# Patient Record
Sex: MALE | Race: WHITE | NOT HISPANIC OR LATINO | Employment: FULL TIME | URBAN - METROPOLITAN AREA
[De-identification: names, ages, dates, MRNs, and addresses within clinical notes are randomized per-mention and may not be internally consistent; named-entity substitution may affect disease eponyms.]

---

## 2017-11-03 ENCOUNTER — HOSPITAL ENCOUNTER (INPATIENT)
Facility: HOSPITAL | Age: 57
LOS: 2 days | Discharge: HOME | End: 2017-11-05
Attending: EMERGENCY MEDICINE | Admitting: SURGERY

## 2017-11-03 ENCOUNTER — APPOINTMENT (OUTPATIENT)
Dept: CT IMAGING | Facility: HOSPITAL | Age: 57
End: 2017-11-03

## 2017-11-03 DIAGNOSIS — K56.609 SBO (SMALL BOWEL OBSTRUCTION) (HCC): Primary | ICD-10-CM

## 2017-11-03 DIAGNOSIS — N20.0 STAGHORN RENAL CALCULUS: ICD-10-CM

## 2017-11-03 LAB
ALBUMIN SERPL-MCNC: 4.9 G/DL (ref 3.5–5.2)
ALBUMIN/GLOB SERPL: 1.5 G/DL
ALP SERPL-CCNC: 77 U/L (ref 39–117)
ALT SERPL W P-5'-P-CCNC: 32 U/L (ref 1–41)
ANION GAP SERPL CALCULATED.3IONS-SCNC: 11.2 MMOL/L
AST SERPL-CCNC: 20 U/L (ref 1–40)
BACTERIA UR QL AUTO: ABNORMAL /HPF
BASOPHILS # BLD AUTO: 0.02 10*3/MM3 (ref 0–0.2)
BASOPHILS NFR BLD AUTO: 0.2 % (ref 0–1.5)
BILIRUB SERPL-MCNC: 1.6 MG/DL (ref 0.1–1.2)
BILIRUB UR QL STRIP: NEGATIVE
BUN BLD-MCNC: 18 MG/DL (ref 6–20)
BUN/CREAT SERPL: 13.5 (ref 7–25)
CALCIUM SPEC-SCNC: 10.4 MG/DL (ref 8.6–10.5)
CHLORIDE SERPL-SCNC: 99 MMOL/L (ref 98–107)
CLARITY UR: CLEAR
CO2 SERPL-SCNC: 31.8 MMOL/L (ref 22–29)
COLOR UR: YELLOW
CREAT BLD-MCNC: 1.33 MG/DL (ref 0.76–1.27)
CYSTINE CRY URNS QL MICRO: ABNORMAL /HPF
DEPRECATED RDW RBC AUTO: 48.4 FL (ref 37–54)
EOSINOPHIL # BLD AUTO: 0.04 10*3/MM3 (ref 0–0.7)
EOSINOPHIL NFR BLD AUTO: 0.3 % (ref 0.3–6.2)
ERYTHROCYTE [DISTWIDTH] IN BLOOD BY AUTOMATED COUNT: 14.4 % (ref 11.5–14.5)
GFR SERPL CREATININE-BSD FRML MDRD: 56 ML/MIN/1.73
GLOBULIN UR ELPH-MCNC: 3.3 GM/DL
GLUCOSE BLD-MCNC: 151 MG/DL (ref 65–99)
GLUCOSE UR STRIP-MCNC: NEGATIVE MG/DL
HCT VFR BLD AUTO: 54.8 % (ref 40.4–52.2)
HGB BLD-MCNC: 18.1 G/DL (ref 13.7–17.6)
HGB UR QL STRIP.AUTO: NEGATIVE
HYALINE CASTS UR QL AUTO: ABNORMAL /LPF
IMM GRANULOCYTES # BLD: 0.05 10*3/MM3 (ref 0–0.03)
IMM GRANULOCYTES NFR BLD: 0.4 % (ref 0–0.5)
KETONES UR QL STRIP: NEGATIVE
LEUKOCYTE ESTERASE UR QL STRIP.AUTO: ABNORMAL
LIPASE SERPL-CCNC: 24 U/L (ref 13–60)
LYMPHOCYTES # BLD AUTO: 0.82 10*3/MM3 (ref 0.9–4.8)
LYMPHOCYTES NFR BLD AUTO: 6.7 % (ref 19.6–45.3)
MCH RBC QN AUTO: 30.8 PG (ref 27–32.7)
MCHC RBC AUTO-ENTMCNC: 33 G/DL (ref 32.6–36.4)
MCV RBC AUTO: 93.2 FL (ref 79.8–96.2)
MONOCYTES # BLD AUTO: 0.93 10*3/MM3 (ref 0.2–1.2)
MONOCYTES NFR BLD AUTO: 7.6 % (ref 5–12)
NEUTROPHILS # BLD AUTO: 10.35 10*3/MM3 (ref 1.9–8.1)
NEUTROPHILS NFR BLD AUTO: 84.8 % (ref 42.7–76)
NITRITE UR QL STRIP: NEGATIVE
PH UR STRIP.AUTO: 7 [PH] (ref 5–8)
PLATELET # BLD AUTO: 167 10*3/MM3 (ref 140–500)
PMV BLD AUTO: 11.8 FL (ref 6–12)
POTASSIUM BLD-SCNC: 4.5 MMOL/L (ref 3.5–5.2)
PROT SERPL-MCNC: 8.2 G/DL (ref 6–8.5)
PROT UR QL STRIP: ABNORMAL
RBC # BLD AUTO: 5.88 10*6/MM3 (ref 4.6–6)
RBC # UR: ABNORMAL /HPF
REF LAB TEST METHOD: ABNORMAL
SODIUM BLD-SCNC: 142 MMOL/L (ref 136–145)
SP GR UR STRIP: 1.02 (ref 1–1.03)
SQUAMOUS #/AREA URNS HPF: ABNORMAL /HPF
UROBILINOGEN UR QL STRIP: ABNORMAL
WBC NRBC COR # BLD: 12.21 10*3/MM3 (ref 4.5–10.7)
WBC UR QL AUTO: ABNORMAL /HPF

## 2017-11-03 PROCEDURE — S0260 H&P FOR SURGERY: HCPCS | Performed by: SURGERY

## 2017-11-03 PROCEDURE — 99284 EMERGENCY DEPT VISIT MOD MDM: CPT

## 2017-11-03 PROCEDURE — 87086 URINE CULTURE/COLONY COUNT: CPT | Performed by: EMERGENCY MEDICINE

## 2017-11-03 PROCEDURE — 25010000002 ONDANSETRON PER 1 MG: Performed by: EMERGENCY MEDICINE

## 2017-11-03 PROCEDURE — 90661 CCIIV3 VAC ABX FR 0.5 ML IM: CPT | Performed by: SURGERY

## 2017-11-03 PROCEDURE — 80053 COMPREHEN METABOLIC PANEL: CPT | Performed by: EMERGENCY MEDICINE

## 2017-11-03 PROCEDURE — 81001 URINALYSIS AUTO W/SCOPE: CPT | Performed by: EMERGENCY MEDICINE

## 2017-11-03 PROCEDURE — G0008 ADMIN INFLUENZA VIRUS VAC: HCPCS | Performed by: SURGERY

## 2017-11-03 PROCEDURE — 25010000002 INFLUENZA VAC SUBUNIT QUAD 0.5 ML SUSPENSION PREFILLED SYRINGE: Performed by: SURGERY

## 2017-11-03 PROCEDURE — 83690 ASSAY OF LIPASE: CPT | Performed by: EMERGENCY MEDICINE

## 2017-11-03 PROCEDURE — 85025 COMPLETE CBC W/AUTO DIFF WBC: CPT | Performed by: EMERGENCY MEDICINE

## 2017-11-03 PROCEDURE — 25010000002 HYDROMORPHONE PER 4 MG: Performed by: SURGERY

## 2017-11-03 PROCEDURE — 87186 SC STD MICRODIL/AGAR DIL: CPT | Performed by: EMERGENCY MEDICINE

## 2017-11-03 PROCEDURE — 25010000002 HYDROMORPHONE PER 4 MG: Performed by: EMERGENCY MEDICINE

## 2017-11-03 PROCEDURE — 74176 CT ABD & PELVIS W/O CONTRAST: CPT

## 2017-11-03 RX ORDER — ROPINIROLE 0.5 MG/1
1 TABLET, FILM COATED ORAL NIGHTLY
COMMUNITY
End: 2021-12-27

## 2017-11-03 RX ORDER — NIFEDIPINE 90 MG/1
90 TABLET, EXTENDED RELEASE ORAL DAILY
COMMUNITY
End: 2017-11-03

## 2017-11-03 RX ORDER — HYDRALAZINE HYDROCHLORIDE 50 MG/1
50 TABLET, FILM COATED ORAL 2 TIMES DAILY
COMMUNITY
End: 2021-12-27

## 2017-11-03 RX ORDER — ONDANSETRON 2 MG/ML
4 INJECTION INTRAMUSCULAR; INTRAVENOUS ONCE
Status: COMPLETED | OUTPATIENT
Start: 2017-11-03 | End: 2017-11-03

## 2017-11-03 RX ORDER — METOPROLOL SUCCINATE 100 MG/1
100 TABLET, EXTENDED RELEASE ORAL 2 TIMES DAILY
COMMUNITY

## 2017-11-03 RX ORDER — LAMOTRIGINE 100 MG/1
100 TABLET ORAL 3 TIMES DAILY
COMMUNITY
End: 2021-12-27

## 2017-11-03 RX ORDER — LEVOTHYROXINE SODIUM 0.05 MG/1
50 TABLET ORAL
COMMUNITY

## 2017-11-03 RX ORDER — DEXTROSE, SODIUM CHLORIDE, AND POTASSIUM CHLORIDE 5; .45; .15 G/100ML; G/100ML; G/100ML
125 INJECTION INTRAVENOUS CONTINUOUS
Status: DISCONTINUED | OUTPATIENT
Start: 2017-11-03 | End: 2017-11-05

## 2017-11-03 RX ORDER — ONDANSETRON 2 MG/ML
4 INJECTION INTRAMUSCULAR; INTRAVENOUS EVERY 6 HOURS PRN
Status: DISCONTINUED | OUTPATIENT
Start: 2017-11-03 | End: 2017-11-05 | Stop reason: HOSPADM

## 2017-11-03 RX ORDER — VILAZODONE HYDROCHLORIDE 40 MG/1
40 TABLET ORAL DAILY
COMMUNITY
End: 2022-11-22

## 2017-11-03 RX ORDER — SODIUM CHLORIDE 0.9 % (FLUSH) 0.9 %
10 SYRINGE (ML) INJECTION AS NEEDED
Status: DISCONTINUED | OUTPATIENT
Start: 2017-11-03 | End: 2017-11-05 | Stop reason: HOSPADM

## 2017-11-03 RX ORDER — POTASSIUM CHLORIDE 1.5 G/1.77G
20 POWDER, FOR SOLUTION ORAL DAILY
COMMUNITY
End: 2022-11-22

## 2017-11-03 RX ORDER — ATORVASTATIN CALCIUM 20 MG/1
20 TABLET, FILM COATED ORAL DAILY
Status: ON HOLD | COMMUNITY
End: 2022-02-15 | Stop reason: SDUPTHER

## 2017-11-03 RX ORDER — HYDROMORPHONE HYDROCHLORIDE 1 MG/ML
0.5 INJECTION, SOLUTION INTRAMUSCULAR; INTRAVENOUS; SUBCUTANEOUS ONCE
Status: COMPLETED | OUTPATIENT
Start: 2017-11-03 | End: 2017-11-03

## 2017-11-03 RX ORDER — LISINOPRIL 20 MG/1
20 TABLET ORAL DAILY
COMMUNITY
End: 2022-01-08 | Stop reason: HOSPADM

## 2017-11-03 RX ORDER — FUROSEMIDE 40 MG/1
40 TABLET ORAL 2 TIMES DAILY
COMMUNITY
End: 2017-11-03

## 2017-11-03 RX ORDER — HYDROMORPHONE HYDROCHLORIDE 1 MG/ML
0.5 INJECTION, SOLUTION INTRAMUSCULAR; INTRAVENOUS; SUBCUTANEOUS
Status: DISCONTINUED | OUTPATIENT
Start: 2017-11-03 | End: 2017-11-05 | Stop reason: HOSPADM

## 2017-11-03 RX ADMIN — POTASSIUM CHLORIDE, DEXTROSE MONOHYDRATE AND SODIUM CHLORIDE 125 ML/HR: 150; 5; 450 INJECTION, SOLUTION INTRAVENOUS at 18:06

## 2017-11-03 RX ADMIN — HYDROMORPHONE HYDROCHLORIDE 0.5 MG: 1 INJECTION, SOLUTION INTRAMUSCULAR; INTRAVENOUS; SUBCUTANEOUS at 21:45

## 2017-11-03 RX ADMIN — A/SINGAPORE/GP1908/2015 IVR-180 (H1N1) (AN A/MICHIGAN/45/2015-LIKE VIRUS), A/SINGAPORE/GP2050/2015 (H3N2) (AN A/HONG KONG/4801/2014 - LIKE VIRUS), B/UTAH/9/2014 (A B/PHUKET/3073/2013-LIKE VIRUS), B/HONG KONG/259/2010 (A B/BRISBANE/60/08-LIKE VIRUS) 0.5 ML: 15; 15; 15; 15 INJECTION, SUSPENSION INTRAMUSCULAR at 16:25

## 2017-11-03 RX ADMIN — HYDROMORPHONE HYDROCHLORIDE 0.5 MG: 1 INJECTION, SOLUTION INTRAMUSCULAR; INTRAVENOUS; SUBCUTANEOUS at 19:11

## 2017-11-03 RX ADMIN — METOPROLOL TARTRATE 5 MG: 1 INJECTION, SOLUTION INTRAVENOUS at 21:40

## 2017-11-03 RX ADMIN — ONDANSETRON 4 MG: 2 INJECTION INTRAMUSCULAR; INTRAVENOUS at 06:02

## 2017-11-03 RX ADMIN — METOPROLOL TARTRATE 5 MG: 1 INJECTION, SOLUTION INTRAVENOUS at 10:08

## 2017-11-03 RX ADMIN — METOPROLOL TARTRATE 5 MG: 1 INJECTION, SOLUTION INTRAVENOUS at 16:24

## 2017-11-03 RX ADMIN — POTASSIUM CHLORIDE, DEXTROSE MONOHYDRATE AND SODIUM CHLORIDE 125 ML/HR: 150; 5; 450 INJECTION, SOLUTION INTRAVENOUS at 10:08

## 2017-11-03 RX ADMIN — HYDROMORPHONE HYDROCHLORIDE 0.5 MG: 1 INJECTION, SOLUTION INTRAMUSCULAR; INTRAVENOUS; SUBCUTANEOUS at 06:02

## 2017-11-03 NOTE — H&P
Cc: Abdominal pain, nausea    History of presenting illness:   This is a very nice 57-year-old gentleman who has a remote history of appendectomy done around 40 years ago and more recently had an open umbilical hernia repair done.  He's had no other abdominal surgery.  He said he was feeling well until last night when he had the onset of increasing abdominal pain, particularly in the lower quadrants which had a twisting type feeling.  He had some associated nausea but no vomiting.  No fever.  He felt he needed to move his bowels but he could not.  The pain increased and so he came to the emergency room for further evaluation.  He has a prior history of multiple renal stones but this pain did not seem similar to him.    Past Medical History: Hyperlipidemia, hypothyroidism, hypertension, restless leg    Past Surgical History: Remote appendectomy open, open umbilical hernia repair, tonsils and adenoids, multiple kidney stone procedures    Medications: Reviewed and reconciled with in Epic    Allergies: None known    Social History: Nonsmoker, uses alcohol rarely, employed and able to function and always he thinks he should be able to    Family History: Noncontributory    Review of Systems:  Constitutional: Negative for chills, fever or change in weight  Neck: no swollen glands or dysphagia or odynophagia  Respiratory: negative for SOB, cough, hemoptysis or wheezing  Cardiovascular: negative for chest pain, palpitations or peripheral edema  Gastrointestinal: Positive for abdominal pain, nausea and constipation      Physical Exam:    General: alert and oriented, appropriate, no acute distress  Neck: Supple without lymphadenopathy or thyromegaly, trachea is in the midline  Respiratory: Lungs are clear bilaterally without wheezing, no use of accessory muscles is noted  Cardiovascular: Regular rate and rhythm without murmur, no peripheral edema  Gastrointestinal: Obese but soft, moderately tender, particularly in the right  lower quadrant.  Abdomen is distended.  No guarding.    Laboratory data: WBC 12.2, Hgb 18.1.  Cr 1.33.    Imaging data: CT of the abdomen and pelvis reviewed.  The majority of the small bowel is moderately dilated and to get to the right lower quadrant with her a few decompressed loops seen.  The colon is relatively decompressed.  No evidence for free fluid, abscess or perforation.      Assessment and plan:   Partial small bowel obstruction, likely secondary to adhesive disease.  Clinically the patient has improved to the course of the day to see if we can get his nasogastric tube out.  He's had several bowel movements through the day today and has no more pain.  If he is doing well I think we could start him on a diet tomorrow and potentially let him go home.  He appears to be improving greatly.      Sha Vasquez MD, FACS  General, Minimally Invasive and Endoscopic Surgery  [facility]    [address], [address]  [place], [place], [address]  P: [phone]  F: [phone]

## 2017-11-03 NOTE — PROGRESS NOTES
Clinical Pharmacy Services: Medication History    Calos Moseley is a 57 y.o. male presenting to [facility] Emergency Department with chief complaint of:       Chief Complaint   Patient presents with   • Abdominal Pain     He  has a past medical history of Stroke.    Allergies as of 11/03/2017   • (No Known Allergies)       Medication information was obtained from: patient and pharmacy  Pharmacy and Phone Number: [facility] [address] [place], [place] - [address] [facility] [phone] [facility] [phone] FX    Prior to Admission Medications       Prescriptions Last Dose Informant Patient Reported? Taking?      atorvastatin (LIPITOR) 20 MG tablet 11/1/2017 Self Yes Yes    Take 20 mg by mouth Daily.    hydrALAZINE (APRESOLINE) 50 MG tablet 11/1/2017 Pharmacy Yes Yes    Take 50 mg by mouth 2 (Two) Times a Day.    lamoTRIgine (LaMICtal) 100 MG tablet 11/1/2017 Pharmacy Yes Yes    Take 100 mg by mouth 3 (Three) Times a Day.    levothyroxine (SYNTHROID, LEVOTHROID) 50 MCG tablet 11/1/2017 Self Yes Yes    Take 50 mcg by mouth Daily.    lisinopril (PRINIVIL,ZESTRIL) 20 MG tablet 11/1/2017 Pharmacy Yes Yes    Take 20 mg by mouth 2 (Two) Times a Day.    metoprolol succinate XL (TOPROL-XL) 100 MG 24 hr tablet 11/1/2017 Pharmacy Yes Yes    Take 100 mg by mouth 2 (Two) Times a Day.    naltrexone-bupropion ER (CONTRAVE) 8-90 MG tablet 11/1/2017 Self Yes Yes    Take 1 tablet by mouth 2 (Two) Times a Day.    potassium chloride (KLOR-CON) 20 MEQ packet 11/1/2017 Pharmacy Yes Yes    Take 20 mEq by mouth Daily.    rOPINIRole (REQUIP) 0.5 MG tablet 11/1/2017 Pharmacy Yes Yes    Take 1 mg by mouth Every Night. Take 1 hour before bedtime.     vilazodone (VIIBRYD) 40 MG tablet tablet 11/1/2017 Pharmacy Yes Yes    Take 40 mg by mouth Daily.          Medication notes: Pharmacy stated that the patient's most recent prescription for Contrave had directions of 2 tablets BID, but the patient said the doctor recently told him to start  taking 1 tablet BID.     This medication list is complete to the best of my knowledge as of 11/3/2017    Please call pharmacy with questions.    Emma De Leon  Pharmacy Intern  11/3/2017 9:58 AM

## 2017-11-03 NOTE — ED PROVIDER NOTES
EMERGENCY DEPARTMENT ENCOUNTER    CHIEF COMPLAINT  Chief Complaint: Abdominal pain  History given by: patient   History limited by: n/a  Room Number: 18/18  PMD: Naveed Nunez DO      HPI:  Pt is a 57 y.o. male who presents complaining of mid abd pain that began last night at 18:00. Pt also complains of abd distention and constipation, but denies nausea, vomiting, diarrhea, or urinary sx. Pt states that he has a hx of kidney stones, but states that the pain today is not similar. Hx of appendectomy.     Duration:  11 hours   Onset: gradual  Timing: constant   Location: mid abd   Radiation: none  Quality: pain  Intensity/Severity: moderate  Progression: unchanged   Associated Symptoms: constipation, abd distention   Previous Episodes: none  Treatment before arrival: none    PAST MEDICAL HISTORY  Active Ambulatory Problems     Diagnosis Date Noted   • No Active Ambulatory Problems     Resolved Ambulatory Problems     Diagnosis Date Noted   • No Resolved Ambulatory Problems     Past Medical History:   Diagnosis Date   • Stroke        PAST SURGICAL HISTORY  Past Surgical History:   Procedure Laterality Date   • ADENOIDECTOMY     • APPENDECTOMY     • KIDNEY STONE SURGERY     • TONSILLECTOMY         FAMILY HISTORY  History reviewed. No pertinent family history.    SOCIAL HISTORY  Social History     Social History   • Marital status:      Spouse name: N/A   • Number of children: N/A   • Years of education: N/A     Occupational History   • Not on file.     Social History Main Topics   • Smoking status: Never Smoker   • Smokeless tobacco: Not on file   • Alcohol use No   • Drug use: No   • Sexual activity: Not on file     Other Topics Concern   • Not on file     Social History Narrative   • No narrative on file       ALLERGIES  Review of patient's allergies indicates no known allergies.    REVIEW OF SYSTEMS  Review of Systems   Constitutional: Negative for chills and fever.   HENT: Negative for congestion and  sore throat.    Eyes: Negative.    Respiratory: Negative for cough and shortness of breath.    Cardiovascular: Negative for chest pain and leg swelling.   Gastrointestinal: Positive for abdominal distention, abdominal pain and constipation. Negative for diarrhea and vomiting.   Genitourinary: Negative for difficulty urinating and dysuria.   Musculoskeletal: Negative for back pain and neck pain.   Skin: Negative for rash and wound.   Allergic/Immunologic: Negative.    Neurological: Negative for dizziness, weakness, numbness and headaches.   Psychiatric/Behavioral: Negative.    All other systems reviewed and are negative.      PHYSICAL EXAM  ED Triage Vitals   Temp Heart Rate Resp BP SpO2   11/03/17 0509 11/03/17 0509 11/03/17 0509 11/03/17 0513 11/03/17 0509   99.4 °F (37.4 °C) 62 14 150/65 94 %      Temp src Heart Rate Source Patient Position BP Location FiO2 (%)   11/03/17 0509 11/03/17 0509 11/03/17 0513 11/03/17 0513 --   Tympanic Monitor Standing Right arm        Physical Exam   Constitutional: He is oriented to person, place, and time. He appears distressed.   HENT:   Head: Normocephalic and atraumatic.   Eyes: EOM are normal. Pupils are equal, round, and reactive to light.   Neck: Normal range of motion. Neck supple.   Cardiovascular: Normal rate, regular rhythm and normal heart sounds.    Pulmonary/Chest: Effort normal and breath sounds normal. No respiratory distress.   Abdominal: Soft. Bowel sounds are normal. He exhibits distension (mild). He exhibits no mass. There is tenderness in the periumbilical area. There is no rebound and no guarding.   Musculoskeletal: Normal range of motion. He exhibits no edema.   Neurological: He is alert and oriented to person, place, and time. He has normal sensation and normal strength.   Skin: Skin is warm and dry.   Psychiatric: Mood and affect normal.   Nursing note and vitals reviewed.      LAB RESULTS  Lab Results (last 24 hours)     Procedure Component Value Units  Date/Time    CBC & Differential [[id number]] Collected:  11/03/17 0602    Specimen:  Blood Updated:  11/03/17 0612    Narrative:       The following orders were created for panel order CBC & Differential.  Procedure                               Abnormality         Status                     ---------                               -----------         ------                     CBC Auto Differential[[id number]]        Abnormal            Final result                 Please view results for these tests on the individual orders.    Comprehensive Metabolic Panel [[id number]]  (Abnormal) Collected:  11/03/17 0602    Specimen:  Blood Updated:  11/03/17 0634     Glucose 151 (H) mg/dL      BUN 18 mg/dL      Creatinine 1.33 (H) mg/dL      Sodium 142 mmol/L      Potassium 4.5 mmol/L      Chloride 99 mmol/L      CO2 31.8 (H) mmol/L      Calcium 10.4 mg/dL      Total Protein 8.2 g/dL      Albumin 4.90 g/dL      ALT (SGPT) 32 U/L      AST (SGOT) 20 U/L      Alkaline Phosphatase 77 U/L      Total Bilirubin 1.6 (H) mg/dL      eGFR Non African Amer 56 (L) mL/min/1.73      Globulin 3.3 gm/dL      A/G Ratio 1.5 g/dL      BUN/Creatinine Ratio 13.5     Anion Gap 11.2 mmol/L     Lipase [[id number]]  (Normal) Collected:  11/03/17 0602    Specimen:  Blood Updated:  11/03/17 0633     Lipase 24 U/L     CBC Auto Differential [[id number]]  (Abnormal) Collected:  11/03/17 0602    Specimen:  Blood Updated:  11/03/17 0612     WBC 12.21 (H) 10*3/mm3      RBC 5.88 10*6/mm3      Hemoglobin 18.1 (H) g/dL      Hematocrit 54.8 (H) %      MCV 93.2 fL      MCH 30.8 pg      MCHC 33.0 g/dL      RDW 14.4 %      RDW-SD 48.4 fl      MPV 11.8 fL      Platelets 167 10*3/mm3      Neutrophil % 84.8 (H) %      Lymphocyte % 6.7 (L) %      Monocyte % 7.6 %      Eosinophil % 0.3 %      Basophil % 0.2 %      Immature Grans % 0.4 %      Neutrophils, Absolute 10.35 (H) 10*3/mm3      Lymphocytes, Absolute 0.82 (L) 10*3/mm3      Monocytes, Absolute 0.93 10*3/mm3       Eosinophils, Absolute 0.04 10*3/mm3      Basophils, Absolute 0.02 10*3/mm3      Immature Grans, Absolute 0.05 (H) 10*3/mm3     Urinalysis With / Culture If Indicated - Urine, Clean Catch [[id number]]  (Abnormal) Collected:  11/03/17 0613    Specimen:  Urine from Urine, Clean Catch Updated:  11/03/17 0626     Color, UA Yellow     Appearance, UA Clear     pH, UA 7.0     Specific Gravity, UA 1.022     Glucose, UA Negative     Ketones, UA Negative     Bilirubin, UA Negative     Blood, UA Negative     Protein, UA 30 mg/dL (1+) (A)     Leuk Esterase, UA Moderate (2+) (A)     Nitrite, UA Negative     Urobilinogen, UA 0.2 E.U./dL    Urinalysis, Microscopic Only - Urine, Clean Catch [[id number]] Collected:  11/03/17 0613    Specimen:  Urine from Urine, Clean Catch Updated:  11/03/17 0625    Urine Culture - Urine, Urine, Clean Catch [[id number]] Collected:  11/03/17 0613    Specimen:  Urine from Urine, Clean Catch Updated:  11/03/17 0625          I ordered the above labs and reviewed the results    RADIOLOGY  CT Abdomen Pelvis Without Contrast   Final Result   1. Nonobstructing 2 mm right kidney stone.  2. 16 mm nonobstructing left kidney stone. Multiple additional kidney  stones which are nonobstructive including a group measuring up to 15 mm  middle pole, 34 x 18 mm pelvic stone and multiple additional  nonobstructing kidney stones with mild hydronephrosis likely chronic.  3. Mechanical small bowel obstruction with dilated proximal small bowel  decompressed terminal ileum and feces filled normal appearing colon..  4. Prominent gastric distention, NG tube placement suggested.  5. Prominent osteophyte formation lower thoracic spine.  6. Mild basilar calcification without aneurysm, retroaortic left renal  venous drainage.  7. Minimal ascites around the liver, otherwise normal appearing liver,  gallbladder, biliary system, spleen, adrenal glands and pancreas.          I ordered the above noted radiological studies. Interpreted by  radiologist. Discussed with radiologist (Dr Crooks). Reviewed by me in PACS.       PROCEDURES  Procedures      PROGRESS AND CONSULTS  ED Course     05:39  BP- 150/65 HR- 62 Temp- 99.4 °F (37.4 °C) (Tympanic) O2 sat- 94%   Advised pt of the plan for labs and CT. Will treat pain. Pt understands and agrees with the plan, all questions answered.    05:41  CBC, CMP, lipase, UA, and CT abd/pelvis ordered. Dilaudid and Zofran ordered to treat pain.     06:29  NG tube insertion ordered to treat SBO.     06:34  BP- 150/65 HR- 62 Temp- 99.4 °F (37.4 °C) (Tympanic) O2 sat- 94%  Rechecked the patient who is in NAD and is resting comfortably. Advised pt of the CT findings of a SBO and staghorn calculus in the left kidney. Informed him of the plan for admission and NG tube. Pt understands and agrees with the plan, all questions answered.    06:36  Call placed to general surgery for consult.     06:44  Discussed pt's case with Dr Vasquez (general surgery), who agrees to admit the pt.     MEDICAL DECISION MAKING  Results were reviewed/discussed with the patient and they were also made aware of online access. Pt also made aware that some labs, such as cultures, will not be resulted during ER visit and follow up with PMD is necessary.     MDM  Number of Diagnoses or Management Options     Amount and/or Complexity of Data Reviewed  Clinical lab tests: ordered and reviewed (WBC=12.21)  Tests in the radiology section of CPT®: ordered and reviewed (CT abd/pelvis shows mechanical SBO. Staghorn calculus in the left kidney)  Discussion of test results with the performing providers: yes (Dr Crooks)  Discuss the patient with other providers: yes (Dr Vasquez, general surgery)    Patient Progress  Patient progress: stable         DIAGNOSIS  Final diagnoses:   SBO (small bowel obstruction)   Staghorn renal calculus       DISPOSITION  ADMISSION    Discussed treatment plan and reason for admission with pt/family and admitting physician.   Pt/family voiced understanding of the plan for admission for further testing/treatment as needed.     Latest Documented Vital Signs:  As of 6:46 AM  BP- 150/65 HR- 62 Temp- 99.4 °F (37.4 °C) (Tympanic) O2 sat- 94%    --  Documentation assistance provided by fei Morel for Dr Rivera.  Information recorded by the scribe was done at my direction and has been verified and validated by me.        More Morel  11/03/17 0646       Jacobo Rivera MD  11/03/17 0654

## 2017-11-03 NOTE — PLAN OF CARE
Problem: Patient Care Overview (Adult)  Goal: Plan of Care Review  Outcome: Ongoing (interventions implemented as appropriate)    11/03/17 1526   Coping/Psychosocial Response Interventions   Plan Of Care Reviewed With patient   Patient Care Overview   Progress no change   Outcome Evaluation   Outcome Summary/Follow up Plan Pt admitted from ER with a SBO as well as kkee. kidney stones. NG tube to low-wall suction and pt is NPO. No real complaints of pain or nausea. VSS, will continue to monitor.         Problem: Pain, Acute (Adult)  Goal: Identify Related Risk Factors and Signs and Symptoms  Outcome: Ongoing (interventions implemented as appropriate)  Goal: Acceptable Pain Control/Comfort Level  Outcome: Ongoing (interventions implemented as appropriate)    Problem: Bowel Obstruction (Adult)  Goal: Signs and Symptoms of Listed Potential Problems Will be Absent or Manageable (Bowel Obstruction)  Outcome: Ongoing (interventions implemented as appropriate)

## 2017-11-04 LAB
ANION GAP SERPL CALCULATED.3IONS-SCNC: 11.9 MMOL/L
BASOPHILS # BLD AUTO: 0.02 10*3/MM3 (ref 0–0.2)
BASOPHILS NFR BLD AUTO: 0.2 % (ref 0–1.5)
BUN BLD-MCNC: 16 MG/DL (ref 6–20)
BUN/CREAT SERPL: 11.6 (ref 7–25)
CALCIUM SPEC-SCNC: 8.6 MG/DL (ref 8.6–10.5)
CHLORIDE SERPL-SCNC: 103 MMOL/L (ref 98–107)
CO2 SERPL-SCNC: 28.1 MMOL/L (ref 22–29)
CREAT BLD-MCNC: 1.38 MG/DL (ref 0.76–1.27)
DEPRECATED RDW RBC AUTO: 50.6 FL (ref 37–54)
EOSINOPHIL # BLD AUTO: 0.36 10*3/MM3 (ref 0–0.7)
EOSINOPHIL NFR BLD AUTO: 4.2 % (ref 0.3–6.2)
ERYTHROCYTE [DISTWIDTH] IN BLOOD BY AUTOMATED COUNT: 14.7 % (ref 11.5–14.5)
GFR SERPL CREATININE-BSD FRML MDRD: 53 ML/MIN/1.73
GLUCOSE BLD-MCNC: 112 MG/DL (ref 65–99)
HCT VFR BLD AUTO: 47.2 % (ref 40.4–52.2)
HGB BLD-MCNC: 15 G/DL (ref 13.7–17.6)
IMM GRANULOCYTES # BLD: 0.02 10*3/MM3 (ref 0–0.03)
IMM GRANULOCYTES NFR BLD: 0.2 % (ref 0–0.5)
LYMPHOCYTES # BLD AUTO: 2.09 10*3/MM3 (ref 0.9–4.8)
LYMPHOCYTES NFR BLD AUTO: 24.4 % (ref 19.6–45.3)
MCH RBC QN AUTO: 30.1 PG (ref 27–32.7)
MCHC RBC AUTO-ENTMCNC: 31.8 G/DL (ref 32.6–36.4)
MCV RBC AUTO: 94.6 FL (ref 79.8–96.2)
MONOCYTES # BLD AUTO: 0.99 10*3/MM3 (ref 0.2–1.2)
MONOCYTES NFR BLD AUTO: 11.6 % (ref 5–12)
NEUTROPHILS # BLD AUTO: 5.07 10*3/MM3 (ref 1.9–8.1)
NEUTROPHILS NFR BLD AUTO: 59.4 % (ref 42.7–76)
PLATELET # BLD AUTO: 145 10*3/MM3 (ref 140–500)
PMV BLD AUTO: 11.6 FL (ref 6–12)
POTASSIUM BLD-SCNC: 4.1 MMOL/L (ref 3.5–5.2)
RBC # BLD AUTO: 4.99 10*6/MM3 (ref 4.6–6)
SODIUM BLD-SCNC: 143 MMOL/L (ref 136–145)
WBC NRBC COR # BLD: 8.55 10*3/MM3 (ref 4.5–10.7)

## 2017-11-04 PROCEDURE — 99231 SBSQ HOSP IP/OBS SF/LOW 25: CPT | Performed by: SURGERY

## 2017-11-04 PROCEDURE — 85025 COMPLETE CBC W/AUTO DIFF WBC: CPT | Performed by: SURGERY

## 2017-11-04 PROCEDURE — 80048 BASIC METABOLIC PNL TOTAL CA: CPT | Performed by: SURGERY

## 2017-11-04 PROCEDURE — 36415 COLL VENOUS BLD VENIPUNCTURE: CPT | Performed by: SURGERY

## 2017-11-04 RX ORDER — ROPINIROLE 0.5 MG/1
0.5 TABLET, FILM COATED ORAL NIGHTLY
Status: DISCONTINUED | OUTPATIENT
Start: 2017-11-04 | End: 2017-11-05 | Stop reason: HOSPADM

## 2017-11-04 RX ADMIN — METOPROLOL TARTRATE 5 MG: 1 INJECTION, SOLUTION INTRAVENOUS at 18:44

## 2017-11-04 RX ADMIN — METOPROLOL TARTRATE 5 MG: 1 INJECTION, SOLUTION INTRAVENOUS at 04:25

## 2017-11-04 RX ADMIN — METOPROLOL TARTRATE 5 MG: 1 INJECTION, SOLUTION INTRAVENOUS at 10:26

## 2017-11-04 RX ADMIN — ROPINIROLE HYDROCHLORIDE 0.5 MG: 0.5 TABLET, FILM COATED ORAL at 20:53

## 2017-11-04 RX ADMIN — POTASSIUM CHLORIDE, DEXTROSE MONOHYDRATE AND SODIUM CHLORIDE 125 ML/HR: 150; 5; 450 INJECTION, SOLUTION INTRAVENOUS at 18:45

## 2017-11-04 RX ADMIN — POTASSIUM CHLORIDE, DEXTROSE MONOHYDRATE AND SODIUM CHLORIDE 125 ML/HR: 150; 5; 450 INJECTION, SOLUTION INTRAVENOUS at 10:26

## 2017-11-04 NOTE — PLAN OF CARE
Problem: Patient Care Overview (Adult)  Goal: Plan of Care Review  Outcome: Ongoing (interventions implemented as appropriate)    11/04/17 0440 11/04/17 7557   Coping/Psychosocial Response Interventions   Plan Of Care Reviewed With patient --    Patient Care Overview   Progress improving --    Outcome Evaluation   Outcome Summary/Follow up Plan --  Pt has had no complaints of pain or nausea. Clear liquid diet for breakfast and lunch, pt tolerated, diet advanced to full liquids. VSS, will continue to monitor. Possible discharge home tomorrow.         Problem: Pain, Acute (Adult)  Goal: Identify Related Risk Factors and Signs and Symptoms  Outcome: Ongoing (interventions implemented as appropriate)  Goal: Acceptable Pain Control/Comfort Level  Outcome: Ongoing (interventions implemented as appropriate)    Problem: Bowel Obstruction (Adult)  Goal: Signs and Symptoms of Listed Potential Problems Will be Absent or Manageable (Bowel Obstruction)  Outcome: Ongoing (interventions implemented as appropriate)

## 2017-11-04 NOTE — PROGRESS NOTES
Daily Progress Note    Chief Complaint: N&V    Subjective     Pt reports no further N&V, +BM    Objective     Vital signs in last 24 hours:  Temp:  [97.2 °F (36.2 °C)-98.3 °F (36.8 °C)] 97.4 °F (36.3 °C)  Heart Rate:  [57-66] 57  Resp:  [16-18] 16  BP: (134-162)/(70-92) 148/81    Intake/Output last 3 shifts:     Physical Exam:  Respiratory: CTA, normal inspiratory effort  CV: RRR, no JVD  Abd: Positive BS, soft, ND, NT, no rebound, no guarding  Ext:  No cyanosis, no edema    Results from last 7 days  Lab Units 11/04/17  0501   WBC 10*3/mm3 8.55   HEMOGLOBIN g/dL 15.0   HEMATOCRIT % 47.2   PLATELETS 10*3/mm3 145       Results from last 7 days  Lab Units 11/04/17  0501   SODIUM mmol/L 143   POTASSIUM mmol/L 4.1   CHLORIDE mmol/L 103   CO2 mmol/L 28.1   BUN mg/dL 16   CREATININE mg/dL 1.38*   GLUCOSE mg/dL 112*   CALCIUM mg/dL 8.6       Assessment/Plan   PSBO  Resolving - adv diet    Soheila Moreno MD  General, Minimally Invasive and Endoscopic Surgery  [facility]

## 2017-11-04 NOTE — PLAN OF CARE
Problem: Patient Care Overview (Adult)  Goal: Plan of Care Review  Outcome: Ongoing (interventions implemented as appropriate)    11/04/17 0440   Coping/Psychosocial Response Interventions   Plan Of Care Reviewed With patient   Patient Care Overview   Progress improving   Outcome Evaluation   Outcome Summary/Follow up Plan Pt showed no signs of nausea or vomiting; NG tube removed at 2200 per order, pt remains comfortable. No further complaints of abdominal pain, only nasal/throat pain related to NG tube. Remains NPO. VSS       Goal: Discharge Needs Assessment  Outcome: Ongoing (interventions implemented as appropriate)    11/04/17 0440   Discharge Needs Assessment   Concerns To Be Addressed no discharge needs identified;denies needs/concerns at this time         Problem: Pain, Acute (Adult)  Goal: Identify Related Risk Factors and Signs and Symptoms  Outcome: Ongoing (interventions implemented as appropriate)  Goal: Acceptable Pain Control/Comfort Level  Outcome: Ongoing (interventions implemented as appropriate)    11/04/17 0440   Pain, Acute (Adult)   Acceptable Pain Control/Comfort Level making progress toward outcome         Problem: Bowel Obstruction (Adult)  Goal: Signs and Symptoms of Listed Potential Problems Will be Absent or Manageable (Bowel Obstruction)  Outcome: Ongoing (interventions implemented as appropriate)    11/04/17 0440   Bowel Obstruction   Problems Assessed (Bowel Obstruction) all   Problems Present (Bowel Obstruction) diarrhea

## 2017-11-05 VITALS
SYSTOLIC BLOOD PRESSURE: 159 MMHG | DIASTOLIC BLOOD PRESSURE: 87 MMHG | TEMPERATURE: 97.3 F | WEIGHT: 230.5 LBS | OXYGEN SATURATION: 99 % | HEIGHT: 66 IN | BODY MASS INDEX: 37.04 KG/M2 | RESPIRATION RATE: 16 BRPM | HEART RATE: 62 BPM

## 2017-11-05 LAB
BACTERIA SPEC AEROBE CULT: ABNORMAL
BACTERIA SPEC AEROBE CULT: ABNORMAL

## 2017-11-05 PROCEDURE — 99231 SBSQ HOSP IP/OBS SF/LOW 25: CPT | Performed by: SURGERY

## 2017-11-05 RX ADMIN — METOPROLOL TARTRATE 5 MG: 1 INJECTION, SOLUTION INTRAVENOUS at 04:23

## 2017-11-05 RX ADMIN — METOPROLOL TARTRATE 5 MG: 1 INJECTION, SOLUTION INTRAVENOUS at 10:59

## 2017-11-05 RX ADMIN — POTASSIUM CHLORIDE, DEXTROSE MONOHYDRATE AND SODIUM CHLORIDE 125 ML/HR: 150; 5; 450 INJECTION, SOLUTION INTRAVENOUS at 01:48

## 2017-11-05 NOTE — PLAN OF CARE
Problem: Patient Care Overview (Adult)  Goal: Plan of Care Review  Outcome: Ongoing (interventions implemented as appropriate)    11/05/17 0407   Coping/Psychosocial Response Interventions   Plan Of Care Reviewed With patient   Patient Care Overview   Progress improving   Outcome Evaluation   Outcome Summary/Follow up Plan Pt still tolerating full liquid diet well. No complaints of pain or n/v. Plan to advance to regular diet today. Pt hopeful for d/c. HR low while sleeping, but otherwise VSS       Goal: Discharge Needs Assessment  Outcome: Ongoing (interventions implemented as appropriate)    11/05/17 0407   Discharge Needs Assessment   Concerns To Be Addressed no discharge needs identified;denies needs/concerns at this time         Problem: Pain, Acute (Adult)  Goal: Identify Related Risk Factors and Signs and Symptoms  Outcome: Ongoing (interventions implemented as appropriate)  Goal: Acceptable Pain Control/Comfort Level  Outcome: Ongoing (interventions implemented as appropriate)    11/05/17 0407   Pain, Acute (Adult)   Acceptable Pain Control/Comfort Level making progress toward outcome         Problem: Bowel Obstruction (Adult)  Goal: Signs and Symptoms of Listed Potential Problems Will be Absent or Manageable (Bowel Obstruction)  Outcome: Ongoing (interventions implemented as appropriate)    11/05/17 0407   Bowel Obstruction   Problems Assessed (Bowel Obstruction) all   Problems Present (Bowel Obstruction) diarrhea

## 2017-11-05 NOTE — PROGRESS NOTES
Daily Progress Note    Chief Complaint: N&V    Subjective     Pt reports passing flatus, +bms, tolerating reg diet, pain resolved, no N&V  Objective     Vital signs in last 24 hours:  Temp:  [97.2 °F (36.2 °C)-98.5 °F (36.9 °C)] 97.3 °F (36.3 °C)  Heart Rate:  [55-62] 62  Resp:  [16-18] 16  BP: (138-180)/(75-94) 159/87    Intake/Output last 3 shifts:  I/O last 3 completed shifts:  In: -   Out: 700 [Urine:700]    Physical Exam:  Respiratory: CTA, normal inspiratory effort  CV: RRR, no JVD  Abd: Positive BS, soft, ND, NT, no rebound, no guarding  Ext:  No cyanosis, no edema    Assessment/Plan   PSBO   Resolved   D/c home  F/u PRN  Soheila Moreno MD  General, Minimally Invasive and Endoscopic Surgery  [facility] [place]

## 2017-11-05 NOTE — PLAN OF CARE
Problem: Patient Care Overview (Adult)  Goal: Plan of Care Review  Outcome: Ongoing (interventions implemented as appropriate)    11/05/17 1245   Coping/Psychosocial Response Interventions   Plan Of Care Reviewed With patient   Patient Care Overview   Progress no change   Outcome Evaluation   Outcome Summary/Follow up Plan Pt being discharged home. no new medications, no pain. vitals stable.        Goal: Adult Individualization and Mutuality  Outcome: Ongoing (interventions implemented as appropriate)  Goal: Discharge Needs Assessment  Outcome: Ongoing (interventions implemented as appropriate)    Problem: Pain, Acute (Adult)  Goal: Identify Related Risk Factors and Signs and Symptoms  Outcome: Outcome(s) achieved Date Met:  11/05/17  Goal: Acceptable Pain Control/Comfort Level  Outcome: Ongoing (interventions implemented as appropriate)    Problem: Bowel Obstruction (Adult)  Goal: Signs and Symptoms of Listed Potential Problems Will be Absent or Manageable (Bowel Obstruction)  Outcome: Ongoing (interventions implemented as appropriate)

## 2017-11-06 NOTE — PROGRESS NOTES
Case Management Discharge Note    Final Note: DC'd home    Discharge Placement     No information found        Other: Other (private auto)    Discharge Codes: 01  Discharge to home

## 2017-11-17 ENCOUNTER — HOSPITAL ENCOUNTER (OUTPATIENT)
Dept: OTHER | Facility: HOSPITAL | Age: 57
Discharge: HOME | End: 2017-11-17
Attending: UROLOGY | Admitting: UROLOGY

## 2017-11-17 LAB
ABO + RH BLD: NORMAL
ANION GAP SERPL CALC-SCNC: 10.9 MMOL/L (ref 10–20)
APTT BLD: 24.5 SEC (ref 24–31)
ARMBAND: NORMAL
BASOPHILS # BLD AUTO: 0.1 10*3/UL (ref 0–0.2)
BASOPHILS NFR BLD AUTO: 1 % (ref 0–2)
BLD COMPONENT TYPE: NORMAL
BLD GP AB SCN SERPL QL: NEGATIVE
BPU ID: NORMAL
BPU ID: NORMAL
BUN SERPL-MCNC: 14 MG/DL (ref 8–20)
BUN/CREAT SERPL: 12.7 (ref 6.2–20.3)
CALCIUM SERPL-MCNC: 9.4 MG/DL (ref 8.9–10.3)
CHLORIDE SERPL-SCNC: 106 MMOL/L (ref 101–111)
CONV CO2: 27 MMOL/L (ref 22–32)
CONV PRODUCT 1 STATUS: NORMAL
CONV PRODUCT 1 STATUS: NORMAL
CREAT UR-MCNC: 1.1 MG/DL (ref 0.7–1.2)
CROSSMATCH EXPIRATION: NORMAL
CROSSMATCH INTERPRETATION: NORMAL
CROSSMATCH INTERPRETATION: NORMAL
CROSSMATCH: NORMAL
DIFFERENTIAL METHOD BLD: (no result)
EOSINOPHIL # BLD AUTO: 0.3 10*3/UL (ref 0–0.3)
EOSINOPHIL # BLD AUTO: 4 % (ref 0–3)
ERYTHROCYTE [DISTWIDTH] IN BLOOD BY AUTOMATED COUNT: 14.3 % (ref 11.5–14.5)
GLUCOSE SERPL-MCNC: 119 MG/DL (ref 65–99)
HCT VFR BLD AUTO: 50 % (ref 40–54)
HGB BLD-MCNC: 16.5 G/DL (ref 14–18)
INR PPP: 0.9
LYMPHOCYTES # BLD AUTO: 1.2 10*3/UL (ref 0.8–4.8)
LYMPHOCYTES NFR BLD AUTO: 17 % (ref 18–42)
MCH RBC QN AUTO: 29.7 PG (ref 26–32)
MCHC RBC AUTO-ENTMCNC: 33 G/DL (ref 32–36)
MCV RBC AUTO: 90 FL (ref 80–94)
MONOCYTES # BLD AUTO: 0.6 10*3/UL (ref 0.1–1.3)
MONOCYTES NFR BLD AUTO: 9 % (ref 2–11)
NEUTROPHILS # BLD AUTO: 4.9 10*3/UL (ref 2.3–8.6)
NEUTROPHILS NFR BLD AUTO: 69 % (ref 50–75)
NRBC BLD AUTO-RTO: 0 /100{WBCS}
NRBC/RBC NFR BLD MANUAL: 0 10*3/UL
NUM BPU REQUESTED: 2
PLATELET # BLD AUTO: 151 10*3/UL (ref 150–450)
PMV BLD AUTO: 10.4 FL (ref 7.4–10.4)
POTASSIUM SERPL-SCNC: 3.9 MMOL/L (ref 3.6–5.1)
PROTHROMBIN TIME: 10.2 SEC (ref 9.6–11.7)
RBC # BLD AUTO: 5.55 10*6/UL (ref 4.6–6)
SODIUM SERPL-SCNC: 140 MMOL/L (ref 136–144)
TRANS STATUS: NORMAL
TRANS STATUS: NORMAL
UNIT DIVISION: 0
UNIT DIVISION: 0
WBC # BLD AUTO: 7 10*3/UL (ref 4.5–11.5)

## 2017-11-20 ENCOUNTER — HOSPITAL ENCOUNTER (OUTPATIENT)
Dept: OTHER | Facility: HOSPITAL | Age: 57
Setting detail: SPECIMEN
Discharge: HOME | End: 2017-11-20
Attending: UROLOGY | Admitting: UROLOGY

## 2017-11-21 ENCOUNTER — HOSPITAL ENCOUNTER (OUTPATIENT)
Dept: OTHER | Facility: HOSPITAL | Age: 57
Setting detail: SPECIMEN
Discharge: HOME | End: 2017-11-21
Attending: UROLOGY | Admitting: UROLOGY

## 2017-11-21 LAB — SPECIMEN SOURCE: NORMAL

## 2020-06-23 ENCOUNTER — TRANSCRIBE ORDERS (OUTPATIENT)
Dept: ADMINISTRATIVE | Facility: HOSPITAL | Age: 60
End: 2020-06-23

## 2020-06-23 DIAGNOSIS — R79.89 ELEVATED LFTS: Primary | ICD-10-CM

## 2020-06-26 ENCOUNTER — HOSPITAL ENCOUNTER (OUTPATIENT)
Dept: ULTRASOUND IMAGING | Facility: HOSPITAL | Age: 60
Discharge: HOME | End: 2020-06-26
Admitting: FAMILY MEDICINE

## 2020-06-26 DIAGNOSIS — R79.89 ELEVATED LFTS: ICD-10-CM

## 2020-06-26 PROCEDURE — 76705 ECHO EXAM OF ABDOMEN: CPT

## 2020-06-27 ENCOUNTER — HOSPITAL ENCOUNTER (EMERGENCY)
Facility: HOSPITAL | Age: 60
Discharge: LEFT BEFORE DISCHARGE | End: 2020-06-27

## 2020-06-27 VITALS
OXYGEN SATURATION: 93 % | SYSTOLIC BLOOD PRESSURE: 149 MMHG | DIASTOLIC BLOOD PRESSURE: 81 MMHG | TEMPERATURE: 97.6 F | HEART RATE: 76 BPM | BODY MASS INDEX: 37.2 KG/M2 | RESPIRATION RATE: 20 BRPM | HEIGHT: 66 IN

## 2020-10-27 ENCOUNTER — TELEMEDICINE (OUTPATIENT)
Dept: FAMILY MEDICINE CLINIC | Facility: TELEHEALTH | Age: 60
End: 2020-10-27

## 2020-10-27 DIAGNOSIS — Z76.89 RETURN TO WORK EVALUATION: Primary | ICD-10-CM

## 2020-10-27 PROCEDURE — 99211 OFF/OP EST MAY X REQ PHY/QHP: CPT | Performed by: NURSE PRACTITIONER

## 2020-10-27 NOTE — PROGRESS NOTES
Subjective   Calos Moseley is a 60 y.o. male.     On October 16 he started not to feel well. He had shortness of breath and a headache. The next day he felt good. The next day he had a headache down the middle of the top of his head. The next day he had a temperature of 101 when he went to work, he was fatigued. The next day he felt better but he went to . He has back soreness and hip soreness and some chest pressure, he was not coughing. At  they told him his blood pressure was high (187/96) and his face was red, he had been out the sun. He has since contacted his PCP and has had an adjustment in his hydralazine. After that he started to feel better. He has not had any other fever, no cough, he no longer has chest pressure or shortness of breath and has not noticed that for a couple of days. Denies loss of taste or smell. His symptoms have totally resolved except for a slight headache.        The following portions of the patient's history were reviewed and updated as appropriate: allergies, current medications, past family history, past medical history, past social history, past surgical history and problem list.    Review of Systems   Constitutional: Negative for activity change, appetite change, chills, fatigue and fever.   HENT: Negative for congestion, ear discharge, ear pain, postnasal drip, rhinorrhea, sinus pressure and sore throat.    Eyes: Negative for discharge, redness and itching.   Respiratory: Negative for cough and shortness of breath.    Gastrointestinal: Negative for constipation, diarrhea, nausea and vomiting.   Musculoskeletal: Negative for arthralgias and myalgias.   Skin: Negative for rash.   Allergic/Immunologic: Negative for environmental allergies.   Neurological: Positive for headache.       Objective   Physical Exam  Constitutional:       General: He is not in acute distress.     Appearance: He is well-developed. He is not diaphoretic.   Pulmonary:      Effort: Pulmonary effort is  normal.   Neurological:      Mental Status: He is alert and oriented to person, place, and time.   Psychiatric:         Behavior: Behavior normal.           Assessment/Plan   Diagnoses and all orders for this visit:    1. Return to work evaluation (Primary)        Patient may return to work tomorrow. Symptoms may be attributed to high blood pressure and being out in the heat. He is working with his PCP to address the blood pressure and all his symptoms have resolved except for a headache. It has been 10 days since his symptoms began.

## 2021-01-06 ENCOUNTER — APPOINTMENT (OUTPATIENT)
Dept: VACCINE CLINIC | Facility: HOSPITAL | Age: 61
End: 2021-01-06

## 2021-01-07 ENCOUNTER — IMMUNIZATION (OUTPATIENT)
Dept: VACCINE CLINIC | Facility: HOSPITAL | Age: 61
End: 2021-01-07

## 2021-01-07 PROCEDURE — 91300 HC SARSCOV02 VAC 30MCG/0.3ML IM: CPT | Performed by: INTERNAL MEDICINE

## 2021-01-07 PROCEDURE — 0001A: CPT | Performed by: INTERNAL MEDICINE

## 2021-01-28 ENCOUNTER — IMMUNIZATION (OUTPATIENT)
Dept: VACCINE CLINIC | Facility: HOSPITAL | Age: 61
End: 2021-01-28

## 2021-01-28 PROCEDURE — 0002A: CPT | Performed by: INTERNAL MEDICINE

## 2021-01-28 PROCEDURE — 91300 HC SARSCOV02 VAC 30MCG/0.3ML IM: CPT | Performed by: INTERNAL MEDICINE

## 2021-02-23 ENCOUNTER — APPOINTMENT (OUTPATIENT)
Dept: GENERAL RADIOLOGY | Facility: HOSPITAL | Age: 61
End: 2021-02-23

## 2021-02-23 PROCEDURE — 73630 X-RAY EXAM OF FOOT: CPT | Performed by: FAMILY MEDICINE

## 2021-11-29 ENCOUNTER — HOSPITAL ENCOUNTER (OUTPATIENT)
Facility: HOSPITAL | Age: 61
Setting detail: SURGERY ADMIT
End: 2021-11-29
Attending: UROLOGY | Admitting: UROLOGY

## 2021-12-27 ENCOUNTER — PRE-ADMISSION TESTING (OUTPATIENT)
Dept: PREADMISSION TESTING | Facility: HOSPITAL | Age: 61
End: 2021-12-27

## 2021-12-27 VITALS
DIASTOLIC BLOOD PRESSURE: 77 MMHG | HEART RATE: 70 BPM | SYSTOLIC BLOOD PRESSURE: 166 MMHG | HEIGHT: 65 IN | TEMPERATURE: 97.6 F | BODY MASS INDEX: 43.39 KG/M2 | WEIGHT: 260.4 LBS | RESPIRATION RATE: 18 BRPM | OXYGEN SATURATION: 97 %

## 2021-12-27 LAB
ANION GAP SERPL CALCULATED.3IONS-SCNC: 9.6 MMOL/L (ref 5–15)
APTT PPP: 28.9 SECONDS (ref 22.7–35.4)
BACTERIA UR QL AUTO: ABNORMAL /HPF
BILIRUB UR QL STRIP: NEGATIVE
BUN SERPL-MCNC: 22 MG/DL (ref 8–23)
BUN/CREAT SERPL: 13.4 (ref 7–25)
CALCIUM SPEC-SCNC: 9.1 MG/DL (ref 8.6–10.5)
CHLORIDE SERPL-SCNC: 103 MMOL/L (ref 98–107)
CLARITY UR: CLEAR
CO2 SERPL-SCNC: 27.4 MMOL/L (ref 22–29)
COLOR UR: YELLOW
CREAT SERPL-MCNC: 1.64 MG/DL (ref 0.76–1.27)
DEPRECATED RDW RBC AUTO: 43.5 FL (ref 37–54)
ERYTHROCYTE [DISTWIDTH] IN BLOOD BY AUTOMATED COUNT: 13 % (ref 12.3–15.4)
GFR SERPL CREATININE-BSD FRML MDRD: 43 ML/MIN/1.73
GLUCOSE SERPL-MCNC: 108 MG/DL (ref 65–99)
GLUCOSE UR STRIP-MCNC: NEGATIVE MG/DL
HCT VFR BLD AUTO: 39.5 % (ref 37.5–51)
HGB BLD-MCNC: 13 G/DL (ref 13–17.7)
HGB UR QL STRIP.AUTO: ABNORMAL
HYALINE CASTS UR QL AUTO: ABNORMAL /LPF
INR PPP: 0.97 (ref 0.9–1.1)
KETONES UR QL STRIP: NEGATIVE
LEUKOCYTE ESTERASE UR QL STRIP.AUTO: ABNORMAL
MCH RBC QN AUTO: 30.1 PG (ref 26.6–33)
MCHC RBC AUTO-ENTMCNC: 32.9 G/DL (ref 31.5–35.7)
MCV RBC AUTO: 91.4 FL (ref 79–97)
NITRITE UR QL STRIP: NEGATIVE
PH UR STRIP.AUTO: 7.5 [PH] (ref 5–8)
PLATELET # BLD AUTO: 143 10*3/MM3 (ref 140–450)
PMV BLD AUTO: 11 FL (ref 6–12)
POTASSIUM SERPL-SCNC: 4.4 MMOL/L (ref 3.5–5.2)
PROT UR QL STRIP: NEGATIVE
PROTHROMBIN TIME: 12.7 SECONDS (ref 11.7–14.2)
QT INTERVAL: 405 MS
RBC # BLD AUTO: 4.32 10*6/MM3 (ref 4.14–5.8)
RBC # UR STRIP: ABNORMAL /HPF
REF LAB TEST METHOD: ABNORMAL
SODIUM SERPL-SCNC: 140 MMOL/L (ref 136–145)
SP GR UR STRIP: 1.01 (ref 1–1.03)
SQUAMOUS #/AREA URNS HPF: ABNORMAL /HPF
UROBILINOGEN UR QL STRIP: ABNORMAL
WBC # UR STRIP: ABNORMAL /HPF
WBC NRBC COR # BLD: 6.12 10*3/MM3 (ref 3.4–10.8)

## 2021-12-27 PROCEDURE — 85610 PROTHROMBIN TIME: CPT

## 2021-12-27 PROCEDURE — 93005 ELECTROCARDIOGRAM TRACING: CPT

## 2021-12-27 PROCEDURE — 36415 COLL VENOUS BLD VENIPUNCTURE: CPT

## 2021-12-27 PROCEDURE — 85027 COMPLETE CBC AUTOMATED: CPT

## 2021-12-27 PROCEDURE — 93010 ELECTROCARDIOGRAM REPORT: CPT | Performed by: INTERNAL MEDICINE

## 2021-12-27 PROCEDURE — 81001 URINALYSIS AUTO W/SCOPE: CPT

## 2021-12-27 PROCEDURE — 85730 THROMBOPLASTIN TIME PARTIAL: CPT

## 2021-12-27 PROCEDURE — 80048 BASIC METABOLIC PNL TOTAL CA: CPT

## 2021-12-27 RX ORDER — HYDROCODONE BITARTRATE AND ACETAMINOPHEN 7.5; 325 MG/1; MG/1
1 TABLET ORAL 3 TIMES DAILY PRN
Status: ON HOLD | COMMUNITY
Start: 2021-11-16 | End: 2022-02-15

## 2021-12-27 RX ORDER — SILDENAFIL 100 MG/1
1 TABLET, FILM COATED ORAL AS NEEDED
Status: ON HOLD | COMMUNITY
Start: 2021-11-16 | End: 2022-02-15 | Stop reason: SDUPTHER

## 2021-12-27 RX ORDER — LAMOTRIGINE 100 MG/1
100 TABLET ORAL 2 TIMES DAILY
Status: ON HOLD | COMMUNITY
End: 2023-03-23

## 2021-12-27 RX ORDER — HYDRALAZINE HYDROCHLORIDE 50 MG/1
50 TABLET, FILM COATED ORAL 3 TIMES DAILY
Status: ON HOLD | COMMUNITY
End: 2022-02-15 | Stop reason: SDUPTHER

## 2021-12-27 NOTE — DISCHARGE INSTRUCTIONS
Arrive to hospital on your day of surgery at 6AM    Take the following medications the morning of surgery with a small sip of water:  WELLBUTRIN, VIIBRYD, NALTREXONE, METOPROLOL, SYNTHROID, LAMICTAL AND DEPAKOTE.  BRING INHALER      BRING CPAP MACHINE    If you are on prescription narcotic pain medication to control your pain you may also take that medication the morning of surgery.    General Instructions:  • Do not eat or drink anything after midnight the night before surgery.  • Infants may have breast milk up to four hours before surgery.  • Infants drinking formula may drink formula up to six hours before surgery.   • Patients who avoid smoking, chewing tobacco and alcohol for 4 weeks prior to surgery have a reduced risk of post-operative complications.  Quit smoking as many days before surgery as you can.  • Do not smoke, use chewing tobacco or drink alcohol the day of surgery.   • If applicable bring your C-PAP/ BI-PAP machine.  • Bring any papers given to you in the doctor’s office.  • Wear clean comfortable clothes.  • Do not wear contact lenses, false eyelashes or make-up.  Bring a case for your glasses.   • Bring crutches or walker if applicable.  • Remove all piercings.  Leave jewelry and any other valuables at home.  • Hair extensions with metal clips must be removed prior to surgery.  • The Pre-Admission Testing nurse will instruct you to bring medications if unable to obtain an accurate list in Pre-Admission Testing.        If you were given a blood bank ID arm band remember to bring it with you the day of surgery.    Preventing a Surgical Site Infection:  • For 2 to 3 days before surgery, avoid shaving with a razor because the razor can irritate skin and make it easier to develop an infection.    • Any areas of open skin can increase the risk of a post-operative wound infection by allowing bacteria to enter and travel throughout the body.  Notify your surgeon if you have any skin wounds / rashes even  if it is not near the expected surgical site.  The area will need assessed to determine if surgery should be delayed until it is healed.  • The night prior to surgery shower using a fresh bar of anti-bacterial soap (such as Dial) and clean washcloth.  Sleep in a clean bed with clean clothing.  Do not allow pets to sleep with you.  • Shower on the morning of surgery using a fresh bar of anti-bacterial soap (such as Dial) and clean washcloth.  Dry with a clean towel and dress in clean clothing.  • Ask your surgeon if you will be receiving antibiotics prior to surgery.  • Make sure you, your family, and all healthcare providers clean their hands with soap and water or an alcohol based hand  before caring for you or your wound.    Day of surgery:  Your arrival time is approximately two hours before your scheduled surgery time.  Upon arrival, a Pre-op nurse and Anesthesiologist will review your health history, obtain vital signs, and answer questions you may have.  The only belongings needed at this time will be your home medications and if applicable your C-PAP/BI-PAP machine.  A Pre-op nurse will start an IV and you may receive medication in preparation for surgery, including something to help you relax.      Please be aware that surgery does come with discomfort.  We want to make every effort to control your discomfort so please discuss any uncontrolled symptoms with your nurse.   Your doctor will most likely have prescribed pain medications.      If you are going home after surgery you will receive individualized written care instructions before being discharged.  A responsible adult must drive you to and from the hospital on the day of your surgery and stay with you for 24 hours.  Discharge prescriptions can be filled by the hospital pharmacy during regular pharmacy hours.  If you are having surgery late in the day/evening your prescription may be e-prescribed to your pharmacy.  Please verify your pharmacy  hours or chose a 24 hour pharmacy to avoid not having access to your prescription because your pharmacy has closed for the day.    If you are staying overnight following surgery, you will be transported to your hospital room following the recovery period.  [facility] has all private rooms.    If you have any questions please call Pre-Admission Testing at [phone].  Deductibles and co-payments are collected on the day of service. Please be prepared to pay the required co-pay, deductible or deposit on the day of service as defined by your plan.    Patient Education for Self-Quarantine Process    • Following your COVID testing, we strongly recommend that you wear a mask when you are with other people and practice social distancing.   • Limit your activities to only required outings.  • Wash your hands with soap and water frequently for at least 20 seconds.   • Avoid touching your eyes, nose and mouth with unwashed hands.  • Do not share anything - utensils, drinking glasses, food from the same bowl.   • Sanitize household surfaces daily. Include all high touch areas (door handles, light switches, phones, countertops, etc.)    Call your surgeon immediately if you experience any of the following symptoms:  • Sore Throat  • Shortness of Breath or difficulty breathing  • Cough  • Chills  • Body soreness or muscle pain  • Headache  • Fever  • New loss of taste or smell  • Do not arrive for your surgery ill.  Your procedure will need to be rescheduled to another time.  You will need to call your physician before the day of surgery to avoid any unnecessary exposure to hospital staff as well as other patients.

## 2022-01-07 ENCOUNTER — APPOINTMENT (OUTPATIENT)
Dept: CT IMAGING | Facility: HOSPITAL | Age: 62
End: 2022-01-07

## 2022-01-07 ENCOUNTER — HOSPITAL ENCOUNTER (OUTPATIENT)
Facility: HOSPITAL | Age: 62
Setting detail: OBSERVATION
Discharge: HOME | End: 2022-01-08
Attending: EMERGENCY MEDICINE | Admitting: INTERNAL MEDICINE

## 2022-01-07 DIAGNOSIS — N20.0 BILATERAL KIDNEY STONES: ICD-10-CM

## 2022-01-07 DIAGNOSIS — N17.9 AKI (ACUTE KIDNEY INJURY): Primary | ICD-10-CM

## 2022-01-07 DIAGNOSIS — R52 UNCONTROLLED PAIN: ICD-10-CM

## 2022-01-07 PROBLEM — N30.01 ACUTE CYSTITIS WITH HEMATURIA: Status: RESOLVED | Noted: 2022-01-07 | Resolved: 2022-01-07

## 2022-01-07 PROBLEM — N10 ACUTE PYELONEPHRITIS: Status: ACTIVE | Noted: 2022-01-07

## 2022-01-07 PROBLEM — E78.5 HLD (HYPERLIPIDEMIA): Status: ACTIVE | Noted: 2022-01-07

## 2022-01-07 PROBLEM — I10 HTN (HYPERTENSION): Status: ACTIVE | Noted: 2022-01-07

## 2022-01-07 PROBLEM — J44.9 COPD (CHRONIC OBSTRUCTIVE PULMONARY DISEASE) (HCC): Status: ACTIVE | Noted: 2022-01-07

## 2022-01-07 PROBLEM — R73.9 HYPERGLYCEMIA: Status: ACTIVE | Noted: 2022-01-07

## 2022-01-07 PROBLEM — E66.01 OBESITY, CLASS III, BMI 40-49.9 (MORBID OBESITY) (HCC): Status: ACTIVE | Noted: 2022-01-07

## 2022-01-07 PROBLEM — N30.01 ACUTE CYSTITIS WITH HEMATURIA: Status: ACTIVE | Noted: 2022-01-07

## 2022-01-07 PROBLEM — R31.9 HEMATURIA: Status: ACTIVE | Noted: 2022-01-07

## 2022-01-07 PROBLEM — E66.813 OBESITY, CLASS III, BMI 40-49.9 (MORBID OBESITY): Status: ACTIVE | Noted: 2022-01-07

## 2022-01-07 LAB
ALBUMIN SERPL-MCNC: 4.1 G/DL (ref 3.5–5.2)
ALBUMIN/GLOB SERPL: 2 G/DL
ALP SERPL-CCNC: 76 U/L (ref 39–117)
ALT SERPL W P-5'-P-CCNC: 43 U/L (ref 1–41)
ANION GAP SERPL CALCULATED.3IONS-SCNC: 8.9 MMOL/L (ref 5–15)
AST SERPL-CCNC: 23 U/L (ref 1–40)
BACTERIA UR QL AUTO: ABNORMAL /HPF
BASOPHILS # BLD AUTO: 0.05 10*3/MM3 (ref 0–0.2)
BASOPHILS NFR BLD AUTO: 0.7 % (ref 0–1.5)
BILIRUB SERPL-MCNC: 0.4 MG/DL (ref 0–1.2)
BILIRUB UR QL STRIP: NEGATIVE
BUN SERPL-MCNC: 25 MG/DL (ref 8–23)
BUN/CREAT SERPL: 12.3 (ref 7–25)
CALCIUM SPEC-SCNC: 8.7 MG/DL (ref 8.6–10.5)
CHLORIDE SERPL-SCNC: 106 MMOL/L (ref 98–107)
CLARITY UR: CLEAR
CO2 SERPL-SCNC: 26.1 MMOL/L (ref 22–29)
COLOR UR: YELLOW
CREAT SERPL-MCNC: 2.03 MG/DL (ref 0.76–1.27)
DEPRECATED RDW RBC AUTO: 45.5 FL (ref 37–54)
EOSINOPHIL # BLD AUTO: 0.46 10*3/MM3 (ref 0–0.4)
EOSINOPHIL NFR BLD AUTO: 6.5 % (ref 0.3–6.2)
ERYTHROCYTE [DISTWIDTH] IN BLOOD BY AUTOMATED COUNT: 13.2 % (ref 12.3–15.4)
GFR SERPL CREATININE-BSD FRML MDRD: 34 ML/MIN/1.73
GLOBULIN UR ELPH-MCNC: 2.1 GM/DL
GLUCOSE BLDC GLUCOMTR-MCNC: 144 MG/DL (ref 70–130)
GLUCOSE BLDC GLUCOMTR-MCNC: 96 MG/DL (ref 70–130)
GLUCOSE SERPL-MCNC: 146 MG/DL (ref 65–99)
GLUCOSE UR STRIP-MCNC: NEGATIVE MG/DL
HCT VFR BLD AUTO: 38.3 % (ref 37.5–51)
HGB BLD-MCNC: 12.9 G/DL (ref 13–17.7)
HGB UR QL STRIP.AUTO: ABNORMAL
HYALINE CASTS UR QL AUTO: ABNORMAL /LPF
IMM GRANULOCYTES # BLD AUTO: 0.02 10*3/MM3 (ref 0–0.05)
IMM GRANULOCYTES NFR BLD AUTO: 0.3 % (ref 0–0.5)
KETONES UR QL STRIP: NEGATIVE
LEUKOCYTE ESTERASE UR QL STRIP.AUTO: ABNORMAL
LYMPHOCYTES # BLD AUTO: 1.48 10*3/MM3 (ref 0.7–3.1)
LYMPHOCYTES NFR BLD AUTO: 20.9 % (ref 19.6–45.3)
MCH RBC QN AUTO: 31.1 PG (ref 26.6–33)
MCHC RBC AUTO-ENTMCNC: 33.7 G/DL (ref 31.5–35.7)
MCV RBC AUTO: 92.3 FL (ref 79–97)
MONOCYTES # BLD AUTO: 0.77 10*3/MM3 (ref 0.1–0.9)
MONOCYTES NFR BLD AUTO: 10.9 % (ref 5–12)
NEUTROPHILS NFR BLD AUTO: 4.3 10*3/MM3 (ref 1.7–7)
NEUTROPHILS NFR BLD AUTO: 60.7 % (ref 42.7–76)
NITRITE UR QL STRIP: NEGATIVE
NRBC BLD AUTO-RTO: 0 /100 WBC (ref 0–0.2)
PH UR STRIP.AUTO: 6 [PH] (ref 5–8)
PLATELET # BLD AUTO: 168 10*3/MM3 (ref 140–450)
PMV BLD AUTO: 10.9 FL (ref 6–12)
POTASSIUM SERPL-SCNC: 4 MMOL/L (ref 3.5–5.2)
PROT SERPL-MCNC: 6.2 G/DL (ref 6–8.5)
PROT UR QL STRIP: ABNORMAL
RBC # BLD AUTO: 4.15 10*6/MM3 (ref 4.14–5.8)
RBC # UR STRIP: ABNORMAL /HPF
REF LAB TEST METHOD: ABNORMAL
SARS-COV-2 RNA RESP QL NAA+PROBE: NOT DETECTED
SODIUM SERPL-SCNC: 141 MMOL/L (ref 136–145)
SP GR UR STRIP: 1.01 (ref 1–1.03)
SQUAMOUS #/AREA URNS HPF: ABNORMAL /HPF
UROBILINOGEN UR QL STRIP: ABNORMAL
WBC # UR STRIP: ABNORMAL /HPF
WBC NRBC COR # BLD: 7.08 10*3/MM3 (ref 3.4–10.8)

## 2022-01-07 PROCEDURE — C9803 HOPD COVID-19 SPEC COLLECT: HCPCS

## 2022-01-07 PROCEDURE — 25010000002 CEFTRIAXONE PER 250 MG: Performed by: NURSE PRACTITIONER

## 2022-01-07 PROCEDURE — 96376 TX/PRO/DX INJ SAME DRUG ADON: CPT

## 2022-01-07 PROCEDURE — 82962 GLUCOSE BLOOD TEST: CPT

## 2022-01-07 PROCEDURE — 94664 DEMO&/EVAL PT USE INHALER: CPT

## 2022-01-07 PROCEDURE — 25010000002 HYDROMORPHONE PER 4 MG: Performed by: EMERGENCY MEDICINE

## 2022-01-07 PROCEDURE — 25010000002 HYDROMORPHONE 1 MG/ML SOLUTION: Performed by: EMERGENCY MEDICINE

## 2022-01-07 PROCEDURE — U0003 INFECTIOUS AGENT DETECTION BY NUCLEIC ACID (DNA OR RNA); SEVERE ACUTE RESPIRATORY SYNDROME CORONAVIRUS 2 (SARS-COV-2) (CORONAVIRUS DISEASE [COVID-19]), AMPLIFIED PROBE TECHNIQUE, MAKING USE OF HIGH THROUGHPUT TECHNOLOGIES AS DESCRIBED BY CMS-2020-01-R: HCPCS | Performed by: EMERGENCY MEDICINE

## 2022-01-07 PROCEDURE — G0378 HOSPITAL OBSERVATION PER HR: HCPCS

## 2022-01-07 PROCEDURE — 94640 AIRWAY INHALATION TREATMENT: CPT

## 2022-01-07 PROCEDURE — 80053 COMPREHEN METABOLIC PANEL: CPT | Performed by: EMERGENCY MEDICINE

## 2022-01-07 PROCEDURE — 94799 UNLISTED PULMONARY SVC/PX: CPT

## 2022-01-07 PROCEDURE — 74176 CT ABD & PELVIS W/O CONTRAST: CPT

## 2022-01-07 PROCEDURE — 25010000002 HYDROMORPHONE 1 MG/ML SOLUTION: Performed by: NURSE PRACTITIONER

## 2022-01-07 PROCEDURE — 96374 THER/PROPH/DIAG INJ IV PUSH: CPT

## 2022-01-07 PROCEDURE — 99284 EMERGENCY DEPT VISIT MOD MDM: CPT

## 2022-01-07 PROCEDURE — 25010000002 ONDANSETRON PER 1 MG: Performed by: EMERGENCY MEDICINE

## 2022-01-07 PROCEDURE — 94760 N-INVAS EAR/PLS OXIMETRY 1: CPT

## 2022-01-07 PROCEDURE — 96375 TX/PRO/DX INJ NEW DRUG ADDON: CPT

## 2022-01-07 PROCEDURE — 85025 COMPLETE CBC W/AUTO DIFF WBC: CPT | Performed by: EMERGENCY MEDICINE

## 2022-01-07 PROCEDURE — 81001 URINALYSIS AUTO W/SCOPE: CPT | Performed by: EMERGENCY MEDICINE

## 2022-01-07 PROCEDURE — 87086 URINE CULTURE/COLONY COUNT: CPT | Performed by: EMERGENCY MEDICINE

## 2022-01-07 RX ORDER — CLONIDINE HYDROCHLORIDE 0.1 MG/1
0.1 TABLET ORAL 2 TIMES DAILY PRN
Status: DISCONTINUED | OUTPATIENT
Start: 2022-01-07 | End: 2022-01-08 | Stop reason: HOSPADM

## 2022-01-07 RX ORDER — AMOXICILLIN 250 MG
2 CAPSULE ORAL 2 TIMES DAILY
Status: DISCONTINUED | OUTPATIENT
Start: 2022-01-07 | End: 2022-01-08 | Stop reason: HOSPADM

## 2022-01-07 RX ORDER — LEVOTHYROXINE SODIUM 0.05 MG/1
50 TABLET ORAL DAILY
Status: DISCONTINUED | OUTPATIENT
Start: 2022-01-07 | End: 2022-01-08 | Stop reason: HOSPADM

## 2022-01-07 RX ORDER — HYDROMORPHONE HYDROCHLORIDE 1 MG/ML
0.5 INJECTION, SOLUTION INTRAMUSCULAR; INTRAVENOUS; SUBCUTANEOUS ONCE
Status: COMPLETED | OUTPATIENT
Start: 2022-01-07 | End: 2022-01-07

## 2022-01-07 RX ORDER — INSULIN LISPRO 100 [IU]/ML
0-7 INJECTION, SOLUTION INTRAVENOUS; SUBCUTANEOUS
Status: DISCONTINUED | OUTPATIENT
Start: 2022-01-07 | End: 2022-01-08 | Stop reason: HOSPADM

## 2022-01-07 RX ORDER — IPRATROPIUM BROMIDE AND ALBUTEROL SULFATE 2.5; .5 MG/3ML; MG/3ML
3 SOLUTION RESPIRATORY (INHALATION) EVERY 6 HOURS PRN
Status: DISCONTINUED | OUTPATIENT
Start: 2022-01-07 | End: 2022-01-08 | Stop reason: HOSPADM

## 2022-01-07 RX ORDER — ATORVASTATIN CALCIUM 20 MG/1
20 TABLET, FILM COATED ORAL NIGHTLY
Status: DISCONTINUED | OUTPATIENT
Start: 2022-01-07 | End: 2022-01-08 | Stop reason: HOSPADM

## 2022-01-07 RX ORDER — BUDESONIDE AND FORMOTEROL FUMARATE DIHYDRATE 160; 4.5 UG/1; UG/1
2 AEROSOL RESPIRATORY (INHALATION)
Status: DISCONTINUED | OUTPATIENT
Start: 2022-01-07 | End: 2022-01-08 | Stop reason: HOSPADM

## 2022-01-07 RX ORDER — ONDANSETRON 4 MG/1
4 TABLET, FILM COATED ORAL EVERY 6 HOURS PRN
Status: DISCONTINUED | OUTPATIENT
Start: 2022-01-07 | End: 2022-01-08 | Stop reason: HOSPADM

## 2022-01-07 RX ORDER — BISACODYL 10 MG
10 SUPPOSITORY, RECTAL RECTAL DAILY PRN
Status: DISCONTINUED | OUTPATIENT
Start: 2022-01-07 | End: 2022-01-08 | Stop reason: HOSPADM

## 2022-01-07 RX ORDER — POLYETHYLENE GLYCOL 3350 17 G/17G
17 POWDER, FOR SOLUTION ORAL DAILY PRN
Status: DISCONTINUED | OUTPATIENT
Start: 2022-01-07 | End: 2022-01-08 | Stop reason: HOSPADM

## 2022-01-07 RX ORDER — NALTREXONE HYDROCHLORIDE 50 MG/1
50 TABLET, FILM COATED ORAL EVERY MORNING
Status: DISCONTINUED | OUTPATIENT
Start: 2022-01-07 | End: 2022-01-07

## 2022-01-07 RX ORDER — ACETAMINOPHEN 160 MG/5ML
650 SOLUTION ORAL EVERY 4 HOURS PRN
Status: DISCONTINUED | OUTPATIENT
Start: 2022-01-07 | End: 2022-01-08 | Stop reason: HOSPADM

## 2022-01-07 RX ORDER — SODIUM CHLORIDE 0.9 % (FLUSH) 0.9 %
10 SYRINGE (ML) INJECTION AS NEEDED
Status: DISCONTINUED | OUTPATIENT
Start: 2022-01-07 | End: 2022-01-08 | Stop reason: HOSPADM

## 2022-01-07 RX ORDER — METOPROLOL SUCCINATE 100 MG/1
100 TABLET, EXTENDED RELEASE ORAL 2 TIMES DAILY
Status: DISCONTINUED | OUTPATIENT
Start: 2022-01-07 | End: 2022-01-08 | Stop reason: HOSPADM

## 2022-01-07 RX ORDER — HYDROCODONE BITARTRATE AND ACETAMINOPHEN 7.5; 325 MG/1; MG/1
1 TABLET ORAL 3 TIMES DAILY PRN
Status: DISCONTINUED | OUTPATIENT
Start: 2022-01-07 | End: 2022-01-08 | Stop reason: HOSPADM

## 2022-01-07 RX ORDER — BUPROPION HYDROCHLORIDE 300 MG/1
300 TABLET ORAL DAILY
Status: DISCONTINUED | OUTPATIENT
Start: 2022-01-07 | End: 2022-01-08 | Stop reason: HOSPADM

## 2022-01-07 RX ORDER — NICOTINE POLACRILEX 4 MG
15 LOZENGE BUCCAL
Status: DISCONTINUED | OUTPATIENT
Start: 2022-01-07 | End: 2022-01-08 | Stop reason: HOSPADM

## 2022-01-07 RX ORDER — VILAZODONE HYDROCHLORIDE 40 MG/1
40 TABLET ORAL DAILY
Status: DISCONTINUED | OUTPATIENT
Start: 2022-01-07 | End: 2022-01-08 | Stop reason: HOSPADM

## 2022-01-07 RX ORDER — DEXTROSE MONOHYDRATE 25 G/50ML
25 INJECTION, SOLUTION INTRAVENOUS
Status: DISCONTINUED | OUTPATIENT
Start: 2022-01-07 | End: 2022-01-08 | Stop reason: HOSPADM

## 2022-01-07 RX ORDER — SODIUM CHLORIDE 0.9 % (FLUSH) 0.9 %
10 SYRINGE (ML) INJECTION EVERY 12 HOURS SCHEDULED
Status: DISCONTINUED | OUTPATIENT
Start: 2022-01-07 | End: 2022-01-08 | Stop reason: HOSPADM

## 2022-01-07 RX ORDER — LAMOTRIGINE 100 MG/1
100 TABLET ORAL 2 TIMES DAILY
Status: DISCONTINUED | OUTPATIENT
Start: 2022-01-07 | End: 2022-01-08 | Stop reason: HOSPADM

## 2022-01-07 RX ORDER — ALBUTEROL SULFATE 90 UG/1
1 AEROSOL, METERED RESPIRATORY (INHALATION) EVERY 4 HOURS PRN
Status: DISCONTINUED | OUTPATIENT
Start: 2022-01-07 | End: 2022-01-07

## 2022-01-07 RX ORDER — ACETAMINOPHEN 325 MG/1
650 TABLET ORAL EVERY 4 HOURS PRN
Status: DISCONTINUED | OUTPATIENT
Start: 2022-01-07 | End: 2022-01-08 | Stop reason: HOSPADM

## 2022-01-07 RX ORDER — ONDANSETRON 2 MG/ML
4 INJECTION INTRAMUSCULAR; INTRAVENOUS EVERY 6 HOURS PRN
Status: DISCONTINUED | OUTPATIENT
Start: 2022-01-07 | End: 2022-01-08 | Stop reason: HOSPADM

## 2022-01-07 RX ORDER — HYDRALAZINE HYDROCHLORIDE 50 MG/1
50 TABLET, FILM COATED ORAL 3 TIMES DAILY
Status: DISCONTINUED | OUTPATIENT
Start: 2022-01-07 | End: 2022-01-08 | Stop reason: HOSPADM

## 2022-01-07 RX ORDER — ROPINIROLE 1 MG/1
1 TABLET, FILM COATED ORAL NIGHTLY
Status: DISCONTINUED | OUTPATIENT
Start: 2022-01-07 | End: 2022-01-08 | Stop reason: HOSPADM

## 2022-01-07 RX ORDER — ONDANSETRON 2 MG/ML
4 INJECTION INTRAMUSCULAR; INTRAVENOUS ONCE
Status: COMPLETED | OUTPATIENT
Start: 2022-01-07 | End: 2022-01-07

## 2022-01-07 RX ORDER — ACETAMINOPHEN 650 MG/1
650 SUPPOSITORY RECTAL EVERY 4 HOURS PRN
Status: DISCONTINUED | OUTPATIENT
Start: 2022-01-07 | End: 2022-01-08 | Stop reason: HOSPADM

## 2022-01-07 RX ORDER — IPRATROPIUM BROMIDE AND ALBUTEROL SULFATE 2.5; .5 MG/3ML; MG/3ML
3 SOLUTION RESPIRATORY (INHALATION)
Status: DISCONTINUED | OUTPATIENT
Start: 2022-01-07 | End: 2022-01-08 | Stop reason: HOSPADM

## 2022-01-07 RX ORDER — SODIUM CHLORIDE 9 MG/ML
100 INJECTION, SOLUTION INTRAVENOUS CONTINUOUS
Status: DISCONTINUED | OUTPATIENT
Start: 2022-01-07 | End: 2022-01-08 | Stop reason: HOSPADM

## 2022-01-07 RX ORDER — NALOXONE HCL 0.4 MG/ML
0.4 VIAL (ML) INJECTION
Status: DISCONTINUED | OUTPATIENT
Start: 2022-01-07 | End: 2022-01-08 | Stop reason: HOSPADM

## 2022-01-07 RX ORDER — BUDESONIDE AND FORMOTEROL FUMARATE DIHYDRATE 160; 4.5 UG/1; UG/1
2 AEROSOL RESPIRATORY (INHALATION)
Status: DISCONTINUED | OUTPATIENT
Start: 2022-01-07 | End: 2022-01-07

## 2022-01-07 RX ORDER — BISACODYL 5 MG/1
5 TABLET, DELAYED RELEASE ORAL DAILY PRN
Status: DISCONTINUED | OUTPATIENT
Start: 2022-01-07 | End: 2022-01-08 | Stop reason: HOSPADM

## 2022-01-07 RX ADMIN — HYDRALAZINE HYDROCHLORIDE 50 MG: 50 TABLET, FILM COATED ORAL at 16:16

## 2022-01-07 RX ADMIN — ROPINIROLE 1 MG: 1 TABLET, FILM COATED ORAL at 20:58

## 2022-01-07 RX ADMIN — LEVOTHYROXINE SODIUM 50 MCG: 0.05 TABLET ORAL at 16:16

## 2022-01-07 RX ADMIN — LAMOTRIGINE 100 MG: 100 TABLET ORAL at 20:58

## 2022-01-07 RX ADMIN — BUPROPION HYDROCHLORIDE 300 MG: 300 TABLET, EXTENDED RELEASE ORAL at 16:16

## 2022-01-07 RX ADMIN — IPRATROPIUM BROMIDE AND ALBUTEROL SULFATE 3 ML: 2.5; .5 SOLUTION RESPIRATORY (INHALATION) at 11:27

## 2022-01-07 RX ADMIN — HYDROMORPHONE HYDROCHLORIDE 0.5 MG: 1 INJECTION, SOLUTION INTRAMUSCULAR; INTRAVENOUS; SUBCUTANEOUS at 10:17

## 2022-01-07 RX ADMIN — CEFTRIAXONE 1 G: 1 INJECTION, POWDER, FOR SOLUTION INTRAMUSCULAR; INTRAVENOUS at 17:20

## 2022-01-07 RX ADMIN — HYDRALAZINE HYDROCHLORIDE 50 MG: 50 TABLET, FILM COATED ORAL at 20:58

## 2022-01-07 RX ADMIN — HYDROCODONE BITARTRATE AND ACETAMINOPHEN 1 TABLET: 7.5; 325 TABLET ORAL at 14:00

## 2022-01-07 RX ADMIN — SODIUM CHLORIDE, POTASSIUM CHLORIDE, SODIUM LACTATE AND CALCIUM CHLORIDE 1000 ML: 600; 310; 30; 20 INJECTION, SOLUTION INTRAVENOUS at 08:29

## 2022-01-07 RX ADMIN — IPRATROPIUM BROMIDE AND ALBUTEROL SULFATE 3 ML: 2.5; .5 SOLUTION RESPIRATORY (INHALATION) at 15:54

## 2022-01-07 RX ADMIN — Medication 10 ML: at 20:59

## 2022-01-07 RX ADMIN — HYDROMORPHONE HYDROCHLORIDE 1 MG: 1 INJECTION, SOLUTION INTRAMUSCULAR; INTRAVENOUS; SUBCUTANEOUS at 21:01

## 2022-01-07 RX ADMIN — VILAZODONE HYDROCHLORIDE 40 MG: 40 TABLET ORAL at 16:16

## 2022-01-07 RX ADMIN — BUDESONIDE AND FORMOTEROL FUMARATE DIHYDRATE 2 PUFF: 160; 4.5 AEROSOL RESPIRATORY (INHALATION) at 20:09

## 2022-01-07 RX ADMIN — HYDROMORPHONE HYDROCHLORIDE 1 MG: 1 INJECTION, SOLUTION INTRAMUSCULAR; INTRAVENOUS; SUBCUTANEOUS at 08:29

## 2022-01-07 RX ADMIN — SODIUM CHLORIDE 100 ML/HR: 9 INJECTION, SOLUTION INTRAVENOUS at 13:18

## 2022-01-07 RX ADMIN — DOCUSATE SODIUM 50MG AND SENNOSIDES 8.6MG 2 TABLET: 8.6; 5 TABLET, FILM COATED ORAL at 20:58

## 2022-01-07 RX ADMIN — ONDANSETRON 4 MG: 2 INJECTION INTRAMUSCULAR; INTRAVENOUS at 08:29

## 2022-01-07 RX ADMIN — ATORVASTATIN CALCIUM 20 MG: 20 TABLET, FILM COATED ORAL at 20:59

## 2022-01-07 RX ADMIN — METOPROLOL SUCCINATE 100 MG: 100 TABLET, EXTENDED RELEASE ORAL at 20:58

## 2022-01-07 NOTE — H&P
Patient Name:  Calos Moseley  YOB: 1960  MRN:  [id number]  Admit Date:  1/7/2022  Patient Care Team:  Naveed Nunez DO as PCP - General (Family Medicine)  Naveed Nunez DO as PCP - Family Medicine  Chris Martinez Jr., MD as Consulting Physician (Pulmonary Disease)      Subjective   History Present Illness     Chief Complaint   Patient presents with   • Flank Pain     right sided       Mr. Moseley is a 61 y.o. former smoker with a history of morbid obesity, COPD, hypertension, restless leg syndrome, kidney stones who presents to [facility] ER chief complaint of right-sided flank pain and admitted for acute kidney injury.    Patient with known history of left kidney stones with tentative plan for nephrostomy tube in outpatient setting per urologist next week; however, developed symptoms of acute right flank pain and right groin pain; therefore, sought ER evaluation.    CT abdomen pelvis report findings bilateral perinephric stranding and bilateral calculi.    Recommendations pending hospital course.  Details below in A&P.    History of Present Illness  Review of Systems   Constitutional: Negative for chills and fever.   HENT: Negative for congestion and rhinorrhea.    Respiratory: Negative for cough and shortness of breath.    Cardiovascular: Negative for chest pain and leg swelling.   Gastrointestinal: Positive for abdominal pain (groin). Negative for nausea and vomiting.   Endocrine: Negative for polydipsia, polyphagia and polyuria.   Genitourinary: Positive for difficulty urinating, flank pain (R>L) and urgency.   Musculoskeletal: Positive for gait problem. Negative for myalgias.   Skin: Negative for rash and wound.   Neurological: Positive for headaches. Negative for weakness.   Psychiatric/Behavioral: Negative for confusion and hallucinations.        Personal History     Past Medical History:   Diagnosis Date   • Anesthesia complication     STATES HARD TO WAKE UP AFTER PREVIOUS  KIDNEY STONE SURGERY AND STATES HE CODED   • Arthritis    • COPD (chronic obstructive pulmonary disease) ([facility])    • Disease of thyroid gland    • Elevated cholesterol    • Hypertension    • Kidney stone on left side    • Left flank pain    • Mood disorder ([facility])    • Restless leg syndrome    • Sleep apnea    • UTI (urinary tract infection) 2021    PREV FINISHED ANTIBIOTIC     Past Surgical History:   Procedure Laterality Date   • ADENOIDECTOMY     • APPENDECTOMY     • COLONOSCOPY     • KIDNEY STONE SURGERY     • TONSILLECTOMY       Family History   Problem Relation Age of Onset   • Malig Hyperthermia Neg Hx      Social History     Tobacco Use   • Smoking status: Former Smoker     Types: Cigarettes     Quit date: 1990     Years since quittin.0   • Smokeless tobacco: Never Used   Vaping Use   • Vaping Use: Never used   Substance Use Topics   • Alcohol use: Not Currently   • Drug use: No     No current facility-administered medications on file prior to encounter.     Current Outpatient Medications on File Prior to Encounter   Medication Sig Dispense Refill   • albuterol sulfate  (90 Base) MCG/ACT inhaler INHALE ONE TO TWO PUFFS BY MOUTH EVERY 4 TO 6 HOURS 18 g 11   • atorvastatin (LIPITOR) 20 MG tablet Take 20 mg by mouth Daily.     • buPROPion XL (WELLBUTRIN XL) 300 MG 24 hr tablet Take 1 tablet by mouth Every Morning. 30 tablet 5   • cloNIDine (CATAPRES) 0.1 MG tablet Take 1 tablet by mouth 2 (two) times a day if systolic blood pressure is 156 or greater or if diastolic blood pressure is 94 or greater. (Patient taking differently: Take 0.1 mg by mouth As Needed.) 60 tablet 3   • divalproex (DEPAKOTE ER) 250 MG 24 hr tablet Take 1 tablet by mouth in the morning and 2 tabs at bedtime. (Patient taking differently: Take 250 mg by mouth Daily.) 270 tablet 2   • divalproex (DEPAKOTE ER) 250 MG 24 hr tablet Take 1 tablet by mouth 2 (Two) Times a Day. 180 tablet 2   • hydrALAZINE (APRESOLINE) 50 MG tablet  Take 50 mg by mouth 3 (Three) Times a Day.     • HYDROcodone-acetaminophen (NORCO) 7.5-325 MG per tablet Take 1 tablet by mouth 3 (Three) Times a Day As Needed.     • lamoTRIgine (LaMICtal) 100 MG tablet Take 100 mg by mouth 2 (Two) Times a Day.     • levothyroxine (SYNTHROID, LEVOTHROID) 50 MCG tablet Take 50 mcg by mouth Daily.     • lisinopril (PRINIVIL,ZESTRIL) 20 MG tablet Take 20 mg by mouth Daily.     • metoprolol succinate XL (TOPROL-XL) 100 MG 24 hr tablet Take 100 mg by mouth 2 (Two) Times a Day.     • naltrexone (DEPADE) 50 MG tablet Take 1 tablet by mouth Every Morning. 90 tablet 2   • potassium chloride (KLOR-CON) 20 MEQ packet Take 20 mEq by mouth Daily.     • rOPINIRole (REQUIP) 0.5 MG tablet Take 2 tablets by mouth Every Night. Take 1 hour before bedtime. 180 tablet 3   • sildenafil (VIAGRA) 100 MG tablet Take 1 tablet by mouth As Needed.     • vilazodone (VIIBRYD) 40 MG tablet tablet Take 40 mg by mouth Daily.     • spironolactone (ALDACTONE) 25 MG tablet Take 1 tablet by mouth Daily. 30 tablet 3     No Known Allergies    Objective    Objective     Vital Signs  Temp:  [96.4 °F (35.8 °C)-97.5 °F (36.4 °C)] 97.5 °F (36.4 °C)  Heart Rate:  [57-64] 57  Resp:  [17-18] 17  BP: (156-186)/(76-95) 156/76  SpO2:  [92 %-100 %] 95 %  on   ;   Device (Oxygen Therapy): room air  Body mass index is 41.97 kg/m².    Physical Exam  Constitutional:       General: He is not in acute distress.     Appearance: He is obese. He is not toxic-appearing.   HENT:      Head: Normocephalic and atraumatic.   Eyes:      Extraocular Movements: Extraocular movements intact.      Conjunctiva/sclera: Conjunctivae normal.   Cardiovascular:      Rate and Rhythm: Bradycardia present.      Heart sounds: Normal heart sounds.   Pulmonary:      Effort: Pulmonary effort is normal.      Breath sounds: Wheezing present.   Abdominal:      General: Bowel sounds are normal.      Palpations: Abdomen is soft.      Tenderness: There is abdominal  tenderness.   Musculoskeletal:         General: No tenderness.      Cervical back: Normal range of motion and neck supple.      Right lower leg: No edema.      Left lower leg: No edema.   Skin:     General: Skin is warm and dry.   Neurological:      Mental Status: He is alert and oriented to person, place, and time.      Cranial Nerves: No cranial nerve deficit.   Psychiatric:         Behavior: Behavior normal.         Thought Content: Thought content normal.         Results Review:  I reviewed the patient's new clinical results.  I reviewed the patient's new imaging results and agree with the interpretation.  I reviewed the patient's other test results and agree with the interpretation  I personally viewed and interpreted the patient's EKG/Telemetry data  Discussed with ED provider.    Lab Results (last 24 hours)     Procedure Component Value Units Date/Time    CBC & Differential [[id number]]  (Abnormal) Collected: 01/07/22 0823    Specimen: Blood Updated: 01/07/22 0836    Narrative:      The following orders were created for panel order CBC & Differential.  Procedure                               Abnormality         Status                     ---------                               -----------         ------                     CBC Auto Differential[[id number]]        Abnormal            Final result                 Please view results for these tests on the individual orders.    Comprehensive Metabolic Panel [[id number]]  (Abnormal) Collected: 01/07/22 0823    Specimen: Blood Updated: 01/07/22 0850     Glucose 146 mg/dL      BUN 25 mg/dL      Creatinine 2.03 mg/dL      Sodium 141 mmol/L      Potassium 4.0 mmol/L      Chloride 106 mmol/L      CO2 26.1 mmol/L      Calcium 8.7 mg/dL      Total Protein 6.2 g/dL      Albumin 4.10 g/dL      ALT (SGPT) 43 U/L      AST (SGOT) 23 U/L      Alkaline Phosphatase 76 U/L      Total Bilirubin 0.4 mg/dL      eGFR Non African Amer 34 mL/min/1.73      Globulin 2.1 gm/dL      A/G  Ratio 2.0 g/dL      BUN/Creatinine Ratio 12.3     Anion Gap 8.9 mmol/L     Narrative:      GFR Normal >60  Chronic Kidney Disease <60  Kidney Failure <15      CBC Auto Differential [[id number]]  (Abnormal) Collected: 01/07/22 0823    Specimen: Blood Updated: 01/07/22 0836     WBC 7.08 10*3/mm3      RBC 4.15 10*6/mm3      Hemoglobin 12.9 g/dL      Hematocrit 38.3 %      MCV 92.3 fL      MCH 31.1 pg      MCHC 33.7 g/dL      RDW 13.2 %      RDW-SD 45.5 fl      MPV 10.9 fL      Platelets 168 10*3/mm3      Neutrophil % 60.7 %      Lymphocyte % 20.9 %      Monocyte % 10.9 %      Eosinophil % 6.5 %      Basophil % 0.7 %      Immature Grans % 0.3 %      Neutrophils, Absolute 4.30 10*3/mm3      Lymphocytes, Absolute 1.48 10*3/mm3      Monocytes, Absolute 0.77 10*3/mm3      Eosinophils, Absolute 0.46 10*3/mm3      Basophils, Absolute 0.05 10*3/mm3      Immature Grans, Absolute 0.02 10*3/mm3      nRBC 0.0 /100 WBC     Urinalysis With Culture If Indicated - Urine, Clean Catch [[id number]]  (Abnormal) Collected: 01/07/22 0845    Specimen: Urine, Clean Catch Updated: 01/07/22 0904     Color, UA Yellow     Appearance, UA Clear     pH, UA 6.0     Specific Gravity, UA 1.015     Glucose, UA Negative     Ketones, UA Negative     Bilirubin, UA Negative     Blood, UA Large (3+)     Protein, UA 30 mg/dL (1+)     Leuk Esterase, UA Small (1+)     Nitrite, UA Negative     Urobilinogen, UA 0.2 E.U./dL    Urinalysis, Microscopic Only - Urine, Clean Catch [[id number]]  (Abnormal) Collected: 01/07/22 0845    Specimen: Urine, Clean Catch Updated: 01/07/22 0913     RBC, UA Too Numerous to Count /HPF      WBC, UA 6-12 /HPF      Bacteria, UA None Seen /HPF      Squamous Epithelial Cells, UA 0-2 /HPF      Hyaline Casts, UA None Seen /LPF      Methodology Manual Light Microscopy    Urine Culture - Urine, Urine, Clean Catch [[id number]] Collected: 01/07/22 0845    Specimen: Urine, Clean Catch Updated: 01/07/22 0913    COVID PRE-OP / PRE-PROCEDURE  SCREENING ORDER (NO ISOLATION) - Swab, Nasopharynx [[id number]]  (Normal) Collected: 01/07/22 1019    Specimen: Swab from Nasopharynx Updated: 01/07/22 1112    Narrative:      The following orders were created for panel order COVID PRE-OP / PRE-PROCEDURE SCREENING ORDER (NO ISOLATION) - Swab, Nasopharynx.  Procedure                               Abnormality         Status                     ---------                               -----------         ------                     COVID-19,[facility] IN-HOUSE...[[id number]]  Normal              Final result                 Please view results for these tests on the individual orders.    COVID-19,[facility] IN-HOUSE CEPHEID/BRYAN NP SWAB IN TRANSPORT MEDIA 8-12 HR TAT - Swab, Nasopharynx [[id number]]  (Normal) Collected: 01/07/22 1019    Specimen: Swab from Nasopharynx Updated: 01/07/22 1112     COVID19 Not Detected    Narrative:      Fact sheet for providers: https://www.fda.gov/media/078848/download     Fact sheet for patients: https://www.fda.gov/media/933737/download          Imaging Results (Last 24 Hours)     Procedure Component Value Units Date/Time    CT Abdomen Pelvis Without Contrast [[id number]] Collected: 01/07/22 0915     Updated: 01/07/22 0915    Narrative:      CT ABDOMEN AND PELVIS WITHOUT CONTRAST     HISTORY: Right flank pain radiating to the groin.     TECHNIQUE: Axial CT images of the abdomen and pelvis were obtained  without administration of intravenous contrast. The patient was not  given oral contrast. Coronal and sagittal reformats were obtained.     COMPARISON: 11/03/2017.     FINDINGS: Bilateral adrenal glands are normal. Mild bilateral  perinephric stranding is present. A 1.5 cm calculus is seen within the  right renal upper pole infundibulum/pelvis. There is mild upper pole  right-sided caliectasis. Additional calculi within the midpole of the  right kidney are present. The right ureter demonstrates normal caliber  without obstructing calculus. Numerous  left-sided renal calculi are  present, majority of which are clustered within the renal pelvis  measuring up to 14 mm. There is moderate dilatation of the left upper  and midpole calyces. There is diffuse left peripelvic and periureteric  stranding. No obstructing calculus along the course of the ureter. The  urinary bladder is partially distended with circumferential wall  thickening.     The liver demonstrates normal noncontrast attenuation. Mildly lobulated  contour of the liver may suggest underlying chronic liver disease. The  gallbladder is normal. The spleen is normal in size and attenuation. The  pancreas is normal without ductal dilatation. The small and large bowel  loops demonstrate normal caliber. The appendix is not identified. No  pathological retroperitoneal lymphadenopathy. Incidental note is made of  a retroaortic left renal vein. Moderate calcified atherosclerotic plaque  is seen within the abdominal aorta and its branches.       Impression:      No ureteric calculus is identified. There is a right upper  pole infundibular/right pelvic calculus causing isolated upper pole  caliectasis. There are additionally numerous calculi within the left  kidney with mild-to-moderate dilatation of the left renal collecting  system that appears to be unchanged from prior imaging in 2017. There is  diffuse left peripelvic and periureteric stranding.     Radiation dose reduction techniques were utilized, including automated  exposure control and exposure modulation based on body size.                    No orders to display        Assessment/Plan     Active Hospital Problems    Diagnosis  POA   • **MESHA (acute kidney injury) ([facility]) [N17.9]  Yes   • Hyperglycemia [R73.9]  Yes   • HTN (hypertension) [I10]  Yes   • HLD (hyperlipidemia) [E78.5]  Yes   • Obesity, Class III, BMI 40-49.9 (morbid obesity) (H[facility]) [E66.01]  Yes   • Bilateral kidney stones [N20.0]  Yes   • Acute pyelonephritis [N10]  Yes   • Hematuria [R31.9]   Yes   • COPD (chronic obstructive pulmonary disease) ([facility]) [J44.9]  Yes      Resolved Hospital Problems    Diagnosis Date Resolved POA   • Acute cystitis with hematuria [N30.01] 01/07/2022 Yes       Mr. Moseley is a 61 y.o. former smoker with a history of morbid obesity, COPD, hypertension, restless leg syndrome, kidney stones who presents to [facility] ER chief complaint of right-sided flank pain and admitted for acute kidney injury.      MESHA (acute kidney injury) ([facility]) /acute pyelonephritis / Bilateral kidney stones / Hematuria  Serum creatinine 2.0 increased from 1.6  Most likely secondary to bilateral kidney stones /pyelonephritis  Gentle IV fluid hydration  Avoid nephrotoxins  Monitor labs  Empiric antibiotic therapy IV ceftriaxone for now pending urine cultures results      Hyperglycemia  A1c in a.m.  Low-dose lispro sliding scale for now      HTN (hypertension)  BP acceptable, hemodynamically stable  Antihypertensive home dose regimen continued on admission with exception of ACE inhibitor given #1      HLD (hyperlipidemia)  Statin      COPD   Expiratory wheezing on exam  Ordering DuoNeb scheduled and as needed  Symbicort      Obesity, Class III, BMI 40-49.9 (morbid obesity) ([facility])  BMI 41  Complicating his problems    · I discussed the patient's findings and my recommendations with Dr. Saldaña.     VTE Prophylaxis - SCDs.  Code Status - CPR       SALVADOR Gonzalez  [facility]  01/07/22  14:28 EST

## 2022-01-07 NOTE — ED PROVIDER NOTES
EMERGENCY DEPARTMENT ENCOUNTER    Room Number:  33/33  Date of encounter:  2022   PCP: Naveed Nunez DO  Historian: Patient, wife      HPI:  Chief Complaint: Flank pain  A complete HPI/ROS/PMH/PSH/SH/FH are unobtainable due to: None    Context: Calos Moseley is a 61 y.o. male who presents to the ED c/o flank pain.  This is chronic pain for which she was was to have a urological procedure done for bilateral kidney stones.  However, last night he developed severe worsening of his pain and his right flank that radiates to the groin.  It is sharp.  It is currently 7/10 in intensity.  No associated fever, vomiting, urinary symptoms.      PAST MEDICAL HISTORY  Active Ambulatory Problems     Diagnosis Date Noted   • SBO (small bowel obstruction) (Formerly [facility]) 2017     Resolved Ambulatory Problems     Diagnosis Date Noted   • No Resolved Ambulatory Problems     Past Medical History:   Diagnosis Date   • Anesthesia complication    • Arthritis    • COPD (chronic obstructive pulmonary disease) (Formerly [facility])    • Disease of thyroid gland    • Elevated cholesterol    • Hypertension    • Kidney stone on left side    • Left flank pain    • Mood disorder ([facility])    • Restless leg syndrome    • Sleep apnea    • UTI (urinary tract infection) 2021         PAST SURGICAL HISTORY  Past Surgical History:   Procedure Laterality Date   • ADENOIDECTOMY     • APPENDECTOMY     • COLONOSCOPY     • KIDNEY STONE SURGERY     • TONSILLECTOMY           FAMILY HISTORY  Family History   Problem Relation Age of Onset   • Malig Hyperthermia Neg Hx          SOCIAL HISTORY  Social History     Socioeconomic History   • Marital status:    Tobacco Use   • Smoking status: Former Smoker     Types: Cigarettes     Quit date: 1990     Years since quittin.0   • Smokeless tobacco: Never Used   Vaping Use   • Vaping Use: Never used   Substance and Sexual Activity   • Alcohol use: Not Currently   • Drug use: No   • Sexual activity: Defer          ALLERGIES  Patient has no known allergies.        REVIEW OF SYSTEMS  Review of Systems     All systems reviewed and negative except for those discussed in HPI.       PHYSICAL EXAM    I have reviewed the triage vital signs and nursing notes.    ED Triage Vitals [01/07/22 0751]   Temp Heart Rate Resp BP SpO2   96.4 °F (35.8 °C) 64 18 -- 96 %      Temp src Heart Rate Source Patient Position BP Location FiO2 (%)   Tympanic Monitor -- -- --       Physical Exam  GENERAL: not distressed  HENT: nares patent  EYES: no scleral icterus  CV: regular rhythm, regular rate  RESPIRATORY: normal effort, clear to auscultation bilaterally  ABDOMEN: soft, nontender  MUSCULOSKELETAL: no deformity  NEURO: alert, moves all extremities, follows commands  SKIN: warm, dry, no rash to the abdomen or back        LAB RESULTS  Recent Results (from the past 24 hour(s))   Comprehensive Metabolic Panel    Collection Time: 01/07/22  8:23 AM    Specimen: Blood   Result Value Ref Range    Glucose 146 (H) 65 - 99 mg/dL    BUN 25 (H) 8 - 23 mg/dL    Creatinine 2.03 (H) 0.76 - 1.27 mg/dL    Sodium 141 136 - 145 mmol/L    Potassium 4.0 3.5 - 5.2 mmol/L    Chloride 106 98 - 107 mmol/L    CO2 26.1 22.0 - 29.0 mmol/L    Calcium 8.7 8.6 - 10.5 mg/dL    Total Protein 6.2 6.0 - 8.5 g/dL    Albumin 4.10 3.50 - 5.20 g/dL    ALT (SGPT) 43 (H) 1 - 41 U/L    AST (SGOT) 23 1 - 40 U/L    Alkaline Phosphatase 76 39 - 117 U/L    Total Bilirubin 0.4 0.0 - 1.2 mg/dL    eGFR Non African Amer 34 (L) >60 mL/min/1.73    Globulin 2.1 gm/dL    A/G Ratio 2.0 g/dL    BUN/Creatinine Ratio 12.3 7.0 - 25.0    Anion Gap 8.9 5.0 - 15.0 mmol/L   CBC Auto Differential    Collection Time: 01/07/22  8:23 AM    Specimen: Blood   Result Value Ref Range    WBC 7.08 3.40 - 10.80 10*3/mm3    RBC 4.15 4.14 - 5.80 10*6/mm3    Hemoglobin 12.9 (L) 13.0 - 17.7 g/dL    Hematocrit 38.3 37.5 - 51.0 %    MCV 92.3 79.0 - 97.0 fL    MCH 31.1 26.6 - 33.0 pg    MCHC 33.7 31.5 - 35.7 g/dL    RDW  13.2 12.3 - 15.4 %    RDW-SD 45.5 37.0 - 54.0 fl    MPV 10.9 6.0 - 12.0 fL    Platelets 168 140 - 450 10*3/mm3    Neutrophil % 60.7 42.7 - 76.0 %    Lymphocyte % 20.9 19.6 - 45.3 %    Monocyte % 10.9 5.0 - 12.0 %    Eosinophil % 6.5 (H) 0.3 - 6.2 %    Basophil % 0.7 0.0 - 1.5 %    Immature Grans % 0.3 0.0 - 0.5 %    Neutrophils, Absolute 4.30 1.70 - 7.00 10*3/mm3    Lymphocytes, Absolute 1.48 0.70 - 3.10 10*3/mm3    Monocytes, Absolute 0.77 0.10 - 0.90 10*3/mm3    Eosinophils, Absolute 0.46 (H) 0.00 - 0.40 10*3/mm3    Basophils, Absolute 0.05 0.00 - 0.20 10*3/mm3    Immature Grans, Absolute 0.02 0.00 - 0.05 10*3/mm3    nRBC 0.0 0.0 - 0.2 /100 WBC   Urinalysis With Culture If Indicated - Urine, Clean Catch    Collection Time: 01/07/22  8:45 AM    Specimen: Urine, Clean Catch   Result Value Ref Range    Color, UA Yellow Yellow, Straw    Appearance, UA Clear Clear    pH, UA 6.0 5.0 - 8.0    Specific Gravity, UA 1.015 1.005 - 1.030    Glucose, UA Negative Negative    Ketones, UA Negative Negative    Bilirubin, UA Negative Negative    Blood, UA Large (3+) (A) Negative    Protein, UA 30 mg/dL (1+) (A) Negative    Leuk Esterase, UA Small (1+) (A) Negative    Nitrite, UA Negative Negative    Urobilinogen, UA 0.2 E.U./dL 0.2 - 1.0 E.U./dL   Urinalysis, Microscopic Only - Urine, Clean Catch    Collection Time: 01/07/22  8:45 AM    Specimen: Urine, Clean Catch   Result Value Ref Range    RBC, UA Too Numerous to Count (A) None Seen, 0-2 /HPF    WBC, UA 6-12 (A) None Seen, 0-2 /HPF    Bacteria, UA None Seen None Seen /HPF    Squamous Epithelial Cells, UA 0-2 None Seen, 0-2 /HPF    Hyaline Casts, UA None Seen None Seen /LPF    Methodology Manual Light Microscopy        Ordered the above labs and independently reviewed the results.        RADIOLOGY  CT Abdomen Pelvis Without Contrast    Result Date: 1/7/2022  CT ABDOMEN AND PELVIS WITHOUT CONTRAST  HISTORY: Right flank pain radiating to the groin.  TECHNIQUE: Axial CT images of  the abdomen and pelvis were obtained without administration of intravenous contrast. The patient was not given oral contrast. Coronal and sagittal reformats were obtained.  COMPARISON: 11/03/2017.  FINDINGS: Bilateral adrenal glands are normal. Mild bilateral perinephric stranding is present. A 1.5 cm calculus is seen within the right renal upper pole infundibulum/pelvis. There is mild upper pole right-sided caliectasis. Additional calculi within the midpole of the right kidney are present. The right ureter demonstrates normal caliber without obstructing calculus. Numerous left-sided renal calculi are present, majority of which are clustered within the renal pelvis measuring up to 14 mm. There is moderate dilatation of the left upper and midpole calyces. There is diffuse left peripelvic and periureteric stranding. No obstructing calculus along the course of the ureter. The urinary bladder is partially distended with circumferential wall thickening.  The liver demonstrates normal noncontrast attenuation. Mildly lobulated contour of the liver may suggest underlying chronic liver disease. The gallbladder is normal. The spleen is normal in size and attenuation. The pancreas is normal without ductal dilatation. The small and large bowel loops demonstrate normal caliber. The appendix is not identified. No pathological retroperitoneal lymphadenopathy. Incidental note is made of a retroaortic left renal vein. Moderate calcified atherosclerotic plaque is seen within the abdominal aorta and its branches.      No ureteric calculus is identified. There is a right upper pole infundibular/right pelvic calculus causing isolated upper pole caliectasis. There are additionally numerous calculi within the left kidney with mild-to-moderate dilatation of the left renal collecting system that appears to be unchanged from prior imaging in 2017. There is diffuse left peripelvic and periureteric stranding.  Radiation dose reduction  techniques were utilized, including automated exposure control and exposure modulation based on body size.         I ordered the above noted radiological studies. Reviewed by me and discussed with radiologist.  See dictation for official radiology interpretation.      PROCEDURES    Procedures      MEDICATIONS GIVEN IN ER    Medications   sodium chloride 0.9 % flush 10 mL (has no administration in time range)   sodium chloride 0.9 % flush 10 mL (has no administration in time range)   sodium chloride 0.9 % flush 10 mL (has no administration in time range)   sodium chloride 0.9 % infusion (has no administration in time range)   acetaminophen (TYLENOL) tablet 650 mg (has no administration in time range)     Or   acetaminophen (TYLENOL) 160 MG/5ML solution 650 mg (has no administration in time range)     Or   acetaminophen (TYLENOL) suppository 650 mg (has no administration in time range)   sennosides-docusate (PERICOLACE) 8.6-50 MG per tablet 2 tablet (has no administration in time range)     And   polyethylene glycol (MIRALAX) packet 17 g (has no administration in time range)     And   bisacodyl (DULCOLAX) EC tablet 5 mg (has no administration in time range)     And   bisacodyl (DULCOLAX) suppository 10 mg (has no administration in time range)   ondansetron (ZOFRAN) tablet 4 mg (has no administration in time range)     Or   ondansetron (ZOFRAN) injection 4 mg (has no administration in time range)   HYDROmorphone (DILAUDID) injection 1 mg (has no administration in time range)     And   naloxone (NARCAN) injection 0.4 mg (has no administration in time range)   HYDROmorphone (DILAUDID) injection 1 mg (1 mg Intravenous Given 1/7/22 0829)   lactated ringers bolus 1,000 mL (1,000 mL Intravenous New Bag 1/7/22 0829)   ondansetron (ZOFRAN) injection 4 mg (4 mg Intravenous Given 1/7/22 0829)   HYDROmorphone (DILAUDID) injection 0.5 mg (0.5 mg Intravenous Given 1/7/22 1017)         PROGRESS, DATA ANALYSIS, CONSULTS, AND MEDICAL  DECISION MAKING    All labs have been independently reviewed by me.  All radiology studies have been reviewed by me and discussed with radiologist dictating the report.   EKG's independently viewed and interpreted by me.  Discussion below represents my analysis of pertinent findings related to patient's condition, differential diagnosis, treatment plan and final disposition.    Differential diagnosis includes but not limited to:  - hepatobiliary pathology such as cholecystitis, cholangitis, and symptomatic cholelithiasis  - Pancreatitis  - Dyspepsia  - Small bowel obstruction  - Appendicitis  - Diverticulitis  - UTI including pyelonephritis  - Ureteral stone  - Zoster  - Colitis, including infectious and ischemic      ED Course as of 01/07/22 1050   Fri Jan 07, 2022   0921 RBC, UA(!): Too Numerous to Count [TD]   0921 WBC, UA(!): 6-12 [TD]   0921 Bacteria, UA: None Seen [TD]   0922 Creatinine(!): 2.03  11 days ago, it was 1.6 [TD]   1006 I discussed the patient's case with on-call urology.  Due to the current elective surgery restrictions, patient is unable to have his procedure performed.  Recommendation for discharge home with pain medication. [TD]   1006 Patient is having poorly controlled pain at this time.  He does have acute kidney injury.  I do think he would benefit from coming to the hospital for IV medication and IV fluid. [TD]   1049 I discussed the case with Dr. Saldaña, hospitalist for [facility].  We reviewed the patient's history, labs and consultation by urology.  She will admit. [TD]      ED Course User Index  [TD] Gerardo Mcnamara II, MD           PPE: The patient wore a surgical mask throughout the entire patient encounter. I wore an N95.    AS OF 10:50 EST VITALS:    BP - 164/95  HR - 59  TEMP - 96.4 °F (35.8 °C) (Tympanic)  O2 SATS - 92%        DIAGNOSIS  Final diagnoses:   MESHA (acute kidney injury) (HCC)   Uncontrolled pain   Bilateral kidney stones         DISPOSITION  Admit           Anders  Gerardo Lopes II, MD  01/07/22 7611

## 2022-01-07 NOTE — ED TRIAGE NOTES
All triage performed with this RN wearing appropriate PPE.  Pt placed in mask upon arrival to ED.  Patient c/o right flank pain since yesterday. He was seen here yesterday for the same. He has known stones on both sides. His surgery was cancelled that was scheduled for 1-12-22.

## 2022-01-07 NOTE — PLAN OF CARE
Goal Outcome Evaluation  VSS, lungs clear but decreased in bases, up ad moy, spouse at bedside, straining urine, clear yellow urine, NPO for possible procdure, to be seen by urology yet:  Late note: First urology contact @ 1600, to ask about when consult might be done, answering service took information then smene later called patient's wife asking where pt was & if he was scheduled to have his surgery in [place], second call to office explained situation in detail to answering service, return call from Dr Cho, orders received will be seen tomorrow  Plan of Care Reviewed With: patient, spouse

## 2022-01-08 VITALS
RESPIRATION RATE: 18 BRPM | OXYGEN SATURATION: 97 % | TEMPERATURE: 97.3 F | BODY MASS INDEX: 41.78 KG/M2 | WEIGHT: 260 LBS | HEART RATE: 78 BPM | HEIGHT: 66 IN | SYSTOLIC BLOOD PRESSURE: 187 MMHG | DIASTOLIC BLOOD PRESSURE: 80 MMHG

## 2022-01-08 LAB
ANION GAP SERPL CALCULATED.3IONS-SCNC: 8.2 MMOL/L (ref 5–15)
BACTERIA SPEC AEROBE CULT: NO GROWTH
BUN SERPL-MCNC: 26 MG/DL (ref 8–23)
BUN/CREAT SERPL: 13.5 (ref 7–25)
CALCIUM SPEC-SCNC: 8.6 MG/DL (ref 8.6–10.5)
CHLORIDE SERPL-SCNC: 109 MMOL/L (ref 98–107)
CO2 SERPL-SCNC: 26.8 MMOL/L (ref 22–29)
CREAT SERPL-MCNC: 1.93 MG/DL (ref 0.76–1.27)
DEPRECATED RDW RBC AUTO: 42.7 FL (ref 37–54)
ERYTHROCYTE [DISTWIDTH] IN BLOOD BY AUTOMATED COUNT: 13 % (ref 12.3–15.4)
GFR SERPL CREATININE-BSD FRML MDRD: 36 ML/MIN/1.73
GLUCOSE BLDC GLUCOMTR-MCNC: 114 MG/DL (ref 70–130)
GLUCOSE SERPL-MCNC: 88 MG/DL (ref 65–99)
HBA1C MFR BLD: 5.45 % (ref 4.8–5.6)
HCT VFR BLD AUTO: 37.6 % (ref 37.5–51)
HGB BLD-MCNC: 12.5 G/DL (ref 13–17.7)
MCH RBC QN AUTO: 30 PG (ref 26.6–33)
MCHC RBC AUTO-ENTMCNC: 33.2 G/DL (ref 31.5–35.7)
MCV RBC AUTO: 90.4 FL (ref 79–97)
PLATELET # BLD AUTO: 172 10*3/MM3 (ref 140–450)
PMV BLD AUTO: 10.9 FL (ref 6–12)
POTASSIUM SERPL-SCNC: 4.3 MMOL/L (ref 3.5–5.2)
RBC # BLD AUTO: 4.16 10*6/MM3 (ref 4.14–5.8)
SODIUM SERPL-SCNC: 144 MMOL/L (ref 136–145)
WBC NRBC COR # BLD: 6.99 10*3/MM3 (ref 3.4–10.8)

## 2022-01-08 PROCEDURE — G0378 HOSPITAL OBSERVATION PER HR: HCPCS

## 2022-01-08 PROCEDURE — 80048 BASIC METABOLIC PNL TOTAL CA: CPT | Performed by: NURSE PRACTITIONER

## 2022-01-08 PROCEDURE — 36415 COLL VENOUS BLD VENIPUNCTURE: CPT | Performed by: NURSE PRACTITIONER

## 2022-01-08 PROCEDURE — 83036 HEMOGLOBIN GLYCOSYLATED A1C: CPT | Performed by: NURSE PRACTITIONER

## 2022-01-08 PROCEDURE — 82962 GLUCOSE BLOOD TEST: CPT

## 2022-01-08 PROCEDURE — 94799 UNLISTED PULMONARY SVC/PX: CPT

## 2022-01-08 PROCEDURE — 85027 COMPLETE CBC AUTOMATED: CPT | Performed by: NURSE PRACTITIONER

## 2022-01-08 RX ORDER — HYDROCODONE BITARTRATE AND ACETAMINOPHEN 7.5; 325 MG/1; MG/1
1 TABLET ORAL EVERY 8 HOURS PRN
Qty: 9 TABLET | Refills: 0 | Status: SHIPPED | OUTPATIENT
Start: 2022-01-08 | End: 2022-01-11

## 2022-01-08 RX ORDER — AMLODIPINE BESYLATE 5 MG/1
10 TABLET ORAL DAILY
Qty: 60 TABLET | Refills: 0 | Status: SHIPPED | OUTPATIENT
Start: 2022-01-08 | End: 2022-02-07

## 2022-01-08 RX ORDER — CEPHALEXIN 500 MG/1
500 CAPSULE ORAL 2 TIMES DAILY
Qty: 10 CAPSULE | Refills: 0 | Status: SHIPPED | OUTPATIENT
Start: 2022-01-08 | End: 2022-01-13

## 2022-01-08 RX ORDER — AMLODIPINE BESYLATE 5 MG/1
5 TABLET ORAL
Status: DISCONTINUED | OUTPATIENT
Start: 2022-01-08 | End: 2022-01-08 | Stop reason: HOSPADM

## 2022-01-08 RX ORDER — BUDESONIDE AND FORMOTEROL FUMARATE DIHYDRATE 160; 4.5 UG/1; UG/1
2 AEROSOL RESPIRATORY (INHALATION)
Qty: 10.2 G | Refills: 0 | Status: ON HOLD | OUTPATIENT
Start: 2022-01-08 | End: 2022-02-15

## 2022-01-08 RX ADMIN — HYDROCODONE BITARTRATE AND ACETAMINOPHEN 1 TABLET: 7.5; 325 TABLET ORAL at 10:46

## 2022-01-08 RX ADMIN — ACETAMINOPHEN 650 MG: 325 TABLET, FILM COATED ORAL at 02:09

## 2022-01-08 RX ADMIN — VILAZODONE HYDROCHLORIDE 40 MG: 40 TABLET ORAL at 08:03

## 2022-01-08 RX ADMIN — LAMOTRIGINE 100 MG: 100 TABLET ORAL at 08:01

## 2022-01-08 RX ADMIN — BUDESONIDE AND FORMOTEROL FUMARATE DIHYDRATE 2 PUFF: 160; 4.5 AEROSOL RESPIRATORY (INHALATION) at 08:13

## 2022-01-08 RX ADMIN — Medication 10 ML: at 08:03

## 2022-01-08 RX ADMIN — METOPROLOL SUCCINATE 100 MG: 100 TABLET, EXTENDED RELEASE ORAL at 08:02

## 2022-01-08 RX ADMIN — LEVOTHYROXINE SODIUM 50 MCG: 0.05 TABLET ORAL at 08:02

## 2022-01-08 RX ADMIN — HYDRALAZINE HYDROCHLORIDE 50 MG: 50 TABLET, FILM COATED ORAL at 08:02

## 2022-01-08 RX ADMIN — BUPROPION HYDROCHLORIDE 300 MG: 300 TABLET, EXTENDED RELEASE ORAL at 08:02

## 2022-01-08 RX ADMIN — IPRATROPIUM BROMIDE AND ALBUTEROL SULFATE 3 ML: 2.5; .5 SOLUTION RESPIRATORY (INHALATION) at 11:17

## 2022-01-08 NOTE — CONSULTS
FIRST UROLOGY CONSULT      Patient Identification:  NAME:  Calos Moseley  Age:  61 y.o.   Sex:  male   :  1960   MRN:  [id number]       Chief complaint: bilateral flank pain    History of present illness:  60yo man with a long complex history of renal stones.  CT shows bilateral stones without significant ureteral involvement      Past medical history:  Past Medical History:   Diagnosis Date   • Anesthesia complication     STATES HARD TO WAKE UP AFTER PREVIOUS KIDNEY STONE SURGERY AND STATES HE CODED   • Arthritis    • COPD (chronic obstructive pulmonary disease) ([facility])    • Disease of thyroid gland    • Elevated cholesterol    • Hypertension    • Kidney stone on left side    • Left flank pain    • Mood disorder ([facility])    • Restless leg syndrome    • Sleep apnea    • UTI (urinary tract infection) 2021    PREV FINISHED ANTIBIOTIC       Past surgical history:  Past Surgical History:   Procedure Laterality Date   • ADENOIDECTOMY     • APPENDECTOMY     • COLONOSCOPY     • KIDNEY STONE SURGERY     • TONSILLECTOMY         Allergies:  Patient has no known allergies.    Home medications:  Medications Prior to Admission   Medication Sig Dispense Refill Last Dose   • albuterol sulfate  (90 Base) MCG/ACT inhaler INHALE ONE TO TWO PUFFS BY MOUTH EVERY 4 TO 6 HOURS 18 g 11    • atorvastatin (LIPITOR) 20 MG tablet Take 20 mg by mouth Daily.      • buPROPion XL (WELLBUTRIN XL) 300 MG 24 hr tablet Take 1 tablet by mouth Every Morning. 30 tablet 5    • cloNIDine (CATAPRES) 0.1 MG tablet Take 1 tablet by mouth 2 (two) times a day if systolic blood pressure is 156 or greater or if diastolic blood pressure is 94 or greater. (Patient taking differently: Take 0.1 mg by mouth As Needed.) 60 tablet 3    • divalproex (DEPAKOTE ER) 250 MG 24 hr tablet Take 1 tablet by mouth in the morning and 2 tabs at bedtime. (Patient taking differently: Take 250 mg by mouth Daily.) 270 tablet 2    • divalproex (DEPAKOTE ER) 250 MG  24 hr tablet Take 1 tablet by mouth 2 (Two) Times a Day. 180 tablet 2    • hydrALAZINE (APRESOLINE) 50 MG tablet Take 50 mg by mouth 3 (Three) Times a Day.      • HYDROcodone-acetaminophen (NORCO) 7.5-325 MG per tablet Take 1 tablet by mouth 3 (Three) Times a Day As Needed.      • lamoTRIgine (LaMICtal) 100 MG tablet Take 100 mg by mouth 2 (Two) Times a Day.      • levothyroxine (SYNTHROID, LEVOTHROID) 50 MCG tablet Take 50 mcg by mouth Daily.      • lisinopril (PRINIVIL,ZESTRIL) 20 MG tablet Take 20 mg by mouth Daily.      • metoprolol succinate XL (TOPROL-XL) 100 MG 24 hr tablet Take 100 mg by mouth 2 (Two) Times a Day.      • naltrexone (DEPADE) 50 MG tablet Take 1 tablet by mouth Every Morning. 90 tablet 2    • potassium chloride (KLOR-CON) 20 MEQ packet Take 20 mEq by mouth Daily.      • rOPINIRole (REQUIP) 0.5 MG tablet Take 2 tablets by mouth Every Night. Take 1 hour before bedtime. 180 tablet 3    • sildenafil (VIAGRA) 100 MG tablet Take 1 tablet by mouth As Needed.      • vilazodone (VIIBRYD) 40 MG tablet tablet Take 40 mg by mouth Daily.      • spironolactone (ALDACTONE) 25 MG tablet Take 1 tablet by mouth Daily. 30 tablet 3         Hospital medications:  atorvastatin, 20 mg, Oral, Nightly  budesonide-formoterol, 2 puff, Inhalation, BID - RT  buPROPion XL, 300 mg, Oral, Daily  cefTRIAXone, 1 g, Intravenous, Q24H  hydrALAZINE, 50 mg, Oral, TID  insulin lispro, 0-7 Units, Subcutaneous, TID AC  ipratropium-albuterol, 3 mL, Nebulization, 4x Daily - RT  lamoTRIgine, 100 mg, Oral, BID  levothyroxine, 50 mcg, Oral, Daily  metoprolol succinate XL, 100 mg, Oral, BID  rOPINIRole, 1 mg, Oral, Nightly  senna-docusate sodium, 2 tablet, Oral, BID  sodium chloride, 10 mL, Intravenous, Q12H  vilazodone, 40 mg, Oral, Daily      sodium chloride, 100 mL/hr, Last Rate: 100 mL/hr (01/07/22 1318)      •  acetaminophen **OR** acetaminophen **OR** acetaminophen  •  senna-docusate sodium **AND** polyethylene glycol **AND**  bisacodyl **AND** bisacodyl  •  cloNIDine  •  dextrose  •  dextrose  •  glucagon (human recombinant)  •  HYDROcodone-acetaminophen  •  HYDROmorphone **AND** naloxone  •  ipratropium-albuterol  •  ondansetron **OR** ondansetron  •  [COMPLETED] Insert peripheral IV **AND** sodium chloride  •  sodium chloride    Family history:  Family History   Problem Relation Age of Onset   • Malig Hyperthermia Neg Hx        Social history:  Social History     Tobacco Use   • Smoking status: Former Smoker     Types: Cigarettes     Quit date: 1990     Years since quittin.0   • Smokeless tobacco: Never Used   Vaping Use   • Vaping Use: Never used   Substance Use Topics   • Alcohol use: Not Currently   • Drug use: No       Review of systems:    Negative 12-system ROS except for the following:        Objective:  TMax 24 hours:   Temp (24hrs), Av.2 °F (36.2 °C), Min:96.4 °F (35.8 °C), Max:97.7 °F (36.5 °C)      Vitals Ranges:   Temp:  [96.4 °F (35.8 °C)-97.7 °F (36.5 °C)] 97.7 °F (36.5 °C)  Heart Rate:  [57-73] 69  Resp:  [16-18] 18  BP: (156-186)/(76-95) 170/80    Intake/Output Last 3 shifts:  I/O last 3 completed shifts:  In: 1000 [IV Piggyback:1000]  Out: 300 [Urine:300]     Physical Exam:       General Appearance:    Alert, cooperative, in no acute distress   Head:    Normocephalic, without obvious abnormality, atraumatic   Eyes:          PERRL, conjunctivae and corneas clear   Ears:    Normal external inspection   Throat:   No oral lesions, oral mucosa moist   Neck:   Supple, no LAD, trachea midline   Back:     No CVA tenderness   Lungs:     Respirations unlabored, symmetric excursion    Heart:    RRR, intact peripheral pulses   Abdomen:        :       DWAYNE:  Extremities:     No edema, no deformity   Skin:   No bleeding, bruising or rashes   Neuro/Psych:   Orientation intact, mood/affect pleasant, no focal findings       Results review:   I reviewed the patient's new clinical results.    Data review:  Lab Results  (last 24 hours)     Procedure Component Value Units Date/Time    POC Glucose Once [[id number]]  (Abnormal) Collected: 01/07/22 2044    Specimen: Blood Updated: 01/07/22 2045     Glucose 144 mg/dL      Comment: Meter: [id number] : [id number] Enrique SHERMAN       POC Glucose Once [[id number]]  (Normal) Collected: 01/07/22 1700    Specimen: Blood Updated: 01/07/22 1702     Glucose 96 mg/dL      Comment: Meter: [id number] : [id number] Jeff SHERMAN       COVID PRE-OP / PRE-PROCEDURE SCREENING ORDER (NO ISOLATION) - Swab, Nasopharynx [[id number]]  (Normal) Collected: 01/07/22 1019    Specimen: Swab from Nasopharynx Updated: 01/07/22 1112    Narrative:      The following orders were created for panel order COVID PRE-OP / PRE-PROCEDURE SCREENING ORDER (NO ISOLATION) - Swab, Nasopharynx.  Procedure                               Abnormality         Status                     ---------                               -----------         ------                     COVID-19,[facility]HOU[facility]...[[id number]]  Normal              Final result                 Please view results for these tests on the individual orders.    COVID-19,[facility]HOU[facility] CEPHEID/BRYAN NP SWAB IN TRANSPORT MEDIA 8-12 HR TAT - Swab, Nasopharynx [[id number]]  (Normal) Collected: 01/07/22 1019    Specimen: Swab from Nasopharynx Updated: 01/07/22 1112     COVID19 Not Detected    Narrative:      Fact sheet for providers: https://www.fda.gov/media/719491/download     Fact sheet for patients: https://www.fda.gov/media/682047/download    Urinalysis, Microscopic Only - Urine, Clean Catch [[id number]]  (Abnormal) Collected: 01/07/22 0845    Specimen: Urine, Clean Catch Updated: 01/07/22 0913     RBC, UA Too Numerous to Count /HPF      WBC, UA 6-12 /HPF      Bacteria, UA None Seen /HPF      Squamous Epithelial Cells, UA 0-2 /HPF      Hyaline Casts, UA None Seen /LPF      Methodology Manual Light Microscopy    Urine Culture - Urine, Urine, Clean Catch [[id number]]  Collected: 01/07/22 0845    Specimen: Urine, Clean Catch Updated: 01/07/22 0913    Urinalysis With Culture If Indicated - Urine, Clean Catch [[id number]]  (Abnormal) Collected: 01/07/22 0845    Specimen: Urine, Clean Catch Updated: 01/07/22 0904     Color, UA Yellow     Appearance, UA Clear     pH, UA 6.0     Specific Gravity, UA 1.015     Glucose, UA Negative     Ketones, UA Negative     Bilirubin, UA Negative     Blood, UA Large (3+)     Protein, UA 30 mg/dL (1+)     Leuk Esterase, UA Small (1+)     Nitrite, UA Negative     Urobilinogen, UA 0.2 E.U./dL    Comprehensive Metabolic Panel [[id number]]  (Abnormal) Collected: 01/07/22 0823    Specimen: Blood Updated: 01/07/22 0850     Glucose 146 mg/dL      BUN 25 mg/dL      Creatinine 2.03 mg/dL      Sodium 141 mmol/L      Potassium 4.0 mmol/L      Chloride 106 mmol/L      CO2 26.1 mmol/L      Calcium 8.7 mg/dL      Total Protein 6.2 g/dL      Albumin 4.10 g/dL      ALT (SGPT) 43 U/L      AST (SGOT) 23 U/L      Alkaline Phosphatase 76 U/L      Total Bilirubin 0.4 mg/dL      eGFR Non African Amer 34 mL/min/1.73      Globulin 2.1 gm/dL      A/G Ratio 2.0 g/dL      BUN/Creatinine Ratio 12.3     Anion Gap 8.9 mmol/L     Narrative:      GFR Normal >60  Chronic Kidney Disease <60  Kidney Failure <15      CBC & Differential [[id number]]  (Abnormal) Collected: 01/07/22 0823    Specimen: Blood Updated: 01/07/22 0836    Narrative:      The following orders were created for panel order CBC & Differential.  Procedure                               Abnormality         Status                     ---------                               -----------         ------                     CBC Auto Differential[[id number]]        Abnormal            Final result                 Please view results for these tests on the individual orders.    CBC Auto Differential [[id number]]  (Abnormal) Collected: 01/07/22 0823    Specimen: Blood Updated: 01/07/22 0836     WBC 7.08 10*3/mm3      RBC 4.15 10*6/mm3       Hemoglobin 12.9 g/dL      Hematocrit 38.3 %      MCV 92.3 fL      MCH 31.1 pg      MCHC 33.7 g/dL      RDW 13.2 %      RDW-SD 45.5 fl      MPV 10.9 fL      Platelets 168 10*3/mm3      Neutrophil % 60.7 %      Lymphocyte % 20.9 %      Monocyte % 10.9 %      Eosinophil % 6.5 %      Basophil % 0.7 %      Immature Grans % 0.3 %      Neutrophils, Absolute 4.30 10*3/mm3      Lymphocytes, Absolute 1.48 10*3/mm3      Monocytes, Absolute 0.77 10*3/mm3      Eosinophils, Absolute 0.46 10*3/mm3      Basophils, Absolute 0.05 10*3/mm3      Immature Grans, Absolute 0.02 10*3/mm3      nRBC 0.0 /100 WBC            Imaging:  Imaging Results (Last 24 Hours)     Procedure Component Value Units Date/Time    CT Abdomen Pelvis Without Contrast [[id number]] Collected: 01/07/22 0915     Updated: 01/07/22 0915    Narrative:      CT ABDOMEN AND PELVIS WITHOUT CONTRAST     HISTORY: Right flank pain radiating to the groin.     TECHNIQUE: Axial CT images of the abdomen and pelvis were obtained  without administration of intravenous contrast. The patient was not  given oral contrast. Coronal and sagittal reformats were obtained.     COMPARISON: 11/03/2017.     FINDINGS: Bilateral adrenal glands are normal. Mild bilateral  perinephric stranding is present. A 1.5 cm calculus is seen within the  right renal upper pole infundibulum/pelvis. There is mild upper pole  right-sided caliectasis. Additional calculi within the midpole of the  right kidney are present. The right ureter demonstrates normal caliber  without obstructing calculus. Numerous left-sided renal calculi are  present, majority of which are clustered within the renal pelvis  measuring up to 14 mm. There is moderate dilatation of the left upper  and midpole calyces. There is diffuse left peripelvic and periureteric  stranding. No obstructing calculus along the course of the ureter. The  urinary bladder is partially distended with circumferential wall  thickening.     The liver  demonstrates normal noncontrast attenuation. Mildly lobulated  contour of the liver may suggest underlying chronic liver disease. The  gallbladder is normal. The spleen is normal in size and attenuation. The  pancreas is normal without ductal dilatation. The small and large bowel  loops demonstrate normal caliber. The appendix is not identified. No  pathological retroperitoneal lymphadenopathy. Incidental note is made of  a retroaortic left renal vein. Moderate calcified atherosclerotic plaque  is seen within the abdominal aorta and its branches.       Impression:      No ureteric calculus is identified. There is a right upper  pole infundibular/right pelvic calculus causing isolated upper pole  caliectasis. There are additionally numerous calculi within the left  kidney with mild-to-moderate dilatation of the left renal collecting  system that appears to be unchanged from prior imaging in 2017. There is  diffuse left peripelvic and periureteric stranding.     Radiation dose reduction techniques were utilized, including automated  exposure control and exposure modulation based on body size.                   Assessment:       MESHA (acute kidney injury) (HCC)    Hyperglycemia    HTN (hypertension)    HLD (hyperlipidemia)    Obesity, Class III, BMI 40-49.9 (morbid obesity) (HCC)    Bilateral kidney stones    Acute pyelonephritis    Hematuria    COPD (chronic obstructive pulmonary disease) ([facility])    Renal stones    Plan:     Ok for discharge with oral pain meds  He does not need and does not want ureteral stents.  Our office is trying to coordinate perc procedure and we will work on that further after the weekend    Calos Cho Jr., MD  01/08/22  06:28 EST

## 2022-01-08 NOTE — DISCHARGE SUMMARY
Patient Name: Calos Moseley  : 1960  MRN: [id number]    Date of Admission: 2022  Date of Discharge:  2022  Primary Care Physician: Naveed Nunez DO      Chief Complaint:   Flank Pain (right sided)      Discharge Diagnoses     Active Hospital Problems    Diagnosis  POA   • **MESHA (acute kidney injury) ([facility]) [N17.9]  Yes   • Hyperglycemia [R73.9]  Yes   • HTN (hypertension) [I10]  Yes   • HLD (hyperlipidemia) [E78.5]  Yes   • Obesity, Class III, BMI 40-49.9 (morbid obesity) ([facility]) [E66.01]  Yes   • Bilateral kidney stones [N20.0]  Yes   • Acute pyelonephritis [N10]  Yes   • Hematuria [R31.9]  Yes   • COPD (chronic obstructive pulmonary disease) ([facility]) [J44.9]  Yes      Resolved Hospital Problems    Diagnosis Date Resolved POA   • Acute cystitis with hematuria [N30.01] 2022 Yes        Hospital Course     Mr. Moseley is a 61 y.o. former smoker with a history of morbid obesity, COPD, hypertension, restless leg syndrome, kidney stones who presents to [facility] ER chief complaint of right-sided flank pain and admitted for acute kidney injury.     Patient with known history of left kidney stones with tentative plan for nephrostomy tube in outpatient setting per urologist next week; however, developed symptoms of acute right flank pain and right groin pain; therefore, sought ER evaluation.     CT abdomen pelvis report findings bilateral perinephric stranding and bilateral calculi.  Patient received empiric antibiotic therapy IV ceftriaxone and urine culture over 24 hours with no growth thus far; therefore, plan 5 days course cephalexin 500 mg p.o. twice daily at discharge.    Urology evaluated patient noting plans to follow-up for percutaneous procedure in outpatient setting as patient does not currently need or want ureteral stents.  Symptom management with pain medication provided at discharge.    Given acute kidney injury--serum creatinine 2.03 increased from 1.2 (2019) albeit improved to 1.9  with gentle IV fluid hydration during hospital admission--will plan to temporarily discontinue spironolactone and lisinopril at discharge and add amlodipine 10 mg p.o. daily for HTN with recommendations for follow-up primary care provider for continued monitoring BMP and blood pressure.      Day of Discharge     Physical Exam:  Temp:  [97.1 °F (36.2 °C)-97.7 °F (36.5 °C)] 97.3 °F (36.3 °C)  Heart Rate:  [57-73] 64  Resp:  [16-18] 16  BP: (156-187)/(76-82) 187/80  Body mass index is 41.97 kg/m².     Physical Exam   Constitutional:       General: He is not in acute distress.     Appearance: He is obese. He is not toxic-appearing.   HENT:      Head: Normocephalic and atraumatic.   Eyes:      Extraocular Movements: Extraocular movements intact.      Conjunctiva/sclera: Conjunctivae normal.   Cardiovascular:      Rate and Rhythm: Regular heart rate present.      Heart sounds: Normal heart sounds.   Pulmonary:      Effort: Pulmonary effort is normal.      Breath sounds: Wheezing present.   Abdominal:      General: Bowel sounds are normal.      Palpations: Abdomen is soft.      Tenderness: There is abdominal tenderness.   Musculoskeletal:         General: No tenderness.      Cervical back: Normal range of motion and neck supple.      Right lower leg: No edema.      Left lower leg: No edema.   Skin:     General: Skin is warm and dry.   Neurological:      Mental Status: He is alert and oriented to person, place, and time.      Cranial Nerves: No cranial nerve deficit.   Psychiatric:         Behavior: Behavior normal.         Thought Content: Thought content normal.     Consultants     Consult Orders (all) (From admission, onward)     Start     Ordered    01/07/22 1009  [facility] (on-call MD unless specified) Details  Once        Specialty:  Hospitalist  Provider:  (Not yet assigned)    01/07/22 1008    01/07/22 0923  Urology (on-call MD unless specified)  Once        Specialty:  Urology  Provider:  Jim Espinoza MD     01/07/22 0922              Procedures     * Surgery not found *      Imaging Results (All)     Procedure Component Value Units Date/Time    CT Abdomen Pelvis Without Contrast [[id number]] Collected: 01/07/22 0915     Updated: 01/07/22 0915    Narrative:      CT ABDOMEN AND PELVIS WITHOUT CONTRAST     HISTORY: Right flank pain radiating to the groin.     TECHNIQUE: Axial CT images of the abdomen and pelvis were obtained  without administration of intravenous contrast. The patient was not  given oral contrast. Coronal and sagittal reformats were obtained.     COMPARISON: 11/03/2017.     FINDINGS: Bilateral adrenal glands are normal. Mild bilateral  perinephric stranding is present. A 1.5 cm calculus is seen within the  right renal upper pole infundibulum/pelvis. There is mild upper pole  right-sided caliectasis. Additional calculi within the midpole of the  right kidney are present. The right ureter demonstrates normal caliber  without obstructing calculus. Numerous left-sided renal calculi are  present, majority of which are clustered within the renal pelvis  measuring up to 14 mm. There is moderate dilatation of the left upper  and midpole calyces. There is diffuse left peripelvic and periureteric  stranding. No obstructing calculus along the course of the ureter. The  urinary bladder is partially distended with circumferential wall  thickening.     The liver demonstrates normal noncontrast attenuation. Mildly lobulated  contour of the liver may suggest underlying chronic liver disease. The  gallbladder is normal. The spleen is normal in size and attenuation. The  pancreas is normal without ductal dilatation. The small and large bowel  loops demonstrate normal caliber. The appendix is not identified. No  pathological retroperitoneal lymphadenopathy. Incidental note is made of  a retroaortic left renal vein. Moderate calcified atherosclerotic plaque  is seen within the abdominal aorta and its branches.        Impression:      No ureteric calculus is identified. There is a right upper  pole infundibular/right pelvic calculus causing isolated upper pole  caliectasis. There are additionally numerous calculi within the left  kidney with mild-to-moderate dilatation of the left renal collecting  system that appears to be unchanged from prior imaging in 2017. There is  diffuse left peripelvic and periureteric stranding.     Radiation dose reduction techniques were utilized, including automated  exposure control and exposure modulation based on body size.                  Pertinent Labs     Results from last 7 days   Lab Units 01/08/22  0555 01/07/22  0823   WBC 10*3/mm3 6.99 7.08   HEMOGLOBIN g/dL 12.5* 12.9*   PLATELETS 10*3/mm3 172 168     Results from last 7 days   Lab Units 01/08/22  0555 01/07/22  0823   SODIUM mmol/L 144 141   POTASSIUM mmol/L 4.3 4.0   CHLORIDE mmol/L 109* 106   CO2 mmol/L 26.8 26.1   BUN mg/dL 26* 25*   CREATININE mg/dL 1.93* 2.03*   GLUCOSE mg/dL 88 146*   EGFR IF NONAFRICN AM mL/min/1.73 36* 34*     Results from last 7 days   Lab Units 01/07/22  0823   ALBUMIN g/dL 4.10   BILIRUBIN mg/dL 0.4   ALK PHOS U/L 76   AST (SGOT) U/L 23   ALT (SGPT) U/L 43*     Results from last 7 days   Lab Units 01/08/22  0555 01/07/22  0823   CALCIUM mg/dL 8.6 8.7   ALBUMIN g/dL  --  4.10               Invalid input(s): LDLCALC  Results from last 7 days   Lab Units 01/07/22  0845   URINECX  No growth     Results from last 7 days   Lab Units 01/07/22  1019   COVID19  Not Detected       Test Results Pending at Discharge     Pending Labs     Order Current Status    Urine Culture - Urine, Urine, Clean Catch Preliminary result          Discharge Details        Discharge Medications      New Medications      Instructions Start Date   amLODIPine 5 MG tablet  Commonly known as: NORVASC   10 mg, Oral, Daily      budesonide-formoterol 160-4.5 MCG/ACT inhaler  Commonly known as: SYMBICORT   2 puffs, Inhalation, 2 Times Daily - RT       cephalexin 500 MG capsule  Commonly known as: Keflex   500 mg, Oral, 2 Times Daily         Changes to Medications      Instructions Start Date   cloNIDine 0.1 MG tablet  Commonly known as: CATAPRES  What changed:   · when to take this  · reasons to take this   Take 1 tablet by mouth 2 (two) times a day if systolic blood pressure is 156 or greater or if diastolic blood pressure is 94 or greater.      divalproex 250 MG 24 hr tablet  Commonly known as: DEPAKOTE ER  What changed:   · how much to take  · when to take this   Take 1 tablet by mouth in the morning and 2 tabs at bedtime.      divalproex 250 MG 24 hr tablet  Commonly known as: DEPAKOTE ER  What changed: Another medication with the same name was changed. Make sure you understand how and when to take each.   250 mg, Oral, 2 Times Daily      HYDROcodone-acetaminophen 7.5-325 MG per tablet  Commonly known as: NORCO  What changed: Another medication with the same name was added. Make sure you understand how and when to take each.   1 tablet, Oral, 3 Times Daily PRN      HYDROcodone-acetaminophen 7.5-325 MG per tablet  Commonly known as: NORCO  What changed: You were already taking a medication with the same name, and this prescription was added. Make sure you understand how and when to take each.   1 tablet, Oral, Every 8 Hours PRN         Continue These Medications      Instructions Start Date   albuterol sulfate  (90 Base) MCG/ACT inhaler  Commonly known as: PROVENTIL HFA;VENTOLIN HFA;PROAIR HFA   INHALE ONE TO TWO PUFFS BY MOUTH EVERY 4 TO 6 HOURS      atorvastatin 20 MG tablet  Commonly known as: LIPITOR   20 mg, Oral, Daily      buPROPion  MG 24 hr tablet  Commonly known as: WELLBUTRIN XL   300 mg, Oral, Every Morning      hydrALAZINE 50 MG tablet  Commonly known as: APRESOLINE   50 mg, Oral, 3 Times Daily      Klor-Con 20 MEQ packet  Generic drug: potassium chloride   20 mEq, Oral, Daily      lamoTRIgine 100 MG tablet  Commonly known as:  LaMICtal   100 mg, Oral, 2 Times Daily      levothyroxine 50 MCG tablet  Commonly known as: SYNTHROID, LEVOTHROID   50 mcg, Oral, Daily      metoprolol succinate  MG 24 hr tablet  Commonly known as: TOPROL-XL   100 mg, Oral, 2 Times Daily      naltrexone 50 MG tablet  Commonly known as: DEPADE   50 mg, Oral, Every Morning      rOPINIRole 0.5 MG tablet  Commonly known as: REQUIP   Take 2 tablets by mouth Every Night. Take 1 hour before bedtime.      sildenafil 100 MG tablet  Commonly known as: VIAGRA   1 tablet, Oral, As Needed      vilazodone 40 MG tablet tablet  Commonly known as: VIIBRYD   40 mg, Oral, Daily         Stop These Medications    lisinopril 20 MG tablet  Commonly known as: PRINIVILZESTRIL     spironolactone 25 MG tablet  Commonly known as: ALDACTONE            No Known Allergies    Discharge Disposition:  Home or Self Care      Discharge Diet:  Diet Order   Procedures   • Diet Regular       Discharge Activity:   Activity Instructions     Activity as Tolerated            CODE STATUS:    Code Status and Medical Interventions:   Ordered at: 01/07/22 1047     Code Status (Patient has no pulse and is not breathing):    CPR (Attempt to Resuscitate)     Medical Interventions (Patient has pulse or is breathing):    Full Support       Future Appointments   Date Time Provider Department Center   1/11/2022 10:35 AM C19 PRE SCREEN [facility]     Additional Instructions for the Follow-ups that You Need to Schedule     Discharge Follow-up with PCP   As directed       Currently Documented PCP:    Naveed Nunez DO    PCP Phone Number:    [phone]     Follow Up Details: Please call to schedule a one week or earliest available follow-up appointment with PCP; BMP, BP monitoring            Follow-up Information     Naveed Nunez DO .    Specialty: Family Medicine  Why: Please call to schedule a one week or earliest available follow-up appointment with PCP; BMP, BP monitoring  Contact  information:  [address]  [facility] [address]  [phone]             Calos Cho Jr., MD. Call.    Specialty: Urology  Why: Please follow-up with urology as previously discussed  Contact information:  [address] [facility] [address] [phone]                         Additional Instructions for the Follow-ups that You Need to Schedule     Discharge Follow-up with PCP   As directed       Currently Documented PCP:    Naveed Nunez DO    PCP Phone Number:    [phone]     Follow Up Details: Please call to schedule a one week or earliest available follow-up appointment with PCP; BMP, BP monitoring           Time Spent on Discharge:  Greater than 30 minutes      SALVADOR Gonzalez  [facility]  01/08/22  09:46 EST

## 2022-01-08 NOTE — PLAN OF CARE
Problem: Adult Inpatient Plan of Care  Goal: Plan of Care Review  Flowsheets (Taken 1/8/2022 6743)  Progress: improving  Plan of Care Reviewed With: patient  Outcome Summary: Pt is A&O x4, c/o pain in back, medicated x1 w/ Dilaudid, pt up ad moy, pain under better control, no other needs. will continue to monitor.   Goal Outcome Evaluation:  Plan of Care Reviewed With: patient        Progress: improving  Outcome Summary: Pt is A&O x4, c/o pain in back, medicated x1 w/ Dilaudid, pt up ad moy, pain under better control, no other needs. will continue to monitor.

## 2022-01-09 NOTE — CASE MANAGEMENT/SOCIAL WORK
Case Management Discharge Note      Final Note: Discharge home, spouse provided transportation.         Selected Continued Care - Discharged on 1/8/2022 Admission date: 1/7/2022 - Discharge disposition: Home or Self Care    Destination    No services have been selected for the patient.              Durable Medical Equipment    No services have been selected for the patient.              Dialysis/Infusion    No services have been selected for the patient.              Home Medical Care    No services have been selected for the patient.              Therapy    No services have been selected for the patient.              Community Resources    No services have been selected for the patient.              Community & DME    No services have been selected for the patient.                       Final Discharge Disposition Code: 01 - home or self-care

## 2022-01-24 ENCOUNTER — APPOINTMENT (OUTPATIENT)
Dept: CT IMAGING | Facility: HOSPITAL | Age: 62
End: 2022-01-24

## 2022-01-24 ENCOUNTER — APPOINTMENT (OUTPATIENT)
Dept: GENERAL RADIOLOGY | Facility: HOSPITAL | Age: 62
End: 2022-01-24

## 2022-01-24 ENCOUNTER — HOSPITAL ENCOUNTER (EMERGENCY)
Facility: HOSPITAL | Age: 62
Discharge: HOME | End: 2022-01-24
Attending: EMERGENCY MEDICINE | Admitting: EMERGENCY MEDICINE

## 2022-01-24 VITALS
HEART RATE: 91 BPM | DIASTOLIC BLOOD PRESSURE: 87 MMHG | WEIGHT: 260 LBS | BODY MASS INDEX: 36.4 KG/M2 | TEMPERATURE: 98.9 F | SYSTOLIC BLOOD PRESSURE: 158 MMHG | HEIGHT: 71 IN | RESPIRATION RATE: 18 BRPM | OXYGEN SATURATION: 94 %

## 2022-01-24 DIAGNOSIS — I16.0 HYPERTENSIVE URGENCY: Primary | ICD-10-CM

## 2022-01-24 DIAGNOSIS — J34.9 PARANASAL SINUS DISEASE: ICD-10-CM

## 2022-01-24 DIAGNOSIS — N28.9 RENAL INSUFFICIENCY: ICD-10-CM

## 2022-01-24 LAB
ALBUMIN SERPL-MCNC: 4.6 G/DL (ref 3.5–5.2)
ALBUMIN/GLOB SERPL: 2.1 G/DL
ALP SERPL-CCNC: 80 U/L (ref 39–117)
ALT SERPL W P-5'-P-CCNC: 27 U/L (ref 1–41)
ANION GAP SERPL CALCULATED.3IONS-SCNC: 15.1 MMOL/L (ref 5–15)
AST SERPL-CCNC: 23 U/L (ref 1–40)
BASOPHILS # BLD AUTO: 0.04 10*3/MM3 (ref 0–0.2)
BASOPHILS NFR BLD AUTO: 0.6 % (ref 0–1.5)
BILIRUB SERPL-MCNC: 0.6 MG/DL (ref 0–1.2)
BUN SERPL-MCNC: 23 MG/DL (ref 8–23)
BUN/CREAT SERPL: 10.9 (ref 7–25)
CALCIUM SPEC-SCNC: 9.3 MG/DL (ref 8.6–10.5)
CHLORIDE SERPL-SCNC: 104 MMOL/L (ref 98–107)
CO2 SERPL-SCNC: 21.9 MMOL/L (ref 22–29)
CREAT SERPL-MCNC: 2.11 MG/DL (ref 0.76–1.27)
DEPRECATED RDW RBC AUTO: 42.3 FL (ref 37–54)
EOSINOPHIL # BLD AUTO: 0.36 10*3/MM3 (ref 0–0.4)
EOSINOPHIL NFR BLD AUTO: 5 % (ref 0.3–6.2)
ERYTHROCYTE [DISTWIDTH] IN BLOOD BY AUTOMATED COUNT: 13 % (ref 12.3–15.4)
GFR SERPL CREATININE-BSD FRML MDRD: 32 ML/MIN/1.73
GLOBULIN UR ELPH-MCNC: 2.2 GM/DL
GLUCOSE SERPL-MCNC: 138 MG/DL (ref 65–99)
HCT VFR BLD AUTO: 42.4 % (ref 37.5–51)
HGB BLD-MCNC: 14.3 G/DL (ref 13–17.7)
IMM GRANULOCYTES # BLD AUTO: 0.05 10*3/MM3 (ref 0–0.05)
IMM GRANULOCYTES NFR BLD AUTO: 0.7 % (ref 0–0.5)
LYMPHOCYTES # BLD AUTO: 1.07 10*3/MM3 (ref 0.7–3.1)
LYMPHOCYTES NFR BLD AUTO: 14.8 % (ref 19.6–45.3)
MAGNESIUM SERPL-MCNC: 1.9 MG/DL (ref 1.6–2.4)
MCH RBC QN AUTO: 30.1 PG (ref 26.6–33)
MCHC RBC AUTO-ENTMCNC: 33.7 G/DL (ref 31.5–35.7)
MCV RBC AUTO: 89.3 FL (ref 79–97)
MONOCYTES # BLD AUTO: 0.59 10*3/MM3 (ref 0.1–0.9)
MONOCYTES NFR BLD AUTO: 8.1 % (ref 5–12)
NEUTROPHILS NFR BLD AUTO: 5.14 10*3/MM3 (ref 1.7–7)
NEUTROPHILS NFR BLD AUTO: 70.8 % (ref 42.7–76)
NRBC BLD AUTO-RTO: 0 /100 WBC (ref 0–0.2)
PLATELET # BLD AUTO: 177 10*3/MM3 (ref 140–450)
PMV BLD AUTO: 11.7 FL (ref 6–12)
POTASSIUM SERPL-SCNC: 3.9 MMOL/L (ref 3.5–5.2)
PROT SERPL-MCNC: 6.8 G/DL (ref 6–8.5)
QT INTERVAL: 328 MS
RBC # BLD AUTO: 4.75 10*6/MM3 (ref 4.14–5.8)
SODIUM SERPL-SCNC: 141 MMOL/L (ref 136–145)
TROPONIN T SERPL-MCNC: <0.01 NG/ML (ref 0–0.03)
WBC NRBC COR # BLD: 7.25 10*3/MM3 (ref 3.4–10.8)

## 2022-01-24 PROCEDURE — 99284 EMERGENCY DEPT VISIT MOD MDM: CPT

## 2022-01-24 PROCEDURE — 93005 ELECTROCARDIOGRAM TRACING: CPT

## 2022-01-24 PROCEDURE — 36415 COLL VENOUS BLD VENIPUNCTURE: CPT

## 2022-01-24 PROCEDURE — 93005 ELECTROCARDIOGRAM TRACING: CPT | Performed by: EMERGENCY MEDICINE

## 2022-01-24 PROCEDURE — 85025 COMPLETE CBC W/AUTO DIFF WBC: CPT | Performed by: NURSE PRACTITIONER

## 2022-01-24 PROCEDURE — 70450 CT HEAD/BRAIN W/O DYE: CPT

## 2022-01-24 PROCEDURE — 83735 ASSAY OF MAGNESIUM: CPT | Performed by: NURSE PRACTITIONER

## 2022-01-24 PROCEDURE — 84484 ASSAY OF TROPONIN QUANT: CPT | Performed by: NURSE PRACTITIONER

## 2022-01-24 PROCEDURE — 93010 ELECTROCARDIOGRAM REPORT: CPT | Performed by: INTERNAL MEDICINE

## 2022-01-24 PROCEDURE — 71045 X-RAY EXAM CHEST 1 VIEW: CPT

## 2022-01-24 PROCEDURE — 80053 COMPREHEN METABOLIC PANEL: CPT | Performed by: NURSE PRACTITIONER

## 2022-01-24 RX ORDER — ROPINIROLE 1 MG/1
1 TABLET, FILM COATED ORAL ONCE
Status: COMPLETED | OUTPATIENT
Start: 2022-01-24 | End: 2022-01-24

## 2022-01-24 RX ORDER — SODIUM CHLORIDE 0.9 % (FLUSH) 0.9 %
10 SYRINGE (ML) INJECTION AS NEEDED
Status: DISCONTINUED | OUTPATIENT
Start: 2022-01-24 | End: 2022-01-24 | Stop reason: HOSPADM

## 2022-01-24 RX ADMIN — ROPINIROLE 1 MG: 1 TABLET, FILM COATED ORAL at 16:00

## 2022-01-24 NOTE — ED PROVIDER NOTES
EMERGENCY DEPARTMENT ENCOUNTER    Room Number:  08/08  Date of encounter:  1/24/2022  PCP: Naveed Nunez DO  Historian: Patient      PPE    Patient was placed in face mask in first look. Patient was wearing facemask when I entered the room and throughout our encounter. I wore full protective equipment throughout this patient encounter including a CAPR face mask, and gloves. Hand hygiene was performed before donning protective equipment and after removal when leaving the room.        HPI:  Chief Complaint: Headache, hypertension  A complete HPI/ROS/PMH/PSH/SH/FH are unobtainable due to: Nothing    Context: Calos Moseley is a 61 y.o. male who arrives to the ED via private vehicle.  Patient presents with c/o moderate, throbbing head pain that began around 1115 today and is improving now.   Patient also complains of hypertension, blood pressure in the 200s prior to arrival and left arm pain/heaviness that is improving also at this time.  Patient states that he does have a history of hypertension, he is also under some stress and pain related to kidney stones and upcoming surgery.  Patient denies chest pain, shortness of breath, dizziness, weakness, dysuria, fever, chills or any other symptoms.  Patient states that nothing makes the symptoms better and nothing worsens symptoms.          PAST MEDICAL HISTORY  Active Ambulatory Problems     Diagnosis Date Noted   • SBO (small bowel obstruction) ([facility]) 11/03/2017   • MESHA (acute kidney injury) ([facility]) 01/07/2022   • Hyperglycemia 01/07/2022   • HTN (hypertension) 01/07/2022   • HLD (hyperlipidemia) 01/07/2022   • Obesity, Class III, BMI 40-49.9 (morbid obesity) ([facility]) 01/07/2022   • Bilateral kidney stones 01/07/2022   • Acute pyelonephritis 01/07/2022   • Hematuria 01/07/2022   • COPD (chronic obstructive pulmonary disease) ([facility]) 01/07/2022     Resolved Ambulatory Problems     Diagnosis Date Noted   • Acute cystitis with hematuria 01/07/2022     Past Medical History:    Diagnosis Date   • Anesthesia complication    • Arthritis    • Disease of thyroid gland    • Elevated cholesterol    • Hypertension    • Kidney stone on left side    • Left flank pain    • Mood disorder (HCC)    • Restless leg syndrome    • Sleep apnea    • UTI (urinary tract infection) 2021         PAST SURGICAL HISTORY  Past Surgical History:   Procedure Laterality Date   • ADENOIDECTOMY     • APPENDECTOMY     • COLONOSCOPY     • KIDNEY STONE SURGERY     • TONSILLECTOMY           FAMILY HISTORY  Family History   Problem Relation Age of Onset   • Malig Hyperthermia Neg Hx          SOCIAL HISTORY  Social History     Socioeconomic History   • Marital status:    Tobacco Use   • Smoking status: Former Smoker     Types: Cigarettes     Quit date: 1990     Years since quittin.0   • Smokeless tobacco: Never Used   Vaping Use   • Vaping Use: Never used   Substance and Sexual Activity   • Alcohol use: Not Currently   • Drug use: No   • Sexual activity: Defer         ALLERGIES  Patient has no known allergies.        REVIEW OF SYSTEMS  Review of Systems     All systems reviewed and negative except for those discussed in HPI.        PHYSICAL EXAM    ED Triage Vitals [22 1239]   Temp Heart Rate Resp BP SpO2   98.9 °F (37.2 °C) (!) 128 18 (!) 194/82 95 %       Physical Exam  GENERAL: Well appearing, nontoxic appearing, not distressed  HENT: normocephalic, atraumatic  EYES: no scleral icterus, PERRL  CV: regular rhythm, regular rate, no murmur  RESPIRATORY: normal effort, CTAB  ABDOMEN: soft   MUSCULOSKELETAL: no deformity  NEURO: alert, moves all extremities, follows commands, mental status normal/baseline  Recent and remote memory functions are normal  Patient is attentive with normal concentration  Language is fluent  Speech is clear  Speech is non-dysarthric  Symmetric smile with no facial droop  Eyes close shut strongly bilaterally  Symmetric eyebrow raise bilaterally  EOMI, PERRL  CN II-XII  grossly normal otherwise  5/5 strength to bilateral upper and lower extremities  No pronator drift  Intact FNF   Normal sensation upper and lower extremities  SKIN: warm, dry, no rash   Psych: Appropriate mood and affect  Nursing notes and vital signs reviewed      LAB RESULTS  Recent Results (from the past 24 hour(s))   ECG 12 Lead    Collection Time: 01/24/22 12:41 PM   Result Value Ref Range    QT Interval 328 ms   Comprehensive Metabolic Panel    Collection Time: 01/24/22  2:54 PM    Specimen: Blood   Result Value Ref Range    Glucose 138 (H) 65 - 99 mg/dL    BUN 23 8 - 23 mg/dL    Creatinine 2.11 (H) 0.76 - 1.27 mg/dL    Sodium 141 136 - 145 mmol/L    Potassium 3.9 3.5 - 5.2 mmol/L    Chloride 104 98 - 107 mmol/L    CO2 21.9 (L) 22.0 - 29.0 mmol/L    Calcium 9.3 8.6 - 10.5 mg/dL    Total Protein 6.8 6.0 - 8.5 g/dL    Albumin 4.60 3.50 - 5.20 g/dL    ALT (SGPT) 27 1 - 41 U/L    AST (SGOT) 23 1 - 40 U/L    Alkaline Phosphatase 80 39 - 117 U/L    Total Bilirubin 0.6 0.0 - 1.2 mg/dL    eGFR Non African Amer 32 (L) >60 mL/min/1.73    Globulin 2.2 gm/dL    A/G Ratio 2.1 g/dL    BUN/Creatinine Ratio 10.9 7.0 - 25.0    Anion Gap 15.1 (H) 5.0 - 15.0 mmol/L   Troponin    Collection Time: 01/24/22  2:54 PM    Specimen: Blood   Result Value Ref Range    Troponin T <0.010 0.000 - 0.030 ng/mL   Magnesium    Collection Time: 01/24/22  2:54 PM    Specimen: Blood   Result Value Ref Range    Magnesium 1.9 1.6 - 2.4 mg/dL   CBC Auto Differential    Collection Time: 01/24/22  2:54 PM    Specimen: Blood   Result Value Ref Range    WBC 7.25 3.40 - 10.80 10*3/mm3    RBC 4.75 4.14 - 5.80 10*6/mm3    Hemoglobin 14.3 13.0 - 17.7 g/dL    Hematocrit 42.4 37.5 - 51.0 %    MCV 89.3 79.0 - 97.0 fL    MCH 30.1 26.6 - 33.0 pg    MCHC 33.7 31.5 - 35.7 g/dL    RDW 13.0 12.3 - 15.4 %    RDW-SD 42.3 37.0 - 54.0 fl    MPV 11.7 6.0 - 12.0 fL    Platelets 177 140 - 450 10*3/mm3    Neutrophil % 70.8 42.7 - 76.0 %    Lymphocyte % 14.8 (L) 19.6 - 45.3 %     Monocyte % 8.1 5.0 - 12.0 %    Eosinophil % 5.0 0.3 - 6.2 %    Basophil % 0.6 0.0 - 1.5 %    Immature Grans % 0.7 (H) 0.0 - 0.5 %    Neutrophils, Absolute 5.14 1.70 - 7.00 10*3/mm3    Lymphocytes, Absolute 1.07 0.70 - 3.10 10*3/mm3    Monocytes, Absolute 0.59 0.10 - 0.90 10*3/mm3    Eosinophils, Absolute 0.36 0.00 - 0.40 10*3/mm3    Basophils, Absolute 0.04 0.00 - 0.20 10*3/mm3    Immature Grans, Absolute 0.05 0.00 - 0.05 10*3/mm3    nRBC 0.0 0.0 - 0.2 /100 WBC       Ordered the above labs and independently reviewed the results.      RADIOLOGY  XR Chest 1 View    Result Date: 1/24/2022  XR CHEST 1 VW-  HISTORY: Male who is 61 years-old,  hypertension  TECHNIQUE: Frontal view of the chest  COMPARISON: None available  FINDINGS: The heart appears mildly enlarged. Pulmonary vasculature is unremarkable. No focal pulmonary consolidation, pleural effusion, or pneumothorax. No acute osseous process.      No focal pulmonary consolidation. Mild cardiomegaly. Follow-up as clinical indications persist.  This report was finalized on 1/24/2022 2:58 PM by Dr. Eder Gallego M.D.        I ordered the above noted radiological studies and viewed the images on the PACS system.       EKG      Independently viewed by me and interpreted by Dr Espinoza         MEDICAL RECORD REVIEW  Records reviewed in epic, patient was just discharged from the hospital on January 8, 2022 after being admitted for MESHA, hypertension.  Patient is to get a nephrostomy tube in an outpatient setting at the next available appointment.      PROCEDURES    Procedures        DIFFERENTIAL DIAGNOSIS  Differential Diagnosis for Headache include but are not limited to the following:  -Migraine  - Cluster Headache  - Ischemic Stroke  - Intracranial Hemorrhage  - Cerebral Aneurysm  - Temporal Arteritis  - Meningitis(Bacterial or Viral)  - Sinusitis, Dental Infection  -Viral Syndrome,  -Hypertension  -Concussion        PROGRESS, DATA ANALYSIS, CONSULTS, AND MEDICAL  DECISION MAKING        ED Course as of 01/24/22 1608   Mon Jan 24, 2022   1445 Discussed with Dr. Mccracken, on-call stroke neurologist, regarding patient's left arm heaviness, headache and current blood pressure.  He agrees with the plan to do CT of the head, monitor blood pressure and if work-up is normal plan to discharge patient home.  Patient's current blood pressure is 159/75.  He states his symptoms are resolving. [MS]   1555 Reviewed pt's history and workup with Dr. Espinoza.  After a bedside evaluation, they agree with the plan of care.       [MS]   1604 Care transferred to Dr. Espinoza at this time pending CT head results and final disposition. [MS]      ED Course User Index  [MS] Arin Felipe, SALVADOR           MEDICATIONS GIVEN IN ED    Medications   sodium chloride 0.9 % flush 10 mL (has no administration in time range)   rOPINIRole (REQUIP) tablet 1 mg (1 mg Oral Given 1/24/22 1600)           COURSE & MEDICAL DECISION MAKING  Any/All labs and Any/All Imaging studies that were ordered were reviewed and are noted above.  Results were reviewed/discussed with the patient and they were also made aware of online access.    Pt also made aware that some labs, such as cultures, will not be resulted during ER visit and followup with PMD is necessary.        Arin Felipe APRN  01/24/22 1607

## 2022-01-24 NOTE — DISCHARGE INSTRUCTIONS
Please call Dr. Nunez tomorrow for guidance on management of your elevated blood pressure readings.  Please also inform him that your kidney function measured by your serum creatinine is slightly up today compared to December.  You may follow-up with Dr. Nunez or your urologist to have your kidney function rechecked.    You were also noted to have some paranasal sinus disease on your CT scan of the brain today.  He should follow up with the ENT doctor at your convenience regarding this.

## 2022-01-24 NOTE — ED NOTES
Patient states he has taken his BP and has been HTN. Patient has been taking BP medications as prescribed. Patient was taking his BP because he could feel the blood pressure in his head. Patient states he started having left arm numbness one hour ago. Denies any numbness anywhere else or any other neuro deficits.      Shantelle Deal RN  01/24/22 1815

## 2022-01-24 NOTE — ED PROVIDER NOTES
MD ATTESTATION NOTE    The VINITA and I have discussed this patient's history, physical exam, and treatment plan.  I have reviewed the documentation and personally had a face to face interaction with the patient. I affirm the documentation and agree with the treatment and plan.  The attached note describes my personal findings.      I provided a substantive portion of the care of the patient.  I personally performed the physical exam in its entirety, and below are my findings.  For this patient encounter, the patient wore surgical mask, I wore full protective PPE including N95 and eye protection.      Brief HPI: Patient presents with complaint of elevated blood pressure at home.  He also has been having pounding headache and some heaviness in his left arm.  He denies chest pain or shortness of air.  He has a history of high blood pressure and is on multiple medications with which he reports compliance.  He denies any speech or vision changes.  He is feeling better now that his blood pressures come down, currently 160s over 80s during my exam and interview.  He is supposed to be having a nephrostomy tube placed soon due to kidney stones.    PHYSICAL EXAM  ED Triage Vitals [01/24/22 1239]   Temp Heart Rate Resp BP SpO2   98.9 °F (37.2 °C) (!) 128 18 (!) 194/82 95 %      Temp src Heart Rate Source Patient Position BP Location FiO2 (%)   -- -- -- -- --         GENERAL: Awake and alert, sitting in chair at the bedside, no acute distress  HENT: nares patent  EYES: no scleral icterus, EOMI  CV: regular rhythm, normal rate, equal radial pulses bilaterally, equal blood pressures bilateral upper extremities  RESPIRATORY: normal effort, lungs clear auscultation bilaterally  ABDOMEN: soft, nondistended, nontender throughout  MUSCULOSKELETAL: no deformity  NEURO: alert, moves all extremities, follows commands, speech fluent and clear, cranial nerves II through XII grossly intact  PSYCH:  calm, cooperative  SKIN: warm, dry    Vital  signs and nursing notes reviewed.    ED Course as of 01/24/22 1719   Mon Jan 24, 2022   1445 Discussed with Dr. Mccracken, on-call stroke neurologist, regarding patient's left arm heaviness, headache and current blood pressure.  He agrees with the plan to do CT of the head, monitor blood pressure and if work-up is normal plan to discharge patient home.  Patient's current blood pressure is 159/75.  He states his symptoms are resolving. [MS]   1555 Reviewed pt's history and workup with Dr. Espinoza.  After a bedside evaluation, they agree with the plan of care.       [MS]   1604 Care transferred to Dr. Espinoza at this time pending CT head results and final disposition. [MS]   1628 CT brain independently reviewed in PACS, no acute intracranial hemorrhage. [JR]   1638 Creatinine(!): 2.11  stable [JR]   1639 Creatinine 2.11 is similar to creatinine from 2 weeks ago, elevated from creatinine 1.6 in December [JR]   1639 BP: 174/80 [JR]   1641 EKG reviewed and appears similar to previous tracing. [JR]      ED Course User Index  [JR] Paul Espinoza MD  [MS] Arin Felipe, APRN       Final diagnoses:   Hypertensive urgency   Renal insufficiency   Paranasal sinus disease         Plan:   DISCHARGE    Patient discharged in stable condition.    Reviewed implications of results, diagnosis, meds, responsibility to follow up, warning signs and symptoms of possible worsening, potential complications and reasons to return to ER.    Patient/Family voiced understanding of above instructions.    Discussed plan for discharge, as there is no emergent indication for admission. Patient referred to primary care provider for BP management due to today's BP. Pt/family is agreeable and understands need for follow up and repeat testing.  Pt is aware that discharge does not mean that nothing is wrong but it indicates no emergency is present that requires admission and they must continue care with follow-up as given below or  physician of their choice.     FOLLOW-UP  Naveed Nunez, DO  [address] [facility] [address]  [facility] [address] [phone]    Call       Alfonso Rousseau MD  [address] [facility] [address]  [place] [id number] [phone]      As needed         Medication List      Changed    cloNIDine 0.1 MG tablet  Commonly known as: CATAPRES  Take 1 tablet by mouth 2 (two) times a day if systolic blood pressure is 156 or greater or if diastolic blood pressure is 94 or greater.  What changed:   · when to take this  · reasons to take this     * divalproex 250 MG 24 hr tablet  Commonly known as: DEPAKOTE ER  Take 1 tablet by mouth in the morning and 2 tabs at bedtime.  What changed:   · how much to take  · when to take this     * divalproex 250 MG 24 hr tablet  Commonly known as: DEPAKOTE ER  Take 1 tablet by mouth 2 (Two) Times a Day.  What changed: Another medication with the same name was changed. Make sure you understand how and when to take each.         * This list has 2 medication(s) that are the same as other medications prescribed for you. Read the directions carefully, and ask your doctor or other care provider to review them with you.                   Paul Espinoza MD  01/24/22 9671

## 2022-02-11 ENCOUNTER — LAB (OUTPATIENT)
Dept: LAB | Facility: HOSPITAL | Age: 62
End: 2022-02-11

## 2022-02-11 LAB
ABO GROUP BLD: NORMAL
ANION GAP SERPL CALCULATED.3IONS-SCNC: 8.7 MMOL/L (ref 5–15)
BASOPHILS # BLD AUTO: 0.08 10*3/MM3 (ref 0–0.2)
BASOPHILS NFR BLD AUTO: 0.7 % (ref 0–1.5)
BLD GP AB SCN SERPL QL: NEGATIVE
BUN SERPL-MCNC: 23 MG/DL (ref 8–23)
BUN/CREAT SERPL: 14.2 (ref 7–25)
CALCIUM SPEC-SCNC: 9.8 MG/DL (ref 8.6–10.5)
CHLORIDE SERPL-SCNC: 100 MMOL/L (ref 98–107)
CO2 SERPL-SCNC: 27.3 MMOL/L (ref 22–29)
CREAT SERPL-MCNC: 1.62 MG/DL (ref 0.76–1.27)
DEPRECATED RDW RBC AUTO: 40.9 FL (ref 37–54)
EOSINOPHIL # BLD AUTO: 0.5 10*3/MM3 (ref 0–0.4)
EOSINOPHIL NFR BLD AUTO: 4.3 % (ref 0.3–6.2)
ERYTHROCYTE [DISTWIDTH] IN BLOOD BY AUTOMATED COUNT: 12.9 % (ref 12.3–15.4)
GFR SERPL CREATININE-BSD FRML MDRD: 44 ML/MIN/1.73
GLUCOSE SERPL-MCNC: 120 MG/DL (ref 65–99)
HCT VFR BLD AUTO: 43.8 % (ref 37.5–51)
HGB BLD-MCNC: 15 G/DL (ref 13–17.7)
IMM GRANULOCYTES # BLD AUTO: 0.1 10*3/MM3 (ref 0–0.05)
IMM GRANULOCYTES NFR BLD AUTO: 0.9 % (ref 0–0.5)
LYMPHOCYTES # BLD AUTO: 1.49 10*3/MM3 (ref 0.7–3.1)
LYMPHOCYTES NFR BLD AUTO: 12.8 % (ref 19.6–45.3)
MCH RBC QN AUTO: 30.4 PG (ref 26.6–33)
MCHC RBC AUTO-ENTMCNC: 34.2 G/DL (ref 31.5–35.7)
MCV RBC AUTO: 88.7 FL (ref 79–97)
MONOCYTES # BLD AUTO: 0.93 10*3/MM3 (ref 0.1–0.9)
MONOCYTES NFR BLD AUTO: 8 % (ref 5–12)
NEUTROPHILS NFR BLD AUTO: 73.3 % (ref 42.7–76)
NEUTROPHILS NFR BLD AUTO: 8.54 10*3/MM3 (ref 1.7–7)
NRBC BLD AUTO-RTO: 0 /100 WBC (ref 0–0.2)
PLATELET # BLD AUTO: 225 10*3/MM3 (ref 140–450)
PMV BLD AUTO: 11.4 FL (ref 6–12)
POTASSIUM SERPL-SCNC: 4.4 MMOL/L (ref 3.5–5.2)
RBC # BLD AUTO: 4.94 10*6/MM3 (ref 4.14–5.8)
RH BLD: NEGATIVE
SODIUM SERPL-SCNC: 136 MMOL/L (ref 136–145)
T&S EXPIRATION DATE: NORMAL
WBC NRBC COR # BLD: 11.64 10*3/MM3 (ref 3.4–10.8)

## 2022-02-11 PROCEDURE — 86900 BLOOD TYPING SEROLOGIC ABO: CPT | Performed by: UROLOGY

## 2022-02-11 PROCEDURE — 86901 BLOOD TYPING SEROLOGIC RH(D): CPT | Performed by: UROLOGY

## 2022-02-11 PROCEDURE — 86850 RBC ANTIBODY SCREEN: CPT | Performed by: UROLOGY

## 2022-02-11 PROCEDURE — 36415 COLL VENOUS BLD VENIPUNCTURE: CPT | Performed by: UROLOGY

## 2022-02-11 PROCEDURE — 87077 CULTURE AEROBIC IDENTIFY: CPT

## 2022-02-11 PROCEDURE — 86900 BLOOD TYPING SEROLOGIC ABO: CPT

## 2022-02-11 PROCEDURE — 80048 BASIC METABOLIC PNL TOTAL CA: CPT | Performed by: UROLOGY

## 2022-02-11 PROCEDURE — 87186 SC STD MICRODIL/AGAR DIL: CPT

## 2022-02-11 PROCEDURE — 85025 COMPLETE CBC W/AUTO DIFF WBC: CPT | Performed by: UROLOGY

## 2022-02-11 PROCEDURE — 86901 BLOOD TYPING SEROLOGIC RH(D): CPT

## 2022-02-11 PROCEDURE — 87086 URINE CULTURE/COLONY COUNT: CPT

## 2022-02-13 LAB — BACTERIA SPEC AEROBE CULT: ABNORMAL

## 2022-02-14 ENCOUNTER — APPOINTMENT (OUTPATIENT)
Dept: CT IMAGING | Facility: HOSPITAL | Age: 62
End: 2022-02-14

## 2022-02-14 ENCOUNTER — HOSPITAL ENCOUNTER (INPATIENT)
Facility: HOSPITAL | Age: 62
LOS: 1 days | Discharge: HOME | End: 2022-02-16
Attending: EMERGENCY MEDICINE | Admitting: INTERNAL MEDICINE

## 2022-02-14 DIAGNOSIS — N23 RENAL COLIC: ICD-10-CM

## 2022-02-14 DIAGNOSIS — R10.9 LEFT FLANK PAIN: Primary | ICD-10-CM

## 2022-02-14 DIAGNOSIS — N12 PYELONEPHRITIS: ICD-10-CM

## 2022-02-14 DIAGNOSIS — N39.0 ACUTE UTI: ICD-10-CM

## 2022-02-14 PROBLEM — R31.9 HEMATURIA: Chronic | Status: ACTIVE | Noted: 2022-01-07

## 2022-02-14 PROBLEM — E78.5 HYPERLIPIDEMIA: Status: ACTIVE | Noted: 2018-01-29

## 2022-02-14 PROBLEM — M47.812 CERVICAL SPONDYLOSIS: Status: ACTIVE | Noted: 2018-09-11

## 2022-02-14 PROBLEM — J44.9 COPD (CHRONIC OBSTRUCTIVE PULMONARY DISEASE): Chronic | Status: ACTIVE | Noted: 2022-01-07

## 2022-02-14 PROBLEM — E66.01 OBESITY, CLASS III, BMI 40-49.9 (MORBID OBESITY): Chronic | Status: ACTIVE | Noted: 2022-01-07

## 2022-02-14 PROBLEM — E66.9 OBESITY: Status: RESOLVED | Noted: 2021-12-01 | Resolved: 2022-02-14

## 2022-02-14 PROBLEM — E66.813 OBESITY, CLASS III, BMI 40-49.9 (MORBID OBESITY): Chronic | Status: ACTIVE | Noted: 2022-01-07

## 2022-02-14 PROBLEM — F41.9 ANXIETY AND DEPRESSION: Chronic | Status: ACTIVE | Noted: 2018-01-29

## 2022-02-14 PROBLEM — R73.03 PREDIABETES: Status: ACTIVE | Noted: 2019-09-06

## 2022-02-14 PROBLEM — Z87.442 HISTORY OF RENAL STONE: Status: ACTIVE | Noted: 2018-01-29

## 2022-02-14 PROBLEM — M47.812 CERVICAL SPONDYLOSIS: Chronic | Status: ACTIVE | Noted: 2018-09-11

## 2022-02-14 PROBLEM — R73.9 HYPERGLYCEMIA: Chronic | Status: ACTIVE | Noted: 2022-01-07

## 2022-02-14 PROBLEM — N17.9 ACUTE RENAL FAILURE (ARF): Status: ACTIVE | Noted: 2022-02-14

## 2022-02-14 PROBLEM — E03.9 HYPOTHYROIDISM: Status: ACTIVE | Noted: 2018-01-29

## 2022-02-14 PROBLEM — I10 ESSENTIAL HYPERTENSION: Status: ACTIVE | Noted: 2018-01-29

## 2022-02-14 PROBLEM — I10 ESSENTIAL HYPERTENSION: Chronic | Status: ACTIVE | Noted: 2018-01-29

## 2022-02-14 PROBLEM — G47.33 OSA (OBSTRUCTIVE SLEEP APNEA): Status: ACTIVE | Noted: 2022-01-26

## 2022-02-14 PROBLEM — E03.9 HYPOTHYROIDISM: Chronic | Status: ACTIVE | Noted: 2018-01-29

## 2022-02-14 PROBLEM — R73.03 PREDIABETES: Status: RESOLVED | Noted: 2019-09-06 | Resolved: 2022-02-14

## 2022-02-14 PROBLEM — F32.A ANXIETY AND DEPRESSION: Chronic | Status: ACTIVE | Noted: 2018-01-29

## 2022-02-14 PROBLEM — I10 HTN (HYPERTENSION): Chronic | Status: RESOLVED | Noted: 2022-01-07 | Resolved: 2022-02-14

## 2022-02-14 PROBLEM — N20.1 RIGHT URETERAL STONE: Status: ACTIVE | Noted: 2018-01-04

## 2022-02-14 PROBLEM — F32.A ANXIETY AND DEPRESSION: Status: ACTIVE | Noted: 2018-01-29

## 2022-02-14 PROBLEM — N20.0 BILATERAL KIDNEY STONES: Chronic | Status: ACTIVE | Noted: 2022-01-07

## 2022-02-14 PROBLEM — E87.6 HYPOKALEMIA: Status: ACTIVE | Noted: 2018-01-29

## 2022-02-14 PROBLEM — E78.5 HLD (HYPERLIPIDEMIA): Chronic | Status: ACTIVE | Noted: 2022-01-07

## 2022-02-14 PROBLEM — G47.33 OSA (OBSTRUCTIVE SLEEP APNEA): Chronic | Status: ACTIVE | Noted: 2022-01-26

## 2022-02-14 PROBLEM — F41.9 ANXIETY AND DEPRESSION: Status: ACTIVE | Noted: 2018-01-29

## 2022-02-14 PROBLEM — E78.5 HYPERLIPIDEMIA: Status: RESOLVED | Noted: 2018-01-29 | Resolved: 2022-02-14

## 2022-02-14 PROBLEM — E66.9 OBESITY: Status: ACTIVE | Noted: 2021-12-01

## 2022-02-14 PROBLEM — I10 HTN (HYPERTENSION): Chronic | Status: ACTIVE | Noted: 2022-01-07

## 2022-02-14 PROBLEM — N20.1 RIGHT URETERAL STONE: Status: RESOLVED | Noted: 2018-01-04 | Resolved: 2022-02-14

## 2022-02-14 LAB
ALBUMIN SERPL-MCNC: 3.8 G/DL (ref 3.5–5.2)
ALBUMIN/GLOB SERPL: 1.4 G/DL
ALP SERPL-CCNC: 70 U/L (ref 39–117)
ALT SERPL W P-5'-P-CCNC: 14 U/L (ref 1–41)
ANION GAP SERPL CALCULATED.3IONS-SCNC: 12 MMOL/L (ref 5–15)
AST SERPL-CCNC: 12 U/L (ref 1–40)
BACTERIA UR QL AUTO: ABNORMAL /HPF
BASOPHILS # BLD AUTO: 0 10*3/MM3 (ref 0–0.2)
BASOPHILS NFR BLD AUTO: 0.4 % (ref 0–1.5)
BILIRUB SERPL-MCNC: 0.4 MG/DL (ref 0–1.2)
BILIRUB UR QL STRIP: NEGATIVE
BUN SERPL-MCNC: 49 MG/DL (ref 8–23)
BUN/CREAT SERPL: 20.8 (ref 7–25)
CALCIUM SPEC-SCNC: 8.6 MG/DL (ref 8.6–10.5)
CHLORIDE SERPL-SCNC: 97 MMOL/L (ref 98–107)
CLARITY UR: ABNORMAL
CO2 SERPL-SCNC: 24 MMOL/L (ref 22–29)
COLOR UR: YELLOW
CREAT SERPL-MCNC: 2.36 MG/DL (ref 0.76–1.27)
CYSTINE CRY URNS QL MICRO: ABNORMAL /HPF
D-LACTATE SERPL-SCNC: 0.8 MMOL/L (ref 0.5–2)
DEPRECATED RDW RBC AUTO: 44.6 FL (ref 37–54)
EOSINOPHIL # BLD AUTO: 0.1 10*3/MM3 (ref 0–0.4)
EOSINOPHIL NFR BLD AUTO: 1.2 % (ref 0.3–6.2)
ERYTHROCYTE [DISTWIDTH] IN BLOOD BY AUTOMATED COUNT: 14.2 % (ref 12.3–15.4)
GFR SERPL CREATININE-BSD FRML MDRD: 28 ML/MIN/1.73
GLOBULIN UR ELPH-MCNC: 2.8 GM/DL
GLUCOSE SERPL-MCNC: 167 MG/DL (ref 65–99)
GLUCOSE UR STRIP-MCNC: NEGATIVE MG/DL
HCT VFR BLD AUTO: 38.4 % (ref 37.5–51)
HGB BLD-MCNC: 13 G/DL (ref 13–17.7)
HGB UR QL STRIP.AUTO: ABNORMAL
HYALINE CASTS UR QL AUTO: ABNORMAL /LPF
KETONES UR QL STRIP: NEGATIVE
LEUKOCYTE ESTERASE UR QL STRIP.AUTO: ABNORMAL
LIPASE SERPL-CCNC: 35 U/L (ref 13–60)
LYMPHOCYTES # BLD AUTO: 1.2 10*3/MM3 (ref 0.7–3.1)
LYMPHOCYTES NFR BLD AUTO: 11.5 % (ref 19.6–45.3)
MCH RBC QN AUTO: 30.2 PG (ref 26.6–33)
MCHC RBC AUTO-ENTMCNC: 33.9 G/DL (ref 31.5–35.7)
MCV RBC AUTO: 89.2 FL (ref 79–97)
MONOCYTES # BLD AUTO: 1.5 10*3/MM3 (ref 0.1–0.9)
MONOCYTES NFR BLD AUTO: 14.4 % (ref 5–12)
NEUTROPHILS NFR BLD AUTO: 7.6 10*3/MM3 (ref 1.7–7)
NEUTROPHILS NFR BLD AUTO: 72.5 % (ref 42.7–76)
NITRITE UR QL STRIP: NEGATIVE
NRBC BLD AUTO-RTO: 0.1 /100 WBC (ref 0–0.2)
PH UR STRIP.AUTO: 5.5 [PH] (ref 5–8)
PLATELET # BLD AUTO: 168 10*3/MM3 (ref 140–450)
PMV BLD AUTO: 8.6 FL (ref 6–12)
POTASSIUM SERPL-SCNC: 3.9 MMOL/L (ref 3.5–5.2)
PROT SERPL-MCNC: 6.6 G/DL (ref 6–8.5)
PROT UR QL STRIP: ABNORMAL
RBC # BLD AUTO: 4.31 10*6/MM3 (ref 4.14–5.8)
RBC # UR STRIP: ABNORMAL /HPF
REF LAB TEST METHOD: ABNORMAL
SARS-COV-2 RNA PNL SPEC NAA+PROBE: NOT DETECTED
SODIUM SERPL-SCNC: 133 MMOL/L (ref 136–145)
SP GR UR STRIP: >=1.03 (ref 1–1.03)
SQUAMOUS #/AREA URNS HPF: ABNORMAL /HPF
UROBILINOGEN UR QL STRIP: ABNORMAL
WBC # UR STRIP: ABNORMAL /HPF
WBC NRBC COR # BLD: 10.4 10*3/MM3 (ref 3.4–10.8)
YEAST URNS QL MICRO: ABNORMAL /HPF

## 2022-02-14 PROCEDURE — 87086 URINE CULTURE/COLONY COUNT: CPT | Performed by: PHYSICIAN ASSISTANT

## 2022-02-14 PROCEDURE — 87150 DNA/RNA AMPLIFIED PROBE: CPT | Performed by: PHYSICIAN ASSISTANT

## 2022-02-14 PROCEDURE — 87077 CULTURE AEROBIC IDENTIFY: CPT | Performed by: PHYSICIAN ASSISTANT

## 2022-02-14 PROCEDURE — 99284 EMERGENCY DEPT VISIT MOD MDM: CPT

## 2022-02-14 PROCEDURE — 85025 COMPLETE CBC W/AUTO DIFF WBC: CPT | Performed by: PHYSICIAN ASSISTANT

## 2022-02-14 PROCEDURE — 25010000002 CEFTRIAXONE PER 250 MG: Performed by: PHYSICIAN ASSISTANT

## 2022-02-14 PROCEDURE — G0378 HOSPITAL OBSERVATION PER HR: HCPCS

## 2022-02-14 PROCEDURE — 81001 URINALYSIS AUTO W/SCOPE: CPT | Performed by: PHYSICIAN ASSISTANT

## 2022-02-14 PROCEDURE — 80053 COMPREHEN METABOLIC PANEL: CPT | Performed by: PHYSICIAN ASSISTANT

## 2022-02-14 PROCEDURE — 87040 BLOOD CULTURE FOR BACTERIA: CPT | Performed by: PHYSICIAN ASSISTANT

## 2022-02-14 PROCEDURE — 74176 CT ABD & PELVIS W/O CONTRAST: CPT

## 2022-02-14 PROCEDURE — 87186 SC STD MICRODIL/AGAR DIL: CPT | Performed by: PHYSICIAN ASSISTANT

## 2022-02-14 PROCEDURE — 63710000001 ONDANSETRON ODT 4 MG TABLET DISPERSIBLE: Performed by: PHYSICIAN ASSISTANT

## 2022-02-14 PROCEDURE — U0003 INFECTIOUS AGENT DETECTION BY NUCLEIC ACID (DNA OR RNA); SEVERE ACUTE RESPIRATORY SYNDROME CORONAVIRUS 2 (SARS-COV-2) (CORONAVIRUS DISEASE [COVID-19]), AMPLIFIED PROBE TECHNIQUE, MAKING USE OF HIGH THROUGHPUT TECHNOLOGIES AS DESCRIBED BY CMS-2020-01-R: HCPCS | Performed by: PHYSICIAN ASSISTANT

## 2022-02-14 PROCEDURE — 87147 CULTURE TYPE IMMUNOLOGIC: CPT | Performed by: PHYSICIAN ASSISTANT

## 2022-02-14 PROCEDURE — 83690 ASSAY OF LIPASE: CPT | Performed by: PHYSICIAN ASSISTANT

## 2022-02-14 PROCEDURE — 83605 ASSAY OF LACTIC ACID: CPT

## 2022-02-14 PROCEDURE — 0 MORPHINE SULFATE 4 MG/ML SOLUTION: Performed by: PHYSICIAN ASSISTANT

## 2022-02-14 RX ORDER — SODIUM CHLORIDE 0.9 % (FLUSH) 0.9 %
10 SYRINGE (ML) INJECTION AS NEEDED
Status: DISCONTINUED | OUTPATIENT
Start: 2022-02-14 | End: 2022-02-16 | Stop reason: HOSPADM

## 2022-02-14 RX ORDER — MORPHINE SULFATE 4 MG/ML
4 INJECTION, SOLUTION INTRAMUSCULAR; INTRAVENOUS ONCE
Status: COMPLETED | OUTPATIENT
Start: 2022-02-14 | End: 2022-02-14

## 2022-02-14 RX ORDER — TAMSULOSIN HYDROCHLORIDE 0.4 MG/1
0.4 CAPSULE ORAL ONCE
Status: COMPLETED | OUTPATIENT
Start: 2022-02-14 | End: 2022-02-14

## 2022-02-14 RX ORDER — ONDANSETRON 4 MG/1
4 TABLET, ORALLY DISINTEGRATING ORAL ONCE
Status: COMPLETED | OUTPATIENT
Start: 2022-02-14 | End: 2022-02-14

## 2022-02-14 RX ADMIN — CEFTRIAXONE 1 G: 10 INJECTION, POWDER, FOR SOLUTION INTRAVENOUS at 22:21

## 2022-02-14 RX ADMIN — MORPHINE SULFATE 4 MG: 4 INJECTION INTRAVENOUS at 21:24

## 2022-02-14 RX ADMIN — MORPHINE SULFATE 4 MG: 4 INJECTION INTRAVENOUS at 23:29

## 2022-02-14 RX ADMIN — SODIUM CHLORIDE 1000 ML: 9 INJECTION, SOLUTION INTRAVENOUS at 22:12

## 2022-02-14 RX ADMIN — TAMSULOSIN HYDROCHLORIDE 0.4 MG: 0.4 CAPSULE ORAL at 23:29

## 2022-02-14 RX ADMIN — ONDANSETRON 4 MG: 4 TABLET, ORALLY DISINTEGRATING ORAL at 21:24

## 2022-02-15 LAB
ANION GAP SERPL CALCULATED.3IONS-SCNC: 10 MMOL/L (ref 5–15)
BACTERIA BLD CULT: ABNORMAL
BASOPHILS # BLD AUTO: 0 10*3/MM3 (ref 0–0.2)
BASOPHILS NFR BLD AUTO: 0.2 % (ref 0–1.5)
BOTTLE TYPE: ABNORMAL
BUN SERPL-MCNC: 46 MG/DL (ref 8–23)
BUN/CREAT SERPL: 19.3 (ref 7–25)
CALCIUM SPEC-SCNC: 8.2 MG/DL (ref 8.6–10.5)
CHLORIDE SERPL-SCNC: 101 MMOL/L (ref 98–107)
CO2 SERPL-SCNC: 24 MMOL/L (ref 22–29)
CREAT SERPL-MCNC: 2.38 MG/DL (ref 0.76–1.27)
DEPRECATED RDW RBC AUTO: 44.2 FL (ref 37–54)
EOSINOPHIL # BLD AUTO: 0.1 10*3/MM3 (ref 0–0.4)
EOSINOPHIL NFR BLD AUTO: 1.3 % (ref 0.3–6.2)
ERYTHROCYTE [DISTWIDTH] IN BLOOD BY AUTOMATED COUNT: 14.1 % (ref 12.3–15.4)
GFR SERPL CREATININE-BSD FRML MDRD: 28 ML/MIN/1.73
GLUCOSE SERPL-MCNC: 117 MG/DL (ref 65–99)
HCT VFR BLD AUTO: 34.6 % (ref 37.5–51)
HGB BLD-MCNC: 11.8 G/DL (ref 13–17.7)
LYMPHOCYTES # BLD AUTO: 1 10*3/MM3 (ref 0.7–3.1)
LYMPHOCYTES NFR BLD AUTO: 10.6 % (ref 19.6–45.3)
MCH RBC QN AUTO: 30.6 PG (ref 26.6–33)
MCHC RBC AUTO-ENTMCNC: 34.2 G/DL (ref 31.5–35.7)
MCV RBC AUTO: 89.5 FL (ref 79–97)
MONOCYTES # BLD AUTO: 1.5 10*3/MM3 (ref 0.1–0.9)
MONOCYTES NFR BLD AUTO: 15.2 % (ref 5–12)
NEUTROPHILS NFR BLD AUTO: 7.1 10*3/MM3 (ref 1.7–7)
NEUTROPHILS NFR BLD AUTO: 72.7 % (ref 42.7–76)
NRBC BLD AUTO-RTO: 0 /100 WBC (ref 0–0.2)
PLATELET # BLD AUTO: 156 10*3/MM3 (ref 140–450)
PMV BLD AUTO: 8.6 FL (ref 6–12)
POTASSIUM SERPL-SCNC: 4.3 MMOL/L (ref 3.5–5.2)
RBC # BLD AUTO: 3.87 10*6/MM3 (ref 4.14–5.8)
SODIUM SERPL-SCNC: 135 MMOL/L (ref 136–145)
WBC NRBC COR # BLD: 9.7 10*3/MM3 (ref 3.4–10.8)

## 2022-02-15 PROCEDURE — 85025 COMPLETE CBC W/AUTO DIFF WBC: CPT | Performed by: NURSE PRACTITIONER

## 2022-02-15 PROCEDURE — 94799 UNLISTED PULMONARY SVC/PX: CPT

## 2022-02-15 PROCEDURE — 0 MORPHINE SULFATE 4 MG/ML SOLUTION: Performed by: NURSE PRACTITIONER

## 2022-02-15 PROCEDURE — 80048 BASIC METABOLIC PNL TOTAL CA: CPT | Performed by: NURSE PRACTITIONER

## 2022-02-15 PROCEDURE — 99221 1ST HOSP IP/OBS SF/LOW 40: CPT | Performed by: INTERNAL MEDICINE

## 2022-02-15 PROCEDURE — 25010000002 CEFTRIAXONE PER 250 MG: Performed by: NURSE PRACTITIONER

## 2022-02-15 RX ORDER — DIVALPROEX SODIUM 250 MG/1
250 TABLET, EXTENDED RELEASE ORAL 2 TIMES DAILY
Status: DISCONTINUED | OUTPATIENT
Start: 2022-02-15 | End: 2022-02-16 | Stop reason: HOSPADM

## 2022-02-15 RX ORDER — BUDESONIDE AND FORMOTEROL FUMARATE DIHYDRATE 160; 4.5 UG/1; UG/1
2 AEROSOL RESPIRATORY (INHALATION)
Status: DISCONTINUED | OUTPATIENT
Start: 2022-02-15 | End: 2022-02-16 | Stop reason: HOSPADM

## 2022-02-15 RX ORDER — NITROGLYCERIN 0.4 MG/1
0.4 TABLET SUBLINGUAL
Status: DISCONTINUED | OUTPATIENT
Start: 2022-02-15 | End: 2022-02-16 | Stop reason: HOSPADM

## 2022-02-15 RX ORDER — TAMSULOSIN HYDROCHLORIDE 0.4 MG/1
0.4 CAPSULE ORAL DAILY
Status: DISCONTINUED | OUTPATIENT
Start: 2022-02-15 | End: 2022-02-16 | Stop reason: HOSPADM

## 2022-02-15 RX ORDER — VILAZODONE HYDROCHLORIDE 40 MG/1
40 TABLET ORAL DAILY
Status: DISCONTINUED | OUTPATIENT
Start: 2022-02-15 | End: 2022-02-16 | Stop reason: HOSPADM

## 2022-02-15 RX ORDER — ONDANSETRON 4 MG/1
4 TABLET, FILM COATED ORAL EVERY 6 HOURS PRN
Status: DISCONTINUED | OUTPATIENT
Start: 2022-02-15 | End: 2022-02-16 | Stop reason: HOSPADM

## 2022-02-15 RX ORDER — POTASSIUM CHLORIDE 1.5 G/1.77G
20 POWDER, FOR SOLUTION ORAL DAILY
Status: DISCONTINUED | OUTPATIENT
Start: 2022-02-15 | End: 2022-02-16 | Stop reason: HOSPADM

## 2022-02-15 RX ORDER — METOPROLOL SUCCINATE 50 MG/1
100 TABLET, EXTENDED RELEASE ORAL
Status: DISCONTINUED | OUTPATIENT
Start: 2022-02-15 | End: 2022-02-16 | Stop reason: HOSPADM

## 2022-02-15 RX ORDER — SODIUM CHLORIDE 9 MG/ML
125 INJECTION, SOLUTION INTRAVENOUS CONTINUOUS
Status: DISCONTINUED | OUTPATIENT
Start: 2022-02-15 | End: 2022-02-16 | Stop reason: HOSPADM

## 2022-02-15 RX ORDER — ONDANSETRON 2 MG/ML
4 INJECTION INTRAMUSCULAR; INTRAVENOUS EVERY 6 HOURS PRN
Status: DISCONTINUED | OUTPATIENT
Start: 2022-02-15 | End: 2022-02-16 | Stop reason: HOSPADM

## 2022-02-15 RX ORDER — ACETAMINOPHEN 325 MG/1
650 TABLET ORAL EVERY 4 HOURS PRN
Status: DISCONTINUED | OUTPATIENT
Start: 2022-02-15 | End: 2022-02-16 | Stop reason: HOSPADM

## 2022-02-15 RX ORDER — ACETAMINOPHEN 650 MG/1
650 SUPPOSITORY RECTAL EVERY 4 HOURS PRN
Status: DISCONTINUED | OUTPATIENT
Start: 2022-02-15 | End: 2022-02-16 | Stop reason: HOSPADM

## 2022-02-15 RX ORDER — ALBUTEROL SULFATE 2.5 MG/3ML
2.5 SOLUTION RESPIRATORY (INHALATION) EVERY 4 HOURS PRN
Status: DISCONTINUED | OUTPATIENT
Start: 2022-02-15 | End: 2022-02-16 | Stop reason: HOSPADM

## 2022-02-15 RX ORDER — LEVOTHYROXINE SODIUM 0.05 MG/1
50 TABLET ORAL
Status: DISCONTINUED | OUTPATIENT
Start: 2022-02-15 | End: 2022-02-16 | Stop reason: HOSPADM

## 2022-02-15 RX ORDER — LABETALOL HYDROCHLORIDE 5 MG/ML
10 INJECTION, SOLUTION INTRAVENOUS EVERY 6 HOURS PRN
Status: DISCONTINUED | OUTPATIENT
Start: 2022-02-15 | End: 2022-02-16 | Stop reason: HOSPADM

## 2022-02-15 RX ORDER — ALUMINA, MAGNESIA, AND SIMETHICONE 2400; 2400; 240 MG/30ML; MG/30ML; MG/30ML
15 SUSPENSION ORAL EVERY 6 HOURS PRN
Status: DISCONTINUED | OUTPATIENT
Start: 2022-02-15 | End: 2022-02-16 | Stop reason: HOSPADM

## 2022-02-15 RX ORDER — MORPHINE SULFATE 4 MG/ML
4 INJECTION, SOLUTION INTRAMUSCULAR; INTRAVENOUS EVERY 4 HOURS PRN
Status: DISCONTINUED | OUTPATIENT
Start: 2022-02-15 | End: 2022-02-16 | Stop reason: HOSPADM

## 2022-02-15 RX ORDER — HYDROCODONE BITARTRATE AND ACETAMINOPHEN 5; 325 MG/1; MG/1
1 TABLET ORAL EVERY 6 HOURS PRN
Status: DISCONTINUED | OUTPATIENT
Start: 2022-02-15 | End: 2022-02-16 | Stop reason: HOSPADM

## 2022-02-15 RX ORDER — ATORVASTATIN CALCIUM 20 MG/1
20 TABLET, FILM COATED ORAL DAILY
Status: DISCONTINUED | OUTPATIENT
Start: 2022-02-15 | End: 2022-02-16 | Stop reason: HOSPADM

## 2022-02-15 RX ORDER — NIFEDIPINE 30 MG/1
90 TABLET, EXTENDED RELEASE ORAL
Status: DISCONTINUED | OUTPATIENT
Start: 2022-02-15 | End: 2022-02-16 | Stop reason: HOSPADM

## 2022-02-15 RX ORDER — ACETAMINOPHEN 160 MG/5ML
650 SOLUTION ORAL EVERY 4 HOURS PRN
Status: DISCONTINUED | OUTPATIENT
Start: 2022-02-15 | End: 2022-02-16 | Stop reason: HOSPADM

## 2022-02-15 RX ORDER — LAMOTRIGINE 100 MG/1
100 TABLET ORAL 2 TIMES DAILY
Status: DISCONTINUED | OUTPATIENT
Start: 2022-02-15 | End: 2022-02-16 | Stop reason: HOSPADM

## 2022-02-15 RX ORDER — PRAMIPEXOLE DIHYDROCHLORIDE 0.5 MG/1
0.5 TABLET ORAL NIGHTLY
Status: DISCONTINUED | OUTPATIENT
Start: 2022-02-15 | End: 2022-02-16 | Stop reason: HOSPADM

## 2022-02-15 RX ORDER — BUDESONIDE AND FORMOTEROL FUMARATE DIHYDRATE 160; 4.5 UG/1; UG/1
2 AEROSOL RESPIRATORY (INHALATION)
COMMUNITY

## 2022-02-15 RX ORDER — CHOLECALCIFEROL (VITAMIN D3) 125 MCG
5 CAPSULE ORAL NIGHTLY PRN
Status: DISCONTINUED | OUTPATIENT
Start: 2022-02-15 | End: 2022-02-16 | Stop reason: HOSPADM

## 2022-02-15 RX ORDER — BUPROPION HYDROCHLORIDE 150 MG/1
300 TABLET ORAL DAILY
Status: DISCONTINUED | OUTPATIENT
Start: 2022-02-15 | End: 2022-02-16 | Stop reason: HOSPADM

## 2022-02-15 RX ADMIN — NIFEDIPINE 90 MG: 30 TABLET, FILM COATED, EXTENDED RELEASE ORAL at 09:59

## 2022-02-15 RX ADMIN — METOPROLOL SUCCINATE 100 MG: 50 TABLET, EXTENDED RELEASE ORAL at 09:59

## 2022-02-15 RX ADMIN — TAMSULOSIN HYDROCHLORIDE 0.4 MG: 0.4 CAPSULE ORAL at 09:59

## 2022-02-15 RX ADMIN — LAMOTRIGINE 100 MG: 100 TABLET ORAL at 21:31

## 2022-02-15 RX ADMIN — VILAZODONE HYDROCHLORIDE 40 MG: 40 TABLET ORAL at 11:07

## 2022-02-15 RX ADMIN — CEFTRIAXONE 1 G: 1 INJECTION, POWDER, FOR SOLUTION INTRAMUSCULAR; INTRAVENOUS at 21:31

## 2022-02-15 RX ADMIN — PRAMIPEXOLE DIHYDROCHLORIDE 0.5 MG: 0.5 TABLET ORAL at 21:43

## 2022-02-15 RX ADMIN — ATORVASTATIN CALCIUM 20 MG: 20 TABLET, FILM COATED ORAL at 09:59

## 2022-02-15 RX ADMIN — LEVOTHYROXINE SODIUM 50 MCG: 0.05 TABLET ORAL at 06:19

## 2022-02-15 RX ADMIN — BUPROPION HYDROCHLORIDE 300 MG: 150 TABLET, EXTENDED RELEASE ORAL at 09:59

## 2022-02-15 RX ADMIN — DIVALPROEX SODIUM 250 MG: 250 TABLET, EXTENDED RELEASE ORAL at 09:59

## 2022-02-15 RX ADMIN — LAMOTRIGINE 100 MG: 100 TABLET ORAL at 09:59

## 2022-02-15 RX ADMIN — DIVALPROEX SODIUM 250 MG: 250 TABLET, EXTENDED RELEASE ORAL at 21:31

## 2022-02-15 RX ADMIN — BUDESONIDE AND FORMOTEROL FUMARATE DIHYDRATE 2 PUFF: 160; 4.5 AEROSOL RESPIRATORY (INHALATION) at 20:21

## 2022-02-15 RX ADMIN — PRAMIPEXOLE DIHYDROCHLORIDE 0.5 MG: 0.5 TABLET ORAL at 02:12

## 2022-02-15 RX ADMIN — SODIUM CHLORIDE 100 ML/HR: 9 INJECTION, SOLUTION INTRAVENOUS at 02:12

## 2022-02-15 RX ADMIN — POTASSIUM CHLORIDE 20 MEQ: 1.5 POWDER, FOR SOLUTION ORAL at 09:59

## 2022-02-15 RX ADMIN — MORPHINE SULFATE 4 MG: 4 INJECTION INTRAVENOUS at 10:07

## 2022-02-15 RX ADMIN — BUDESONIDE AND FORMOTEROL FUMARATE DIHYDRATE 2 PUFF: 160; 4.5 AEROSOL RESPIRATORY (INHALATION) at 07:55

## 2022-02-15 NOTE — H&P
[facility] Medicine Services      Patient Name: Calos Moseley  : 1960  MRN: [id number]  Primary Care Physician:  Naveed Nunez DO  Date of admission: 2022      Subjective      Chief Complaint: L flank pain, nausea headache    History of Present Illness: Calos Moseley is a 61 y.o. male w/PMH of HTN, HLD, COPD, ISIAH on CPAP, hypothyroidism, bipolar affective disorder, depression, anxiety, restless leg syndrome morbid obesity, and recurrent kidney stones, with ureteral stent placed last week presented to [facility] on 2022 complaining of 7/10  stabbing left flank pain that radiates to his left lower quadrant, and left hip.  Patient denies any exacerbating or eliminating factors.  Patient reports pain is also accompanied by chills, nausea, and headache very similar to what he had experienced in January, and then again 3 weeks ago before he had ureteral stent placed.  Patient states that he has had some dysuria, especially at start of stream, urgency, frequency, hesitancy and hematuria.  Patient also reports he has plans for percutaneous nephrolithotomy on 2022 with Dr. Vail.  Patient otherwise denies any acute distress chest pain, shortness of breath, palpitations, syncope, abdominal pain, constipation, fever or any known sick contacts.    Initial vital signs upon arrival to the emergency department blood pressure 146/73, heart rate 71, oxygen saturation 95% on room air, respiratory rate 16, patient afebrile with a T-max of 97.8.    Initial work-up in the emergency department included initial labs with the following abnormalities noted: Glucose 167, sodium 133, creatinine 2.36, BUN 29, GFR 28.  Urine collected for urinalysis showed trace blood, 2+ leukocytes, 2+ protein, 6-12 RBC, too numerous to count WBC, 1+ bacteria  Urine culture is pending, urine culture on 2022 showed Enterococcus faecalis  Blood cultures collected with results  pending.  -Respiratory panel collected negative for COVID-19.  -CT abdomen pelvis completed showing bilateral kidney stones, staghorn calculus in left renal pelvis, left-sided ureteral stent noted, there is left hydronephrosis and some thickening of the wall of the left renal pelvis. The 18 mm left renal lesion possibly a cyst right sided minor hydronephrosis, mild bilateral  periureteric   Stranding. As dictated by   Compared to CT on January 7, 2020 with diffuse no ureteric calculus identified. Right upper pole infundibular/right pelvic calculus causing isolated upper pole caliectasis.  Normally numerous calculi within the left kidney with mild to moderate dilation of left renal collecting system, unchanged from prior imaging in 2017. There is diffuse left peripelvic and periureteric  stranding. As dictated by Dr. Llanes 01/101/2022.    Was administered 0.4 mg tamsulosin, 1 g ceftriaxone, 4 mg morphine sulfate x 2 doses, 1 L normal saline bolus, 4 mg ondansetron while in the emergency department.    Patient will be admitted for observation with urology following for further evaluation and treatment of acute kidney injury, acute UTI, bilateral renal calculi, and other acute and chronic conditions.    Review of Systems   Constitutional: Negative.   HENT: Negative.    Eyes: Negative.    Cardiovascular: Negative.    Respiratory: Negative.    Endocrine: Negative.    Hematologic/Lymphatic: Negative.    Skin: Negative.    Gastrointestinal: Negative.    Genitourinary: Positive for dysuria, flank pain, frequency, hematuria, hesitancy and urgency.   Neurological: Negative.    Psychiatric/Behavioral: Negative.    Allergic/Immunologic: Negative.    All other systems reviewed and are negative.     Personal History     Past Medical History:   Diagnosis Date   • Anesthesia complication     STATES HARD TO WAKE UP AFTER PREVIOUS KIDNEY STONE SURGERY AND STATES HE CODED   • Bilateral kidney stones    • Bipolar affective  ([facility])    • COPD (chronic obstructive pulmonary disease) ([facility])    • Disease of thyroid gland    • Elevated cholesterol    • Hypertension    • Kidney stone on left side    • Left flank pain    • Mood disorder ([facility])    • Restless leg syndrome    • Sleep apnea     cpap  bring dos   • UTI (urinary tract infection) 12/2021    PREV FINISHED ANTIBIOTIC       Past Surgical History:   Procedure Laterality Date   • ADENOIDECTOMY     • APPENDECTOMY     • COLONOSCOPY     • KIDNEY STONE SURGERY     • TONSILLECTOMY         Family History: family history is not on file. Otherwise pertinent FHx was reviewed and not pertinent to current issue.    Social History:  reports that he quit smoking about 31 years ago. His smoking use included cigarettes. He has never used smokeless tobacco. He reports previous alcohol use. He reports that he does not use drugs.    Home Medications:  Prior to Admission Medications     Prescriptions Last Dose Informant Patient Reported? Taking?    albuterol sulfate  (90 Base) MCG/ACT inhaler   No No    INHALE ONE TO TWO PUFFS BY MOUTH EVERY 4 TO 6 HOURS    Patient taking differently:  Inhale 2 puffs As Needed. Bring with    atorvastatin (LIPITOR) 20 MG tablet  Self Yes No    Take 20 mg by mouth Daily. MAY TAKE PREOP    atorvastatin (LIPITOR) 20 MG tablet   No No    Take 1 tablet by mouth every night at bedtime.    budesonide-formoterol (SYMBICORT) 160-4.5 MCG/ACT inhaler   No No    Inhale 2 puffs 2 (Two) Times a Day for 30 days.    Patient taking differently:  Inhale 2 puffs 2 (Two) Times a Day As Needed. Use preop if needed bring    buPROPion XL (WELLBUTRIN XL) 300 MG 24 hr tablet   No No    Take 1 tablet by mouth Every Morning.    Patient taking differently:  Take 300 mg by mouth Every Morning. TAKE PREOP    cephalexin (KEFLEX) 500 MG capsule   No No    Take 1 capsule by mouth 3 (Three) Times a Day.    Patient taking differently:  Take 500 mg by mouth 3 (Three) Times a Day. Take preop    cloNIDine  (CATAPRES) 0.1 MG tablet   No No    Take 1 tablet by mouth 2 (two) times a day if systolic blood pressure is 156 or greater or if diastolic blood pressure is 94 or greater.    Patient taking differently:  Take 0.1 mg by mouth As Needed. If SBPover 156 ir DBP over 94  Take preop if needed    divalproex (DEPAKOTE ER) 250 MG 24 hr tablet   No No    Take 1 tablet by mouth in the morning and 2 tabs at bedtime.    Patient taking differently:  Take 250 mg by mouth Daily.    divalproex (DEPAKOTE ER) 250 MG 24 hr tablet   No No    Take 1 tablet by mouth 2 (Two) Times a Day.    Patient taking differently:  Take 250 mg by mouth 2 (Two) Times a Day. Take preop    hydrALAZINE (APRESOLINE) 50 MG tablet   Yes No    Take 50 mg by mouth 3 (Three) Times a Day. Take preop    hydrALAZINE (APRESOLINE) 50 MG tablet   No No    Take 1 tablet by mouth 3 (Three) Times a Day As Needed.    HYDROcodone-acetaminophen (NORCO) 7.5-325 MG per tablet   Yes No    Take 1 tablet by mouth 3 (Three) Times a Day As Needed.    lamoTRIgine (LaMICtal) 100 MG tablet   Yes No    Take 100 mg by mouth 2 (Two) Times a Day. Take preop    levoFLOXacin (LEVAQUIN) 500 MG tablet   No No    Take 1 tablet by mouth Daily. Start 5 days prior to surgery.    Patient taking differently:  Take 500 mg by mouth Daily. Take preop    levothyroxine (SYNTHROID, LEVOTHROID) 50 MCG tablet  Self Yes No    Take 50 mcg by mouth Daily. TAKE PREOP    levothyroxine (SYNTHROID, LEVOTHROID) 50 MCG tablet   No No    Take 1 tablet by mouth Daily.    metoprolol succinate XL (TOPROL-XL) 100 MG 24 hr tablet  Pharmacy Yes No    Take 100 mg by mouth 2 (Two) Times a Day. TAKE PREOP    metoprolol succinate XL (TOPROL-XL) 100 MG 24 hr tablet   No No    Take 2 tablets by mouth Daily.    naltrexone (DEPADE) 50 MG tablet   No No    Take 1 tablet by mouth Every Morning.    Patient taking differently:  Take 50 mg by mouth Every Morning. Last dose 2/19 by 0900    NIFEdipine CC (ADALAT CC) 90 MG 24 hr tablet    No No    Take 1 tablet by mouth Daily.    nitrofurantoin, macrocrystal-monohydrate, (MACROBID) 100 MG capsule   No No    Take 1 capsule by mouth 2 (Two) Times a Day. Start 5 days prior to surgery.    Patient taking differently:  Take 100 mg by mouth 2 (Two) Times a Day. Take preop    oxyCODONE-acetaminophen (PERCOCET) 7.5-325 MG per tablet   No No    Take 1 tablet by mouth Every 4 (Four) Hours as needed for pain    potassium chloride (KLOR-CON) 20 MEQ packet  Pharmacy Yes No    Take 20 mEq by mouth Daily. TAKE PREOP    pramipexole (MIRAPEX) 0.5 MG tablet   No No    take 1 tablet by mouth at bedtime    rOPINIRole (REQUIP) 0.5 MG tablet   No No    Take 2 tablets by mouth Every Night. Take 1 hour before bedtime.    Patient taking differently:  Take 1 mg by mouth Every Night. ON HOLD WHILE TRYING MIRAPEX    sildenafil (VIAGRA) 100 MG tablet   Yes No    Take 1 tablet by mouth As Needed. DONT USE WITHIN 24 HOURS OF SURGERY    sildenafil (VIAGRA) 100 MG tablet   No No    Take 1 tablet by mouth Daily As Needed.    vilazodone (VIIBRYD) 40 MG tablet tablet  Pharmacy Yes No    Take 40 mg by mouth Daily. TAKE PREOP            Allergies:  No Known Allergies    Objective      Vitals:   Temp:  [97.8 °F (36.6 °C)] 97.8 °F (36.6 °C)  Heart Rate:  [67-71] 68  Resp:  [15-18] 15  BP: (127-151)/(63-73) 151/71    Physical Exam  Vitals and nursing note reviewed.   Constitutional:       Appearance: He is obese.   HENT:      Head: Normocephalic and atraumatic.      Right Ear: Ear canal normal.      Left Ear: External ear normal.      Mouth/Throat:      Mouth: Mucous membranes are dry.   Eyes:      Conjunctiva/sclera: Conjunctivae normal.      Pupils: Pupils are equal, round, and reactive to light.   Cardiovascular:      Rate and Rhythm: Normal rate and regular rhythm.      Pulses: Normal pulses.           Carotid pulses are 2+ on the right side and 2+ on the left side.       Radial pulses are 2+ on the right side and 2+ on the left side.         Dorsalis pedis pulses are 2+ on the right side and 2+ on the left side.        Posterior tibial pulses are 2+ on the right side and 2+ on the left side.      Heart sounds: Normal heart sounds.   Pulmonary:      Effort: Pulmonary effort is normal.      Breath sounds: Normal breath sounds.   Abdominal:      General: Bowel sounds are normal.   Musculoskeletal:         General: Normal range of motion.      Cervical back: Normal range of motion and neck supple.   Skin:     General: Skin is warm and dry.      Capillary Refill: Capillary refill takes less than 2 seconds.   Neurological:      General: No focal deficit present.      Mental Status: He is alert and oriented to person, place, and time.   Psychiatric:         Mood and Affect: Mood normal.         Behavior: Behavior normal.         Thought Content: Thought content normal.         Judgment: Judgment normal.          Result Review    Result Review:  I have personally reviewed the results from the time of this admission to 2/15/2022 00:12 EST and agree with these findings:  [x]  Laboratory  [x]  Microbiology  [x]  Radiology  [x]  EKG/Telemetry   [x]  Cardiology/Vascular   []  Pathology  []  Old records  []  Other:  Most notable findings include:       Assessment/Plan        Active Hospital Problems:  Active Hospital Problems    Diagnosis    • **MESHA (acute kidney injury) ([facility])    • Left flank pain    • Acute renal failure (ARF) ([facility])    • ISIAH (obstructive sleep apnea)      Last Assessment & Plan:   Formatting of this note might be different from the original.  Patient is using and feels that he is benefiting from CPAP usage.  Review of 30-day download with excellent usage and compliance with improved AHI at 5.3, down from a previous residual AHI of greater than 8.  Patient still has significant leak time noted.  Discussed that he will need to have, would benefit from, dedicated mask fitting may need to size down with his mask option.  Patient verbalized  understanding     • Bilateral kidney stones    • Hematuria    • Obesity, Class III, BMI 40-49.9 (morbid obesity) ([facility])    • COPD (chronic obstructive pulmonary disease) ([facility])    • HLD (hyperlipidemia)    • Essential hypertension      Formatting of this note might be different from the original.  Lisinopril, Apresoline, and Metoprolol    Last Assessment & Plan:   Formatting of this note might be different from the original.  Not well controlled, continue medications per PCP instruction, if continues to be elevated may need to see PCP for further evaluation     • Anxiety and depression      Formatting of this note might be different from the original.  Lamictal and Vibryd     • Hypothyroidism      Formatting of this note might be different from the original.  Synthroid 50mcgs       Plan:   Acute kidney injury  Left flank pain,  Acute UTI,  Pyelonephritis,  Renal colic/bilateral kidney stones  -History of ureteral stents  -Left renal stent noted  -Continue Rocephin  -NS infusion ordered  -Strain all urine  -Vital signs per policy  -Continue morphine as needed every 4 hours  -As needed ondansetron for nausea  -CT scan completed and reviewed  -Urinalysis completed and  -Flomax continued  -Urology consulted  -N.p.o. for now except sips with meds    Essential hypertension  -Chronic-partially controlled  -Vital signs per policy  -Continue home metoprolol succinate XL, and Procardia  -Hold home as needed antihypertensives for now  -As needed labetalol with parameters ordered    HLD (hyperlipidemia)  -Chronic  -Stable  -Continue home atorvastatin    COPD(on obstructive pulmonary disease)  ISIAH (obstructive sleep apnea)  -Chronic/stable not in exacerbation   -PFTs unknown  -Continuous pulse oximetry  -Continue home Symbicort, and albuterol  -Supplemental oxygen as needed for hypoxia/respiratory distress maintain oxygen saturation greater than 90%.  -As needed ABGs per policy for hypoxia/respiratory distress  -May use home  CPAP    Hypothyroidism  -Continue home levothyroxine  .  Anxiety and depression/bipolar disorder  -Chronic/stable  -Continue home Depakote, and Lamictal    Restless leg syndrome  -Continue Mirapex    Obesity, Class III, BMI 40-49.9 (morbid obesity)   -BMI 40.00  -Encourage healthy lifestyle modifications to reduce cardiovascular risk factors.      DVT prophylaxis:  No DVT prophylaxis order currently exists.    CODE STATUS:       Admission Status:  I believe this patient meets observation status.    I discussed the patient's findings and my recommendations with patient and family.    This patient has been examined wearing appropriate Personal Protective Equipment . 02/15/22      Signature:Electronically signed by SALVADOR Hamlin, 02/15/22, 12:12 AM EST.    Attending attestation:    I have reviewed the VINITA's note and agree with the documented findings and plan of care unless stated otherwise by my note.    61 y.o. male w/PMH of HTN, HLD, COPD, ISIAH on CPAP, hypothyroidism, bipolar affective disorder, depression, anxiety, restless leg syndrome morbid obesity, and recurrent kidney stones, with ureteral stent placed last week presented to [facility] on 2/14/2022 complaining of 7/10  stabbing left flank pain that radiates to his left lower quadrant, and left hip.     General: Awake, alert, NAD  Eyes: PERRL, EOMI, conjunctive are clear  Cardiovascular: Regular rate and rhythm, no murmurs  Respiratory: Clear to auscultation bilaterally, no wheezing or rales, unlabored breathing  Abdomen: Soft, mild left lower quadrant tenderness, positive bowel sounds, no guarding  Neurologic: A&O, CN grossly intact, moves all extremities spontaneously  Musculoskeletal: Normal range of motion, no deformities  Skin: Warm, dry, intact    Assessment:  Bilateral renal stones with hydronephrosis  Acute UTI  MESHA on CKD stage IIIb  Hypertension  Hyperlipidemia  Hypothyroidism  Chronic anxiety/bipolar  disorder  COPD/ISIAH    Plan:  -Imaging reports noted, UA noted  -Previous urine culture grew E. coli, pansensitive, repeat urine culture ordered here  -Continue IV ceftriaxone  -Baseline creatinine around 1.6-2, creatinine slowly improving, continue IV fluids  -Urology not planning any procedure at this time, observe overnight and possible discharge tomorrow and plan for scheduled surgery on next Monday  -Continue other home meds except nephrotoxins  -Monitor      Electronically signed by Gulshan Yuen DO, 02/15/22, 10:27 AM EST.

## 2022-02-15 NOTE — ED PROVIDER NOTES
Subjective       Patient is a 61-year-old male comes in complaining of left-sided flank pain for the past 3 days.  Patient states the pain is about a 7 out of 10 that radiates to his left lower quadrant.  Patient states nothing seems to make the pain better or worse.  Patient reports some nausea and vomiting as well.  Patient ports some chills but no fever.  Spouse at bedside reports that patient has had some confused conversation last few days has been intermittent.  Patient states he has had some nausea and vomiting that has been intermittent last few days.  Patient denies any diarrhea.  Patient otherwise denies any cough, chest pain, shortness of breath or swelling in his legs bilaterally.  Patient reports that 3 weeks ago he had a stent placed in his right ureter and has had stents placed in both ureters.  Patient denies any scrotal pain, swelling of the groin or scrotum, penile pain or discharge.  Patient does report some decreased urine output and some pain with urination but denies any blood in the urine.  Patient follows with urology, Dr. Leija.        Review of Systems   Constitutional: Negative for chills, fatigue and fever.   HENT: Negative for congestion, sore throat, tinnitus and trouble swallowing.    Eyes: Negative for photophobia, discharge and visual disturbance.   Respiratory: Negative for cough, shortness of breath and wheezing.    Cardiovascular: Negative for chest pain, palpitations and leg swelling.   Gastrointestinal: Positive for nausea and vomiting. Negative for abdominal pain, blood in stool and diarrhea.   Genitourinary: Positive for decreased urine volume, dysuria and flank pain (left). Negative for frequency, hematuria, penile discharge, penile pain, penile swelling, scrotal swelling, testicular pain and urgency.   Musculoskeletal: Negative for arthralgias and myalgias.   Skin: Negative for rash.   Neurological: Negative for dizziness, light-headedness and headaches.    Psychiatric/Behavioral: Negative for confusion.       Past Medical History:   Diagnosis Date   • Anesthesia complication     STATES HARD TO WAKE UP AFTER PREVIOUS KIDNEY STONE SURGERY AND STATES HE CODED   • Bilateral kidney stones    • Bipolar affective ([facility])    • COPD (chronic obstructive pulmonary disease) ([facility])    • Disease of thyroid gland    • Elevated cholesterol    • Hypertension    • Kidney stone on left side    • Left flank pain    • Mood disorder ([facility])    • Restless leg syndrome    • Sleep apnea     cpap  bring dos   • UTI (urinary tract infection) 2021    PREV FINISHED ANTIBIOTIC       No Known Allergies    Past Surgical History:   Procedure Laterality Date   • ADENOIDECTOMY     • APPENDECTOMY     • COLONOSCOPY     • KIDNEY STONE SURGERY     • TONSILLECTOMY         Family History   Problem Relation Age of Onset   • Malig Hyperthermia Neg Hx        Social History     Socioeconomic History   • Marital status:    Tobacco Use   • Smoking status: Former Smoker     Types: Cigarettes     Quit date: 1990     Years since quittin.1   • Smokeless tobacco: Never Used   Vaping Use   • Vaping Use: Never used   Substance and Sexual Activity   • Alcohol use: Not Currently   • Drug use: No   • Sexual activity: Defer           Objective   Physical Exam  Vitals and nursing note reviewed.   Constitutional:       General: He is not in acute distress.     Appearance: He is well-developed. He is not diaphoretic.   HENT:      Head: Normocephalic and atraumatic.      Right Ear: External ear normal.      Left Ear: External ear normal.      Nose: Nose normal.      Mouth/Throat:      Pharynx: No oropharyngeal exudate.   Eyes:      Extraocular Movements: Extraocular movements intact.      Conjunctiva/sclera: Conjunctivae normal.      Pupils: Pupils are equal, round, and reactive to light.   Cardiovascular:      Rate and Rhythm: Normal rate and regular rhythm.      Pulses: Normal pulses.      Heart sounds:  "Normal heart sounds.      Comments: S1, S2 audible.  Pulmonary:      Effort: Pulmonary effort is normal. No respiratory distress.      Breath sounds: Normal breath sounds. No wheezing, rhonchi or rales.      Comments: On room air.  Abdominal:      General: Bowel sounds are normal. There is no distension.      Palpations: Abdomen is soft.      Tenderness: There is no abdominal tenderness. There is no guarding or rebound.   Musculoskeletal:         General: No tenderness or deformity. Normal range of motion.      Cervical back: Normal range of motion.      Right lower leg: No edema.      Left lower leg: No edema.   Skin:     General: Skin is warm.      Capillary Refill: Capillary refill takes less than 2 seconds.      Findings: No erythema or rash.   Neurological:      General: No focal deficit present.      Mental Status: He is alert and oriented to person, place, and time.      Cranial Nerves: No cranial nerve deficit.      Sensory: No sensory deficit.      Motor: No weakness.   Psychiatric:         Mood and Affect: Mood normal.         Behavior: Behavior normal.         Procedures           ED Course  ED Course as of 02/14/22 2325 Mon Feb 14, 2022 2318 Spoke with Dr. Ortiz who is on-call for patient's urologist, Dr. Leija.  Recommended admitting patient to hospitalist and they will see him in the morning.  They also recommended pain control and antibiotics overnight. [RL]      ED Course User Index  [RL] Feliciano Camacho PA      /63 (BP Location: Left arm, Patient Position: Lying)   Pulse 67   Temp 97.8 °F (36.6 °C) (Oral)   Resp 18   Ht 167.6 cm (66\")   Wt 112 kg (248 lb)   SpO2 98%   BMI 40.03 kg/m²   Labs Reviewed   COMPREHENSIVE METABOLIC PANEL - Abnormal; Notable for the following components:       Result Value    Glucose 167 (*)     BUN 49 (*)     Creatinine 2.36 (*)     Sodium 133 (*)     Chloride 97 (*)     eGFR Non  Amer 28 (*)     All other components within normal limits    " Narrative:     GFR Normal >60  Chronic Kidney Disease <60  Kidney Failure <15     URINALYSIS W/ CULTURE IF INDICATED - Abnormal; Notable for the following components:    Appearance, UA Cloudy (*)     Blood, UA Trace (*)     Protein,  mg/dL (2+) (*)     Leuk Esterase, UA Moderate (2+) (*)     All other components within normal limits   CBC WITH AUTO DIFFERENTIAL - Abnormal; Notable for the following components:    Lymphocyte % 11.5 (*)     Monocyte % 14.4 (*)     Neutrophils, Absolute 7.60 (*)     Monocytes, Absolute 1.50 (*)     All other components within normal limits   URINALYSIS, MICROSCOPIC ONLY - Abnormal; Notable for the following components:    RBC, UA 6-12 (*)     WBC, UA Too Numerous to Count (*)     Bacteria, UA 1+ (*)     All other components within normal limits   COVID-19,CEPHEID/[facility],COR/[facility] IN-HOUSE,NP SWAB IN TRANSPORT MEDIA 3-4 HR TAT, RT-PCR - Normal    Narrative:     Fact sheet for providers: https://www.fda.gov/media/767737/download     Fact sheet for patients: https://www.fda.gov/media/660191/download  Fact sheet for providers: https://www.fda.gov/media/903183/download     Fact sheet for patients: https://www.fda.gov/media/833678/download   LIPASE - Normal   POC LACTATE - Normal   BLOOD CULTURE   BLOOD CULTURE   URINE CULTURE   POC LACTATE   CBC AND DIFFERENTIAL    Narrative:     The following orders were created for panel order CBC & Differential.  Procedure                               Abnormality         Status                     ---------                               -----------         ------                     CBC Auto Differential[[id number]]        Abnormal            Final result                 Please view results for these tests on the individual orders.     CT Abdomen Pelvis Without Contrast    Result Date: 2/14/2022  1. There are bilateral kidney stones. This includes a staghorn calculus in the left renal pelvis. There is a left-sided ureteral stent in place. There  is minor left hydronephrosis and some thickening of the wall of the left renal pelvis. 2. The 18 mm left renal lesion may be a cyst but is not fully evaluated on this exam. 3. There is minor right hydronephrosis. Mild bilateral perinephric stranding. 4. Additional findings as reported above.  Electronically Signed By-Kaci Doran MD On:2/14/2022 10:16 PM This report was finalized on [id number] by  Kaci Doran MD.                                               MDM  Number of Diagnoses or Management Options     Amount and/or Complexity of Data Reviewed  Clinical lab tests: reviewed  Tests in the radiology section of CPT®: reviewed    Risk of Complications, Morbidity, and/or Mortality  Presenting problems: low  Diagnostic procedures: low  Management options: low    Patient Progress  Patient progress: stable       Chart review:    EKG: Not indicated  Imaging:    CT Abdomen Pelvis Without Contrast   Final Result   1. There are bilateral kidney stones. This includes a staghorn calculus   in the left renal pelvis. There is a left-sided ureteral stent in place.   There is minor left hydronephrosis and some thickening of the wall of   the left renal pelvis.    2. The 18 mm left renal lesion may be a cyst but is not fully evaluated   on this exam.   3. There is minor right hydronephrosis. Mild bilateral perinephric   stranding.    4. Additional findings as reported above.       Electronically Signed By-Kaci Doran MD On:2/14/2022 10:16 PM   This report was finalized on [id number] by  Kaci Doran MD.          Labs: UA shows 1+ bacteria, moderate leukocyte esterase, trace blood.  Initial lactic normal at 0.8.  Lipase normal, CBC largely unremarkable for acute findings.  Serum creatinine elevated at 2.36 which was 1.6 about 4 days ago on prior lab work.  Urine culture pending.  Blood cultures x2 pending.  COVID-19 swab negative.  Vitals:  /63 (BP Location: Left arm, Patient Position: Lying)   Pulse 67    "Temp 97.8 °F (36.6 °C) (Oral)   Resp 18   Ht 167.6 cm (66\")   Wt 112 kg (248 lb)   SpO2 98%   BMI 40.03 kg/m²     Medications given:    Medications   sodium chloride 0.9 % flush 10 mL (has no administration in time range)   Morphine sulfate (PF) injection 4 mg (has no administration in time range)   tamsulosin (FLOMAX) 24 hr capsule 0.4 mg (has no administration in time range)   ondansetron ODT (ZOFRAN-ODT) disintegrating tablet 4 mg (4 mg Oral Given 2/14/22 2124)   Morphine sulfate (PF) injection 4 mg (4 mg Intravenous Given 2/14/22 2124)   sodium chloride 0.9 % bolus 1,000 mL (1,000 mL Intravenous New Bag 2/14/22 2212)   cefTRIAXone (ROCEPHIN) in SWFI 1 gram/10ml IV PUSH syringe (1 g Intravenous Given 2/14/22 2221)       Procedures:    MDM: Patient is a 61-year-old male comes in complaining of left-sided flank pain with extensive history of kidney stones and currently has a left ureteral stent in place.  UA shows 1+ bacteria, moderate leukocyte esterase, trace blood.  Initial lactic normal at 0.8.  Lipase normal, CBC largely unremarkable for acute findings.  Serum creatinine elevated at 2.36 which was 1.6 about 4 days ago on prior lab work.  Urine culture pending.  Blood cultures x2 pending.  COVID-19 swab negative.  Patient was given Rocephin for apparent UTI.  Patient was given morphine for pain control.  Patient was also given Zofran for nausea.  Patient given 1 L normal saline bolus given patient's significant MESHA.  Patient also given Flomax upon admission to the hospital.  Also spoke with patient regarding incidental findings including renal cyst.  Spoke with Dr. Ortiz, who is on-call for Dr. Leija with urology who recommended an antibiotics and admit the patient to hospitalist.  Spoke with SALVADOR Desai, who accepted patient on behalf of hospitalist team and Dr. Malcolm for further work-up and treatment.  Results and plan discussed with patient and is agreeable with plan.    Final diagnoses:   Left " flank pain   Acute UTI   Pyelonephritis   Renal colic       ED Disposition  ED Disposition     ED Disposition Condition Comment    Decision to Admit  Level of Care: Med/Surg [1]   Admitting Physician: JARRETT HUMMEL [[id number]]   Attending Physician: JARRETT HUMMEL [[id number]]            No follow-up provider specified.       Medication List      No changes were made to your prescriptions during this visit.          Feliciano Camacho PA  02/14/22 [id number]       Feliciano Camacho PA  02/14/22 [id number]

## 2022-02-15 NOTE — ED NOTES
Pt states that he was dx with Kidney stones in November. Pt states that he is suppose to have surgery on Monday for it. Pt states that he has left flank pain and groin pain. Pt states that pain is constant 7/10. Pt is alert and oriented and has family at the bedside. Pt staes that he is having nausea.     Adrianne Saha, RN  02/14/22 2046       Adrianne Saha RN  02/14/22 2059

## 2022-02-15 NOTE — CONSULTS
Urology Consult Note    Patient:Calos Moseley :1960  Room:[facility]  Admit Date2022  Age:61 y.o.     SEX:male     DOS:2/15/2022     MR:[id number]     Visit:[id number]       Attending: Gulshan Yuen DO  Referring Provider: Dr. Yuen  Reason for Consultation: UTI    Patient Care Team:  Naveed Nunez DO as PCP - General (Family Medicine)  Naveed Nunez DO as PCP - Family Medicine  Chris Martinez Jr., MD as Consulting Physician (Pulmonary Disease)    Chief complaint mental status changes    Subjective .     History of present illness: 61-year-old gentleman with history of stone disease.  Currently has a left stent in place and has plain plans for a percutaneous nephrolithotomy next Monday.  Patient developed some mental status changes with confusion over the weekend and presented to the hospital.  He certainly has evidence of urinary tract infection.  CT scan shows some mild left hydro with a stent in place but no significant obstruction.  There are bilateral renal calculi.    Review of Systems  10 point review of systems were reviewed and are negative except for:  Constitution:  positive for See HPI    History  Past Medical History:   Diagnosis Date   • Anesthesia complication     STATES HARD TO WAKE UP AFTER PREVIOUS KIDNEY STONE SURGERY AND STATES HE CODED   • Bilateral kidney stones    • Bipolar affective (HCC)    • COPD (chronic obstructive pulmonary disease) (HCC)    • Disease of thyroid gland    • Elevated cholesterol    • Hypertension    • Kidney stone on left side    • Left flank pain    • Mood disorder (HCC)    • Restless leg syndrome    • Sleep apnea     cpap  bring dos   • UTI (urinary tract infection) 2021    PREV FINISHED ANTIBIOTIC     Past Surgical History:   Procedure Laterality Date   • ADENOIDECTOMY     • APPENDECTOMY     • COLONOSCOPY     • KIDNEY STONE SURGERY     • TONSILLECTOMY       Social History     Socioeconomic History   • Marital status:    Tobacco Use    • Smoking status: Former Smoker     Types: Cigarettes     Quit date: 1990     Years since quittin.1   • Smokeless tobacco: Never Used   Vaping Use   • Vaping Use: Never used   Substance and Sexual Activity   • Alcohol use: Not Currently   • Drug use: No   • Sexual activity: Defer     Family History   Problem Relation Age of Onset   • Malig Hyperthermia Neg Hx      Allergy  No Known Allergies  Prior to Admission medications    Medication Sig Start Date End Date Taking? Authorizing Provider   albuterol sulfate  (90 Base) MCG/ACT inhaler INHALE ONE TO TWO PUFFS BY MOUTH EVERY 4 TO 6 HOURS  Patient taking differently: Inhale 2 puffs As Needed. Bring with 21  Yes    atorvastatin (LIPITOR) 20 MG tablet Take 20 mg by mouth Daily. MAY TAKE PREOP   Yes Provider, MD Gentry   atorvastatin (LIPITOR) 20 MG tablet Take 1 tablet by mouth every night at bedtime. 22  Yes    buPROPion XL (WELLBUTRIN XL) 300 MG 24 hr tablet Take 1 tablet by mouth Every Morning.  Patient taking differently: Take 300 mg by mouth Every Morning. TAKE PREOP 12/15/20  Yes    cephalexin (KEFLEX) 500 MG capsule Take 1 capsule by mouth 3 (Three) Times a Day.  Patient taking differently: Take 500 mg by mouth 3 (Three) Times a Day. Take preop 22  Yes    cloNIDine (CATAPRES) 0.1 MG tablet Take 1 tablet by mouth 2 (two) times a day if systolic blood pressure is 156 or greater or if diastolic blood pressure is 94 or greater.  Patient taking differently: Take 0.1 mg by mouth As Needed. If SBPover 156 ir DBP over 94  Take preop if needed 21  Yes    divalproex (DEPAKOTE ER) 250 MG 24 hr tablet Take 1 tablet by mouth 2 (Two) Times a Day.  Patient taking differently: Take 250 mg by mouth 2 (Two) Times a Day. Take preop 22  Yes    hydrALAZINE (APRESOLINE) 50 MG tablet Take 1 tablet by mouth 3 (Three) Times a Day As Needed. 22  Yes    lamoTRIgine (LaMICtal) 100 MG tablet Take 100 mg by mouth 2 (Two) Times a Day. Take  preop   Yes Gentry Sawyer MD   levoFLOXacin (LEVAQUIN) 500 MG tablet Take 1 tablet by mouth Daily. Start 5 days prior to surgery.  Patient taking differently: Take 500 mg by mouth Daily. Take preop 2/7/22  Yes    levothyroxine (SYNTHROID, LEVOTHROID) 50 MCG tablet Take 50 mcg by mouth Daily. TAKE PREOP   Yes Gentry Sawyer MD   metoprolol succinate XL (TOPROL-XL) 100 MG 24 hr tablet Take 100 mg by mouth 2 (Two) Times a Day. TAKE PREOP   Yes Gentry Sawyer MD   NIFEdipine CC (ADALAT CC) 90 MG 24 hr tablet Take 1 tablet by mouth Daily. 2/13/22  Yes    potassium chloride (KLOR-CON) 20 MEQ packet Take 20 mEq by mouth Daily. TAKE PREOP   Yes Gentry Sawyer MD   pramipexole (MIRAPEX) 0.5 MG tablet take 1 tablet by mouth at bedtime 2/7/22  Yes    divalproex (DEPAKOTE ER) 250 MG 24 hr tablet Take 1 tablet by mouth in the morning and 2 tabs at bedtime.  Patient taking differently: Take 250 mg by mouth Daily. 7/13/21 2/15/22 Yes    HYDROcodone-acetaminophen (NORCO) 7.5-325 MG per tablet Take 1 tablet by mouth 3 (Three) Times a Day As Needed. 11/16/21 2/15/22 Yes Gentry Sawyer MD   budesonide-formoterol (SYMBICORT) 160-4.5 MCG/ACT inhaler Inhale 2 puffs 2 (Two) Times a Day for 30 days.  Patient taking differently: Inhale 2 puffs 2 (Two) Times a Day As Needed. Use preop if needed bring 1/8/22 2/10/22  Edmar Barros APRN   hydrALAZINE (APRESOLINE) 50 MG tablet Take 50 mg by mouth 3 (Three) Times a Day. Take preop    Gentry Sawyer MD   levothyroxine (SYNTHROID, LEVOTHROID) 50 MCG tablet Take 1 tablet by mouth Daily. 2/13/22      metoprolol succinate XL (TOPROL-XL) 100 MG 24 hr tablet Take 2 tablets by mouth Daily. 2/13/22      naltrexone (DEPADE) 50 MG tablet Take 1 tablet by mouth Every Morning.  Patient taking differently: Take 50 mg by mouth Every Morning. Last dose 2/19 by 0900 7/13/21      nitrofurantoin, macrocrystal-monohydrate, (MACROBID) 100 MG capsule Take 1 capsule by  mouth 2 (Two) Times a Day. Start 5 days prior to surgery.  Patient taking differently: Take 100 mg by mouth 2 (Two) Times a Day. Take preop 22      oxyCODONE-acetaminophen (PERCOCET) 7.5-325 MG per tablet Take 1 tablet by mouth Every 4 (Four) Hours as needed for pain 22      rOPINIRole (REQUIP) 0.5 MG tablet Take 2 tablets by mouth Every Night. Take 1 hour before bedtime.  Patient taking differently: Take 1 mg by mouth Every Night. ON HOLD WHILE TRYING MIRAPEX 21      sildenafil (VIAGRA) 100 MG tablet Take 1 tablet by mouth As Needed. DONT USE WITHIN 24 HOURS OF SURGERY 21   Gentry Sawyer MD   sildenafil (VIAGRA) 100 MG tablet Take 1 tablet by mouth Daily As Needed. 22      vilazodone (VIIBRYD) 40 MG tablet tablet Take 40 mg by mouth Daily. TAKE PREOP    Gentry Sawyer MD         Objective     tMax 24 hours:  Temp (24hrs), Av.2 °F (36.8 °C), Min:97.8 °F (36.6 °C), Max:99 °F (37.2 °C)    Vital Sign Ranges:  Temp:  [97.8 °F (36.6 °C)-99 °F (37.2 °C)] 97.8 °F (36.6 °C)  Heart Rate:  [63-71] 63  Resp:  [15-18] 16  BP: (113-151)/(56-73) 113/60  Intake and Output Last 3 Shifts:  I/O last 3 completed shifts:  In: -   Out: 300 [Urine:300]      Physical Exam:   General Appearance alert, appears stated age and cooperative  Head normocephalic, without obvious abnormality and atraumatic  Eyes lids and lashes normal, conjunctivae and sclerae normal, no icterus, no pallor and corneas clear  Lungs respirations regular, respirations even and respirations unlabored  Heart regular rhythm & normal rate  Abdomen soft non-tender, no guarding and no rebound tenderness  Extremities moves extremities well, no edema, no cyanosis and no redness  Skin no bleeding, bruising or rash  Neurologic Mental Status orientated to person, place, time and situation    Results Review:     Lab Results (last 24 hours)     Procedure Component Value Units Date/Time    Basic Metabolic Panel [[id number]]  (Abnormal)  Collected: 02/15/22 0358    Specimen: Blood Updated: 02/15/22 0440     Glucose 117 mg/dL      BUN 46 mg/dL      Creatinine 2.38 mg/dL      Sodium 135 mmol/L      Potassium 4.3 mmol/L      Chloride 101 mmol/L      CO2 24.0 mmol/L      Calcium 8.2 mg/dL      eGFR Non African Amer 28 mL/min/1.73      BUN/Creatinine Ratio 19.3     Anion Gap 10.0 mmol/L     Narrative:      GFR Normal >60  Chronic Kidney Disease <60  Kidney Failure <15      CBC & Differential [[id number]]  (Abnormal) Collected: 02/15/22 0358    Specimen: Blood Updated: 02/15/22 0408    Narrative:      The following orders were created for panel order CBC & Differential.  Procedure                               Abnormality         Status                     ---------                               -----------         ------                     CBC Auto Differential[[id number]]        Abnormal            Final result                 Please view results for these tests on the individual orders.    CBC Auto Differential [[id number]]  (Abnormal) Collected: 02/15/22 0358    Specimen: Blood Updated: 02/15/22 0408     WBC 9.70 10*3/mm3      RBC 3.87 10*6/mm3      Hemoglobin 11.8 g/dL      Hematocrit 34.6 %      MCV 89.5 fL      MCH 30.6 pg      MCHC 34.2 g/dL      RDW 14.1 %      RDW-SD 44.2 fl      MPV 8.6 fL      Platelets 156 10*3/mm3      Neutrophil % 72.7 %      Lymphocyte % 10.6 %      Monocyte % 15.2 %      Eosinophil % 1.3 %      Basophil % 0.2 %      Neutrophils, Absolute 7.10 10*3/mm3      Lymphocytes, Absolute 1.00 10*3/mm3      Monocytes, Absolute 1.50 10*3/mm3      Eosinophils, Absolute 0.10 10*3/mm3      Basophils, Absolute 0.00 10*3/mm3      nRBC 0.0 /100 WBC     Urinalysis, Microscopic Only - Urine, Clean Catch [[id number]]  (Abnormal) Collected: 02/14/22 2126    Specimen: Urine, Clean Catch Updated: 02/14/22 2204     RBC, UA 6-12 /HPF      WBC, UA Too Numerous to Count /HPF      Bacteria, UA 1+ /HPF      Squamous Epithelial Cells, UA 0-2 /HPF       Yeast, UA Small/1+ Yeast /HPF      Hyaline Casts, UA None Seen /LPF      Cystine Crystals, UA Small/1+ /HPF      Methodology Manual Light Microscopy    Urine Culture - Urine, Urine, Clean Catch [[id number]] Collected: 02/14/22 2126    Specimen: Urine, Clean Catch Updated: 02/14/22 2204    COVID-19,CEPHEID/BRYAN,COR/MARQUITA/PAD/[facility] IN-HOUSE(OR EMERGENT/ADD-ON),NP SWAB IN TRANSPORT MEDIA 3-4 HR TAT, RT-PCR - Swab, Nasopharynx [[id number]]  (Normal) Collected: 02/14/22 2052    Specimen: Swab from Nasopharynx Updated: 02/14/22 2149     COVID19 Not Detected    Narrative:      Fact sheet for providers: https://www.fda.gov/media/970475/download     Fact sheet for patients: https://www.fda.gov/media/247346/download  Fact sheet for providers: https://www.fda.gov/media/883030/download     Fact sheet for patients: https://www.fda.gov/media/428170/download    Urinalysis With Culture If Indicated - Urine, Clean Catch [[id number]]  (Abnormal) Collected: 02/14/22 2126    Specimen: Urine, Clean Catch Updated: 02/14/22 2142     Color, UA Yellow     Appearance, UA Cloudy     pH, UA 5.5     Specific Gravity, UA >=1.030     Glucose, UA Negative     Ketones, UA Negative     Bilirubin, UA Negative     Blood, UA Trace     Protein,  mg/dL (2+)     Leuk Esterase, UA Moderate (2+)     Nitrite, UA Negative     Urobilinogen, UA 0.2 E.U./dL    Blood Culture - Blood, Blood, Venous Line [[id number]] Collected: 02/14/22 2125    Specimen: Blood, Venous Line Updated: 02/14/22 2128    Comprehensive Metabolic Panel [[id number]]  (Abnormal) Collected: 02/14/22 2053    Specimen: Blood Updated: 02/14/22 2124     Glucose 167 mg/dL      BUN 49 mg/dL      Creatinine 2.36 mg/dL      Sodium 133 mmol/L      Potassium 3.9 mmol/L      Chloride 97 mmol/L      CO2 24.0 mmol/L      Calcium 8.6 mg/dL      Total Protein 6.6 g/dL      Albumin 3.80 g/dL      ALT (SGPT) 14 U/L      AST (SGOT) 12 U/L      Alkaline Phosphatase 70 U/L      Total Bilirubin 0.4 mg/dL      eGFR  Non  Amer 28 mL/min/1.73      Globulin 2.8 gm/dL      A/G Ratio 1.4 g/dL      BUN/Creatinine Ratio 20.8     Anion Gap 12.0 mmol/L     Narrative:      GFR Normal >60  Chronic Kidney Disease <60  Kidney Failure <15      Lipase [[id number]]  (Normal) Collected: 02/14/22 2053    Specimen: Blood Updated: 02/14/22 2117     Lipase 35 U/L     CBC & Differential [[id number]]  (Abnormal) Collected: 02/14/22 2053    Specimen: Blood Updated: 02/14/22 2100    Narrative:      The following orders were created for panel order CBC & Differential.  Procedure                               Abnormality         Status                     ---------                               -----------         ------                     CBC Auto Differential[[id number]]        Abnormal            Final result                 Please view results for these tests on the individual orders.    CBC Auto Differential [[id number]]  (Abnormal) Collected: 02/14/22 2053    Specimen: Blood Updated: 02/14/22 2100     WBC 10.40 10*3/mm3      RBC 4.31 10*6/mm3      Hemoglobin 13.0 g/dL      Hematocrit 38.4 %      MCV 89.2 fL      MCH 30.2 pg      MCHC 33.9 g/dL      RDW 14.2 %      RDW-SD 44.6 fl      MPV 8.6 fL      Platelets 168 10*3/mm3      Neutrophil % 72.5 %      Lymphocyte % 11.5 %      Monocyte % 14.4 %      Eosinophil % 1.2 %      Basophil % 0.4 %      Neutrophils, Absolute 7.60 10*3/mm3      Lymphocytes, Absolute 1.20 10*3/mm3      Monocytes, Absolute 1.50 10*3/mm3      Eosinophils, Absolute 0.10 10*3/mm3      Basophils, Absolute 0.00 10*3/mm3      nRBC 0.1 /100 WBC     Blood Culture - Blood, Arm, Right [[id number]] Collected: 02/14/22 2053    Specimen: Blood from Arm, Right Updated: 02/14/22 2057    POC Lactate [[id number]]  (Normal) Collected: 02/14/22 2056    Specimen: Blood Updated: 02/14/22 2057     Lactate 0.8 mmol/L      Comment: Serial Number: [id number]:  [id number]            No results found for: URINECX     Imaging Results (Last 7 Days)      Procedure Component Value Units Date/Time    CT Abdomen Pelvis Without Contrast [[id number]] Collected: 02/14/22 2207     Updated: 02/14/22 2218    Narrative:      CT ABDOMEN PELVIS WO CONTRAST-     Date of Exam: 2/14/2022 9:32 PM     Indication: Flank pain, kidney stone suspected.  Nausea, vomiting,  dysuria, drainage output, left flank and left lower quadrant pain,  history of left ureteral stent     Comparison: None available.     Technique: Contiguous axial CT images were obtained from the lung bases  to the pubic symphysis without contrast. Sagittal and coronal  reconstructions were performed.  Automated exposure control and  iterative reconstruction methods were used.     FINDINGS:  There are multiple stones in the left kidney. Calcific material  consistent with a staghorn calculus fills the left renal pelvis. There  is a poorly defined hypodense area in the left mid kidney that measures  about 18 mm, with Hounsfield reading of fluid. There is a stent with the  upper end looped in the left upper pole renal collecting system in the  lower end looped in the bladder. There is minor left hydronephrosis and  perinephric stranding. There is some thickening of the wall of the left  renal pelvis. No stone is identifiable in the ureter adjacent to the  stent. There is mild left periureteral stranding. No stones are seen in  the bladder.     There is a 10 mm stone in the renal pelvis. There are some additional  smaller stones in the right lower kidney. There is minor right  hydronephrosis and mild perinephric stranding. There is no right  ureteral stone.     There is minimal basilar atelectasis. No abnormality is seen in the  liver, gallbladder, adrenals, spleen, or pancreas. There is moderate  atherosclerotic vascular calcification.     No abnormality is seen in the stomach or small bowel. The appendix is  not delineated. There is no inflammation around the cecum. There is no  large bowel abnormality. There is no  free fluid or free air. No  adenopathy.     Bone windows show no significant abnormality.       Impression:      1. There are bilateral kidney stones. This includes a staghorn calculus  in the left renal pelvis. There is a left-sided ureteral stent in place.  There is minor left hydronephrosis and some thickening of the wall of  the left renal pelvis.   2. The 18 mm left renal lesion may be a cyst but is not fully evaluated  on this exam.  3. There is minor right hydronephrosis. Mild bilateral perinephric  stranding.   4. Additional findings as reported above.     Electronically Signed By-Kaci Doran MD On:2/14/2022 10:16 PM  This report was finalized on [id number] by  Kaci Doran MD.          Inpatient Meds:   Scheduled Meds:atorvastatin, 20 mg, Oral, Daily  budesonide-formoterol, 2 puff, Inhalation, BID - RT  buPROPion XL, 300 mg, Oral, Daily  cefTRIAXone, 1 g, Intravenous, Q24H  divalproex, 250 mg, Oral, BID  lamoTRIgine, 100 mg, Oral, BID  levothyroxine, 50 mcg, Oral, Q AM  metoprolol succinate XL, 100 mg, Oral, Q24H  NIFEdipine XL, 90 mg, Oral, Q24H  potassium chloride, 20 mEq, Oral, Daily  pramipexole, 0.5 mg, Oral, Nightly  tamsulosin, 0.4 mg, Oral, Daily  vilazodone, 40 mg, Oral, Daily       Continuous Infusions:sodium chloride, 125 mL/hr, Last Rate: 100 mL/hr (02/15/22 0954)       PRN Meds:.•  acetaminophen **OR** acetaminophen **OR** acetaminophen  •  albuterol  •  aluminum-magnesium hydroxide-simethicone  •  HYDROcodone-acetaminophen  •  labetalol  •  melatonin  •  Morphine  •  nitroglycerin  •  ondansetron **OR** ondansetron  •  sodium chloride      Assessment/Plan     Principal Problem:    MESHA (acute kidney injury) ([facility])  Active Problems:    HLD (hyperlipidemia)    Obesity, Class III, BMI 40-49.9 (morbid obesity) ([facility])    Bilateral kidney stones    Hematuria    COPD (chronic obstructive pulmonary disease) ([facility])    Left flank pain    Acute renal failure (ARF) ([facility])    Anxiety and depression     Hypothyroidism    ISIAH (obstructive sleep apnea)    Essential hypertension    Bilateral renal calculi  Left hydronephrosis which is only minimal he has a stent in place  UTI with evidence of sepsis    Plan  Await urine cultures  Continue empiric Rocephin  We will need to stay on antibiotics through his procedure next Monday      I discussed the patients findings and my recommendations with patient    Thank you for this  consult    Ihsan Lopez MD  02/15/22  10:46 EST

## 2022-02-15 NOTE — PLAN OF CARE
Goal Outcome Evaluation:  Plan of Care Reviewed With: patient        Progress: no change  Outcome Summary: Pt is resting in bed. Admission is being completed at this time,

## 2022-02-15 NOTE — PLAN OF CARE
Goal Outcome Evaluation:  Plan of Care Reviewed With: patient        Progress: no change  Outcome Summary: Patient with complaints of pain, treated per MAR. Ambulates independently. Tolerating regular diet. Continues with IV antibiotics.  Urine strained.  Anticipates discharge home tomorrow.  Vital signs stable. Call light within reach. Care plan on going.

## 2022-02-15 NOTE — PLAN OF CARE
Pt resting. No c/o pain or nausea during this shift thus far. Pt NPO. Pt ambulatory per self. Call light within reach; pt able to make needs known. Will continue to monitor.

## 2022-02-15 NOTE — PAYOR COMM NOTE
UTILIZATION REVIEW  ROSA BAKER RN   PH:   FAX: [phone]    [facility]  NPI# [id number]  TID # [id number]  =============================    Clinical review for inpt admission  Verified obs order 2/14/22  VERIFIED INPT ORDER 2/15/22    Met (Selected): Reviewed on 2/15/2022 by Rosa Baker RN       Created Using Review Status Review Entered   Indicia® Completed 2/15/2022 13:20       Criteria Set Name - Subset   Urinary Tract Infection (UTI) RRG - Inpatient Care       Selected?   Yes - Inpatient Care is selected for the Urinary Tract Infection (UTI) RRG criteria set.      Criteria Review      Inpatient Care    Most Recent : Rosa Baker Most Recent Date: 2/15/2022 13:20:20 EST    (X) Admission is indicated for  1 or more  of the following :       (X) Suspected infection of an indwelling prosthetic device, stent, implant, or graft       2/15/2022 13:20:20 EST by Rosa Baker         patient has recent stent placed for kidney stone  had urine culture 2/11 growing Enterococcus faecalis       (X) Ureteral obstruction (eg, stone)       (X) Acute kidney injury (stage 2)       2/15/2022 13:20:20 EST by Rosa Baker         elevated creatinine 2.38    Notes:    2/15/2022 13:20:20 EST by Rosa Baker    Subject: Admission    UROLOGY NOTE 2/15- MIKE RENAL CALCULI, MESHA (acute kidney injury) (HCC)      Bilateral renal calculi    Left hydronephrosis which is only minimal he has a stent in place    UTI with evidence of sepsis         Plan    Await urine cultures    MEDS: Continue empiric Rocephin ,IVF    We will need to stay on antibiotics through his procedure next Monday      LABS:  MEDS: ROCEPHIN IV, 1 L IVF BOLUS, IVF , FLOMAX, M/S IV X 1     LABS: CREATININE 2.36 > 2.38, POSITIVE UA - REPEAT CULTURE PENDING   POSITIVE FOR ENTEROCOCCUS ON UA FROM 2/11/22        Calos Moseley (61 y.o. Male)             Date of Birth Social Security Number Address Home Phone MRN    1960  [address] [place] [address]  "[facility] [id number] [phone] [id number]    Yarsani Marital Status             Samaritan        Admission Date Admission Type Admitting Provider Attending Provider Department, Room/Bed    22 Emergency Gulshan Yuen DO Patel, Jalaram, DO [facility] SURGICAL INPATIENT,     Discharge Date Discharge Disposition Discharge Destination                         Attending Provider: Gulshan Yuen DO    Allergies: No Known Allergies    Isolation: None   Infection: COVID Screen (preop/placement) (22)   Code Status: CPR   Advance Care Planning Activity    Ht: 167.6 cm (66\")   Wt: 113 kg (248 lb 10.9 oz)    Admission Cmt: None   Principal Problem: MESHA (acute kidney injury) ([facility]) [N17.9]                 Active Insurance as of 2022     Primary Coverage     Payor Plan Insurance Group Employer/Plan Group    [employer] EM[employer]OYEE [id number]     Payor Plan Address Payor Plan Phone Number Payor Plan Fax Number Effective Dates    [address] [phone]  2022 - None Entered    [place] [id number]       Subscriber Name Subscriber Birth Date Member ID       FABIOLA BELTRAN 1960 [id number]                 Emergency Contacts      (Rel.) Home Phone Work Phone Mobile Phone    Rebeca Beltran (Spouse) [phone] -- [phone]               History & Physical      Gulshan Yuen DO at 02/15/22 0012              [facility] Medicine Services      Patient Name: Fabiola Beltran  : 1960  MRN: [id number]  Primary Care Physician:  Naveed Nunez DO  Date of admission: 2022      Subjective      Chief Complaint: L flank pain, nausea headache    History of Present Illness: Fabiloa Beltran is a 61 y.o. male w/PMH of HTN, HLD, COPD, ISIAH on CPAP, hypothyroidism, bipolar affective disorder, depression, anxiety, restless leg syndrome morbid obesity, and recurrent kidney stones, with ureteral stent placed last week presented to [facility]" [facility] on 2/14/2022 complaining of 7/10  stabbing left flank pain that radiates to his left lower quadrant, and left hip.  Patient denies any exacerbating or eliminating factors.  Patient reports pain is also accompanied by chills, nausea, and headache very similar to what he had experienced in January, and then again 3 weeks ago before he had ureteral stent placed.  Patient states that he has had some dysuria, especially at start of stream, urgency, frequency, hesitancy and hematuria.  Patient also reports he has plans for percutaneous nephrolithotomy on February 21, 2022 with Dr. Vail.  Patient otherwise denies any acute distress chest pain, shortness of breath, palpitations, syncope, abdominal pain, constipation, fever or any known sick contacts.    Initial vital signs upon arrival to the emergency department blood pressure 146/73, heart rate 71, oxygen saturation 95% on room air, respiratory rate 16, patient afebrile with a T-max of 97.8.    Initial work-up in the emergency department included initial labs with the following abnormalities noted: Glucose 167, sodium 133, creatinine 2.36, BUN 29, GFR 28.  Urine collected for urinalysis showed trace blood, 2+ leukocytes, 2+ protein, 6-12 RBC, too numerous to count WBC, 1+ bacteria  Urine culture is pending, urine culture on 2/11/2022 showed Enterococcus faecalis  Blood cultures collected with results pending.  -Respiratory panel collected negative for COVID-19.  -CT abdomen pelvis completed showing bilateral kidney stones, staghorn calculus in left renal pelvis, left-sided ureteral stent noted, there is left hydronephrosis and some thickening of the wall of the left renal pelvis. The 18 mm left renal lesion possibly a cyst right sided minor hydronephrosis, mild bilateral  periureteric   Stranding. As dictated by   Compared to CT on January 7, 2020 with diffuse no ureteric calculus identified. Right upper pole infundibular/right pelvic calculus  causing isolated upper pole caliectasis.  Normally numerous calculi within the left kidney with mild to moderate dilation of left renal collecting system, unchanged from prior imaging in 2017. There is diffuse left peripelvic and periureteric  stranding. As dictated by Dr. Llanes 01/101/2022.    Was administered 0.4 mg tamsulosin, 1 g ceftriaxone, 4 mg morphine sulfate x 2 doses, 1 L normal saline bolus, 4 mg ondansetron while in the emergency department.    Patient will be admitted for observation with urology following for further evaluation and treatment of acute kidney injury, acute UTI, bilateral renal calculi, and other acute and chronic conditions.    Review of Systems   Constitutional: Negative.   HENT: Negative.    Eyes: Negative.    Cardiovascular: Negative.    Respiratory: Negative.    Endocrine: Negative.    Hematologic/Lymphatic: Negative.    Skin: Negative.    Gastrointestinal: Negative.    Genitourinary: Positive for dysuria, flank pain, frequency, hematuria, hesitancy and urgency.   Neurological: Negative.    Psychiatric/Behavioral: Negative.    Allergic/Immunologic: Negative.    All other systems reviewed and are negative.     Personal History     Past Medical History:   Diagnosis Date   • Anesthesia complication     STATES HARD TO WAKE UP AFTER PREVIOUS KIDNEY STONE SURGERY AND STATES HE CODED   • Bilateral kidney stones    • Bipolar affective (HCC)    • COPD (chronic obstructive pulmonary disease) (HCC)    • Disease of thyroid gland    • Elevated cholesterol    • Hypertension    • Kidney stone on left side    • Left flank pain    • Mood disorder (HCC)    • Restless leg syndrome    • Sleep apnea     cpap  bring dos   • UTI (urinary tract infection) 12/2021    PREV FINISHED ANTIBIOTIC       Past Surgical History:   Procedure Laterality Date   • ADENOIDECTOMY     • APPENDECTOMY     • COLONOSCOPY     • KIDNEY STONE SURGERY     • TONSILLECTOMY         Family History: family history is not on file.  Otherwise pertinent FHx was reviewed and not pertinent to current issue.    Social History:  reports that he quit smoking about 31 years ago. His smoking use included cigarettes. He has never used smokeless tobacco. He reports previous alcohol use. He reports that he does not use drugs.    Home Medications:  Prior to Admission Medications     Prescriptions Last Dose Informant Patient Reported? Taking?    albuterol sulfate  (90 Base) MCG/ACT inhaler   No No    INHALE ONE TO TWO PUFFS BY MOUTH EVERY 4 TO 6 HOURS    Patient taking differently:  Inhale 2 puffs As Needed. Bring with    atorvastatin (LIPITOR) 20 MG tablet  Self Yes No    Take 20 mg by mouth Daily. MAY TAKE PREOP    atorvastatin (LIPITOR) 20 MG tablet   No No    Take 1 tablet by mouth every night at bedtime.    budesonide-formoterol (SYMBICORT) 160-4.5 MCG/ACT inhaler   No No    Inhale 2 puffs 2 (Two) Times a Day for 30 days.    Patient taking differently:  Inhale 2 puffs 2 (Two) Times a Day As Needed. Use preop if needed bring    buPROPion XL (WELLBUTRIN XL) 300 MG 24 hr tablet   No No    Take 1 tablet by mouth Every Morning.    Patient taking differently:  Take 300 mg by mouth Every Morning. TAKE PREOP    cephalexin (KEFLEX) 500 MG capsule   No No    Take 1 capsule by mouth 3 (Three) Times a Day.    Patient taking differently:  Take 500 mg by mouth 3 (Three) Times a Day. Take preop    cloNIDine (CATAPRES) 0.1 MG tablet   No No    Take 1 tablet by mouth 2 (two) times a day if systolic blood pressure is 156 or greater or if diastolic blood pressure is 94 or greater.    Patient taking differently:  Take 0.1 mg by mouth As Needed. If SBPover 156 ir DBP over 94  Take preop if needed    divalproex (DEPAKOTE ER) 250 MG 24 hr tablet   No No    Take 1 tablet by mouth in the morning and 2 tabs at bedtime.    Patient taking differently:  Take 250 mg by mouth Daily.    divalproex (DEPAKOTE ER) 250 MG 24 hr tablet   No No    Take 1 tablet by mouth 2 (Two)  Times a Day.    Patient taking differently:  Take 250 mg by mouth 2 (Two) Times a Day. Take preop    hydrALAZINE (APRESOLINE) 50 MG tablet   Yes No    Take 50 mg by mouth 3 (Three) Times a Day. Take preop    hydrALAZINE (APRESOLINE) 50 MG tablet   No No    Take 1 tablet by mouth 3 (Three) Times a Day As Needed.    HYDROcodone-acetaminophen (NORCO) 7.5-325 MG per tablet   Yes No    Take 1 tablet by mouth 3 (Three) Times a Day As Needed.    lamoTRIgine (LaMICtal) 100 MG tablet   Yes No    Take 100 mg by mouth 2 (Two) Times a Day. Take preop    levoFLOXacin (LEVAQUIN) 500 MG tablet   No No    Take 1 tablet by mouth Daily. Start 5 days prior to surgery.    Patient taking differently:  Take 500 mg by mouth Daily. Take preop    levothyroxine (SYNTHROID, LEVOTHROID) 50 MCG tablet  Self Yes No    Take 50 mcg by mouth Daily. TAKE PREOP    levothyroxine (SYNTHROID, LEVOTHROID) 50 MCG tablet   No No    Take 1 tablet by mouth Daily.    metoprolol succinate XL (TOPROL-XL) 100 MG 24 hr tablet  Pharmacy Yes No    Take 100 mg by mouth 2 (Two) Times a Day. TAKE PREOP    metoprolol succinate XL (TOPROL-XL) 100 MG 24 hr tablet   No No    Take 2 tablets by mouth Daily.    naltrexone (DEPADE) 50 MG tablet   No No    Take 1 tablet by mouth Every Morning.    Patient taking differently:  Take 50 mg by mouth Every Morning. Last dose 2/19 by 0900    NIFEdipine CC (ADALAT CC) 90 MG 24 hr tablet   No No    Take 1 tablet by mouth Daily.    nitrofurantoin, macrocrystal-monohydrate, (MACROBID) 100 MG capsule   No No    Take 1 capsule by mouth 2 (Two) Times a Day. Start 5 days prior to surgery.    Patient taking differently:  Take 100 mg by mouth 2 (Two) Times a Day. Take preop    oxyCODONE-acetaminophen (PERCOCET) 7.5-325 MG per tablet   No No    Take 1 tablet by mouth Every 4 (Four) Hours as needed for pain    potassium chloride (KLOR-CON) 20 MEQ packet  Pharmacy Yes No    Take 20 mEq by mouth Daily. TAKE PREOP    pramipexole (MIRAPEX) 0.5 MG  tablet   No No    take 1 tablet by mouth at bedtime    rOPINIRole (REQUIP) 0.5 MG tablet   No No    Take 2 tablets by mouth Every Night. Take 1 hour before bedtime.    Patient taking differently:  Take 1 mg by mouth Every Night. ON HOLD WHILE TRYING MIRAPEX    sildenafil (VIAGRA) 100 MG tablet   Yes No    Take 1 tablet by mouth As Needed. DONT USE WITHIN 24 HOURS OF SURGERY    sildenafil (VIAGRA) 100 MG tablet   No No    Take 1 tablet by mouth Daily As Needed.    vilazodone (VIIBRYD) 40 MG tablet tablet  Pharmacy Yes No    Take 40 mg by mouth Daily. TAKE PREOP            Allergies:  No Known Allergies    Objective      Vitals:   Temp:  [97.8 °F (36.6 °C)] 97.8 °F (36.6 °C)  Heart Rate:  [67-71] 68  Resp:  [15-18] 15  BP: (127-151)/(63-73) 151/71    Physical Exam  Vitals and nursing note reviewed.   Constitutional:       Appearance: He is obese.   HENT:      Head: Normocephalic and atraumatic.      Right Ear: Ear canal normal.      Left Ear: External ear normal.      Mouth/Throat:      Mouth: Mucous membranes are dry.   Eyes:      Conjunctiva/sclera: Conjunctivae normal.      Pupils: Pupils are equal, round, and reactive to light.   Cardiovascular:      Rate and Rhythm: Normal rate and regular rhythm.      Pulses: Normal pulses.           Carotid pulses are 2+ on the right side and 2+ on the left side.       Radial pulses are 2+ on the right side and 2+ on the left side.        Dorsalis pedis pulses are 2+ on the right side and 2+ on the left side.        Posterior tibial pulses are 2+ on the right side and 2+ on the left side.      Heart sounds: Normal heart sounds.   Pulmonary:      Effort: Pulmonary effort is normal.      Breath sounds: Normal breath sounds.   Abdominal:      General: Bowel sounds are normal.   Musculoskeletal:         General: Normal range of motion.      Cervical back: Normal range of motion and neck supple.   Skin:     General: Skin is warm and dry.      Capillary Refill: Capillary refill takes  less than 2 seconds.   Neurological:      General: No focal deficit present.      Mental Status: He is alert and oriented to person, place, and time.   Psychiatric:         Mood and Affect: Mood normal.         Behavior: Behavior normal.         Thought Content: Thought content normal.         Judgment: Judgment normal.          Result Review    Result Review:  I have personally reviewed the results from the time of this admission to 2/15/2022 00:12 EST and agree with these findings:  [x]  Laboratory  [x]  Microbiology  [x]  Radiology  [x]  EKG/Telemetry   [x]  Cardiology/Vascular   []  Pathology  []  Old records  []  Other:  Most notable findings include:       Assessment/Plan        Active Hospital Problems:  Active Hospital Problems    Diagnosis    • **MESHA (acute kidney injury) ([facility])    • Left flank pain    • Acute renal failure (ARF) ([facility])    • ISIAH (obstructive sleep apnea)      Last Assessment & Plan:   Formatting of this note might be different from the original.  Patient is using and feels that he is benefiting from CPAP usage.  Review of 30-day download with excellent usage and compliance with improved AHI at 5.3, down from a previous residual AHI of greater than 8.  Patient still has significant leak time noted.  Discussed that he will need to have, would benefit from, dedicated mask fitting may need to size down with his mask option.  Patient verbalized understanding     • Bilateral kidney stones    • Hematuria    • Obesity, Class III, BMI 40-49.9 (morbid obesity) ([facility])    • COPD (chronic obstructive pulmonary disease) ([facility])    • HLD (hyperlipidemia)    • Essential hypertension      Formatting of this note might be different from the original.  Lisinopril, Apresoline, and Metoprolol    Last Assessment & Plan:   Formatting of this note might be different from the original.  Not well controlled, continue medications per PCP instruction, if continues to be elevated may need to see PCP for further evaluation      • Anxiety and depression      Formatting of this note might be different from the original.  Lamictal and Vibryd     • Hypothyroidism      Formatting of this note might be different from the original.  Synthroid 50mcgs       Plan:   Acute kidney injury  Left flank pain,  Acute UTI,  Pyelonephritis,  Renal colic/bilateral kidney stones  -History of ureteral stents  -Left renal stent noted  -Continue Rocephin  -NS infusion ordered  -Strain all urine  -Vital signs per policy  -Continue morphine as needed every 4 hours  -As needed ondansetron for nausea  -CT scan completed and reviewed  -Urinalysis completed and  -Flomax continued  -Urology consulted  -N.p.o. for now except sips with meds    Essential hypertension  -Chronic-partially controlled  -Vital signs per policy  -Continue home metoprolol succinate XL, and Procardia  -Hold home as needed antihypertensives for now  -As needed labetalol with parameters ordered    HLD (hyperlipidemia)  -Chronic  -Stable  -Continue home atorvastatin    COPD(on obstructive pulmonary disease)  ISIAH (obstructive sleep apnea)  -Chronic/stable not in exacerbation   -PFTs unknown  -Continuous pulse oximetry  -Continue home Symbicort, and albuterol  -Supplemental oxygen as needed for hypoxia/respiratory distress maintain oxygen saturation greater than 90%.  -As needed ABGs per policy for hypoxia/respiratory distress  -May use home CPAP    Hypothyroidism  -Continue home levothyroxine  .  Anxiety and depression/bipolar disorder  -Chronic/stable  -Continue home Depakote, and Lamictal    Restless leg syndrome  -Continue Mirapex    Obesity, Class III, BMI 40-49.9 (morbid obesity)   -BMI 40.00  -Encourage healthy lifestyle modifications to reduce cardiovascular risk factors.      DVT prophylaxis:  No DVT prophylaxis order currently exists.    CODE STATUS:       Admission Status:  I believe this patient meets observation status.    I discussed the patient's findings and my recommendations with  patient and family.    This patient has been examined wearing appropriate Personal Protective Equipment . 02/15/22      Signature:Electronically signed by SALVADOR Hamlin, 02/15/22, 12:12 AM EST.    Attending attestation:    I have reviewed the VINITA's note and agree with the documented findings and plan of care unless stated otherwise by my note.    61 y.o. male w/PMH of HTN, HLD, COPD, ISIAH on CPAP, hypothyroidism, bipolar affective disorder, depression, anxiety, restless leg syndrome morbid obesity, and recurrent kidney stones, with ureteral stent placed last week presented to [facility] on 2/14/2022 complaining of 7/10  stabbing left flank pain that radiates to his left lower quadrant, and left hip.     General: Awake, alert, NAD  Eyes: PERRL, EOMI, conjunctive are clear  Cardiovascular: Regular rate and rhythm, no murmurs  Respiratory: Clear to auscultation bilaterally, no wheezing or rales, unlabored breathing  Abdomen: Soft, mild left lower quadrant tenderness, positive bowel sounds, no guarding  Neurologic: A&O, CN grossly intact, moves all extremities spontaneously  Musculoskeletal: Normal range of motion, no deformities  Skin: Warm, dry, intact    Assessment:  Bilateral renal stones with hydronephrosis  Acute UTI  MESHA on CKD stage IIIb  Hypertension  Hyperlipidemia  Hypothyroidism  Chronic anxiety/bipolar disorder  COPD/ISIAH    Plan:  -Imaging reports noted, UA noted  -Previous urine culture grew E. coli, pansensitive, repeat urine culture ordered here  -Continue IV ceftriaxone  -Baseline creatinine around 1.6-2, creatinine slowly improving, continue IV fluids  -Urology not planning any procedure at this time, observe overnight and possible discharge tomorrow and plan for scheduled surgery on next Monday  -Continue other home meds except nephrotoxins  -Monitor      Electronically signed by Gulshan Yuen DO, 02/15/22, 10:27 AM EST.        Electronically signed by Gulshan Yuen DO at 02/15/22  1028          Emergency Department Notes      Feliciano Camacho PA at 02/14/22 2025          Subjective       Patient is a 61-year-old male comes in complaining of left-sided flank pain for the past 3 days.  Patient states the pain is about a 7 out of 10 that radiates to his left lower quadrant.  Patient states nothing seems to make the pain better or worse.  Patient reports some nausea and vomiting as well.  Patient ports some chills but no fever.  Spouse at bedside reports that patient has had some confused conversation last few days has been intermittent.  Patient states he has had some nausea and vomiting that has been intermittent last few days.  Patient denies any diarrhea.  Patient otherwise denies any cough, chest pain, shortness of breath or swelling in his legs bilaterally.  Patient reports that 3 weeks ago he had a stent placed in his right ureter and has had stents placed in both ureters.  Patient denies any scrotal pain, swelling of the groin or scrotum, penile pain or discharge.  Patient does report some decreased urine output and some pain with urination but denies any blood in the urine.  Patient follows with urology, Dr. Leija.        Review of Systems   Constitutional: Negative for chills, fatigue and fever.   HENT: Negative for congestion, sore throat, tinnitus and trouble swallowing.    Eyes: Negative for photophobia, discharge and visual disturbance.   Respiratory: Negative for cough, shortness of breath and wheezing.    Cardiovascular: Negative for chest pain, palpitations and leg swelling.   Gastrointestinal: Positive for nausea and vomiting. Negative for abdominal pain, blood in stool and diarrhea.   Genitourinary: Positive for decreased urine volume, dysuria and flank pain (left). Negative for frequency, hematuria, penile discharge, penile pain, penile swelling, scrotal swelling, testicular pain and urgency.   Musculoskeletal: Negative for arthralgias and myalgias.   Skin: Negative for rash.    Neurological: Negative for dizziness, light-headedness and headaches.   Psychiatric/Behavioral: Negative for confusion.       Past Medical History:   Diagnosis Date   • Anesthesia complication     STATES HARD TO WAKE UP AFTER PREVIOUS KIDNEY STONE SURGERY AND STATES HE CODED   • Bilateral kidney stones    • Bipolar affective (HCC)    • COPD (chronic obstructive pulmonary disease) ([facility])    • Disease of thyroid gland    • Elevated cholesterol    • Hypertension    • Kidney stone on left side    • Left flank pain    • Mood disorder (HCC)    • Restless leg syndrome    • Sleep apnea     cpap  bring dos   • UTI (urinary tract infection) 2021    PREV FINISHED ANTIBIOTIC       No Known Allergies    Past Surgical History:   Procedure Laterality Date   • ADENOIDECTOMY     • APPENDECTOMY     • COLONOSCOPY     • KIDNEY STONE SURGERY     • TONSILLECTOMY         Family History   Problem Relation Age of Onset   • Malig Hyperthermia Neg Hx        Social History     Socioeconomic History   • Marital status:    Tobacco Use   • Smoking status: Former Smoker     Types: Cigarettes     Quit date: 1990     Years since quittin.1   • Smokeless tobacco: Never Used   Vaping Use   • Vaping Use: Never used   Substance and Sexual Activity   • Alcohol use: Not Currently   • Drug use: No   • Sexual activity: Defer           Objective   Physical Exam  Vitals and nursing note reviewed.   Constitutional:       General: He is not in acute distress.     Appearance: He is well-developed. He is not diaphoretic.   HENT:      Head: Normocephalic and atraumatic.      Right Ear: External ear normal.      Left Ear: External ear normal.      Nose: Nose normal.      Mouth/Throat:      Pharynx: No oropharyngeal exudate.   Eyes:      Extraocular Movements: Extraocular movements intact.      Conjunctiva/sclera: Conjunctivae normal.      Pupils: Pupils are equal, round, and reactive to light.   Cardiovascular:      Rate and Rhythm: Normal rate  "and regular rhythm.      Pulses: Normal pulses.      Heart sounds: Normal heart sounds.      Comments: S1, S2 audible.  Pulmonary:      Effort: Pulmonary effort is normal. No respiratory distress.      Breath sounds: Normal breath sounds. No wheezing, rhonchi or rales.      Comments: On room air.  Abdominal:      General: Bowel sounds are normal. There is no distension.      Palpations: Abdomen is soft.      Tenderness: There is no abdominal tenderness. There is no guarding or rebound.   Musculoskeletal:         General: No tenderness or deformity. Normal range of motion.      Cervical back: Normal range of motion.      Right lower leg: No edema.      Left lower leg: No edema.   Skin:     General: Skin is warm.      Capillary Refill: Capillary refill takes less than 2 seconds.      Findings: No erythema or rash.   Neurological:      General: No focal deficit present.      Mental Status: He is alert and oriented to person, place, and time.      Cranial Nerves: No cranial nerve deficit.      Sensory: No sensory deficit.      Motor: No weakness.   Psychiatric:         Mood and Affect: Mood normal.         Behavior: Behavior normal.         Procedures          ED Course  ED Course as of 02/14/22 2325 Mon Feb 14, 2022 2318 Spoke with Dr. Ortiz who is on-call for patient's urologist, Dr. Leija.  Recommended admitting patient to hospitalist and they will see him in the morning.  They also recommended pain control and antibiotics overnight. [RL]      ED Course User Index  [RL] Feliciano Camacho PA      /63 (BP Location: Left arm, Patient Position: Lying)   Pulse 67   Temp 97.8 °F (36.6 °C) (Oral)   Resp 18   Ht 167.6 cm (66\")   Wt 112 kg (248 lb)   SpO2 98%   BMI 40.03 kg/m²   Labs Reviewed   COMPREHENSIVE METABOLIC PANEL - Abnormal; Notable for the following components:       Result Value    Glucose 167 (*)     BUN 49 (*)     Creatinine 2.36 (*)     Sodium 133 (*)     Chloride 97 (*)     eGFR Non African " Amer 28 (*)     All other components within normal limits    Narrative:     GFR Normal >60  Chronic Kidney Disease <60  Kidney Failure <15     URINALYSIS W/ CULTURE IF INDICATED - Abnormal; Notable for the following components:    Appearance, UA Cloudy (*)     Blood, UA Trace (*)     Protein,  mg/dL (2+) (*)     Leuk Esterase, UA Moderate (2+) (*)     All other components within normal limits   CBC WITH AUTO DIFFERENTIAL - Abnormal; Notable for the following components:    Lymphocyte % 11.5 (*)     Monocyte % 14.4 (*)     Neutrophils, Absolute 7.60 (*)     Monocytes, Absolute 1.50 (*)     All other components within normal limits   URINALYSIS, MICROSCOPIC ONLY - Abnormal; Notable for the following components:    RBC, UA 6-12 (*)     WBC, UA Too Numerous to Count (*)     Bacteria, UA 1+ (*)     All other components within normal limits   COVID-19,CEPHEID/BRYAN,COR/MARQUITA/PAD/[facility] IN-HOUSE,NP SWAB IN TRANSPORT MEDIA 3-4 HR TAT, RT-PCR - Normal    Narrative:     Fact sheet for providers: https://www.fda.gov/media/621525/download     Fact sheet for patients: https://www.fda.gov/media/790615/download  Fact sheet for providers: https://www.fda.gov/media/694044/download     Fact sheet for patients: https://www.fda.gov/media/260172/download   LIPASE - Normal   POC LACTATE - Normal   BLOOD CULTURE   BLOOD CULTURE   URINE CULTURE   POC LACTATE   CBC AND DIFFERENTIAL    Narrative:     The following orders were created for panel order CBC & Differential.  Procedure                               Abnormality         Status                     ---------                               -----------         ------                     CBC Auto Differential[[id number]]        Abnormal            Final result                 Please view results for these tests on the individual orders.     CT Abdomen Pelvis Without Contrast    Result Date: 2/14/2022  1. There are bilateral kidney stones. This includes a staghorn calculus in the left renal  pelvis. There is a left-sided ureteral stent in place. There is minor left hydronephrosis and some thickening of the wall of the left renal pelvis. 2. The 18 mm left renal lesion may be a cyst but is not fully evaluated on this exam. 3. There is minor right hydronephrosis. Mild bilateral perinephric stranding. 4. Additional findings as reported above.  Electronically Signed ByBlanca Doran MD On:2/14/2022 10:16 PM This report was finalized on [id number] by  Kaci Doran MD.                                               MDM  Number of Diagnoses or Management Options     Amount and/or Complexity of Data Reviewed  Clinical lab tests: reviewed  Tests in the radiology section of CPT®: reviewed    Risk of Complications, Morbidity, and/or Mortality  Presenting problems: low  Diagnostic procedures: low  Management options: low    Patient Progress  Patient progress: stable       Chart review:    EKG: Not indicated  Imaging:    CT Abdomen Pelvis Without Contrast   Final Result   1. There are bilateral kidney stones. This includes a staghorn calculus   in the left renal pelvis. There is a left-sided ureteral stent in place.   There is minor left hydronephrosis and some thickening of the wall of   the left renal pelvis.    2. The 18 mm left renal lesion may be a cyst but is not fully evaluated   on this exam.   3. There is minor right hydronephrosis. Mild bilateral perinephric   stranding.    4. Additional findings as reported above.       Electronically Signed By-Kaci Doran MD On:2/14/2022 10:16 PM   This report was finalized on [id number] by  Kaci Doran MD.          Labs: UA shows 1+ bacteria, moderate leukocyte esterase, trace blood.  Initial lactic normal at 0.8.  Lipase normal, CBC largely unremarkable for acute findings.  Serum creatinine elevated at 2.36 which was 1.6 about 4 days ago on prior lab work.  Urine culture pending.  Blood cultures x2 pending.  COVID-19 swab negative.  Vitals:  /63 (BP  "Location: Left arm, Patient Position: Lying)   Pulse 67   Temp 97.8 °F (36.6 °C) (Oral)   Resp 18   Ht 167.6 cm (66\")   Wt 112 kg (248 lb)   SpO2 98%   BMI 40.03 kg/m²     Medications given:    Medications   sodium chloride 0.9 % flush 10 mL (has no administration in time range)   Morphine sulfate (PF) injection 4 mg (has no administration in time range)   tamsulosin (FLOMAX) 24 hr capsule 0.4 mg (has no administration in time range)   ondansetron ODT (ZOFRAN-ODT) disintegrating tablet 4 mg (4 mg Oral Given 2/14/22 2124)   Morphine sulfate (PF) injection 4 mg (4 mg Intravenous Given 2/14/22 2124)   sodium chloride 0.9 % bolus 1,000 mL (1,000 mL Intravenous New Bag 2/14/22 2212)   cefTRIAXone (ROCEPHIN) in SWFI 1 gram/10ml IV PUSH syringe (1 g Intravenous Given 2/14/22 2221)       Procedures:    MDM: Patient is a 61-year-old male comes in complaining of left-sided flank pain with extensive history of kidney stones and currently has a left ureteral stent in place.  UA shows 1+ bacteria, moderate leukocyte esterase, trace blood.  Initial lactic normal at 0.8.  Lipase normal, CBC largely unremarkable for acute findings.  Serum creatinine elevated at 2.36 which was 1.6 about 4 days ago on prior lab work.  Urine culture pending.  Blood cultures x2 pending.  COVID-19 swab negative.  Patient was given Rocephin for apparent UTI.  Patient was given morphine for pain control.  Patient was also given Zofran for nausea.  Patient given 1 L normal saline bolus given patient's significant MESHA.  Patient also given Flomax upon admission to the hospital.  Also spoke with patient regarding incidental findings including renal cyst.  Spoke with Dr. Ortiz, who is on-call for Dr. Leija with urology who recommended an antibiotics and admit the patient to hospitalist.  Spoke with SALVADOR Desai, who accepted patient on behalf of hospitalist team and Dr. Malcolm for further work-up and treatment.  Results and plan discussed with " patient and is agreeable with plan.    Final diagnoses:   Left flank pain   Acute UTI   Pyelonephritis   Renal colic       ED Disposition  ED Disposition     ED Disposition Condition Comment    Decision to Admit  Level of Care: Med/Surg [1]   Admitting Physician: JARRETT HUMMEL [[id number]]   Attending Physician: JARRETT HUMMEL [[id number]]            No follow-up provider specified.       Medication List      No changes were made to your prescriptions during this visit.          Feliciano Camacho PA  22       Feliciano Camacho PA  22      Electronically signed by Feliciano Camacho PA at 22     Adrianne Shaa RN at 22        Pt states that he was dx with Kidney stones in November. Pt states that he is suppose to have surgery on Monday for it. Pt states that he has left flank pain and groin pain. Pt states that pain is constant /10. Pt is alert and oriented and has family at the bedside. Pt staes that he is having nausea.     Adrianne Saha RN  22       Adrianne Saha RN  22      Electronically signed by Adrianne Saha RN at 22       Physician Progress Notes (last 24 hours)  Notes from 22 1327 through 02/15/22 1327   No notes of this type exist for this encounter.            Consult Notes (last 24 hours)      Ihsan Lopez MD at 02/15/22 1046      Consult Orders    1. Urology (on-call MD unless specified) [[id number]] ordered by Feliciano Camacho PA at 22 2259               Urology Consult Note    Patient:Calos Moseley :1960  Room:[facility]  Admit Date2022  Age:61 y.o.     SEX:male     DOS:2/15/2022     MR:[id number]     Visit:[id number]       Attending: Gulshan Yuen DO  Referring Provider: Dr. Yuen  Reason for Consultation: UTI    Patient Care Team:  Jarrett Nunez DO as PCP - General (Family Medicine)  Jarrett Nunez DO as PCP - Family Medicine  Chris Martinez Jr., MD as Consulting Physician (Pulmonary  Disease)    Chief complaint mental status changes    Subjective .     History of present illness: 61-year-old gentleman with history of stone disease.  Currently has a left stent in place and has plain plans for a percutaneous nephrolithotomy next Monday.  Patient developed some mental status changes with confusion over the weekend and presented to the hospital.  He certainly has evidence of urinary tract infection.  CT scan shows some mild left hydro with a stent in place but no significant obstruction.  There are bilateral renal calculi.    Review of Systems  10 point review of systems were reviewed and are negative except for:  Constitution:  positive for See HPI    History  Past Medical History:   Diagnosis Date   • Anesthesia complication     STATES HARD TO WAKE UP AFTER PREVIOUS KIDNEY STONE SURGERY AND STATES HE CODED   • Bilateral kidney stones    • Bipolar affective (HCC)    • COPD (chronic obstructive pulmonary disease) (HCC)    • Disease of thyroid gland    • Elevated cholesterol    • Hypertension    • Kidney stone on left side    • Left flank pain    • Mood disorder (HCC)    • Restless leg syndrome    • Sleep apnea     cpap  bring dos   • UTI (urinary tract infection) 2021    PREV FINISHED ANTIBIOTIC     Past Surgical History:   Procedure Laterality Date   • ADENOIDECTOMY     • APPENDECTOMY     • COLONOSCOPY     • KIDNEY STONE SURGERY     • TONSILLECTOMY       Social History     Socioeconomic History   • Marital status:    Tobacco Use   • Smoking status: Former Smoker     Types: Cigarettes     Quit date: 1990     Years since quittin.1   • Smokeless tobacco: Never Used   Vaping Use   • Vaping Use: Never used   Substance and Sexual Activity   • Alcohol use: Not Currently   • Drug use: No   • Sexual activity: Defer     Family History   Problem Relation Age of Onset   • Malig Hyperthermia Neg Hx      Allergy  No Known Allergies  Prior to Admission medications    Medication Sig Start Date  End Date Taking? Authorizing Provider   albuterol sulfate  (90 Base) MCG/ACT inhaler INHALE ONE TO TWO PUFFS BY MOUTH EVERY 4 TO 6 HOURS  Patient taking differently: Inhale 2 puffs As Needed. Bring with 11/5/21  Yes    atorvastatin (LIPITOR) 20 MG tablet Take 20 mg by mouth Daily. MAY TAKE PREOP   Yes Gentry Sawyer MD   atorvastatin (LIPITOR) 20 MG tablet Take 1 tablet by mouth every night at bedtime. 2/13/22  Yes    buPROPion XL (WELLBUTRIN XL) 300 MG 24 hr tablet Take 1 tablet by mouth Every Morning.  Patient taking differently: Take 300 mg by mouth Every Morning. TAKE PREOP 12/15/20  Yes    cephalexin (KEFLEX) 500 MG capsule Take 1 capsule by mouth 3 (Three) Times a Day.  Patient taking differently: Take 500 mg by mouth 3 (Three) Times a Day. Take preop 1/28/22  Yes    cloNIDine (CATAPRES) 0.1 MG tablet Take 1 tablet by mouth 2 (two) times a day if systolic blood pressure is 156 or greater or if diastolic blood pressure is 94 or greater.  Patient taking differently: Take 0.1 mg by mouth As Needed. If SBPover 156 ir DBP over 94  Take preop if needed 6/13/21  Yes    divalproex (DEPAKOTE ER) 250 MG 24 hr tablet Take 1 tablet by mouth 2 (Two) Times a Day.  Patient taking differently: Take 250 mg by mouth 2 (Two) Times a Day. Take preop 1/5/22  Yes    hydrALAZINE (APRESOLINE) 50 MG tablet Take 1 tablet by mouth 3 (Three) Times a Day As Needed. 2/13/22  Yes    lamoTRIgine (LaMICtal) 100 MG tablet Take 100 mg by mouth 2 (Two) Times a Day. Take preop   Yes Gentry Sawyer MD   levoFLOXacin (LEVAQUIN) 500 MG tablet Take 1 tablet by mouth Daily. Start 5 days prior to surgery.  Patient taking differently: Take 500 mg by mouth Daily. Take preop 2/7/22  Yes    levothyroxine (SYNTHROID, LEVOTHROID) 50 MCG tablet Take 50 mcg by mouth Daily. TAKE PREOP   Yes Gentry Sawyer MD   metoprolol succinate XL (TOPROL-XL) 100 MG 24 hr tablet Take 100 mg by mouth 2 (Two) Times a Day. TAKE PREOP   Yes Silverio  MD Gentry   NIFEdipine CC (ADALAT CC) 90 MG 24 hr tablet Take 1 tablet by mouth Daily. 2/13/22  Yes    potassium chloride (KLOR-CON) 20 MEQ packet Take 20 mEq by mouth Daily. TAKE PREOP   Yes Gentry Sawyer MD   pramipexole (MIRAPEX) 0.5 MG tablet take 1 tablet by mouth at bedtime 2/7/22  Yes    divalproex (DEPAKOTE ER) 250 MG 24 hr tablet Take 1 tablet by mouth in the morning and 2 tabs at bedtime.  Patient taking differently: Take 250 mg by mouth Daily. 7/13/21 2/15/22 Yes    HYDROcodone-acetaminophen (NORCO) 7.5-325 MG per tablet Take 1 tablet by mouth 3 (Three) Times a Day As Needed. 11/16/21 2/15/22 Yes Gentry Sawyer MD   budesonide-formoterol (SYMBICORT) 160-4.5 MCG/ACT inhaler Inhale 2 puffs 2 (Two) Times a Day for 30 days.  Patient taking differently: Inhale 2 puffs 2 (Two) Times a Day As Needed. Use preop if needed bring 1/8/22 2/10/22  Edmar Barros APRN   hydrALAZINE (APRESOLINE) 50 MG tablet Take 50 mg by mouth 3 (Three) Times a Day. Take preop    Gentry Sawyer MD   levothyroxine (SYNTHROID, LEVOTHROID) 50 MCG tablet Take 1 tablet by mouth Daily. 2/13/22      metoprolol succinate XL (TOPROL-XL) 100 MG 24 hr tablet Take 2 tablets by mouth Daily. 2/13/22      naltrexone (DEPADE) 50 MG tablet Take 1 tablet by mouth Every Morning.  Patient taking differently: Take 50 mg by mouth Every Morning. Last dose 2/19 by 0900 7/13/21      nitrofurantoin, macrocrystal-monohydrate, (MACROBID) 100 MG capsule Take 1 capsule by mouth 2 (Two) Times a Day. Start 5 days prior to surgery.  Patient taking differently: Take 100 mg by mouth 2 (Two) Times a Day. Take preop 1/25/22      oxyCODONE-acetaminophen (PERCOCET) 7.5-325 MG per tablet Take 1 tablet by mouth Every 4 (Four) Hours as needed for pain 1/28/22      rOPINIRole (REQUIP) 0.5 MG tablet Take 2 tablets by mouth Every Night. Take 1 hour before bedtime.  Patient taking differently: Take 1 mg by mouth Every Night. ON HOLD WHILE TRYING  MIRAPEX 21      sildenafil (VIAGRA) 100 MG tablet Take 1 tablet by mouth As Needed. DONT USE WITHIN 24 HOURS OF SURGERY 21   Gentry Sawyer MD   sildenafil (VIAGRA) 100 MG tablet Take 1 tablet by mouth Daily As Needed. 22      vilazodone (VIIBRYD) 40 MG tablet tablet Take 40 mg by mouth Daily. TAKE PREOP    Gentry Sawyer MD         Objective     tMax 24 hours:  Temp (24hrs), Av.2 °F (36.8 °C), Min:97.8 °F (36.6 °C), Max:99 °F (37.2 °C)    Vital Sign Ranges:  Temp:  [97.8 °F (36.6 °C)-99 °F (37.2 °C)] 97.8 °F (36.6 °C)  Heart Rate:  [63-71] 63  Resp:  [15-18] 16  BP: (113-151)/(56-73) 113/60  Intake and Output Last 3 Shifts:  I/O last 3 completed shifts:  In: -   Out: 300 [Urine:300]      Physical Exam:   General Appearance alert, appears stated age and cooperative  Head normocephalic, without obvious abnormality and atraumatic  Eyes lids and lashes normal, conjunctivae and sclerae normal, no icterus, no pallor and corneas clear  Lungs respirations regular, respirations even and respirations unlabored  Heart regular rhythm & normal rate  Abdomen soft non-tender, no guarding and no rebound tenderness  Extremities moves extremities well, no edema, no cyanosis and no redness  Skin no bleeding, bruising or rash  Neurologic Mental Status orientated to person, place, time and situation    Results Review:     Lab Results (last 24 hours)     Procedure Component Value Units Date/Time    Basic Metabolic Panel [[id number]]  (Abnormal) Collected: 02/15/22 0358    Specimen: Blood Updated: 02/15/22 0440     Glucose 117 mg/dL      BUN 46 mg/dL      Creatinine 2.38 mg/dL      Sodium 135 mmol/L      Potassium 4.3 mmol/L      Chloride 101 mmol/L      CO2 24.0 mmol/L      Calcium 8.2 mg/dL      eGFR Non African Amer 28 mL/min/1.73      BUN/Creatinine Ratio 19.3     Anion Gap 10.0 mmol/L     Narrative:      GFR Normal >60  Chronic Kidney Disease <60  Kidney Failure <15      CBC & Differential  [[id number]]  (Abnormal) Collected: 02/15/22 0358    Specimen: Blood Updated: 02/15/22 0408    Narrative:      The following orders were created for panel order CBC & Differential.  Procedure                               Abnormality         Status                     ---------                               -----------         ------                     CBC Auto Differential[[id number]]        Abnormal            Final result                 Please view results for these tests on the individual orders.    CBC Auto Differential [[id number]]  (Abnormal) Collected: 02/15/22 0358    Specimen: Blood Updated: 02/15/22 0408     WBC 9.70 10*3/mm3      RBC 3.87 10*6/mm3      Hemoglobin 11.8 g/dL      Hematocrit 34.6 %      MCV 89.5 fL      MCH 30.6 pg      MCHC 34.2 g/dL      RDW 14.1 %      RDW-SD 44.2 fl      MPV 8.6 fL      Platelets 156 10*3/mm3      Neutrophil % 72.7 %      Lymphocyte % 10.6 %      Monocyte % 15.2 %      Eosinophil % 1.3 %      Basophil % 0.2 %      Neutrophils, Absolute 7.10 10*3/mm3      Lymphocytes, Absolute 1.00 10*3/mm3      Monocytes, Absolute 1.50 10*3/mm3      Eosinophils, Absolute 0.10 10*3/mm3      Basophils, Absolute 0.00 10*3/mm3      nRBC 0.0 /100 WBC     Urinalysis, Microscopic Only - Urine, Clean Catch [[id number]]  (Abnormal) Collected: 02/14/22 2126    Specimen: Urine, Clean Catch Updated: 02/14/22 2204     RBC, UA 6-12 /HPF      WBC, UA Too Numerous to Count /HPF      Bacteria, UA 1+ /HPF      Squamous Epithelial Cells, UA 0-2 /HPF      Yeast, UA Small/1+ Yeast /HPF      Hyaline Casts, UA None Seen /LPF      Cystine Crystals, UA Small/1+ /HPF      Methodology Manual Light Microscopy    Urine Culture - Urine, Urine, Clean Catch [[id number]] Collected: 02/14/22 2126    Specimen: Urine, Clean Catch Updated: 02/14/22 2204    COVID-19,CEPHEID/BRYAN,COR/MARQUITA/PAD/[facility] IN-HOUSE(OR EMERGENT/ADD-ON),NP SWAB IN TRANSPORT MEDIA 3-4 HR TAT, RT-PCR - Swab, Nasopharynx [[id number]]  (Normal) Collected:  02/14/22 2052    Specimen: Swab from Nasopharynx Updated: 02/14/22 2149     COVID19 Not Detected    Narrative:      Fact sheet for providers: https://www.fda.gov/media/821796/download     Fact sheet for patients: https://www.fda.gov/media/456219/download  Fact sheet for providers: https://www.fda.gov/media/787459/download     Fact sheet for patients: https://www.fda.gov/media/320531/download    Urinalysis With Culture If Indicated - Urine, Clean Catch [[id number]]  (Abnormal) Collected: 02/14/22 2126    Specimen: Urine, Clean Catch Updated: 02/14/22 2142     Color, UA Yellow     Appearance, UA Cloudy     pH, UA 5.5     Specific Gravity, UA >=1.030     Glucose, UA Negative     Ketones, UA Negative     Bilirubin, UA Negative     Blood, UA Trace     Protein,  mg/dL (2+)     Leuk Esterase, UA Moderate (2+)     Nitrite, UA Negative     Urobilinogen, UA 0.2 E.U./dL    Blood Culture - Blood, Blood, Venous Line [[id number]] Collected: 02/14/22 2125    Specimen: Blood, Venous Line Updated: 02/14/22 2128    Comprehensive Metabolic Panel [[id number]]  (Abnormal) Collected: 02/14/22 2053    Specimen: Blood Updated: 02/14/22 2124     Glucose 167 mg/dL      BUN 49 mg/dL      Creatinine 2.36 mg/dL      Sodium 133 mmol/L      Potassium 3.9 mmol/L      Chloride 97 mmol/L      CO2 24.0 mmol/L      Calcium 8.6 mg/dL      Total Protein 6.6 g/dL      Albumin 3.80 g/dL      ALT (SGPT) 14 U/L      AST (SGOT) 12 U/L      Alkaline Phosphatase 70 U/L      Total Bilirubin 0.4 mg/dL      eGFR Non African Amer 28 mL/min/1.73      Globulin 2.8 gm/dL      A/G Ratio 1.4 g/dL      BUN/Creatinine Ratio 20.8     Anion Gap 12.0 mmol/L     Narrative:      GFR Normal >60  Chronic Kidney Disease <60  Kidney Failure <15      Lipase [[id number]]  (Normal) Collected: 02/14/22 2053    Specimen: Blood Updated: 02/14/22 2117     Lipase 35 U/L     CBC & Differential [[id number]]  (Abnormal) Collected: 02/14/22 2053    Specimen: Blood Updated: 02/14/22 2100     Narrative:      The following orders were created for panel order CBC & Differential.  Procedure                               Abnormality         Status                     ---------                               -----------         ------                     CBC Auto Differential[[id number]]        Abnormal            Final result                 Please view results for these tests on the individual orders.    CBC Auto Differential [[id number]]  (Abnormal) Collected: 02/14/22 2053    Specimen: Blood Updated: 02/14/22 2100     WBC 10.40 10*3/mm3      RBC 4.31 10*6/mm3      Hemoglobin 13.0 g/dL      Hematocrit 38.4 %      MCV 89.2 fL      MCH 30.2 pg      MCHC 33.9 g/dL      RDW 14.2 %      RDW-SD 44.6 fl      MPV 8.6 fL      Platelets 168 10*3/mm3      Neutrophil % 72.5 %      Lymphocyte % 11.5 %      Monocyte % 14.4 %      Eosinophil % 1.2 %      Basophil % 0.4 %      Neutrophils, Absolute 7.60 10*3/mm3      Lymphocytes, Absolute 1.20 10*3/mm3      Monocytes, Absolute 1.50 10*3/mm3      Eosinophils, Absolute 0.10 10*3/mm3      Basophils, Absolute 0.00 10*3/mm3      nRBC 0.1 /100 WBC     Blood Culture - Blood, Arm, Right [[id number]] Collected: 02/14/22 2053    Specimen: Blood from Arm, Right Updated: 02/14/22 2057    POC Lactate [[id number]]  (Normal) Collected: 02/14/22 2056    Specimen: Blood Updated: 02/14/22 2057     Lactate 0.8 mmol/L      Comment: Serial Number: [id number]:  [id number]            No results found for: URINECX     Imaging Results (Last 7 Days)     Procedure Component Value Units Date/Time    CT Abdomen Pelvis Without Contrast [[id number]] Collected: 02/14/22 2207     Updated: 02/14/22 2218    Narrative:      CT ABDOMEN PELVIS WO CONTRAST-     Date of Exam: 2/14/2022 9:32 PM     Indication: Flank pain, kidney stone suspected.  Nausea, vomiting,  dysuria, drainage output, left flank and left lower quadrant pain,  history of left ureteral stent     Comparison: None available.     Technique:  Contiguous axial CT images were obtained from the lung bases  to the pubic symphysis without contrast. Sagittal and coronal  reconstructions were performed.  Automated exposure control and  iterative reconstruction methods were used.     FINDINGS:  There are multiple stones in the left kidney. Calcific material  consistent with a staghorn calculus fills the left renal pelvis. There  is a poorly defined hypodense area in the left mid kidney that measures  about 18 mm, with Hounsfield reading of fluid. There is a stent with the  upper end looped in the left upper pole renal collecting system in the  lower end looped in the bladder. There is minor left hydronephrosis and  perinephric stranding. There is some thickening of the wall of the left  renal pelvis. No stone is identifiable in the ureter adjacent to the  stent. There is mild left periureteral stranding. No stones are seen in  the bladder.     There is a 10 mm stone in the renal pelvis. There are some additional  smaller stones in the right lower kidney. There is minor right  hydronephrosis and mild perinephric stranding. There is no right  ureteral stone.     There is minimal basilar atelectasis. No abnormality is seen in the  liver, gallbladder, adrenals, spleen, or pancreas. There is moderate  atherosclerotic vascular calcification.     No abnormality is seen in the stomach or small bowel. The appendix is  not delineated. There is no inflammation around the cecum. There is no  large bowel abnormality. There is no free fluid or free air. No  adenopathy.     Bone windows show no significant abnormality.       Impression:      1. There are bilateral kidney stones. This includes a staghorn calculus  in the left renal pelvis. There is a left-sided ureteral stent in place.  There is minor left hydronephrosis and some thickening of the wall of  the left renal pelvis.   2. The 18 mm left renal lesion may be a cyst but is not fully evaluated  on this exam.  3. There  is minor right hydronephrosis. Mild bilateral perinephric  stranding.   4. Additional findings as reported above.     Electronically Signed By-Kaci Doran MD On:2/14/2022 10:16 PM  This report was finalized on [id number] by  Kaci Doran MD.          Inpatient Meds:   Scheduled Meds:atorvastatin, 20 mg, Oral, Daily  budesonide-formoterol, 2 puff, Inhalation, BID - RT  buPROPion XL, 300 mg, Oral, Daily  cefTRIAXone, 1 g, Intravenous, Q24H  divalproex, 250 mg, Oral, BID  lamoTRIgine, 100 mg, Oral, BID  levothyroxine, 50 mcg, Oral, Q AM  metoprolol succinate XL, 100 mg, Oral, Q24H  NIFEdipine XL, 90 mg, Oral, Q24H  potassium chloride, 20 mEq, Oral, Daily  pramipexole, 0.5 mg, Oral, Nightly  tamsulosin, 0.4 mg, Oral, Daily  vilazodone, 40 mg, Oral, Daily       Continuous Infusions:sodium chloride, 125 mL/hr, Last Rate: 100 mL/hr (02/15/22 0954)       PRN Meds:.•  acetaminophen **OR** acetaminophen **OR** acetaminophen  •  albuterol  •  aluminum-magnesium hydroxide-simethicone  •  HYDROcodone-acetaminophen  •  labetalol  •  melatonin  •  Morphine  •  nitroglycerin  •  ondansetron **OR** ondansetron  •  sodium chloride      Assessment/Plan     Principal Problem:    MESHA (acute kidney injury) ([facility])  Active Problems:    HLD (hyperlipidemia)    Obesity, Class III, BMI 40-49.9 (morbid obesity) ([facility])    Bilateral kidney stones    Hematuria    COPD (chronic obstructive pulmonary disease) ([facility])    Left flank pain    Acute renal failure (ARF) ([facility])    Anxiety and depression    Hypothyroidism    ISIAH (obstructive sleep apnea)    Essential hypertension    Bilateral renal calculi  Left hydronephrosis which is only minimal he has a stent in place  UTI with evidence of sepsis    Plan  Await urine cultures  Continue empiric Rocephin  We will need to stay on antibiotics through his procedure next Monday      I discussed the patients findings and my recommendations with patient    Thank you for this  consult    Ihsan Lopez,  MD  02/15/22  10:46 EST      Electronically signed by Ihsan Lopez MD at 02/15/22 1043

## 2022-02-15 NOTE — NURSING NOTE
Pt transferred from ER to [place] [id number] at approximately 0015 on 02/15/2022. This nurse will assume primary care of pt; Patricia Cueva RN will perform initial assessment. Will continue to monitor.

## 2022-02-15 NOTE — CASE MANAGEMENT/SOCIAL WORK
Discharge Planning Assessment  [facility]     Patient Name: Calos Moseley  MRN: [id number]  Today's Date: 2/15/2022    Admit Date: 2/14/2022     Discharge Needs Assessment     Row Name 02/15/22 1320       Living Environment    Lives With spouse    Current Living Arrangements home/apartment/condo    Primary Care Provided by self    Provides Primary Care For no one    Family Caregiver if Needed spouse    Quality of Family Relationships helpful    Able to Return to Prior Arrangements yes       Resource/Environmental Concerns    Resource/Environmental Concerns none       Transition Planning    Patient/Family Anticipates Transition to home with family    Patient/Family Anticipated Services at Transition none    Transportation Anticipated family or friend will provide       Discharge Needs Assessment    Equipment Currently Used at Home none    Concerns to be Addressed no discharge needs identified; denies needs/concerns at this time               Discharge Plan     Row Name 02/15/22 1320       Plan    Plan Home with family    Provided Post Acute Provider List? N/A    Provided Post Acute Provider Quality & Resource List? N/A    Patient/Family in Agreement with Plan yes    Plan Comments Spoke to patient at bedside. Denies dc needs. PCP and pharmacy confirmed              Continued Care and Services - Admitted Since 2/14/2022    Coordination has not been started for this encounter.          Demographic Summary     Row Name 02/15/22 1319       General Information    Admission Type observation    Arrived From emergency department    Referral Source admission list    Reason for Consult discharge planning    Preferred Language English               Functional Status     Row Name 02/15/22 1320       Functional Status    Usual Activity Tolerance moderate    Current Activity Tolerance moderate       Functional Status, IADL    Medications independent    Meal Preparation independent    Housekeeping independent    Laundry independent     Shopping independent       Mental Status    General Appearance WDL WDL       Mental Status Summary    Recent Changes in Mental Status/Cognitive Functioning no changes                         Fern Ruiz RN   Met with patient in room wearing PPE: mask, face shield/goggles, gloves, gown.      Maintained distance greater than six feet and spent less than 15 minutes in the room.

## 2022-02-16 ENCOUNTER — READMISSION MANAGEMENT (OUTPATIENT)
Dept: CALL CENTER | Facility: HOSPITAL | Age: 62
End: 2022-02-16

## 2022-02-16 VITALS
TEMPERATURE: 97.7 F | WEIGHT: 247.36 LBS | OXYGEN SATURATION: 95 % | SYSTOLIC BLOOD PRESSURE: 142 MMHG | BODY MASS INDEX: 39.75 KG/M2 | HEIGHT: 66 IN | HEART RATE: 77 BPM | DIASTOLIC BLOOD PRESSURE: 72 MMHG | RESPIRATION RATE: 20 BRPM

## 2022-02-16 LAB
ANION GAP SERPL CALCULATED.3IONS-SCNC: 7 MMOL/L (ref 5–15)
BACTERIA SPEC AEROBE CULT: ABNORMAL
BASOPHILS # BLD AUTO: 0 10*3/MM3 (ref 0–0.2)
BASOPHILS NFR BLD AUTO: 0.4 % (ref 0–1.5)
BUN SERPL-MCNC: 29 MG/DL (ref 8–23)
BUN/CREAT SERPL: 16.6 (ref 7–25)
CALCIUM SPEC-SCNC: 8.1 MG/DL (ref 8.6–10.5)
CHLORIDE SERPL-SCNC: 106 MMOL/L (ref 98–107)
CO2 SERPL-SCNC: 26 MMOL/L (ref 22–29)
CREAT SERPL-MCNC: 1.75 MG/DL (ref 0.76–1.27)
DEPRECATED RDW RBC AUTO: 43.8 FL (ref 37–54)
EOSINOPHIL # BLD AUTO: 0.2 10*3/MM3 (ref 0–0.4)
EOSINOPHIL NFR BLD AUTO: 2.6 % (ref 0.3–6.2)
ERYTHROCYTE [DISTWIDTH] IN BLOOD BY AUTOMATED COUNT: 13.8 % (ref 12.3–15.4)
GFR SERPL CREATININE-BSD FRML MDRD: 40 ML/MIN/1.73
GLUCOSE SERPL-MCNC: 106 MG/DL (ref 65–99)
GRAM STN SPEC: ABNORMAL
HCT VFR BLD AUTO: 34.5 % (ref 37.5–51)
HGB BLD-MCNC: 11.6 G/DL (ref 13–17.7)
ISOLATED FROM: ABNORMAL
LYMPHOCYTES # BLD AUTO: 1.1 10*3/MM3 (ref 0.7–3.1)
LYMPHOCYTES NFR BLD AUTO: 15.9 % (ref 19.6–45.3)
MCH RBC QN AUTO: 30.5 PG (ref 26.6–33)
MCHC RBC AUTO-ENTMCNC: 33.8 G/DL (ref 31.5–35.7)
MCV RBC AUTO: 90.1 FL (ref 79–97)
MONOCYTES # BLD AUTO: 1 10*3/MM3 (ref 0.1–0.9)
MONOCYTES NFR BLD AUTO: 14.2 % (ref 5–12)
NEUTROPHILS NFR BLD AUTO: 4.5 10*3/MM3 (ref 1.7–7)
NEUTROPHILS NFR BLD AUTO: 66.9 % (ref 42.7–76)
NRBC BLD AUTO-RTO: 0.1 /100 WBC (ref 0–0.2)
PLATELET # BLD AUTO: 156 10*3/MM3 (ref 140–450)
PMV BLD AUTO: 8.7 FL (ref 6–12)
POTASSIUM SERPL-SCNC: 4.3 MMOL/L (ref 3.5–5.2)
RBC # BLD AUTO: 3.82 10*6/MM3 (ref 4.14–5.8)
SODIUM SERPL-SCNC: 139 MMOL/L (ref 136–145)
WBC NRBC COR # BLD: 6.8 10*3/MM3 (ref 3.4–10.8)

## 2022-02-16 PROCEDURE — 99239 HOSP IP/OBS DSCHRG MGMT >30: CPT | Performed by: INTERNAL MEDICINE

## 2022-02-16 PROCEDURE — 94799 UNLISTED PULMONARY SVC/PX: CPT

## 2022-02-16 PROCEDURE — 85025 COMPLETE CBC W/AUTO DIFF WBC: CPT | Performed by: NURSE PRACTITIONER

## 2022-02-16 PROCEDURE — 93010 ELECTROCARDIOGRAM REPORT: CPT | Performed by: INTERNAL MEDICINE

## 2022-02-16 PROCEDURE — 93005 ELECTROCARDIOGRAM TRACING: CPT | Performed by: NURSE PRACTITIONER

## 2022-02-16 PROCEDURE — 80048 BASIC METABOLIC PNL TOTAL CA: CPT | Performed by: NURSE PRACTITIONER

## 2022-02-16 RX ORDER — AMOXICILLIN 500 MG/1
500 CAPSULE ORAL EVERY 8 HOURS SCHEDULED
Qty: 29 CAPSULE | Refills: 0 | Status: ON HOLD | OUTPATIENT
Start: 2022-02-16 | End: 2022-02-21

## 2022-02-16 RX ORDER — TAMSULOSIN HYDROCHLORIDE 0.4 MG/1
0.4 CAPSULE ORAL DAILY
Qty: 30 CAPSULE | Refills: 0 | Status: SHIPPED | OUTPATIENT
Start: 2022-02-17

## 2022-02-16 RX ORDER — FLUCONAZOLE 100 MG/1
150 TABLET ORAL ONCE
Status: COMPLETED | OUTPATIENT
Start: 2022-02-16 | End: 2022-02-16

## 2022-02-16 RX ORDER — AMOXICILLIN 250 MG/1
500 CAPSULE ORAL EVERY 8 HOURS SCHEDULED
Status: DISCONTINUED | OUTPATIENT
Start: 2022-02-16 | End: 2022-02-16 | Stop reason: HOSPADM

## 2022-02-16 RX ADMIN — DIVALPROEX SODIUM 250 MG: 250 TABLET, EXTENDED RELEASE ORAL at 08:35

## 2022-02-16 RX ADMIN — ACETAMINOPHEN 650 MG: 325 TABLET, FILM COATED ORAL at 13:34

## 2022-02-16 RX ADMIN — LAMOTRIGINE 100 MG: 100 TABLET ORAL at 08:35

## 2022-02-16 RX ADMIN — FLUCONAZOLE 150 MG: 100 TABLET ORAL at 08:35

## 2022-02-16 RX ADMIN — BUPROPION HYDROCHLORIDE 300 MG: 150 TABLET, EXTENDED RELEASE ORAL at 08:42

## 2022-02-16 RX ADMIN — LEVOTHYROXINE SODIUM 50 MCG: 0.05 TABLET ORAL at 05:09

## 2022-02-16 RX ADMIN — ATORVASTATIN CALCIUM 20 MG: 20 TABLET, FILM COATED ORAL at 08:36

## 2022-02-16 RX ADMIN — NIFEDIPINE 90 MG: 30 TABLET, FILM COATED, EXTENDED RELEASE ORAL at 08:42

## 2022-02-16 RX ADMIN — METOPROLOL SUCCINATE 100 MG: 50 TABLET, EXTENDED RELEASE ORAL at 08:42

## 2022-02-16 RX ADMIN — BUDESONIDE AND FORMOTEROL FUMARATE DIHYDRATE 2 PUFF: 160; 4.5 AEROSOL RESPIRATORY (INHALATION) at 07:36

## 2022-02-16 RX ADMIN — POTASSIUM CHLORIDE 20 MEQ: 1.5 POWDER, FOR SOLUTION ORAL at 08:35

## 2022-02-16 RX ADMIN — VILAZODONE HYDROCHLORIDE 40 MG: 40 TABLET ORAL at 08:43

## 2022-02-16 RX ADMIN — TAMSULOSIN HYDROCHLORIDE 0.4 MG: 0.4 CAPSULE ORAL at 08:36

## 2022-02-16 RX ADMIN — HYDROCODONE BITARTRATE AND ACETAMINOPHEN 1 TABLET: 5; 325 TABLET ORAL at 01:20

## 2022-02-16 RX ADMIN — Medication 10 ML: at 08:36

## 2022-02-16 RX ADMIN — AMOXICILLIN 500 MG: 250 CAPSULE ORAL at 11:13

## 2022-02-16 NOTE — PROGRESS NOTES
Urology Progress Note    Patient Identification:  Name:  Calos Moseley  Age:  61 y.o.  Sex:  male  :  1960  MRN:  [id number]    Subjective:   Feeling better wants to go home    Objective    Urine culture pending, patient did have some yeast    Problem List:  Patient Active Problem List   Diagnosis   • SBO (small bowel obstruction) ([facility])   • MESHA (acute kidney injury) ([facility])   • Hyperglycemia   • HLD (hyperlipidemia)   • Obesity, Class III, BMI 40-49.9 (morbid obesity) ([facility])   • Bilateral kidney stones   • Acute pyelonephritis   • Hematuria   • COPD (chronic obstructive pulmonary disease) ([facility])   • Left flank pain   • Acute renal failure (ARF) ([facility])   • Anxiety and depression   • Cervical spondylosis   • History of renal stone   • Hypokalemia   • Hypothyroidism   • ISIAH (obstructive sleep apnea)   • Essential hypertension     Past Medical History:  Past Medical History:   Diagnosis Date   • Anesthesia complication     STATES HARD TO WAKE UP AFTER PREVIOUS KIDNEY STONE SURGERY AND STATES HE CODED   • Bilateral kidney stones    • Bipolar affective ([facility])    • COPD (chronic obstructive pulmonary disease) ([facility])    • Disease of thyroid gland    • Elevated cholesterol    • Hypertension    • Kidney stone on left side    • Left flank pain    • Mood disorder ([facility])    • Restless leg syndrome    • Sleep apnea     cpap  bring dos   • UTI (urinary tract infection) 2021    PREV FINISHED ANTIBIOTIC     Past Surgical History:  Past Surgical History:   Procedure Laterality Date   • ADENOIDECTOMY     • APPENDECTOMY     • COLONOSCOPY     • KIDNEY STONE SURGERY     • TONSILLECTOMY       Home Meds:  Medications Prior to Admission   Medication Sig Dispense Refill Last Dose   • albuterol sulfate  (90 Base) MCG/ACT inhaler INHALE ONE TO TWO PUFFS BY MOUTH EVERY 4 TO 6 HOURS (Patient taking differently: Inhale 2 puffs As Needed. Bring with) 18 g 11 2022 at Unknown time   • atorvastatin (LIPITOR) 20 MG tablet Take 1 tablet  by mouth every night at bedtime. 90 tablet 3 2/14/2022 at Unknown time   • budesonide-formoterol (SYMBICORT) 160-4.5 MCG/ACT inhaler Inhale 2 puffs 2 (Two) Times a Day.      • buPROPion XL (WELLBUTRIN XL) 300 MG 24 hr tablet Take 1 tablet by mouth Every Morning. (Patient taking differently: Take 300 mg by mouth Every Morning. TAKE PREOP) 30 tablet 5 2/14/2022 at Unknown time   • cloNIDine (CATAPRES) 0.1 MG tablet Take 1 tablet by mouth 2 (two) times a day if systolic blood pressure is 156 or greater or if diastolic blood pressure is 94 or greater. (Patient taking differently: Take 0.1 mg by mouth As Needed. If SBPover 156 ir DBP over 94  Take preop if needed) 60 tablet 3 2/14/2022 at Unknown time   • divalproex (DEPAKOTE ER) 250 MG 24 hr tablet Take 1 tablet by mouth 2 (Two) Times a Day. (Patient taking differently: Take 250 mg by mouth 2 (Two) Times a Day. Take preop) 180 tablet 2 2/14/2022 at Unknown time   • hydrALAZINE (APRESOLINE) 50 MG tablet Take 1 tablet by mouth 3 (Three) Times a Day As Needed. 90 tablet 11 2/14/2022 at Unknown time   • lamoTRIgine (LaMICtal) 100 MG tablet Take 100 mg by mouth 2 (Two) Times a Day. Take preop   2/14/2022 at Unknown time   • levothyroxine (SYNTHROID, LEVOTHROID) 50 MCG tablet Take 50 mcg by mouth Daily. TAKE PREOP   2/14/2022 at Unknown time   • metoprolol succinate XL (TOPROL-XL) 100 MG 24 hr tablet Take 100 mg by mouth 2 (Two) Times a Day. TAKE PREOP   2/14/2022 at Unknown time   • naltrexone (DEPADE) 50 MG tablet Take 1 tablet by mouth Every Morning. (Patient taking differently: Take 50 mg by mouth Every Morning. Last dose 2/19 by 0900) 90 tablet 2    • NIFEdipine CC (ADALAT CC) 90 MG 24 hr tablet Take 1 tablet by mouth Daily. 90 tablet 3 2/14/2022 at Unknown time   • potassium chloride (KLOR-CON) 20 MEQ packet Take 20 mEq by mouth Daily. TAKE PREOP   2/14/2022 at Unknown time   • pramipexole (MIRAPEX) 0.5 MG tablet take 1 tablet by mouth at bedtime 90 tablet 0 2/14/2022  at Unknown time   • vilazodone (VIIBRYD) 40 MG tablet tablet Take 40 mg by mouth Daily. TAKE PREOP      • levoFLOXacin (LEVAQUIN) 500 MG tablet Take 1 tablet by mouth Daily. Start 5 days prior to surgery. (Patient taking differently: Take 500 mg by mouth Daily. Take preop) 5 tablet 0    • nitrofurantoin, macrocrystal-monohydrate, (MACROBID) 100 MG capsule Take 1 capsule by mouth 2 (Two) Times a Day. Start 5 days prior to surgery. (Patient taking differently: Take 100 mg by mouth 2 (Two) Times a Day. Take preop) 10 capsule 0 Unknown at Unknown time   • sildenafil (VIAGRA) 100 MG tablet Take 1 tablet by mouth Daily As Needed. 6 tablet 3      Current Meds:    Current Facility-Administered Medications:   •  acetaminophen (TYLENOL) tablet 650 mg, 650 mg, Oral, Q4H PRN **OR** acetaminophen (TYLENOL) 160 MG/5ML solution 650 mg, 650 mg, Oral, Q4H PRN **OR** acetaminophen (TYLENOL) suppository 650 mg, 650 mg, Rectal, Q4H PRN, Giselle Ferris APRN  •  albuterol (PROVENTIL) nebulizer solution 0.083% 2.5 mg/3mL, 2.5 mg, Nebulization, Q4H PRN, Giselle Ferris APRN  •  aluminum-magnesium hydroxide-simethicone (MAALOX MAX) 400-400-40 MG/5ML suspension 15 mL, 15 mL, Oral, Q6H PRN, Giselle Ferris APRN  •  atorvastatin (LIPITOR) tablet 20 mg, 20 mg, Oral, Daily, Giselle Ferris APRN, 20 mg at 02/15/22 0959  •  budesonide-formoterol (SYMBICORT) 160-4.5 MCG/ACT inhaler 2 puff, 2 puff, Inhalation, BID - RT, Giselle Ferris APRN, 2 puff at 02/15/22 2021  •  buPROPion XL (WELLBUTRIN XL) 24 hr tablet 300 mg, 300 mg, Oral, Daily, Giselle Ferris APRN, 300 mg at 02/15/22 0959  •  cefTRIAXone (ROCEPHIN) 1 g in sodium chloride 0.9 % 100 mL IVPB, 1 g, Intravenous, Q24H, Giselle Ferris APRN, Last Rate: 200 mL/hr at 02/15/22 2131, 1 g at 02/15/22 2131  •  divalproex (DEPAKOTE ER) 24 hr tablet 250 mg, 250 mg, Oral, BID, Giselle Ferris APRN, 250 mg at 02/15/22 2131  •  fluconazole  (DIFLUCAN) tablet 150 mg, 150 mg, Oral, Once, Ravi De La Torre MD  •  HYDROcodone-acetaminophen (NORCO) 5-325 MG per tablet 1 tablet, 1 tablet, Oral, Q6H PRN, Gulshan Yuen, DO, 1 tablet at 02/16/22 0120  •  labetalol (NORMODYNE,TRANDATE) injection 10 mg, 10 mg, Intravenous, Q6H PRN, Giselle Ferris APRN  •  lamoTRIgine (LaMICtal) tablet 100 mg, 100 mg, Oral, BID, Giselle Ferris APRN, 100 mg at 02/15/22 2131  •  levothyroxine (SYNTHROID, LEVOTHROID) tablet 50 mcg, 50 mcg, Oral, Q AM, Giselle Ferris APRN, 50 mcg at 02/16/22 0509  •  melatonin tablet 5 mg, 5 mg, Oral, Nightly PRN, Giselle Ferris APRN  •  metoprolol succinate XL (TOPROL-XL) 24 hr tablet 100 mg, 100 mg, Oral, Q24H, Giselle Ferris APRN, 100 mg at 02/15/22 0959  •  Morphine sulfate (PF) injection 4 mg, 4 mg, Intravenous, Q4H PRN, Giselle Ferris APRN, 4 mg at 02/15/22 1007  •  NIFEdipine XL (PROCARDIA XL) 24 hr tablet 90 mg, 90 mg, Oral, Q24H, Giselle Ferris APRN, 90 mg at 02/15/22 0959  •  nitroglycerin (NITROSTAT) SL tablet 0.4 mg, 0.4 mg, Sublingual, Q5 Min PRN, Giselle Ferris APRN  •  ondansetron (ZOFRAN) tablet 4 mg, 4 mg, Oral, Q6H PRN **OR** ondansetron (ZOFRAN) injection 4 mg, 4 mg, Intravenous, Q6H PRN, Giselle Ferris APRN  •  potassium chloride (KLOR-CON) packet 20 mEq, 20 mEq, Oral, Daily, Giselle Ferris APRN, 20 mEq at 02/15/22 0959  •  pramipexole (MIRAPEX) tablet 0.5 mg, 0.5 mg, Oral, Nightly, Giselle Ferris, SALVADOR, 0.5 mg at 02/15/22 2143  •  sodium chloride 0.9 % flush 10 mL, 10 mL, Intravenous, PRN, Feliciano Camacho PA  •  sodium chloride 0.9 % infusion, 125 mL/hr, Intravenous, Continuous, Gulshan Yuen, DO, Last Rate: 125 mL/hr at 02/15/22 1108, 125 mL/hr at 02/15/22 1108  •  tamsulosin (FLOMAX) 24 hr capsule 0.4 mg, 0.4 mg, Oral, Daily, Giselle Ferris, SALVADOR, 0.4 mg at 02/15/22 0990  •  vilazodone (VIIBRYD) tablet 40 mg, 40 mg, Oral,  "Daily, Giselle Ferris, APRN, 40 mg at 02/15/22 1107  Allergies:  Patient has no known allergies.    Review of Systems:  Negative 12 point system review except for what is in the HPI    Objective:  tMax 24 hours:  Temp (24hrs), Av °F (36.7 °C), Min:97.8 °F (36.6 °C), Max:98.3 °F (36.8 °C)    Vital Sign Ranges:  Temp:  [97.8 °F (36.6 °C)-98.3 °F (36.8 °C)] 98 °F (36.7 °C)  Heart Rate:  [63-74] 70  Resp:  [15-18] 16  BP: (113-162)/(56-83) 162/83  Intake and Output Last 3 Shifts:  I/O last 3 completed shifts:  In: 340 [P.O.:240; IV Piggyback:100]  Out: 2300 [Urine:2300]    Exam:  /83 (BP Location: Left arm, Patient Position: Lying)   Pulse 70   Temp 98 °F (36.7 °C) (Axillary)   Resp 16   Ht 167.6 cm (66\")   Wt 112 kg (247 lb 5.7 oz)   SpO2 95%   BMI 39.92 kg/m²    General Appearance:    Alert, cooperative, no acute distress, general       appearance is normal   Head:    Normocephalic, without obvious abnormality, atraumatic   Eyes:            Pupils/irises normal. Exterior inspection conjunctivae       and lids normal.   Ears:    Normal external inspection   Nose:   Exterior inspection of nose is normal   Throat:   Lips, mucosa, and tongue normal   Neck:   No adenopathy, supple, symmetrical, trachea midline   Back:     No CVA tenderness   Lungs:     Respirations unlabored; normal effort, no audible     abnormality   CV:   Regular rhythm and normal rate, no edema   Abdomen:     No hernia, examination of the abdomen is normal with     no masses, tenderness, or distension    Male :  Normal   Musculoskeletal:   Inspection of the nails and digits reveal no abnormalities   Skin:   Inspection normal, palpation normal   Neurologic/Psych:   Orientation normal; Mood/Affect normal     Data Review:  All labs (24hrs):    Lab Results (last 24 hours)     Procedure Component Value Units Date/Time    Blood Culture - Blood, Arm, Right [[id number]]  (Abnormal) Collected: 22    Specimen: Blood from Arm, " Right Updated: 02/16/22 0625     Blood Culture Staphylococcus, coagulase negative     Isolated from Anaerobic Bottle     Gram Stain Anaerobic Bottle Gram positive cocci in clusters    Narrative:      Probable contaminant requires clinical correlation, susceptibility not performed unless requested by physician.      Basic Metabolic Panel [[id number]]  (Abnormal) Collected: 02/16/22 0352    Specimen: Blood Updated: 02/16/22 0440     Glucose 106 mg/dL      BUN 29 mg/dL      Creatinine 1.75 mg/dL      Sodium 139 mmol/L      Potassium 4.3 mmol/L      Chloride 106 mmol/L      CO2 26.0 mmol/L      Calcium 8.1 mg/dL      eGFR Non African Amer 40 mL/min/1.73      BUN/Creatinine Ratio 16.6     Anion Gap 7.0 mmol/L     Narrative:      GFR Normal >60  Chronic Kidney Disease <60  Kidney Failure <15      CBC & Differential [[id number]]  (Abnormal) Collected: 02/16/22 0352    Specimen: Blood Updated: 02/16/22 0414    Narrative:      The following orders were created for panel order CBC & Differential.  Procedure                               Abnormality         Status                     ---------                               -----------         ------                     CBC Auto Differential[[id number]]        Abnormal            Final result                 Please view results for these tests on the individual orders.    CBC Auto Differential [[id number]]  (Abnormal) Collected: 02/16/22 0352    Specimen: Blood Updated: 02/16/22 0414     WBC 6.80 10*3/mm3      RBC 3.82 10*6/mm3      Hemoglobin 11.6 g/dL      Hematocrit 34.5 %      MCV 90.1 fL      MCH 30.5 pg      MCHC 33.8 g/dL      RDW 13.8 %      RDW-SD 43.8 fl      MPV 8.7 fL      Platelets 156 10*3/mm3      Neutrophil % 66.9 %      Lymphocyte % 15.9 %      Monocyte % 14.2 %      Eosinophil % 2.6 %      Basophil % 0.4 %      Neutrophils, Absolute 4.50 10*3/mm3      Lymphocytes, Absolute 1.10 10*3/mm3      Monocytes, Absolute 1.00 10*3/mm3      Eosinophils, Absolute 0.20  10*3/mm3      Basophils, Absolute 0.00 10*3/mm3      nRBC 0.1 /100 WBC     Blood Culture ID, PCR - Blood, Arm, Right [[id number]]  (Abnormal) Collected: 02/14/22 2053    Specimen: Blood from Arm, Right Updated: 02/15/22 2258     BCID, PCR Staph spp, not aureus or lugdunesis. Identification by BCID2 PCR.     BOTTLE TYPE Anaerobic Bottle    Blood Culture - Blood, Blood, Venous Line [[id number]]  (Normal) Collected: 02/14/22 2125    Specimen: Blood, Venous Line Updated: 02/15/22 2130     Blood Culture No growth at 24 hours    Urine Culture - Urine, Urine, Clean Catch [[id number]]  (Normal) Collected: 02/14/22 2126    Specimen: Urine, Clean Catch Updated: 02/15/22 1446     Urine Culture Culture in progress        Radiology:   Imaging Results (Last 72 Hours)     Procedure Component Value Units Date/Time    CT Abdomen Pelvis Without Contrast [[id number]] Collected: 02/14/22 2207     Updated: 02/14/22 2218    Narrative:      CT ABDOMEN PELVIS WO CONTRAST-     Date of Exam: 2/14/2022 9:32 PM     Indication: Flank pain, kidney stone suspected.  Nausea, vomiting,  dysuria, drainage output, left flank and left lower quadrant pain,  history of left ureteral stent     Comparison: None available.     Technique: Contiguous axial CT images were obtained from the lung bases  to the pubic symphysis without contrast. Sagittal and coronal  reconstructions were performed.  Automated exposure control and  iterative reconstruction methods were used.     FINDINGS:  There are multiple stones in the left kidney. Calcific material  consistent with a staghorn calculus fills the left renal pelvis. There  is a poorly defined hypodense area in the left mid kidney that measures  about 18 mm, with Hounsfield reading of fluid. There is a stent with the  upper end looped in the left upper pole renal collecting system in the  lower end looped in the bladder. There is minor left hydronephrosis and  perinephric stranding. There is some thickening of the  wall of the left  renal pelvis. No stone is identifiable in the ureter adjacent to the  stent. There is mild left periureteral stranding. No stones are seen in  the bladder.     There is a 10 mm stone in the renal pelvis. There are some additional  smaller stones in the right lower kidney. There is minor right  hydronephrosis and mild perinephric stranding. There is no right  ureteral stone.     There is minimal basilar atelectasis. No abnormality is seen in the  liver, gallbladder, adrenals, spleen, or pancreas. There is moderate  atherosclerotic vascular calcification.     No abnormality is seen in the stomach or small bowel. The appendix is  not delineated. There is no inflammation around the cecum. There is no  large bowel abnormality. There is no free fluid or free air. No  adenopathy.     Bone windows show no significant abnormality.       Impression:      1. There are bilateral kidney stones. This includes a staghorn calculus  in the left renal pelvis. There is a left-sided ureteral stent in place.  There is minor left hydronephrosis and some thickening of the wall of  the left renal pelvis.   2. The 18 mm left renal lesion may be a cyst but is not fully evaluated  on this exam.  3. There is minor right hydronephrosis. Mild bilateral perinephric  stranding.   4. Additional findings as reported above.     Electronically Signed By-Kaci Doran MD On:2/14/2022 10:16 PM  This report was finalized on [id number] by  Kaci Doran MD.          Assessment/Plan:    Bilateral kidney stones left greater than right.  Patient having a left PCNL with Dr. Leija on Monday as planned.    UTI on Rocephin and primary team will send patient home on culture specific antibiotics for least 10 days    Candiduria Diflucan 150 mg today    Ravi De La Torre MD  2/16/2022  07:09 EST

## 2022-02-16 NOTE — PLAN OF CARE
Goal Outcome Evaluation:  Plan of Care Reviewed With: patient        Progress: no change  Outcome Summary: Patient without complaints of pain. Continues to strain urine.  Ambulates independently. VItal signs stable. Anticipates dischage home today.  Call light within reach. Care plan on going.

## 2022-02-16 NOTE — DISCHARGE SUMMARY
[facility] Medicine Services  DISCHARGE SUMMARY    Patient Name: Calos Moseley  : 1960  MRN: [id number]    Date of Admission: 2022  Date of Discharge: 2022  Primary Care Physician: Naveed Nunez DO      Presenting Problem:   Renal colic [N23]  Pyelonephritis [N12]  Left flank pain [R10.9]  Acute UTI [N39.0]  Acute renal failure (ARF) ([facility]) [N17.9]    Active and Resolved Hospital Problems:  Active Hospital Problems    Diagnosis POA   • **MESHA (acute kidney injury) ([facility]) [N17.9] Yes   • Left flank pain [R10.9] Yes   • Acute renal failure (ARF) ([facility]) [N17.9] Yes   • ISIAH (obstructive sleep apnea) [G47.33] Yes   • Bilateral kidney stones [N20.0] Yes   • Hematuria [R31.9] Yes   • Obesity, Class III, BMI 40-49.9 (morbid obesity) ([facility]) [E66.01] Yes   • COPD (chronic obstructive pulmonary disease) ([facility]) [J44.9] Yes   • HLD (hyperlipidemia) [E78.5] Yes   • Essential hypertension [I10] Yes   • Anxiety and depression [F41.9, F32.A] Yes   • Hypothyroidism [E03.9] Yes      Resolved Hospital Problems   No resolved problems to display.         Hospital Course     Hospital Course:  Calos Moseley is a 61 y.o. male w/PMH of HTN, HLD, COPD, ISIAH on CPAP, hypothyroidism, bipolar affective disorder, depression, anxiety, restless leg syndrome morbid obesity, and recurrent kidney stones, with ureteral stent placed last week presented to [facility] on 2022 complaining of 7/10  stabbing left flank pain that radiates to his left lower quadrant, and left hip. Patient states that he has had some dysuria, especially at start of stream, urgency, frequency, hesitancy and hematuria.  Patient also reports he has plans for percutaneous nephrolithotomy on 2022 with Dr. Vail.  Admission patient noted to have bilateral renal stones with hydronephrosis, acute UTI, and MESHA on CKD stage IIIb.  Patient started on IV ceftriaxone, recent urine culture grew Enterococcus  faecalis with sensitivities noted.  Patient's creatinine improved with IV fluids.  He is doing much better and clinically improved and cleared to discharge per urology.  Urology planning the procedure on Monday, February 21 as scheduled.  He had 1 out of 2 blood cultures positive for gram-positive cocci, likely contaminant.  Patient remains afebrile, no leukocytosis.  Antibiotic switched to p.o. amoxicillin on discharge.  Patient is clinically improved and stable to discharge home today with follow-up with PCP and urology as an outpatient.        DISCHARGE Follow Up Recommendations for labs and diagnostics:   Follow-up with PCP and urology    Reasons For Change In Medications and Indications for New Medications:  Amoxicillin and Flomax added.    Day of Discharge     Vital Signs:  Temp:  [97.7 °F (36.5 °C)-98.3 °F (36.8 °C)] 97.7 °F (36.5 °C)  Heart Rate:  [65-74] 73  Resp:  [15-18] 16  BP: (131-162)/(60-83) 161/75    Physical Exam:  General: Awake, alert, NAD  Eyes: PERRL, EOMI, conjunctive are clear  Cardiovascular: Regular rate and rhythm, no murmurs  Respiratory: Clear to auscultation bilaterally, no wheezing or rales, unlabored breathing  Abdomen: Soft, nontender, positive bowel sounds, no guarding  Neurologic: A&O, CN grossly intact, moves all extremities spontaneously  Musculoskeletal: Normal range of motion, no deformities  Skin: Warm, dry, intact        Pertinent  and/or Most Recent Results     LAB RESULTS:      Lab 02/16/22  0352 02/15/22  0358 02/14/22 2056 02/14/22 2053 02/11/22  0941   WBC 6.80 9.70  --  10.40 11.64*   HEMOGLOBIN 11.6* 11.8*  --  13.0 15.0   HEMATOCRIT 34.5* 34.6*  --  38.4 43.8   PLATELETS 156 156  --  168 225   NEUTROS ABS 4.50 7.10*  --  7.60* 8.54*   IMMATURE GRANS (ABS)  --   --   --   --  0.10*   LYMPHS ABS 1.10 1.00  --  1.20 1.49   MONOS ABS 1.00* 1.50*  --  1.50* 0.93*   EOS ABS 0.20 0.10  --  0.10 0.50*   MCV 90.1 89.5  --  89.2 88.7   LACTATE  --   --  0.8  --   --           Lab 02/16/22  0352 02/15/22  0358 02/14/22 2053 02/11/22  0941   SODIUM 139 135* 133* 136   POTASSIUM 4.3 4.3 3.9 4.4   CHLORIDE 106 101 97* 100   CO2 26.0 24.0 24.0 27.3   ANION GAP 7.0 10.0 12.0 8.7   BUN 29* 46* 49* 23   CREATININE 1.75* 2.38* 2.36* 1.62*   GLUCOSE 106* 117* 167* 120*   CALCIUM 8.1* 8.2* 8.6 9.8         Lab 02/14/22 2053   TOTAL PROTEIN 6.6   ALBUMIN 3.80   GLOBULIN 2.8   ALT (SGPT) 14   AST (SGOT) 12   BILIRUBIN 0.4   ALK PHOS 70   LIPASE 35                 Lab 02/11/22  0941   ABO TYPING A   RH TYPING Negative   ANTIBODY SCREEN Negative         Brief Urine Lab Results  (Last result in the past 365 days)      Color   Clarity   Blood   Leuk Est   Nitrite   Protein   CREAT   Urine HCG        02/14/22 2126 Yellow   Cloudy   Trace   Moderate (2+)   Negative   100 mg/dL (2+)               Microbiology Results (last 10 days)     Procedure Component Value - Date/Time    Urine Culture - Urine, Urine, Clean Catch [[id number]]  (Normal) Collected: 02/14/22 2126    Lab Status: Preliminary result Specimen: Urine, Clean Catch Updated: 02/15/22 1446     Urine Culture Culture in progress    Blood Culture - Blood, Blood, Venous Line [[id number]]  (Normal) Collected: 02/14/22 2125    Lab Status: Preliminary result Specimen: Blood, Venous Line Updated: 02/15/22 2130     Blood Culture No growth at 24 hours    Blood Culture - Blood, Arm, Right [[id number]]  (Abnormal) Collected: 02/14/22 2053    Lab Status: Final result Specimen: Blood from Arm, Right Updated: 02/16/22 0625     Blood Culture Staphylococcus, coagulase negative     Isolated from Anaerobic Bottle     Gram Stain Anaerobic Bottle Gram positive cocci in clusters    Narrative:      Probable contaminant requires clinical correlation, susceptibility not performed unless requested by physician.      Blood Culture ID, PCR - Blood, Arm, Right [[id number]]  (Abnormal) Collected: 02/14/22 2053    Lab Status: Final result Specimen: Blood from Arm, Right Updated:  02/15/22 2258     BCID, PCR Staph spp, not aureus or lugdunesis. Identification by BCID2 PCR.     BOTTLE TYPE Anaerobic Bottle    COVID-19,CEPHEID/[facility],COR/[facility] IN-HOUSE(OR EMERGENT/ADD-ON),NP SWAB IN TRANSPORT MEDIA 3-4 HR TAT, RT-PCR - Swab, Nasopharynx [[id number]]  (Normal) Collected: 02/14/22 2052    Lab Status: Final result Specimen: Swab from Nasopharynx Updated: 02/14/22 2149     COVID19 Not Detected    Narrative:      Fact sheet for providers: https://www.fda.gov/media/769132/download     Fact sheet for patients: https://www.fda.gov/media/529829/download  Fact sheet for providers: https://www.fda.gov/media/168390/download     Fact sheet for patients: https://www.fda.gov/media/197955/download    Urine Culture - Urine, Urine, Clean Catch [[id number]]  (Abnormal)  (Susceptibility) Collected: 02/11/22 0941    Lab Status: Final result Specimen: Urine, Clean Catch Updated: 02/13/22 1055     Urine Culture >100,000 CFU/mL Enterococcus faecalis    Susceptibility      Enterococcus faecalis     YUNIER     Ampicillin Susceptible     Levofloxacin Susceptible     Nitrofurantoin Susceptible     Tetracycline Susceptible     Vancomycin Susceptible                               CT Abdomen Pelvis Without Contrast    Result Date: 2/14/2022  Impression: 1. There are bilateral kidney stones. This includes a staghorn calculus in the left renal pelvis. There is a left-sided ureteral stent in place. There is minor left hydronephrosis and some thickening of the wall of the left renal pelvis. 2. The 18 mm left renal lesion may be a cyst but is not fully evaluated on this exam. 3. There is minor right hydronephrosis. Mild bilateral perinephric stranding. 4. Additional findings as reported above.  Electronically Signed By-Kaci Doran MD On:2/14/2022 10:16 PM This report was finalized on [id number] by  Kaci Doran MD.    CT Head Without Contrast    Result Date: 1/24/2022  Impression:  1. No evidence for acute traumatic  intracranial pathology.  2. Moderate changes of inflammatory paranasal sinus disease are identified and there are findings suggestive of sinonasal polyposis. Further evaluation with direct visual inspection is suggested.    Radiation dose reduction techniques were utilized, including automated exposure control and exposure modulation based on body size.  This report was finalized on 1/24/2022 6:01 PM by Dr. Harmeet Kingston M.D.      XR Chest 1 View    Result Date: 1/24/2022  Impression: No focal pulmonary consolidation. Mild cardiomegaly. Follow-up as clinical indications persist.  This report was finalized on 1/24/2022 2:58 PM by Dr. Eder Gallego M.D.                    Labs Pending at Discharge:  Pending Labs     Order Current Status    Blood Culture - Blood, Blood, Venous Line Preliminary result    Urine Culture - Urine, Urine, Clean Catch Preliminary result          Procedures Performed           Consults:   Consults     Date and Time Order Name Status Description    2/14/2022 11:11 PM Hospitalist (on-call MD unless specified)      2/14/2022 10:59 PM Urology (on-call MD unless specified) Completed             Discharge Details        Discharge Medications      New Medications      Instructions Start Date   amoxicillin 500 MG capsule  Commonly known as: AMOXIL   500 mg, Oral, Every 8 Hours Scheduled      tamsulosin 0.4 MG capsule 24 hr capsule  Commonly known as: FLOMAX   0.4 mg, Oral, Daily   Start Date: February 17, 2022        Changes to Medications      Instructions Start Date   albuterol sulfate  (90 Base) MCG/ACT inhaler  Commonly known as: PROVENTIL HFA;VENTOLIN HFA;PROAIR HFA  What changed:   · how much to take  · when to take this  · reasons to take this  · additional instructions   INHALE ONE TO TWO PUFFS BY MOUTH EVERY 4 TO 6 HOURS      buPROPion  MG 24 hr tablet  Commonly known as: WELLBUTRIN XL  What changed: additional instructions   300 mg, Oral, Every Morning      cloNIDine 0.1 MG  tablet  Commonly known as: CATAPRES  What changed:   · when to take this  · reasons to take this  · additional instructions   Take 1 tablet by mouth 2 (two) times a day if systolic blood pressure is 156 or greater or if diastolic blood pressure is 94 or greater.      divalproex 250 MG 24 hr tablet  Commonly known as: DEPAKOTE ER  What changed: additional instructions   250 mg, Oral, 2 Times Daily      naltrexone 50 MG tablet  Commonly known as: DEPADE  What changed: additional instructions   50 mg, Oral, Every Morning         Continue These Medications      Instructions Start Date   atorvastatin 20 MG tablet  Commonly known as: LIPITOR   20 mg, Oral, Every Night at Bedtime      budesonide-formoterol 160-4.5 MCG/ACT inhaler  Commonly known as: SYMBICORT   2 puffs, Inhalation, 2 Times Daily - RT      hydrALAZINE 50 MG tablet  Commonly known as: APRESOLINE   50 mg, Oral, 3 Times Daily PRN      Klor-Con 20 MEQ packet  Generic drug: potassium chloride   20 mEq, Oral, Daily, TAKE PREOP      lamoTRIgine 100 MG tablet  Commonly known as: LaMICtal   100 mg, Oral, 2 Times Daily, Take preop      levothyroxine 50 MCG tablet  Commonly known as: SYNTHROID, LEVOTHROID   50 mcg, Oral, Daily, TAKE PREOP      metoprolol succinate  MG 24 hr tablet  Commonly known as: TOPROL-XL   100 mg, Oral, 2 Times Daily, TAKE PREOP      NIFEdipine CC 90 MG 24 hr tablet  Commonly known as: ADALAT CC   90 mg, Oral, Daily      pramipexole 0.5 MG tablet  Commonly known as: MIRAPEX   take 1 tablet by mouth at bedtime      sildenafil 100 MG tablet  Commonly known as: VIAGRA   100 mg, Oral, Daily PRN      vilazodone 40 MG tablet tablet  Commonly known as: VIIBRYD   40 mg, Oral, Daily, TAKE PREOP         Stop These Medications    levoFLOXacin 500 MG tablet  Commonly known as: LEVAQUIN     nitrofurantoin (macrocrystal-monohydrate) 100 MG capsule  Commonly known as: MACROBID            No Known Allergies      Discharge Disposition:  Home or Self  Care    Diet:  Hospital:  Diet Order   Procedures   • Diet Regular         Discharge Activity:         CODE STATUS:  Code Status and Medical Interventions:   Ordered at: 02/15/22 0110     Code Status (Patient has no pulse and is not breathing):    CPR (Attempt to Resuscitate)     Medical Interventions (Patient has pulse or is breathing):    Full Support         Future Appointments   Date Time Provider Department Center   2/18/2022  8:15 AM C19 PRE SCREEN [facility]           Time spent on Discharge including face to face service:  35 minutes    Signature:    Electronically signed by Gulshan Yuen DO, 02/16/22, 11:59 AM EST.

## 2022-02-16 NOTE — PLAN OF CARE
Goal Outcome Evaluation:      Patient is doing well. Pain treated per MAR. No other complaints. VSS, will continue to monitor.

## 2022-02-17 LAB — BACTERIA SPEC AEROBE CULT: ABNORMAL

## 2022-02-17 NOTE — OUTREACH NOTE
Prep Survey      Responses   [facility]   Is LACE score < 7 ? No   Emergency Room discharge w/ pulse ox? No   Eligibility Readm Mgmt   Discharge diagnosis MESHA (acute kidney injury)   Does the patient have one of the following disease processes/diagnoses(primary or secondary)? Other   Does the patient have Home health ordered? No   Is there a DME ordered? No   Comments regarding appointments Schedule an appointment with Naveed Nunez DO and Deuce Leija MD in 1 week(s)   Prep survey completed? Yes          Medina Artis RN

## 2022-02-18 ENCOUNTER — LAB (OUTPATIENT)
Dept: LAB | Facility: HOSPITAL | Age: 62
End: 2022-02-18

## 2022-02-18 LAB — QT INTERVAL: 376 MS

## 2022-02-18 PROCEDURE — U0004 COV-19 TEST NON-CDC HGH THRU: HCPCS

## 2022-02-18 PROCEDURE — C9803 HOPD COVID-19 SPEC COLLECT: HCPCS

## 2022-02-19 LAB
BACTERIA SPEC AEROBE CULT: NORMAL
SARS-COV-2 ORF1AB RESP QL NAA+PROBE: NOT DETECTED

## 2022-02-21 ENCOUNTER — ANESTHESIA (OUTPATIENT)
Dept: PERIOP | Facility: HOSPITAL | Age: 62
End: 2022-02-21

## 2022-02-21 ENCOUNTER — READMISSION MANAGEMENT (OUTPATIENT)
Dept: CALL CENTER | Facility: HOSPITAL | Age: 62
End: 2022-02-21

## 2022-02-21 ENCOUNTER — HOSPITAL ENCOUNTER (INPATIENT)
Facility: HOSPITAL | Age: 62
LOS: 3 days | Discharge: HOME | End: 2022-02-24
Attending: UROLOGY | Admitting: UROLOGY

## 2022-02-21 ENCOUNTER — APPOINTMENT (OUTPATIENT)
Dept: CT IMAGING | Facility: HOSPITAL | Age: 62
End: 2022-02-21

## 2022-02-21 ENCOUNTER — ANESTHESIA EVENT (OUTPATIENT)
Dept: PERIOP | Facility: HOSPITAL | Age: 62
End: 2022-02-21

## 2022-02-21 ENCOUNTER — APPOINTMENT (OUTPATIENT)
Dept: GENERAL RADIOLOGY | Facility: HOSPITAL | Age: 62
End: 2022-02-21

## 2022-02-21 DIAGNOSIS — N20.0 CALCULUS, RENAL: ICD-10-CM

## 2022-02-21 DIAGNOSIS — N20.0 KIDNEY STONES: ICD-10-CM

## 2022-02-21 LAB
ANION GAP SERPL CALCULATED.3IONS-SCNC: 12 MMOL/L (ref 5–15)
BASOPHILS # BLD AUTO: 0 10*3/MM3 (ref 0–0.2)
BASOPHILS NFR BLD AUTO: 0.3 % (ref 0–1.5)
BUN SERPL-MCNC: 28 MG/DL (ref 8–23)
BUN/CREAT SERPL: 14.7 (ref 7–25)
CALCIUM SPEC-SCNC: 9.3 MG/DL (ref 8.6–10.5)
CHLORIDE SERPL-SCNC: 102 MMOL/L (ref 98–107)
CO2 SERPL-SCNC: 25 MMOL/L (ref 22–29)
CREAT SERPL-MCNC: 1.9 MG/DL (ref 0.76–1.27)
DEPRECATED RDW RBC AUTO: 45.9 FL (ref 37–54)
EOSINOPHIL # BLD AUTO: 0.2 10*3/MM3 (ref 0–0.4)
EOSINOPHIL NFR BLD AUTO: 1.6 % (ref 0.3–6.2)
ERYTHROCYTE [DISTWIDTH] IN BLOOD BY AUTOMATED COUNT: 14.3 % (ref 12.3–15.4)
GFR SERPL CREATININE-BSD FRML MDRD: 36 ML/MIN/1.73
GLUCOSE SERPL-MCNC: 165 MG/DL (ref 65–99)
HCT VFR BLD AUTO: 40.5 % (ref 37.5–51)
HGB BLD-MCNC: 13.5 G/DL (ref 13–17.7)
LYMPHOCYTES # BLD AUTO: 0.6 10*3/MM3 (ref 0.7–3.1)
LYMPHOCYTES NFR BLD AUTO: 6.5 % (ref 19.6–45.3)
MCH RBC QN AUTO: 30.2 PG (ref 26.6–33)
MCHC RBC AUTO-ENTMCNC: 33.2 G/DL (ref 31.5–35.7)
MCV RBC AUTO: 91 FL (ref 79–97)
MONOCYTES # BLD AUTO: 0.4 10*3/MM3 (ref 0.1–0.9)
MONOCYTES NFR BLD AUTO: 3.5 % (ref 5–12)
NEUTROPHILS NFR BLD AUTO: 8.8 10*3/MM3 (ref 1.7–7)
NEUTROPHILS NFR BLD AUTO: 88.1 % (ref 42.7–76)
NRBC BLD AUTO-RTO: 0 /100 WBC (ref 0–0.2)
PLATELET # BLD AUTO: 244 10*3/MM3 (ref 140–450)
PMV BLD AUTO: 7.9 FL (ref 6–12)
POTASSIUM SERPL-SCNC: 5 MMOL/L (ref 3.5–5.2)
RBC # BLD AUTO: 4.45 10*6/MM3 (ref 4.14–5.8)
SODIUM SERPL-SCNC: 139 MMOL/L (ref 136–145)
WBC NRBC COR # BLD: 10 10*3/MM3 (ref 3.4–10.8)

## 2022-02-21 PROCEDURE — C1726 CATH, BAL DIL, NON-VASCULAR: HCPCS | Performed by: UROLOGY

## 2022-02-21 PROCEDURE — 0T9430Z DRAINAGE OF LEFT KIDNEY PELVIS WITH DRAINAGE DEVICE, PERCUTANEOUS APPROACH: ICD-10-PCS | Performed by: UROLOGY

## 2022-02-21 PROCEDURE — 82365 CALCULUS SPECTROSCOPY: CPT | Performed by: UROLOGY

## 2022-02-21 PROCEDURE — 94640 AIRWAY INHALATION TREATMENT: CPT

## 2022-02-21 PROCEDURE — 80048 BASIC METABOLIC PNL TOTAL CA: CPT | Performed by: UROLOGY

## 2022-02-21 PROCEDURE — 76000 FLUOROSCOPY <1 HR PHYS/QHP: CPT

## 2022-02-21 PROCEDURE — 0 MORPHINE SULFATE 4 MG/ML SOLUTION: Performed by: UROLOGY

## 2022-02-21 PROCEDURE — 25010000002 DEXAMETHASONE PER 1 MG: Performed by: ANESTHESIOLOGY

## 2022-02-21 PROCEDURE — 25010000002 FENTANYL CITRATE (PF) 100 MCG/2ML SOLUTION: Performed by: ANESTHESIOLOGY

## 2022-02-21 PROCEDURE — C1758 CATHETER, URETERAL: HCPCS | Performed by: UROLOGY

## 2022-02-21 PROCEDURE — C1729 CATH, DRAINAGE: HCPCS | Performed by: UROLOGY

## 2022-02-21 PROCEDURE — C1769 GUIDE WIRE: HCPCS | Performed by: UROLOGY

## 2022-02-21 PROCEDURE — 74176 CT ABD & PELVIS W/O CONTRAST: CPT

## 2022-02-21 PROCEDURE — 0TC78ZZ EXTIRPATION OF MATTER FROM LEFT URETER, VIA NATURAL OR ARTIFICIAL OPENING ENDOSCOPIC: ICD-10-PCS | Performed by: UROLOGY

## 2022-02-21 PROCEDURE — 25010000002 FUROSEMIDE PER 20 MG: Performed by: ANESTHESIOLOGY

## 2022-02-21 PROCEDURE — 25010000002 ONDANSETRON PER 1 MG: Performed by: ANESTHESIOLOGY

## 2022-02-21 PROCEDURE — 94799 UNLISTED PULMONARY SVC/PX: CPT

## 2022-02-21 PROCEDURE — 85025 COMPLETE CBC W/AUTO DIFF WBC: CPT | Performed by: UROLOGY

## 2022-02-21 PROCEDURE — 25010000002 PROPOFOL 200 MG/20ML EMULSION: Performed by: ANESTHESIOLOGY

## 2022-02-21 PROCEDURE — 25010000002 FENTANYL CITRATE (PF) 50 MCG/ML SOLUTION: Performed by: ANESTHESIOLOGY

## 2022-02-21 PROCEDURE — 25010000002 HYDROMORPHONE PER 4 MG: Performed by: ANESTHESIOLOGY

## 2022-02-21 PROCEDURE — 25010000002 CEFTRIAXONE PER 250 MG: Performed by: ANESTHESIOLOGY

## 2022-02-21 PROCEDURE — C1894 INTRO/SHEATH, NON-LASER: HCPCS | Performed by: UROLOGY

## 2022-02-21 PROCEDURE — 25010000002 MIDAZOLAM PER 1 MG: Performed by: ANESTHESIOLOGY

## 2022-02-21 PROCEDURE — 99223 1ST HOSP IP/OBS HIGH 75: CPT | Performed by: INTERNAL MEDICINE

## 2022-02-21 PROCEDURE — 25010000002 KETOROLAC TROMETHAMINE PER 15 MG: Performed by: ANESTHESIOLOGY

## 2022-02-21 PROCEDURE — 0 IOHEXOL 300 MG/ML SOLUTION: Performed by: UROLOGY

## 2022-02-21 RX ORDER — ONDANSETRON 2 MG/ML
4 INJECTION INTRAMUSCULAR; INTRAVENOUS EVERY 6 HOURS PRN
Status: DISCONTINUED | OUTPATIENT
Start: 2022-02-21 | End: 2022-02-24 | Stop reason: HOSPADM

## 2022-02-21 RX ORDER — MIDAZOLAM HYDROCHLORIDE 1 MG/ML
INJECTION INTRAMUSCULAR; INTRAVENOUS AS NEEDED
Status: DISCONTINUED | OUTPATIENT
Start: 2022-02-21 | End: 2022-02-21 | Stop reason: SURG

## 2022-02-21 RX ORDER — ACETAMINOPHEN 650 MG/1
650 SUPPOSITORY RECTAL EVERY 4 HOURS PRN
Status: DISCONTINUED | OUTPATIENT
Start: 2022-02-21 | End: 2022-02-24 | Stop reason: HOSPADM

## 2022-02-21 RX ORDER — ATORVASTATIN CALCIUM 20 MG/1
20 TABLET, FILM COATED ORAL NIGHTLY
Status: DISCONTINUED | OUTPATIENT
Start: 2022-02-21 | End: 2022-02-24 | Stop reason: HOSPADM

## 2022-02-21 RX ORDER — NALTREXONE HYDROCHLORIDE 50 MG/1
50 TABLET, FILM COATED ORAL EVERY MORNING
Status: DISCONTINUED | OUTPATIENT
Start: 2022-02-22 | End: 2022-02-22

## 2022-02-21 RX ORDER — DEXAMETHASONE SODIUM PHOSPHATE 4 MG/ML
INJECTION, SOLUTION INTRA-ARTICULAR; INTRALESIONAL; INTRAMUSCULAR; INTRAVENOUS; SOFT TISSUE AS NEEDED
Status: DISCONTINUED | OUTPATIENT
Start: 2022-02-21 | End: 2022-02-21 | Stop reason: SURG

## 2022-02-21 RX ORDER — FUROSEMIDE 10 MG/ML
INJECTION INTRAMUSCULAR; INTRAVENOUS AS NEEDED
Status: DISCONTINUED | OUTPATIENT
Start: 2022-02-21 | End: 2022-02-21 | Stop reason: SURG

## 2022-02-21 RX ORDER — FENTANYL CITRATE 50 UG/ML
INJECTION, SOLUTION INTRAMUSCULAR; INTRAVENOUS AS NEEDED
Status: DISCONTINUED | OUTPATIENT
Start: 2022-02-21 | End: 2022-02-21 | Stop reason: SURG

## 2022-02-21 RX ORDER — FENTANYL CITRATE 50 UG/ML
25 INJECTION, SOLUTION INTRAMUSCULAR; INTRAVENOUS
Status: DISCONTINUED | OUTPATIENT
Start: 2022-02-21 | End: 2022-02-21 | Stop reason: HOSPADM

## 2022-02-21 RX ORDER — BUPROPION HYDROCHLORIDE 150 MG/1
300 TABLET ORAL EVERY MORNING
Status: DISCONTINUED | OUTPATIENT
Start: 2022-02-22 | End: 2022-02-24 | Stop reason: HOSPADM

## 2022-02-21 RX ORDER — ACETAMINOPHEN 325 MG/1
650 TABLET ORAL EVERY 4 HOURS PRN
Status: DISCONTINUED | OUTPATIENT
Start: 2022-02-21 | End: 2022-02-24 | Stop reason: HOSPADM

## 2022-02-21 RX ORDER — ROCURONIUM BROMIDE 10 MG/ML
INJECTION, SOLUTION INTRAVENOUS AS NEEDED
Status: DISCONTINUED | OUTPATIENT
Start: 2022-02-21 | End: 2022-02-21 | Stop reason: SURG

## 2022-02-21 RX ORDER — METOPROLOL SUCCINATE 50 MG/1
100 TABLET, EXTENDED RELEASE ORAL
Status: DISCONTINUED | OUTPATIENT
Start: 2022-02-22 | End: 2022-02-24 | Stop reason: HOSPADM

## 2022-02-21 RX ORDER — SODIUM CHLORIDE 0.9 % (FLUSH) 0.9 %
10 SYRINGE (ML) INJECTION AS NEEDED
Status: DISCONTINUED | OUTPATIENT
Start: 2022-02-21 | End: 2022-02-21 | Stop reason: HOSPADM

## 2022-02-21 RX ORDER — SODIUM CHLORIDE 9 MG/ML
50 INJECTION, SOLUTION INTRAVENOUS CONTINUOUS
Status: DISCONTINUED | OUTPATIENT
Start: 2022-02-21 | End: 2022-02-22

## 2022-02-21 RX ORDER — SODIUM CHLORIDE 0.9 % (FLUSH) 0.9 %
10 SYRINGE (ML) INJECTION AS NEEDED
Status: DISCONTINUED | OUTPATIENT
Start: 2022-02-21 | End: 2022-02-24 | Stop reason: HOSPADM

## 2022-02-21 RX ORDER — KETOROLAC TROMETHAMINE 30 MG/ML
INJECTION, SOLUTION INTRAMUSCULAR; INTRAVENOUS AS NEEDED
Status: DISCONTINUED | OUTPATIENT
Start: 2022-02-21 | End: 2022-02-21 | Stop reason: SURG

## 2022-02-21 RX ORDER — HYDRALAZINE HYDROCHLORIDE 25 MG/1
50 TABLET, FILM COATED ORAL EVERY 8 HOURS SCHEDULED
Status: DISCONTINUED | OUTPATIENT
Start: 2022-02-22 | End: 2022-02-22

## 2022-02-21 RX ORDER — SODIUM CHLORIDE 9 MG/ML
100 INJECTION, SOLUTION INTRAVENOUS CONTINUOUS
Status: DISCONTINUED | OUTPATIENT
Start: 2022-02-21 | End: 2022-02-24 | Stop reason: HOSPADM

## 2022-02-21 RX ORDER — IPRATROPIUM BROMIDE AND ALBUTEROL SULFATE 2.5; .5 MG/3ML; MG/3ML
3 SOLUTION RESPIRATORY (INHALATION) ONCE
Status: COMPLETED | OUTPATIENT
Start: 2022-02-21 | End: 2022-02-21

## 2022-02-21 RX ORDER — ATROPA BELLADONNA AND OPIUM 16.2; 3 MG/1; MG/1
30 SUPPOSITORY RECTAL DAILY PRN
Status: DISCONTINUED | OUTPATIENT
Start: 2022-02-21 | End: 2022-02-24 | Stop reason: HOSPADM

## 2022-02-21 RX ORDER — TAMSULOSIN HYDROCHLORIDE 0.4 MG/1
0.4 CAPSULE ORAL DAILY
Status: DISCONTINUED | OUTPATIENT
Start: 2022-02-22 | End: 2022-02-24 | Stop reason: HOSPADM

## 2022-02-21 RX ORDER — HYDROMORPHONE HCL 110MG/55ML
0.25 PATIENT CONTROLLED ANALGESIA SYRINGE INTRAVENOUS
Status: DISCONTINUED | OUTPATIENT
Start: 2022-02-21 | End: 2022-02-21 | Stop reason: HOSPADM

## 2022-02-21 RX ORDER — ONDANSETRON 2 MG/ML
4 INJECTION INTRAMUSCULAR; INTRAVENOUS ONCE AS NEEDED
Status: DISCONTINUED | OUTPATIENT
Start: 2022-02-21 | End: 2022-02-21 | Stop reason: HOSPADM

## 2022-02-21 RX ORDER — ALBUTEROL SULFATE 90 UG/1
2 AEROSOL, METERED RESPIRATORY (INHALATION) AS NEEDED
Status: DISCONTINUED | OUTPATIENT
Start: 2022-02-21 | End: 2022-02-24 | Stop reason: HOSPADM

## 2022-02-21 RX ORDER — BUDESONIDE AND FORMOTEROL FUMARATE DIHYDRATE 160; 4.5 UG/1; UG/1
2 AEROSOL RESPIRATORY (INHALATION)
Status: DISCONTINUED | OUTPATIENT
Start: 2022-02-21 | End: 2022-02-24 | Stop reason: HOSPADM

## 2022-02-21 RX ORDER — KETOROLAC TROMETHAMINE 30 MG/ML
30 INJECTION, SOLUTION INTRAMUSCULAR; INTRAVENOUS EVERY 6 HOURS PRN
Status: DISCONTINUED | OUTPATIENT
Start: 2022-02-21 | End: 2022-02-21

## 2022-02-21 RX ORDER — VILAZODONE HYDROCHLORIDE 40 MG/1
40 TABLET ORAL DAILY
Status: DISCONTINUED | OUTPATIENT
Start: 2022-02-21 | End: 2022-02-24 | Stop reason: HOSPADM

## 2022-02-21 RX ORDER — CLONIDINE HYDROCHLORIDE 0.1 MG/1
0.1 TABLET ORAL EVERY 12 HOURS SCHEDULED
Status: DISCONTINUED | OUTPATIENT
Start: 2022-02-21 | End: 2022-02-22

## 2022-02-21 RX ORDER — PROPOFOL 10 MG/ML
INJECTION, EMULSION INTRAVENOUS AS NEEDED
Status: DISCONTINUED | OUTPATIENT
Start: 2022-02-21 | End: 2022-02-21 | Stop reason: SURG

## 2022-02-21 RX ORDER — OXYCODONE HYDROCHLORIDE 5 MG/1
10 TABLET ORAL EVERY 4 HOURS PRN
Status: DISCONTINUED | OUTPATIENT
Start: 2022-02-21 | End: 2022-02-21 | Stop reason: HOSPADM

## 2022-02-21 RX ORDER — LEVOTHYROXINE SODIUM 0.05 MG/1
50 TABLET ORAL DAILY
Status: DISCONTINUED | OUTPATIENT
Start: 2022-02-22 | End: 2022-02-24 | Stop reason: HOSPADM

## 2022-02-21 RX ORDER — SODIUM CHLORIDE 0.9 % (FLUSH) 0.9 %
10 SYRINGE (ML) INJECTION EVERY 12 HOURS SCHEDULED
Status: DISCONTINUED | OUTPATIENT
Start: 2022-02-21 | End: 2022-02-24 | Stop reason: HOSPADM

## 2022-02-21 RX ORDER — MORPHINE SULFATE 4 MG/ML
4 INJECTION, SOLUTION INTRAMUSCULAR; INTRAVENOUS
Status: DISCONTINUED | OUTPATIENT
Start: 2022-02-21 | End: 2022-02-24 | Stop reason: HOSPADM

## 2022-02-21 RX ORDER — HYDROCODONE BITARTRATE AND ACETAMINOPHEN 10; 325 MG/1; MG/1
1 TABLET ORAL EVERY 4 HOURS PRN
Status: DISCONTINUED | OUTPATIENT
Start: 2022-02-21 | End: 2022-02-24 | Stop reason: HOSPADM

## 2022-02-21 RX ORDER — DIVALPROEX SODIUM 250 MG/1
250 TABLET, EXTENDED RELEASE ORAL 2 TIMES DAILY
Status: DISCONTINUED | OUTPATIENT
Start: 2022-02-21 | End: 2022-02-24 | Stop reason: HOSPADM

## 2022-02-21 RX ORDER — MEPERIDINE HYDROCHLORIDE 25 MG/ML
12.5 INJECTION INTRAMUSCULAR; INTRAVENOUS; SUBCUTANEOUS
Status: DISCONTINUED | OUTPATIENT
Start: 2022-02-21 | End: 2022-02-21 | Stop reason: HOSPADM

## 2022-02-21 RX ORDER — ONDANSETRON 2 MG/ML
INJECTION INTRAMUSCULAR; INTRAVENOUS AS NEEDED
Status: DISCONTINUED | OUTPATIENT
Start: 2022-02-21 | End: 2022-02-21 | Stop reason: SURG

## 2022-02-21 RX ORDER — LIDOCAINE HYDROCHLORIDE 10 MG/ML
0.5 INJECTION, SOLUTION INFILTRATION; PERINEURAL ONCE AS NEEDED
Status: DISCONTINUED | OUTPATIENT
Start: 2022-02-21 | End: 2022-02-21 | Stop reason: HOSPADM

## 2022-02-21 RX ORDER — LAMOTRIGINE 100 MG/1
100 TABLET ORAL 2 TIMES DAILY
Status: DISCONTINUED | OUTPATIENT
Start: 2022-02-21 | End: 2022-02-24 | Stop reason: HOSPADM

## 2022-02-21 RX ORDER — PRAMIPEXOLE DIHYDROCHLORIDE 0.5 MG/1
0.5 TABLET ORAL NIGHTLY
Status: DISCONTINUED | OUTPATIENT
Start: 2022-02-21 | End: 2022-02-24 | Stop reason: HOSPADM

## 2022-02-21 RX ORDER — NALOXONE HCL 0.4 MG/ML
0.4 VIAL (ML) INJECTION
Status: DISCONTINUED | OUTPATIENT
Start: 2022-02-21 | End: 2022-02-24 | Stop reason: HOSPADM

## 2022-02-21 RX ORDER — ONDANSETRON 4 MG/1
4 TABLET, FILM COATED ORAL EVERY 6 HOURS PRN
Status: DISCONTINUED | OUTPATIENT
Start: 2022-02-21 | End: 2022-02-24 | Stop reason: HOSPADM

## 2022-02-21 RX ADMIN — HYDROCODONE BITARTRATE AND ACETAMINOPHEN 1 TABLET: 10; 325 TABLET ORAL at 21:45

## 2022-02-21 RX ADMIN — LAMOTRIGINE 100 MG: 100 TABLET ORAL at 21:45

## 2022-02-21 RX ADMIN — FENTANYL CITRATE 25 MCG: 50 INJECTION, SOLUTION INTRAMUSCULAR; INTRAVENOUS at 13:02

## 2022-02-21 RX ADMIN — DIVALPROEX SODIUM 250 MG: 250 TABLET, EXTENDED RELEASE ORAL at 21:46

## 2022-02-21 RX ADMIN — MIDAZOLAM 2 MG: 1 INJECTION INTRAMUSCULAR; INTRAVENOUS at 09:09

## 2022-02-21 RX ADMIN — ROCURONIUM BROMIDE 50 MG: 10 INJECTION INTRAVENOUS at 09:13

## 2022-02-21 RX ADMIN — KETOROLAC TROMETHAMINE 30 MG: 30 INJECTION, SOLUTION INTRAMUSCULAR; INTRAVENOUS at 10:24

## 2022-02-21 RX ADMIN — SODIUM CHLORIDE 50 ML/HR: 9 INJECTION, SOLUTION INTRAVENOUS at 08:02

## 2022-02-21 RX ADMIN — FENTANYL CITRATE 100 MCG: 50 INJECTION INTRAMUSCULAR; INTRAVENOUS at 09:09

## 2022-02-21 RX ADMIN — MORPHINE SULFATE 4 MG: 4 INJECTION INTRAVENOUS at 16:58

## 2022-02-21 RX ADMIN — ATORVASTATIN CALCIUM 20 MG: 20 TABLET, FILM COATED ORAL at 21:45

## 2022-02-21 RX ADMIN — HYDROMORPHONE HYDROCHLORIDE 0.5 MG: 2 INJECTION, SOLUTION INTRAMUSCULAR; INTRAVENOUS; SUBCUTANEOUS at 11:19

## 2022-02-21 RX ADMIN — FENTANYL CITRATE 25 MCG: 50 INJECTION, SOLUTION INTRAMUSCULAR; INTRAVENOUS at 12:40

## 2022-02-21 RX ADMIN — ROCURONIUM BROMIDE 10 MG: 10 INJECTION INTRAVENOUS at 10:37

## 2022-02-21 RX ADMIN — OXYCODONE 10 MG: 5 TABLET ORAL at 13:29

## 2022-02-21 RX ADMIN — HYDROMORPHONE HYDROCHLORIDE 0.5 MG: 2 INJECTION, SOLUTION INTRAMUSCULAR; INTRAVENOUS; SUBCUTANEOUS at 11:59

## 2022-02-21 RX ADMIN — CEFTRIAXONE SODIUM 1 G: 1 INJECTION, POWDER, FOR SOLUTION INTRAMUSCULAR; INTRAVENOUS at 09:19

## 2022-02-21 RX ADMIN — ONDANSETRON 4 MG: 2 INJECTION INTRAMUSCULAR; INTRAVENOUS at 10:50

## 2022-02-21 RX ADMIN — HYDROMORPHONE HYDROCHLORIDE 0.25 MG: 2 INJECTION, SOLUTION INTRAMUSCULAR; INTRAVENOUS; SUBCUTANEOUS at 12:07

## 2022-02-21 RX ADMIN — HYDROMORPHONE HYDROCHLORIDE 0.5 MG: 2 INJECTION, SOLUTION INTRAMUSCULAR; INTRAVENOUS; SUBCUTANEOUS at 11:44

## 2022-02-21 RX ADMIN — PRAMIPEXOLE DIHYDROCHLORIDE 0.5 MG: 0.5 TABLET ORAL at 21:45

## 2022-02-21 RX ADMIN — ROCURONIUM BROMIDE 20 MG: 10 INJECTION INTRAVENOUS at 09:47

## 2022-02-21 RX ADMIN — IPRATROPIUM BROMIDE AND ALBUTEROL SULFATE 3 ML: 2.5; .5 SOLUTION RESPIRATORY (INHALATION) at 08:00

## 2022-02-21 RX ADMIN — SUGAMMADEX 200 MG: 100 INJECTION, SOLUTION INTRAVENOUS at 10:55

## 2022-02-21 RX ADMIN — FUROSEMIDE 20 MG: 10 INJECTION, SOLUTION INTRAMUSCULAR; INTRAVENOUS at 10:24

## 2022-02-21 RX ADMIN — SODIUM CHLORIDE, PRESERVATIVE FREE 10 ML: 5 INJECTION INTRAVENOUS at 21:48

## 2022-02-21 RX ADMIN — HYDROMORPHONE HYDROCHLORIDE 0.25 MG: 2 INJECTION, SOLUTION INTRAMUSCULAR; INTRAVENOUS; SUBCUTANEOUS at 11:29

## 2022-02-21 RX ADMIN — PROPOFOL 200 MG: 10 INJECTION, EMULSION INTRAVENOUS at 09:13

## 2022-02-21 RX ADMIN — BUDESONIDE AND FORMOTEROL FUMARATE DIHYDRATE 2 PUFF: 160; 4.5 AEROSOL RESPIRATORY (INHALATION) at 21:08

## 2022-02-21 RX ADMIN — DEXAMETHASONE SODIUM PHOSPHATE 4 MG: 4 INJECTION, SOLUTION INTRAMUSCULAR; INTRAVENOUS at 09:42

## 2022-02-21 NOTE — ANESTHESIA POSTPROCEDURE EVALUATION
Patient: Calos Moseley    Procedure Summary     Date: 02/21/22 Room / Location: [facility] OR 06 / [facility] MAIN OR    Anesthesia Start: 0909 Anesthesia Stop: 1105    Procedure: NEPHROSTLITHOTOMY PERCUTANEOUS (Left ) Diagnosis:       Calculus, renal      (Calculus, renal [N20.0])    Surgeons: Deuce Leija MD Provider: Alpesh Haider MD    Anesthesia Type: general ASA Status: 3          Anesthesia Type: general    Vitals  Vitals Value Taken Time   /74 02/21/22 1322   Temp 97.1 °F (36.2 °C) 02/21/22 1105   Pulse 59 02/21/22 1325   Resp 14 02/21/22 1305   SpO2 89 % 02/21/22 1325   Vitals shown include unvalidated device data.        Post Anesthesia Care and Evaluation    Patient location during evaluation: PACU  Patient participation: complete - patient participated  Level of consciousness: awake  Pain scale: See nurse's notes for pain score.  Pain management: adequate  Airway patency: patent  Anesthetic complications: No anesthetic complications  PONV Status: none  Cardiovascular status: acceptable  Respiratory status: acceptable  Hydration status: acceptable    Comments: Patient seen and examined postoperatively; vital signs stable; SpO2 greater than or equal to 90%; cardiopulmonary status stable; nausea/vomiting adequately controlled; pain adequately controlled; no apparent anesthesia complications; patient discharged from anesthesia care when discharge criteria were met

## 2022-02-21 NOTE — ANESTHESIA PROCEDURE NOTES
Airway  Urgency: elective    Date/Time: 2/21/2022 9:17 AM  Airway not difficult    General Information and Staff    Patient location during procedure: OR    Indications and Patient Condition  Indications for airway management: airway protection    Preoxygenated: yes  MILS maintained throughout  Mask difficulty assessment: 2 - vent by mask + OA or adjuvant +/- NMBA    Final Airway Details  Final airway type: endotracheal airway      Successful airway: ETT  Cuffed: yes   Successful intubation technique: video laryngoscopy  Facilitating devices/methods: anterior pressure/BURP and intubating stylet  Endotracheal tube insertion site: oral  Blade: Glidescope  Blade size: 4  ETT size (mm): 7.0  Cormack-Lehane Classification: grade IIa - partial view of glottis  Placement verified by: capnometry   Cuff volume (mL): 8  Measured from: lips  ETT/EBT  to lips (cm): 22  Number of attempts at approach: 1  Assessment: lips, teeth, and gum same as pre-op and atraumatic intubation    Additional Comments  Soft bite block

## 2022-02-21 NOTE — OUTREACH NOTE
Medical Week 1 Survey      Responses   [facility] patient discharged from? [facility]   Does the patient have one of the following disease processes/diagnoses(primary or secondary)? Other   Week 1 attempt successful? No   Unsuccessful attempts Attempt 1   Revoke Readmitted          Tish Rico RN

## 2022-02-21 NOTE — ANESTHESIA PREPROCEDURE EVALUATION
Anesthesia Evaluation     Patient summary reviewed and Nursing notes reviewed   history of anesthetic complications: prolonged sedation  NPO Solid Status: > 8 hours  NPO Liquid Status: > 8 hours           Airway   Mallampati: III  TM distance: >3 FB  Neck ROM: full  Possible difficult intubation  Dental - normal exam     Pulmonary    (+) COPD, sleep apnea,   Cardiovascular     (+) hypertension, hyperlipidemia,       Neuro/Psych  (+) psychiatric history Depression and Anxiety,    GI/Hepatic/Renal/Endo    (+) morbid obesity,  renal disease, thyroid problem     Musculoskeletal     Abdominal    Substance History      OB/GYN          Other   arthritis,                    Anesthesia Plan    ASA 3     general     intravenous induction     Anesthetic plan, all risks, benefits, and alternatives have been provided, discussed and informed consent has been obtained with: patient.    Plan discussed with CRNA and CAA.        CODE STATUS:

## 2022-02-22 ENCOUNTER — ANESTHESIA EVENT (OUTPATIENT)
Dept: PERIOP | Facility: HOSPITAL | Age: 62
End: 2022-02-22

## 2022-02-22 PROBLEM — R31.9 HEMATURIA: Chronic | Status: RESOLVED | Noted: 2022-01-07 | Resolved: 2022-02-22

## 2022-02-22 PROBLEM — R73.9 HYPERGLYCEMIA: Status: RESOLVED | Noted: 2022-01-07 | Resolved: 2022-02-22

## 2022-02-22 PROBLEM — Z87.442 HISTORY OF RENAL STONE: Status: RESOLVED | Noted: 2018-01-29 | Resolved: 2022-02-22

## 2022-02-22 PROBLEM — K56.609 SBO (SMALL BOWEL OBSTRUCTION): Status: RESOLVED | Noted: 2017-11-03 | Resolved: 2022-02-22

## 2022-02-22 PROBLEM — N10 ACUTE PYELONEPHRITIS: Status: RESOLVED | Noted: 2022-01-07 | Resolved: 2022-02-22

## 2022-02-22 PROBLEM — N17.9 AKI (ACUTE KIDNEY INJURY): Status: RESOLVED | Noted: 2022-01-07 | Resolved: 2022-02-22

## 2022-02-22 PROBLEM — E87.6 HYPOKALEMIA: Status: RESOLVED | Noted: 2018-01-29 | Resolved: 2022-02-22

## 2022-02-22 PROBLEM — N17.9 ACUTE RENAL FAILURE (ARF): Status: RESOLVED | Noted: 2022-02-14 | Resolved: 2022-02-22

## 2022-02-22 PROBLEM — M47.812 CERVICAL SPONDYLOSIS: Chronic | Status: RESOLVED | Noted: 2018-09-11 | Resolved: 2022-02-22

## 2022-02-22 LAB
ANION GAP SERPL CALCULATED.3IONS-SCNC: 12 MMOL/L (ref 5–15)
BASOPHILS # BLD AUTO: 0 10*3/MM3 (ref 0–0.2)
BASOPHILS NFR BLD AUTO: 0.5 % (ref 0–1.5)
BUN SERPL-MCNC: 31 MG/DL (ref 8–23)
BUN/CREAT SERPL: 13.8 (ref 7–25)
CALCIUM SPEC-SCNC: 8.6 MG/DL (ref 8.6–10.5)
CHLORIDE SERPL-SCNC: 102 MMOL/L (ref 98–107)
CO2 SERPL-SCNC: 27 MMOL/L (ref 22–29)
CREAT SERPL-MCNC: 2.25 MG/DL (ref 0.76–1.27)
DEPRECATED RDW RBC AUTO: 44.2 FL (ref 37–54)
EOSINOPHIL # BLD AUTO: 0.2 10*3/MM3 (ref 0–0.4)
EOSINOPHIL NFR BLD AUTO: 2.3 % (ref 0.3–6.2)
ERYTHROCYTE [DISTWIDTH] IN BLOOD BY AUTOMATED COUNT: 14.1 % (ref 12.3–15.4)
GFR SERPL CREATININE-BSD FRML MDRD: 30 ML/MIN/1.73
GLUCOSE SERPL-MCNC: 175 MG/DL (ref 65–99)
HCT VFR BLD AUTO: 33.9 % (ref 37.5–51)
HGB BLD-MCNC: 11.4 G/DL (ref 13–17.7)
LYMPHOCYTES # BLD AUTO: 1.3 10*3/MM3 (ref 0.7–3.1)
LYMPHOCYTES NFR BLD AUTO: 16.9 % (ref 19.6–45.3)
MCH RBC QN AUTO: 30.2 PG (ref 26.6–33)
MCHC RBC AUTO-ENTMCNC: 33.6 G/DL (ref 31.5–35.7)
MCV RBC AUTO: 90.2 FL (ref 79–97)
MONOCYTES # BLD AUTO: 0.8 10*3/MM3 (ref 0.1–0.9)
MONOCYTES NFR BLD AUTO: 10.2 % (ref 5–12)
NEUTROPHILS NFR BLD AUTO: 5.6 10*3/MM3 (ref 1.7–7)
NEUTROPHILS NFR BLD AUTO: 70.1 % (ref 42.7–76)
NRBC BLD AUTO-RTO: 0 /100 WBC (ref 0–0.2)
PLATELET # BLD AUTO: 253 10*3/MM3 (ref 140–450)
PMV BLD AUTO: 8 FL (ref 6–12)
POTASSIUM SERPL-SCNC: 4.5 MMOL/L (ref 3.5–5.2)
RBC # BLD AUTO: 3.76 10*6/MM3 (ref 4.14–5.8)
SARS-COV-2 RNA PNL SPEC NAA+PROBE: NOT DETECTED
SODIUM SERPL-SCNC: 141 MMOL/L (ref 136–145)
WBC NRBC COR # BLD: 8 10*3/MM3 (ref 3.4–10.8)

## 2022-02-22 PROCEDURE — 99232 SBSQ HOSP IP/OBS MODERATE 35: CPT | Performed by: INTERNAL MEDICINE

## 2022-02-22 PROCEDURE — 85025 COMPLETE CBC W/AUTO DIFF WBC: CPT | Performed by: UROLOGY

## 2022-02-22 PROCEDURE — 80048 BASIC METABOLIC PNL TOTAL CA: CPT | Performed by: UROLOGY

## 2022-02-22 PROCEDURE — 87635 SARS-COV-2 COVID-19 AMP PRB: CPT | Performed by: UROLOGY

## 2022-02-22 PROCEDURE — 94799 UNLISTED PULMONARY SVC/PX: CPT

## 2022-02-22 RX ORDER — HYDROXYZINE HYDROCHLORIDE 25 MG/1
25 TABLET, FILM COATED ORAL NIGHTLY PRN
Status: DISCONTINUED | OUTPATIENT
Start: 2022-02-22 | End: 2022-02-24 | Stop reason: HOSPADM

## 2022-02-22 RX ORDER — DOCUSATE SODIUM 100 MG/1
100 CAPSULE, LIQUID FILLED ORAL 2 TIMES DAILY
Status: DISCONTINUED | OUTPATIENT
Start: 2022-02-22 | End: 2022-02-24 | Stop reason: HOSPADM

## 2022-02-22 RX ORDER — CHOLECALCIFEROL (VITAMIN D3) 125 MCG
10 CAPSULE ORAL NIGHTLY PRN
Status: DISCONTINUED | OUTPATIENT
Start: 2022-02-22 | End: 2022-02-24 | Stop reason: HOSPADM

## 2022-02-22 RX ORDER — HYDRALAZINE HYDROCHLORIDE 25 MG/1
50 TABLET, FILM COATED ORAL EVERY 12 HOURS SCHEDULED
Status: DISCONTINUED | OUTPATIENT
Start: 2022-02-22 | End: 2022-02-24 | Stop reason: HOSPADM

## 2022-02-22 RX ADMIN — SODIUM CHLORIDE, PRESERVATIVE FREE 10 ML: 5 INJECTION INTRAVENOUS at 08:58

## 2022-02-22 RX ADMIN — LAMOTRIGINE 100 MG: 100 TABLET ORAL at 20:47

## 2022-02-22 RX ADMIN — BUPROPION HYDROCHLORIDE 300 MG: 150 TABLET, EXTENDED RELEASE ORAL at 08:54

## 2022-02-22 RX ADMIN — DIVALPROEX SODIUM 250 MG: 250 TABLET, EXTENDED RELEASE ORAL at 08:55

## 2022-02-22 RX ADMIN — HYDROCODONE BITARTRATE AND ACETAMINOPHEN 1 TABLET: 10; 325 TABLET ORAL at 14:07

## 2022-02-22 RX ADMIN — TAMSULOSIN HYDROCHLORIDE 0.4 MG: 0.4 CAPSULE ORAL at 08:56

## 2022-02-22 RX ADMIN — BUDESONIDE AND FORMOTEROL FUMARATE DIHYDRATE 2 PUFF: 160; 4.5 AEROSOL RESPIRATORY (INHALATION) at 07:32

## 2022-02-22 RX ADMIN — PRAMIPEXOLE DIHYDROCHLORIDE 0.5 MG: 0.5 TABLET ORAL at 20:47

## 2022-02-22 RX ADMIN — HYDRALAZINE HYDROCHLORIDE 50 MG: 25 TABLET, FILM COATED ORAL at 20:47

## 2022-02-22 RX ADMIN — METOPROLOL SUCCINATE 100 MG: 50 TABLET, EXTENDED RELEASE ORAL at 08:55

## 2022-02-22 RX ADMIN — SODIUM CHLORIDE, PRESERVATIVE FREE 10 ML: 5 INJECTION INTRAVENOUS at 20:47

## 2022-02-22 RX ADMIN — VILAZODONE HYDROCHLORIDE 40 MG: 40 TABLET ORAL at 09:16

## 2022-02-22 RX ADMIN — ATORVASTATIN CALCIUM 20 MG: 20 TABLET, FILM COATED ORAL at 20:47

## 2022-02-22 RX ADMIN — DOCUSATE SODIUM 100 MG: 100 CAPSULE, LIQUID FILLED ORAL at 22:44

## 2022-02-22 RX ADMIN — HYDRALAZINE HYDROCHLORIDE 50 MG: 25 TABLET, FILM COATED ORAL at 03:21

## 2022-02-22 RX ADMIN — LEVOTHYROXINE SODIUM 50 MCG: 0.05 TABLET ORAL at 08:55

## 2022-02-22 RX ADMIN — LAMOTRIGINE 100 MG: 100 TABLET ORAL at 08:56

## 2022-02-22 RX ADMIN — DIVALPROEX SODIUM 250 MG: 250 TABLET, EXTENDED RELEASE ORAL at 20:47

## 2022-02-22 RX ADMIN — HYDROCODONE BITARTRATE AND ACETAMINOPHEN 1 TABLET: 10; 325 TABLET ORAL at 22:44

## 2022-02-22 RX ADMIN — HYDROCODONE BITARTRATE AND ACETAMINOPHEN 1 TABLET: 10; 325 TABLET ORAL at 08:56

## 2022-02-23 ENCOUNTER — APPOINTMENT (OUTPATIENT)
Dept: GENERAL RADIOLOGY | Facility: HOSPITAL | Age: 62
End: 2022-02-23

## 2022-02-23 ENCOUNTER — ANESTHESIA (OUTPATIENT)
Dept: PERIOP | Facility: HOSPITAL | Age: 62
End: 2022-02-23

## 2022-02-23 LAB
ANION GAP SERPL CALCULATED.3IONS-SCNC: 10 MMOL/L (ref 5–15)
BUN SERPL-MCNC: 21 MG/DL (ref 8–23)
BUN/CREAT SERPL: 12.6 (ref 7–25)
CALCIUM SPEC-SCNC: 8.5 MG/DL (ref 8.6–10.5)
CHLORIDE SERPL-SCNC: 107 MMOL/L (ref 98–107)
CO2 SERPL-SCNC: 26 MMOL/L (ref 22–29)
CREAT SERPL-MCNC: 1.67 MG/DL (ref 0.76–1.27)
GFR SERPL CREATININE-BSD FRML MDRD: 42 ML/MIN/1.73
GLUCOSE SERPL-MCNC: 105 MG/DL (ref 65–99)
POTASSIUM SERPL-SCNC: 4.7 MMOL/L (ref 3.5–5.2)
SODIUM SERPL-SCNC: 143 MMOL/L (ref 136–145)

## 2022-02-23 PROCEDURE — C1729 CATH, DRAINAGE: HCPCS | Performed by: UROLOGY

## 2022-02-23 PROCEDURE — 0T788DZ DILATION OF BILATERAL URETERS WITH INTRALUMINAL DEVICE, VIA NATURAL OR ARTIFICIAL OPENING ENDOSCOPIC: ICD-10-PCS | Performed by: UROLOGY

## 2022-02-23 PROCEDURE — C2617 STENT, NON-COR, TEM W/O DEL: HCPCS | Performed by: UROLOGY

## 2022-02-23 PROCEDURE — 76000 FLUOROSCOPY <1 HR PHYS/QHP: CPT

## 2022-02-23 PROCEDURE — 25010000002 PROPOFOL 200 MG/20ML EMULSION

## 2022-02-23 PROCEDURE — 94799 UNLISTED PULMONARY SVC/PX: CPT

## 2022-02-23 PROCEDURE — 25010000002 DEXAMETHASONE SODIUM PHOSPHATE 20 MG/5ML SOLUTION

## 2022-02-23 PROCEDURE — 25010000002 CEFTRIAXONE PER 250 MG

## 2022-02-23 PROCEDURE — 0 IOHEXOL 300 MG/ML SOLUTION: Performed by: UROLOGY

## 2022-02-23 PROCEDURE — 25010000002 MIDAZOLAM PER 1 MG

## 2022-02-23 PROCEDURE — 0 MORPHINE SULFATE 4 MG/ML SOLUTION: Performed by: UROLOGY

## 2022-02-23 PROCEDURE — 0TC18ZZ EXTIRPATION OF MATTER FROM LEFT KIDNEY, VIA NATURAL OR ARTIFICIAL OPENING ENDOSCOPIC: ICD-10-PCS | Performed by: UROLOGY

## 2022-02-23 PROCEDURE — 25010000002 FENTANYL CITRATE (PF) 100 MCG/2ML SOLUTION

## 2022-02-23 PROCEDURE — 25010000002 HYDRALAZINE PER 20 MG

## 2022-02-23 PROCEDURE — 80048 BASIC METABOLIC PNL TOTAL CA: CPT | Performed by: UROLOGY

## 2022-02-23 PROCEDURE — C1769 GUIDE WIRE: HCPCS | Performed by: UROLOGY

## 2022-02-23 PROCEDURE — 0TP5X0Z REMOVAL OF DRAINAGE DEVICE FROM KIDNEY, EXTERNAL APPROACH: ICD-10-PCS | Performed by: UROLOGY

## 2022-02-23 PROCEDURE — 25010000002 ONDANSETRON PER 1 MG

## 2022-02-23 PROCEDURE — 25010000002 ONDANSETRON PER 1 MG: Performed by: UROLOGY

## 2022-02-23 PROCEDURE — 25010000002 HYDROMORPHONE PER 4 MG

## 2022-02-23 PROCEDURE — C1758 CATHETER, URETERAL: HCPCS | Performed by: UROLOGY

## 2022-02-23 DEVICE — VARIABLE LENGTH URETERAL STENT
Type: IMPLANTABLE DEVICE | Site: URETER | Status: FUNCTIONAL
Brand: CONTOUR VL™

## 2022-02-23 RX ORDER — ACETAMINOPHEN 325 MG/1
650 TABLET ORAL ONCE AS NEEDED
Status: DISCONTINUED | OUTPATIENT
Start: 2022-02-23 | End: 2022-02-23 | Stop reason: HOSPADM

## 2022-02-23 RX ORDER — MIDAZOLAM HYDROCHLORIDE 1 MG/ML
INJECTION INTRAMUSCULAR; INTRAVENOUS AS NEEDED
Status: DISCONTINUED | OUTPATIENT
Start: 2022-02-23 | End: 2022-02-23 | Stop reason: SURG

## 2022-02-23 RX ORDER — HYDROMORPHONE HCL 110MG/55ML
0.5 PATIENT CONTROLLED ANALGESIA SYRINGE INTRAVENOUS
Status: DISCONTINUED | OUTPATIENT
Start: 2022-02-23 | End: 2022-02-23 | Stop reason: HOSPADM

## 2022-02-23 RX ORDER — HYDROMORPHONE HCL 110MG/55ML
0.25 PATIENT CONTROLLED ANALGESIA SYRINGE INTRAVENOUS
Status: DISCONTINUED | OUTPATIENT
Start: 2022-02-23 | End: 2022-02-23 | Stop reason: HOSPADM

## 2022-02-23 RX ORDER — ROCURONIUM BROMIDE 10 MG/ML
INJECTION, SOLUTION INTRAVENOUS AS NEEDED
Status: DISCONTINUED | OUTPATIENT
Start: 2022-02-23 | End: 2022-02-23 | Stop reason: SURG

## 2022-02-23 RX ORDER — ACETAMINOPHEN 650 MG/1
650 SUPPOSITORY RECTAL ONCE AS NEEDED
Status: DISCONTINUED | OUTPATIENT
Start: 2022-02-23 | End: 2022-02-23 | Stop reason: HOSPADM

## 2022-02-23 RX ORDER — MEPERIDINE HYDROCHLORIDE 25 MG/ML
12.5 INJECTION INTRAMUSCULAR; INTRAVENOUS; SUBCUTANEOUS
Status: DISCONTINUED | OUTPATIENT
Start: 2022-02-23 | End: 2022-02-23 | Stop reason: HOSPADM

## 2022-02-23 RX ORDER — GLYCOPYRROLATE 0.2 MG/ML
INJECTION INTRAMUSCULAR; INTRAVENOUS AS NEEDED
Status: DISCONTINUED | OUTPATIENT
Start: 2022-02-23 | End: 2022-02-23 | Stop reason: SURG

## 2022-02-23 RX ORDER — HYDROMORPHONE HCL 110MG/55ML
1 PATIENT CONTROLLED ANALGESIA SYRINGE INTRAVENOUS
Status: DISCONTINUED | OUTPATIENT
Start: 2022-02-23 | End: 2022-02-23 | Stop reason: HOSPADM

## 2022-02-23 RX ORDER — PROMETHAZINE HYDROCHLORIDE 25 MG/1
25 TABLET ORAL ONCE AS NEEDED
Status: DISCONTINUED | OUTPATIENT
Start: 2022-02-23 | End: 2022-02-23 | Stop reason: HOSPADM

## 2022-02-23 RX ORDER — DROPERIDOL 2.5 MG/ML
0.62 INJECTION, SOLUTION INTRAMUSCULAR; INTRAVENOUS ONCE AS NEEDED
Status: DISCONTINUED | OUTPATIENT
Start: 2022-02-23 | End: 2022-02-23 | Stop reason: HOSPADM

## 2022-02-23 RX ORDER — IPRATROPIUM BROMIDE AND ALBUTEROL SULFATE 2.5; .5 MG/3ML; MG/3ML
3 SOLUTION RESPIRATORY (INHALATION) ONCE AS NEEDED
Status: DISCONTINUED | OUTPATIENT
Start: 2022-02-23 | End: 2022-02-23 | Stop reason: HOSPADM

## 2022-02-23 RX ORDER — KETAMINE HCL IN NACL, ISO-OSM 100MG/10ML
SYRINGE (ML) INJECTION AS NEEDED
Status: DISCONTINUED | OUTPATIENT
Start: 2022-02-23 | End: 2022-02-23 | Stop reason: SURG

## 2022-02-23 RX ORDER — ONDANSETRON 2 MG/ML
INJECTION INTRAMUSCULAR; INTRAVENOUS AS NEEDED
Status: DISCONTINUED | OUTPATIENT
Start: 2022-02-23 | End: 2022-02-23 | Stop reason: SURG

## 2022-02-23 RX ORDER — HYDRALAZINE HYDROCHLORIDE 20 MG/ML
5 INJECTION INTRAMUSCULAR; INTRAVENOUS
Status: DISCONTINUED | OUTPATIENT
Start: 2022-02-23 | End: 2022-02-23 | Stop reason: HOSPADM

## 2022-02-23 RX ORDER — FLUMAZENIL 0.1 MG/ML
0.2 INJECTION INTRAVENOUS AS NEEDED
Status: DISCONTINUED | OUTPATIENT
Start: 2022-02-23 | End: 2022-02-23 | Stop reason: HOSPADM

## 2022-02-23 RX ORDER — METOCLOPRAMIDE HYDROCHLORIDE 5 MG/ML
10 INJECTION INTRAMUSCULAR; INTRAVENOUS ONCE AS NEEDED
Status: DISCONTINUED | OUTPATIENT
Start: 2022-02-23 | End: 2022-02-23 | Stop reason: HOSPADM

## 2022-02-23 RX ORDER — PROMETHAZINE HYDROCHLORIDE 25 MG/1
25 SUPPOSITORY RECTAL ONCE AS NEEDED
Status: DISCONTINUED | OUTPATIENT
Start: 2022-02-23 | End: 2022-02-23 | Stop reason: HOSPADM

## 2022-02-23 RX ORDER — PROPOFOL 10 MG/ML
INJECTION, EMULSION INTRAVENOUS AS NEEDED
Status: DISCONTINUED | OUTPATIENT
Start: 2022-02-23 | End: 2022-02-23 | Stop reason: SURG

## 2022-02-23 RX ORDER — OXYCODONE HYDROCHLORIDE 5 MG/1
10 TABLET ORAL ONCE AS NEEDED
Status: DISCONTINUED | OUTPATIENT
Start: 2022-02-23 | End: 2022-02-23 | Stop reason: HOSPADM

## 2022-02-23 RX ORDER — OXYCODONE HYDROCHLORIDE 5 MG/1
15 TABLET ORAL EVERY 4 HOURS PRN
Status: DISCONTINUED | OUTPATIENT
Start: 2022-02-23 | End: 2022-02-23 | Stop reason: HOSPADM

## 2022-02-23 RX ORDER — LABETALOL HYDROCHLORIDE 5 MG/ML
5 INJECTION, SOLUTION INTRAVENOUS
Status: DISCONTINUED | OUTPATIENT
Start: 2022-02-23 | End: 2022-02-23 | Stop reason: HOSPADM

## 2022-02-23 RX ORDER — FENTANYL CITRATE 50 UG/ML
INJECTION, SOLUTION INTRAMUSCULAR; INTRAVENOUS AS NEEDED
Status: DISCONTINUED | OUTPATIENT
Start: 2022-02-23 | End: 2022-02-23 | Stop reason: SURG

## 2022-02-23 RX ORDER — NALOXONE HCL 0.4 MG/ML
0.4 VIAL (ML) INJECTION AS NEEDED
Status: DISCONTINUED | OUTPATIENT
Start: 2022-02-23 | End: 2022-02-23 | Stop reason: HOSPADM

## 2022-02-23 RX ORDER — DEXAMETHASONE SODIUM PHOSPHATE 4 MG/ML
INJECTION, SOLUTION INTRA-ARTICULAR; INTRALESIONAL; INTRAMUSCULAR; INTRAVENOUS; SOFT TISSUE AS NEEDED
Status: DISCONTINUED | OUTPATIENT
Start: 2022-02-23 | End: 2022-02-23 | Stop reason: SURG

## 2022-02-23 RX ADMIN — ATORVASTATIN CALCIUM 20 MG: 20 TABLET, FILM COATED ORAL at 20:09

## 2022-02-23 RX ADMIN — HYDRALAZINE HYDROCHLORIDE 10 MG: 20 INJECTION INTRAMUSCULAR; INTRAVENOUS at 14:10

## 2022-02-23 RX ADMIN — METOPROLOL SUCCINATE 100 MG: 50 TABLET, EXTENDED RELEASE ORAL at 16:51

## 2022-02-23 RX ADMIN — HYDROMORPHONE HYDROCHLORIDE 1 MG: 2 INJECTION, SOLUTION INTRAMUSCULAR; INTRAVENOUS; SUBCUTANEOUS at 14:02

## 2022-02-23 RX ADMIN — HYDROCODONE BITARTRATE AND ACETAMINOPHEN 1 TABLET: 10; 325 TABLET ORAL at 20:09

## 2022-02-23 RX ADMIN — GLYCOPYRROLATE 0.4 MG: 0.2 INJECTION INTRAMUSCULAR; INTRAVENOUS at 12:33

## 2022-02-23 RX ADMIN — ONDANSETRON 4 MG: 2 INJECTION INTRAMUSCULAR; INTRAVENOUS at 13:27

## 2022-02-23 RX ADMIN — ONDANSETRON 4 MG: 2 INJECTION INTRAMUSCULAR; INTRAVENOUS at 13:52

## 2022-02-23 RX ADMIN — ROCURONIUM BROMIDE 50 MG: 10 INJECTION INTRAVENOUS at 12:17

## 2022-02-23 RX ADMIN — DOCUSATE SODIUM 100 MG: 100 CAPSULE, LIQUID FILLED ORAL at 20:09

## 2022-02-23 RX ADMIN — SUGAMMADEX 200 MG: 100 INJECTION, SOLUTION INTRAVENOUS at 13:27

## 2022-02-23 RX ADMIN — MORPHINE SULFATE 4 MG: 4 INJECTION INTRAVENOUS at 04:02

## 2022-02-23 RX ADMIN — FENTANYL CITRATE 50 MCG: 50 INJECTION INTRAMUSCULAR; INTRAVENOUS at 12:41

## 2022-02-23 RX ADMIN — DEXAMETHASONE SODIUM PHOSPHATE 10 MG: 4 INJECTION, SOLUTION INTRAMUSCULAR; INTRAVENOUS at 12:17

## 2022-02-23 RX ADMIN — MIDAZOLAM 2 MG: 1 INJECTION INTRAMUSCULAR; INTRAVENOUS at 12:12

## 2022-02-23 RX ADMIN — HYDRALAZINE HYDROCHLORIDE 50 MG: 25 TABLET, FILM COATED ORAL at 20:09

## 2022-02-23 RX ADMIN — DIVALPROEX SODIUM 250 MG: 250 TABLET, EXTENDED RELEASE ORAL at 20:09

## 2022-02-23 RX ADMIN — CEFTRIAXONE SODIUM 1 G: 1 INJECTION, POWDER, FOR SOLUTION INTRAMUSCULAR; INTRAVENOUS at 12:21

## 2022-02-23 RX ADMIN — BUDESONIDE AND FORMOTEROL FUMARATE DIHYDRATE 2 PUFF: 160; 4.5 AEROSOL RESPIRATORY (INHALATION) at 07:02

## 2022-02-23 RX ADMIN — FENTANYL CITRATE 50 MCG: 50 INJECTION INTRAMUSCULAR; INTRAVENOUS at 12:47

## 2022-02-23 RX ADMIN — SODIUM CHLORIDE: 0.9 INJECTION, SOLUTION INTRAVENOUS at 12:11

## 2022-02-23 RX ADMIN — Medication 30 MG: at 12:24

## 2022-02-23 RX ADMIN — PRAMIPEXOLE DIHYDROCHLORIDE 0.5 MG: 0.5 TABLET ORAL at 20:09

## 2022-02-23 RX ADMIN — PROPOFOL 200 MG: 10 INJECTION, EMULSION INTRAVENOUS at 12:16

## 2022-02-23 RX ADMIN — ROCURONIUM BROMIDE 20 MG: 10 INJECTION INTRAVENOUS at 13:09

## 2022-02-23 RX ADMIN — LAMOTRIGINE 100 MG: 100 TABLET ORAL at 20:09

## 2022-02-23 RX ADMIN — FENTANYL CITRATE 100 MCG: 50 INJECTION INTRAMUSCULAR; INTRAVENOUS at 12:16

## 2022-02-23 RX ADMIN — MORPHINE SULFATE 4 MG: 4 INJECTION INTRAVENOUS at 18:13

## 2022-02-23 NOTE — ANESTHESIA PROCEDURE NOTES
Peripheral IV    Patient location during procedure: OR  Start time: 2/23/2022 1:22 PM  End time: 2/23/2022 1:23 PM  Line placed for Fluids/Medication Admin.  Performed By   CRNA: Marco Antonio Winston CAA  Preanesthetic Checklist  Completed: patient identified, IV checked, site marked, risks and benefits discussed, surgical consent, monitors and equipment checked, pre-op evaluation and timeout performed  Peripheral IV Prep   Patient position: supine   Prep: alcohol swabs  Patient monitoring: heart rate, cardiac monitor and continuous pulse ox  Peripheral IV Procedure   Laterality:left  Location:  Hand  Catheter size: 20 G         Post Assessment   Dressing Type: gauze, tape and transparent.    IV Dressing/Site: clean, dry and intact

## 2022-02-23 NOTE — ANESTHESIA POSTPROCEDURE EVALUATION
Patient: Calos Moseley    Procedure Summary     Date: 02/23/22 Room / Location: [facility] OR 06 / [facility] MAIN OR    Anesthesia Start: 1211 Anesthesia Stop: 1343    Procedure: LEFT FLEXIBLE NEPHROSTOMY WITH STONE EXTRACTION, LEFT NEPHROSTOMY, TUBE REMOVAL, BILATERAL URETERAL STENT PLACEMENT (Left ) Diagnosis:     Surgeons: Deuce Leija MD Provider: Riley Dalton MD    Anesthesia Type: general ASA Status: 3          Anesthesia Type: general    Vitals  Vitals Value Taken Time   /76 02/23/22 1441   Temp 97.5 °F (36.4 °C) 02/23/22 1435   Pulse 77 02/23/22 1442   Resp 11 02/23/22 1435   SpO2 94 % 02/23/22 1442   Vitals shown include unvalidated device data.        Post Anesthesia Care and Evaluation    Patient location during evaluation: PACU  Patient participation: complete - patient participated  Level of consciousness: awake  Pain scale: See nurse's notes for pain score.  Pain management: adequate  Airway patency: patent  Anesthetic complications: No anesthetic complications  PONV Status: none  Cardiovascular status: acceptable  Respiratory status: acceptable  Hydration status: acceptable    Comments: Patient seen and examined postoperatively; vital signs stable; SpO2 greater than or equal to 90%; cardiopulmonary status stable; nausea/vomiting adequately controlled; pain adequately controlled; no apparent anesthesia complications; patient discharged from anesthesia care when discharge criteria were met

## 2022-02-23 NOTE — ANESTHESIA PROCEDURE NOTES
Airway  Urgency: elective    Date/Time: 2/23/2022 12:20 PM  Difficult airway    General Information and Staff    Patient location during procedure: OR  CRNA: Marco Antonio Winston CAA    Indications and Patient Condition  Indications for airway management: airway protection    Preoxygenated: yes  MILS maintained throughout  Mask difficulty assessment: 3 - difficult mask (inadequate, unstable or two providers) +/- NMBA    Final Airway Details  Final airway type: endotracheal airway      Successful airway: ETT  Cuffed: yes   Successful intubation technique: direct laryngoscopy  Facilitating devices/methods: intubating stylet and cricoid pressure  Endotracheal tube insertion site: oral  Blade: Jillian  Blade size: 4  ETT size (mm): 7.5  Cormack-Lehane Classification: grade IIb - view of arytenoids or posterior of glottis only  Placement verified by: chest auscultation and capnometry   Measured from: lips  ETT/EBT  to lips (cm): 24  Number of attempts at approach: 2  Assessment: lips, teeth, and gum same as pre-op and atraumatic intubation

## 2022-02-23 NOTE — ANESTHESIA PREPROCEDURE EVALUATION
Anesthesia Evaluation     Patient summary reviewed and Nursing notes reviewed   history of anesthetic complications: prolonged sedation  NPO Solid Status: > 8 hours  NPO Liquid Status: > 8 hours           Airway   Mallampati: III  TM distance: >3 FB  Neck ROM: full  No difficulty expected  Dental - normal exam     Pulmonary - normal exam   (+) a smoker Former, COPD, sleep apnea,   Cardiovascular - normal exam    (+) hypertension, hyperlipidemia,       Neuro/Psych  (+) psychiatric history Depression and Anxiety,    GI/Hepatic/Renal/Endo    (+) morbid obesity,  renal disease, thyroid problem hypothyroidism    Musculoskeletal     Abdominal  - normal exam   Substance History      OB/GYN          Other   arthritis,                        Anesthesia Plan    ASA 3     general     intravenous induction     Anesthetic plan, all risks, benefits, and alternatives have been provided, discussed and informed consent has been obtained with: patient.    Plan discussed with CAA.        CODE STATUS:

## 2022-02-24 ENCOUNTER — READMISSION MANAGEMENT (OUTPATIENT)
Dept: CALL CENTER | Facility: HOSPITAL | Age: 62
End: 2022-02-24

## 2022-02-24 VITALS
WEIGHT: 243.4 LBS | BODY MASS INDEX: 39.12 KG/M2 | HEART RATE: 88 BPM | SYSTOLIC BLOOD PRESSURE: 153 MMHG | RESPIRATION RATE: 16 BRPM | HEIGHT: 66 IN | DIASTOLIC BLOOD PRESSURE: 67 MMHG | TEMPERATURE: 98.2 F | OXYGEN SATURATION: 94 %

## 2022-02-24 PROCEDURE — 94799 UNLISTED PULMONARY SVC/PX: CPT

## 2022-02-24 RX ORDER — CEFTRIAXONE 1 G/1
1 INJECTION, POWDER, FOR SOLUTION INTRAMUSCULAR; INTRAVENOUS EVERY 24 HOURS
Status: DISCONTINUED | OUTPATIENT
Start: 2022-02-24 | End: 2022-02-24 | Stop reason: SDUPTHER

## 2022-02-24 RX ADMIN — BUDESONIDE AND FORMOTEROL FUMARATE DIHYDRATE 2 PUFF: 160; 4.5 AEROSOL RESPIRATORY (INHALATION) at 07:33

## 2022-02-24 RX ADMIN — TAMSULOSIN HYDROCHLORIDE 0.4 MG: 0.4 CAPSULE ORAL at 08:03

## 2022-02-24 RX ADMIN — DIVALPROEX SODIUM 250 MG: 250 TABLET, EXTENDED RELEASE ORAL at 08:03

## 2022-02-24 RX ADMIN — LEVOTHYROXINE SODIUM 50 MCG: 0.05 TABLET ORAL at 08:03

## 2022-02-24 RX ADMIN — HYDROCODONE BITARTRATE AND ACETAMINOPHEN 1 TABLET: 10; 325 TABLET ORAL at 06:16

## 2022-02-24 RX ADMIN — DOCUSATE SODIUM 100 MG: 100 CAPSULE, LIQUID FILLED ORAL at 08:03

## 2022-02-24 RX ADMIN — LAMOTRIGINE 100 MG: 100 TABLET ORAL at 08:03

## 2022-02-24 RX ADMIN — HYDRALAZINE HYDROCHLORIDE 50 MG: 25 TABLET, FILM COATED ORAL at 08:03

## 2022-02-24 RX ADMIN — VILAZODONE HYDROCHLORIDE 40 MG: 40 TABLET ORAL at 08:07

## 2022-02-24 RX ADMIN — BUPROPION HYDROCHLORIDE 300 MG: 150 TABLET, EXTENDED RELEASE ORAL at 06:16

## 2022-02-24 RX ADMIN — METOPROLOL SUCCINATE 100 MG: 50 TABLET, EXTENDED RELEASE ORAL at 08:03

## 2022-02-25 LAB
COLOR STONE: NORMAL
COMPN STONE: NORMAL
CYSTINE MFR STONE: 100 %
LABORATORY COMMENT REPORT: NORMAL
LABORATORY COMMENT REPORT: NORMAL
Lab: NORMAL
Lab: NORMAL
PHOTO: NORMAL
SIZE STONE: NORMAL MM
SPEC SOURCE SUBJ: NORMAL
WT STONE: 4077 MG

## 2022-02-25 NOTE — OUTREACH NOTE
Prep Survey      Responses   [facility]? [facility]   Is LACE score < 7 ? No   Emergency Room discharge w/ pulse ox? No   Eligibility Readm Mgmt   Discharge diagnosis Left staghorn calculus and right renal calculus s/p left percutaneous nephrolithotomy, LEFT FLEXIBLE NEPHROSTOMY WITH STONE EXTRACTION, LEFT NEPHROSTOMY, TUBE REMOVAL, BILATERAL URETERAL STENT PLACEMENT   Does the patient have one of the following disease processes/diagnoses(primary or secondary)? General Surgery   Does the patient have Home health ordered? No   Is there a DME ordered? No   Prep survey completed? Yes          Sweetie Benito RN

## 2022-02-28 ENCOUNTER — READMISSION MANAGEMENT (OUTPATIENT)
Dept: CALL CENTER | Facility: HOSPITAL | Age: 62
End: 2022-02-28

## 2022-02-28 RX ORDER — BUDESONIDE AND FORMOTEROL FUMARATE DIHYDRATE 160; 4.5 UG/1; UG/1
2 AEROSOL RESPIRATORY (INHALATION)
Status: ON HOLD | COMMUNITY
End: 2022-03-07

## 2022-02-28 NOTE — OUTREACH NOTE
General Surgery Week 1 Survey      Responses   [facility] [place] patient discharged from? [facility]   Does the patient have one of the following disease processes/diagnoses(primary or secondary)? General Surgery   Week 1 attempt successful? No   Unsuccessful attempts Attempt 1          Leona Gillespie RN

## 2022-03-01 ENCOUNTER — LAB (OUTPATIENT)
Dept: LAB | Facility: HOSPITAL | Age: 62
End: 2022-03-01

## 2022-03-01 ENCOUNTER — HOSPITAL ENCOUNTER (OUTPATIENT)
Dept: CARDIOLOGY | Facility: HOSPITAL | Age: 62
Discharge: HOME | End: 2022-03-01

## 2022-03-01 LAB
ANION GAP SERPL CALCULATED.3IONS-SCNC: 11.8 MMOL/L (ref 5–15)
BUN SERPL-MCNC: 28 MG/DL (ref 8–23)
BUN/CREAT SERPL: 13.5 (ref 7–25)
CALCIUM SPEC-SCNC: 9.7 MG/DL (ref 8.6–10.5)
CHLORIDE SERPL-SCNC: 100 MMOL/L (ref 98–107)
CO2 SERPL-SCNC: 26.2 MMOL/L (ref 22–29)
CREAT SERPL-MCNC: 2.07 MG/DL (ref 0.76–1.27)
DEPRECATED RDW RBC AUTO: 40.5 FL (ref 37–54)
EGFRCR SERPLBLD CKD-EPI 2021: 36 ML/MIN/1.73
ERYTHROCYTE [DISTWIDTH] IN BLOOD BY AUTOMATED COUNT: 12.8 % (ref 12.3–15.4)
GLUCOSE SERPL-MCNC: 111 MG/DL (ref 65–99)
HCT VFR BLD AUTO: 39.6 % (ref 37.5–51)
HGB BLD-MCNC: 13.4 G/DL (ref 13–17.7)
MCH RBC QN AUTO: 29.7 PG (ref 26.6–33)
MCHC RBC AUTO-ENTMCNC: 33.8 G/DL (ref 31.5–35.7)
MCV RBC AUTO: 87.8 FL (ref 79–97)
PLATELET # BLD AUTO: 269 10*3/MM3 (ref 140–450)
PMV BLD AUTO: 10.4 FL (ref 6–12)
POTASSIUM SERPL-SCNC: 4.3 MMOL/L (ref 3.5–5.2)
RBC # BLD AUTO: 4.51 10*6/MM3 (ref 4.14–5.8)
SODIUM SERPL-SCNC: 138 MMOL/L (ref 136–145)
WBC NRBC COR # BLD: 7.9 10*3/MM3 (ref 3.4–10.8)

## 2022-03-01 PROCEDURE — 80048 BASIC METABOLIC PNL TOTAL CA: CPT | Performed by: UROLOGY

## 2022-03-01 PROCEDURE — 85027 COMPLETE CBC AUTOMATED: CPT | Performed by: UROLOGY

## 2022-03-01 PROCEDURE — 36415 COLL VENOUS BLD VENIPUNCTURE: CPT | Performed by: UROLOGY

## 2022-03-01 PROCEDURE — 93005 ELECTROCARDIOGRAM TRACING: CPT

## 2022-03-01 PROCEDURE — 93010 ELECTROCARDIOGRAM REPORT: CPT | Performed by: INTERNAL MEDICINE

## 2022-03-02 ENCOUNTER — READMISSION MANAGEMENT (OUTPATIENT)
Dept: CALL CENTER | Facility: HOSPITAL | Age: 62
End: 2022-03-02

## 2022-03-02 NOTE — OUTREACH NOTE
General Surgery Week 1 Survey      Responses   [facility] patient discharged from? [facility]   Does the patient have one of the following disease processes/diagnoses(primary or secondary)? General Surgery   Week 1 attempt successful? No   Unsuccessful attempts Attempt 2          Dian Bermudez RN

## 2022-03-03 LAB — QT INTERVAL: 401 MS

## 2022-03-04 ENCOUNTER — ANESTHESIA EVENT (OUTPATIENT)
Dept: PERIOP | Facility: HOSPITAL | Age: 62
End: 2022-03-04

## 2022-03-04 ENCOUNTER — READMISSION MANAGEMENT (OUTPATIENT)
Dept: CALL CENTER | Facility: HOSPITAL | Age: 62
End: 2022-03-04

## 2022-03-04 ENCOUNTER — LAB (OUTPATIENT)
Dept: LAB | Facility: HOSPITAL | Age: 62
End: 2022-03-04

## 2022-03-04 PROCEDURE — U0004 COV-19 TEST NON-CDC HGH THRU: HCPCS

## 2022-03-04 PROCEDURE — C9803 HOPD COVID-19 SPEC COLLECT: HCPCS

## 2022-03-04 NOTE — OUTREACH NOTE
General Surgery Week 1 Survey      Responses   [facility] patient discharged from? [facility]   Does the patient have one of the following disease processes/diagnoses(primary or secondary)? General Surgery   Week 1 attempt successful? Yes   Call start time 1549   Call end time 1553   Discharge diagnosis Left staghorn calculus and right renal calculus s/p left percutaneous nephrolithotomy, LEFT FLEXIBLE NEPHROSTOMY WITH STONE EXTRACTION, LEFT NEPHROSTOMY, TUBE REMOVAL, BILATERAL URETERAL STENT PLACEMENT   Is patient permission given to speak with other caregiver? Yes   List who call center can speak with wife   Person spoke with today (if not patient) and relationship wife   Meds reviewed with patient/caregiver? Yes   Is the patient having any side effects they believe may be caused by any medication additions or changes? No   Does the patient have all medications related to this admission filled (includes all antibiotics, pain medications, etc.) Yes   Is the patient taking all medications as directed (includes completed medication regime)? Yes   Medication comments pain is tolerable per wife.    Does the patient have a follow up appointment scheduled with their surgeon? Yes   Has the patient kept scheduled appointments due by today? N/A   Psychosocial issues? No   Comments Pt having surgery on 3/7, has two stents in place. Hematuria + per wife, denies fever. Appetite decreased.    Did the patient receive a copy of their discharge instructions? Yes   Nursing interventions Reviewed instructions with patient   What is the patient's perception of their health status since discharge? Improving   Nursing interventions Nurse provided patient education   Is the patient /caregiver able to teach back basic post-op care? Continue use of incentive spirometry at least 1 week post discharge,  Practice 'cough and deep breath'   Is the patient/caregiver able to teach back signs and symptoms of incisional infection? Increased  redness, swelling or pain at the incisonal site   Is the patient/caregiver able to teach back steps to recovery at home? Set small, achievable goals for return to baseline health   If the patient is a current smoker, are they able to teach back resources for cessation? Not a smoker   Is the patient/caregiver able to teach back the hierarchy of who to call/visit for symptoms/problems? PCP, Specialist, Home health nurse, Urgent Care, ED, 911 Yes   Week 1 call completed? Yes          Jessica Byers RN

## 2022-03-05 LAB — SARS-COV-2 ORF1AB RESP QL NAA+PROBE: NOT DETECTED

## 2022-03-07 ENCOUNTER — APPOINTMENT (OUTPATIENT)
Dept: GENERAL RADIOLOGY | Facility: HOSPITAL | Age: 62
End: 2022-03-07

## 2022-03-07 ENCOUNTER — ANESTHESIA (OUTPATIENT)
Dept: PERIOP | Facility: HOSPITAL | Age: 62
End: 2022-03-07

## 2022-03-07 ENCOUNTER — READMISSION MANAGEMENT (OUTPATIENT)
Dept: CALL CENTER | Facility: HOSPITAL | Age: 62
End: 2022-03-07

## 2022-03-07 ENCOUNTER — HOSPITAL ENCOUNTER (INPATIENT)
Facility: HOSPITAL | Age: 62
LOS: 4 days | Discharge: HOME | End: 2022-03-11
Attending: UROLOGY | Admitting: UROLOGY

## 2022-03-07 ENCOUNTER — APPOINTMENT (OUTPATIENT)
Dept: CT IMAGING | Facility: HOSPITAL | Age: 62
End: 2022-03-07

## 2022-03-07 DIAGNOSIS — R31.0 GROSS HEMATURIA: Primary | ICD-10-CM

## 2022-03-07 DIAGNOSIS — N20.1 CALCULUS OF URETER: ICD-10-CM

## 2022-03-07 LAB
ANION GAP SERPL CALCULATED.3IONS-SCNC: 12 MMOL/L (ref 5–15)
BUN SERPL-MCNC: 13 MG/DL (ref 8–23)
BUN/CREAT SERPL: 8.6 (ref 7–25)
CALCIUM SPEC-SCNC: 8.9 MG/DL (ref 8.6–10.5)
CHLORIDE SERPL-SCNC: 107 MMOL/L (ref 98–107)
CO2 SERPL-SCNC: 25 MMOL/L (ref 22–29)
CREAT SERPL-MCNC: 1.52 MG/DL (ref 0.76–1.27)
DEPRECATED RDW RBC AUTO: 45.1 FL (ref 37–54)
EGFRCR SERPLBLD CKD-EPI 2021: 52.1 ML/MIN/1.73
ERYTHROCYTE [DISTWIDTH] IN BLOOD BY AUTOMATED COUNT: 14.3 % (ref 12.3–15.4)
GLUCOSE SERPL-MCNC: 221 MG/DL (ref 65–99)
HCT VFR BLD AUTO: 36 % (ref 37.5–51)
HGB BLD-MCNC: 12.1 G/DL (ref 13–17.7)
MCH RBC QN AUTO: 30 PG (ref 26.6–33)
MCHC RBC AUTO-ENTMCNC: 33.6 G/DL (ref 31.5–35.7)
MCV RBC AUTO: 89.4 FL (ref 79–97)
PLATELET # BLD AUTO: 193 10*3/MM3 (ref 140–450)
PMV BLD AUTO: 8.5 FL (ref 6–12)
POTASSIUM SERPL-SCNC: 4.3 MMOL/L (ref 3.5–5.2)
RBC # BLD AUTO: 4.03 10*6/MM3 (ref 4.14–5.8)
SODIUM SERPL-SCNC: 144 MMOL/L (ref 136–145)
WBC NRBC COR # BLD: 6.2 10*3/MM3 (ref 3.4–10.8)

## 2022-03-07 PROCEDURE — 25010000002 CEFTRIAXONE PER 250 MG: Performed by: UROLOGY

## 2022-03-07 PROCEDURE — 25010000002 HYDROMORPHONE PER 4 MG: Performed by: ANESTHESIOLOGIST ASSISTANT

## 2022-03-07 PROCEDURE — 25010000002 PROPOFOL 200 MG/20ML EMULSION: Performed by: ANESTHESIOLOGIST ASSISTANT

## 2022-03-07 PROCEDURE — C2617 STENT, NON-COR, TEM W/O DEL: HCPCS | Performed by: UROLOGY

## 2022-03-07 PROCEDURE — 0T9130Z DRAINAGE OF LEFT KIDNEY WITH DRAINAGE DEVICE, PERCUTANEOUS APPROACH: ICD-10-PCS | Performed by: UROLOGY

## 2022-03-07 PROCEDURE — 25010000002 FENTANYL CITRATE (PF) 100 MCG/2ML SOLUTION: Performed by: ANESTHESIOLOGIST ASSISTANT

## 2022-03-07 PROCEDURE — 94660 CPAP INITIATION&MGMT: CPT

## 2022-03-07 PROCEDURE — 76000 FLUOROSCOPY <1 HR PHYS/QHP: CPT

## 2022-03-07 PROCEDURE — C1887 CATHETER, GUIDING: HCPCS | Performed by: UROLOGY

## 2022-03-07 PROCEDURE — 25010000002 DEXAMETHASONE PER 1 MG: Performed by: ANESTHESIOLOGIST ASSISTANT

## 2022-03-07 PROCEDURE — BT1FZZZ FLUOROSCOPY OF LEFT KIDNEY, URETER AND BLADDER: ICD-10-PCS | Performed by: UROLOGY

## 2022-03-07 PROCEDURE — C1758 CATHETER, URETERAL: HCPCS | Performed by: UROLOGY

## 2022-03-07 PROCEDURE — 0 IOHEXOL 300 MG/ML SOLUTION: Performed by: UROLOGY

## 2022-03-07 PROCEDURE — 85027 COMPLETE CBC AUTOMATED: CPT | Performed by: UROLOGY

## 2022-03-07 PROCEDURE — 25010000002 ONDANSETRON PER 1 MG: Performed by: ANESTHESIOLOGIST ASSISTANT

## 2022-03-07 PROCEDURE — C1769 GUIDE WIRE: HCPCS | Performed by: UROLOGY

## 2022-03-07 PROCEDURE — 99222 1ST HOSP IP/OBS MODERATE 55: CPT | Performed by: NURSE PRACTITIONER

## 2022-03-07 PROCEDURE — 94799 UNLISTED PULMONARY SVC/PX: CPT

## 2022-03-07 PROCEDURE — C1894 INTRO/SHEATH, NON-LASER: HCPCS | Performed by: UROLOGY

## 2022-03-07 PROCEDURE — 25010000002 LORAZEPAM PER 2 MG: Performed by: ANESTHESIOLOGIST ASSISTANT

## 2022-03-07 PROCEDURE — 82365 CALCULUS SPECTROSCOPY: CPT | Performed by: UROLOGY

## 2022-03-07 PROCEDURE — 25010000002 NEOSTIGMINE 5 MG/5ML SOLUTION: Performed by: ANESTHESIOLOGIST ASSISTANT

## 2022-03-07 PROCEDURE — 80048 BASIC METABOLIC PNL TOTAL CA: CPT | Performed by: UROLOGY

## 2022-03-07 PROCEDURE — C1729 CATH, DRAINAGE: HCPCS | Performed by: UROLOGY

## 2022-03-07 PROCEDURE — 74176 CT ABD & PELVIS W/O CONTRAST: CPT

## 2022-03-07 PROCEDURE — 0TC18ZZ EXTIRPATION OF MATTER FROM LEFT KIDNEY, VIA NATURAL OR ARTIFICIAL OPENING ENDOSCOPIC: ICD-10-PCS | Performed by: UROLOGY

## 2022-03-07 DEVICE — URETERAL STENT
Type: IMPLANTABLE DEVICE | Site: URETER | Status: FUNCTIONAL
Brand: PERCUFLEX™ PLUS

## 2022-03-07 RX ORDER — EPHEDRINE SULFATE 5 MG/ML
5 INJECTION INTRAVENOUS ONCE AS NEEDED
Status: DISCONTINUED | OUTPATIENT
Start: 2022-03-07 | End: 2022-03-07 | Stop reason: HOSPADM

## 2022-03-07 RX ORDER — SODIUM CHLORIDE 0.9 % (FLUSH) 0.9 %
10 SYRINGE (ML) INJECTION AS NEEDED
Status: DISCONTINUED | OUTPATIENT
Start: 2022-03-07 | End: 2022-03-07 | Stop reason: HOSPADM

## 2022-03-07 RX ORDER — HYDROCODONE BITARTRATE AND ACETAMINOPHEN 7.5; 325 MG/1; MG/1
1 TABLET ORAL EVERY 4 HOURS PRN
Status: DISCONTINUED | OUTPATIENT
Start: 2022-03-07 | End: 2022-03-10

## 2022-03-07 RX ORDER — SODIUM CHLORIDE 9 MG/ML
100 INJECTION, SOLUTION INTRAVENOUS CONTINUOUS
Status: DISCONTINUED | OUTPATIENT
Start: 2022-03-07 | End: 2022-03-11 | Stop reason: HOSPADM

## 2022-03-07 RX ORDER — DEXAMETHASONE SODIUM PHOSPHATE 4 MG/ML
INJECTION, SOLUTION INTRA-ARTICULAR; INTRALESIONAL; INTRAMUSCULAR; INTRAVENOUS; SOFT TISSUE AS NEEDED
Status: DISCONTINUED | OUTPATIENT
Start: 2022-03-07 | End: 2022-03-07 | Stop reason: SURG

## 2022-03-07 RX ORDER — MEPERIDINE HYDROCHLORIDE 25 MG/ML
12.5 INJECTION INTRAMUSCULAR; INTRAVENOUS; SUBCUTANEOUS
Status: DISCONTINUED | OUTPATIENT
Start: 2022-03-07 | End: 2022-03-07 | Stop reason: HOSPADM

## 2022-03-07 RX ORDER — ACETAMINOPHEN 650 MG/1
650 SUPPOSITORY RECTAL ONCE AS NEEDED
Status: DISCONTINUED | OUTPATIENT
Start: 2022-03-07 | End: 2022-03-07 | Stop reason: HOSPADM

## 2022-03-07 RX ORDER — NEOSTIGMINE METHYLSULFATE 5 MG/5 ML
SYRINGE (ML) INTRAVENOUS AS NEEDED
Status: DISCONTINUED | OUTPATIENT
Start: 2022-03-07 | End: 2022-03-07 | Stop reason: SURG

## 2022-03-07 RX ORDER — LORAZEPAM 2 MG/ML
1 INJECTION INTRAMUSCULAR
Status: DISCONTINUED | OUTPATIENT
Start: 2022-03-07 | End: 2022-03-07 | Stop reason: HOSPADM

## 2022-03-07 RX ORDER — ONDANSETRON 2 MG/ML
4 INJECTION INTRAMUSCULAR; INTRAVENOUS ONCE AS NEEDED
Status: DISCONTINUED | OUTPATIENT
Start: 2022-03-07 | End: 2022-03-07 | Stop reason: HOSPADM

## 2022-03-07 RX ORDER — FENTANYL CITRATE 50 UG/ML
INJECTION, SOLUTION INTRAMUSCULAR; INTRAVENOUS AS NEEDED
Status: DISCONTINUED | OUTPATIENT
Start: 2022-03-07 | End: 2022-03-07 | Stop reason: SURG

## 2022-03-07 RX ORDER — HYDROMORPHONE HCL 110MG/55ML
PATIENT CONTROLLED ANALGESIA SYRINGE INTRAVENOUS AS NEEDED
Status: DISCONTINUED | OUTPATIENT
Start: 2022-03-07 | End: 2022-03-07 | Stop reason: SURG

## 2022-03-07 RX ORDER — LABETALOL HYDROCHLORIDE 5 MG/ML
INJECTION, SOLUTION INTRAVENOUS AS NEEDED
Status: DISCONTINUED | OUTPATIENT
Start: 2022-03-07 | End: 2022-03-07 | Stop reason: SURG

## 2022-03-07 RX ORDER — BUDESONIDE AND FORMOTEROL FUMARATE DIHYDRATE 160; 4.5 UG/1; UG/1
2 AEROSOL RESPIRATORY (INHALATION)
Status: DISCONTINUED | OUTPATIENT
Start: 2022-03-07 | End: 2022-03-07

## 2022-03-07 RX ORDER — TAMSULOSIN HYDROCHLORIDE 0.4 MG/1
0.4 CAPSULE ORAL DAILY
Status: DISCONTINUED | OUTPATIENT
Start: 2022-03-07 | End: 2022-03-11 | Stop reason: HOSPADM

## 2022-03-07 RX ORDER — OXYCODONE HYDROCHLORIDE 5 MG/1
5 TABLET ORAL ONCE AS NEEDED
Status: DISCONTINUED | OUTPATIENT
Start: 2022-03-07 | End: 2022-03-07 | Stop reason: HOSPADM

## 2022-03-07 RX ORDER — ACETAMINOPHEN 325 MG/1
650 TABLET ORAL ONCE AS NEEDED
Status: DISCONTINUED | OUTPATIENT
Start: 2022-03-07 | End: 2022-03-07 | Stop reason: HOSPADM

## 2022-03-07 RX ORDER — POTASSIUM CHLORIDE 1.5 G/1.77G
20 POWDER, FOR SOLUTION ORAL DAILY
Status: DISCONTINUED | OUTPATIENT
Start: 2022-03-08 | End: 2022-03-11 | Stop reason: HOSPADM

## 2022-03-07 RX ORDER — NIFEDIPINE 30 MG/1
30 TABLET, EXTENDED RELEASE ORAL
Refills: 3 | Status: DISCONTINUED | OUTPATIENT
Start: 2022-03-08 | End: 2022-03-11 | Stop reason: HOSPADM

## 2022-03-07 RX ORDER — HYDROMORPHONE HCL 110MG/55ML
0.5 PATIENT CONTROLLED ANALGESIA SYRINGE INTRAVENOUS
Status: DISCONTINUED | OUTPATIENT
Start: 2022-03-07 | End: 2022-03-07 | Stop reason: HOSPADM

## 2022-03-07 RX ORDER — LABETALOL HYDROCHLORIDE 5 MG/ML
5 INJECTION, SOLUTION INTRAVENOUS
Status: DISCONTINUED | OUTPATIENT
Start: 2022-03-07 | End: 2022-03-07 | Stop reason: HOSPADM

## 2022-03-07 RX ORDER — ACETAMINOPHEN 650 MG/1
650 SUPPOSITORY RECTAL EVERY 4 HOURS PRN
Status: DISCONTINUED | OUTPATIENT
Start: 2022-03-07 | End: 2022-03-11 | Stop reason: HOSPADM

## 2022-03-07 RX ORDER — ALBUTEROL SULFATE 90 UG/1
1 AEROSOL, METERED RESPIRATORY (INHALATION) EVERY 6 HOURS PRN
Status: DISCONTINUED | OUTPATIENT
Start: 2022-03-07 | End: 2022-03-11 | Stop reason: HOSPADM

## 2022-03-07 RX ORDER — ONDANSETRON 2 MG/ML
4 INJECTION INTRAMUSCULAR; INTRAVENOUS EVERY 6 HOURS PRN
Status: DISCONTINUED | OUTPATIENT
Start: 2022-03-07 | End: 2022-03-11 | Stop reason: HOSPADM

## 2022-03-07 RX ORDER — NALOXONE HCL 0.4 MG/ML
0.4 VIAL (ML) INJECTION
Status: DISCONTINUED | OUTPATIENT
Start: 2022-03-07 | End: 2022-03-11 | Stop reason: HOSPADM

## 2022-03-07 RX ORDER — DIPHENHYDRAMINE HYDROCHLORIDE 50 MG/ML
12.5 INJECTION INTRAMUSCULAR; INTRAVENOUS
Status: DISCONTINUED | OUTPATIENT
Start: 2022-03-07 | End: 2022-03-07 | Stop reason: HOSPADM

## 2022-03-07 RX ORDER — OXYCODONE HYDROCHLORIDE 5 MG/1
10 TABLET ORAL EVERY 4 HOURS PRN
Status: DISCONTINUED | OUTPATIENT
Start: 2022-03-07 | End: 2022-03-07 | Stop reason: HOSPADM

## 2022-03-07 RX ORDER — LIDOCAINE HYDROCHLORIDE 20 MG/ML
INJECTION, SOLUTION EPIDURAL; INFILTRATION; INTRACAUDAL; PERINEURAL AS NEEDED
Status: DISCONTINUED | OUTPATIENT
Start: 2022-03-07 | End: 2022-03-07 | Stop reason: SURG

## 2022-03-07 RX ORDER — SODIUM CHLORIDE 0.9 % (FLUSH) 0.9 %
10 SYRINGE (ML) INJECTION EVERY 12 HOURS SCHEDULED
Status: DISCONTINUED | OUTPATIENT
Start: 2022-03-07 | End: 2022-03-07 | Stop reason: HOSPADM

## 2022-03-07 RX ORDER — ONDANSETRON 4 MG/1
4 TABLET, FILM COATED ORAL EVERY 6 HOURS PRN
Status: DISCONTINUED | OUTPATIENT
Start: 2022-03-07 | End: 2022-03-11 | Stop reason: HOSPADM

## 2022-03-07 RX ORDER — HYDRALAZINE HYDROCHLORIDE 25 MG/1
50 TABLET, FILM COATED ORAL 3 TIMES DAILY PRN
Status: DISCONTINUED | OUTPATIENT
Start: 2022-03-07 | End: 2022-03-11 | Stop reason: HOSPADM

## 2022-03-07 RX ORDER — ACETAMINOPHEN 325 MG/1
650 TABLET ORAL EVERY 4 HOURS PRN
Status: DISCONTINUED | OUTPATIENT
Start: 2022-03-07 | End: 2022-03-11 | Stop reason: HOSPADM

## 2022-03-07 RX ORDER — MIDAZOLAM HYDROCHLORIDE 1 MG/ML
1 INJECTION INTRAMUSCULAR; INTRAVENOUS
Status: DISCONTINUED | OUTPATIENT
Start: 2022-03-07 | End: 2022-03-07 | Stop reason: HOSPADM

## 2022-03-07 RX ORDER — BUDESONIDE AND FORMOTEROL FUMARATE DIHYDRATE 160; 4.5 UG/1; UG/1
2 AEROSOL RESPIRATORY (INHALATION)
Status: DISCONTINUED | OUTPATIENT
Start: 2022-03-07 | End: 2022-03-11 | Stop reason: HOSPADM

## 2022-03-07 RX ORDER — MORPHINE SULFATE 4 MG/ML
4 INJECTION, SOLUTION INTRAMUSCULAR; INTRAVENOUS
Status: DISCONTINUED | OUTPATIENT
Start: 2022-03-07 | End: 2022-03-10

## 2022-03-07 RX ORDER — METOPROLOL SUCCINATE 50 MG/1
100 TABLET, EXTENDED RELEASE ORAL 2 TIMES DAILY
Status: DISCONTINUED | OUTPATIENT
Start: 2022-03-07 | End: 2022-03-11 | Stop reason: HOSPADM

## 2022-03-07 RX ORDER — FENTANYL CITRATE 50 UG/ML
50 INJECTION, SOLUTION INTRAMUSCULAR; INTRAVENOUS
Status: DISCONTINUED | OUTPATIENT
Start: 2022-03-07 | End: 2022-03-07 | Stop reason: HOSPADM

## 2022-03-07 RX ORDER — PROPOFOL 10 MG/ML
INJECTION, EMULSION INTRAVENOUS AS NEEDED
Status: DISCONTINUED | OUTPATIENT
Start: 2022-03-07 | End: 2022-03-07 | Stop reason: SURG

## 2022-03-07 RX ORDER — SODIUM CHLORIDE 0.9 % (FLUSH) 0.9 %
10 SYRINGE (ML) INJECTION EVERY 12 HOURS SCHEDULED
Status: DISCONTINUED | OUTPATIENT
Start: 2022-03-07 | End: 2022-03-11 | Stop reason: HOSPADM

## 2022-03-07 RX ORDER — VILAZODONE HYDROCHLORIDE 40 MG/1
40 TABLET ORAL DAILY
Status: DISCONTINUED | OUTPATIENT
Start: 2022-03-08 | End: 2022-03-11 | Stop reason: HOSPADM

## 2022-03-07 RX ORDER — ONDANSETRON 2 MG/ML
INJECTION INTRAMUSCULAR; INTRAVENOUS AS NEEDED
Status: DISCONTINUED | OUTPATIENT
Start: 2022-03-07 | End: 2022-03-07 | Stop reason: SURG

## 2022-03-07 RX ORDER — DIPHENHYDRAMINE HCL 25 MG
25 CAPSULE ORAL
Status: DISCONTINUED | OUTPATIENT
Start: 2022-03-07 | End: 2022-03-07 | Stop reason: HOSPADM

## 2022-03-07 RX ORDER — LAMOTRIGINE 100 MG/1
100 TABLET ORAL 2 TIMES DAILY
Status: DISCONTINUED | OUTPATIENT
Start: 2022-03-07 | End: 2022-03-11 | Stop reason: HOSPADM

## 2022-03-07 RX ORDER — SODIUM CHLORIDE 0.9 % (FLUSH) 0.9 %
10 SYRINGE (ML) INJECTION AS NEEDED
Status: DISCONTINUED | OUTPATIENT
Start: 2022-03-07 | End: 2022-03-11 | Stop reason: HOSPADM

## 2022-03-07 RX ORDER — ROCURONIUM BROMIDE 10 MG/ML
INJECTION, SOLUTION INTRAVENOUS AS NEEDED
Status: DISCONTINUED | OUTPATIENT
Start: 2022-03-07 | End: 2022-03-07 | Stop reason: SURG

## 2022-03-07 RX ORDER — IPRATROPIUM BROMIDE AND ALBUTEROL SULFATE 2.5; .5 MG/3ML; MG/3ML
3 SOLUTION RESPIRATORY (INHALATION) ONCE AS NEEDED
Status: DISCONTINUED | OUTPATIENT
Start: 2022-03-07 | End: 2022-03-07 | Stop reason: HOSPADM

## 2022-03-07 RX ORDER — CEFTRIAXONE 1 G/1
1 INJECTION, POWDER, FOR SOLUTION INTRAMUSCULAR; INTRAVENOUS EVERY 24 HOURS
Status: COMPLETED | OUTPATIENT
Start: 2022-03-07 | End: 2022-03-07

## 2022-03-07 RX ORDER — DIVALPROEX SODIUM 250 MG/1
250 TABLET, EXTENDED RELEASE ORAL 2 TIMES DAILY
Status: DISCONTINUED | OUTPATIENT
Start: 2022-03-07 | End: 2022-03-11 | Stop reason: HOSPADM

## 2022-03-07 RX ORDER — ATORVASTATIN CALCIUM 20 MG/1
20 TABLET, FILM COATED ORAL DAILY
Status: DISCONTINUED | OUTPATIENT
Start: 2022-03-07 | End: 2022-03-11 | Stop reason: HOSPADM

## 2022-03-07 RX ORDER — NALTREXONE HYDROCHLORIDE 50 MG/1
50 TABLET, FILM COATED ORAL EVERY MORNING
Status: DISCONTINUED | OUTPATIENT
Start: 2022-03-07 | End: 2022-03-07

## 2022-03-07 RX ORDER — PRAMIPEXOLE DIHYDROCHLORIDE 0.5 MG/1
0.5 TABLET ORAL DAILY
Status: DISCONTINUED | OUTPATIENT
Start: 2022-03-07 | End: 2022-03-11 | Stop reason: HOSPADM

## 2022-03-07 RX ORDER — HYDRALAZINE HYDROCHLORIDE 20 MG/ML
5 INJECTION INTRAMUSCULAR; INTRAVENOUS
Status: DISCONTINUED | OUTPATIENT
Start: 2022-03-07 | End: 2022-03-07 | Stop reason: HOSPADM

## 2022-03-07 RX ORDER — SODIUM CHLORIDE, SODIUM LACTATE, POTASSIUM CHLORIDE, CALCIUM CHLORIDE 600; 310; 30; 20 MG/100ML; MG/100ML; MG/100ML; MG/100ML
9 INJECTION, SOLUTION INTRAVENOUS CONTINUOUS PRN
Status: DISCONTINUED | OUTPATIENT
Start: 2022-03-07 | End: 2022-03-07 | Stop reason: HOSPADM

## 2022-03-07 RX ORDER — GLYCOPYRROLATE 1 MG/5 ML
SYRINGE (ML) INTRAVENOUS AS NEEDED
Status: DISCONTINUED | OUTPATIENT
Start: 2022-03-07 | End: 2022-03-07 | Stop reason: SURG

## 2022-03-07 RX ORDER — FLUMAZENIL 0.1 MG/ML
0.5 INJECTION INTRAVENOUS AS NEEDED
Status: DISCONTINUED | OUTPATIENT
Start: 2022-03-07 | End: 2022-03-07 | Stop reason: HOSPADM

## 2022-03-07 RX ORDER — BUPROPION HYDROCHLORIDE 150 MG/1
300 TABLET ORAL EVERY MORNING
Status: DISCONTINUED | OUTPATIENT
Start: 2022-03-08 | End: 2022-03-11 | Stop reason: HOSPADM

## 2022-03-07 RX ORDER — NALOXONE HCL 0.4 MG/ML
0.4 VIAL (ML) INJECTION AS NEEDED
Status: DISCONTINUED | OUTPATIENT
Start: 2022-03-07 | End: 2022-03-07 | Stop reason: HOSPADM

## 2022-03-07 RX ORDER — LEVOTHYROXINE SODIUM 0.05 MG/1
50 TABLET ORAL
Status: DISCONTINUED | OUTPATIENT
Start: 2022-03-08 | End: 2022-03-11 | Stop reason: HOSPADM

## 2022-03-07 RX ORDER — ATROPA BELLADONNA AND OPIUM 16.2; 3 MG/1; MG/1
30 SUPPOSITORY RECTAL DAILY PRN
Status: DISCONTINUED | OUTPATIENT
Start: 2022-03-07 | End: 2022-03-11 | Stop reason: HOSPADM

## 2022-03-07 RX ADMIN — HYDROMORPHONE HYDROCHLORIDE 1 MG: 2 INJECTION INTRAMUSCULAR; INTRAVENOUS; SUBCUTANEOUS at 09:49

## 2022-03-07 RX ADMIN — SODIUM CHLORIDE 100 ML/HR: 9 INJECTION, SOLUTION INTRAVENOUS at 21:23

## 2022-03-07 RX ADMIN — ROCURONIUM BROMIDE 20 MG: 50 INJECTION, SOLUTION INTRAVENOUS at 08:50

## 2022-03-07 RX ADMIN — ONDANSETRON 4 MG: 2 INJECTION INTRAMUSCULAR; INTRAVENOUS at 10:30

## 2022-03-07 RX ADMIN — LIDOCAINE HYDROCHLORIDE 100 MG: 20 INJECTION, SOLUTION EPIDURAL; INFILTRATION; INTRACAUDAL; PERINEURAL at 08:11

## 2022-03-07 RX ADMIN — SUGAMMADEX 200 MG: 100 INJECTION, SOLUTION INTRAVENOUS at 10:42

## 2022-03-07 RX ADMIN — ROCURONIUM BROMIDE 10 MG: 50 INJECTION, SOLUTION INTRAVENOUS at 08:58

## 2022-03-07 RX ADMIN — ROCURONIUM BROMIDE 10 MG: 50 INJECTION, SOLUTION INTRAVENOUS at 09:36

## 2022-03-07 RX ADMIN — ATORVASTATIN CALCIUM 20 MG: 20 TABLET, FILM COATED ORAL at 21:07

## 2022-03-07 RX ADMIN — HYDROCODONE BITARTRATE AND ACETAMINOPHEN 1 TABLET: 7.5; 325 TABLET ORAL at 21:08

## 2022-03-07 RX ADMIN — LAMOTRIGINE 100 MG: 100 TABLET ORAL at 21:08

## 2022-03-07 RX ADMIN — FENTANYL CITRATE 50 MCG: 50 INJECTION INTRAMUSCULAR; INTRAVENOUS at 09:19

## 2022-03-07 RX ADMIN — Medication 10 ML: at 21:08

## 2022-03-07 RX ADMIN — BUDESONIDE AND FORMOTEROL FUMARATE DIHYDRATE 2 PUFF: 160; 4.5 AEROSOL RESPIRATORY (INHALATION) at 20:38

## 2022-03-07 RX ADMIN — HYDROMORPHONE HYDROCHLORIDE 0.5 MG: 2 INJECTION INTRAMUSCULAR; INTRAVENOUS; SUBCUTANEOUS at 09:35

## 2022-03-07 RX ADMIN — DIVALPROEX SODIUM 250 MG: 250 TABLET, EXTENDED RELEASE ORAL at 21:17

## 2022-03-07 RX ADMIN — Medication 5 MG: at 10:32

## 2022-03-07 RX ADMIN — FENTANYL CITRATE 50 MCG: 50 INJECTION INTRAMUSCULAR; INTRAVENOUS at 09:05

## 2022-03-07 RX ADMIN — ROCURONIUM BROMIDE 50 MG: 50 INJECTION, SOLUTION INTRAVENOUS at 08:11

## 2022-03-07 RX ADMIN — METOPROLOL SUCCINATE 100 MG: 50 TABLET, EXTENDED RELEASE ORAL at 21:08

## 2022-03-07 RX ADMIN — CEFTRIAXONE SODIUM 1 G: 1 INJECTION, POWDER, FOR SOLUTION INTRAMUSCULAR; INTRAVENOUS at 08:13

## 2022-03-07 RX ADMIN — LORAZEPAM 1 MG: 2 INJECTION INTRAMUSCULAR; INTRAVENOUS at 11:14

## 2022-03-07 RX ADMIN — DEXAMETHASONE SODIUM PHOSPHATE 4 MG: 4 INJECTION, SOLUTION INTRAMUSCULAR; INTRAVENOUS at 08:20

## 2022-03-07 RX ADMIN — FENTANYL CITRATE 50 MCG: 50 INJECTION INTRAMUSCULAR; INTRAVENOUS at 10:40

## 2022-03-07 RX ADMIN — TAMSULOSIN HYDROCHLORIDE 0.4 MG: 0.4 CAPSULE ORAL at 21:07

## 2022-03-07 RX ADMIN — LABETALOL 20 MG/4 ML (5 MG/ML) INTRAVENOUS SYRINGE 10 MG: at 10:53

## 2022-03-07 RX ADMIN — PROPOFOL 200 MG: 10 INJECTION, EMULSION INTRAVENOUS at 08:11

## 2022-03-07 RX ADMIN — SODIUM CHLORIDE, SODIUM LACTATE, POTASSIUM CHLORIDE, AND CALCIUM CHLORIDE: .6; .31; .03; .02 INJECTION, SOLUTION INTRAVENOUS at 08:04

## 2022-03-07 RX ADMIN — HYDROMORPHONE HYDROCHLORIDE 0.5 MG: 2 INJECTION, SOLUTION INTRAMUSCULAR; INTRAVENOUS; SUBCUTANEOUS at 11:30

## 2022-03-07 RX ADMIN — PRAMIPEXOLE DIHYDROCHLORIDE 0.5 MG: 0.5 TABLET ORAL at 21:08

## 2022-03-07 RX ADMIN — HYDROMORPHONE HYDROCHLORIDE 0.5 MG: 2 INJECTION INTRAMUSCULAR; INTRAVENOUS; SUBCUTANEOUS at 09:26

## 2022-03-07 RX ADMIN — Medication 0.8 MG: at 10:32

## 2022-03-07 RX ADMIN — HYDROMORPHONE HYDROCHLORIDE 0.5 MG: 2 INJECTION, SOLUTION INTRAMUSCULAR; INTRAVENOUS; SUBCUTANEOUS at 11:14

## 2022-03-07 RX ADMIN — FENTANYL CITRATE 50 MCG: 50 INJECTION INTRAMUSCULAR; INTRAVENOUS at 10:34

## 2022-03-07 RX ADMIN — SODIUM CHLORIDE 100 ML/HR: 9 INJECTION, SOLUTION INTRAVENOUS at 11:35

## 2022-03-07 RX ADMIN — FENTANYL CITRATE 100 MCG: 50 INJECTION INTRAMUSCULAR; INTRAVENOUS at 08:11

## 2022-03-07 NOTE — ANESTHESIA PROCEDURE NOTES
Airway  Urgency: elective    Date/Time: 3/7/2022 8:12 AM  End Time:3/7/2022 8:12 AM  Airway not difficult (anticipated, so went straight to glidescope)    General Information and Staff    Patient location during procedure: OR  Anesthesiologist: Alpesh Haider MD  CRNA: Ekta Grider CAA    Indications and Patient Condition  Indications for airway management: airway protection    Preoxygenated: yes  Mask difficulty assessment: 1 - vent by mask    Final Airway Details  Final airway type: endotracheal airway      Successful airway: ETT  Cuffed: yes   Successful intubation technique: direct laryngoscopy  Facilitating devices/methods: intubating stylet  Endotracheal tube insertion site: oral  Blade: Glidescope  Blade size: D  ETT size (mm): 7.5  Cormack-Lehane Classification: grade I - full view of glottis  Placement verified by: chest auscultation, capnometry and palpation of cuff   Measured from: lips  ETT/EBT  to lips (cm): 22  Number of attempts at approach: 1  Assessment: lips, teeth, and gum same as pre-op and atraumatic intubation

## 2022-03-07 NOTE — OUTREACH NOTE
General Surgery Week 2 Survey    Flowsheet Row Responses   [facility]   Does the patient have one of the following disease processes/diagnoses(primary or secondary)? General Surgery   Week 2 attempt successful? No   Revoke Readmitted          EDUARD MACK - Registered Nurse

## 2022-03-07 NOTE — CONSULTS
[facility] Medicine Services - Consult Note    Patient Name: Calos Moseley  : 1960  MRN: [id number]  Primary Care Physician:  Naveed Nunez DO  Referring Physician: Deuce Thao*  Date of admission: 3/7/2022    Consults    Subjective      Reason for Consult/ Chief Complaint: flank pain    History of Present Illness:     Calos Moseley is a 61 y.o. male with PMH of HTN, HLD, COPD, ISAIH on CPAP, hypothyroidism, bipolar affective disorder, depression, anxiety, restless leg syndrome morbid obesity, and recurrent kidney stones. The patient underwent left ureteroscopy with laser lithotripsy and stent exchange as well as right percutaneous nephrolithotomy with nephrostomy tube placement 3/7/2022 by Dr. Leija. The hospitalist group was consulted for medical management. He has dillard in place with bloody urine and right nephrostomy tube with bloody drainage. He complaints of right flank pain.       Review of Systems   Constitutional: Negative.   HENT: Negative.    Eyes: Negative.    Cardiovascular: Negative.    Respiratory: Negative.    Endocrine: Negative.    Hematologic/Lymphatic: Negative.    Skin: Negative.    Gastrointestinal: Negative.    Genitourinary: Positive for flank pain and pelvic pain.   Neurological: Negative.    Psychiatric/Behavioral: Negative.    Allergic/Immunologic: Negative.    All other systems reviewed and are negative.       Personal History     Past Medical History:   Diagnosis Date   • Anesthesia complication     STATES HARD TO WAKE UP AFTER PREVIOUS KIDNEY STONE SURGERY AND STATES HE CODED   • Bilateral kidney stones    • Bipolar affective (HCC)    • COPD (chronic obstructive pulmonary disease) (HCC)    • Disease of thyroid gland    • Elevated cholesterol    • Hypertension    • Kidney stone on left side    • Left flank pain    • Mood disorder (HCC)    • Restless leg syndrome    • Sleep apnea     cpap  bring dos   • UTI (urinary tract infection) 2021     PREV FINISHED ANTIBIOTIC       Past Surgical History:   Procedure Laterality Date   • ADENOIDECTOMY     • APPENDECTOMY     • COLONOSCOPY     • KIDNEY STONE SURGERY     • NEPHROLITHOTRIPSY PERCUTANEOUS Left 2/21/2022    Procedure: NEPHROSTLITHOTOMY PERCUTANEOUS;  Surgeon: Deuce Leija MD;  Location: [facility];  Service: Urology;  Laterality: Left;   • NEPHROLITHOTRIPSY PERCUTANEOUS Left 2/23/2022    Procedure: LEFT FLEXIBLE NEPHROSTOMY WITH STONE EXTRACTION, LEFT NEPHROSTOMY, TUBE REMOVAL, BILATERAL URETERAL STENT PLACEMENT;  Surgeon: Deuce Leija MD;  Location: [facility];  Service: Urology;  Laterality: Left;   • TONSILLECTOMY         Family History: family history is not on file. Otherwise pertinent FHx was reviewed and not pertinent to current issue.    Social History:  reports that he quit smoking about 31 years ago. His smoking use included cigarettes. He has never used smokeless tobacco. He reports previous alcohol use. He reports that he does not use drugs.    Home Medications:   HYDROcodone-acetaminophen, NIFEdipine CC, albuterol sulfate HFA, atorvastatin, buPROPion XL, budesonide-formoterol, divalproex, hydrALAZINE, lamoTRIgine, levothyroxine, metoprolol succinate XL, naltrexone, potassium chloride, pramipexole, sildenafil, tamsulosin, and vilazodone    Allergies:  No Known Allergies      Objective      Vitals:  Temp:  [97.6 °F (36.4 °C)-98.8 °F (37.1 °C)] 97.6 °F (36.4 °C)  Heart Rate:  [] 73  Resp:  [9-19] 16  BP: (144-166)/(73-77) 164/76  Flow (L/min):  [1-6] 1    Physical Exam  Vitals and nursing note reviewed.   HENT:      Head: Normocephalic and atraumatic.   Eyes:      Extraocular Movements: Extraocular movements intact.      Pupils: Pupils are equal, round, and reactive to light.   Cardiovascular:      Rate and Rhythm: Normal rate and regular rhythm.      Pulses: Normal pulses.      Heart sounds: Normal heart sounds.   Pulmonary:      Effort: Pulmonary effort is  normal.      Breath sounds: Normal breath sounds.   Abdominal:      General: Bowel sounds are normal.      Palpations: Abdomen is soft.      Tenderness: There is no abdominal tenderness.      Comments: Right nephrostomy drain in place with sanguinous output   Genitourinary:     Comments: Lobo in place  Musculoskeletal:         General: Normal range of motion.      Cervical back: Normal range of motion.   Skin:     General: Skin is warm and dry.   Neurological:      Mental Status: He is alert and oriented to person, place, and time.   Psychiatric:         Mood and Affect: Mood normal.         Behavior: Behavior normal.          Result Review    Result Review:  I have personally reviewed the results from the time of this admission to 3/7/2022 15:28 EST and agree with these findings:  [x]  Laboratory  []  Microbiology  [x]  Radiology  []  EKG/Telemetry   []  Cardiology/Vascular   []  Pathology  [x]  Old records    Assessment/Plan        Active Hospital Problems:  Active Hospital Problems    Diagnosis    • **Bilateral kidney stones    • Calculus of ureter    • Restless leg syndrome    • Bipolar affective (HCC)    • ISIAH (obstructive sleep apnea)      Last Assessment & Plan:   Formatting of this note might be different from the original.  Patient is using and feels that he is benefiting from CPAP usage.  Review of 30-day download with excellent usage and compliance with improved AHI at 5.3, down from a previous residual AHI of greater than 8.  Patient still has significant leak time noted.  Discussed that he will need to have, would benefit from, dedicated mask fitting may need to size down with his mask option.  Patient verbalized understanding     • HLD (hyperlipidemia)    • COPD (chronic obstructive pulmonary disease) (HCC)    • Hypothyroidism      Formatting of this note might be different from the original.  Synthroid 50mcgs     • Anxiety and depression      Formatting of this note might be different from the  original.  Lamictal and Vibryd     • Essential hypertension      Formatting of this note might be different from the original.  Lisinopril, Apresoline, and Metoprolol    Last Assessment & Plan:   Formatting of this note might be different from the original.  Not well controlled, continue medications per PCP instruction, if continues to be elevated may need to see PCP for further evaluation       Plan:     Bilateral renal stones status post left ureteroscopy with laser lithotripsy and stent exchange as well as right percutaneous nephrolithotomy with nephrostomy tube placement 3/7/2022 by Dr. Leija  -monitor urine output, hgb, and BMP  -hgb 12.1, creatinine 1.52  -on IVFs, IV rocephin, Flomax, and pain control per urology     Essential hypertension  -Chronic-partially controlled  -cont home Toprol, Procardia     Hyperlipidemia  -Chronic, continue home atorvastatin     COPD  ISIAH   -Chronic/stable not in exacerbation   -Continue home Symbicort, as needed albuterol  -May use home CPAP     Hypothyroidism  -Continue home levothyroxine  .  Anxiety and depression/bipolar disorder  -Chronic/stable  -Continue home Depakote, Wellbutrin, and Lamictal     Restless leg syndrome  -Continue Mirapex     Obesity, Class III, BMI 38.6  -Encourage lifestyle modifications      This patient has been examined wearing appropriate Personal Protective Equipment  03/07/22      Signature: Electronically signed by SALVADOR Pantoja, 03/07/22, 3:28 PM EST.

## 2022-03-07 NOTE — OP NOTE
Preop Diagnosis: Bilateral Renal Stones  Postop Diagnosis: same  Procedures:  1. Left URS/LL/SBE/Stent  Exchange  2. Right PCNL < 2 cm  3. Perc Access  Surgeon: Liss  Anesthesia: General  Findings: Was able to laser and find  Remaining stones in left lower  Pole; Right side was difficult access and had  To do upper pole access but did  It  Tubeless with drainage tube in lower pole  Complications:  None    Brief  History:  62 yo with stones s/p Left PCNL.  Presents for Left URS and Right PCNL.  Please  See  My preoperative H&P for  Full details.    Description of Procedure:  Pt brought to OR, timeout completed, antibiotics given.  Placed in lithotomy position, prepped/draped.  Cystoscopy revealed normal bladder. Left stent was grasped and  2 wires were placed into kidney.  I place a ureteral access sheath with ease.  Flexible ureteroscopy revealed 1 large stone in a left lower pole calyx.  I broke this up with a laser and removed the fragments.  I  Didn't  See anything else on fluroscopy.  Retrograde showed no extravasation  And  I placed a 6x26 stent with no string.  He was  Then placed  In prone  Position in right modified swimmer's position.  He was prepped and  Draped.  I obtained  Lower pole access but couldn't get wire to go down ureter.  Likely due to renal pelvic  Stone.  Had  To do upper pole access so used eye of the needle technique  And easily placed glide wire down ureter.  Exchanged for stiffer  Wire and placed 2 wires.  I dilated  Into the upper pole calyx with nephromax  Balloon.  Right endoscopy showed  The stone  And  I broke it up with ultrasonic lihtotriptor.  Flexible endoscopy revealed no further stones.  To cut down on his  Potential pulmonary complications  I then scoped  Into a lower pole calyx and used this as my target.  I got  Access into this calyx and used the glide wire to get through and through access.  I then dilated  Over this wire and  Placed a 10 Khmer nephrostomy tube into  the renal pelvis.  Wires were sacrified and  Dressings applied.    There  Were no immediate complications.    Will get CT scan to see if residual  Stones  Are present.    THESE PROCEDURES  WERE THE SECOND STAGE OF A STAGED PROCEDURE

## 2022-03-07 NOTE — ANESTHESIA POSTPROCEDURE EVALUATION
Patient: Calos Moseley    Procedure Summary     Date: 03/07/22 Room / Location: [facility] OR 09 / [facility] MAIN OR    Anesthesia Start: 0803 Anesthesia Stop: 1056    Procedures:       RIGHT PERCUTANEOUS NEPHROLITHOTOMY W ACCESS (Right Flank)      CYSTOSCOPY URETEROSCOPY LASER LITHOTRIPSY and stent exchange (Left ) Diagnosis:       Calculus of ureter      (Calculus of ureter [N20.1])    Surgeons: Deuce Leija MD Provider: Alpesh Haider MD    Anesthesia Type: general ASA Status: 3          Anesthesia Type: general    Vitals  Vitals Value Taken Time   /74 03/07/22 1209   Temp 98.8 °F (37.1 °C) 03/07/22 1209   Pulse 74 03/07/22 1210   Resp 13 03/07/22 1209   SpO2 94 % 03/07/22 1210   Vitals shown include unvalidated device data.        Post Anesthesia Care and Evaluation    Patient location during evaluation: PACU  Patient participation: complete - patient participated  Level of consciousness: awake  Pain scale: See nurse's notes for pain score.  Pain management: adequate  Airway patency: patent  Anesthetic complications: No anesthetic complications  PONV Status: none  Cardiovascular status: acceptable  Respiratory status: acceptable  Hydration status: acceptable    Comments: Patient seen and examined postoperatively; vital signs stable; SpO2 greater than or equal to 90%; cardiopulmonary status stable; nausea/vomiting adequately controlled; pain adequately controlled; no apparent anesthesia complications; patient discharged from anesthesia care when discharge criteria were met

## 2022-03-07 NOTE — ANESTHESIA PREPROCEDURE EVALUATION
Anesthesia Evaluation     Patient summary reviewed   NPO Solid Status: > 8 hours  NPO Liquid Status: > 8 hours           Airway   Mallampati: II  TM distance: >3 FB  Neck ROM: full  No difficulty expected  Dental - normal exam     Pulmonary    (+) COPD, sleep apnea,   Cardiovascular     (+) hypertension, hyperlipidemia,       Neuro/Psych  GI/Hepatic/Renal/Endo    (+) morbid obesity,  renal disease stones, thyroid problem     Musculoskeletal     Abdominal    Substance History      OB/GYN          Other                        Anesthesia Plan    ASA 3     general     intravenous induction     Anesthetic plan, all risks, benefits, and alternatives have been provided, discussed and informed consent has been obtained with: patient.    Plan discussed with CRNA and CAA.        CODE STATUS:

## 2022-03-07 NOTE — PLAN OF CARE
Goal Outcome Evaluation:  Plan of Care Reviewed With: patient           Outcome Evaluation: Admitted to unit from recovery room. Still very drowsy but will awaken when spoken to. Lobo catheter and nephro tube in place. Both with bloody output. Will monitor

## 2022-03-07 NOTE — H&P
Urology Progress Note    Patient Identification:  Name:  Calos Moseley  Age:  61 y.o.  Sex:  male  :  1960  MRN:  [id number]    Chief Complaint:  I have stones    History of Present Illness:  62 yo wm with stone disease  Here for left Urs and right pcnl    Problem List:  Patient Active Problem List   Diagnosis   • HLD (hyperlipidemia)   • Obesity, Class III, BMI 40-49.9 (morbid obesity) ([facility])   • Bilateral kidney stones   • COPD (chronic obstructive pulmonary disease) ([facility])   • Left flank pain   • Anxiety and depression   • Hypothyroidism   • ISIAH (obstructive sleep apnea)   • Essential hypertension   • Calculus, renal   • Restless leg syndrome   • Bipolar affective ([facility])     Past Medical History:  Past Medical History:   Diagnosis Date   • Anesthesia complication     STATES HARD TO WAKE UP AFTER PREVIOUS KIDNEY STONE SURGERY AND STATES HE CODED   • Bilateral kidney stones    • Bipolar affective ([facility])    • COPD (chronic obstructive pulmonary disease) ([facility])    • Disease of thyroid gland    • Elevated cholesterol    • Hypertension    • Kidney stone on left side    • Left flank pain    • Mood disorder ([facility])    • Restless leg syndrome    • Sleep apnea     cpap  bring dos   • UTI (urinary tract infection) 2021    PREV FINISHED ANTIBIOTIC     Past Surgical History:  Past Surgical History:   Procedure Laterality Date   • ADENOIDECTOMY     • APPENDECTOMY     • COLONOSCOPY     • KIDNEY STONE SURGERY     • NEPHROLITHOTRIPSY PERCUTANEOUS Left 2022    Procedure: NEPHROSTLITHOTOMY PERCUTANEOUS;  Surgeon: Deuce Leija MD;  Location: [facility];  Service: Urology;  Laterality: Left;   • NEPHROLITHOTRIPSY PERCUTANEOUS Left 2022    Procedure: LEFT FLEXIBLE NEPHROSTOMY WITH STONE EXTRACTION, LEFT NEPHROSTOMY, TUBE REMOVAL, BILATERAL URETERAL STENT PLACEMENT;  Surgeon: Deuce Leija MD;  Location: [facility];  Service: Urology;  Laterality: Left;   • TONSILLECTOMY       Home Meds:  Medications  Prior to Admission   Medication Sig Dispense Refill Last Dose   • albuterol sulfate  (90 Base) MCG/ACT inhaler INHALE ONE TO TWO PUFFS BY MOUTH EVERY 4 TO 6 HOURS 18 g 11 Past Week at Unknown time   • atorvastatin (LIPITOR) 20 MG tablet Take 1 tablet by mouth every night at bedtime. 90 tablet 3 3/6/2022 at Unknown time   • budesonide-formoterol (SYMBICORT) 160-4.5 MCG/ACT inhaler Inhale 2 puffs 2 (Two) Times a Day.   3/7/2022 at 500   • budesonide-formoterol (SYMBICORT) 160-4.5 MCG/ACT inhaler Inhale 2 puffs 2 (Two) Times a Day.   3/7/2022 at 0500   • buPROPion XL (WELLBUTRIN XL) 300 MG 24 hr tablet Take 1 tablet by mouth Every Morning. 30 tablet 5 3/7/2022 at 0500   • cephalexin (KEFLEX) 500 MG capsule Take 1 capsule by mouth 3 (Three) Times a Day. 30 capsule 0 3/6/2022 at Unknown time   • divalproex (DEPAKOTE ER) 250 MG 24 hr tablet Take 1 tablet by mouth 2 (Two) Times a Day. 180 tablet 2 3/6/2022 at Unknown time   • hydrALAZINE (APRESOLINE) 50 MG tablet Take 1 tablet by mouth 3 (Three) Times a Day As Needed. 90 tablet 11 3/7/2022 at 0500   • HYDROcodone-acetaminophen (NORCO) 7.5-325 MG per tablet Take 1 tablet by mouth Every 4 (Four) to 6 (six) Hours As Needed for pain 20 tablet 0 3/5/2022 at Unknown time   • lamoTRIgine (LaMICtal) 100 MG tablet Take 100 mg by mouth 2 (Two) Times a Day. Take preop   3/7/2022 at 0500   • levothyroxine (SYNTHROID, LEVOTHROID) 50 MCG tablet Take 50 mcg by mouth Every Morning. TAKE PREOP   3/7/2022 at 0500   • metoprolol succinate XL (TOPROL-XL) 100 MG 24 hr tablet Take 100 mg by mouth 2 (Two) Times a Day. TAKE PREOP   3/7/2022 at 0500   • naltrexone (DEPADE) 50 MG tablet Take 1 tablet by mouth Every Morning. 90 tablet 2 3/7/2022 at 0500   • NIFEdipine CC (ADALAT CC) 90 MG 24 hr tablet Take 1 tablet by mouth Daily. 90 tablet 3 3/7/2022 at 0500   • potassium chloride (KLOR-CON) 20 MEQ packet Take 20 mEq by mouth Daily. TAKE PREOP      • pramipexole (MIRAPEX) 0.5 MG tablet take  "1 tablet by mouth at bedtime 90 tablet 0    • sildenafil (VIAGRA) 100 MG tablet Take 1 tablet by mouth Daily As Needed. 6 tablet 3 Past Month at Unknown time   • tamsulosin (FLOMAX) 0.4 MG capsule 24 hr capsule Take 1 capsule by mouth Daily. 30 capsule 0    • vilazodone (VIIBRYD) 40 MG tablet tablet Take 40 mg by mouth Daily. TAKE PREOP   3/7/2022 at 0500     Current Meds:    Current Facility-Administered Medications:   •  cefTRIAXone (ROCEPHIN) injection 1 g, 1 g, Intramuscular, Q24H, Deuce Leija MD  •  lactated ringers infusion, 9 mL/hr, Intravenous, Continuous PRN, Nick Chen CAA  •  sodium chloride 0.9 % flush 10 mL, 10 mL, Intravenous, Q12H, Nick Chen CAA  •  sodium chloride 0.9 % flush 10 mL, 10 mL, Intravenous, PRN, Nick Chen CAA  Allergies:  Patient has no known allergies.    Review of Systems:  Negative 12 point system review except: mild discomfort from the stents    Objective:  tMax 24 hours:  Temp (24hrs), Av.1 °F (36.7 °C), Min:98.1 °F (36.7 °C), Max:98.1 °F (36.7 °C)    Vital Sign Ranges:  Temp:  [98.1 °F (36.7 °C)] 98.1 °F (36.7 °C)  Heart Rate:  [102] 102  Resp:  [19] 19  BP: (166)/(77) 166/77  Intake and Output Last 3 Shifts:  No intake/output data recorded.    Exam:  /77 (Patient Position: Lying)   Pulse 102   Temp 98.1 °F (36.7 °C) (Temporal)   Resp 19   Ht 167.6 cm (66\")   Wt 109 kg (239 lb 3.2 oz)   SpO2 95%   BMI 38.61 kg/m²    General Appearance:    Alert, cooperative, no acute distress, general       appearance is normal   Head:    Normocephalic, without obvious abnormality, atraumatic   Eyes:            Pupils/Irises normal. Exterior inspection conjunctiva       and lids normal.   Ears:    Normal external inspection   Nose:   Exterior inspection of nose is normal   Throat:   Lips, mucosa, and tongue normal   Neck:   No adenopathy, supple, symmetrical, trachea midline   Back:     No CVA tenderness   Lungs:     Respirations unlabored; " normal effort, no audible     abnormality   CV:   Regular rhythm and normal rate, no edema   Abdomen:     No hernia, examination of the abdomen is normal with     no masses, tenderness, or distension    Female :  N/A    Musculoskeletal:   Inspection of the nails and digits reveal no abnormalities   Skin:   Inspection normal, palpation normal   Neurologic/Psych:   Orientation normal; Mood/Affect normal     Data Review:  All labs (24hrs):    Lab Results (last 24 hours)     ** No results found for the last 24 hours. **        Radiology:   Imaging Results (Last 72 Hours)     ** No results found for the last 72 hours. **          Assessment:  Active Problems:    * No active hospital problems. *  Impression:  Bilateral Renal Stones    Plan:  1. Left URS and Right PCNL      Deuce Leija MD  3/7/2022  08:09 EST

## 2022-03-08 ENCOUNTER — ANESTHESIA EVENT (OUTPATIENT)
Dept: PERIOP | Facility: HOSPITAL | Age: 62
End: 2022-03-08

## 2022-03-08 ENCOUNTER — ANESTHESIA (OUTPATIENT)
Dept: PERIOP | Facility: HOSPITAL | Age: 62
End: 2022-03-08

## 2022-03-08 ENCOUNTER — APPOINTMENT (OUTPATIENT)
Dept: CT IMAGING | Facility: HOSPITAL | Age: 62
End: 2022-03-08

## 2022-03-08 PROBLEM — R31.0 GROSS HEMATURIA: Status: ACTIVE | Noted: 2022-02-25

## 2022-03-08 LAB
ANION GAP SERPL CALCULATED.3IONS-SCNC: 7 MMOL/L (ref 5–15)
BUN SERPL-MCNC: 12 MG/DL (ref 8–23)
BUN/CREAT SERPL: 7 (ref 7–25)
CALCIUM SPEC-SCNC: 8.5 MG/DL (ref 8.6–10.5)
CHLORIDE SERPL-SCNC: 107 MMOL/L (ref 98–107)
CO2 SERPL-SCNC: 29 MMOL/L (ref 22–29)
CREAT SERPL-MCNC: 1.72 MG/DL (ref 0.76–1.27)
DEPRECATED RDW RBC AUTO: 45.1 FL (ref 37–54)
EGFRCR SERPLBLD CKD-EPI 2021: 44.9 ML/MIN/1.73
ERYTHROCYTE [DISTWIDTH] IN BLOOD BY AUTOMATED COUNT: 14.3 % (ref 12.3–15.4)
GLUCOSE SERPL-MCNC: 115 MG/DL (ref 65–99)
HCT VFR BLD AUTO: 23.4 % (ref 37.5–51)
HCT VFR BLD AUTO: 25.7 % (ref 37.5–51)
HCT VFR BLD AUTO: 29.5 % (ref 37.5–51)
HCT VFR BLD AUTO: 29.8 % (ref 37.5–51)
HGB BLD-MCNC: 10 G/DL (ref 13–17.7)
HGB BLD-MCNC: 10.2 G/DL (ref 13–17.7)
HGB BLD-MCNC: 8 G/DL (ref 13–17.7)
HGB BLD-MCNC: 8.7 G/DL (ref 13–17.7)
MCH RBC QN AUTO: 30.7 PG (ref 26.6–33)
MCHC RBC AUTO-ENTMCNC: 34.2 G/DL (ref 31.5–35.7)
MCV RBC AUTO: 89.7 FL (ref 79–97)
PLATELET # BLD AUTO: 178 10*3/MM3 (ref 140–450)
PMV BLD AUTO: 8.2 FL (ref 6–12)
POTASSIUM SERPL-SCNC: 4.5 MMOL/L (ref 3.5–5.2)
RBC # BLD AUTO: 3.32 10*6/MM3 (ref 4.14–5.8)
SODIUM SERPL-SCNC: 143 MMOL/L (ref 136–145)
WBC NRBC COR # BLD: 8.1 10*3/MM3 (ref 3.4–10.8)

## 2022-03-08 PROCEDURE — 0TCB8ZZ EXTIRPATION OF MATTER FROM BLADDER, VIA NATURAL OR ARTIFICIAL OPENING ENDOSCOPIC: ICD-10-PCS | Performed by: UROLOGY

## 2022-03-08 PROCEDURE — 99232 SBSQ HOSP IP/OBS MODERATE 35: CPT | Performed by: HOSPITALIST

## 2022-03-08 PROCEDURE — 85014 HEMATOCRIT: CPT | Performed by: UROLOGY

## 2022-03-08 PROCEDURE — 85027 COMPLETE CBC AUTOMATED: CPT | Performed by: UROLOGY

## 2022-03-08 PROCEDURE — 80048 BASIC METABOLIC PNL TOTAL CA: CPT | Performed by: UROLOGY

## 2022-03-08 PROCEDURE — 25010000002 LORAZEPAM PER 2 MG: Performed by: HOSPITALIST

## 2022-03-08 PROCEDURE — 74176 CT ABD & PELVIS W/O CONTRAST: CPT

## 2022-03-08 PROCEDURE — 25010000002 ONDANSETRON PER 1 MG

## 2022-03-08 PROCEDURE — 25010000002 CEFTRIAXONE PER 250 MG: Performed by: UROLOGY

## 2022-03-08 PROCEDURE — 94799 UNLISTED PULMONARY SVC/PX: CPT

## 2022-03-08 PROCEDURE — 25010000002 PROPOFOL 200 MG/20ML EMULSION

## 2022-03-08 PROCEDURE — 85018 HEMOGLOBIN: CPT | Performed by: UROLOGY

## 2022-03-08 PROCEDURE — 0 MORPHINE SULFATE 4 MG/ML SOLUTION: Performed by: UROLOGY

## 2022-03-08 PROCEDURE — 25010000002 FENTANYL CITRATE (PF) 100 MCG/2ML SOLUTION

## 2022-03-08 RX ORDER — LORAZEPAM 2 MG/ML
1 INJECTION INTRAMUSCULAR ONCE
Status: COMPLETED | OUTPATIENT
Start: 2022-03-08 | End: 2022-03-08

## 2022-03-08 RX ORDER — PROMETHAZINE HYDROCHLORIDE 25 MG/1
25 TABLET ORAL ONCE AS NEEDED
Status: DISCONTINUED | OUTPATIENT
Start: 2022-03-08 | End: 2022-03-08 | Stop reason: HOSPADM

## 2022-03-08 RX ORDER — ONDANSETRON 2 MG/ML
INJECTION INTRAMUSCULAR; INTRAVENOUS AS NEEDED
Status: DISCONTINUED | OUTPATIENT
Start: 2022-03-08 | End: 2022-03-08 | Stop reason: SURG

## 2022-03-08 RX ORDER — LIDOCAINE HYDROCHLORIDE 10 MG/ML
INJECTION, SOLUTION EPIDURAL; INFILTRATION; INTRACAUDAL; PERINEURAL AS NEEDED
Status: DISCONTINUED | OUTPATIENT
Start: 2022-03-08 | End: 2022-03-08 | Stop reason: SURG

## 2022-03-08 RX ORDER — PHENYLEPHRINE HCL IN 0.9% NACL 1 MG/10 ML
SYRINGE (ML) INTRAVENOUS AS NEEDED
Status: DISCONTINUED | OUTPATIENT
Start: 2022-03-08 | End: 2022-03-08 | Stop reason: SURG

## 2022-03-08 RX ORDER — PROPOFOL 10 MG/ML
INJECTION, EMULSION INTRAVENOUS AS NEEDED
Status: DISCONTINUED | OUTPATIENT
Start: 2022-03-08 | End: 2022-03-08 | Stop reason: SURG

## 2022-03-08 RX ORDER — ONDANSETRON 2 MG/ML
4 INJECTION INTRAMUSCULAR; INTRAVENOUS ONCE AS NEEDED
Status: DISCONTINUED | OUTPATIENT
Start: 2022-03-08 | End: 2022-03-08 | Stop reason: HOSPADM

## 2022-03-08 RX ORDER — MEPERIDINE HYDROCHLORIDE 25 MG/ML
12.5 INJECTION INTRAMUSCULAR; INTRAVENOUS; SUBCUTANEOUS
Status: DISCONTINUED | OUTPATIENT
Start: 2022-03-08 | End: 2022-03-08 | Stop reason: HOSPADM

## 2022-03-08 RX ORDER — MIDAZOLAM HYDROCHLORIDE 1 MG/ML
1 INJECTION INTRAMUSCULAR; INTRAVENOUS
Status: DISCONTINUED | OUTPATIENT
Start: 2022-03-08 | End: 2022-03-08 | Stop reason: HOSPADM

## 2022-03-08 RX ORDER — SODIUM CHLORIDE 9 MG/ML
INJECTION, SOLUTION INTRAVENOUS CONTINUOUS PRN
Status: DISCONTINUED | OUTPATIENT
Start: 2022-03-08 | End: 2022-03-08 | Stop reason: SURG

## 2022-03-08 RX ORDER — FENTANYL CITRATE 50 UG/ML
25 INJECTION, SOLUTION INTRAMUSCULAR; INTRAVENOUS
Status: DISCONTINUED | OUTPATIENT
Start: 2022-03-08 | End: 2022-03-08 | Stop reason: HOSPADM

## 2022-03-08 RX ORDER — DIPHENHYDRAMINE HYDROCHLORIDE 50 MG/ML
12.5 INJECTION INTRAMUSCULAR; INTRAVENOUS
Status: DISCONTINUED | OUTPATIENT
Start: 2022-03-08 | End: 2022-03-08 | Stop reason: HOSPADM

## 2022-03-08 RX ORDER — FENTANYL CITRATE 50 UG/ML
INJECTION, SOLUTION INTRAMUSCULAR; INTRAVENOUS AS NEEDED
Status: DISCONTINUED | OUTPATIENT
Start: 2022-03-08 | End: 2022-03-08 | Stop reason: SURG

## 2022-03-08 RX ORDER — IPRATROPIUM BROMIDE AND ALBUTEROL SULFATE 2.5; .5 MG/3ML; MG/3ML
3 SOLUTION RESPIRATORY (INHALATION) ONCE AS NEEDED
Status: DISCONTINUED | OUTPATIENT
Start: 2022-03-08 | End: 2022-03-08 | Stop reason: HOSPADM

## 2022-03-08 RX ORDER — SODIUM CHLORIDE 9 MG/ML
100 INJECTION, SOLUTION INTRAVENOUS CONTINUOUS
Status: DISCONTINUED | OUTPATIENT
Start: 2022-03-08 | End: 2022-03-11 | Stop reason: HOSPADM

## 2022-03-08 RX ORDER — NALOXONE HCL 0.4 MG/ML
0.4 VIAL (ML) INJECTION AS NEEDED
Status: DISCONTINUED | OUTPATIENT
Start: 2022-03-08 | End: 2022-03-08 | Stop reason: HOSPADM

## 2022-03-08 RX ORDER — LABETALOL HYDROCHLORIDE 5 MG/ML
5 INJECTION, SOLUTION INTRAVENOUS
Status: DISCONTINUED | OUTPATIENT
Start: 2022-03-08 | End: 2022-03-08 | Stop reason: HOSPADM

## 2022-03-08 RX ORDER — EPHEDRINE SULFATE 5 MG/ML
5 INJECTION INTRAVENOUS ONCE AS NEEDED
Status: DISCONTINUED | OUTPATIENT
Start: 2022-03-08 | End: 2022-03-08 | Stop reason: HOSPADM

## 2022-03-08 RX ADMIN — PRAMIPEXOLE DIHYDROCHLORIDE 0.5 MG: 0.5 TABLET ORAL at 20:36

## 2022-03-08 RX ADMIN — METOPROLOL SUCCINATE 100 MG: 50 TABLET, EXTENDED RELEASE ORAL at 20:35

## 2022-03-08 RX ADMIN — ATORVASTATIN CALCIUM 20 MG: 20 TABLET, FILM COATED ORAL at 20:35

## 2022-03-08 RX ADMIN — FENTANYL CITRATE 50 MCG: 50 INJECTION, SOLUTION INTRAMUSCULAR; INTRAVENOUS at 17:16

## 2022-03-08 RX ADMIN — FENTANYL CITRATE 50 MCG: 50 INJECTION, SOLUTION INTRAMUSCULAR; INTRAVENOUS at 17:02

## 2022-03-08 RX ADMIN — SODIUM CHLORIDE: 0.9 INJECTION, SOLUTION INTRAVENOUS at 16:59

## 2022-03-08 RX ADMIN — MORPHINE SULFATE 4 MG: 4 INJECTION INTRAVENOUS at 11:25

## 2022-03-08 RX ADMIN — Medication 100 MCG: at 17:12

## 2022-03-08 RX ADMIN — BUPROPION HYDROCHLORIDE 300 MG: 150 TABLET, EXTENDED RELEASE ORAL at 07:08

## 2022-03-08 RX ADMIN — LORAZEPAM 1 MG: 2 INJECTION INTRAMUSCULAR; INTRAVENOUS at 12:13

## 2022-03-08 RX ADMIN — Medication 100 MCG: at 17:08

## 2022-03-08 RX ADMIN — SODIUM CHLORIDE 100 ML/HR: 9 INJECTION, SOLUTION INTRAVENOUS at 19:12

## 2022-03-08 RX ADMIN — HYDROCODONE BITARTRATE AND ACETAMINOPHEN 1 TABLET: 7.5; 325 TABLET ORAL at 02:12

## 2022-03-08 RX ADMIN — LIDOCAINE HYDROCHLORIDE 50 MG: 10 INJECTION, SOLUTION EPIDURAL; INFILTRATION; INTRACAUDAL; PERINEURAL at 17:02

## 2022-03-08 RX ADMIN — LEVOTHYROXINE SODIUM 50 MCG: 0.05 TABLET ORAL at 05:23

## 2022-03-08 RX ADMIN — MORPHINE SULFATE 4 MG: 4 INJECTION INTRAVENOUS at 14:10

## 2022-03-08 RX ADMIN — PROPOFOL 200 MG: 10 INJECTION, EMULSION INTRAVENOUS at 17:02

## 2022-03-08 RX ADMIN — MORPHINE SULFATE 4 MG: 4 INJECTION INTRAVENOUS at 08:46

## 2022-03-08 RX ADMIN — SODIUM CHLORIDE 100 ML/HR: 9 INJECTION, SOLUTION INTRAVENOUS at 07:08

## 2022-03-08 RX ADMIN — ONDANSETRON 4 MG: 2 INJECTION INTRAMUSCULAR; INTRAVENOUS at 17:21

## 2022-03-08 RX ADMIN — LAMOTRIGINE 100 MG: 100 TABLET ORAL at 20:35

## 2022-03-08 RX ADMIN — MORPHINE SULFATE 4 MG: 4 INJECTION INTRAVENOUS at 06:51

## 2022-03-08 RX ADMIN — PROPOFOL 30 MG: 10 INJECTION, EMULSION INTRAVENOUS at 17:17

## 2022-03-08 RX ADMIN — CEFTRIAXONE 1 G: 1 INJECTION, POWDER, FOR SOLUTION INTRAMUSCULAR; INTRAVENOUS at 08:46

## 2022-03-08 RX ADMIN — BUDESONIDE AND FORMOTEROL FUMARATE DIHYDRATE 2 PUFF: 160; 4.5 AEROSOL RESPIRATORY (INHALATION) at 08:32

## 2022-03-08 RX ADMIN — DIVALPROEX SODIUM 250 MG: 250 TABLET, EXTENDED RELEASE ORAL at 20:35

## 2022-03-08 NOTE — ANESTHESIA PROCEDURE NOTES
Airway  Urgency: elective    Date/Time: 3/8/2022 5:02 PM  Airway not difficult    General Information and Staff    Patient location during procedure: OR  Anesthesiologist: Ritesh Laura MD  CRNA: Leatha Jane CAA    Indications and Patient Condition  Indications for airway management: airway protection    Preoxygenated: yes  Mask difficulty assessment: 0 - not attempted    Final Airway Details  Final airway type: supraglottic airway      Successful airway: classic  Size 4    Number of attempts at approach: 1  Assessment: lips, teeth, and gum same as pre-op and atraumatic intubation

## 2022-03-08 NOTE — PAYOR COMM NOTE
"Requesting IP Auth for Fabiola Moseley  1960  Ref #[id number]    AUTHORIZATION PENDING  PLEASE FORWARD DETERMINATION TO FOLLOWING CONTACT:    KAMRYN HERMOSILLO LPN UR  Utilization Review Nurse  [facility]  Direct & confidential phone # [phone]  Fax # [phone]      Fabiola Moseley (61 y.o. Male)             Date of Birth   1960    Social Security Number       Address   [address] [place] [address]    Home Phone   [phone]    MRN   [id number]       Faith   Ep[employer]    Marital Status                               Admission Date   3/7/22    Admission Type   Elective    Admitting Provider   Deuce Leija MD    Attending Provider   Deuce Leija MD    Department, Room/Bed   [facility] SURGICAL INPATIENT,        Discharge Date       Discharge Disposition       Discharge Destination                               Attending Provider: Deuce Leija MD    Allergies: No Known Allergies    Isolation: None   Infection: None   Code Status: CPR   Advance Care Planning Activity    Ht: 167.6 cm (66\")   Wt: 109 kg (239 lb 3.2 oz)    Admission Cmt: None   Principal Problem: Bilateral kidney stones [N20.0]                 Active Insurance as of 3/7/2022     Primary Coverage     Payor Plan Insurance Group Employer/Plan Group    [facility] [employer] EMPLOYEE [id number]     Payor Plan Address Payor Plan Phone Number Payor Plan Fax Number Effective Dates    [address] [phone]  2022 - None Entered    [place] [id number]       Subscriber Name Subscriber Birth Date Member ID       FABIOLA MOSELEY 1960 [id number]                 Emergency Contacts      (Rel.) Home Phone Work Phone Mobile Phone    Rebeca Moseley (Spouse) [phone] -- [phone]               History & Physical      Deuce Leija MD at 22 0809          Urology Progress Note    Patient Identification:  Name:  Fabiola SIMS" Pradip  Age:  61 y.o.  Sex:  male  :  1960  MRN:  [id number]    Chief Complaint:  I have stones    History of Present Illness:  60 yo wm with stone disease  Here for left Urs and right pcnl    Problem List:  Patient Active Problem List   Diagnosis   • HLD (hyperlipidemia)   • Obesity, Class III, BMI 40-49.9 (morbid obesity) ([facility])   • Bilateral kidney stones   • COPD (chronic obstructive pulmonary disease) ([facility])   • Left flank pain   • Anxiety and depression   • Hypothyroidism   • ISIAH (obstructive sleep apnea)   • Essential hypertension   • Calculus, renal   • Restless leg syndrome   • Bipolar affective ([facility])     Past Medical History:  Past Medical History:   Diagnosis Date   • Anesthesia complication     STATES HARD TO WAKE UP AFTER PREVIOUS KIDNEY STONE SURGERY AND STATES HE CODED   • Bilateral kidney stones    • Bipolar affective ([facility])    • COPD (chronic obstructive pulmonary disease) ([facility])    • Disease of thyroid gland    • Elevated cholesterol    • Hypertension    • Kidney stone on left side    • Left flank pain    • Mood disorder ([facility])    • Restless leg syndrome    • Sleep apnea     cpap  bring dos   • UTI (urinary tract infection) 2021    PREV FINISHED ANTIBIOTIC     Past Surgical History:  Past Surgical History:   Procedure Laterality Date   • ADENOIDECTOMY     • APPENDECTOMY     • COLONOSCOPY     • KIDNEY STONE SURGERY     • NEPHROLITHOTRIPSY PERCUTANEOUS Left 2022    Procedure: NEPHROSTLITHOTOMY PERCUTANEOUS;  Surgeon: Deuce Leija MD;  Location: [facility];  Service: Urology;  Laterality: Left;   • NEPHROLITHOTRIPSY PERCUTANEOUS Left 2022    Procedure: LEFT FLEXIBLE NEPHROSTOMY WITH STONE EXTRACTION, LEFT NEPHROSTOMY, TUBE REMOVAL, BILATERAL URETERAL STENT PLACEMENT;  Surgeon: Deuce Leija MD;  Location: [facility];  Service: Urology;  Laterality: Left;   • TONSILLECTOMY       Home Meds:  Medications Prior to Admission   Medication Sig Dispense Refill Last Dose    • albuterol sulfate  (90 Base) MCG/ACT inhaler INHALE ONE TO TWO PUFFS BY MOUTH EVERY 4 TO 6 HOURS 18 g 11 Past Week at Unknown time   • atorvastatin (LIPITOR) 20 MG tablet Take 1 tablet by mouth every night at bedtime. 90 tablet 3 3/6/2022 at Unknown time   • budesonide-formoterol (SYMBICORT) 160-4.5 MCG/ACT inhaler Inhale 2 puffs 2 (Two) Times a Day.   3/7/2022 at 500   • budesonide-formoterol (SYMBICORT) 160-4.5 MCG/ACT inhaler Inhale 2 puffs 2 (Two) Times a Day.   3/7/2022 at 0500   • buPROPion XL (WELLBUTRIN XL) 300 MG 24 hr tablet Take 1 tablet by mouth Every Morning. 30 tablet 5 3/7/2022 at 0500   • cephalexin (KEFLEX) 500 MG capsule Take 1 capsule by mouth 3 (Three) Times a Day. 30 capsule 0 3/6/2022 at Unknown time   • divalproex (DEPAKOTE ER) 250 MG 24 hr tablet Take 1 tablet by mouth 2 (Two) Times a Day. 180 tablet 2 3/6/2022 at Unknown time   • hydrALAZINE (APRESOLINE) 50 MG tablet Take 1 tablet by mouth 3 (Three) Times a Day As Needed. 90 tablet 11 3/7/2022 at 0500   • HYDROcodone-acetaminophen (NORCO) 7.5-325 MG per tablet Take 1 tablet by mouth Every 4 (Four) to 6 (six) Hours As Needed for pain 20 tablet 0 3/5/2022 at Unknown time   • lamoTRIgine (LaMICtal) 100 MG tablet Take 100 mg by mouth 2 (Two) Times a Day. Take preop   3/7/2022 at 0500   • levothyroxine (SYNTHROID, LEVOTHROID) 50 MCG tablet Take 50 mcg by mouth Every Morning. TAKE PREOP   3/7/2022 at 0500   • metoprolol succinate XL (TOPROL-XL) 100 MG 24 hr tablet Take 100 mg by mouth 2 (Two) Times a Day. TAKE PREOP   3/7/2022 at 0500   • naltrexone (DEPADE) 50 MG tablet Take 1 tablet by mouth Every Morning. 90 tablet 2 3/7/2022 at 0500   • NIFEdipine CC (ADALAT CC) 90 MG 24 hr tablet Take 1 tablet by mouth Daily. 90 tablet 3 3/7/2022 at 0500   • potassium chloride (KLOR-CON) 20 MEQ packet Take 20 mEq by mouth Daily. TAKE PREOP      • pramipexole (MIRAPEX) 0.5 MG tablet take 1 tablet by mouth at bedtime 90 tablet 0    • sildenafil  "(VIAGRA) 100 MG tablet Take 1 tablet by mouth Daily As Needed. 6 tablet 3 Past Month at Unknown time   • tamsulosin (FLOMAX) 0.4 MG capsule 24 hr capsule Take 1 capsule by mouth Daily. 30 capsule 0    • vilazodone (VIIBRYD) 40 MG tablet tablet Take 40 mg by mouth Daily. TAKE PREOP   3/7/2022 at 0500     Current Meds:    Current Facility-Administered Medications:   •  cefTRIAXone (ROCEPHIN) injection 1 g, 1 g, Intramuscular, Q24H, Deuce Leija MD  •  lactated ringers infusion, 9 mL/hr, Intravenous, Continuous PRN, Nick Chen CAA  •  sodium chloride 0.9 % flush 10 mL, 10 mL, Intravenous, Q12H, Nick Chen CAA  •  sodium chloride 0.9 % flush 10 mL, 10 mL, Intravenous, PRN, Nick Chen CAA  Allergies:  Patient has no known allergies.    Review of Systems:  Negative 12 point system review except: mild discomfort from the stents    Objective:  tMax 24 hours:  Temp (24hrs), Av.1 °F (36.7 °C), Min:98.1 °F (36.7 °C), Max:98.1 °F (36.7 °C)    Vital Sign Ranges:  Temp:  [98.1 °F (36.7 °C)] 98.1 °F (36.7 °C)  Heart Rate:  [102] 102  Resp:  [19] 19  BP: (166)/(77) 166/77  Intake and Output Last 3 Shifts:  No intake/output data recorded.    Exam:  /77 (Patient Position: Lying)   Pulse 102   Temp 98.1 °F (36.7 °C) (Temporal)   Resp 19   Ht 167.6 cm (66\")   Wt 109 kg (239 lb 3.2 oz)   SpO2 95%   BMI 38.61 kg/m²    General Appearance:    Alert, cooperative, no acute distress, general       appearance is normal   Head:    Normocephalic, without obvious abnormality, atraumatic   Eyes:            Pupils/Irises normal. Exterior inspection conjunctiva       and lids normal.   Ears:    Normal external inspection   Nose:   Exterior inspection of nose is normal   Throat:   Lips, mucosa, and tongue normal   Neck:   No adenopathy, supple, symmetrical, trachea midline   Back:     No CVA tenderness   Lungs:     Respirations unlabored; normal effort, no audible     abnormality   CV:   Regular " rhythm and normal rate, no edema   Abdomen:     No hernia, examination of the abdomen is normal with     no masses, tenderness, or distension    Female :  N/A    Musculoskeletal:   Inspection of the nails and digits reveal no abnormalities   Skin:   Inspection normal, palpation normal   Neurologic/Psych:   Orientation normal; Mood/Affect normal     Data Review:  All labs (24hrs):    Lab Results (last 24 hours)     ** No results found for the last 24 hours. **        Radiology:   Imaging Results (Last 72 Hours)     ** No results found for the last 72 hours. **          Assessment:  Active Problems:    * No active hospital problems. *  Impression:  Bilateral Renal Stones    Plan:  1. Left URS and Right PCNL      Deuce Leija MD  3/7/2022  08:09 EST      Electronically signed by Deuce Leija MD at 03/07/22 0812     H&P signed by [employer], [place] at 03/07/22 0746      Scan on 3/7/2022 by [employer], [place]: H&P, [facility], 01/25/2022          Electronically signed by [employer], [place] at 03/07/22 0746          Operative/Procedure Notes (last 48 hours)      Deuce Leija MD at 03/07/22 0819     Summary:[facility] Op Note             Preop Diagnosis: Bilateral Renal Stones  Postop Diagnosis: same  Procedures:  1. Left URS/LL/SBE/Stent  Exchange  2. Right PCNL < 2 cm  3. Perc Access  Surgeon: Liss  Anesthesia: General  Findings: Was able to laser and find  Remaining stones in left lower  Pole; Right side was difficult access and had  To do upper pole access but did  It  Tubeless with drainage tube in lower pole  Complications:  None    Brief  History:  60 yo with stones s/p Left PCNL.  Presents for Left URS and Right PCNL.  Please  See  My preoperative H&P for  Full details.    Description of Procedure:  Pt brought to OR, timeout completed, antibiotics given.  Placed in lithotomy position, prepped/draped.  Cystoscopy revealed normal bladder. Left stent was grasped and  2 wires were  placed into kidney.  I place a ureteral access sheath with ease.  Flexible ureteroscopy revealed 1 large stone in a left lower pole calyx.  I broke this up with a laser and removed the fragments.  I  Didn't  See anything else on fluroscopy.  Retrograde showed no extravasation  And  I placed a 6x26 stent with no string.  He was  Then placed  In prone  Position in right modified swimmer's position.  He was prepped and  Draped.  I obtained  Lower pole access but couldn't get wire to go down ureter.  Likely due to renal pelvic  Stone.  Had  To do upper pole access so used eye of the needle technique  And easily placed glide wire down ureter.  Exchanged for stiffer  Wire and placed 2 wires.  I dilated  Into the upper pole calyx with nephromax  Balloon.  Right endoscopy showed  The stone  And  I broke it up with ultrasonic lihtotriptor.  Flexible endoscopy revealed no further stones.  To cut down on his  Potential pulmonary complications  I then scoped  Into a lower pole calyx and used this as my target.  I got  Access into this calyx and used the glide wire to get through and through access.  I then dilated  Over this wire and  Placed a 10 Maltese nephrostomy tube into the renal pelvis.  Wires were sacrified and  Dressings applied.    There  Were no immediate complications.    Will get CT scan to see if residual  Stones  Are present.    THESE PROCEDURES  WERE THE SECOND STAGE OF A STAGED PROCEDURE        Electronically signed by Deuce Leija MD at 22 1111          Physician Progress Notes (last 24 hours)      Ang Padilla DO at 22 1227              [facility] Daily Progress Note    Patient Name: Calos Moseley  : 1960  MRN: [id number]  Primary Care Physician:  Naveed Nunez DO  Date of admission: 3/7/2022      Subjective      Chief Complaint: Fatigue and abdominal pain      Patient Reports     3/8/22: Anemia.  Nephrostomy tube pulled out.   Lobo catheter pulled out. S/p CT abdomen pelvis    Review of Systems   All other systems reviewed and are negative.         Objective      Vitals:   Temp:  [97.4 °F (36.3 °C)-98.1 °F (36.7 °C)] 97.7 °F (36.5 °C)  Heart Rate:  [69-88] 69  Resp:  [18-20] 18  BP: (109-172)/(68-80) 125/70    Physical Exam  HENT:      Head: Normocephalic.      Mouth/Throat:      Mouth: Mucous membranes are moist.   Eyes:      General: No scleral icterus.     Extraocular Movements: Extraocular movements intact.      Pupils: Pupils are equal, round, and reactive to light.   Cardiovascular:      Rate and Rhythm: Normal rate and regular rhythm.   Pulmonary:      Effort: Pulmonary effort is normal.   Abdominal:      General: Bowel sounds are normal.   Musculoskeletal:         General: Normal range of motion.      Cervical back: Normal range of motion.   Skin:     General: Skin is warm.   Neurological:      Mental Status: He is alert. Mental status is at baseline.   Psychiatric:         Mood and Affect: Mood normal.           Result Review    Result Review:  I have personally reviewed the results from the time of this admission to 3/8/2022 12:39 EST and agree with these findings:  [x]  Laboratory  []  Microbiology  [x]  Radiology  []  EKG/Telemetry   []  Cardiology/Vascular   []  Pathology  [x]  Old records  []  Other:      Wounds (last 24 hours)     LDA Wound     Row Name 03/08/22 0705 03/07/22 1930          Wound 03/07/22 0819 penis Other (comment)    Wound - Properties Group Placement Date: 03/07/22  -EP Placement Time: 0819  -EP Present on Hospital Admission: N  -EP Location: penis  -EP Primary Wound Type: Other  -EP, cysto insertion      Retired Wound - Properties Group Placement Date: 03/07/22  -EP Placement Time: 0819  -EP Present on Hospital Admission: N  -EP Location: penis  -EP Primary Wound Type: Other  -EP, cysto insertion      Retired Wound - Properties Group Date first assessed: 03/07/22  -EP Time first assessed: 0819  -EP  Present on Hospital Admission: N  -EP Location: penis  -EP Primary Wound Type: Other  -EP, cysto insertion             Wound 03/07/22 0910 Right flank Incision    Wound - Properties Group Placement Date: 03/07/22  -EP Placement Time: 0910  -EP Side: Right  -EP Location: flank  -EP Primary Wound Type: Incision  -EP     Dressing Appearance dry;intact  -KL intact;moist drainage  -SM     Closure BALDO  -KL BALDO  -SM     Base dressing in place, unable to visualize  -KL dressing in place, unable to visualize  -SM     Drainage Characteristics/Odor bleeding controlled  -KL serosanguineous  -SM     Drainage Amount none  -KL moderate  -SM     Retired Wound - Properties Group Placement Date: 03/07/22  -EP Placement Time: 0910  -EP Side: Right  -EP Location: flank  -EP Primary Wound Type: Incision  -EP     Retired Wound - Properties Group Date first assessed: 03/07/22  -EP Time first assessed: 0910  -EP Side: Right  -EP Location: flank  -EP Primary Wound Type: Incision  -EP            Wound 02/21/22 0940 Left flank    Wound - Properties Group Placement Date: 02/21/22  -CW Placement Time: 0940  -CW Present on Hospital Admission: N  -CW Side: Left  -CW Location: flank  -CW     Retired Wound - Properties Group Placement Date: 02/21/22  -CW Placement Time: 0940  -CW Present on Hospital Admission: N  -CW Side: Left  -CW Location: flank  -CW     Retired Wound - Properties Group Date first assessed: 02/21/22  -CW Time first assessed: 0940  -CW Present on Hospital Admission: N  -CW Side: Left  -CW Location: flank  -CW           User Key  (r) = Recorded By, (t) = Taken By, (c) = Cosigned By    Initials Name Provider Type    CW Yane Mcclure RN Registered Nurse    Henrietta Fournier RN Registered Nurse    Vandana Lawrence RN Registered Nurse    Zully Lara RN Registered Nurse                  Assessment/Plan      Brief Patient Summary:    61 years old male with bilateral nephrolithiasis s/p left percutaneous nephrolithotomy 2  weeks ago now s/p right percutaneous nephrolithotomy and right ureteroscopy on 3/7/2022 complicated with severe bleeding after nephrostomy tube removal and Lobo catheter removal      atorvastatin, 20 mg, Oral, Daily  budesonide-formoterol, 2 puff, Inhalation, BID - RT  buPROPion XL, 300 mg, Oral, QAM  cefTRIAXone, 1 g, Intravenous, Q24H  divalproex, 250 mg, Oral, BID  lamoTRIgine, 100 mg, Oral, BID  levothyroxine, 50 mcg, Oral, Q AM  metoprolol succinate XL, 100 mg, Oral, BID  NIFEdipine XL, 30 mg, Oral, Q24H  potassium chloride, 20 mEq, Oral, Daily  pramipexole, 0.5 mg, Oral, Daily  sodium chloride, 10 mL, Intravenous, Q12H  tamsulosin, 0.4 mg, Oral, Daily  vilazodone, 40 mg, Oral, Daily       sodium chloride, 100 mL/hr, Last Rate: 100 mL/hr (03/08/22 0708)         Active Hospital Problems:  Active Hospital Problems    Diagnosis    • **Bilateral kidney stones    • Calculus of ureter    • Gross hematuria    • Restless leg syndrome    • Bipolar affective (HCC)    • ISIAH (obstructive sleep apnea)    • HLD (hyperlipidemia)    • COPD (chronic obstructive pulmonary disease) (HCC)    • Hypothyroidism    • Anxiety and depression      Formatting of this note might be different from the original.  Lamictal and Vibryd     • Essential hypertension        Bilateral nephrolithiasis s/p left percutaneous nephrolithotomy 2 weeks ago now s/p right percutaneous nephrolithotomy and right ureteroscopy on 3/7/2022 complicated with severe bleeding after nephrostomy tube removal and Lobo catheter removal:  -s/p Ct abd/pelvis 3/8/22--> large blood/hematoma in bladder, trace pleural effusion  -Trend H&H every 6  -Urology following    ISIAH:  -Continue home CPAP    Hypothyroidism:  -Continue Synthroid    Hypertension:  -Continue Toprol, Procardia      Hyperlipidemia:  -Continue statin    Bipolar disorder/anxiety/depression:  -Depakote, Viibryd, Lamictal, Wellbutrin    Restless leg:  -Continue Mirapex        DVT prophylaxis:  Mechanical DVT  prophylaxis orders are present.    CODE STATUS:    Level Of Support Discussed With: Patient  Code Status (Patient has no pulse and is not breathing): CPR (Attempt to Resuscitate)  Medical Interventions (Patient has pulse or is breathing): Full Support      Disposition:  I expect patient to be discharged home.    This patient has been examined wearing appropriate Personal Protective Equipment and discussed with nursing. 22      Electronically signed by Ang Padilla DO, 22, 12:39 EST.  [facility] Hospitalist Team             Electronically signed by Ang Padilla DO at 22 1245     Ihsan Lopez MD at 22 0704          Urology Progress Note    Patient Identification:  Name:  Calos Moseley  Age:  61 y.o.  Sex:  male  :  1960  MRN:  [id number]    Chief Complaint: Patient without complaint    History of Present Illness: Patient was doing well.  CT scan showed good resolution of his stones.  Blood work was stable.  His urine from his catheter and nephrostomy tubes were red-tinged.  I removed his right nephrostomy tube without any difficulty and he suddenly developed significant bleeding from the nephrostomy tube site.  This resolved with compression.  A dressing was then placed and has remained essentially dry.  However half hour later the patient developed severe lower abdominal pain with increased bleeding from the Lobo catheter.    Problem List:  Patient Active Problem List   Diagnosis   • HLD (hyperlipidemia)   • Obesity, Class III, BMI 40-49.9 (morbid obesity) ([facility])   • Bilateral kidney stones   • COPD (chronic obstructive pulmonary disease) ([facility])   • Left flank pain   • Anxiety and depression   • Hypothyroidism   • ISIAH (obstructive sleep apnea)   • Essential hypertension   • Calculus, renal   • Restless leg syndrome   • Bipolar affective ([facility])   • Calculus of ureter     Past Medical History:  Past Medical History:   Diagnosis Date   • Anesthesia complication      STATES HARD TO WAKE UP AFTER PREVIOUS KIDNEY STONE SURGERY AND STATES HE CODED   • Bilateral kidney stones    • Bipolar affective ([facility])    • COPD (chronic obstructive pulmonary disease) ([facility])    • Disease of thyroid gland    • Elevated cholesterol    • Hypertension    • Kidney stone on left side    • Left flank pain    • Mood disorder (HCC)    • Restless leg syndrome    • Sleep apnea     cpap  bring dos   • UTI (urinary tract infection) 12/2021    PREV FINISHED ANTIBIOTIC     Past Surgical History:  Past Surgical History:   Procedure Laterality Date   • ADENOIDECTOMY     • APPENDECTOMY     • COLONOSCOPY     • KIDNEY STONE SURGERY     • NEPHROLITHOTRIPSY PERCUTANEOUS Left 2/21/2022    Procedure: NEPHROSTLITHOTOMY PERCUTANEOUS;  Surgeon: Deuce Leija MD;  Location: [facility];  Service: Urology;  Laterality: Left;   • NEPHROLITHOTRIPSY PERCUTANEOUS Left 2/23/2022    Procedure: LEFT FLEXIBLE NEPHROSTOMY WITH STONE EXTRACTION, LEFT NEPHROSTOMY, TUBE REMOVAL, BILATERAL URETERAL STENT PLACEMENT;  Surgeon: Deuce Leija MD;  Location: [facility];  Service: Urology;  Laterality: Left;   • TONSILLECTOMY       Home Meds:  Medications Prior to Admission   Medication Sig Dispense Refill Last Dose   • albuterol sulfate  (90 Base) MCG/ACT inhaler INHALE ONE TO TWO PUFFS BY MOUTH EVERY 4 TO 6 HOURS 18 g 11 Past Week at Unknown time   • atorvastatin (LIPITOR) 20 MG tablet Take 1 tablet by mouth every night at bedtime. 90 tablet 3 3/6/2022 at Unknown time   • budesonide-formoterol (SYMBICORT) 160-4.5 MCG/ACT inhaler Inhale 2 puffs 2 (Two) Times a Day.   3/7/2022 at 500   • buPROPion XL (WELLBUTRIN XL) 300 MG 24 hr tablet Take 1 tablet by mouth Every Morning. 30 tablet 5 3/7/2022 at 0500   • divalproex (DEPAKOTE ER) 250 MG 24 hr tablet Take 1 tablet by mouth 2 (Two) Times a Day. 180 tablet 2 3/6/2022 at Unknown time   • hydrALAZINE (APRESOLINE) 50 MG tablet Take 1 tablet by mouth 3 (Three) Times a  Day As Needed. 90 tablet 11 3/7/2022 at 0500   • HYDROcodone-acetaminophen (NORCO) 7.5-325 MG per tablet Take 1 tablet by mouth Every 4 (Four) to 6 (six) Hours As Needed for pain 20 tablet 0 3/5/2022 at Unknown time   • lamoTRIgine (LaMICtal) 100 MG tablet Take 100 mg by mouth 2 (Two) Times a Day. Take preop   3/7/2022 at 0500   • levothyroxine (SYNTHROID, LEVOTHROID) 50 MCG tablet Take 50 mcg by mouth Every Morning. TAKE PREOP   3/7/2022 at 0500   • metoprolol succinate XL (TOPROL-XL) 100 MG 24 hr tablet Take 100 mg by mouth 2 (Two) Times a Day. TAKE PREOP   3/7/2022 at 0500   • naltrexone (DEPADE) 50 MG tablet Take 1 tablet by mouth Every Morning. 90 tablet 2 3/7/2022 at 0500   • NIFEdipine CC (ADALAT CC) 90 MG 24 hr tablet Take 1 tablet by mouth Daily. 90 tablet 3 3/7/2022 at 0500   • potassium chloride (KLOR-CON) 20 MEQ packet Take 20 mEq by mouth Daily. TAKE PREOP      • pramipexole (MIRAPEX) 0.5 MG tablet take 1 tablet by mouth at bedtime 90 tablet 0    • tamsulosin (FLOMAX) 0.4 MG capsule 24 hr capsule Take 1 capsule by mouth Daily. 30 capsule 0    • vilazodone (VIIBRYD) 40 MG tablet tablet Take 40 mg by mouth Daily. TAKE PREOP   3/7/2022 at 0500     Current Meds:    Current Facility-Administered Medications:   •  acetaminophen (TYLENOL) tablet 650 mg, 650 mg, Oral, Q4H PRN **OR** acetaminophen (TYLENOL) suppository 650 mg, 650 mg, Rectal, Q4H PRN, Deuce Leija MD  •  albuterol sulfate HFA (PROVENTIL HFA;VENTOLIN HFA;PROAIR HFA) inhaler 1 puff, 1 puff, Inhalation, Q6H PRN, Deuce Leija MD  •  atorvastatin (LIPITOR) tablet 20 mg, 20 mg, Oral, Daily, Deuce Leija MD, 20 mg at 03/07/22 2107  •  budesonide-formoterol (SYMBICORT) 160-4.5 MCG/ACT inhaler 2 puff, 2 puff, Inhalation, BID - RT, Deuce Leija MD, 2 puff at 03/07/22 2038  •  buPROPion XL (WELLBUTRIN XL) 24 hr tablet 300 mg, 300 mg, Oral, QAM, Deuce Leija MD  •  cefTRIAXone (ROCEPHIN) 1 g in sodium chloride 0.9 %  100 mL IVPB, 1 g, Intravenous, Q24H, Deuce Leija MD  •  divalproex (DEPAKOTE ER) 24 hr tablet 250 mg, 250 mg, Oral, BID, Deuce Leija MD, 250 mg at 03/07/22 2117  •  hydrALAZINE (APRESOLINE) tablet 50 mg, 50 mg, Oral, TID PRN, Deuce Leija MD  •  HYDROcodone-acetaminophen (NORCO) 7.5-325 MG per tablet 1 tablet, 1 tablet, Oral, Q4H PRN, Deuce Leija MD, 1 tablet at 03/08/22 0212  •  lamoTRIgine (LaMICtal) tablet 100 mg, 100 mg, Oral, BID, Deuce Leija MD, 100 mg at 03/07/22 2108  •  levothyroxine (SYNTHROID, LEVOTHROID) tablet 50 mcg, 50 mcg, Oral, Q AM, Deuce Leija MD, 50 mcg at 03/08/22 0523  •  metoprolol succinate XL (TOPROL-XL) 24 hr tablet 100 mg, 100 mg, Oral, BID, Deuce Leija MD, 100 mg at 03/07/22 2108  •  Morphine sulfate (PF) injection 4 mg, 4 mg, Intravenous, Q2H PRN, 4 mg at 03/08/22 0651 **AND** naloxone (NARCAN) injection 0.4 mg, 0.4 mg, Intravenous, Q5 Min PRN, Deuce Leija MD  •  NIFEdipine XL (PROCARDIA XL) 24 hr tablet 30 mg, 30 mg, Oral, Q24H, Deuce Leija MD  •  ondansetron (ZOFRAN) tablet 4 mg, 4 mg, Oral, Q6H PRN **OR** ondansetron (ZOFRAN) injection 4 mg, 4 mg, Intravenous, Q6H PRN, Deuce Leija MD  •  opium-belladonna (B&O SUPPRETTES) 16.2-30 MG suppository 30 mg, 30 mg, Rectal, Daily PRN, Deuce Leija MD  •  potassium chloride (KLOR-CON) packet 20 mEq, 20 mEq, Oral, Daily, Deuce Leija MD  •  pramipexole (MIRAPEX) tablet 0.5 mg, 0.5 mg, Oral, Daily, Deuce Leija MD, 0.5 mg at 03/07/22 2108  •  sodium chloride 0.9 % flush 10 mL, 10 mL, Intravenous, Q12H, Deuce Leija MD, 10 mL at 03/07/22 2108  •  sodium chloride 0.9 % flush 10 mL, 10 mL, Intravenous, PRN, Deuce Leija MD  •  sodium chloride 0.9 % infusion, 100 mL/hr, Intravenous, Continuous, Deuce Leija MD, Last Rate: 100 mL/hr at 03/07/22 2123, 100 mL/hr at 03/07/22 2123  •  tamsulosin (FLOMAX) 24 hr capsule  "0.4 mg, 0.4 mg, Oral, Daily, Deuce Leija MD, 0.4 mg at 22  •  vilazodone (VIIBRYD) tablet 40 mg, 40 mg, Oral, Daily, Deuce Leija MD  Allergies:  Patient has no known allergies.    Review of Systems no fevers or chills.  No congestion or nasal discharge.    Objective:  tMax 24 hours:  Temp (24hrs), Av °F (36.7 °C), Min:97.4 °F (36.3 °C), Max:98.8 °F (37.1 °C)    Vital Sign Ranges:  Temp:  [97.4 °F (36.3 °C)-98.8 °F (37.1 °C)] 97.4 °F (36.3 °C)  Heart Rate:  [71-88] 80  Resp:  [9-20] 20  BP: (144-172)/(73-80) 144/80  Intake and Output Last 3 Shifts:  I/O last 3 completed shifts:  In:  [P.O.:220; I.V.:]  Out: 240 [Urine:1800; Drains:600]    Exam:  /80 (BP Location: Right arm, Patient Position: Lying)   Pulse 80   Temp 97.4 °F (36.3 °C) (Oral)   Resp 20   Ht 167.6 cm (66\")   Wt 109 kg (239 lb 3.2 oz)   SpO2 95%   BMI 38.61 kg/m²    General Appearance:    Alert, cooperative, no acute distress, general         appearance is normal   Head:    Normocephalic, without obvious abnormality, atraumatic   Eyes:            Pupils/Irises normal. Exterior inspection conjunctivae       and lids normal.   Ears:    Normal external inspection   Nose:   Exterior inspection of nose is normal   Throat:   Lips, mucosa, and tongue normal   Lungs:     Respirations unlabored; normal effort, no audible     abnormality   CV:   Regular rhythm and normal rate, no edema   Abdomen:      examination of the abdomen is normal with     no masses, tenderness, or distension    :  Lobo catheter with bloody urine     Data Review:  All labs (24hrs):    Lab Results (last 24 hours)     Procedure Component Value Units Date/Time    Basic Metabolic Panel [[id number]]  (Abnormal) Collected: 22 0407    Specimen: Blood Updated: 22 0443     Glucose 115 mg/dL      BUN 12 mg/dL      Creatinine 1.72 mg/dL      Sodium 143 mmol/L      Potassium 4.5 mmol/L      Chloride 107 mmol/L      CO2 29.0 mmol/L     "  Calcium 8.5 mg/dL      BUN/Creatinine Ratio 7.0     Anion Gap 7.0 mmol/L      eGFR 44.9 mL/min/1.73      Comment: National Kidney Foundation and American Society of Nephrology (ASN) Task Force recommended calculation based on the Chronic Kidney Disease Epidemiology Collaboration (CKD-EPI) equation refit without adjustment for race.       Narrative:      GFR Normal >60  Chronic Kidney Disease <60  Kidney Failure <15      CBC (No Diff) [[id number]]  (Abnormal) Collected: 03/08/22 0407    Specimen: Blood Updated: 03/08/22 0427     WBC 8.10 10*3/mm3      RBC 3.32 10*6/mm3      Hemoglobin 10.2 g/dL      Hematocrit 29.8 %      MCV 89.7 fL      MCH 30.7 pg      MCHC 34.2 g/dL      RDW 14.3 %      RDW-SD 45.1 fl      MPV 8.2 fL      Platelets 178 10*3/mm3     STONE ANALYSIS - Calculus, Urine, Catheter [[id number]] Collected: 03/07/22 1028    Specimen: Calculus from Urine, Catheter Updated: 03/07/22 1216    Basic Metabolic Panel [[id number]]  (Abnormal) Collected: 03/07/22 1120    Specimen: Blood from Arm, Right Updated: 03/07/22 1203     Glucose 221 mg/dL      BUN 13 mg/dL      Comment: Result checked         Creatinine 1.52 mg/dL      Sodium 144 mmol/L      Potassium 4.3 mmol/L      Chloride 107 mmol/L      CO2 25.0 mmol/L      Calcium 8.9 mg/dL      BUN/Creatinine Ratio 8.6     Anion Gap 12.0 mmol/L      eGFR 52.1 mL/min/1.73      Comment: National Kidney Foundation and American Society of Nephrology (ASN) Task Force recommended calculation based on the Chronic Kidney Disease Epidemiology Collaboration (CKD-EPI) equation refit without adjustment for race.       Narrative:      GFR Normal >60  Chronic Kidney Disease <60  Kidney Failure <15      CBC (No Diff) [[id number]]  (Abnormal) Collected: 03/07/22 1120    Specimen: Blood from Arm, Right Updated: 03/07/22 1135     WBC 6.20 10*3/mm3      RBC 4.03 10*6/mm3      Hemoglobin 12.1 g/dL      Hematocrit 36.0 %      MCV 89.4 fL      MCH 30.0 pg      MCHC 33.6 g/dL      RDW 14.3 %       RDW-SD 45.1 fl      MPV 8.5 fL      Platelets 193 10*3/mm3         Radiology:   Imaging Results (Last 72 Hours)     Procedure Component Value Units Date/Time    CT Abdomen Pelvis Without Contrast [[id number]] Collected: 03/07/22 1624     Updated: 03/07/22 1635    Narrative:      CT ABDOMEN PELVIS WO CONTRAST-     Date of Exam: 3/7/2022 3:36 PM     Indication: Urinary tract stone, symptomatic/complicated.       Comparison Exams: CT abdomen and pelvis 02/21/2022     Technique: Axial images from the base of the chest through the abdomen  and pelvis were obtained without intravenous contrast. Sagittal and  coronal reformatted images also performed. Automated exposure control  and iterative reconstruction methods were used.  Radiation audit for  number of CT and nuclear cardiology exams performed in the last year:   0.       FINDINGS:     There is a moderate amount of atelectasis in both lower lobes. Very  small amount of bilateral pleural fluid.     Bilateral ureteral stents are present, in good position. Mild left-sided  hydronephrosis. No hydronephrosis on the right. There are a few tiny  left-sided renal calculi measuring 3 mm and less. Solitary right-sided 3  mm stone. No ureteral stone on either side.     There also is a right-sided percutaneous nephrostomy tube, good  position. There is a new elongated crescent-shaped high density fluid  collection in the right-sided retroperitoneum posterior to the kidney  measuring 1.7 cm in thickness and 10 cm in diameter     The bladder is collapsed around the bilateral ureteral stents and a  Lobo catheter balloon. No stone in the bladder. Prostate calcification  is again noted.     No free air. No ascites. No bowel obstruction or colitis.             Impression:         Bilateral ureteral stents and right-sided percutaneous nephrostomy  catheter, all in good position.     1.7 cm thick 10 cm diameter fluid collection in the right-sided  posterior perirenal space. This may  either relate to hemorrhage or  contrast material. The percutaneous nephrostomy tube courses through the  central portion of this fluid.     Near complete resolution of previous bilateral nephrolithiasis with a  few left-sided and a solitary tiny right-sided stone measuring 3 mm and  less.     No ureteral stone or stones in the bladder.     Mild left-sided hydronephrosis. No hydroureter process on the right.     Bladder is collapsed around the distal portion of the stents and a Lobo  catheter     Electronically Signed By-Arjun Lizarraga On:3/7/2022 4:33 PM  This report was finalized on [id number] by  Arjun Lizarraga, .    [place] < 1 Hour [[id number]] Resulted: 03/07/22 1103     Updated: 03/07/22 1104          Assessment/Plan:    Principal Problem:    Bilateral kidney stones  Active Problems:    HLD (hyperlipidemia)    COPD (chronic obstructive pulmonary disease) ([facility])    Anxiety and depression    Hypothyroidism    ISIAH (obstructive sleep apnea)    Essential hypertension    Restless leg syndrome    Bipolar affective ([facility])    Calculus of ureter     Bilateral renal calculi status post left percutaneous nephrolithotomy approximately 2 weeks ago.  Now status post right percutaneous nephrolithotomy and right ureteroscopy.    Severe bleeding after removal of nephrostomy tube   Severe lower abdominal pain with poorly draining Lobo catheter     Plan   Will remove Lobo catheter and he may need a three-way cath placed if he has difficulty voiding  Stat H&H  Stat CT scan stone protocol  We will watch Delise tomorrow      Ihsan Lopez MD  3/8/2022  07:04 EST      Electronically signed by Ihsan Lopez MD at 03/08/22 0707          Consult Notes (last 48 hours)      Nelly Andrew, SALVADOR at 03/07/22 1337     Attestation signed by Ang Padilla DO at 03/07/22 1611    I have reviewed this documentation and agree.    Electronically signed by Ang Padilla DO, 03/07/22, 4:10 PM EST.                        [facility] Medicine Services - Consult Note    Patient Name: Calos Moseley  : 1960  MRN: [id number]  Primary Care Physician:  Naveed Nunez DO  Referring Physician: Deuce Thao*  Date of admission: 3/7/2022    Consults    Subjective      Reason for Consult/ Chief Complaint: flank pain    History of Present Illness:     Calos Moseley is a 61 y.o. male with PMH of HTN, HLD, COPD, ISIAH on CPAP, hypothyroidism, bipolar affective disorder, depression, anxiety, restless leg syndrome morbid obesity, and recurrent kidney stones. The patient underwent left ureteroscopy with laser lithotripsy and stent exchange as well as right percutaneous nephrolithotomy with nephrostomy tube placement 3/7/2022 by Dr. Leija. The hospitalist group was consulted for medical management. He has dillard in place with bloody urine and right nephrostomy tube with bloody drainage. He complaints of right flank pain.       Review of Systems   Constitutional: Negative.   HENT: Negative.    Eyes: Negative.    Cardiovascular: Negative.    Respiratory: Negative.    Endocrine: Negative.    Hematologic/Lymphatic: Negative.    Skin: Negative.    Gastrointestinal: Negative.    Genitourinary: Positive for flank pain and pelvic pain.   Neurological: Negative.    Psychiatric/Behavioral: Negative.    Allergic/Immunologic: Negative.    All other systems reviewed and are negative.       Personal History     Past Medical History:   Diagnosis Date   • Anesthesia complication     STATES HARD TO WAKE UP AFTER PREVIOUS KIDNEY STONE SURGERY AND STATES HE CODED   • Bilateral kidney stones    • Bipolar affective (HCC)    • COPD (chronic obstructive pulmonary disease) (HCC)    • Disease of thyroid gland    • Elevated cholesterol    • Hypertension    • Kidney stone on left side    • Left flank pain    • Mood disorder (HCC)    • Restless leg syndrome    • Sleep apnea     cpap  bring dos   • UTI (urinary tract infection)  12/2021    PREV FINISHED ANTIBIOTIC       Past Surgical History:   Procedure Laterality Date   • ADENOIDECTOMY     • APPENDECTOMY     • COLONOSCOPY     • KIDNEY STONE SURGERY     • NEPHROLITHOTRIPSY PERCUTANEOUS Left 2/21/2022    Procedure: NEPHROSTLITHOTOMY PERCUTANEOUS;  Surgeon: Deuce Leija MD;  Location: [facility];  Service: Urology;  Laterality: Left;   • NEPHROLITHOTRIPSY PERCUTANEOUS Left 2/23/2022    Procedure: LEFT FLEXIBLE NEPHROSTOMY WITH STONE EXTRACTION, LEFT NEPHROSTOMY, TUBE REMOVAL, BILATERAL URETERAL STENT PLACEMENT;  Surgeon: Deuce Leija MD;  Location: [facility] OR;  Service: Urology;  Laterality: Left;   • TONSILLECTOMY         Family History: family history is not on file. Otherwise pertinent FHx was reviewed and not pertinent to current issue.    Social History:  reports that he quit smoking about 31 years ago. His smoking use included cigarettes. He has never used smokeless tobacco. He reports previous alcohol use. He reports that he does not use drugs.    Home Medications:   HYDROcodone-acetaminophen, NIFEdipine CC, albuterol sulfate HFA, atorvastatin, buPROPion XL, budesonide-formoterol, divalproex, hydrALAZINE, lamoTRIgine, levothyroxine, metoprolol succinate XL, naltrexone, potassium chloride, pramipexole, sildenafil, tamsulosin, and vilazodone    Allergies:  No Known Allergies      Objective      Vitals:  Temp:  [97.6 °F (36.4 °C)-98.8 °F (37.1 °C)] 97.6 °F (36.4 °C)  Heart Rate:  [] 73  Resp:  [9-19] 16  BP: (144-166)/(73-77) 164/76  Flow (L/min):  [1-6] 1    Physical Exam  Vitals and nursing note reviewed.   HENT:      Head: Normocephalic and atraumatic.   Eyes:      Extraocular Movements: Extraocular movements intact.      Pupils: Pupils are equal, round, and reactive to light.   Cardiovascular:      Rate and Rhythm: Normal rate and regular rhythm.      Pulses: Normal pulses.      Heart sounds: Normal heart sounds.   Pulmonary:      Effort: Pulmonary  effort is normal.      Breath sounds: Normal breath sounds.   Abdominal:      General: Bowel sounds are normal.      Palpations: Abdomen is soft.      Tenderness: There is no abdominal tenderness.      Comments: Right nephrostomy drain in place with sanguinous output   Genitourinary:     Comments: Lobo in place  Musculoskeletal:         General: Normal range of motion.      Cervical back: Normal range of motion.   Skin:     General: Skin is warm and dry.   Neurological:      Mental Status: He is alert and oriented to person, place, and time.   Psychiatric:         Mood and Affect: Mood normal.         Behavior: Behavior normal.          Result Review    Result Review:  I have personally reviewed the results from the time of this admission to 3/7/2022 15:28 EST and agree with these findings:  [x]  Laboratory  []  Microbiology  [x]  Radiology  []  EKG/Telemetry   []  Cardiology/Vascular   []  Pathology  [x]  Old records    Assessment/Plan        Active Hospital Problems:  Active Hospital Problems    Diagnosis    • **Bilateral kidney stones    • Calculus of ureter    • Restless leg syndrome    • Bipolar affective (HCC)    • ISIAH (obstructive sleep apnea)      Last Assessment & Plan:   Formatting of this note might be different from the original.  Patient is using and feels that he is benefiting from CPAP usage.  Review of 30-day download with excellent usage and compliance with improved AHI at 5.3, down from a previous residual AHI of greater than 8.  Patient still has significant leak time noted.  Discussed that he will need to have, would benefit from, dedicated mask fitting may need to size down with his mask option.  Patient verbalized understanding     • HLD (hyperlipidemia)    • COPD (chronic obstructive pulmonary disease) (HCC)    • Hypothyroidism      Formatting of this note might be different from the original.  Synthroid 50mcgs     • Anxiety and depression      Formatting of this note might be different from  the original.  Lamictal and Vibryd     • Essential hypertension      Formatting of this note might be different from the original.  Lisinopril, Apresoline, and Metoprolol    Last Assessment & Plan:   Formatting of this note might be different from the original.  Not well controlled, continue medications per PCP instruction, if continues to be elevated may need to see PCP for further evaluation       Plan:     Bilateral renal stones status post left ureteroscopy with laser lithotripsy and stent exchange as well as right percutaneous nephrolithotomy with nephrostomy tube placement 3/7/2022 by Dr. Leija  -monitor urine output, hgb, and BMP  -hgb 12.1, creatinine 1.52  -on IVFs, IV rocephin, Flomax, and pain control per urology     Essential hypertension  -Chronic-partially controlled  -cont home Toprol, Procardia     Hyperlipidemia  -Chronic, continue home atorvastatin     COPD  ISIAH   -Chronic/stable not in exacerbation   -Continue home Symbicort, as needed albuterol  -May use home CPAP     Hypothyroidism  -Continue home levothyroxine  .  Anxiety and depression/bipolar disorder  -Chronic/stable  -Continue home Depakote, Wellbutrin, and Lamictal     Restless leg syndrome  -Continue Mirapex     Obesity, Class III, BMI 38.6  -Encourage lifestyle modifications      This patient has been examined wearing appropriate Personal Protective Equipment  03/07/22      Signature: Electronically signed by SALVADOR Pantoja, 03/07/22, 3:28 PM EST.    Electronically signed by Ang Padilla DO at 03/07/22 6850

## 2022-03-08 NOTE — PROGRESS NOTES
Urology Progress Note    Patient Identification:  Name:  Calos Moseley  Age:  61 y.o.  Sex:  male  :  1960  MRN:  [id number]    Chief Complaint: Patient without complaint    History of Present Illness: Patient was doing well.  CT scan showed good resolution of his stones.  Blood work was stable.  His urine from his catheter and nephrostomy tubes were red-tinged.  I removed his right nephrostomy tube without any difficulty and he suddenly developed significant bleeding from the nephrostomy tube site.  This resolved with compression.  A dressing was then placed and has remained essentially dry.  However half hour later the patient developed severe lower abdominal pain with increased bleeding from the Lobo catheter.    Problem List:  Patient Active Problem List   Diagnosis   • HLD (hyperlipidemia)   • Obesity, Class III, BMI 40-49.9 (morbid obesity) ([facility])   • Bilateral kidney stones   • COPD (chronic obstructive pulmonary disease) ([facility])   • Left flank pain   • Anxiety and depression   • Hypothyroidism   • ISIAH (obstructive sleep apnea)   • Essential hypertension   • Calculus, renal   • Restless leg syndrome   • Bipolar affective ([facility])   • Calculus of ureter     Past Medical History:  Past Medical History:   Diagnosis Date   • Anesthesia complication     STATES HARD TO WAKE UP AFTER PREVIOUS KIDNEY STONE SURGERY AND STATES HE CODED   • Bilateral kidney stones    • Bipolar affective ([facility])    • COPD (chronic obstructive pulmonary disease) ([facility])    • Disease of thyroid gland    • Elevated cholesterol    • Hypertension    • Kidney stone on left side    • Left flank pain    • Mood disorder ([facility])    • Restless leg syndrome    • Sleep apnea     cpap  bring dos   • UTI (urinary tract infection) 2021    PREV FINISHED ANTIBIOTIC     Past Surgical History:  Past Surgical History:   Procedure Laterality Date   • ADENOIDECTOMY     • APPENDECTOMY     • COLONOSCOPY     • KIDNEY STONE SURGERY     • NEPHROLITHOTRIPSY  PERCUTANEOUS Left 2/21/2022    Procedure: NEPHROSTLITHOTOMY PERCUTANEOUS;  Surgeon: Deuce Leija MD;  Location: [facility] MAIN OR;  Service: Urology;  Laterality: Left;   • NEPHROLITHOTRIPSY PERCUTANEOUS Left 2/23/2022    Procedure: LEFT FLEXIBLE NEPHROSTOMY WITH STONE EXTRACTION, LEFT NEPHROSTOMY, TUBE REMOVAL, BILATERAL URETERAL STENT PLACEMENT;  Surgeon: Deuce Leija MD;  Location: [facility] MAIN OR;  Service: Urology;  Laterality: Left;   • TONSILLECTOMY       Home Meds:  Medications Prior to Admission   Medication Sig Dispense Refill Last Dose   • albuterol sulfate  (90 Base) MCG/ACT inhaler INHALE ONE TO TWO PUFFS BY MOUTH EVERY 4 TO 6 HOURS 18 g 11 Past Week at Unknown time   • atorvastatin (LIPITOR) 20 MG tablet Take 1 tablet by mouth every night at bedtime. 90 tablet 3 3/6/2022 at Unknown time   • budesonide-formoterol (SYMBICORT) 160-4.5 MCG/ACT inhaler Inhale 2 puffs 2 (Two) Times a Day.   3/7/2022 at 500   • buPROPion XL (WELLBUTRIN XL) 300 MG 24 hr tablet Take 1 tablet by mouth Every Morning. 30 tablet 5 3/7/2022 at 0500   • divalproex (DEPAKOTE ER) 250 MG 24 hr tablet Take 1 tablet by mouth 2 (Two) Times a Day. 180 tablet 2 3/6/2022 at Unknown time   • hydrALAZINE (APRESOLINE) 50 MG tablet Take 1 tablet by mouth 3 (Three) Times a Day As Needed. 90 tablet 11 3/7/2022 at 0500   • HYDROcodone-acetaminophen (NORCO) 7.5-325 MG per tablet Take 1 tablet by mouth Every 4 (Four) to 6 (six) Hours As Needed for pain 20 tablet 0 3/5/2022 at Unknown time   • lamoTRIgine (LaMICtal) 100 MG tablet Take 100 mg by mouth 2 (Two) Times a Day. Take preop   3/7/2022 at 0500   • levothyroxine (SYNTHROID, LEVOTHROID) 50 MCG tablet Take 50 mcg by mouth Every Morning. TAKE PREOP   3/7/2022 at 0500   • metoprolol succinate XL (TOPROL-XL) 100 MG 24 hr tablet Take 100 mg by mouth 2 (Two) Times a Day. TAKE PREOP   3/7/2022 at 0500   • naltrexone (DEPADE) 50 MG tablet Take 1 tablet by mouth Every Morning. 90 tablet  2 3/7/2022 at 0500   • NIFEdipine CC (ADALAT CC) 90 MG 24 hr tablet Take 1 tablet by mouth Daily. 90 tablet 3 3/7/2022 at 0500   • potassium chloride (KLOR-CON) 20 MEQ packet Take 20 mEq by mouth Daily. TAKE PREOP      • pramipexole (MIRAPEX) 0.5 MG tablet take 1 tablet by mouth at bedtime 90 tablet 0    • tamsulosin (FLOMAX) 0.4 MG capsule 24 hr capsule Take 1 capsule by mouth Daily. 30 capsule 0    • vilazodone (VIIBRYD) 40 MG tablet tablet Take 40 mg by mouth Daily. TAKE PREOP   3/7/2022 at 0500     Current Meds:    Current Facility-Administered Medications:   •  acetaminophen (TYLENOL) tablet 650 mg, 650 mg, Oral, Q4H PRN **OR** acetaminophen (TYLENOL) suppository 650 mg, 650 mg, Rectal, Q4H PRN, Deuce Leija MD  •  albuterol sulfate HFA (PROVENTIL HFA;VENTOLIN HFA;PROAIR HFA) inhaler 1 puff, 1 puff, Inhalation, Q6H PRN, Deuce Leija MD  •  atorvastatin (LIPITOR) tablet 20 mg, 20 mg, Oral, Daily, Deuce Leija MD, 20 mg at 03/07/22 2107  •  budesonide-formoterol (SYMBICORT) 160-4.5 MCG/ACT inhaler 2 puff, 2 puff, Inhalation, BID - RT, Deuce Leija MD, 2 puff at 03/07/22 2038  •  buPROPion XL (WELLBUTRIN XL) 24 hr tablet 300 mg, 300 mg, Oral, QAM, Deuce Leija MD  •  cefTRIAXone (ROCEPHIN) 1 g in sodium chloride 0.9 % 100 mL IVPB, 1 g, Intravenous, Q24H, Deuce eLija MD  •  divalproex (DEPAKOTE ER) 24 hr tablet 250 mg, 250 mg, Oral, BID, Deuce Leija MD, 250 mg at 03/07/22 2117  •  hydrALAZINE (APRESOLINE) tablet 50 mg, 50 mg, Oral, TID PRN, Deuce Leija MD  •  HYDROcodone-acetaminophen (NORCO) 7.5-325 MG per tablet 1 tablet, 1 tablet, Oral, Q4H PRN, Deuce Leija MD, 1 tablet at 03/08/22 0212  •  lamoTRIgine (LaMICtal) tablet 100 mg, 100 mg, Oral, BID, Deuce Leija MD, 100 mg at 03/07/22 2108  •  levothyroxine (SYNTHROID, LEVOTHROID) tablet 50 mcg, 50 mcg, Oral, Q AM, Deuce Leija MD, 50 mcg at 03/08/22 0523  •  metoprolol  succinate XL (TOPROL-XL) 24 hr tablet 100 mg, 100 mg, Oral, BID, Deuce Leija MD, 100 mg at 22  •  Morphine sulfate (PF) injection 4 mg, 4 mg, Intravenous, Q2H PRN, 4 mg at 22 0651 **AND** naloxone (NARCAN) injection 0.4 mg, 0.4 mg, Intravenous, Q5 Min PRN, Deuce Leija MD  •  NIFEdipine XL (PROCARDIA XL) 24 hr tablet 30 mg, 30 mg, Oral, Q24H, Deuce Leija MD  •  ondansetron (ZOFRAN) tablet 4 mg, 4 mg, Oral, Q6H PRN **OR** ondansetron (ZOFRAN) injection 4 mg, 4 mg, Intravenous, Q6H PRN, Deuce Leija MD  •  opium-belladonna (B&O SUPPRETTES) 16.2-30 MG suppository 30 mg, 30 mg, Rectal, Daily PRN, Deuce Leija MD  •  potassium chloride (KLOR-CON) packet 20 mEq, 20 mEq, Oral, Daily, Deuce Leija MD  •  pramipexole (MIRAPEX) tablet 0.5 mg, 0.5 mg, Oral, Daily, Deuce Leija MD, 0.5 mg at 22  •  sodium chloride 0.9 % flush 10 mL, 10 mL, Intravenous, Q12H, Deuce Leija MD, 10 mL at 22  •  sodium chloride 0.9 % flush 10 mL, 10 mL, Intravenous, PRN, Deuce Leija MD  •  sodium chloride 0.9 % infusion, 100 mL/hr, Intravenous, Continuous, Deuce Leija MD, Last Rate: 100 mL/hr at 22, 100 mL/hr at 22  •  tamsulosin (FLOMAX) 24 hr capsule 0.4 mg, 0.4 mg, Oral, Daily, Deuce Leija MD, 0.4 mg at 22  •  vilazodone (VIIBRYD) tablet 40 mg, 40 mg, Oral, Daily, Deuce Leija MD  Allergies:  Patient has no known allergies.    Review of Systems no fevers or chills.  No congestion or nasal discharge.    Objective:  tMax 24 hours:  Temp (24hrs), Av °F (36.7 °C), Min:97.4 °F (36.3 °C), Max:98.8 °F (37.1 °C)    Vital Sign Ranges:  Temp:  [97.4 °F (36.3 °C)-98.8 °F (37.1 °C)] 97.4 °F (36.3 °C)  Heart Rate:  [71-88] 80  Resp:  [9-20] 20  BP: (144-172)/(73-80) 144/80  Intake and Output Last 3 Shifts:  I/O last 3 completed shifts:  In: 2317 [P.O.:220; I.V.:]  Out: 2400  "[Urine:1800; Drains:600]    Exam:  /80 (BP Location: Right arm, Patient Position: Lying)   Pulse 80   Temp 97.4 °F (36.3 °C) (Oral)   Resp 20   Ht 167.6 cm (66\")   Wt 109 kg (239 lb 3.2 oz)   SpO2 95%   BMI 38.61 kg/m²    General Appearance:    Alert, cooperative, no acute distress, general         appearance is normal   Head:    Normocephalic, without obvious abnormality, atraumatic   Eyes:            Pupils/Irises normal. Exterior inspection conjunctivae       and lids normal.   Ears:    Normal external inspection   Nose:   Exterior inspection of nose is normal   Throat:   Lips, mucosa, and tongue normal   Lungs:     Respirations unlabored; normal effort, no audible     abnormality   CV:   Regular rhythm and normal rate, no edema   Abdomen:      examination of the abdomen is normal with     no masses, tenderness, or distension    :  Lobo catheter with bloody urine     Data Review:  All labs (24hrs):    Lab Results (last 24 hours)     Procedure Component Value Units Date/Time    Basic Metabolic Panel [[id number]]  (Abnormal) Collected: 03/08/22 0407    Specimen: Blood Updated: 03/08/22 0443     Glucose 115 mg/dL      BUN 12 mg/dL      Creatinine 1.72 mg/dL      Sodium 143 mmol/L      Potassium 4.5 mmol/L      Chloride 107 mmol/L      CO2 29.0 mmol/L      Calcium 8.5 mg/dL      BUN/Creatinine Ratio 7.0     Anion Gap 7.0 mmol/L      eGFR 44.9 mL/min/1.73      Comment: National Kidney Foundation and American Society of Nephrology (ASN) Task Force recommended calculation based on the Chronic Kidney Disease Epidemiology Collaboration (CKD-EPI) equation refit without adjustment for race.       Narrative:      GFR Normal >60  Chronic Kidney Disease <60  Kidney Failure <15      CBC (No Diff) [[id number]]  (Abnormal) Collected: 03/08/22 0407    Specimen: Blood Updated: 03/08/22 0427     WBC 8.10 10*3/mm3      RBC 3.32 10*6/mm3      Hemoglobin 10.2 g/dL      Hematocrit 29.8 %      MCV 89.7 fL      MCH 30.7 pg  "     MCHC 34.2 g/dL      RDW 14.3 %      RDW-SD 45.1 fl      MPV 8.2 fL      Platelets 178 10*3/mm3     STONE ANALYSIS - Calculus, Urine, Catheter [[id number]] Collected: 03/07/22 1028    Specimen: Calculus from Urine, Catheter Updated: 03/07/22 1216    Basic Metabolic Panel [[id number]]  (Abnormal) Collected: 03/07/22 1120    Specimen: Blood from Arm, Right Updated: 03/07/22 1203     Glucose 221 mg/dL      BUN 13 mg/dL      Comment: Result checked         Creatinine 1.52 mg/dL      Sodium 144 mmol/L      Potassium 4.3 mmol/L      Chloride 107 mmol/L      CO2 25.0 mmol/L      Calcium 8.9 mg/dL      BUN/Creatinine Ratio 8.6     Anion Gap 12.0 mmol/L      eGFR 52.1 mL/min/1.73      Comment: National Kidney Foundation and American Society of Nephrology (ASN) Task Force recommended calculation based on the Chronic Kidney Disease Epidemiology Collaboration (CKD-EPI) equation refit without adjustment for race.       Narrative:      GFR Normal >60  Chronic Kidney Disease <60  Kidney Failure <15      CBC (No Diff) [[id number]]  (Abnormal) Collected: 03/07/22 1120    Specimen: Blood from Arm, Right Updated: 03/07/22 1135     WBC 6.20 10*3/mm3      RBC 4.03 10*6/mm3      Hemoglobin 12.1 g/dL      Hematocrit 36.0 %      MCV 89.4 fL      MCH 30.0 pg      MCHC 33.6 g/dL      RDW 14.3 %      RDW-SD 45.1 fl      MPV 8.5 fL      Platelets 193 10*3/mm3         Radiology:   Imaging Results (Last 72 Hours)     Procedure Component Value Units Date/Time    CT Abdomen Pelvis Without Contrast [[id number]] Collected: 03/07/22 1624     Updated: 03/07/22 1635    Narrative:      CT ABDOMEN PELVIS WO CONTRAST-     Date of Exam: 3/7/2022 3:36 PM     Indication: Urinary tract stone, symptomatic/complicated.       Comparison Exams: CT abdomen and pelvis 02/21/2022     Technique: Axial images from the base of the chest through the abdomen  and pelvis were obtained without intravenous contrast. Sagittal and  coronal reformatted images also performed.  Automated exposure control  and iterative reconstruction methods were used.  Radiation audit for  number of CT and nuclear cardiology exams performed in the last year:   0.       FINDINGS:     There is a moderate amount of atelectasis in both lower lobes. Very  small amount of bilateral pleural fluid.     Bilateral ureteral stents are present, in good position. Mild left-sided  hydronephrosis. No hydronephrosis on the right. There are a few tiny  left-sided renal calculi measuring 3 mm and less. Solitary right-sided 3  mm stone. No ureteral stone on either side.     There also is a right-sided percutaneous nephrostomy tube, good  position. There is a new elongated crescent-shaped high density fluid  collection in the right-sided retroperitoneum posterior to the kidney  measuring 1.7 cm in thickness and 10 cm in diameter     The bladder is collapsed around the bilateral ureteral stents and a  Lobo catheter balloon. No stone in the bladder. Prostate calcification  is again noted.     No free air. No ascites. No bowel obstruction or colitis.             Impression:         Bilateral ureteral stents and right-sided percutaneous nephrostomy  catheter, all in good position.     1.7 cm thick 10 cm diameter fluid collection in the right-sided  posterior perirenal space. This may either relate to hemorrhage or  contrast material. The percutaneous nephrostomy tube courses through the  central portion of this fluid.     Near complete resolution of previous bilateral nephrolithiasis with a  few left-sided and a solitary tiny right-sided stone measuring 3 mm and  less.     No ureteral stone or stones in the bladder.     Mild left-sided hydronephrosis. No hydroureter process on the right.     Bladder is collapsed around the distal portion of the stents and a Lobo  catheter     Electronically Signed By-Arjun Lizarraga On:3/7/2022 4:33 PM  This report was finalized on [id number] by  Arjun Lizarraga, .    [place] < 1 Hour [[id number]]  Resulted: 03/07/22 1103     Updated: 03/07/22 1104          Assessment/Plan:    Principal Problem:    Bilateral kidney stones  Active Problems:    HLD (hyperlipidemia)    COPD (chronic obstructive pulmonary disease) ([facility])    Anxiety and depression    Hypothyroidism    ISIAH (obstructive sleep apnea)    Essential hypertension    Restless leg syndrome    Bipolar affective ([facility])    Calculus of ureter     Bilateral renal calculi status post left percutaneous nephrolithotomy approximately 2 weeks ago.  Now status post right percutaneous nephrolithotomy and right ureteroscopy.    Severe bleeding after removal of nephrostomy tube   Severe lower abdominal pain with poorly draining Lobo catheter     Plan   Will remove Lboo catheter and he may need a three-way cath placed if he has difficulty voiding  Stat H&H  Stat CT scan stone protocol        Ihsan Lopez MD  3/8/2022  07:04 EST

## 2022-03-08 NOTE — OP NOTE
CYSTOSCOPY WITH CLOT EVACUATION  Procedure Report    Patient Name:  Calos Moseley  YOB: 1960    Date of Surgery:  3/8/2022     Indications: 61-year-old gentleman with significant bleeding following a right percutaneous nephrolithotomy.  He has a large amount of blood clot in his bladder.    Pre-op Diagnosis:   Gross hematuria [R31.0] clot retention       Post-Op Diagnosis Codes:     * Gross hematuria [R31.0] clot retention    Procedure/CPT® Codes:      Procedure(s):  CYSTOSCOPY WITH CLOT EVACUATION    Staff:  Surgeon(s):  Ihsan Lopez MD            was responsible for performing the following activities: None and their skilled assistance was necessary for the success of this case.    Anesthesia: General    Estimated Blood Loss: minimal    Implants:    Nothing was implanted during the procedure    Specimen:          None      Findings: Large amount of blood clot in the bladder    Complications: None    Description of Procedure: Patient induced with general anesthesia.  Placed in the dorsolithotomy position prepped draped in sterile fashion.  22 [place] cystoscope introduced under direct vision.  Urethra is within normal limits.  Prostate shows minimal hypertrophy.  Bladder mucosa could not be visualized because a large amount of blood clot in the bladder.  This was irrigated out using a Suresh syringe.  A large amount of clot was obtained.  This point bladder was inspected.  The mucosa was normal throughout without any tumors.  There are stents in each ureteral orifice.  There is small amount of blood clot at the end of the right stent but no current bleeding is seen.  The cystoscope was removed and a 24 [place] three-way Lobo catheter was placed.  Patient was taken out of dorsolithotomy position and awakened from general anesthesia.  He was transported to the post anesthesia care unit in stable condition having tolerated procedure well without any complications.      Ihsan Lopez MD     Date:  3/8/2022  Time: 17:29 EST

## 2022-03-08 NOTE — ANESTHESIA PREPROCEDURE EVALUATION
" Anesthesia Evaluation     Patient summary reviewed and Nursing notes reviewed   history of anesthetic complications: prolonged sedation  NPO Solid Status: > 8 hours  NPO Liquid Status: > 8 hours           Airway   Mallampati: II  TM distance: >3 FB  Neck ROM: full  No difficulty expected  Dental - normal exam     Pulmonary - normal exam   (+) COPD, sleep apnea,   Cardiovascular - normal exam    ECG reviewed    (+) hypertension, hyperlipidemia,       Neuro/Psych  (+) psychiatric history,    GI/Hepatic/Renal/Endo    (+) morbid obesity,  renal disease stones and CRI, thyroid problem     Musculoskeletal     Abdominal  - normal exam    Bowel sounds: normal.   Substance History      OB/GYN          Other        ROS/Med Hx Other: \"coded\" with previous stone surgery     SR     1. Interval removal of right percutaneous nephrostomy tube. Bilateral  ureteral stents remain appropriately positioned.  2. Right retroperitoneal hematoma has increased compared to one day  prior. Blood products are now seen within the right renal pelvis, and  large blood/hematoma is dense the urinary bladder, new since yesterday's  study.  3. Single nonobstructing right renal stone. Multiple nonobstructing left  renal stones. No ureteral stone is evident.  4. Trace right pleural effusion with mild right basilar atelectasis.  Small left pleural effusion with dense posterior left lower lobe  atelectasis. This is without significant change from one day prior.                  Anesthesia Plan    ASA 3     general     intravenous induction     Anesthetic plan, all risks, benefits, and alternatives have been provided, discussed and informed consent has been obtained with: patient.        CODE STATUS:    Level Of Support Discussed With: Patient  Code Status (Patient has no pulse and is not breathing): CPR (Attempt to Resuscitate)  Medical Interventions (Patient has pulse or is breathing): Full Support      "

## 2022-03-08 NOTE — PLAN OF CARE
Goal Outcome Evaluation:      Patient having moderate amount of pain but controlled using oral pain medicine. Both the nephrostomy tube and dillard catheter remain bloody in appearance. Dressing around nephrostomy tube has moderate amount of blood on dressing. Will continue to monitor.

## 2022-03-08 NOTE — PROGRESS NOTES
[facility] Medicine Services Daily Progress Note    Patient Name: Calos Moseley  : 1960  MRN: [id number]  Primary Care Physician:  Naveed Nunez DO  Date of admission: 3/7/2022      Subjective      Chief Complaint: Fatigue and abdominal pain      Patient Reports     3/8/22: Anemia.  Nephrostomy tube pulled out.  Lobo catheter pulled out. S/p CT abdomen pelvis    Review of Systems   All other systems reviewed and are negative.         Objective      Vitals:   Temp:  [97.4 °F (36.3 °C)-98.1 °F (36.7 °C)] 97.7 °F (36.5 °C)  Heart Rate:  [69-88] 69  Resp:  [18-20] 18  BP: (109-172)/(68-80) 125/70    Physical Exam  HENT:      Head: Normocephalic.      Mouth/Throat:      Mouth: Mucous membranes are moist.   Eyes:      General: No scleral icterus.     Extraocular Movements: Extraocular movements intact.      Pupils: Pupils are equal, round, and reactive to light.   Cardiovascular:      Rate and Rhythm: Normal rate and regular rhythm.   Pulmonary:      Effort: Pulmonary effort is normal.   Abdominal:      General: Bowel sounds are normal.   Musculoskeletal:         General: Normal range of motion.      Cervical back: Normal range of motion.   Skin:     General: Skin is warm.   Neurological:      Mental Status: He is alert. Mental status is at baseline.   Psychiatric:         Mood and Affect: Mood normal.           Result Review    Result Review:  I have personally reviewed the results from the time of this admission to 3/8/2022 12:39 EST and agree with these findings:  [x]  Laboratory  []  Microbiology  [x]  Radiology  []  EKG/Telemetry   []  Cardiology/Vascular   []  Pathology  [x]  Old records  []  Other:      Wounds (last 24 hours)     LDA Wound     Row Name 22 0705 22 1930          Wound 22 08 penis Other (comment)    Wound - Properties Group Placement Date: 22  -EP Placement Time: 819  -EP Present on Hospital Admission: N  -EP Location: penis  -EP Primary  Wound Type: Other  -EP, cysto insertion      Retired Wound - Properties Group Placement Date: 03/07/22  -EP Placement Time: 0819  -EP Present on Hospital Admission: N  -EP Location: penis  -EP Primary Wound Type: Other  -EP, cysto insertion      Retired Wound - Properties Group Date first assessed: 03/07/22  -EP Time first assessed: 0819  -EP Present on Hospital Admission: N  -EP Location: penis  -EP Primary Wound Type: Other  -EP, cysto insertion             Wound 03/07/22 0910 Right flank Incision    Wound - Properties Group Placement Date: 03/07/22  -EP Placement Time: 0910  -EP Side: Right  -EP Location: flank  -EP Primary Wound Type: Incision  -EP     Dressing Appearance dry;intact  -KL intact;moist drainage  -SM     Closure BALDO  -KL BALDO  -SM     Base dressing in place, unable to visualize  -KL dressing in place, unable to visualize  -SM     Drainage Characteristics/Odor bleeding controlled  -KL serosanguineous  -SM     Drainage Amount none  -KL moderate  -SM     Retired Wound - Properties Group Placement Date: 03/07/22  -EP Placement Time: 0910  -EP Side: Right  -EP Location: flank  -EP Primary Wound Type: Incision  -EP     Retired Wound - Properties Group Date first assessed: 03/07/22  -EP Time first assessed: 0910  -EP Side: Right  -EP Location: flank  -EP Primary Wound Type: Incision  -EP            Wound 02/21/22 0940 Left flank    Wound - Properties Group Placement Date: 02/21/22  -CW Placement Time: 0940  -CW Present on Hospital Admission: N  -CW Side: Left  -CW Location: flank  -CW     Retired Wound - Properties Group Placement Date: 02/21/22  -CW Placement Time: 0940  -CW Present on Hospital Admission: N  -CW Side: Left  -CW Location: flank  -CW     Retired Wound - Properties Group Date first assessed: 02/21/22  -CW Time first assessed: 0940  -CW Present on Hospital Admission: N  -CW Side: Left  -CW Location: flank  -CW           User Key  (r) = Recorded By, (t) = Taken By, (c) = Cosigned By     Initials Name Provider Type    CW Yane Mcclure, RN Registered Nurse    Henrietta Fournier RN Registered Nurse    Vandana Lawrence RN Registered Nurse    Zully Lara RN Registered Nurse                  Assessment/Plan      Brief Patient Summary:    61 years old male with bilateral nephrolithiasis s/p left percutaneous nephrolithotomy 2 weeks ago now s/p right percutaneous nephrolithotomy and right ureteroscopy on 3/7/2022 complicated with severe bleeding after nephrostomy tube removal and Lobo catheter removal      atorvastatin, 20 mg, Oral, Daily  budesonide-formoterol, 2 puff, Inhalation, BID - RT  buPROPion XL, 300 mg, Oral, QAM  cefTRIAXone, 1 g, Intravenous, Q24H  divalproex, 250 mg, Oral, BID  lamoTRIgine, 100 mg, Oral, BID  levothyroxine, 50 mcg, Oral, Q AM  metoprolol succinate XL, 100 mg, Oral, BID  NIFEdipine XL, 30 mg, Oral, Q24H  potassium chloride, 20 mEq, Oral, Daily  pramipexole, 0.5 mg, Oral, Daily  sodium chloride, 10 mL, Intravenous, Q12H  tamsulosin, 0.4 mg, Oral, Daily  vilazodone, 40 mg, Oral, Daily       sodium chloride, 100 mL/hr, Last Rate: 100 mL/hr (03/08/22 0708)         Active Hospital Problems:  Active Hospital Problems    Diagnosis    • **Bilateral kidney stones    • Calculus of ureter    • Gross hematuria    • Restless leg syndrome    • Bipolar affective (HCC)    • ISIAH (obstructive sleep apnea)    • HLD (hyperlipidemia)    • COPD (chronic obstructive pulmonary disease) (HCC)    • Hypothyroidism    • Anxiety and depression      Formatting of this note might be different from the original.  Lamictal and Vibryd     • Essential hypertension        Bilateral nephrolithiasis s/p left percutaneous nephrolithotomy 2 weeks ago now s/p right percutaneous nephrolithotomy and right ureteroscopy on 3/7/2022 complicated with severe bleeding after nephrostomy tube removal and Lobo catheter removal:  -s/p Ct abd/pelvis 3/8/22--> large blood/hematoma in bladder, trace pleural  effusion  -Trend H&H every 6  -Urology following    ISIAH:  -Continue home CPAP    Hypothyroidism:  -Continue Synthroid    Hypertension:  -Continue Toprol, Procardia      Hyperlipidemia:  -Continue statin    Bipolar disorder/anxiety/depression:  -Depakote, Viibryd, Lamictal, Wellbutrin    Restless leg:  -Continue Mirapex        DVT prophylaxis:  Mechanical DVT prophylaxis orders are present.    CODE STATUS:    Level Of Support Discussed With: Patient  Code Status (Patient has no pulse and is not breathing): CPR (Attempt to Resuscitate)  Medical Interventions (Patient has pulse or is breathing): Full Support      Disposition:  I expect patient to be discharged home.    This patient has been examined wearing appropriate Personal Protective Equipment and discussed with nursing. 03/08/22      Electronically signed by Ang Padilla DO, 03/08/22, 12:39 EST.  [facility]ist Team

## 2022-03-08 NOTE — PLAN OF CARE
Goal Outcome Evaluation:  Plan of Care Reviewed With: patient        Progress: no change  Outcome Evaluation: Abed at this time with wife at bedside. Has had significant pain the entire shift since catheter and drain taken out this morning. Catheter with continuous bladder irrigation restarted. Have had to hand irrigate catheter multiple times in order to remove occluding clots. Patient has taken Morphine 4mg multiple times in addition to a dose of Ativan to aide in comfort. Plan is to return to surgery this evening for a cysto with clot evacuation. Will monitor

## 2022-03-08 NOTE — CASE MANAGEMENT/SOCIAL WORK
Discharge Planning Assessment  [facility]     Patient Name: Calos Moseley  MRN: [id number]  Today's Date: 3/8/2022    Admit Date: 3/7/2022     Discharge Needs Assessment     Row Name 03/08/22 1213       Living Environment    People in Home spouse    Name(s) of People in Home Spouse (Rebeca)    Current Living Arrangements home    Primary Care Provided by self    Provides Primary Care For no one    Family Caregiver if Needed spouse    Quality of Family Relationships helpful;involved    Able to Return to Prior Arrangements yes    Living Arrangement Comments Lives w/spouse.       Resource/Environmental Concerns    Resource/Environmental Concerns none    Transportation Concerns none       Transition Planning    Patient/Family Anticipates Transition to home    Patient/Family Anticipated Services at Transition none    Transportation Anticipated family or friend will provide       Discharge Needs Assessment    Readmission Within the Last 30 Days current reason for admission unrelated to previous admission;other (see comments)  Same issue pertaining to kidney stones    Equipment Currently Used at Home cpap    Concerns to be Addressed no discharge needs identified    Anticipated Changes Related to Illness none    Equipment Needed After Discharge none    Discharge Facility/Level of Care Needs other (see comments)  Home w/spouse    Provided Post Acute Provider List? N/A    Provided Post Acute Provider Quality & Resource List? N/A               Discharge Plan     Row Name 03/08/22 1216       Plan    Plan Home w/wife. No discharge needs at this time.    Provided Post Acute Provider List? N/A    Provided Post Acute Provider Quality & Resource List? N/A    Patient/Family in Agreement with Plan yes    Final Discharge Disposition Code 01 - home or self-care              Continued Care and Services - Admitted Since 3/7/2022    Coordination has not been started for this encounter.       Expected Discharge Date and Time     Expected  Discharge Date Expected Discharge Time    Mar 11, 2022          Demographic Summary     Row Name 03/08/22 1211       General Information    Admission Type inpatient    Arrived From emergency department    Referral Source admission list    Reason for Consult discharge planning    Preferred Language English       Contact Information    Permission Granted to Share Info With                Functional Status     Row Name 03/08/22 1212       Functional Status    Usual Activity Tolerance good    Current Activity Tolerance good       Functional Status, IADL    Medications independent    Meal Preparation independent    Housekeeping independent    Laundry independent    Shopping independent       Mental Status    General Appearance WDL WDL       Mental Status Summary    Recent Changes in Mental Status/Cognitive Functioning no changes       Employment/    Employment Status employed full-time    Shift Worked first shift    Current or Previous Occupation healthcare              Jina Jarvis, RN, MSN  Care Manager  [phone]

## 2022-03-09 LAB
ABO GROUP BLD: NORMAL
ANION GAP SERPL CALCULATED.3IONS-SCNC: 9 MMOL/L (ref 5–15)
BLD GP AB SCN SERPL QL: NEGATIVE
BUN SERPL-MCNC: 18 MG/DL (ref 8–23)
BUN/CREAT SERPL: 9 (ref 7–25)
CALCIUM SPEC-SCNC: 8.2 MG/DL (ref 8.6–10.5)
CHLORIDE SERPL-SCNC: 103 MMOL/L (ref 98–107)
CO2 SERPL-SCNC: 27 MMOL/L (ref 22–29)
CREAT SERPL-MCNC: 2.01 MG/DL (ref 0.76–1.27)
EGFRCR SERPLBLD CKD-EPI 2021: 37.3 ML/MIN/1.73
GLUCOSE SERPL-MCNC: 126 MG/DL (ref 65–99)
HCT VFR BLD AUTO: 20.5 % (ref 37.5–51)
HCT VFR BLD AUTO: 21.4 % (ref 37.5–51)
HCT VFR BLD AUTO: 23.5 % (ref 37.5–51)
HCT VFR BLD AUTO: 24.6 % (ref 37.5–51)
HCT VFR BLD AUTO: 24.9 % (ref 37.5–51)
HGB BLD-MCNC: 7 G/DL (ref 13–17.7)
HGB BLD-MCNC: 7.1 G/DL (ref 13–17.7)
HGB BLD-MCNC: 7.7 G/DL (ref 13–17.7)
HGB BLD-MCNC: 8.3 G/DL (ref 13–17.7)
HGB BLD-MCNC: 8.4 G/DL (ref 13–17.7)
POTASSIUM SERPL-SCNC: 4.7 MMOL/L (ref 3.5–5.2)
RH BLD: NEGATIVE
SODIUM SERPL-SCNC: 139 MMOL/L (ref 136–145)
T&S EXPIRATION DATE: NORMAL

## 2022-03-09 PROCEDURE — 86900 BLOOD TYPING SEROLOGIC ABO: CPT | Performed by: NURSE PRACTITIONER

## 2022-03-09 PROCEDURE — 85014 HEMATOCRIT: CPT | Performed by: HOSPITALIST

## 2022-03-09 PROCEDURE — 86901 BLOOD TYPING SEROLOGIC RH(D): CPT | Performed by: NURSE PRACTITIONER

## 2022-03-09 PROCEDURE — 86900 BLOOD TYPING SEROLOGIC ABO: CPT

## 2022-03-09 PROCEDURE — 85018 HEMOGLOBIN: CPT | Performed by: UROLOGY

## 2022-03-09 PROCEDURE — 86923 COMPATIBILITY TEST ELECTRIC: CPT

## 2022-03-09 PROCEDURE — 94799 UNLISTED PULMONARY SVC/PX: CPT

## 2022-03-09 PROCEDURE — 85018 HEMOGLOBIN: CPT | Performed by: HOSPITALIST

## 2022-03-09 PROCEDURE — 25010000002 CEFTRIAXONE PER 250 MG: Performed by: UROLOGY

## 2022-03-09 PROCEDURE — 80048 BASIC METABOLIC PNL TOTAL CA: CPT | Performed by: HOSPITALIST

## 2022-03-09 PROCEDURE — 99232 SBSQ HOSP IP/OBS MODERATE 35: CPT | Performed by: HOSPITALIST

## 2022-03-09 PROCEDURE — P9016 RBC LEUKOCYTES REDUCED: HCPCS

## 2022-03-09 PROCEDURE — 36430 TRANSFUSION BLD/BLD COMPNT: CPT

## 2022-03-09 PROCEDURE — 86850 RBC ANTIBODY SCREEN: CPT | Performed by: NURSE PRACTITIONER

## 2022-03-09 PROCEDURE — 85014 HEMATOCRIT: CPT | Performed by: UROLOGY

## 2022-03-09 RX ADMIN — LAMOTRIGINE 100 MG: 100 TABLET ORAL at 20:19

## 2022-03-09 RX ADMIN — LEVOTHYROXINE SODIUM 50 MCG: 0.05 TABLET ORAL at 05:27

## 2022-03-09 RX ADMIN — BUDESONIDE AND FORMOTEROL FUMARATE DIHYDRATE 2 PUFF: 160; 4.5 AEROSOL RESPIRATORY (INHALATION) at 08:18

## 2022-03-09 RX ADMIN — HYDROCODONE BITARTRATE AND ACETAMINOPHEN 1 TABLET: 7.5; 325 TABLET ORAL at 08:02

## 2022-03-09 RX ADMIN — TAMSULOSIN HYDROCHLORIDE 0.4 MG: 0.4 CAPSULE ORAL at 08:01

## 2022-03-09 RX ADMIN — CEFTRIAXONE 1 G: 1 INJECTION, POWDER, FOR SOLUTION INTRAMUSCULAR; INTRAVENOUS at 08:02

## 2022-03-09 RX ADMIN — HYDROCODONE BITARTRATE AND ACETAMINOPHEN 1 TABLET: 7.5; 325 TABLET ORAL at 02:25

## 2022-03-09 RX ADMIN — METOPROLOL SUCCINATE 100 MG: 50 TABLET, EXTENDED RELEASE ORAL at 20:19

## 2022-03-09 RX ADMIN — HYDROCODONE BITARTRATE AND ACETAMINOPHEN 1 TABLET: 7.5; 325 TABLET ORAL at 20:19

## 2022-03-09 RX ADMIN — POTASSIUM CHLORIDE 20 MEQ: 1.5 POWDER, FOR SOLUTION ORAL at 08:01

## 2022-03-09 RX ADMIN — LAMOTRIGINE 100 MG: 100 TABLET ORAL at 08:01

## 2022-03-09 RX ADMIN — NIFEDIPINE 30 MG: 30 TABLET, FILM COATED, EXTENDED RELEASE ORAL at 08:01

## 2022-03-09 RX ADMIN — VILAZODONE HYDROCHLORIDE 40 MG: 40 TABLET ORAL at 08:01

## 2022-03-09 RX ADMIN — SODIUM CHLORIDE 100 ML/HR: 9 INJECTION, SOLUTION INTRAVENOUS at 05:27

## 2022-03-09 RX ADMIN — DIVALPROEX SODIUM 250 MG: 250 TABLET, EXTENDED RELEASE ORAL at 20:19

## 2022-03-09 RX ADMIN — Medication 10 ML: at 20:19

## 2022-03-09 RX ADMIN — METOPROLOL SUCCINATE 100 MG: 50 TABLET, EXTENDED RELEASE ORAL at 08:01

## 2022-03-09 RX ADMIN — HYDROCODONE BITARTRATE AND ACETAMINOPHEN 1 TABLET: 7.5; 325 TABLET ORAL at 14:15

## 2022-03-09 RX ADMIN — BUDESONIDE AND FORMOTEROL FUMARATE DIHYDRATE 2 PUFF: 160; 4.5 AEROSOL RESPIRATORY (INHALATION) at 19:25

## 2022-03-09 RX ADMIN — BUPROPION HYDROCHLORIDE 300 MG: 150 TABLET, EXTENDED RELEASE ORAL at 08:01

## 2022-03-09 RX ADMIN — ACETAMINOPHEN 650 MG: 325 TABLET ORAL at 05:10

## 2022-03-09 RX ADMIN — SODIUM CHLORIDE 100 ML/HR: 9 INJECTION, SOLUTION INTRAVENOUS at 15:48

## 2022-03-09 RX ADMIN — ATORVASTATIN CALCIUM 20 MG: 20 TABLET, FILM COATED ORAL at 20:19

## 2022-03-09 RX ADMIN — PRAMIPEXOLE DIHYDROCHLORIDE 0.5 MG: 0.5 TABLET ORAL at 20:19

## 2022-03-09 RX ADMIN — DIVALPROEX SODIUM 250 MG: 250 TABLET, EXTENDED RELEASE ORAL at 08:01

## 2022-03-09 NOTE — PLAN OF CARE
Goal Outcome Evaluation:         Patient complaining of pain in right flank area but is controlled using oral pain medicine. Patient hemoglobin being checked Q6H. The midnight hemoglobin was 7.0 so 1 unit of PRBC is being infused at this time. Patient is tolerating blood well. Patient vitals are WNL. Output out of dillard catheter is a light pink color and is running at a slow drip. No clots noticed in catheter this shift. Will continue to monitor.

## 2022-03-09 NOTE — CASE MANAGEMENT/SOCIAL WORK
Continued Stay Note  [facility]     Patient Name: Calos Moseley  MRN: [id number]  Today's Date: 3/9/2022    Admit Date: 3/7/2022     Discharge Plan     Row Name 03/09/22 1259       Plan    Plan Home w/wife. No discharge needs at this time.    Plan Comments Home w/wife. No discharge needs at this time. CM to patient's room to talk with patient to confirm discharge plan. Patient sitting up painting and wife at bedside.                Expected Discharge Date and Time     Expected Discharge Date Expected Discharge Time    Mar 11, 2022         Met with patient in room wearing PPE: mask, face shield/goggles, gloves, gown.      Maintained distance greater than six feet and spent less than 15 minutes in the room.    Jina Jarvis RN, MSN  Care Manager  [phone]

## 2022-03-09 NOTE — PLAN OF CARE
Goal Outcome Evaluation:  Plan of Care Reviewed With: patient        Progress: improving  Outcome Evaluation: Abed at this time with wife at bedside. Has been voiding since catheter out but has had a few episodes of urgency and was unable to get the urinal in time. Has had some complaints of right flank and ureteral meatus discomfort. Has taken Norco once during the shift. Urine still remains somewhat bloody in appareance. Will monitor

## 2022-03-09 NOTE — PROGRESS NOTES
Urology Progress Note    Patient Identification:  Name:  Calos Moseley  Age:  61 y.o.  Sex:  male  :  1960  MRN:  [id number]    Subjective:   Feeling better, wants catheter out    Objective    Hemoglobin 7 last night and received 1 unit of blood with a CBC is currently pending    Problem List:  Patient Active Problem List   Diagnosis   • HLD (hyperlipidemia)   • Obesity, Class III, BMI 40-49.9 (morbid obesity) ([facility])   • Bilateral kidney stones   • COPD (chronic obstructive pulmonary disease) ([facility])   • Left flank pain   • Anxiety and depression   • Hypothyroidism   • ISIAH (obstructive sleep apnea)   • Essential hypertension   • Calculus, renal   • Restless leg syndrome   • Bipolar affective ([facility])   • Calculus of ureter   • Gross hematuria     Past Medical History:  Past Medical History:   Diagnosis Date   • Anesthesia complication     STATES HARD TO WAKE UP AFTER PREVIOUS KIDNEY STONE SURGERY AND STATES HE CODED   • Bilateral kidney stones    • Bipolar affective ([facility])    • COPD (chronic obstructive pulmonary disease) ([facility])    • Disease of thyroid gland    • Elevated cholesterol    • Hypertension    • Kidney stone on left side    • Left flank pain    • Mood disorder ([facility])    • Restless leg syndrome    • Sleep apnea     cpap  bring dos   • UTI (urinary tract infection) 2021    PREV FINISHED ANTIBIOTIC     Past Surgical History:  Past Surgical History:   Procedure Laterality Date   • ADENOIDECTOMY     • APPENDECTOMY     • COLONOSCOPY     • KIDNEY STONE SURGERY     • NEPHROLITHOTRIPSY PERCUTANEOUS Left 2022    Procedure: NEPHROSTLITHOTOMY PERCUTANEOUS;  Surgeon: Deuce Leija MD;  Location: [facility];  Service: Urology;  Laterality: Left;   • NEPHROLITHOTRIPSY PERCUTANEOUS Left 2022    Procedure: LEFT FLEXIBLE NEPHROSTOMY WITH STONE EXTRACTION, LEFT NEPHROSTOMY, TUBE REMOVAL, BILATERAL URETERAL STENT PLACEMENT;  Surgeon: Deuce Leija MD;  Location: [facility] OR;  Service:  Urology;  Laterality: Left;   • TONSILLECTOMY       Home Meds:  Medications Prior to Admission   Medication Sig Dispense Refill Last Dose   • albuterol sulfate  (90 Base) MCG/ACT inhaler INHALE ONE TO TWO PUFFS BY MOUTH EVERY 4 TO 6 HOURS 18 g 11 Past Week at Unknown time   • atorvastatin (LIPITOR) 20 MG tablet Take 1 tablet by mouth every night at bedtime. 90 tablet 3 3/6/2022 at Unknown time   • budesonide-formoterol (SYMBICORT) 160-4.5 MCG/ACT inhaler Inhale 2 puffs 2 (Two) Times a Day.   3/7/2022 at 500   • buPROPion XL (WELLBUTRIN XL) 300 MG 24 hr tablet Take 1 tablet by mouth Every Morning. 30 tablet 5 3/7/2022 at 0500   • divalproex (DEPAKOTE ER) 250 MG 24 hr tablet Take 1 tablet by mouth 2 (Two) Times a Day. 180 tablet 2 3/6/2022 at Unknown time   • hydrALAZINE (APRESOLINE) 50 MG tablet Take 1 tablet by mouth 3 (Three) Times a Day As Needed. 90 tablet 11 3/7/2022 at 0500   • HYDROcodone-acetaminophen (NORCO) 7.5-325 MG per tablet Take 1 tablet by mouth Every 4 (Four) to 6 (six) Hours As Needed for pain 20 tablet 0 3/5/2022 at Unknown time   • lamoTRIgine (LaMICtal) 100 MG tablet Take 100 mg by mouth 2 (Two) Times a Day. Take preop   3/7/2022 at 0500   • levothyroxine (SYNTHROID, LEVOTHROID) 50 MCG tablet Take 50 mcg by mouth Every Morning. TAKE PREOP   3/7/2022 at 0500   • metoprolol succinate XL (TOPROL-XL) 100 MG 24 hr tablet Take 100 mg by mouth 2 (Two) Times a Day. TAKE PREOP   3/7/2022 at 0500   • naltrexone (DEPADE) 50 MG tablet Take 1 tablet by mouth Every Morning. 90 tablet 2 3/7/2022 at 0500   • NIFEdipine CC (ADALAT CC) 90 MG 24 hr tablet Take 1 tablet by mouth Daily. 90 tablet 3 3/7/2022 at 0500   • potassium chloride (KLOR-CON) 20 MEQ packet Take 20 mEq by mouth Daily. TAKE PREOP      • pramipexole (MIRAPEX) 0.5 MG tablet take 1 tablet by mouth at bedtime 90 tablet 0    • tamsulosin (FLOMAX) 0.4 MG capsule 24 hr capsule Take 1 capsule by mouth Daily. 30 capsule 0    • vilazodone (VIIBRYD)  40 MG tablet tablet Take 40 mg by mouth Daily. TAKE PREOP   3/7/2022 at 0500     Current Meds:    Current Facility-Administered Medications:   •  acetaminophen (TYLENOL) tablet 650 mg, 650 mg, Oral, Q4H PRN, 650 mg at 03/09/22 0510 **OR** acetaminophen (TYLENOL) suppository 650 mg, 650 mg, Rectal, Q4H PRN, Ihsan Lopez MD  •  albuterol sulfate HFA (PROVENTIL HFA;VENTOLIN HFA;PROAIR HFA) inhaler 1 puff, 1 puff, Inhalation, Q6H PRN, Ihsan Lopez MD  •  atorvastatin (LIPITOR) tablet 20 mg, 20 mg, Oral, Daily, Ihsan Lopez MD, 20 mg at 03/08/22 2035  •  budesonide-formoterol (SYMBICORT) 160-4.5 MCG/ACT inhaler 2 puff, 2 puff, Inhalation, BID - RT, Ihsan Lopez MD, 2 puff at 03/08/22 0832  •  buPROPion XL (WELLBUTRIN XL) 24 hr tablet 300 mg, 300 mg, Oral, QAM, Ihsan Lopez MD, 300 mg at 03/08/22 0708  •  cefTRIAXone (ROCEPHIN) 1 g in sodium chloride 0.9 % 100 mL IVPB, 1 g, Intravenous, Q24H, Ihsan Lopez MD, Last Rate: 200 mL/hr at 03/08/22 0846, 1 g at 03/08/22 0846  •  divalproex (DEPAKOTE ER) 24 hr tablet 250 mg, 250 mg, Oral, BID, Ihsan Lopez MD, 250 mg at 03/08/22 2035  •  hydrALAZINE (APRESOLINE) tablet 50 mg, 50 mg, Oral, TID PRN, Ihsan Lopez MD  •  HYDROcodone-acetaminophen (NORCO) 7.5-325 MG per tablet 1 tablet, 1 tablet, Oral, Q4H PRN, Ihsan Lopez MD, 1 tablet at 03/09/22 0225  •  lamoTRIgine (LaMICtal) tablet 100 mg, 100 mg, Oral, BID, Ihsan Lopez MD, 100 mg at 03/08/22 2035  •  levothyroxine (SYNTHROID, LEVOTHROID) tablet 50 mcg, 50 mcg, Oral, Q AM, Ihsan Lopez MD, 50 mcg at 03/09/22 0527  •  metoprolol succinate XL (TOPROL-XL) 24 hr tablet 100 mg, 100 mg, Oral, BID, Ihsan Lopez MD, 100 mg at 03/08/22 2035  •  Morphine sulfate (PF) injection 4 mg, 4 mg, Intravenous, Q2H PRN, 4 mg at 03/08/22 1410 **AND** naloxone (NARCAN) injection 0.4 mg, 0.4 mg, Intravenous, Q5 Min PRN, Ihsan Lopez MD  •  NIFEdipine XL (PROCARDIA XL) 24 hr tablet 30 mg,  30 mg, Oral, Q24H, Ihsan Lopez MD  •  ondansetron (ZOFRAN) tablet 4 mg, 4 mg, Oral, Q6H PRN **OR** ondansetron (ZOFRAN) injection 4 mg, 4 mg, Intravenous, Q6H PRN, Ihsan Lopez MD  •  opium-belladonna (B&O SUPPRETTES) 16.2-30 MG suppository 30 mg, 30 mg, Rectal, Daily PRN, Ihsan Lopez MD  •  phenylephrine (ZHANG-SYNEPHRINE) 50 mg in sodium chloride 0.9 % 250 mL infusion, 0.5-3 mcg/kg/min, Intravenous, Continuous PRN, Ihsan Lopez MD  •  potassium chloride (KLOR-CON) packet 20 mEq, 20 mEq, Oral, Daily, Ihsan Lopez MD  •  pramipexole (MIRAPEX) tablet 0.5 mg, 0.5 mg, Oral, Daily, Ihsan Lopez MD, 0.5 mg at 22  •  sodium chloride 0.9 % flush 10 mL, 10 mL, Intravenous, Q12H, Ihsan Lopez MD, 10 mL at 22  •  sodium chloride 0.9 % flush 10 mL, 10 mL, Intravenous, PRN, Ihsan Lopez MD  •  sodium chloride 0.9 % infusion, 100 mL/hr, Intravenous, Continuous, Ihsan Lopez MD, Last Rate: 100 mL/hr at 22, 100 mL/hr at 22  •  sodium chloride 0.9 % infusion, 100 mL/hr, Intravenous, Continuous, Ihsan Lopez MD, Last Rate: 100 mL/hr at 22, 100 mL/hr at 22  •  tamsulosin (FLOMAX) 24 hr capsule 0.4 mg, 0.4 mg, Oral, Daily, Ihsan Lopez MD, 0.4 mg at 22  •  vilazodone (VIIBRYD) tablet 40 mg, 40 mg, Oral, Daily, Ihsan Lopez MD  Allergies:  Patient has no known allergies.    Review of Systems:  Negative 12 point system review except for what is in the HPI    Objective:  tMax 24 hours:  Temp (24hrs), Av.3 °F (36.8 °C), Min:97.4 °F (36.3 °C), Max:99.4 °F (37.4 °C)    Vital Sign Ranges:  Temp:  [97.4 °F (36.3 °C)-99.4 °F (37.4 °C)] 99.4 °F (37.4 °C)  Heart Rate:  [63-84] 70  Resp:  [13-20] 20  BP: (109-159)/(53-85) 131/53  Intake and Output Last 3 Shifts:  I/O last 3 completed shifts:  In: 3217 [P.O.:220; I.V.:2997]  Out: 8925 [Urine:8325; Drains:600]    Exam:  /53 (BP Location: Right arm,  "Patient Position: Lying)   Pulse 70   Temp 99.4 °F (37.4 °C) (Oral)   Resp 20   Ht 167.6 cm (66\")   Wt 109 kg (239 lb 3.2 oz)   SpO2 90%   BMI 38.61 kg/m²    General Appearance:    Alert, cooperative, no acute distress, general       appearance is normal   Head:    Normocephalic, without obvious abnormality, atraumatic   Eyes:            Pupils/irises normal. Exterior inspection conjunctivae       and lids normal.   Ears:    Normal external inspection   Nose:   Exterior inspection of nose is normal   Throat:   Lips, mucosa, and tongue normal   Neck:   No adenopathy, supple, symmetrical, trachea midline   Back:     No CVA tenderness   Lungs:     Respirations unlabored; normal effort, no audible     abnormality   CV:   Regular rhythm and normal rate, no edema   Abdomen:     No hernia, examination of the abdomen is normal with     no masses, tenderness, or distension    Male :  Normal   Musculoskeletal:   Inspection of the nails and digits reveal no abnormalities   Skin:   Inspection normal, palpation normal   Neurologic/Psych:   Orientation normal; Mood/Affect normal     Data Review:  All labs (24hrs):    Lab Results (last 24 hours)     Procedure Component Value Units Date/Time    Hemoglobin & Hematocrit, Blood [[id number]]  (Abnormal) Collected: 03/08/22 2350    Specimen: Blood Updated: 03/09/22 0004     Hemoglobin 7.0 g/dL      Hematocrit 21.4 %     Hemoglobin & Hematocrit, Blood [[id number]]  (Abnormal) Collected: 03/08/22 1825    Specimen: Blood Updated: 03/08/22 1844     Hemoglobin 8.0 g/dL      Hematocrit 23.4 %     Hemoglobin & Hematocrit, Blood [[id number]]  (Abnormal) Collected: 03/08/22 1255    Specimen: Blood Updated: 03/08/22 1304     Hemoglobin 8.7 g/dL      Hematocrit 25.7 %     Hemoglobin & Hematocrit, Blood [[id number]]  (Abnormal) Collected: 03/08/22 0709    Specimen: Blood Updated: 03/08/22 0715     Hemoglobin 10.0 g/dL      Hematocrit 29.5 %         Radiology:   Imaging Results (Last 72 Hours)  "    Procedure Component Value Units Date/Time    CT Abdomen Pelvis Stone Protocol [[id number]] Collected: 03/08/22 0804     Updated: 03/08/22 0815    Narrative:      CT ABDOMEN PELVIS STONE PROTOCOL-     Date of Exam: 3/8/2022 7:59 AM     Indication: Urinary tract infection. Status post lithotripsy today on  right and 2 weeks ago on the left.  Right-sided abdominal pain. Gross  hematuria.     Comparison: CT abdomen and pelvis without contrast 3/7/2022.     Technique: Contiguous axial CT images were obtained from the lung bases  to the pubic symphysis without contrast. Sagittal and coronal  reconstructions were performed.  Automated exposure control and  iterative reconstruction methods were used.     FINDINGS:     Right retroperitoneal hematoma is located at the posterior inferior  margin of the right kidney. It is larger than on 3/7/2022. It currently  measures 11.8 x 6.3 cm in the axial plane compared to 16.6 x 10.1 cm ON  the previous examination, and extends 23.6 cm craniocaudally on today's  study compared to 16.1 cm craniocaudally on the 3/7/2022 study. The  hematoma displaces the right kidney anteriorly.     High density fluid consistent with hemorrhagic fluid is seen within the  right renal pelvis. No gross hydronephrosis is seen.     Single bilateral ureteral stents remain appropriately positioned and  unchanged in position.     Multiple small nonobstructing left intrarenal calculi are seen, the  dominant stone located in the left lower renal pole measuring nearly 10  x 9 mm. The remainder of the stones in the left kidney measure 3 mm or  less in size.     A punctate nonobstructing stone is seen within the right mid kidney. No  definite right or left ureteral stone is evident.     The urinary bladder is distended with hemorrhagic fluid, new in  comparison to the prior examination. The Lobo catheter has been removed  in the interval.     Small amount of fluid is seen within the left retroperitoneum in  the  lower abdomen and pelvis.     Bandlike soft tissue thickening is seen within the back bilaterally,  likely related to previous percutaneous renal instrumentation. The right  percutaneous nephrostomy tube has been removed since the previous study.     Small left, trace right basilar pleural effusions and left greater than  right basilar airspace disease favored to represent atelectasis, is  without significant change from one day prior.     Heart size is mildly enlarged but stable with mild coronary artery  calcifications.     The noncontrast appearance of the liver, spleen, pancreas and adrenal  glands is within normal limits. The unopacified bowel appears  nondistended thickened, nondilated, noninflamed. The prostate and rectum  are within normal limits. Bilateral vasectomy clips are noted.     No acute or suspicious osseous abnormalities are identified.          Impression:         1. Interval removal of right percutaneous nephrostomy tube. Bilateral  ureteral stents remain appropriately positioned.  2. Right retroperitoneal hematoma has increased compared to one day  prior. Blood products are now seen within the right renal pelvis, and  large blood/hematoma is dense the urinary bladder, new since yesterday's  study.  3. Single nonobstructing right renal stone. Multiple nonobstructing left  renal stones. No ureteral stone is evident.  4. Trace right pleural effusion with mild right basilar atelectasis.  Small left pleural effusion with dense posterior left lower lobe  atelectasis. This is without significant change from one day prior.     Electronically Signed By-Alma Rosa Mercer MD On:3/8/2022 8:12 AM  This report was finalized on [id number] by  Alma Rosa Mercer MD.    CT Abdomen Pelvis Without Contrast [[id number]] Collected: 03/07/22 1624     Updated: 03/07/22 1635    Narrative:      CT ABDOMEN PELVIS WO CONTRAST-     Date of Exam: 3/7/2022 3:36 PM     Indication: Urinary tract stone,  symptomatic/complicated.       Comparison Exams: CT abdomen and pelvis 02/21/2022     Technique: Axial images from the base of the chest through the abdomen  and pelvis were obtained without intravenous contrast. Sagittal and  coronal reformatted images also performed. Automated exposure control  and iterative reconstruction methods were used.  Radiation audit for  number of CT and nuclear cardiology exams performed in the last year:   0.       FINDINGS:     There is a moderate amount of atelectasis in both lower lobes. Very  small amount of bilateral pleural fluid.     Bilateral ureteral stents are present, in good position. Mild left-sided  hydronephrosis. No hydronephrosis on the right. There are a few tiny  left-sided renal calculi measuring 3 mm and less. Solitary right-sided 3  mm stone. No ureteral stone on either side.     There also is a right-sided percutaneous nephrostomy tube, good  position. There is a new elongated crescent-shaped high density fluid  collection in the right-sided retroperitoneum posterior to the kidney  measuring 1.7 cm in thickness and 10 cm in diameter     The bladder is collapsed around the bilateral ureteral stents and a  Lobo catheter balloon. No stone in the bladder. Prostate calcification  is again noted.     No free air. No ascites. No bowel obstruction or colitis.             Impression:         Bilateral ureteral stents and right-sided percutaneous nephrostomy  catheter, all in good position.     1.7 cm thick 10 cm diameter fluid collection in the right-sided  posterior perirenal space. This may either relate to hemorrhage or  contrast material. The percutaneous nephrostomy tube courses through the  central portion of this fluid.     Near complete resolution of previous bilateral nephrolithiasis with a  few left-sided and a solitary tiny right-sided stone measuring 3 mm and  less.     No ureteral stone or stones in the bladder.     Mild left-sided hydronephrosis. No  hydroureter process on the right.     Bladder is collapsed around the distal portion of the stents and a Lobo  catheter     Electronically Signed By-Arjun Lizarraga On:3/7/2022 4:33 PM  This report was finalized on [id number] by  Arjun Lizarraga, .    FL < 1 Hour [[id number]] Resulted: 03/07/22 1103     Updated: 03/07/22 1104          Assessment/Plan:    Bilateral renal calculi status post left percutaneous nephrolithotomy approximately 2 weeks ago.  Now status post right percutaneous nephrolithotomy and right ureteroscopy.    Severe bleeding after removal of nephrostomy tube       Status post cystoscopy clot evacuation yesterday.    Urine clear now so we will give him a voiding trial.  He did receive a unit of blood last night after his hemoglobin came back at 7.  His CBC is pending this morning.  He may need another transfusion.  We will keep him at least 1 more day       Ravi De La Torre MD  3/9/2022  06:48 EST

## 2022-03-09 NOTE — PROGRESS NOTES
[facility] Services Daily Progress Note    Patient Name: Calos Moseley  : 1960  MRN: [id number]  Primary Care Physician:  Naveed Nunez DO  Date of admission: 3/7/2022      Subjective      Chief Complaint: Fatigue and abdominal pain      Patient Reports     3/8/22: Anemia.  Nephrostomy tube pulled out.  Lobo catheter pulled out. S/p CT abdomen pelvis. S/p cystoscopy with clot evacuation for uncontrolled pain.  3/9/22: Feels better.    Review of Systems   All other systems reviewed and are negative.         Objective      Vitals:   Temp:  [98 °F (36.7 °C)-99.4 °F (37.4 °C)] 98.2 °F (36.8 °C)  Heart Rate:  [67-76] 74  Resp:  [13-20] 17  BP: (120-159)/(53-85) 145/64  Flow (L/min):  [5] 5    Physical Exam  HENT:      Head: Normocephalic.      Mouth/Throat:      Mouth: Mucous membranes are moist.   Eyes:      General: No scleral icterus.     Extraocular Movements: Extraocular movements intact.      Pupils: Pupils are equal, round, and reactive to light.   Cardiovascular:      Rate and Rhythm: Normal rate and regular rhythm.   Pulmonary:      Effort: Pulmonary effort is normal.   Abdominal:      General: Bowel sounds are normal.   Musculoskeletal:         General: Normal range of motion.      Cervical back: Normal range of motion.   Skin:     General: Skin is warm.   Neurological:      Mental Status: He is alert. Mental status is at baseline.   Psychiatric:         Mood and Affect: Mood normal.           Result Review    Result Review:  I have personally reviewed the results from the time of this admission to 3/9/2022 16:36 EST and agree with these findings:  [x]  Laboratory  []  Microbiology  [x]  Radiology  []  EKG/Telemetry   []  Cardiology/Vascular   []  Pathology  [x]  Old records  []  Other:      Wounds (last 24 hours)     LDA Wound     Row Name 22 0705 22 1901          [REMOVED] Wound 22 0819 penis Other (comment)    Wound - Properties Group Placement  Date: 03/07/22  -EP Placement Time: 0819  -EP Present on Hospital Admission: N  -EP Location: penis  -EP Primary Wound Type: Other  -EP, cysto insertion  Removal Date: 03/08/22  -JY Removal Time: 1705 -JY     Retired Wound - Properties Group Placement Date: 03/07/22  -EP Placement Time: 0819  -EP Present on Hospital Admission: N  -EP Location: penis  -EP Primary Wound Type: Other  -EP, cysto insertion  Removal Date: 03/08/22  -JY Removal Time: 1705 -JY     Retired Wound - Properties Group Date first assessed: 03/07/22  -EP Time first assessed: 0819  -EP Present on Hospital Admission: N  -EP Location: penis  -EP Primary Wound Type: Other  -EP, cysto insertion  Resolution Date: 03/08/22  -JY Resolution Time: 1705  -JY            Wound 03/07/22 0910 Right flank Incision    Wound - Properties Group Placement Date: 03/07/22  -EP Placement Time: 0910  -EP Side: Right  -EP Location: flank  -EP Primary Wound Type: Incision  -EP     Dressing Appearance dry;intact  -KL intact;dried drainage  -SM     Closure BALDO  -KL BALDO  -SM     Base dressing in place, unable to visualize  -KL dressing in place, unable to visualize  -SM     Periwound intact;dry  -KL --     Periwound Temperature warm  -KL --     Periwound Skin Turgor soft  -KL --     Drainage Characteristics/Odor bleeding controlled  -KL bleeding controlled  -SM     Drainage Amount none  -KL small  shadow drainage  -SM     Retired Wound - Properties Group Placement Date: 03/07/22  -EP Placement Time: 0910  -EP Side: Right  -EP Location: flank  -EP Primary Wound Type: Incision  -EP     Retired Wound - Properties Group Date first assessed: 03/07/22  -EP Time first assessed: 0910  -EP Side: Right  -EP Location: flank  -EP Primary Wound Type: Incision  -EP            Wound 02/21/22 0940 Left flank    Wound - Properties Group Placement Date: 02/21/22  -CW Placement Time: 0940  -CW Present on Hospital Admission: N  -CW Side: Left  -CW Location: flank  -CW     Retired Wound -  Properties Group Placement Date: 02/21/22  -CW Placement Time: 0940 -CW Present on Hospital Admission: N  -CW Side: Left  -CW Location: flank  -CW     Retired Wound - Properties Group Date first assessed: 02/21/22  -CW Time first assessed: 0940  -CW Present on Hospital Admission: N  -CW Side: Left  -CW Location: flank  -CW           User Key  (r) = Recorded By, (t) = Taken By, (c) = Cosigned By    Initials Name Provider Type    CW Yane Mcclure, RN Registered Nurse    Vika Jules RN Registered Nurse    Henrietta Fournier RN Registered Nurse    Vandana Lawrence RN Registered Nurse    Zully Lara RN Registered Nurse                  Assessment/Plan      Brief Patient Summary:    61 years old male with bilateral nephrolithiasis s/p left percutaneous nephrolithotomy 2 weeks ago now s/p right percutaneous nephrolithotomy and right ureteroscopy on 3/7/2022 complicated with severe bleeding after nephrostomy tube removal and Lobo catheter removal      atorvastatin, 20 mg, Oral, Daily  budesonide-formoterol, 2 puff, Inhalation, BID - RT  buPROPion XL, 300 mg, Oral, QAM  cefTRIAXone, 1 g, Intravenous, Q24H  divalproex, 250 mg, Oral, BID  lamoTRIgine, 100 mg, Oral, BID  levothyroxine, 50 mcg, Oral, Q AM  metoprolol succinate XL, 100 mg, Oral, BID  NIFEdipine XL, 30 mg, Oral, Q24H  potassium chloride, 20 mEq, Oral, Daily  pramipexole, 0.5 mg, Oral, Daily  sodium chloride, 10 mL, Intravenous, Q12H  tamsulosin, 0.4 mg, Oral, Daily  vilazodone, 40 mg, Oral, Daily       phenylephrine, 0.5-3 mcg/kg/min  sodium chloride, 100 mL/hr, Last Rate: Stopped (03/09/22 1547)  sodium chloride, 100 mL/hr, Last Rate: 100 mL/hr (03/09/22 1548)         Active Hospital Problems:  Active Hospital Problems    Diagnosis    • **Bilateral kidney stones    • Calculus of ureter    • Gross hematuria    • Restless leg syndrome    • Bipolar affective (HCC)    • ISIAH (obstructive sleep apnea)    • HLD (hyperlipidemia)    • COPD (chronic  obstructive pulmonary disease) (HCC)    • Hypothyroidism    • Anxiety and depression      Formatting of this note might be different from the original.  Lamictal and Vibryd     • Essential hypertension        Bilateral nephrolithiasis s/p left percutaneous nephrolithotomy 2 weeks ago now s/p right percutaneous nephrolithotomy and right ureteroscopy on 3/7/2022 complicated with severe bleeding after nephrostomy tube removal and Lobo catheter removal:  -s/p Ct abd/pelvis 3/8/22--> large blood/hematoma in bladder, trace pleural effusion  -s/p cystoscopy with clot evacuation afternoon of 3/8/2022  -Trend H&H every 6  -Urology following    ISIAH:  -Continue home CPAP    Hypothyroidism:  -Continue Synthroid    Hypertension:  -Continue Toprol, Procardia      Hyperlipidemia:  -Continue statin    Bipolar disorder/anxiety/depression:  -Depakote, Viibryd, Lamictal, Wellbutrin    Restless leg:  -Continue Mirapex        DVT prophylaxis:  Mechanical DVT prophylaxis orders are present.    CODE STATUS:    Level Of Support Discussed With: Patient  Code Status (Patient has no pulse and is not breathing): CPR (Attempt to Resuscitate)  Medical Interventions (Patient has pulse or is breathing): Full Support      Disposition:  I expect patient to be discharged home.    This patient has been examined wearing appropriate Personal Protective Equipment and discussed with nursing. 03/09/22      Electronically signed by Ang Padilla DO, 03/09/22, 16:36 EST.  [facility]

## 2022-03-09 NOTE — ANESTHESIA POSTPROCEDURE EVALUATION
Patient: Calos Moseley    Procedure Summary     Date: 03/08/22 Room / Location: [facility] OR 01 / [facility] MAIN OR    Anesthesia Start: 1659 Anesthesia Stop: 1733    Procedure: CYSTOSCOPY WITH CLOT EVACUATION (N/A Bladder) Diagnosis:       Gross hematuria      (Gross hematuria [R31.0])    Surgeons: Ihsan Lopez MD Provider: Ritesh Laura MD    Anesthesia Type: general ASA Status: 3          Anesthesia Type: general    Vitals  Vitals Value Taken Time   /65 03/08/22 1830   Temp 98.6 °F (37 °C) 03/08/22 1829   Pulse 73 03/08/22 1831   Resp 18 03/08/22 1829   SpO2 91 % 03/08/22 1831   Vitals shown include unvalidated device data.        Post Anesthesia Care and Evaluation    Patient location during evaluation: PACU  Patient participation: complete - patient participated  Level of consciousness: awake  Pain scale: See nurse's notes for pain score.  Pain management: adequate  Airway patency: patent  Anesthetic complications: No anesthetic complications  PONV Status: none  Cardiovascular status: acceptable  Respiratory status: acceptable  Hydration status: acceptable    Comments: Patient seen and examined postoperatively; vital signs stable; SpO2 greater than or equal to 90%; cardiopulmonary status stable; nausea/vomiting adequately controlled; pain adequately controlled; no apparent anesthesia complications; patient discharged from anesthesia care when discharge criteria were met

## 2022-03-10 LAB
BH BB BLOOD EXPIRATION DATE: NORMAL
BH BB BLOOD TYPE BARCODE: 600
BH BB DISPENSE STATUS: NORMAL
BH BB PRODUCT CODE: NORMAL
BH BB UNIT NUMBER: NORMAL
CROSSMATCH INTERPRETATION: NORMAL
HCT VFR BLD AUTO: 23.4 % (ref 37.5–51)
HCT VFR BLD AUTO: 24.5 % (ref 37.5–51)
HCT VFR BLD AUTO: 26.4 % (ref 37.5–51)
HGB BLD-MCNC: 7.9 G/DL (ref 13–17.7)
HGB BLD-MCNC: 8.3 G/DL (ref 13–17.7)
HGB BLD-MCNC: 9 G/DL (ref 13–17.7)
UNIT  ABO: NORMAL
UNIT  RH: NORMAL

## 2022-03-10 PROCEDURE — 85014 HEMATOCRIT: CPT | Performed by: UROLOGY

## 2022-03-10 PROCEDURE — 25010000002 CEFTRIAXONE PER 250 MG: Performed by: UROLOGY

## 2022-03-10 PROCEDURE — 85018 HEMOGLOBIN: CPT | Performed by: UROLOGY

## 2022-03-10 PROCEDURE — 94799 UNLISTED PULMONARY SVC/PX: CPT

## 2022-03-10 PROCEDURE — 86900 BLOOD TYPING SEROLOGIC ABO: CPT

## 2022-03-10 PROCEDURE — 36430 TRANSFUSION BLD/BLD COMPNT: CPT

## 2022-03-10 PROCEDURE — P9016 RBC LEUKOCYTES REDUCED: HCPCS

## 2022-03-10 PROCEDURE — 99232 SBSQ HOSP IP/OBS MODERATE 35: CPT | Performed by: HOSPITALIST

## 2022-03-10 RX ORDER — OXYCODONE HYDROCHLORIDE 5 MG/1
10 TABLET ORAL EVERY 4 HOURS PRN
Status: DISCONTINUED | OUTPATIENT
Start: 2022-03-10 | End: 2022-03-11 | Stop reason: HOSPADM

## 2022-03-10 RX ORDER — POLYETHYLENE GLYCOL 3350 17 G/17G
17 POWDER, FOR SOLUTION ORAL DAILY
Status: DISCONTINUED | OUTPATIENT
Start: 2022-03-10 | End: 2022-03-11 | Stop reason: HOSPADM

## 2022-03-10 RX ORDER — AMOXICILLIN 250 MG
2 CAPSULE ORAL 2 TIMES DAILY
Status: DISCONTINUED | OUTPATIENT
Start: 2022-03-10 | End: 2022-03-11 | Stop reason: HOSPADM

## 2022-03-10 RX ADMIN — HYDROCODONE BITARTRATE AND ACETAMINOPHEN 1 TABLET: 7.5; 325 TABLET ORAL at 02:24

## 2022-03-10 RX ADMIN — OXYCODONE HYDROCHLORIDE 10 MG: 5 TABLET ORAL at 14:28

## 2022-03-10 RX ADMIN — DIVALPROEX SODIUM 250 MG: 250 TABLET, EXTENDED RELEASE ORAL at 21:27

## 2022-03-10 RX ADMIN — HYDRALAZINE HYDROCHLORIDE 50 MG: 25 TABLET, FILM COATED ORAL at 00:55

## 2022-03-10 RX ADMIN — METOPROLOL SUCCINATE 100 MG: 50 TABLET, EXTENDED RELEASE ORAL at 08:49

## 2022-03-10 RX ADMIN — HYDROCODONE BITARTRATE AND ACETAMINOPHEN 1 TABLET: 7.5; 325 TABLET ORAL at 08:49

## 2022-03-10 RX ADMIN — HYDRALAZINE HYDROCHLORIDE 50 MG: 25 TABLET, FILM COATED ORAL at 21:34

## 2022-03-10 RX ADMIN — BUPROPION HYDROCHLORIDE 300 MG: 150 TABLET, EXTENDED RELEASE ORAL at 06:50

## 2022-03-10 RX ADMIN — LAMOTRIGINE 100 MG: 100 TABLET ORAL at 21:27

## 2022-03-10 RX ADMIN — OXYCODONE HYDROCHLORIDE 10 MG: 5 TABLET ORAL at 21:25

## 2022-03-10 RX ADMIN — METOPROLOL SUCCINATE 100 MG: 50 TABLET, EXTENDED RELEASE ORAL at 21:27

## 2022-03-10 RX ADMIN — LEVOTHYROXINE SODIUM 50 MCG: 0.05 TABLET ORAL at 05:09

## 2022-03-10 RX ADMIN — PRAMIPEXOLE DIHYDROCHLORIDE 0.5 MG: 0.5 TABLET ORAL at 21:27

## 2022-03-10 RX ADMIN — CEFTRIAXONE 1 G: 1 INJECTION, POWDER, FOR SOLUTION INTRAMUSCULAR; INTRAVENOUS at 08:48

## 2022-03-10 RX ADMIN — POLYETHYLENE GLYCOL 3350 17 G: 17 POWDER, FOR SOLUTION ORAL at 21:25

## 2022-03-10 RX ADMIN — VILAZODONE HYDROCHLORIDE 40 MG: 40 TABLET ORAL at 08:49

## 2022-03-10 RX ADMIN — Medication 10 ML: at 08:50

## 2022-03-10 RX ADMIN — DIVALPROEX SODIUM 250 MG: 250 TABLET, EXTENDED RELEASE ORAL at 08:48

## 2022-03-10 RX ADMIN — BUDESONIDE AND FORMOTEROL FUMARATE DIHYDRATE 2 PUFF: 160; 4.5 AEROSOL RESPIRATORY (INHALATION) at 08:17

## 2022-03-10 RX ADMIN — ATORVASTATIN CALCIUM 20 MG: 20 TABLET, FILM COATED ORAL at 21:27

## 2022-03-10 RX ADMIN — ACETAMINOPHEN 650 MG: 325 TABLET ORAL at 16:35

## 2022-03-10 RX ADMIN — NIFEDIPINE 30 MG: 30 TABLET, FILM COATED, EXTENDED RELEASE ORAL at 08:49

## 2022-03-10 RX ADMIN — LAMOTRIGINE 100 MG: 100 TABLET ORAL at 08:49

## 2022-03-10 RX ADMIN — POTASSIUM CHLORIDE 20 MEQ: 1.5 POWDER, FOR SOLUTION ORAL at 08:49

## 2022-03-10 RX ADMIN — TAMSULOSIN HYDROCHLORIDE 0.4 MG: 0.4 CAPSULE ORAL at 08:49

## 2022-03-10 RX ADMIN — SENNOSIDES AND DOCUSATE SODIUM 2 TABLET: 50; 8.6 TABLET ORAL at 21:27

## 2022-03-10 NOTE — PLAN OF CARE
Goal Outcome Evaluation:  Plan of Care Reviewed With: patient        Progress: no change  Outcome Evaluation: Pt is resting in bed, he is having an infusion of PRBC's. Will monitor H&H. VSS, will continue to monitor.

## 2022-03-10 NOTE — PROGRESS NOTES
Urology Progress Note    Patient Identification:  Name:  Calos Moseley  Age:  61 y.o.  Sex:  male  :  1960  MRN:  [id number]    Chief Complaint: Patient without complaint    History of Present Illness: Patient has required transfusions for acute blood loss anemia following a percutaneous nephrolithotomy and stent placement.  He did also require clot evacuation.  His hemoglobin is 7.9 this morning following a unit of blood for hemoglobin of 7.1 last night.  Patient is voiding without any difficulty.  Urine is slightly discolored.    Problem List:  Patient Active Problem List   Diagnosis   • HLD (hyperlipidemia)   • Obesity, Class III, BMI 40-49.9 (morbid obesity) ([facility])   • Bilateral kidney stones   • COPD (chronic obstructive pulmonary disease) ([facility])   • Left flank pain   • Anxiety and depression   • Hypothyroidism   • ISIAH (obstructive sleep apnea)   • Essential hypertension   • Calculus, renal   • Restless leg syndrome   • Bipolar affective ([facility])   • Calculus of ureter   • Gross hematuria     Past Medical History:  Past Medical History:   Diagnosis Date   • Anesthesia complication     STATES HARD TO WAKE UP AFTER PREVIOUS KIDNEY STONE SURGERY AND STATES HE CODED   • Bilateral kidney stones    • Bipolar affective ([facility])    • COPD (chronic obstructive pulmonary disease) ([facility])    • Disease of thyroid gland    • Elevated cholesterol    • Hypertension    • Kidney stone on left side    • Left flank pain    • Mood disorder ([facility])    • Restless leg syndrome    • Sleep apnea     cpap  bring dos   • UTI (urinary tract infection) 2021    PREV FINISHED ANTIBIOTIC     Past Surgical History:  Past Surgical History:   Procedure Laterality Date   • ADENOIDECTOMY     • APPENDECTOMY     • COLONOSCOPY     • KIDNEY STONE SURGERY     • NEPHROLITHOTRIPSY PERCUTANEOUS Left 2022    Procedure: NEPHROSTLITHOTOMY PERCUTANEOUS;  Surgeon: Deuce Leija MD;  Location: [facility] OR;  Service: Urology;  Laterality: Left;   •  NEPHROLITHOTRIPSY PERCUTANEOUS Left 2/23/2022    Procedure: LEFT FLEXIBLE NEPHROSTOMY WITH STONE EXTRACTION, LEFT NEPHROSTOMY, TUBE REMOVAL, BILATERAL URETERAL STENT PLACEMENT;  Surgeon: Deuce Leija MD;  Location: [facility] MAIN OR;  Service: Urology;  Laterality: Left;   • TONSILLECTOMY       Home Meds:  Medications Prior to Admission   Medication Sig Dispense Refill Last Dose   • albuterol sulfate  (90 Base) MCG/ACT inhaler INHALE ONE TO TWO PUFFS BY MOUTH EVERY 4 TO 6 HOURS 18 g 11 Past Week at Unknown time   • atorvastatin (LIPITOR) 20 MG tablet Take 1 tablet by mouth every night at bedtime. 90 tablet 3 3/6/2022 at Unknown time   • budesonide-formoterol (SYMBICORT) 160-4.5 MCG/ACT inhaler Inhale 2 puffs 2 (Two) Times a Day.   3/7/2022 at 500   • buPROPion XL (WELLBUTRIN XL) 300 MG 24 hr tablet Take 1 tablet by mouth Every Morning. 30 tablet 5 3/7/2022 at 0500   • divalproex (DEPAKOTE ER) 250 MG 24 hr tablet Take 1 tablet by mouth 2 (Two) Times a Day. 180 tablet 2 3/6/2022 at Unknown time   • hydrALAZINE (APRESOLINE) 50 MG tablet Take 1 tablet by mouth 3 (Three) Times a Day As Needed. 90 tablet 11 3/7/2022 at 0500   • HYDROcodone-acetaminophen (NORCO) 7.5-325 MG per tablet Take 1 tablet by mouth Every 4 (Four) to 6 (six) Hours As Needed for pain 20 tablet 0 3/5/2022 at Unknown time   • lamoTRIgine (LaMICtal) 100 MG tablet Take 100 mg by mouth 2 (Two) Times a Day. Take preop   3/7/2022 at 0500   • levothyroxine (SYNTHROID, LEVOTHROID) 50 MCG tablet Take 50 mcg by mouth Every Morning. TAKE PREOP   3/7/2022 at 0500   • metoprolol succinate XL (TOPROL-XL) 100 MG 24 hr tablet Take 100 mg by mouth 2 (Two) Times a Day. TAKE PREOP   3/7/2022 at 0500   • naltrexone (DEPADE) 50 MG tablet Take 1 tablet by mouth Every Morning. 90 tablet 2 3/7/2022 at 0500   • NIFEdipine CC (ADALAT CC) 90 MG 24 hr tablet Take 1 tablet by mouth Daily. 90 tablet 3 3/7/2022 at 0500   • potassium chloride (KLOR-CON) 20 MEQ packet  Take 20 mEq by mouth Daily. TAKE PREOP      • pramipexole (MIRAPEX) 0.5 MG tablet take 1 tablet by mouth at bedtime 90 tablet 0    • tamsulosin (FLOMAX) 0.4 MG capsule 24 hr capsule Take 1 capsule by mouth Daily. 30 capsule 0    • vilazodone (VIIBRYD) 40 MG tablet tablet Take 40 mg by mouth Daily. TAKE PREOP   3/7/2022 at 0500     Current Meds:    Current Facility-Administered Medications:   •  acetaminophen (TYLENOL) tablet 650 mg, 650 mg, Oral, Q4H PRN, 650 mg at 03/09/22 0510 **OR** acetaminophen (TYLENOL) suppository 650 mg, 650 mg, Rectal, Q4H PRN, Ihsan Lopez MD  •  albuterol sulfate HFA (PROVENTIL HFA;VENTOLIN HFA;PROAIR HFA) inhaler 1 puff, 1 puff, Inhalation, Q6H PRN, Ishan Lopez MD  •  atorvastatin (LIPITOR) tablet 20 mg, 20 mg, Oral, Daily, Ihsan Lopez MD, 20 mg at 03/09/22 2019  •  budesonide-formoterol (SYMBICORT) 160-4.5 MCG/ACT inhaler 2 puff, 2 puff, Inhalation, BID - RT, Ihsan Lopez MD, 2 puff at 03/10/22 0817  •  buPROPion XL (WELLBUTRIN XL) 24 hr tablet 300 mg, 300 mg, Oral, QAM, Ihsan Lopez MD, 300 mg at 03/10/22 0650  •  cefTRIAXone (ROCEPHIN) 1 g in sodium chloride 0.9 % 100 mL IVPB, 1 g, Intravenous, Q24H, Ihsan Lopez MD, Last Rate: 200 mL/hr at 03/10/22 0848, 1 g at 03/10/22 0848  •  divalproex (DEPAKOTE ER) 24 hr tablet 250 mg, 250 mg, Oral, BID, Ihsan Lopez MD, 250 mg at 03/10/22 0848  •  hydrALAZINE (APRESOLINE) tablet 50 mg, 50 mg, Oral, TID PRN, Ihsan Lopez MD, 50 mg at 03/10/22 0055  •  lamoTRIgine (LaMICtal) tablet 100 mg, 100 mg, Oral, BID, Ihsan Lopez MD, 100 mg at 03/10/22 0849  •  levothyroxine (SYNTHROID, LEVOTHROID) tablet 50 mcg, 50 mcg, Oral, Q AM, Ihsan Lopez MD, 50 mcg at 03/10/22 0509  •  metoprolol succinate XL (TOPROL-XL) 24 hr tablet 100 mg, 100 mg, Oral, BID, Ihsan Lopez MD, 100 mg at 03/10/22 0849  •  [DISCONTINUED] Morphine sulfate (PF) injection 4 mg, 4 mg, Intravenous, Q2H PRN, 4 mg at 03/08/22 1410  **AND** naloxone (NARCAN) injection 0.4 mg, 0.4 mg, Intravenous, Q5 Min PRN, Ihsan Lopez MD  •  NIFEdipine XL (PROCARDIA XL) 24 hr tablet 30 mg, 30 mg, Oral, Q24H, Ihsan Lopez MD, 30 mg at 03/10/22 0849  •  ondansetron (ZOFRAN) tablet 4 mg, 4 mg, Oral, Q6H PRN **OR** ondansetron (ZOFRAN) injection 4 mg, 4 mg, Intravenous, Q6H PRN, Ihsan Lopez MD  •  opium-belladonna (B&O SUPPRETTES) 16.2-30 MG suppository 30 mg, 30 mg, Rectal, Daily PRN, Ihsan Lopez MD  •  oxyCODONE (ROXICODONE) immediate release tablet 10 mg, 10 mg, Oral, Q4H PRN, nAg Padilla DO  •  potassium chloride (KLOR-CON) packet 20 mEq, 20 mEq, Oral, Daily, Ihsan Lopez MD, 20 mEq at 03/10/22 0849  •  pramipexole (MIRAPEX) tablet 0.5 mg, 0.5 mg, Oral, Daily, Ihsan Lopez MD, 0.5 mg at 22 2019  •  sodium chloride 0.9 % flush 10 mL, 10 mL, Intravenous, Q12H, Ihsan Lopez MD, 10 mL at 03/10/22 0850  •  sodium chloride 0.9 % flush 10 mL, 10 mL, Intravenous, PRN, Ihsan Lopez MD  •  sodium chloride 0.9 % infusion, 100 mL/hr, Intravenous, Continuous, Ihsan Lopez MD, Stopped at 22 1547  •  sodium chloride 0.9 % infusion, 100 mL/hr, Intravenous, Continuous, Ihsan Lopez MD, Last Rate: 100 mL/hr at 22 1548, 100 mL/hr at 22 1548  •  tamsulosin (FLOMAX) 24 hr capsule 0.4 mg, 0.4 mg, Oral, Daily, Ihsan Lopez MD, 0.4 mg at 03/10/22 0849  •  vilazodone (VIIBRYD) tablet 40 mg, 40 mg, Oral, Daily, Ihsan Lopez MD, 40 mg at 03/10/22 0849  Allergies:  Patient has no known allergies.    Review of Systems   no fevers or chills.  No congestion or nasal discharge.    Objective:  tMax 24 hours:  Temp (24hrs), Av.2 °F (36.8 °C), Min:97.5 °F (36.4 °C), Max:98.5 °F (36.9 °C)    Vital Sign Ranges:  Temp:  [97.5 °F (36.4 °C)-98.5 °F (36.9 °C)] 97.5 °F (36.4 °C)  Heart Rate:  [63-77] 69  Resp:  [15-18] 18  BP: (126-200)/(49-79) 144/71  Intake and Output Last 3 Shifts:  I/O last 3  "completed shifts:  In: 4320.8 [P.O.:480; I.V.:3142; Blood:698.8]  Out: -75     Exam:  /71 (BP Location: Right arm, Patient Position: Sitting)   Pulse 69   Temp 97.5 °F (36.4 °C) (Oral)   Resp 18   Ht 167.6 cm (66\")   Wt 109 kg (239 lb 3.2 oz)   SpO2 96%   BMI 38.61 kg/m²    General Appearance:    Alert, cooperative, no acute distress, general         appearance is normal   Head:    Normocephalic, without obvious abnormality, atraumatic   Eyes:            Pupils/Irises normal. Exterior inspection conjunctivae       and lids normal.   Ears:    Normal external inspection   Nose:   Exterior inspection of nose is normal   Throat:   Lips, mucosa, and tongue normal   Lungs:     Respirations unlabored; normal effort, no audible     abnormality   CV:   Regular rhythm and normal rate, no edema   Abdomen:      examination of the abdomen is normal with     no masses, tenderness, or distension    :       Data Review:  All labs (24hrs):    Lab Results (last 24 hours)     Procedure Component Value Units Date/Time    Hemoglobin & Hematocrit, Blood [[id number]]  (Abnormal) Collected: 03/10/22 1220    Specimen: Blood Updated: 03/10/22 1234     Hemoglobin 9.0 g/dL      Hematocrit 26.4 %     Hemoglobin & Hematocrit, Blood [[id number]]  (Abnormal) Collected: 03/10/22 0601    Specimen: Blood Updated: 03/10/22 0644     Hemoglobin 7.9 g/dL      Hematocrit 23.4 %     Hemoglobin & Hematocrit, Blood [[id number]]  (Abnormal) Collected: 03/09/22 2333    Specimen: Blood Updated: 03/09/22 2356     Hemoglobin 7.1 g/dL      Hematocrit 20.5 %     Hemoglobin & Hematocrit, Blood [[id number]]  (Abnormal) Collected: 03/09/22 1824    Specimen: Blood Updated: 03/09/22 1844     Hemoglobin 7.7 g/dL      Hematocrit 23.5 %         Radiology:   Imaging Results (Last 72 Hours)     Procedure Component Value Units Date/Time    CT Abdomen Pelvis Stone Protocol [[id number]] Collected: 03/08/22 0804     Updated: 03/08/22 0815    Narrative:      CT ABDOMEN " PELVIS STONE PROTOCOL-     Date of Exam: 3/8/2022 7:59 AM     Indication: Urinary tract infection. Status post lithotripsy today on  right and 2 weeks ago on the left.  Right-sided abdominal pain. Gross  hematuria.     Comparison: CT abdomen and pelvis without contrast 3/7/2022.     Technique: Contiguous axial CT images were obtained from the lung bases  to the pubic symphysis without contrast. Sagittal and coronal  reconstructions were performed.  Automated exposure control and  iterative reconstruction methods were used.     FINDINGS:     Right retroperitoneal hematoma is located at the posterior inferior  margin of the right kidney. It is larger than on 3/7/2022. It currently  measures 11.8 x 6.3 cm in the axial plane compared to 16.6 x 10.1 cm ON  the previous examination, and extends 23.6 cm craniocaudally on today's  study compared to 16.1 cm craniocaudally on the 3/7/2022 study. The  hematoma displaces the right kidney anteriorly.     High density fluid consistent with hemorrhagic fluid is seen within the  right renal pelvis. No gross hydronephrosis is seen.     Single bilateral ureteral stents remain appropriately positioned and  unchanged in position.     Multiple small nonobstructing left intrarenal calculi are seen, the  dominant stone located in the left lower renal pole measuring nearly 10  x 9 mm. The remainder of the stones in the left kidney measure 3 mm or  less in size.     A punctate nonobstructing stone is seen within the right mid kidney. No  definite right or left ureteral stone is evident.     The urinary bladder is distended with hemorrhagic fluid, new in  comparison to the prior examination. The Lobo catheter has been removed  in the interval.     Small amount of fluid is seen within the left retroperitoneum in the  lower abdomen and pelvis.     Bandlike soft tissue thickening is seen within the back bilaterally,  likely related to previous percutaneous renal instrumentation. The  right  percutaneous nephrostomy tube has been removed since the previous study.     Small left, trace right basilar pleural effusions and left greater than  right basilar airspace disease favored to represent atelectasis, is  without significant change from one day prior.     Heart size is mildly enlarged but stable with mild coronary artery  calcifications.     The noncontrast appearance of the liver, spleen, pancreas and adrenal  glands is within normal limits. The unopacified bowel appears  nondistended thickened, nondilated, noninflamed. The prostate and rectum  are within normal limits. Bilateral vasectomy clips are noted.     No acute or suspicious osseous abnormalities are identified.          Impression:         1. Interval removal of right percutaneous nephrostomy tube. Bilateral  ureteral stents remain appropriately positioned.  2. Right retroperitoneal hematoma has increased compared to one day  prior. Blood products are now seen within the right renal pelvis, and  large blood/hematoma is dense the urinary bladder, new since yesterday's  study.  3. Single nonobstructing right renal stone. Multiple nonobstructing left  renal stones. No ureteral stone is evident.  4. Trace right pleural effusion with mild right basilar atelectasis.  Small left pleural effusion with dense posterior left lower lobe  atelectasis. This is without significant change from one day prior.     Electronically Signed By-Alma Rosa Mercer MD On:3/8/2022 8:12 AM  This report was finalized on [id number] by  Alma Rosa Mercer MD.    CT Abdomen Pelvis Without Contrast [[id number]] Collected: 03/07/22 1624     Updated: 03/07/22 1635    Narrative:      CT ABDOMEN PELVIS WO CONTRAST-     Date of Exam: 3/7/2022 3:36 PM     Indication: Urinary tract stone, symptomatic/complicated.       Comparison Exams: CT abdomen and pelvis 02/21/2022     Technique: Axial images from the base of the chest through the abdomen  and pelvis were obtained without  intravenous contrast. Sagittal and  coronal reformatted images also performed. Automated exposure control  and iterative reconstruction methods were used.  Radiation audit for  number of CT and nuclear cardiology exams performed in the last year:   0.       FINDINGS:     There is a moderate amount of atelectasis in both lower lobes. Very  small amount of bilateral pleural fluid.     Bilateral ureteral stents are present, in good position. Mild left-sided  hydronephrosis. No hydronephrosis on the right. There are a few tiny  left-sided renal calculi measuring 3 mm and less. Solitary right-sided 3  mm stone. No ureteral stone on either side.     There also is a right-sided percutaneous nephrostomy tube, good  position. There is a new elongated crescent-shaped high density fluid  collection in the right-sided retroperitoneum posterior to the kidney  measuring 1.7 cm in thickness and 10 cm in diameter     The bladder is collapsed around the bilateral ureteral stents and a  Lobo catheter balloon. No stone in the bladder. Prostate calcification  is again noted.     No free air. No ascites. No bowel obstruction or colitis.             Impression:         Bilateral ureteral stents and right-sided percutaneous nephrostomy  catheter, all in good position.     1.7 cm thick 10 cm diameter fluid collection in the right-sided  posterior perirenal space. This may either relate to hemorrhage or  contrast material. The percutaneous nephrostomy tube courses through the  central portion of this fluid.     Near complete resolution of previous bilateral nephrolithiasis with a  few left-sided and a solitary tiny right-sided stone measuring 3 mm and  less.     No ureteral stone or stones in the bladder.     Mild left-sided hydronephrosis. No hydroureter process on the right.     Bladder is collapsed around the distal portion of the stents and a Lobo  catheter     Electronically Signed By-Arjun Lizarraga On:3/7/2022 4:33 PM  This report was  finalized on [id number] by  Arjun Lizarraga, .          Assessment/Plan:    Principal Problem:    Bilateral kidney stones  Active Problems:    HLD (hyperlipidemia)    COPD (chronic obstructive pulmonary disease) (HCC)    Anxiety and depression    Hypothyroidism    ISIAH (obstructive sleep apnea)    Essential hypertension    Restless leg syndrome    Bipolar affective (HCC)    Calculus of ureter    Gross hematuria     Bilateral renal calculi status post left percutaneous nephrolithotomy approximately 2 weeks ago.  Now status post right percutaneous nephrolithotomy and right ureteroscopy.    Severe bleeding after removal of nephrostomy tube   Severe lower abdominal pain with poorly draining Lobo catheter  -resolved after clot evacuation    Plan   Monitor H&H  Transfuse as needed  Hopefully home tomorrow if hemoglobin stabilizes      Ihsan Lopez MD  3/10/2022  13:58 EST

## 2022-03-10 NOTE — PAYOR COMM NOTE
"UTILIZATION REVIEW  ROSA RUIZ RN   PH:   FAX: [phone]    [facility]  NPI# [id number]  TID # [id number]  ========================    CLINICAL UPDATE FOR EXTENDED STAY    3/10: NO NOTES FROM MD TODAY  VS: STABLE /79  MEDS: ROCEPHIN IV, NS IVF , OTHERS PO    LABS: HGB 7.1 > 7.9  AFTER 1 UNIT PRBC GIVEN  CREATININE 2.1        Fabiola Moseley (61 y.o. Male)             Date of Birth   1960    Social Security Number       Address   [address] [address]    Home Phone   [phone]    MRN   [id number]       Islam   [employer]    Marital Status                               Admission Date   3/7/22    Admission Type   Elective    Admitting Provider   Deuce Leija MD    Attending Provider   Deuce Leija MD    Department, Room/Bed   [facility] SURGICAL INPATIENT, 4132/1       Discharge Date       Discharge Disposition       Discharge Destination                               Attending Provider: Deuce Leija MD    Allergies: No Known Allergies    Isolation: None   Infection: None   Code Status: CPR   Advance Care Planning Activity    Ht: 167.6 cm (66\")   Wt: 109 kg (239 lb 3.2 oz)    Admission Cmt: None   Principal Problem: Bilateral kidney stones [N20.0]                 Active Insurance as of 3/7/2022     Primary Coverage     Payor Plan Insurance Group Employer/Plan Group    [facility]ly [facility] BLUE [employer]PL[employer]EE [id number]     Payor Plan Address Payor Plan Phone Number Payor Plan Fax Number Effective Dates    [address] [phone]  1/1/2022 - None Entered    [facility] [id number]       Subscriber Name Subscriber Birth Date Member ID       FABIOLA MOSELEY 1960 [id number]                 Emergency Contacts      (Rel.) Home Phone Work Phone Mobile Phone    MoseleyRebeca collins (Spouse) [phone] -- [phone]               Physician Progress Notes (last 48 hours)      Ang Padilla, DO at " 22 1636              [facility] Medicine Services Daily Progress Note    Patient Name: Calos Moseley  : 1960  MRN: [id number]  Primary Care Physician:  Naveed Nunez DO  Date of admission: 3/7/2022      Subjective      Chief Complaint: Fatigue and abdominal pain      Patient Reports     3/8/22: Anemia.  Nephrostomy tube pulled out.  Lobo catheter pulled out. S/p CT abdomen pelvis. S/p cystoscopy with clot evacuation for uncontrolled pain.  3/9/22: Feels better.    Review of Systems   All other systems reviewed and are negative.         Objective      Vitals:   Temp:  [98 °F (36.7 °C)-99.4 °F (37.4 °C)] 98.2 °F (36.8 °C)  Heart Rate:  [67-76] 74  Resp:  [13-20] 17  BP: (120-159)/(53-85) 145/64  Flow (L/min):  [5] 5    Physical Exam  HENT:      Head: Normocephalic.      Mouth/Throat:      Mouth: Mucous membranes are moist.   Eyes:      General: No scleral icterus.     Extraocular Movements: Extraocular movements intact.      Pupils: Pupils are equal, round, and reactive to light.   Cardiovascular:      Rate and Rhythm: Normal rate and regular rhythm.   Pulmonary:      Effort: Pulmonary effort is normal.   Abdominal:      General: Bowel sounds are normal.   Musculoskeletal:         General: Normal range of motion.      Cervical back: Normal range of motion.   Skin:     General: Skin is warm.   Neurological:      Mental Status: He is alert. Mental status is at baseline.   Psychiatric:         Mood and Affect: Mood normal.           Result Review    Result Review:  I have personally reviewed the results from the time of this admission to 3/9/2022 16:36 EST and agree with these findings:  [x]  Laboratory  []  Microbiology  [x]  Radiology  []  EKG/Telemetry   []  Cardiology/Vascular   []  Pathology  [x]  Old records  []  Other:      Wounds (last 24 hours)     LDA Wound     Row Name 22 0705 22 1901          [REMOVED] Wound 22 0819 penis Other (comment)    Wound -  Properties Group Placement Date: 03/07/22  -EP Placement Time: 0819  -EP Present on Hospital Admission: N  -EP Location: penis  -EP Primary Wound Type: Other  -EP, cysto insertion  Removal Date: 03/08/22  -JY Removal Time: 1705 -JY     Retired Wound - Properties Group Placement Date: 03/07/22  -EP Placement Time: 0819  -EP Present on Hospital Admission: N  -EP Location: penis  -EP Primary Wound Type: Other  -EP, cysto insertion  Removal Date: 03/08/22  -JY Removal Time: 1705 -JY     Retired Wound - Properties Group Date first assessed: 03/07/22  -EP Time first assessed: 0819  -EP Present on Hospital Admission: N  -EP Location: penis  -EP Primary Wound Type: Other  -EP, cysto insertion  Resolution Date: 03/08/22  -JY Resolution Time: 1705  -JY            Wound 03/07/22 0910 Right flank Incision    Wound - Properties Group Placement Date: 03/07/22  -EP Placement Time: 0910  -EP Side: Right  -EP Location: flank  -EP Primary Wound Type: Incision  -EP     Dressing Appearance dry;intact  -KL intact;dried drainage  -SM     Closure BALDO  -KL BALDO  -SM     Base dressing in place, unable to visualize  -KL dressing in place, unable to visualize  -SM     Periwound intact;dry  -KL --     Periwound Temperature warm  -KL --     Periwound Skin Turgor soft  -KL --     Drainage Characteristics/Odor bleeding controlled  -KL bleeding controlled  -SM     Drainage Amount none  -KL small  shadow drainage  -SM     Retired Wound - Properties Group Placement Date: 03/07/22  -EP Placement Time: 0910  -EP Side: Right  -EP Location: flank  -EP Primary Wound Type: Incision  -EP     Retired Wound - Properties Group Date first assessed: 03/07/22  -EP Time first assessed: 0910  -EP Side: Right  -EP Location: flank  -EP Primary Wound Type: Incision  -EP            Wound 02/21/22 0940 Left flank    Wound - Properties Group Placement Date: 02/21/22  -CW Placement Time: 0940  -CW Present on Hospital Admission: N  -CW Side: Left  -CW Location: flank   -CW     Retired Wound - Properties Group Placement Date: 02/21/22  -CW Placement Time: 0940 -CW Present on Hospital Admission: N  -CW Side: Left  -CW Location: flank  -CW     Retired Wound - Properties Group Date first assessed: 02/21/22  -CW Time first assessed: 0940  -CW Present on Hospital Admission: N  -CW Side: Left  -CW Location: flank  -CW           User Key  (r) = Recorded By, (t) = Taken By, (c) = Cosigned By    Initials Name Provider Type    CW Yane Mcclure, RN Registered Nurse    Vika Jules RN Registered Nurse    Henrietta Fournier RN Registered Nurse    Vandana Lawrence RN Registered Nurse    Zully Lara RN Registered Nurse                  Assessment/Plan      Brief Patient Summary:    61 years old male with bilateral nephrolithiasis s/p left percutaneous nephrolithotomy 2 weeks ago now s/p right percutaneous nephrolithotomy and right ureteroscopy on 3/7/2022 complicated with severe bleeding after nephrostomy tube removal and Lobo catheter removal      atorvastatin, 20 mg, Oral, Daily  budesonide-formoterol, 2 puff, Inhalation, BID - RT  buPROPion XL, 300 mg, Oral, QAM  cefTRIAXone, 1 g, Intravenous, Q24H  divalproex, 250 mg, Oral, BID  lamoTRIgine, 100 mg, Oral, BID  levothyroxine, 50 mcg, Oral, Q AM  metoprolol succinate XL, 100 mg, Oral, BID  NIFEdipine XL, 30 mg, Oral, Q24H  potassium chloride, 20 mEq, Oral, Daily  pramipexole, 0.5 mg, Oral, Daily  sodium chloride, 10 mL, Intravenous, Q12H  tamsulosin, 0.4 mg, Oral, Daily  vilazodone, 40 mg, Oral, Daily       phenylephrine, 0.5-3 mcg/kg/min  sodium chloride, 100 mL/hr, Last Rate: Stopped (03/09/22 1547)  sodium chloride, 100 mL/hr, Last Rate: 100 mL/hr (03/09/22 1548)         Active Hospital Problems:  Active Hospital Problems    Diagnosis    • **Bilateral kidney stones    • Calculus of ureter    • Gross hematuria    • Restless leg syndrome    • Bipolar affective (HCC)    • ISIAH (obstructive sleep apnea)    • HLD  (hyperlipidemia)    • COPD (chronic obstructive pulmonary disease) (HCC)    • Hypothyroidism    • Anxiety and depression      Formatting of this note might be different from the original.  Lamictal and Vibryd     • Essential hypertension        Bilateral nephrolithiasis s/p left percutaneous nephrolithotomy 2 weeks ago now s/p right percutaneous nephrolithotomy and right ureteroscopy on 3/7/2022 complicated with severe bleeding after nephrostomy tube removal and Lobo catheter removal:  -s/p Ct abd/pelvis 3/8/22--> large blood/hematoma in bladder, trace pleural effusion  -s/p cystoscopy with clot evacuation afternoon of 3/8/2022  -Trend H&H every 6  -Urology following    ISIAH:  -Continue home CPAP    Hypothyroidism:  -Continue Synthroid    Hypertension:  -Continue Toprol, Procardia      Hyperlipidemia:  -Continue statin    Bipolar disorder/anxiety/depression:  -Depakote, Viibryd, Lamictal, Wellbutrin    Restless leg:  -Continue Mirapex        DVT prophylaxis:  Mechanical DVT prophylaxis orders are present.    CODE STATUS:    Level Of Support Discussed With: Patient  Code Status (Patient has no pulse and is not breathing): CPR (Attempt to Resuscitate)  Medical Interventions (Patient has pulse or is breathing): Full Support      Disposition:  I expect patient to be discharged home.    This patient has been examined wearing appropriate Personal Protective Equipment and discussed with nursing. 22      Electronically signed by Ang Padilla DO, 22, 16:36 EST.  [facility]             Electronically signed by Ang Padilla DO at 22 1637     Ravi De La Torre MD at 22 0648            Urology Progress Note    Patient Identification:  Name:  Calos Moseley  Age:  61 y.o.  Sex:  male  :  1960  MRN:  [id number]    Subjective:   Feeling better, wants catheter out    Objective    Hemoglobin 7 last night and received 1 unit of blood with a CBC is currently  pending    Problem List:  Patient Active Problem List   Diagnosis   • HLD (hyperlipidemia)   • Obesity, Class III, BMI 40-49.9 (morbid obesity) ([facility])   • Bilateral kidney stones   • COPD (chronic obstructive pulmonary disease) ([facility])   • Left flank pain   • Anxiety and depression   • Hypothyroidism   • ISIAH (obstructive sleep apnea)   • Essential hypertension   • Calculus, renal   • Restless leg syndrome   • Bipolar affective ([facility])   • Calculus of ureter   • Gross hematuria     Past Medical History:  Past Medical History:   Diagnosis Date   • Anesthesia complication     STATES HARD TO WAKE UP AFTER PREVIOUS KIDNEY STONE SURGERY AND STATES HE CODED   • Bilateral kidney stones    • Bipolar affective ([facility])    • COPD (chronic obstructive pulmonary disease) ([facility])    • Disease of thyroid gland    • Elevated cholesterol    • Hypertension    • Kidney stone on left side    • Left flank pain    • Mood disorder ([facility])    • Restless leg syndrome    • Sleep apnea     cpap  bring dos   • UTI (urinary tract infection) 12/2021    PREV FINISHED ANTIBIOTIC     Past Surgical History:  Past Surgical History:   Procedure Laterality Date   • ADENOIDECTOMY     • APPENDECTOMY     • COLONOSCOPY     • KIDNEY STONE SURGERY     • NEPHROLITHOTRIPSY PERCUTANEOUS Left 2/21/2022    Procedure: NEPHROSTLITHOTOMY PERCUTANEOUS;  Surgeon: Deuce Leija MD;  Location: [facility];  Service: Urology;  Laterality: Left;   • NEPHROLITHOTRIPSY PERCUTANEOUS Left 2/23/2022    Procedure: LEFT FLEXIBLE NEPHROSTOMY WITH STONE EXTRACTION, LEFT NEPHROSTOMY, TUBE REMOVAL, BILATERAL URETERAL STENT PLACEMENT;  Surgeon: Deuce Leija MD;  Location: [facility];  Service: Urology;  Laterality: Left;   • TONSILLECTOMY       Home Meds:  Medications Prior to Admission   Medication Sig Dispense Refill Last Dose   • albuterol sulfate  (90 Base) MCG/ACT inhaler INHALE ONE TO TWO PUFFS BY MOUTH EVERY 4 TO 6 HOURS 18 g 11 Past Week at Unknown time   •  atorvastatin (LIPITOR) 20 MG tablet Take 1 tablet by mouth every night at bedtime. 90 tablet 3 3/6/2022 at Unknown time   • budesonide-formoterol (SYMBICORT) 160-4.5 MCG/ACT inhaler Inhale 2 puffs 2 (Two) Times a Day.   3/7/2022 at 500   • buPROPion XL (WELLBUTRIN XL) 300 MG 24 hr tablet Take 1 tablet by mouth Every Morning. 30 tablet 5 3/7/2022 at 0500   • divalproex (DEPAKOTE ER) 250 MG 24 hr tablet Take 1 tablet by mouth 2 (Two) Times a Day. 180 tablet 2 3/6/2022 at Unknown time   • hydrALAZINE (APRESOLINE) 50 MG tablet Take 1 tablet by mouth 3 (Three) Times a Day As Needed. 90 tablet 11 3/7/2022 at 0500   • HYDROcodone-acetaminophen (NORCO) 7.5-325 MG per tablet Take 1 tablet by mouth Every 4 (Four) to 6 (six) Hours As Needed for pain 20 tablet 0 3/5/2022 at Unknown time   • lamoTRIgine (LaMICtal) 100 MG tablet Take 100 mg by mouth 2 (Two) Times a Day. Take preop   3/7/2022 at 0500   • levothyroxine (SYNTHROID, LEVOTHROID) 50 MCG tablet Take 50 mcg by mouth Every Morning. TAKE PREOP   3/7/2022 at 0500   • metoprolol succinate XL (TOPROL-XL) 100 MG 24 hr tablet Take 100 mg by mouth 2 (Two) Times a Day. TAKE PREOP   3/7/2022 at 0500   • naltrexone (DEPADE) 50 MG tablet Take 1 tablet by mouth Every Morning. 90 tablet 2 3/7/2022 at 0500   • NIFEdipine CC (ADALAT CC) 90 MG 24 hr tablet Take 1 tablet by mouth Daily. 90 tablet 3 3/7/2022 at 0500   • potassium chloride (KLOR-CON) 20 MEQ packet Take 20 mEq by mouth Daily. TAKE PREOP      • pramipexole (MIRAPEX) 0.5 MG tablet take 1 tablet by mouth at bedtime 90 tablet 0    • tamsulosin (FLOMAX) 0.4 MG capsule 24 hr capsule Take 1 capsule by mouth Daily. 30 capsule 0    • vilazodone (VIIBRYD) 40 MG tablet tablet Take 40 mg by mouth Daily. TAKE PREOP   3/7/2022 at 0500     Current Meds:    Current Facility-Administered Medications:   •  acetaminophen (TYLENOL) tablet 650 mg, 650 mg, Oral, Q4H PRN, 650 mg at 03/09/22 0510 **OR** acetaminophen (TYLENOL) suppository 650 mg,  650 mg, Rectal, Q4H PRN, Ihsan Lopez MD  •  albuterol sulfate HFA (PROVENTIL HFA;VENTOLIN HFA;PROAIR HFA) inhaler 1 puff, 1 puff, Inhalation, Q6H PRN, Ihsan Lopez MD  •  atorvastatin (LIPITOR) tablet 20 mg, 20 mg, Oral, Daily, Ihsan Lopez MD, 20 mg at 03/08/22 2035  •  budesonide-formoterol (SYMBICORT) 160-4.5 MCG/ACT inhaler 2 puff, 2 puff, Inhalation, BID - RT, Ihsan Lopez MD, 2 puff at 03/08/22 0832  •  buPROPion XL (WELLBUTRIN XL) 24 hr tablet 300 mg, 300 mg, Oral, QAM, Ihsan Lopez MD, 300 mg at 03/08/22 0708  •  cefTRIAXone (ROCEPHIN) 1 g in sodium chloride 0.9 % 100 mL IVPB, 1 g, Intravenous, Q24H, Ihsan Lopez MD, Last Rate: 200 mL/hr at 03/08/22 0846, 1 g at 03/08/22 0846  •  divalproex (DEPAKOTE ER) 24 hr tablet 250 mg, 250 mg, Oral, BID, Ihsan Lopez MD, 250 mg at 03/08/22 2035  •  hydrALAZINE (APRESOLINE) tablet 50 mg, 50 mg, Oral, TID PRN, Ihsan Lopez MD  •  HYDROcodone-acetaminophen (NORCO) 7.5-325 MG per tablet 1 tablet, 1 tablet, Oral, Q4H PRN, Ihsan Lopez MD, 1 tablet at 03/09/22 0225  •  lamoTRIgine (LaMICtal) tablet 100 mg, 100 mg, Oral, BID, Ihsan Lopez MD, 100 mg at 03/08/22 2035  •  levothyroxine (SYNTHROID, LEVOTHROID) tablet 50 mcg, 50 mcg, Oral, Q AM, Ihsan Lopez MD, 50 mcg at 03/09/22 0527  •  metoprolol succinate XL (TOPROL-XL) 24 hr tablet 100 mg, 100 mg, Oral, BID, Ihsan Lopez MD, 100 mg at 03/08/22 2035  •  Morphine sulfate (PF) injection 4 mg, 4 mg, Intravenous, Q2H PRN, 4 mg at 03/08/22 1410 **AND** naloxone (NARCAN) injection 0.4 mg, 0.4 mg, Intravenous, Q5 Min PRN, Ihsan Lopez MD  •  NIFEdipine XL (PROCARDIA XL) 24 hr tablet 30 mg, 30 mg, Oral, Q24H, Ihsan Lopez MD  •  ondansetron (ZOFRAN) tablet 4 mg, 4 mg, Oral, Q6H PRN **OR** ondansetron (ZOFRAN) injection 4 mg, 4 mg, Intravenous, Q6H PRN, Ihsan Lopez MD  •  opium-belladonna (B&O SUPPRETTES) 16.2-30 MG suppository 30 mg, 30 mg, Rectal, Daily  "PRN, Ihsan Lopez MD  •  phenylephrine (ZHANG-SYNEPHRINE) 50 mg in sodium chloride 0.9 % 250 mL infusion, 0.5-3 mcg/kg/min, Intravenous, Continuous PRN, Ihsan Lopez MD  •  potassium chloride (KLOR-CON) packet 20 mEq, 20 mEq, Oral, Daily, Ihsan Lopez MD  •  pramipexole (MIRAPEX) tablet 0.5 mg, 0.5 mg, Oral, Daily, Ihsan Lopez MD, 0.5 mg at 22  •  sodium chloride 0.9 % flush 10 mL, 10 mL, Intravenous, Q12H, Ihsan Lopez MD, 10 mL at 22  •  sodium chloride 0.9 % flush 10 mL, 10 mL, Intravenous, PRN, Ihsan Lopez MD  •  sodium chloride 0.9 % infusion, 100 mL/hr, Intravenous, Continuous, Ihsan Lopez MD, Last Rate: 100 mL/hr at 22, 100 mL/hr at 22  •  sodium chloride 0.9 % infusion, 100 mL/hr, Intravenous, Continuous, Ihsan Lopez MD, Last Rate: 100 mL/hr at 22, 100 mL/hr at 22  •  tamsulosin (FLOMAX) 24 hr capsule 0.4 mg, 0.4 mg, Oral, Daily, Ihsan Lopez MD, 0.4 mg at 22  •  vilazodone (VIIBRYD) tablet 40 mg, 40 mg, Oral, Daily, Ihsan Lopez MD  Allergies:  Patient has no known allergies.    Review of Systems:  Negative 12 point system review except for what is in the HPI    Objective:  tMax 24 hours:  Temp (24hrs), Av.3 °F (36.8 °C), Min:97.4 °F (36.3 °C), Max:99.4 °F (37.4 °C)    Vital Sign Ranges:  Temp:  [97.4 °F (36.3 °C)-99.4 °F (37.4 °C)] 99.4 °F (37.4 °C)  Heart Rate:  [63-84] 70  Resp:  [13-20] 20  BP: (109-159)/(53-85) 131/53  Intake and Output Last 3 Shifts:  I/O last 3 completed shifts:  In: 3217 [P.O.:220; I.V.:2997]  Out: 8925 [Urine:8325; Drains:600]    Exam:  /53 (BP Location: Right arm, Patient Position: Lying)   Pulse 70   Temp 99.4 °F (37.4 °C) (Oral)   Resp 20   Ht 167.6 cm (66\")   Wt 109 kg (239 lb 3.2 oz)   SpO2 90%   BMI 38.61 kg/m²    General Appearance:    Alert, cooperative, no acute distress, general       appearance is normal   Head:    " Normocephalic, without obvious abnormality, atraumatic   Eyes:            Pupils/irises normal. Exterior inspection conjunctivae       and lids normal.   Ears:    Normal external inspection   Nose:   Exterior inspection of nose is normal   Throat:   Lips, mucosa, and tongue normal   Neck:   No adenopathy, supple, symmetrical, trachea midline   Back:     No CVA tenderness   Lungs:     Respirations unlabored; normal effort, no audible     abnormality   CV:   Regular rhythm and normal rate, no edema   Abdomen:     No hernia, examination of the abdomen is normal with     no masses, tenderness, or distension    Male :  Normal   Musculoskeletal:   Inspection of the nails and digits reveal no abnormalities   Skin:   Inspection normal, palpation normal   Neurologic/Psych:   Orientation normal; Mood/Affect normal     Data Review:  All labs (24hrs):    Lab Results (last 24 hours)     Procedure Component Value Units Date/Time    Hemoglobin & Hematocrit, Blood [[id number]]  (Abnormal) Collected: 03/08/22 2350    Specimen: Blood Updated: 03/09/22 0004     Hemoglobin 7.0 g/dL      Hematocrit 21.4 %     Hemoglobin & Hematocrit, Blood [[id number]]  (Abnormal) Collected: 03/08/22 1825    Specimen: Blood Updated: 03/08/22 1844     Hemoglobin 8.0 g/dL      Hematocrit 23.4 %     Hemoglobin & Hematocrit, Blood [[id number]]  (Abnormal) Collected: 03/08/22 1255    Specimen: Blood Updated: 03/08/22 1304     Hemoglobin 8.7 g/dL      Hematocrit 25.7 %     Hemoglobin & Hematocrit, Blood [[id number]]  (Abnormal) Collected: 03/08/22 0709    Specimen: Blood Updated: 03/08/22 0715     Hemoglobin 10.0 g/dL      Hematocrit 29.5 %         Radiology:   Imaging Results (Last 72 Hours)     Procedure Component Value Units Date/Time    CT Abdomen Pelvis Stone Protocol [[id number]] Collected: 03/08/22 0804     Updated: 03/08/22 0815    Narrative:      CT ABDOMEN PELVIS STONE PROTOCOL-     Date of Exam: 3/8/2022 7:59 AM     Indication: Urinary tract  infection. Status post lithotripsy today on  right and 2 weeks ago on the left.  Right-sided abdominal pain. Gross  hematuria.     Comparison: CT abdomen and pelvis without contrast 3/7/2022.     Technique: Contiguous axial CT images were obtained from the lung bases  to the pubic symphysis without contrast. Sagittal and coronal  reconstructions were performed.  Automated exposure control and  iterative reconstruction methods were used.     FINDINGS:     Right retroperitoneal hematoma is located at the posterior inferior  margin of the right kidney. It is larger than on 3/7/2022. It currently  measures 11.8 x 6.3 cm in the axial plane compared to 16.6 x 10.1 cm ON  the previous examination, and extends 23.6 cm craniocaudally on today's  study compared to 16.1 cm craniocaudally on the 3/7/2022 study. The  hematoma displaces the right kidney anteriorly.     High density fluid consistent with hemorrhagic fluid is seen within the  right renal pelvis. No gross hydronephrosis is seen.     Single bilateral ureteral stents remain appropriately positioned and  unchanged in position.     Multiple small nonobstructing left intrarenal calculi are seen, the  dominant stone located in the left lower renal pole measuring nearly 10  x 9 mm. The remainder of the stones in the left kidney measure 3 mm or  less in size.     A punctate nonobstructing stone is seen within the right mid kidney. No  definite right or left ureteral stone is evident.     The urinary bladder is distended with hemorrhagic fluid, new in  comparison to the prior examination. The Lobo catheter has been removed  in the interval.     Small amount of fluid is seen within the left retroperitoneum in the  lower abdomen and pelvis.     Bandlike soft tissue thickening is seen within the back bilaterally,  likely related to previous percutaneous renal instrumentation. The right  percutaneous nephrostomy tube has been removed since the previous study.     Small left,  trace right basilar pleural effusions and left greater than  right basilar airspace disease favored to represent atelectasis, is  without significant change from one day prior.     Heart size is mildly enlarged but stable with mild coronary artery  calcifications.     The noncontrast appearance of the liver, spleen, pancreas and adrenal  glands is within normal limits. The unopacified bowel appears  nondistended thickened, nondilated, noninflamed. The prostate and rectum  are within normal limits. Bilateral vasectomy clips are noted.     No acute or suspicious osseous abnormalities are identified.          Impression:         1. Interval removal of right percutaneous nephrostomy tube. Bilateral  ureteral stents remain appropriately positioned.  2. Right retroperitoneal hematoma has increased compared to one day  prior. Blood products are now seen within the right renal pelvis, and  large blood/hematoma is dense the urinary bladder, new since yesterday's  study.  3. Single nonobstructing right renal stone. Multiple nonobstructing left  renal stones. No ureteral stone is evident.  4. Trace right pleural effusion with mild right basilar atelectasis.  Small left pleural effusion with dense posterior left lower lobe  atelectasis. This is without significant change from one day prior.     Electronically Signed By-Alma Rosa Mercer MD On:3/8/2022 8:12 AM  This report was finalized on [id number] by  Alma Rosa Mercer MD.    CT Abdomen Pelvis Without Contrast [[id number]] Collected: 03/07/22 1624     Updated: 03/07/22 1635    Narrative:      CT ABDOMEN PELVIS WO CONTRAST-     Date of Exam: 3/7/2022 3:36 PM     Indication: Urinary tract stone, symptomatic/complicated.       Comparison Exams: CT abdomen and pelvis 02/21/2022     Technique: Axial images from the base of the chest through the abdomen  and pelvis were obtained without intravenous contrast. Sagittal and  coronal reformatted images also performed. Automated exposure  control  and iterative reconstruction methods were used.  Radiation audit for  number of CT and nuclear cardiology exams performed in the last year:   0.       FINDINGS:     There is a moderate amount of atelectasis in both lower lobes. Very  small amount of bilateral pleural fluid.     Bilateral ureteral stents are present, in good position. Mild left-sided  hydronephrosis. No hydronephrosis on the right. There are a few tiny  left-sided renal calculi measuring 3 mm and less. Solitary right-sided 3  mm stone. No ureteral stone on either side.     There also is a right-sided percutaneous nephrostomy tube, good  position. There is a new elongated crescent-shaped high density fluid  collection in the right-sided retroperitoneum posterior to the kidney  measuring 1.7 cm in thickness and 10 cm in diameter     The bladder is collapsed around the bilateral ureteral stents and a  Lobo catheter balloon. No stone in the bladder. Prostate calcification  is again noted.     No free air. No ascites. No bowel obstruction or colitis.             Impression:         Bilateral ureteral stents and right-sided percutaneous nephrostomy  catheter, all in good position.     1.7 cm thick 10 cm diameter fluid collection in the right-sided  posterior perirenal space. This may either relate to hemorrhage or  contrast material. The percutaneous nephrostomy tube courses through the  central portion of this fluid.     Near complete resolution of previous bilateral nephrolithiasis with a  few left-sided and a solitary tiny right-sided stone measuring 3 mm and  less.     No ureteral stone or stones in the bladder.     Mild left-sided hydronephrosis. No hydroureter process on the right.     Bladder is collapsed around the distal portion of the stents and a Lobo  catheter     Electronically Signed By-Arjun Lizarraga On:3/7/2022 4:33 PM  This report was finalized on [id number] by  Arjun Lizarraga, .    [place] < 1 Hour [[id number]] Resulted: 03/07/22 1105      Updated: 22 1104          Assessment/Plan:    Bilateral renal calculi status post left percutaneous nephrolithotomy approximately 2 weeks ago.  Now status post right percutaneous nephrolithotomy and right ureteroscopy.    Severe bleeding after removal of nephrostomy tube       Status post cystoscopy clot evacuation yesterday.    Urine clear now so we will give him a voiding trial.  He did receive a unit of blood last night after his hemoglobin came back at 7.  His CBC is pending this morning.  He may need another transfusion.  We will keep him at least 1 more day       Ravi De La Torre MD  3/9/2022  06:48 EST      Electronically signed by Ravi De La Torre MD at 22 0650     Ang Padilla DO at 22 1227              [facility] Medicine Services Daily Progress Note    Patient Name: Calos Moseley  : 1960  MRN: [id number]  Primary Care Physician:  Naveed Nunez DO  Date of admission: 3/7/2022      Subjective      Chief Complaint: Fatigue and abdominal pain      Patient Reports     3/8/22: Anemia.  Nephrostomy tube pulled out.  Lobo catheter pulled out. S/p CT abdomen pelvis    Review of Systems   All other systems reviewed and are negative.         Objective      Vitals:   Temp:  [97.4 °F (36.3 °C)-98.1 °F (36.7 °C)] 97.7 °F (36.5 °C)  Heart Rate:  [69-88] 69  Resp:  [18-20] 18  BP: (109-172)/(68-80) 125/70    Physical Exam  HENT:      Head: Normocephalic.      Mouth/Throat:      Mouth: Mucous membranes are moist.   Eyes:      General: No scleral icterus.     Extraocular Movements: Extraocular movements intact.      Pupils: Pupils are equal, round, and reactive to light.   Cardiovascular:      Rate and Rhythm: Normal rate and regular rhythm.   Pulmonary:      Effort: Pulmonary effort is normal.   Abdominal:      General: Bowel sounds are normal.   Musculoskeletal:         General: Normal range of motion.      Cervical back: Normal range of motion.    Skin:     General: Skin is warm.   Neurological:      Mental Status: He is alert. Mental status is at baseline.   Psychiatric:         Mood and Affect: Mood normal.           Result Review    Result Review:  I have personally reviewed the results from the time of this admission to 3/8/2022 12:39 EST and agree with these findings:  [x]  Laboratory  []  Microbiology  [x]  Radiology  []  EKG/Telemetry   []  Cardiology/Vascular   []  Pathology  [x]  Old records  []  Other:      Wounds (last 24 hours)     LDA Wound     Row Name 03/08/22 0705 03/07/22 1930          Wound 03/07/22 0819 penis Other (comment)    Wound - Properties Group Placement Date: 03/07/22  -EP Placement Time: 0819  -EP Present on Hospital Admission: N  -EP Location: penis  -EP Primary Wound Type: Other  -EP, cysto insertion      Retired Wound - Properties Group Placement Date: 03/07/22  -EP Placement Time: 0819  -EP Present on Hospital Admission: N  -EP Location: penis  -EP Primary Wound Type: Other  -EP, cysto insertion      Retired Wound - Properties Group Date first assessed: 03/07/22  -EP Time first assessed: 0819  -EP Present on Hospital Admission: N  -EP Location: penis  -EP Primary Wound Type: Other  -EP, cysto insertion             Wound 03/07/22 0910 Right flank Incision    Wound - Properties Group Placement Date: 03/07/22  -EP Placement Time: 0910  -EP Side: Right  -EP Location: flank  -EP Primary Wound Type: Incision  -EP     Dressing Appearance dry;intact  -KL intact;moist drainage  -SM     Closure BALDO  -KL BALDO  -SM     Base dressing in place, unable to visualize  -KL dressing in place, unable to visualize  -SM     Drainage Characteristics/Odor bleeding controlled  -KL serosanguineous  -SM     Drainage Amount none  -KL moderate  -SM     Retired Wound - Properties Group Placement Date: 03/07/22  -EP Placement Time: 0910  -EP Side: Right  -EP Location: flank  -EP Primary Wound Type: Incision  -EP     Retired Wound - Properties Group Date  first assessed: 03/07/22  -EP Time first assessed: 0910  -EP Side: Right  -EP Location: flank  -EP Primary Wound Type: Incision  -EP            Wound 02/21/22 0940 Left flank    Wound - Properties Group Placement Date: 02/21/22  -CW Placement Time: 0940  -CW Present on Hospital Admission: N  -CW Side: Left  -CW Location: flank  -CW     Retired Wound - Properties Group Placement Date: 02/21/22  -CW Placement Time: 0940 -CW Present on Hospital Admission: N  -CW Side: Left  -CW Location: flank  -CW     Retired Wound - Properties Group Date first assessed: 02/21/22  -CW Time first assessed: 0940  -CW Present on Hospital Admission: N  -CW Side: Left  -CW Location: flank  -CW           User Key  (r) = Recorded By, (t) = Taken By, (c) = Cosigned By    Initials Name Provider Type    CW Yane Mcclure RN Registered Nurse    Henrietta Fournier RN Registered Nurse    Vandana Lawrence RN Registered Nurse    Zully Lara RN Registered Nurse                  Assessment/Plan      Brief Patient Summary:    61 years old male with bilateral nephrolithiasis s/p left percutaneous nephrolithotomy 2 weeks ago now s/p right percutaneous nephrolithotomy and right ureteroscopy on 3/7/2022 complicated with severe bleeding after nephrostomy tube removal and Lobo catheter removal      atorvastatin, 20 mg, Oral, Daily  budesonide-formoterol, 2 puff, Inhalation, BID - RT  buPROPion XL, 300 mg, Oral, QAM  cefTRIAXone, 1 g, Intravenous, Q24H  divalproex, 250 mg, Oral, BID  lamoTRIgine, 100 mg, Oral, BID  levothyroxine, 50 mcg, Oral, Q AM  metoprolol succinate XL, 100 mg, Oral, BID  NIFEdipine XL, 30 mg, Oral, Q24H  potassium chloride, 20 mEq, Oral, Daily  pramipexole, 0.5 mg, Oral, Daily  sodium chloride, 10 mL, Intravenous, Q12H  tamsulosin, 0.4 mg, Oral, Daily  vilazodone, 40 mg, Oral, Daily       sodium chloride, 100 mL/hr, Last Rate: 100 mL/hr (03/08/22 0708)         Active Hospital Problems:  Active Hospital Problems    Diagnosis     • **Bilateral kidney stones    • Calculus of ureter    • Gross hematuria    • Restless leg syndrome    • Bipolar affective (HCC)    • ISIAH (obstructive sleep apnea)    • HLD (hyperlipidemia)    • COPD (chronic obstructive pulmonary disease) (HCC)    • Hypothyroidism    • Anxiety and depression      Formatting of this note might be different from the original.  Lamictal and Vibryd     • Essential hypertension        Bilateral nephrolithiasis s/p left percutaneous nephrolithotomy 2 weeks ago now s/p right percutaneous nephrolithotomy and right ureteroscopy on 3/7/2022 complicated with severe bleeding after nephrostomy tube removal and Lobo catheter removal:  -s/p Ct abd/pelvis 3/8/22--> large blood/hematoma in bladder, trace pleural effusion  -Trend H&H every 6  -Urology following    ISIAH:  -Continue home CPAP    Hypothyroidism:  -Continue Synthroid    Hypertension:  -Continue Toprol, Procardia      Hyperlipidemia:  -Continue statin    Bipolar disorder/anxiety/depression:  -Depakote, Viibryd, Lamictal, Wellbutrin    Restless leg:  -Continue Mirapex        DVT prophylaxis:  Mechanical DVT prophylaxis orders are present.    CODE STATUS:    Level Of Support Discussed With: Patient  Code Status (Patient has no pulse and is not breathing): CPR (Attempt to Resuscitate)  Medical Interventions (Patient has pulse or is breathing): Full Support      Disposition:  I expect patient to be discharged home.    This patient has been examined wearing appropriate Personal Protective Equipment and discussed with nursing. 03/08/22      Electronically signed by Ang Padilla DO, 03/08/22, 12:39 EST.  [facility]             Electronically signed by Ang Padilla DO at 03/08/22 1245       Consult Notes (last 48 hours)  Notes from 03/08/22 1124 through 03/10/22 1124   No notes of this type exist for this encounter.

## 2022-03-10 NOTE — PROGRESS NOTES
[facility] Medicine Services Daily Progress Note    Patient Name: Calos Moseley  : 1960  MRN: [id number]  Primary Care Physician:  Naveed Nunez DO  Date of admission: 3/7/2022      Subjective      Chief Complaint: Fatigue and abdominal pain      Patient Reports     3/8/22: Anemia.  Nephrostomy tube pulled out.  Lobo catheter pulled out. S/p CT abdomen pelvis. S/p cystoscopy with clot evacuation for uncontrolled pain.  3/9/22: Feels better.  3/10/22: Hemoglobin 7.9 this a.m.  Transfused PRBC overnight.  H&H every 6.  Out of bed to chair no complaints.    Review of Systems   All other systems reviewed and are negative.         Objective      Vitals:   Temp:  [98.1 °F (36.7 °C)-98.5 °F (36.9 °C)] 98.1 °F (36.7 °C)  Heart Rate:  [63-77] 68  Resp:  [15-18] 18  BP: (126-200)/(49-79) 161/73  Flow (L/min):  [5] 5    Physical Exam  HENT:      Head: Normocephalic.      Mouth/Throat:      Mouth: Mucous membranes are moist.   Eyes:      General: No scleral icterus.     Extraocular Movements: Extraocular movements intact.      Pupils: Pupils are equal, round, and reactive to light.   Cardiovascular:      Rate and Rhythm: Normal rate and regular rhythm.   Pulmonary:      Effort: Pulmonary effort is normal.   Abdominal:      General: Bowel sounds are normal.   Musculoskeletal:         General: Normal range of motion.      Cervical back: Normal range of motion.   Skin:     General: Skin is warm.   Neurological:      Mental Status: He is alert. Mental status is at baseline.   Psychiatric:         Mood and Affect: Mood normal.           Result Review    Result Review:  I have personally reviewed the results from the time of this admission to 3/10/2022 11:26 EST and agree with these findings:  [x]  Laboratory  []  Microbiology  [x]  Radiology  []  EKG/Telemetry   []  Cardiology/Vascular   []  Pathology  [x]  Old records  []  Other:      Wounds (last 24 hours)     LDA Wound     Row Name 22  1926             Wound 03/07/22 0910 Right flank Incision    Wound - Properties Group Placement Date: 03/07/22  -EP Placement Time: 0910  -EP Side: Right  -EP Location: flank  -EP Primary Wound Type: Incision  -EP      Dressing Appearance dry;intact  -AB      Closure BALDO  -AB      Base dressing in place, unable to visualize  -AB      Periwound intact;dry  -AB      Periwound Temperature warm  -AB      Periwound Skin Turgor soft  -AB      Drainage Characteristics/Odor bleeding controlled  -AB      Drainage Amount none  -AB      Retired Wound - Properties Group Placement Date: 03/07/22  -EP Placement Time: 0910  -EP Side: Right  -EP Location: flank  -EP Primary Wound Type: Incision  -EP      Retired Wound - Properties Group Date first assessed: 03/07/22  -EP Time first assessed: 0910  -EP Side: Right  -EP Location: flank  -EP Primary Wound Type: Incision  -EP              [REMOVED] Wound 02/21/22 0940 Left flank    Wound - Properties Group Placement Date: 02/21/22  -CW Placement Time: 0940  -CW Present on Hospital Admission: N  -CW Side: Left  -CW Location: flank  -CW Removal Date: 03/10/22  -DV Removal Time: 0732 -DV Wound Outcome: Healed  -DV      Retired Wound - Properties Group Placement Date: 02/21/22  -CW Placement Time: 0940  -CW Present on Hospital Admission: N  -CW Side: Left  -CW Location: flank  -CW Removal Date: 03/10/22  -DV Removal Time: 0732  -DV Wound Outcome: Healed  -DV      Retired Wound - Properties Group Date first assessed: 02/21/22  -CW Time first assessed: 0940  -CW Present on Hospital Admission: N  -CW Side: Left  -CW Location: flank  -CW Resolution Date: 03/10/22  -DV Resolution Time: 0732  -DV Wound Outcome: Healed  -DV            User Key  (r) = Recorded By, (t) = Taken By, (c) = Cosigned By    Initials Name Provider Type    Yane Melendrez, RN Registered Nurse    Henrietta Fournier RN Registered Nurse    Darron Lozano RN Registered Nurse    Laura Kong LPN Licensed Nurse                   Assessment/Plan      Brief Patient Summary:    61 years old male with bilateral nephrolithiasis s/p left percutaneous nephrolithotomy 2 weeks ago now s/p right percutaneous nephrolithotomy and right ureteroscopy on 3/7/2022 complicated with severe bleeding after nephrostomy tube removal and Lobo catheter removal      atorvastatin, 20 mg, Oral, Daily  budesonide-formoterol, 2 puff, Inhalation, BID - RT  buPROPion XL, 300 mg, Oral, QAM  cefTRIAXone, 1 g, Intravenous, Q24H  divalproex, 250 mg, Oral, BID  lamoTRIgine, 100 mg, Oral, BID  levothyroxine, 50 mcg, Oral, Q AM  metoprolol succinate XL, 100 mg, Oral, BID  NIFEdipine XL, 30 mg, Oral, Q24H  potassium chloride, 20 mEq, Oral, Daily  pramipexole, 0.5 mg, Oral, Daily  sodium chloride, 10 mL, Intravenous, Q12H  tamsulosin, 0.4 mg, Oral, Daily  vilazodone, 40 mg, Oral, Daily       phenylephrine, 0.5-3 mcg/kg/min  sodium chloride, 100 mL/hr, Last Rate: Stopped (03/09/22 1547)  sodium chloride, 100 mL/hr, Last Rate: 100 mL/hr (03/09/22 1548)         Active Hospital Problems:  Active Hospital Problems    Diagnosis    • **Bilateral kidney stones    • Calculus of ureter    • Gross hematuria    • Restless leg syndrome    • Bipolar affective (HCC)    • ISIAH (obstructive sleep apnea)    • HLD (hyperlipidemia)    • COPD (chronic obstructive pulmonary disease) (HCC)    • Hypothyroidism    • Anxiety and depression      Formatting of this note might be different from the original.  Lamictal and Vibryd     • Essential hypertension        Bilateral nephrolithiasis s/p left percutaneous nephrolithotomy 2 weeks ago now s/p right percutaneous nephrolithotomy and right ureteroscopy on 3/7/2022 complicated with severe bleeding after nephrostomy tube removal and Lobo catheter removal:  -s/p Ct abd/pelvis 3/8/22--> large blood/hematoma in bladder, trace pleural effusion  -s/p cystoscopy with clot evacuation afternoon of 3/8/2022  -Trend H&H every 6  -Urology  following    Postoperative acute blood loss anemia:  -s/p PRBC 3/9/2022  -Continue H&H every 6    ISIAH:  -Continue home CPAP    Hypothyroidism:  -Continue Synthroid    Hypertension:  -Continue Toprol, Procardia      Hyperlipidemia:  -Continue statin    Bipolar disorder/anxiety/depression:  -Depakote, Viibryd, Lamictal, Wellbutrin    Restless leg:  -Continue Mirapex        DVT prophylaxis:  Mechanical DVT prophylaxis orders are present.    CODE STATUS:    Level Of Support Discussed With: Patient  Code Status (Patient has no pulse and is not breathing): CPR (Attempt to Resuscitate)  Medical Interventions (Patient has pulse or is breathing): Full Support      Disposition:  I expect patient to be discharged home.    This patient has been examined wearing appropriate Personal Protective Equipment and discussed with nursing. 03/10/22      Electronically signed by Ang Padilla DO, 03/10/22, 11:26 EST.  [facility]

## 2022-03-10 NOTE — PLAN OF CARE
Goal Outcome Evaluation:  Plan of Care Reviewed With: patient, spouse        Progress: improving  Outcome Evaluation: pt. has had some c/o of pain, tx per MAR. H&H improving. MD Lopez wants to keep patient to make sure HGB continues to trend up. IV was leaking so I took it out and patient did not want it replaced. Plan of care on-going

## 2022-03-11 ENCOUNTER — READMISSION MANAGEMENT (OUTPATIENT)
Dept: CALL CENTER | Facility: HOSPITAL | Age: 62
End: 2022-03-11

## 2022-03-11 VITALS
WEIGHT: 239.2 LBS | OXYGEN SATURATION: 95 % | DIASTOLIC BLOOD PRESSURE: 66 MMHG | RESPIRATION RATE: 18 BRPM | SYSTOLIC BLOOD PRESSURE: 137 MMHG | BODY MASS INDEX: 38.44 KG/M2 | HEART RATE: 72 BPM | TEMPERATURE: 97.9 F | HEIGHT: 66 IN

## 2022-03-11 LAB
BH BB BLOOD EXPIRATION DATE: NORMAL
BH BB BLOOD TYPE BARCODE: 600
BH BB DISPENSE STATUS: NORMAL
BH BB PRODUCT CODE: NORMAL
BH BB UNIT NUMBER: NORMAL
COLOR STONE: NORMAL
COMPN STONE: NORMAL
CROSSMATCH INTERPRETATION: NORMAL
CYSTINE MFR STONE: 100 %
HCT VFR BLD AUTO: 22.8 % (ref 37.5–51)
HCT VFR BLD AUTO: 25 % (ref 37.5–51)
HGB BLD-MCNC: 7.8 G/DL (ref 13–17.7)
HGB BLD-MCNC: 8.6 G/DL (ref 13–17.7)
LABORATORY COMMENT REPORT: NORMAL
LABORATORY COMMENT REPORT: NORMAL
Lab: NORMAL
Lab: NORMAL
PHOTO: NORMAL
SIZE STONE: NORMAL MM
SPEC SOURCE SUBJ: NORMAL
UNIT  ABO: NORMAL
UNIT  RH: NORMAL
WT STONE: 1754 MG

## 2022-03-11 PROCEDURE — 99231 SBSQ HOSP IP/OBS SF/LOW 25: CPT | Performed by: HOSPITALIST

## 2022-03-11 PROCEDURE — 25010000002 CEFTRIAXONE PER 250 MG: Performed by: UROLOGY

## 2022-03-11 PROCEDURE — 94799 UNLISTED PULMONARY SVC/PX: CPT

## 2022-03-11 PROCEDURE — 85014 HEMATOCRIT: CPT | Performed by: UROLOGY

## 2022-03-11 PROCEDURE — 85018 HEMOGLOBIN: CPT | Performed by: UROLOGY

## 2022-03-11 RX ORDER — CEFDINIR 300 MG/1
300 CAPSULE ORAL 2 TIMES DAILY
Qty: 4 CAPSULE | Refills: 0 | Status: SHIPPED | OUTPATIENT
Start: 2022-03-11 | End: 2022-03-13

## 2022-03-11 RX ADMIN — BUDESONIDE AND FORMOTEROL FUMARATE DIHYDRATE 2 PUFF: 160; 4.5 AEROSOL RESPIRATORY (INHALATION) at 08:40

## 2022-03-11 RX ADMIN — DIVALPROEX SODIUM 250 MG: 250 TABLET, EXTENDED RELEASE ORAL at 08:24

## 2022-03-11 RX ADMIN — LEVOTHYROXINE SODIUM 50 MCG: 0.05 TABLET ORAL at 05:04

## 2022-03-11 RX ADMIN — VILAZODONE HYDROCHLORIDE 40 MG: 40 TABLET ORAL at 08:25

## 2022-03-11 RX ADMIN — TAMSULOSIN HYDROCHLORIDE 0.4 MG: 0.4 CAPSULE ORAL at 08:24

## 2022-03-11 RX ADMIN — OXYCODONE HYDROCHLORIDE 10 MG: 5 TABLET ORAL at 07:07

## 2022-03-11 RX ADMIN — ACETAMINOPHEN 650 MG: 325 TABLET ORAL at 05:04

## 2022-03-11 RX ADMIN — OXYCODONE HYDROCHLORIDE 10 MG: 5 TABLET ORAL at 02:01

## 2022-03-11 RX ADMIN — NIFEDIPINE 30 MG: 30 TABLET, FILM COATED, EXTENDED RELEASE ORAL at 08:24

## 2022-03-11 RX ADMIN — POTASSIUM CHLORIDE 20 MEQ: 1.5 POWDER, FOR SOLUTION ORAL at 08:33

## 2022-03-11 RX ADMIN — METOPROLOL SUCCINATE 100 MG: 50 TABLET, EXTENDED RELEASE ORAL at 08:24

## 2022-03-11 RX ADMIN — BUPROPION HYDROCHLORIDE 300 MG: 150 TABLET, EXTENDED RELEASE ORAL at 08:24

## 2022-03-11 RX ADMIN — CEFTRIAXONE 1 G: 1 INJECTION, POWDER, FOR SOLUTION INTRAMUSCULAR; INTRAVENOUS at 08:23

## 2022-03-11 RX ADMIN — Medication 10 ML: at 08:26

## 2022-03-11 RX ADMIN — LAMOTRIGINE 100 MG: 100 TABLET ORAL at 08:24

## 2022-03-11 RX ADMIN — SENNOSIDES AND DOCUSATE SODIUM 2 TABLET: 50; 8.6 TABLET ORAL at 08:25

## 2022-03-11 RX ADMIN — POLYETHYLENE GLYCOL 3350 17 G: 17 POWDER, FOR SOLUTION ORAL at 08:33

## 2022-03-11 NOTE — PROGRESS NOTES
[facility] Medicine Services Daily Progress Note    Patient Name: Calos Moseley  : 1960  MRN: [id number]  Primary Care Physician:  Naveed Nunez DO  Date of admission: 3/7/2022      Subjective      Chief Complaint: Fatigue and abdominal pain      Patient Reports     3/8/22: Anemia.  Nephrostomy tube pulled out.  Lobo catheter pulled out. S/p CT abdomen pelvis. S/p cystoscopy with clot evacuation for uncontrolled pain.  3/9/22: Feels better.  3/10/22: Hemoglobin 7.9 this a.m.  Transfused PRBC overnight.  H&H every 6.  Out of bed to chair no complaints.  3/11/22: Patient will be discharged home today.    Review of Systems   All other systems reviewed and are negative.         Objective      Vitals:   Temp:  [97.8 °F (36.6 °C)-97.9 °F (36.6 °C)] 97.9 °F (36.6 °C)  Heart Rate:  [68-74] 72  Resp:  [18-20] 18  BP: (132-155)/(65-73) 137/66    Physical Exam  HENT:      Head: Normocephalic.      Mouth/Throat:      Mouth: Mucous membranes are moist.   Eyes:      General: No scleral icterus.     Extraocular Movements: Extraocular movements intact.      Pupils: Pupils are equal, round, and reactive to light.   Cardiovascular:      Rate and Rhythm: Normal rate and regular rhythm.   Pulmonary:      Effort: Pulmonary effort is normal.   Abdominal:      General: Bowel sounds are normal.   Musculoskeletal:         General: Normal range of motion.      Cervical back: Normal range of motion.   Skin:     General: Skin is warm.   Neurological:      Mental Status: He is alert. Mental status is at baseline.   Psychiatric:         Mood and Affect: Mood normal.           Result Review    Result Review:  I have personally reviewed the results from the time of this admission to 3/11/2022 15:00 EST and agree with these findings:  [x]  Laboratory  []  Microbiology  [x]  Radiology  []  EKG/Telemetry   []  Cardiology/Vascular   []  Pathology  [x]  Old records  []  Other:      Wounds (last 24 hours)     LDA  Wound     Row Name 03/11/22 0706 03/10/22 1945 03/10/22 1534       Wound 03/07/22 0910 Right flank Incision    Wound - Properties Group Placement Date: 03/07/22  -EP Placement Time: 0910  -EP Side: Right  -EP Location: flank  -EP Primary Wound Type: Incision  -EP    Dressing Appearance dry;intact  -LB dry;intact;no drainage  -SM dry;intact;no drainage  -DV    Closure -- BALDO  -SM None  -DV    Base -- dressing in place, unable to visualize  -SM clean  -DV    Periwound -- -- intact;dry  -DV    Periwound Temperature -- -- warm  -DV    Periwound Skin Turgor -- -- soft  -DV    Drainage Characteristics/Odor -- -- bleeding controlled  -DV    Drainage Amount -- none  -SM none  -DV    Dressing Care -- gauze;transparent film  -SM --    Retired Wound - Properties Group Placement Date: 03/07/22  -EP Placement Time: 0910  -EP Side: Right  -EP Location: flank  -EP Primary Wound Type: Incision  -EP    Retired Wound - Properties Group Date first assessed: 03/07/22  -EP Time first assessed: 0910  -EP Side: Right  -EP Location: flank  -EP Primary Wound Type: Incision  -EP          User Key  (r) = Recorded By, (t) = Taken By, (c) = Cosigned By    Initials Name Provider Type    Henrietta Fournier, RN Registered Nurse    Janeth Shaver RN Registered Nurse    Vandana Lawrence RN Registered Nurse    Laura Kong LPN Licensed Nurse                  Assessment/Plan      Brief Patient Summary:    61 years old male with bilateral nephrolithiasis s/p left percutaneous nephrolithotomy 2 weeks ago now s/p right percutaneous nephrolithotomy and right ureteroscopy on 3/7/2022 complicated with severe bleeding after nephrostomy tube removal and Lobo catheter removal         No current facility-administered medications for this encounter.       Active Hospital Problems:  Active Hospital Problems    Diagnosis    • **Bilateral kidney stones    • Calculus of ureter    • Gross hematuria    • Restless leg syndrome    • Bipolar affective  (HCC)    • ISIAH (obstructive sleep apnea)    • HLD (hyperlipidemia)    • COPD (chronic obstructive pulmonary disease) (HCC)    • Hypothyroidism    • Anxiety and depression      Formatting of this note might be different from the original.  Lamictal and Vibryd     • Essential hypertension        Bilateral nephrolithiasis s/p left percutaneous nephrolithotomy 2 weeks ago now s/p right percutaneous nephrolithotomy and right ureteroscopy on 3/7/2022 complicated with severe bleeding after nephrostomy tube removal and Lobo catheter removal:  -s/p Ct abd/pelvis 3/8/22--> large blood/hematoma in bladder, trace pleural effusion  -s/p cystoscopy with clot evacuation afternoon of 3/8/2022  -Trend H&H every 6  -Urology following    Postoperative acute blood loss anemia:  -s/p PRBC 3/9/2022  -Continue H&H every 6    ISIAH:  -Continue home CPAP    Hypothyroidism:  -Continue Synthroid    Hypertension:  -Continue Toprol, Procardia      Hyperlipidemia:  -Continue statin    Bipolar disorder/anxiety/depression:  -Depakote, Viibryd, Lamictal, Wellbutrin    Restless leg:  -Continue Mirapex        DVT prophylaxis:  Mechanical DVT prophylaxis orders are present.    CODE STATUS:    Level Of Support Discussed With: Patient  Code Status (Patient has no pulse and is not breathing): CPR (Attempt to Resuscitate)  Medical Interventions (Patient has pulse or is breathing): Full Support      Disposition:  I expect patient to be discharged home.    This patient has been examined wearing appropriate Personal Protective Equipment and discussed with nursing. 03/11/22      Electronically signed by Ang Padilla DO, 03/11/22, 15:00 EST.  [facility]

## 2022-03-11 NOTE — DISCHARGE SUMMARY
DISCHARGE SUMMARY    Carlos Solitario MD      Date of Discharge:  3/11/2022    Discharge Diagnosis:   Renal calculi    Problem List:    Bilateral kidney stones    HLD (hyperlipidemia)    COPD (chronic obstructive pulmonary disease) ([facility])    Anxiety and depression    Hypothyroidism    ISIAH (obstructive sleep apnea)    Essential hypertension    Restless leg syndrome    Bipolar affective (HCC)    Calculus of ureter    Gross hematuria      Presenting Problem/History of Present Illness  Calculus of ureter [N20.1]    Underwent PCNL with Dr. Leija 3/7/2022    Hospital Course  Patient is a 61 y.o. male underwent PCNL with Dr. Leija 3/7/2022 postoperatively his nephrostomy tube was removed noted for brisk bleeding which stopped with manual pressure.  He then developed severe hematuria requiring clot evacuation which Dr. Lopez performed on 3/8/2022.  Postoperatively he required blood transfusion but stabilized and was given a void trial and urine was clear.  Upon discharge his hemoglobin was stable he was voiding well    Procedures Performed  Procedure(s):  CYSTOSCOPY WITH CLOT EVACUATION     PCNL    Pertinent Test Results: Hemoglobin levels    Condition on Discharge:  stable      Discharge Disposition: Home      Discharge Medications     Discharge Medications      Continue These Medications      Instructions Start Date   albuterol sulfate  (90 Base) MCG/ACT inhaler  Commonly known as: PROVENTIL HFA;VENTOLIN HFA;PROAIR HFA   INHALE ONE TO TWO PUFFS BY MOUTH EVERY 4 TO 6 HOURS      atorvastatin 20 MG tablet  Commonly known as: LIPITOR   20 mg, Oral, Every Night at Bedtime      budesonide-formoterol 160-4.5 MCG/ACT inhaler  Commonly known as: SYMBICORT   2 puffs, Inhalation, 2 Times Daily - RT      buPROPion  MG 24 hr tablet  Commonly known as: WELLBUTRIN XL   300 mg, Oral, Every Morning      divalproex 250 MG 24 hr tablet  Commonly known as: DEPAKOTE ER   250 mg, Oral, 2 Times Daily      hydrALAZINE 50 MG  tablet  Commonly known as: APRESOLINE   50 mg, Oral, 3 Times Daily PRN      HYDROcodone-acetaminophen 7.5-325 MG per tablet  Commonly known as: NORCO   Take 1 tablet by mouth Every 4 (Four) to 6 (six) Hours As Needed for pain      Klor-Con 20 MEQ packet  Generic drug: potassium chloride   20 mEq, Oral, Daily, TAKE PREOP      lamoTRIgine 100 MG tablet  Commonly known as: LaMICtal   100 mg, Oral, 2 Times Daily, Take preop      levothyroxine 50 MCG tablet  Commonly known as: SYNTHROID, LEVOTHROID   50 mcg, Oral, Every Early Morning, TAKE PREOP      metoprolol succinate  MG 24 hr tablet  Commonly known as: TOPROL-XL   100 mg, Oral, 2 Times Daily, TAKE PREOP      naltrexone 50 MG tablet  Commonly known as: DEPADE   50 mg, Oral, Every Morning      NIFEdipine CC 90 MG 24 hr tablet  Commonly known as: ADALAT CC   90 mg, Oral, Daily      pramipexole 0.5 MG tablet  Commonly known as: MIRAPEX   take 1 tablet by mouth at bedtime      tamsulosin 0.4 MG capsule 24 hr capsule  Commonly known as: FLOMAX   0.4 mg, Oral, Daily      vilazodone 40 MG tablet tablet  Commonly known as: VIIBRYD   40 mg, Oral, Daily, TAKE PREOP               Follow-up Appointments  No future appointments.

## 2022-03-11 NOTE — CASE MANAGEMENT/SOCIAL WORK
Case Management Discharge Note      Final Note: Home w/wife.    Provided Post Acute Provider List?: N/A  Provided Post Acute Provider Quality & Resource List?: N/A      Final Discharge Disposition Code: 01 - home or self-care     Jina Jarvis RN, MSN  Care Manager  [phone]

## 2022-03-11 NOTE — NURSING NOTE
Patients hemoglobin 7.8. Spoke with Dr. Acosta regarding possible blood transfusion. He asked what is blood pressure was and stated that we would wait until hemoglobin was less than 7.

## 2022-03-11 NOTE — PLAN OF CARE
Goal Outcome Evaluation:      Patient having some pain this shift but is controlled using oral pain medicine. Patient hemoglobin being drawn Q6H. Hemoglobin at midnight was 7.8 but no blood ordered yet. Awaiting next draw at 0600. If patient hemoglobin can stabilize patient may be able to go home today. Patient not having any signs of bleeding in his urine or elsewhere. All vitals WNL. Will continue to monitor.

## 2022-03-11 NOTE — PROGRESS NOTES
Urology Progress Note    Patient Identification:  Name:  Calos Moseley  Age:  61 y.o.  Sex:  male  :  1960  MRN:  [id number]    Chief Complaint: Patient without complaint    History of Present Illness: Voiding clear, last hemoglobin drifting down to 7.8, doing well    Problem List:  Patient Active Problem List   Diagnosis   • HLD (hyperlipidemia)   • Obesity, Class III, BMI 40-49.9 (morbid obesity) ([facility])   • Bilateral kidney stones   • COPD (chronic obstructive pulmonary disease) ([facility])   • Left flank pain   • Anxiety and depression   • Hypothyroidism   • ISIAH (obstructive sleep apnea)   • Essential hypertension   • Calculus, renal   • Restless leg syndrome   • Bipolar affective ([facility])   • Calculus of ureter   • Gross hematuria     Past Medical History:  Past Medical History:   Diagnosis Date   • Anesthesia complication     STATES HARD TO WAKE UP AFTER PREVIOUS KIDNEY STONE SURGERY AND STATES HE CODED   • Bilateral kidney stones    • Bipolar affective ([facility])    • COPD (chronic obstructive pulmonary disease) ([facility])    • Disease of thyroid gland    • Elevated cholesterol    • Hypertension    • Kidney stone on left side    • Left flank pain    • Mood disorder ([facility])    • Restless leg syndrome    • Sleep apnea     cpap  bring dos   • UTI (urinary tract infection) 2021    PREV FINISHED ANTIBIOTIC     Past Surgical History:  Past Surgical History:   Procedure Laterality Date   • ADENOIDECTOMY     • APPENDECTOMY     • COLONOSCOPY     • CYSTOSCOPY WITH CLOT EVACUATION N/A 3/8/2022    Procedure: CYSTOSCOPY WITH CLOT EVACUATION;  Surgeon: Ihsan Lopez MD;  Location: [facility];  Service: Urology;  Laterality: N/A;   • KIDNEY STONE SURGERY     • NEPHROLITHOTRIPSY PERCUTANEOUS Left 2022    Procedure: NEPHROSTLITHOTOMY PERCUTANEOUS;  Surgeon: Deuce Leija MD;  Location: [facility] OR;  Service: Urology;  Laterality: Left;   • NEPHROLITHOTRIPSY PERCUTANEOUS Left 2022    Procedure: LEFT FLEXIBLE  NEPHROSTOMY WITH STONE EXTRACTION, LEFT NEPHROSTOMY, TUBE REMOVAL, BILATERAL URETERAL STENT PLACEMENT;  Surgeon: Deuce Leija MD;  Location: [facility] MAIN OR;  Service: Urology;  Laterality: Left;   • TONSILLECTOMY       Home Meds:  Medications Prior to Admission   Medication Sig Dispense Refill Last Dose   • albuterol sulfate  (90 Base) MCG/ACT inhaler INHALE ONE TO TWO PUFFS BY MOUTH EVERY 4 TO 6 HOURS 18 g 11 Past Week at Unknown time   • atorvastatin (LIPITOR) 20 MG tablet Take 1 tablet by mouth every night at bedtime. 90 tablet 3 3/6/2022 at Unknown time   • budesonide-formoterol (SYMBICORT) 160-4.5 MCG/ACT inhaler Inhale 2 puffs 2 (Two) Times a Day.   3/7/2022 at 500   • buPROPion XL (WELLBUTRIN XL) 300 MG 24 hr tablet Take 1 tablet by mouth Every Morning. 30 tablet 5 3/7/2022 at 0500   • divalproex (DEPAKOTE ER) 250 MG 24 hr tablet Take 1 tablet by mouth 2 (Two) Times a Day. 180 tablet 2 3/6/2022 at Unknown time   • hydrALAZINE (APRESOLINE) 50 MG tablet Take 1 tablet by mouth 3 (Three) Times a Day As Needed. 90 tablet 11 3/7/2022 at 0500   • HYDROcodone-acetaminophen (NORCO) 7.5-325 MG per tablet Take 1 tablet by mouth Every 4 (Four) to 6 (six) Hours As Needed for pain 20 tablet 0 3/5/2022 at Unknown time   • lamoTRIgine (LaMICtal) 100 MG tablet Take 100 mg by mouth 2 (Two) Times a Day. Take preop   3/7/2022 at 0500   • levothyroxine (SYNTHROID, LEVOTHROID) 50 MCG tablet Take 50 mcg by mouth Every Morning. TAKE PREOP   3/7/2022 at 0500   • metoprolol succinate XL (TOPROL-XL) 100 MG 24 hr tablet Take 100 mg by mouth 2 (Two) Times a Day. TAKE PREOP   3/7/2022 at 0500   • naltrexone (DEPADE) 50 MG tablet Take 1 tablet by mouth Every Morning. 90 tablet 2 3/7/2022 at 0500   • NIFEdipine CC (ADALAT CC) 90 MG 24 hr tablet Take 1 tablet by mouth Daily. 90 tablet 3 3/7/2022 at 0500   • potassium chloride (KLOR-CON) 20 MEQ packet Take 20 mEq by mouth Daily. TAKE PREOP      • pramipexole (MIRAPEX) 0.5 MG  tablet take 1 tablet by mouth at bedtime 90 tablet 0    • tamsulosin (FLOMAX) 0.4 MG capsule 24 hr capsule Take 1 capsule by mouth Daily. 30 capsule 0    • vilazodone (VIIBRYD) 40 MG tablet tablet Take 40 mg by mouth Daily. TAKE PREOP   3/7/2022 at 0500     Current Meds:    Current Facility-Administered Medications:   •  acetaminophen (TYLENOL) tablet 650 mg, 650 mg, Oral, Q4H PRN, 650 mg at 03/11/22 0504 **OR** acetaminophen (TYLENOL) suppository 650 mg, 650 mg, Rectal, Q4H PRN, Ihsan Lopez MD  •  albuterol sulfate HFA (PROVENTIL HFA;VENTOLIN HFA;PROAIR HFA) inhaler 1 puff, 1 puff, Inhalation, Q6H PRN, Ihsan Lopez MD  •  atorvastatin (LIPITOR) tablet 20 mg, 20 mg, Oral, Daily, Ihsan Lopez MD, 20 mg at 03/10/22 2127  •  budesonide-formoterol (SYMBICORT) 160-4.5 MCG/ACT inhaler 2 puff, 2 puff, Inhalation, BID - RT, Ihsan Lopez MD, 2 puff at 03/10/22 0817  •  buPROPion XL (WELLBUTRIN XL) 24 hr tablet 300 mg, 300 mg, Oral, QAM, Ihsan Lopez MD, 300 mg at 03/10/22 0650  •  cefTRIAXone (ROCEPHIN) 1 g in sodium chloride 0.9 % 100 mL IVPB, 1 g, Intravenous, Q24H, Ihsan Lopez MD, Last Rate: 200 mL/hr at 03/10/22 0848, 1 g at 03/10/22 0848  •  divalproex (DEPAKOTE ER) 24 hr tablet 250 mg, 250 mg, Oral, BID, Ihsan Lopez MD, 250 mg at 03/10/22 2127  •  hydrALAZINE (APRESOLINE) tablet 50 mg, 50 mg, Oral, TID PRN, Ihsan Lopez MD, 50 mg at 03/10/22 2134  •  lamoTRIgine (LaMICtal) tablet 100 mg, 100 mg, Oral, BID, Ihsan Lopez MD, 100 mg at 03/10/22 2127  •  levothyroxine (SYNTHROID, LEVOTHROID) tablet 50 mcg, 50 mcg, Oral, Q AM, Ihsan Lopez MD, 50 mcg at 03/11/22 0504  •  metoprolol succinate XL (TOPROL-XL) 24 hr tablet 100 mg, 100 mg, Oral, BID, Ihsan Lopez MD, 100 mg at 03/10/22 2127  •  [DISCONTINUED] Morphine sulfate (PF) injection 4 mg, 4 mg, Intravenous, Q2H PRN, 4 mg at 03/08/22 1410 **AND** naloxone (NARCAN) injection 0.4 mg, 0.4 mg, Intravenous, Q5 Min PRN,  Ihsan Lopez MD  •  NIFEdipine XL (PROCARDIA XL) 24 hr tablet 30 mg, 30 mg, Oral, Q24H, Ihsan Lopez MD, 30 mg at 03/10/22 0849  •  ondansetron (ZOFRAN) tablet 4 mg, 4 mg, Oral, Q6H PRN **OR** ondansetron (ZOFRAN) injection 4 mg, 4 mg, Intravenous, Q6H PRN, Ihsan Lopez MD  •  opium-belladonna (B&O SUPPRETTES) 16.2-30 MG suppository 30 mg, 30 mg, Rectal, Daily PRN, Ihsan Lopez MD  •  oxyCODONE (ROXICODONE) immediate release tablet 10 mg, 10 mg, Oral, Q4H PRN, Ang Padilla IDO, 10 mg at 22 0707  •  polyethylene glycol (MIRALAX) packet 17 g, 17 g, Oral, Daily, Ang Padilla DO, 17 g at 03/10/22 2125  •  potassium chloride (KLOR-CON) packet 20 mEq, 20 mEq, Oral, Daily, Ihsan Lopez MD, 20 mEq at 03/10/22 0849  •  pramipexole (MIRAPEX) tablet 0.5 mg, 0.5 mg, Oral, Daily, Ihsan Lopez MD, 0.5 mg at 03/10/22 2127  •  sennosides-docusate (PERICOLACE) 8.6-50 MG per tablet 2 tablet, 2 tablet, Oral, BID, Ang Padilla I DO, 2 tablet at 03/10/22 2127  •  sodium chloride 0.9 % flush 10 mL, 10 mL, Intravenous, Q12H, Ihsan Lopez MD, 10 mL at 03/10/22 0850  •  sodium chloride 0.9 % flush 10 mL, 10 mL, Intravenous, PRN, Ihsan Lopez MD  •  sodium chloride 0.9 % infusion, 100 mL/hr, Intravenous, Continuous, Ihsan Lopez MD, Stopped at 22 1547  •  sodium chloride 0.9 % infusion, 100 mL/hr, Intravenous, Continuous, Ihsan Lopez MD, Last Rate: 100 mL/hr at 22 1548, 100 mL/hr at 22 1548  •  tamsulosin (FLOMAX) 24 hr capsule 0.4 mg, 0.4 mg, Oral, Daily, Ihsan Lopez MD, 0.4 mg at 03/10/22 0849  •  vilazodone (VIIBRYD) tablet 40 mg, 40 mg, Oral, Daily, Ihsan Lopez MD, 40 mg at 03/10/22 0849  Allergies:  Patient has no known allergies.    Review of Systems no fevers or chills.  No congestion or nasal discharge.    Objective:  tMax 24 hours:  Temp (24hrs), Av.8 °F (36.6 °C), Min:97.5 °F (36.4 °C), Max:98.1 °F (36.7  "°C)    Vital Sign Ranges:  Temp:  [97.5 °F (36.4 °C)-98.1 °F (36.7 °C)] 97.9 °F (36.6 °C)  Heart Rate:  [68-69] 69  Resp:  [16-20] 18  BP: (132-161)/(65-73) 132/66  Intake and Output Last 3 Shifts:  I/O last 3 completed shifts:  In: 1470 [P.O.:960; I.V.:210; Blood:300]  Out: 3050 [Urine:3050]    Exam:  /66 (BP Location: Right arm, Patient Position: Lying)   Pulse 69   Temp 97.9 °F (36.6 °C) (Oral)   Resp 18   Ht 167.6 cm (66\")   Wt 109 kg (239 lb 3.2 oz)   SpO2 95%   BMI 38.61 kg/m²    General Appearance:    Alert, cooperative, no acute distress, general         appearance is normal                                   :  Urine clear yellow in bedside commode     Data Review:  All labs (24hrs):    Lab Results (last 24 hours)     Procedure Component Value Units Date/Time    Hemoglobin & Hematocrit, Blood [[id number]]  (Abnormal) Collected: 03/10/22 2349    Specimen: Blood Updated: 03/11/22 0012     Hemoglobin 7.8 g/dL      Hematocrit 22.8 %     Hemoglobin & Hematocrit, Blood [[id number]]  (Abnormal) Collected: 03/10/22 1810    Specimen: Blood Updated: 03/10/22 1836     Hemoglobin 8.3 g/dL      Hematocrit 24.5 %     Hemoglobin & Hematocrit, Blood [[id number]]  (Abnormal) Collected: 03/10/22 1220    Specimen: Blood Updated: 03/10/22 1234     Hemoglobin 9.0 g/dL      Hematocrit 26.4 %         Radiology:   Imaging Results (Last 72 Hours)     Procedure Component Value Units Date/Time    CT Abdomen Pelvis Stone Protocol [[id number]] Collected: 03/08/22 0804     Updated: 03/08/22 0815    Narrative:      CT ABDOMEN PELVIS STONE PROTOCOL-     Date of Exam: 3/8/2022 7:59 AM     Indication: Urinary tract infection. Status post lithotripsy today on  right and 2 weeks ago on the left.  Right-sided abdominal pain. Gross  hematuria.     Comparison: CT abdomen and pelvis without contrast 3/7/2022.     Technique: Contiguous axial CT images were obtained from the lung bases  to the pubic symphysis without contrast. Sagittal " and coronal  reconstructions were performed.  Automated exposure control and  iterative reconstruction methods were used.     FINDINGS:     Right retroperitoneal hematoma is located at the posterior inferior  margin of the right kidney. It is larger than on 3/7/2022. It currently  measures 11.8 x 6.3 cm in the axial plane compared to 16.6 x 10.1 cm ON  the previous examination, and extends 23.6 cm craniocaudally on today's  study compared to 16.1 cm craniocaudally on the 3/7/2022 study. The  hematoma displaces the right kidney anteriorly.     High density fluid consistent with hemorrhagic fluid is seen within the  right renal pelvis. No gross hydronephrosis is seen.     Single bilateral ureteral stents remain appropriately positioned and  unchanged in position.     Multiple small nonobstructing left intrarenal calculi are seen, the  dominant stone located in the left lower renal pole measuring nearly 10  x 9 mm. The remainder of the stones in the left kidney measure 3 mm or  less in size.     A punctate nonobstructing stone is seen within the right mid kidney. No  definite right or left ureteral stone is evident.     The urinary bladder is distended with hemorrhagic fluid, new in  comparison to the prior examination. The Lobo catheter has been removed  in the interval.     Small amount of fluid is seen within the left retroperitoneum in the  lower abdomen and pelvis.     Bandlike soft tissue thickening is seen within the back bilaterally,  likely related to previous percutaneous renal instrumentation. The right  percutaneous nephrostomy tube has been removed since the previous study.     Small left, trace right basilar pleural effusions and left greater than  right basilar airspace disease favored to represent atelectasis, is  without significant change from one day prior.     Heart size is mildly enlarged but stable with mild coronary artery  calcifications.     The noncontrast appearance of the liver, spleen,  pancreas and adrenal  glands is within normal limits. The unopacified bowel appears  nondistended thickened, nondilated, noninflamed. The prostate and rectum  are within normal limits. Bilateral vasectomy clips are noted.     No acute or suspicious osseous abnormalities are identified.          Impression:         1. Interval removal of right percutaneous nephrostomy tube. Bilateral  ureteral stents remain appropriately positioned.  2. Right retroperitoneal hematoma has increased compared to one day  prior. Blood products are now seen within the right renal pelvis, and  large blood/hematoma is dense the urinary bladder, new since yesterday's  study.  3. Single nonobstructing right renal stone. Multiple nonobstructing left  renal stones. No ureteral stone is evident.  4. Trace right pleural effusion with mild right basilar atelectasis.  Small left pleural effusion with dense posterior left lower lobe  atelectasis. This is without significant change from one day prior.     Electronically Signed By-Alma Rosa Mercer MD On:3/8/2022 8:12 AM  This report was finalized on [id number] by  Alma Rosa Mercer MD.          Assessment/Plan:    Principal Problem:    Bilateral kidney stones  Active Problems:    HLD (hyperlipidemia)    COPD (chronic obstructive pulmonary disease) (HCC)    Anxiety and depression    Hypothyroidism    ISIAH (obstructive sleep apnea)    Essential hypertension    Restless leg syndrome    Bipolar affective (HCC)    Calculus of ureter    Gross hematuria     Bilateral renal calculi status post left percutaneous nephrolithotomy approximately 2 weeks ago.  Now status post right percutaneous nephrolithotomy and right ureteroscopy.    Severe bleeding after removal of nephrostomy tube   Severe lower abdominal pain with poorly draining Lobo catheter  -resolved after clot evacuation    Plan   Recheck hemoglobin this morning, if stable discharge today      Carlos Sloitario MD  3/11/2022  07:55 EST

## 2022-03-12 NOTE — OUTREACH NOTE
Prep Survey    Flowsheet Row Responses   [facility] facility patient discharged from? [facility]   Is LACE score < 7 ? No   Emergency Room discharge w/ pulse ox? No   Eligibility Readm Mgmt   Discharge diagnosis CYSTOSCOPY WITH CLOT EVACUATION   Does the patient have one of the following disease processes/diagnoses(primary or secondary)? General Surgery   Does the patient have Home health ordered? No   Is there a DME ordered? No   Prep survey completed? Yes          NEY ANNA - Registered Nurse

## 2022-03-14 ENCOUNTER — NURSE TRIAGE (OUTPATIENT)
Dept: CALL CENTER | Facility: HOSPITAL | Age: 62
End: 2022-03-14

## 2022-03-14 NOTE — TELEPHONE ENCOUNTER
"Call Deuce Leija MD on  [phone]      Reason for Disposition  • Caller has URGENT question and triager unable to answer question    Additional Information  • Negative: Sounds like a life-threatening emergency to the triager  • Negative: Chest pain  • Negative: Difficulty breathing  • Negative: Acting confused (e.g., disoriented, slurred speech) or excessively sleepy  • Negative: Surgical incision symptoms and questions  • Negative: Pain or burning with passing urine (urination) and male  • Negative: Pain or burning with passing urine (urination) and female  • Negative: Constipation  • Negative: New or worsening leg (calf, thigh) pain  • Negative: New or worsening leg swelling  • Negative: Dizziness is severe, or persists > 24 hours after surgery  • Negative: Symptoms arising from use of a urinary catheter (Lobo or Coude)  • Negative: Cast problems or questions  • Negative: Medication question  • Negative: Bright red, wide-spread, sunburn-like rash  • Negative: SEVERE headache and after spinal (epidural) anesthesia  • Negative: Vomiting and persists > 4 hours  • Negative: Vomiting and abdomen looks much more swollen than usual  • Negative: Drinking very little and dehydration suspected (e.g., no urine > 12 hours, very dry mouth, very lightheaded)  • Negative: Patient sounds very sick or weak to the triager  • Negative: Sounds like a serious complication to the triager  • Negative: Fever > 100.4 F (38.0 C)    Answer Assessment - Initial Assessment Questions  1. SYMPTOM: \"What's the main symptom you're concerned about?\" (e.g., pain, fever, vomiting)      Bruising and pain  2. ONSET: \"When did this  start?\"      3/11  3. SURGERY: \"What surgery was performed?\"      Cysto with clot evacuation, s/p bilateral kidney stone removal  4. DATE of SURGERY: \"When was surgery performed?\"       3/10  5. ANESTHESIA: \" What type of anesthesia did you have?\" (e.g., general, spinal, epidural, local)      general  6. PAIN: " "\"Is there any pain?\" If Yes, ask: \"How bad is it?\"  (Scale 1-10; or mild, moderate, severe)      5  7. FEVER: \"Do you have a fever?\" If Yes, ask: \"What is your temperature, how was it measured, and when did it start?\"      afebrile  8. VOMITING: \"Is there any vomiting?\" If yes, ask: \"How many times?\"      no  9. BLEEDING: \"Is there any bleeding?\" If Yes, ask: \"How much?\" and \"Where?\"     Pink urine  10. OTHER SYMPTOMS: \"Do you have any other symptoms?\" (e.g., drainage from wound, painful urination, constipation)        no    Protocols used: POST-OP SYMPTOMS AND QUESTIONS-ADULT-OH      "

## 2022-03-15 ENCOUNTER — READMISSION MANAGEMENT (OUTPATIENT)
Dept: CALL CENTER | Facility: HOSPITAL | Age: 62
End: 2022-03-15

## 2022-03-15 NOTE — OUTREACH NOTE
General Surgery Week 1 Survey    Flowsheet Row Responses   [facility] patient discharged from? [facility]   Does the patient have one of the following disease processes/diagnoses(primary or secondary)? General Surgery   Week 1 attempt successful? Yes   Call start time 1015   Call end time 1020   Discharge diagnosis CYSTOSCOPY WITH CLOT EVACUATION   Is patient permission given to speak with other caregiver? Yes   Person spoke with today (if not patient) and relationship wife   Meds reviewed with patient/caregiver? Yes   Is the patient having any side effects they believe may be caused by any medication additions or changes? No   Does the patient have all medications related to this admission filled (includes all antibiotics, pain medications, etc.) Yes   Is the patient taking all medications as directed (includes completed medication regime)? Yes   Does the patient have a follow up appointment scheduled with their surgeon? Yes   Has the patient kept scheduled appointments due by today? Yes   Comments on his way to appt now with alex.   Has home health visited the patient within 72 hours of discharge? N/A   Psychosocial issues? No   Did the patient receive a copy of their discharge instructions? Yes   What is the patient's perception of their health status since discharge? Improving   Is the patient /caregiver able to teach back basic post-op care? Continue use of incentive spirometry at least 1 week post discharge, Practice 'cough and deep breath', No tub bath, swimming, or hot tub until instructed by MD, Keep incision areas clean,dry and protected, Do not remove steri-strips   Is the patient/caregiver able to teach back signs and symptoms of incisional infection? Increased redness, swelling or pain at the incisonal site, Increased drainage or bleeding, Incisional warmth, Pus or odor from incision, Fever   Is the patient/caregiver able to teach back steps to recovery at home? Rest and rebuild strength,  gradually increase activity, Set small, achievable goals for return to baseline health   If the patient is a current smoker, are they able to teach back resources for cessation? Not a smoker   Is the patient/caregiver able to teach back the hierarchy of who to call/visit for symptoms/problems? PCP, Specialist, Home health nurse, Urgent Care, ED, 911 Yes   Week 1 call completed? Yes   Wrap up additional comments Doing ok, on their way to his urology appt.  Denies needs at this time.          JOEL OTT - Registered Nurse

## 2022-03-23 ENCOUNTER — READMISSION MANAGEMENT (OUTPATIENT)
Dept: CALL CENTER | Facility: HOSPITAL | Age: 62
End: 2022-03-23

## 2022-03-23 NOTE — OUTREACH NOTE
General Surgery Week 2 Survey    Flowsheet Row Responses   [facility] facility patient discharged from? [facility]   Does the patient have one of the following disease processes/diagnoses(primary or secondary)? General Surgery   Week 2 attempt successful? No   Unsuccessful attempts Attempt 1   Discharge diagnosis CYSTOSCOPY WITH CLOT EVACUATION          ANDREW W - Registered Nurse

## 2022-03-28 ENCOUNTER — READMISSION MANAGEMENT (OUTPATIENT)
Dept: CALL CENTER | Facility: HOSPITAL | Age: 62
End: 2022-03-28

## 2022-03-28 NOTE — OUTREACH NOTE
General Surgery Week 2 Survey    Flowsheet Row Responses   [facility] patient discharged from? [facility]   Does the patient have one of the following disease processes/diagnoses(primary or secondary)? General Surgery   Week 2 attempt successful? Yes   Call start time 1254   Call end time 1256   Discharge diagnosis CYSTOSCOPY WITH CLOT EVACUATION   Meds reviewed with patient/caregiver? Yes   Is the patient taking all medications as directed (includes completed medication regime)? Yes   Does the patient have a follow up appointment scheduled with their surgeon? Yes   Has the patient kept scheduled appointments due by today? Yes   Has home health visited the patient within 72 hours of discharge? N/A   Psychosocial issues? No   Did the patient receive a copy of their discharge instructions? Yes   Nursing interventions Reviewed instructions with patient   What is the patient's perception of their health status since discharge? Improving   Nursing interventions Nurse provided patient education   Is the patient /caregiver able to teach back basic post-op care? Take showers only when approved by MD-sponge bathe until then   Is the patient/caregiver able to teach back steps to recovery at home? Rest and rebuild strength, gradually increase activity, Set small, achievable goals for return to baseline health, Eat a well-balance diet   If the patient is a current smoker, are they able to teach back resources for cessation? Not a smoker   Is the patient/caregiver able to teach back the hierarchy of who to call/visit for symptoms/problems? PCP, Specialist, Home health nurse, Urgent Care, ED, 911 Yes   Week 2 call completed? Yes   Wrap up additional comments States he is improving. No needs identified.           ZUHAIR MACK - Registered Nurse

## 2022-12-16 ENCOUNTER — PRE-PROCEDURE SCREENING (OUTPATIENT)
Dept: GASTROENTEROLOGY | Facility: CLINIC | Age: 62
End: 2022-12-16

## 2022-12-16 NOTE — TELEPHONE ENCOUNTER
LAST SCOPE 11YRS --No personal history of polyps--Family history of polyps ( grandmother)  --No family history of colon cancer--No blood thinners--Medications:            albuterol sulfate  (90 Base) MCG/ACT inhaler  atorvastatin (LIPITOR) 20 MG tablet  budesonide-formoterol (SYMBICORT) 160-4.5 MCG/ACT inhaler  buPROPion XL (Wellbutrin XL) 150 MG 24 hr tablet  divalproex (Depakote ER) 250 MG 24 hr tablet  doxycycline (VIBRAMYCIN) 100 MG capsule  hydrALAZINE (APRESOLINE) 100 MG tablet  hydrALAZINE (APRESOLINE) 50 MG tablet  lamoTRIgine (LaMICtal) 100 MG tablet  levothyroxine (SYNTHROID, LEVOTHROID) 50 MCG tablet  lisinopril (PRINIVIL,ZESTRIL) 40 MG tablet  metoprolol succinate XL (TOPROL-XL) 100 MG 24 hr tablet  metoprolol succinate XL (TOPROL-XL) 100 MG 24 hr tablet  naltrexone (DEPADE) 50 MG tablet  NIFEdipine CC (ADALAT CC) 90 MG 24 hr tablet  potassium chloride (K-DUR,KLOR-CON) 20 MEQ CR tablet  potassium citrate (UROCIT-K) 10 MEQ (1080 MG) CR tablet  pramipexole (MIRAPEX) 0.5 MG tablet  predniSONE (DELTASONE) 10 MG (21) dose pack  promethazine-dextromethorphan (PROMETHAZINE-DM) 6.25-15 MG/5ML syrup  sildenafil (VIAGRA) 100 MG tablet  spironolactone (ALDACTONE) 25 MG tablet  tamsulosin (FLOMAX) 0.4 MG capsule 24 hr capsule  vilazodone (Viibryd) 40 MG tablet tablet         QUESTIONNAIRE SCEEENING & HAS BEEN SENT TO DOCTOR FOR REVIEW

## 2023-01-05 ENCOUNTER — PREP FOR SURGERY (OUTPATIENT)
Dept: OTHER | Facility: HOSPITAL | Age: 63
End: 2023-01-05
Payer: COMMERCIAL

## 2023-01-05 DIAGNOSIS — Z12.11 ENCOUNTER FOR SCREENING FOR MALIGNANT NEOPLASM OF COLON: Primary | ICD-10-CM

## 2023-01-05 RX ORDER — SODIUM CHLORIDE, SODIUM LACTATE, POTASSIUM CHLORIDE, CALCIUM CHLORIDE 600; 310; 30; 20 MG/100ML; MG/100ML; MG/100ML; MG/100ML
30 INJECTION, SOLUTION INTRAVENOUS CONTINUOUS
Status: CANCELLED | OUTPATIENT
Start: 2023-03-23

## 2023-01-31 PROBLEM — Z12.11 ENCOUNTER FOR SCREENING FOR MALIGNANT NEOPLASM OF COLON: Status: ACTIVE | Noted: 2023-01-31

## 2023-03-23 ENCOUNTER — ANESTHESIA EVENT (OUTPATIENT)
Dept: GASTROENTEROLOGY | Facility: HOSPITAL | Age: 63
End: 2023-03-23
Payer: COMMERCIAL

## 2023-03-23 ENCOUNTER — ANESTHESIA (OUTPATIENT)
Dept: GASTROENTEROLOGY | Facility: HOSPITAL | Age: 63
End: 2023-03-23
Payer: COMMERCIAL

## 2023-03-23 ENCOUNTER — HOSPITAL ENCOUNTER (OUTPATIENT)
Facility: HOSPITAL | Age: 63
Setting detail: HOSPITAL OUTPATIENT SURGERY
Discharge: HOME | End: 2023-03-23
Attending: INTERNAL MEDICINE | Admitting: INTERNAL MEDICINE
Payer: COMMERCIAL

## 2023-03-23 VITALS
OXYGEN SATURATION: 93 % | HEART RATE: 61 BPM | WEIGHT: 250.9 LBS | HEIGHT: 66 IN | DIASTOLIC BLOOD PRESSURE: 80 MMHG | BODY MASS INDEX: 40.32 KG/M2 | SYSTOLIC BLOOD PRESSURE: 143 MMHG | RESPIRATION RATE: 16 BRPM

## 2023-03-23 DIAGNOSIS — Z12.11 ENCOUNTER FOR SCREENING FOR MALIGNANT NEOPLASM OF COLON: ICD-10-CM

## 2023-03-23 PROCEDURE — 25010000002 PROPOFOL 10 MG/ML EMULSION

## 2023-03-23 PROCEDURE — 88305 TISSUE EXAM BY PATHOLOGIST: CPT | Performed by: INTERNAL MEDICINE

## 2023-03-23 PROCEDURE — 45385 COLONOSCOPY W/LESION REMOVAL: CPT | Performed by: INTERNAL MEDICINE

## 2023-03-23 RX ORDER — SODIUM CHLORIDE 0.9 % (FLUSH) 0.9 %
10 SYRINGE (ML) INJECTION AS NEEDED
Status: DISCONTINUED | OUTPATIENT
Start: 2023-03-23 | End: 2023-03-23 | Stop reason: HOSPADM

## 2023-03-23 RX ORDER — PROPOFOL 10 MG/ML
VIAL (ML) INTRAVENOUS AS NEEDED
Status: DISCONTINUED | OUTPATIENT
Start: 2023-03-23 | End: 2023-03-23 | Stop reason: SURG

## 2023-03-23 RX ORDER — PROPOFOL 10 MG/ML
VIAL (ML) INTRAVENOUS CONTINUOUS PRN
Status: DISCONTINUED | OUTPATIENT
Start: 2023-03-23 | End: 2023-03-23 | Stop reason: SURG

## 2023-03-23 RX ORDER — SODIUM CHLORIDE, SODIUM LACTATE, POTASSIUM CHLORIDE, CALCIUM CHLORIDE 600; 310; 30; 20 MG/100ML; MG/100ML; MG/100ML; MG/100ML
30 INJECTION, SOLUTION INTRAVENOUS CONTINUOUS
Status: DISCONTINUED | OUTPATIENT
Start: 2023-03-23 | End: 2023-03-23 | Stop reason: HOSPADM

## 2023-03-23 RX ORDER — GLYCOPYRROLATE 0.2 MG/ML
INJECTION INTRAMUSCULAR; INTRAVENOUS AS NEEDED
Status: DISCONTINUED | OUTPATIENT
Start: 2023-03-23 | End: 2023-03-23 | Stop reason: SURG

## 2023-03-23 RX ORDER — SODIUM CHLORIDE, SODIUM LACTATE, POTASSIUM CHLORIDE, CALCIUM CHLORIDE 600; 310; 30; 20 MG/100ML; MG/100ML; MG/100ML; MG/100ML
30 INJECTION, SOLUTION INTRAVENOUS CONTINUOUS PRN
Status: DISCONTINUED | OUTPATIENT
Start: 2023-03-23 | End: 2023-03-23 | Stop reason: HOSPADM

## 2023-03-23 RX ORDER — LIDOCAINE HYDROCHLORIDE 20 MG/ML
INJECTION, SOLUTION INFILTRATION; PERINEURAL AS NEEDED
Status: DISCONTINUED | OUTPATIENT
Start: 2023-03-23 | End: 2023-03-23 | Stop reason: SURG

## 2023-03-23 RX ORDER — SODIUM CHLORIDE 0.9 % (FLUSH) 0.9 %
10 SYRINGE (ML) INJECTION EVERY 12 HOURS SCHEDULED
Status: DISCONTINUED | OUTPATIENT
Start: 2023-03-23 | End: 2023-03-23 | Stop reason: HOSPADM

## 2023-03-23 RX ORDER — SODIUM CHLORIDE 9 MG/ML
40 INJECTION, SOLUTION INTRAVENOUS AS NEEDED
Status: DISCONTINUED | OUTPATIENT
Start: 2023-03-23 | End: 2023-03-23 | Stop reason: HOSPADM

## 2023-03-23 RX ADMIN — GLYCOPYRROLATE 0.1 MG: 0.2 INJECTION INTRAMUSCULAR; INTRAVENOUS at 10:42

## 2023-03-23 RX ADMIN — PROPOFOL 100 MG: 10 INJECTION, EMULSION INTRAVENOUS at 10:42

## 2023-03-23 RX ADMIN — SODIUM CHLORIDE, POTASSIUM CHLORIDE, SODIUM LACTATE AND CALCIUM CHLORIDE: 600; 310; 30; 20 INJECTION, SOLUTION INTRAVENOUS at 10:37

## 2023-03-23 RX ADMIN — PROPOFOL 30 MG: 10 INJECTION, EMULSION INTRAVENOUS at 10:43

## 2023-03-23 RX ADMIN — Medication 250 MCG/KG/MIN: at 10:42

## 2023-03-23 RX ADMIN — LIDOCAINE HYDROCHLORIDE 60 MG: 20 INJECTION, SOLUTION INFILTRATION; PERINEURAL at 10:42

## 2023-03-23 NOTE — ANESTHESIA POSTPROCEDURE EVALUATION
Patient: Calos Moseley    Procedure Summary     Date: 03/23/23 Room / Location: [facility] ENDOSCOPY 10 / [facility] ENDOSCOPY    Anesthesia Start: 1038 Anesthesia Stop: 1107    Procedure: COLONOSCOPY into cecum with cold snare polypectomies Diagnosis:       Encounter for screening for malignant neoplasm of colon      (Encounter for screening for malignant neoplasm of colon [Z12.11])    Surgeons: Yoan Leonard MD Provider: Eder Santos MD    Anesthesia Type: MAC ASA Status: 3          Anesthesia Type: MAC    Vitals  Vitals Value Taken Time   /70 03/23/23 1107   Temp     Pulse 79 03/23/23 1107   Resp 16 03/23/23 1107   SpO2 94 % 03/23/23 1107           Post Anesthesia Care and Evaluation    Patient location during evaluation: PHASE II  Patient participation: waiting for patient participation  Level of consciousness: sleepy but conscious  Pain management: adequate    Airway patency: patent    Cardiovascular status: acceptable  Respiratory status: acceptable  Hydration status: acceptable

## 2023-03-23 NOTE — ANESTHESIA PREPROCEDURE EVALUATION
" Anesthesia Evaluation     Patient summary reviewed and Nursing notes reviewed   history of anesthetic complications: prolonged sedation  NPO Solid Status: > 8 hours  NPO Liquid Status: > 8 hours           Airway   Mallampati: II  TM distance: >3 FB  Neck ROM: full  No difficulty expected  Dental - normal exam     Pulmonary - normal exam    breath sounds clear to auscultation  (+) a smoker Former, COPD, sleep apnea,   Cardiovascular - normal exam    ECG reviewed  Rhythm: regular  Rate: normal    (+) hypertension, hyperlipidemia,     ROS comment: NORMAL ECG -  Sinus rhythm  When compared with ECG of 16-Feb-2022 5:24:37,  No significant change    Neuro/Psych  (+) psychiatric history Anxiety, Depression and Bipolar,    GI/Hepatic/Renal/Endo    (+) morbid obesity,  renal disease stones and CRI, thyroid problem hypothyroidism    Musculoskeletal     Abdominal  - normal exam    Bowel sounds: normal.   Substance History      OB/GYN          Other        ROS/Med Hx Other: \"coded\" with previous stone surgery     SR     1. Interval removal of right percutaneous nephrostomy tube. Bilateral  ureteral stents remain appropriately positioned.  2. Right retroperitoneal hematoma has increased compared to one day  prior. Blood products are now seen within the right renal pelvis, and  large blood/hematoma is dense the urinary bladder, new since yesterday's  study.  3. Single nonobstructing right renal stone. Multiple nonobstructing left  renal stones. No ureteral stone is evident.  4. Trace right pleural effusion with mild right basilar atelectasis.  Small left pleural effusion with dense posterior left lower lobe  atelectasis. This is without significant change from one day prior.                    Anesthesia Plan    ASA 3     MAC       Anesthetic plan, risks, benefits, and alternatives have been provided, discussed and informed consent has been obtained with: patient.        CODE STATUS:       "

## 2023-03-23 NOTE — H&P
[facility]  Pre Procedure History & Physical    Chief Complaint:   Time for my colonoscopy    Subjective     HPI:   62 y.o. male presenting to endoscopy unit today for screening colonoscopy.    Past Medical History:   Past Medical History:   Diagnosis Date   • Anesthesia complication     STATES HARD TO WAKE UP AFTER PREVIOUS KIDNEY STONE SURGERY AND STATES HE CODED   • Bilateral kidney stones    • Bipolar affective (HCC)    • Disease of thyroid gland    • Elevated cholesterol    • Hypertension    • Kidney stone on left side    • Left flank pain    • Mood disorder (HCC)    • Restless leg syndrome    • Sleep apnea     cpap  bring dos   • UTI (urinary tract infection) 12/2021    PREV FINISHED ANTIBIOTIC       Family History:  Family History   Problem Relation Age of Onset   • Malig Hyperthermia Neg Hx        Social History:   reports that he quit smoking about 32 years ago. His smoking use included cigarettes. He has never used smokeless tobacco. He reports that he does not currently use alcohol. He reports that he does not use drugs.    Medications:   Medications Prior to Admission   Medication Sig Dispense Refill Last Dose   • albuterol sulfate  (90 Base) MCG/ACT inhaler INHALE ONE TO TWO PUFFS BY MOUTH EVERY 4 TO 6 HOURS 18 g 0 Past Week   • albuterol sulfate  (90 Base) MCG/ACT inhaler Inhale 1 puff by mouth via inhaler every 6 (six) hours as needed for Wheezing. 8.5 g 11 Past Week   • atorvastatin (LIPITOR) 20 MG tablet Take 1 tablet by mouth every night at bedtime. 90 tablet 3 3/22/2023   • buPROPion XL (Wellbutrin XL) 150 MG 24 hr tablet Take 1 tablet by mouth Every Morning. 90 tablet 2 3/23/2023   • divalproex (Depakote ER) 250 MG 24 hr tablet Take 1 tablet by mouth 2 (Two) Times a Day. 180 tablet 3 3/23/2023   • hydrALAZINE (APRESOLINE) 100 MG tablet Take 1 tablet by mouth 3 (Three) Times a Day - morning, evening, and before bedtime 270 tablet 3 3/23/2023   • lamoTRIgine  (LaMICtal) 100 MG tablet Take 1 tablet by mouth 2 (Two) Times a Day. 60 tablet 3 3/23/2023   • levothyroxine (SYNTHROID, LEVOTHROID) 50 MCG tablet Take 1 tablet by mouth Every Morning. TAKE PREOP   3/23/2023   • lisinopril (PRINIVIL,ZESTRIL) 40 MG tablet Take 1 tablet by mouth once daily 90 tablet 3 3/22/2023   • metoprolol succinate XL (TOPROL-XL) 100 MG 24 hr tablet Take 1 tablet by mouth 2 (Two) Times a Day. TAKE PREOP   3/23/2023   • NIFEdipine CC (ADALAT CC) 90 MG 24 hr tablet Take 1 tablet by mouth Daily. 90 tablet 3 3/22/2023   • potassium citrate (UROCIT-K) 10 MEQ (1080 MG) CR tablet Take 1 tablet by mouth 2 (Two) Times a Day - morning and evening. Take with meals. Do not crush, chew, or split. 180 tablet 3 3/23/2023   • spironolactone (ALDACTONE) 25 MG tablet Take 1 tablet by mouth Daily. 90 tablet 3 Past Month   • tamsulosin (FLOMAX) 0.4 MG capsule 24 hr capsule Take 1 capsule by mouth Daily. 30 capsule 0 3/22/2023   • vilazodone (Viibryd) 40 MG tablet tablet Take 1 tablet by mouth Daily. 90 tablet 3 3/23/2023   • budesonide-formoterol (SYMBICORT) 160-4.5 MCG/ACT inhaler Inhale 2 puffs 2 (Two) Times a Day.   More than a month   • hydrALAZINE (APRESOLINE) 50 MG tablet Take 1 tablet by mouth 3 (Three) Times a Day As Needed. 90 tablet 11    • lamoTRIgine (LaMICtal) 100 MG tablet Take 1 tablet by mouth 2 (Two) Times a Day. 180 tablet 3    • metoprolol succinate XL (TOPROL-XL) 100 MG 24 hr tablet Take 2 tablets by mouth Daily. 180 tablet 3    • naltrexone (DEPADE) 50 MG tablet Take 1 tablet by mouth Every Morning. 90 tablet 2    • oxyCODONE-acetaminophen (Percocet) 5-325 MG per tablet Take 1 tablet by mouth Every 4 (Four) to 6 (Six) Hours As Needed for pain. 12 tablet 0 More than a month   • potassium chloride (K-DUR,KLOR-CON) 20 MEQ CR tablet take 1 tablet by mouth once daily 90 tablet 3    • pramipexole (MIRAPEX) 0.5 MG tablet take 1 tablet by mouth at bedtime 90 tablet 1 More than a month   •  "promethazine-dextromethorphan (PROMETHAZINE-DM) 6.25-15 MG/5ML syrup Take 5-10 ml TID prn for cough. 150 mL 0 More than a month   • sildenafil (VIAGRA) 100 MG tablet Take 1 tablet by mouth Daily As Needed. 6 tablet 3    • tamsulosin (Flomax) 0.4 MG capsule 24 hr capsule Take 1 capsule by mouth every night at bedtime. 90 capsule 3        Allergies:  Patient has no known allergies.      Objective     Blood pressure 152/78, pulse 69, resp. rate 16, height 167.6 cm (66\"), weight 114 kg (250 lb 14.4 oz), SpO2 92 %.  Physical Exam:   General: patient awake, alert and cooperative    Assessment & Plan     Diagnosis:  Encounter for screening for colon cancer    Anticipated Surgical Procedure:  Colonoscopy    The risks, benefits, and alternatives of this procedure have been discussed with the patient or the responsible party- the patient understands and agrees to proceed.                                                                "

## 2023-03-24 LAB
LAB AP CASE REPORT: NORMAL
LAB AP CLINICAL INFORMATION: NORMAL
PATH REPORT.FINAL DX SPEC: NORMAL
PATH REPORT.GROSS SPEC: NORMAL

## 2023-05-16 ENCOUNTER — HOSPITAL ENCOUNTER (INPATIENT)
Facility: HOSPITAL | Age: 63
LOS: 8 days | Discharge: HOME | End: 2023-05-26
Attending: EMERGENCY MEDICINE | Admitting: INTERNAL MEDICINE
Payer: COMMERCIAL

## 2023-05-16 ENCOUNTER — APPOINTMENT (OUTPATIENT)
Dept: CT IMAGING | Facility: HOSPITAL | Age: 63
End: 2023-05-16
Payer: COMMERCIAL

## 2023-05-16 DIAGNOSIS — J44.9 CHRONIC OBSTRUCTIVE PULMONARY DISEASE, UNSPECIFIED COPD TYPE: Chronic | ICD-10-CM

## 2023-05-16 DIAGNOSIS — R59.0 LYMPHADENOPATHY, MEDIASTINAL: ICD-10-CM

## 2023-05-16 DIAGNOSIS — R09.02 HYPOXIA: ICD-10-CM

## 2023-05-16 DIAGNOSIS — R06.00 ACUTE DYSPNEA: Primary | ICD-10-CM

## 2023-05-16 DIAGNOSIS — J90 PLEURAL EFFUSION, RIGHT: ICD-10-CM

## 2023-05-16 DIAGNOSIS — J96.01 ACUTE RESPIRATORY FAILURE WITH HYPOXIA: ICD-10-CM

## 2023-05-16 PROBLEM — N18.9 CKD (CHRONIC KIDNEY DISEASE): Status: ACTIVE | Noted: 2023-05-16

## 2023-05-16 LAB
ALBUMIN SERPL-MCNC: 4 G/DL (ref 3.5–5.2)
ALBUMIN/GLOB SERPL: 1.2 G/DL
ALP SERPL-CCNC: 89 U/L (ref 39–117)
ALT SERPL W P-5'-P-CCNC: 34 U/L (ref 1–41)
ANION GAP SERPL CALCULATED.3IONS-SCNC: 10 MMOL/L (ref 5–15)
AST SERPL-CCNC: 19 U/L (ref 1–40)
B PARAPERT DNA SPEC QL NAA+PROBE: NOT DETECTED
B PERT DNA SPEC QL NAA+PROBE: NOT DETECTED
BASOPHILS # BLD AUTO: 0.05 10*3/MM3 (ref 0–0.2)
BASOPHILS NFR BLD AUTO: 0.5 % (ref 0–1.5)
BILIRUB SERPL-MCNC: 0.7 MG/DL (ref 0–1.2)
BUN SERPL-MCNC: 33 MG/DL (ref 8–23)
BUN/CREAT SERPL: 19.9 (ref 7–25)
C PNEUM DNA NPH QL NAA+NON-PROBE: NOT DETECTED
CALCIUM SPEC-SCNC: 9.5 MG/DL (ref 8.6–10.5)
CHLORIDE SERPL-SCNC: 100 MMOL/L (ref 98–107)
CO2 SERPL-SCNC: 27 MMOL/L (ref 22–29)
CREAT SERPL-MCNC: 1.66 MG/DL (ref 0.76–1.27)
D-LACTATE SERPL-SCNC: 1.3 MMOL/L (ref 0.5–2)
DEPRECATED RDW RBC AUTO: 43.3 FL (ref 37–54)
EGFRCR SERPLBLD CKD-EPI 2021: 46.6 ML/MIN/1.73
EOSINOPHIL # BLD AUTO: 0.08 10*3/MM3 (ref 0–0.4)
EOSINOPHIL NFR BLD AUTO: 0.8 % (ref 0.3–6.2)
ERYTHROCYTE [DISTWIDTH] IN BLOOD BY AUTOMATED COUNT: 13.6 % (ref 12.3–15.4)
FLUAV SUBTYP SPEC NAA+PROBE: NOT DETECTED
FLUBV RNA ISLT QL NAA+PROBE: NOT DETECTED
GEN 5 2HR TROPONIN T REFLEX: 17 NG/L
GLOBULIN UR ELPH-MCNC: 3.3 GM/DL
GLUCOSE SERPL-MCNC: 132 MG/DL (ref 65–99)
HADV DNA SPEC NAA+PROBE: NOT DETECTED
HCOV 229E RNA SPEC QL NAA+PROBE: NOT DETECTED
HCOV HKU1 RNA SPEC QL NAA+PROBE: NOT DETECTED
HCOV NL63 RNA SPEC QL NAA+PROBE: NOT DETECTED
HCOV OC43 RNA SPEC QL NAA+PROBE: NOT DETECTED
HCT VFR BLD AUTO: 42 % (ref 37.5–51)
HGB BLD-MCNC: 14.1 G/DL (ref 13–17.7)
HMPV RNA NPH QL NAA+NON-PROBE: NOT DETECTED
HPIV1 RNA ISLT QL NAA+PROBE: NOT DETECTED
HPIV2 RNA SPEC QL NAA+PROBE: NOT DETECTED
HPIV3 RNA NPH QL NAA+PROBE: NOT DETECTED
HPIV4 P GENE NPH QL NAA+PROBE: NOT DETECTED
IMM GRANULOCYTES # BLD AUTO: 0.46 10*3/MM3 (ref 0–0.05)
IMM GRANULOCYTES NFR BLD AUTO: 4.6 % (ref 0–0.5)
LYMPHOCYTES # BLD AUTO: 1.29 10*3/MM3 (ref 0.7–3.1)
LYMPHOCYTES NFR BLD AUTO: 12.8 % (ref 19.6–45.3)
M PNEUMO IGG SER IA-ACNC: NOT DETECTED
MCH RBC QN AUTO: 29.5 PG (ref 26.6–33)
MCHC RBC AUTO-ENTMCNC: 33.6 G/DL (ref 31.5–35.7)
MCV RBC AUTO: 87.9 FL (ref 79–97)
MONOCYTES # BLD AUTO: 1.37 10*3/MM3 (ref 0.1–0.9)
MONOCYTES NFR BLD AUTO: 13.6 % (ref 5–12)
NEUTROPHILS NFR BLD AUTO: 6.84 10*3/MM3 (ref 1.7–7)
NEUTROPHILS NFR BLD AUTO: 67.7 % (ref 42.7–76)
NRBC BLD AUTO-RTO: 0 /100 WBC (ref 0–0.2)
NT-PROBNP SERPL-MCNC: 167 PG/ML (ref 0–900)
PLATELET # BLD AUTO: 273 10*3/MM3 (ref 140–450)
PMV BLD AUTO: 9.4 FL (ref 6–12)
POTASSIUM SERPL-SCNC: 4.6 MMOL/L (ref 3.5–5.2)
PROCALCITONIN SERPL-MCNC: 0.98 NG/ML (ref 0–0.25)
PROT SERPL-MCNC: 7.3 G/DL (ref 6–8.5)
RBC # BLD AUTO: 4.78 10*6/MM3 (ref 4.14–5.8)
RHINOVIRUS RNA SPEC NAA+PROBE: NOT DETECTED
RSV RNA NPH QL NAA+NON-PROBE: NOT DETECTED
SARS-COV-2 RNA NPH QL NAA+NON-PROBE: NOT DETECTED
SODIUM SERPL-SCNC: 137 MMOL/L (ref 136–145)
TROPONIN T DELTA: -2 NG/L
TROPONIN T SERPL HS-MCNC: 19 NG/L
WBC NRBC COR # BLD: 10.09 10*3/MM3 (ref 3.4–10.8)

## 2023-05-16 PROCEDURE — 84484 ASSAY OF TROPONIN QUANT: CPT | Performed by: EMERGENCY MEDICINE

## 2023-05-16 PROCEDURE — G0378 HOSPITAL OBSERVATION PER HR: HCPCS

## 2023-05-16 PROCEDURE — 71250 CT THORAX DX C-: CPT

## 2023-05-16 PROCEDURE — 36415 COLL VENOUS BLD VENIPUNCTURE: CPT

## 2023-05-16 PROCEDURE — 83880 ASSAY OF NATRIURETIC PEPTIDE: CPT | Performed by: EMERGENCY MEDICINE

## 2023-05-16 PROCEDURE — 0202U NFCT DS 22 TRGT SARS-COV-2: CPT | Performed by: EMERGENCY MEDICINE

## 2023-05-16 PROCEDURE — 83605 ASSAY OF LACTIC ACID: CPT | Performed by: EMERGENCY MEDICINE

## 2023-05-16 PROCEDURE — 87040 BLOOD CULTURE FOR BACTERIA: CPT | Performed by: INTERNAL MEDICINE

## 2023-05-16 PROCEDURE — 85025 COMPLETE CBC W/AUTO DIFF WBC: CPT | Performed by: EMERGENCY MEDICINE

## 2023-05-16 PROCEDURE — 80053 COMPREHEN METABOLIC PANEL: CPT | Performed by: EMERGENCY MEDICINE

## 2023-05-16 PROCEDURE — 99285 EMERGENCY DEPT VISIT HI MDM: CPT

## 2023-05-16 PROCEDURE — 93005 ELECTROCARDIOGRAM TRACING: CPT | Performed by: EMERGENCY MEDICINE

## 2023-05-16 PROCEDURE — 93010 ELECTROCARDIOGRAM REPORT: CPT | Performed by: INTERNAL MEDICINE

## 2023-05-16 PROCEDURE — 84145 PROCALCITONIN (PCT): CPT | Performed by: EMERGENCY MEDICINE

## 2023-05-16 RX ORDER — LAMOTRIGINE 100 MG/1
100 TABLET ORAL 2 TIMES DAILY
Status: DISCONTINUED | OUTPATIENT
Start: 2023-05-16 | End: 2023-05-26 | Stop reason: HOSPADM

## 2023-05-16 RX ORDER — AMOXICILLIN 250 MG
2 CAPSULE ORAL 2 TIMES DAILY
Status: DISCONTINUED | OUTPATIENT
Start: 2023-05-16 | End: 2023-05-20

## 2023-05-16 RX ORDER — BISACODYL 5 MG/1
5 TABLET, DELAYED RELEASE ORAL DAILY PRN
Status: DISCONTINUED | OUTPATIENT
Start: 2023-05-16 | End: 2023-05-20

## 2023-05-16 RX ORDER — NALTREXONE HYDROCHLORIDE 50 MG/1
50 TABLET, FILM COATED ORAL EVERY MORNING
Status: DISCONTINUED | OUTPATIENT
Start: 2023-05-17 | End: 2023-05-20

## 2023-05-16 RX ORDER — LISINOPRIL 40 MG/1
40 TABLET ORAL DAILY
Status: DISCONTINUED | OUTPATIENT
Start: 2023-05-17 | End: 2023-05-23

## 2023-05-16 RX ORDER — BUPROPION HYDROCHLORIDE 150 MG/1
150 TABLET ORAL EVERY MORNING
Status: DISCONTINUED | OUTPATIENT
Start: 2023-05-17 | End: 2023-05-18

## 2023-05-16 RX ORDER — HYDRALAZINE HYDROCHLORIDE 50 MG/1
50 TABLET, FILM COATED ORAL EVERY 8 HOURS SCHEDULED
Status: DISCONTINUED | OUTPATIENT
Start: 2023-05-16 | End: 2023-05-24

## 2023-05-16 RX ORDER — ALBUTEROL SULFATE 90 UG/1
2 AEROSOL, METERED RESPIRATORY (INHALATION) EVERY 6 HOURS PRN
Status: DISCONTINUED | OUTPATIENT
Start: 2023-05-16 | End: 2023-05-26 | Stop reason: HOSPADM

## 2023-05-16 RX ORDER — SODIUM CHLORIDE 0.9 % (FLUSH) 0.9 %
10 SYRINGE (ML) INJECTION EVERY 12 HOURS SCHEDULED
Status: DISCONTINUED | OUTPATIENT
Start: 2023-05-16 | End: 2023-05-26 | Stop reason: HOSPADM

## 2023-05-16 RX ORDER — NIFEDIPINE 90 MG/1
90 TABLET, FILM COATED, EXTENDED RELEASE ORAL DAILY
Status: DISCONTINUED | OUTPATIENT
Start: 2023-05-16 | End: 2023-05-16 | Stop reason: CLARIF

## 2023-05-16 RX ORDER — TAMSULOSIN HYDROCHLORIDE 0.4 MG/1
0.4 CAPSULE ORAL NIGHTLY
Status: DISCONTINUED | OUTPATIENT
Start: 2023-05-16 | End: 2023-05-26 | Stop reason: HOSPADM

## 2023-05-16 RX ORDER — ONDANSETRON 2 MG/ML
4 INJECTION INTRAMUSCULAR; INTRAVENOUS EVERY 6 HOURS PRN
Status: DISCONTINUED | OUTPATIENT
Start: 2023-05-16 | End: 2023-05-26 | Stop reason: HOSPADM

## 2023-05-16 RX ORDER — BUDESONIDE AND FORMOTEROL FUMARATE DIHYDRATE 160; 4.5 UG/1; UG/1
2 AEROSOL RESPIRATORY (INHALATION)
Status: DISCONTINUED | OUTPATIENT
Start: 2023-05-16 | End: 2023-05-17

## 2023-05-16 RX ORDER — METOPROLOL SUCCINATE 100 MG/1
200 TABLET, EXTENDED RELEASE ORAL DAILY
Status: DISCONTINUED | OUTPATIENT
Start: 2023-05-16 | End: 2023-05-16

## 2023-05-16 RX ORDER — NITROGLYCERIN 0.4 MG/1
0.4 TABLET SUBLINGUAL
Status: DISCONTINUED | OUTPATIENT
Start: 2023-05-16 | End: 2023-05-20 | Stop reason: SDUPTHER

## 2023-05-16 RX ORDER — SODIUM CHLORIDE 0.9 % (FLUSH) 0.9 %
10 SYRINGE (ML) INJECTION AS NEEDED
Status: DISCONTINUED | OUTPATIENT
Start: 2023-05-16 | End: 2023-05-21

## 2023-05-16 RX ORDER — DIVALPROEX SODIUM 250 MG/1
250 TABLET, EXTENDED RELEASE ORAL 2 TIMES DAILY
Status: DISCONTINUED | OUTPATIENT
Start: 2023-05-16 | End: 2023-05-26 | Stop reason: HOSPADM

## 2023-05-16 RX ORDER — LEVOTHYROXINE SODIUM 0.05 MG/1
50 TABLET ORAL
Status: DISCONTINUED | OUTPATIENT
Start: 2023-05-17 | End: 2023-05-26 | Stop reason: HOSPADM

## 2023-05-16 RX ORDER — METOPROLOL SUCCINATE 100 MG/1
100 TABLET, EXTENDED RELEASE ORAL 2 TIMES DAILY
Status: DISCONTINUED | OUTPATIENT
Start: 2023-05-16 | End: 2023-05-26 | Stop reason: HOSPADM

## 2023-05-16 RX ORDER — POLYETHYLENE GLYCOL 3350 17 G/17G
17 POWDER, FOR SOLUTION ORAL DAILY PRN
Status: DISCONTINUED | OUTPATIENT
Start: 2023-05-16 | End: 2023-05-20

## 2023-05-16 RX ORDER — SODIUM CHLORIDE 0.9 % (FLUSH) 0.9 %
10 SYRINGE (ML) INJECTION AS NEEDED
Status: DISCONTINUED | OUTPATIENT
Start: 2023-05-16 | End: 2023-05-26 | Stop reason: HOSPADM

## 2023-05-16 RX ORDER — BISACODYL 10 MG
10 SUPPOSITORY, RECTAL RECTAL DAILY PRN
Status: DISCONTINUED | OUTPATIENT
Start: 2023-05-16 | End: 2023-05-20

## 2023-05-16 RX ORDER — VILAZODONE HYDROCHLORIDE 40 MG/1
40 TABLET ORAL DAILY
Status: DISCONTINUED | OUTPATIENT
Start: 2023-05-16 | End: 2023-05-26 | Stop reason: HOSPADM

## 2023-05-16 RX ORDER — ATORVASTATIN CALCIUM 20 MG/1
20 TABLET, FILM COATED ORAL NIGHTLY
Status: DISCONTINUED | OUTPATIENT
Start: 2023-05-16 | End: 2023-05-26 | Stop reason: HOSPADM

## 2023-05-16 RX ORDER — OXYCODONE HYDROCHLORIDE AND ACETAMINOPHEN 5; 325 MG/1; MG/1
1 TABLET ORAL EVERY 6 HOURS PRN
Status: DISCONTINUED | OUTPATIENT
Start: 2023-05-16 | End: 2023-05-20

## 2023-05-16 RX ORDER — PRAMIPEXOLE DIHYDROCHLORIDE 0.5 MG/1
0.5 TABLET ORAL NIGHTLY
Status: DISCONTINUED | OUTPATIENT
Start: 2023-05-16 | End: 2023-05-26 | Stop reason: HOSPADM

## 2023-05-16 RX ORDER — SODIUM CHLORIDE 9 MG/ML
40 INJECTION, SOLUTION INTRAVENOUS AS NEEDED
Status: DISCONTINUED | OUTPATIENT
Start: 2023-05-16 | End: 2023-05-21

## 2023-05-16 RX ADMIN — OXYCODONE AND ACETAMINOPHEN 1 TABLET: 5; 325 TABLET ORAL at 20:32

## 2023-05-16 RX ADMIN — HYDRALAZINE HYDROCHLORIDE 50 MG: 50 TABLET, FILM COATED ORAL at 22:27

## 2023-05-16 RX ADMIN — NIFEDIPINE 90 MG: 60 TABLET, FILM COATED, EXTENDED RELEASE ORAL at 22:29

## 2023-05-16 RX ADMIN — Medication 10 ML: at 22:28

## 2023-05-16 RX ADMIN — LAMOTRIGINE 100 MG: 100 TABLET ORAL at 22:27

## 2023-05-16 RX ADMIN — METOPROLOL SUCCINATE 100 MG: 100 TABLET, EXTENDED RELEASE ORAL at 22:28

## 2023-05-16 RX ADMIN — ATORVASTATIN CALCIUM 20 MG: 20 TABLET, FILM COATED ORAL at 20:32

## 2023-05-16 RX ADMIN — TAMSULOSIN HYDROCHLORIDE 0.4 MG: 0.4 CAPSULE ORAL at 22:29

## 2023-05-16 RX ADMIN — DIVALPROEX SODIUM 250 MG: 250 TABLET, EXTENDED RELEASE ORAL at 22:29

## 2023-05-16 RX ADMIN — PRAMIPEXOLE DIHYDROCHLORIDE 0.5 MG: 0.5 TABLET ORAL at 22:28

## 2023-05-16 NOTE — ED NOTES
Nursing report ED to floor  Calos Moseley  62 y.o.  male    HPI :   Chief Complaint   Patient presents with    Shortness of Breath    Abnormal Imaging       Admitting doctor:   Ramya Lomeli MD    Admitting diagnosis:   The primary encounter diagnosis was Acute dyspnea. Diagnoses of Hypoxia and Pleural effusion, right were also pertinent to this visit.    Code status:   Current Code Status       Date Active Code Status Order ID Comments User Context       Prior            Allergies:   Patient has no known allergies.    Isolation:   Enhanced Droplet/Contact     Intake and Output  No intake or output data in the 24 hours ending 05/16/23 1904    Weight:   There were no vitals filed for this visit.    Most recent vitals:   Vitals:    05/16/23 1713 05/16/23 1748 05/16/23 1750   Pulse: 94 84 74   Resp: 18     Temp: 99.1 °F (37.3 °C)     TempSrc: Tympanic     SpO2: 92% (!) 89% 92%       Active LDAs/IV Access:   Lines, Drains & Airways       Active LDAs       Name Placement date Placement time Site Days    Peripheral IV 05/16/23 1741 Right Antecubital 05/16/23 1741  Antecubital  less than 1                    Labs (abnormal labs have a star):   Labs Reviewed   COMPREHENSIVE METABOLIC PANEL - Abnormal; Notable for the following components:       Result Value    Glucose 132 (*)     BUN 33 (*)     Creatinine 1.66 (*)     eGFR 46.6 (*)     All other components within normal limits    Narrative:     GFR Normal >60  Chronic Kidney Disease <60  Kidney Failure <15     TROPONIN - Abnormal; Notable for the following components:    HS Troponin T 19 (*)     All other components within normal limits    Narrative:     High Sensitive Troponin T Reference Range:  <10.0 ng/L- Negative Female for AMI  <15.0 ng/L- Negative Male for AMI  >=10 - Abnormal Female indicating possible myocardial injury.  >=15 - Abnormal Male indicating possible myocardial injury.   Clinicians would have to utilize clinical acumen, EKG, Troponin, and serial changes  "to determine if it is an Acute Myocardial Infarction or myocardial injury due to an underlying chronic condition.        CBC WITH AUTO DIFFERENTIAL - Abnormal; Notable for the following components:    Lymphocyte % 12.8 (*)     Monocyte % 13.6 (*)     Immature Grans % 4.6 (*)     Monocytes, Absolute 1.37 (*)     Immature Grans, Absolute 0.46 (*)     All other components within normal limits   PROCALCITONIN - Abnormal; Notable for the following components:    Procalcitonin 0.98 (*)     All other components within normal limits    Narrative:     As a Marker for Sepsis (Non-Neonates):    1. <0.5 ng/mL represents a low risk of severe sepsis and/or septic shock.  2. >2 ng/mL represents a high risk of severe sepsis and/or septic shock.    As a Marker for Lower Respiratory Tract Infections that require antibiotic therapy:    PCT on Admission    Antibiotic Therapy       6-12 Hrs later    >0.5                Strongly Recommended  >0.25 - <0.5        Recommended   0.1 - 0.25          Discouraged              Remeasure/reassess PCT  <0.1                Strongly Discouraged     Remeasure/reassess PCT    As 28 day mortality risk marker: \"Change in Procalcitonin Result\" (>80% or <=80%) if Day 0 (or Day 1) and Day 4 values are available. Refer to [email or web address]    Change in PCT <=80%  A decrease of PCT levels below or equal to 80% defines a positive change in PCT test result representing a higher risk for 28-day all-cause mortality of patients diagnosed with severe sepsis for septic shock.    Change in PCT >80%  A decrease of PCT levels of more than 80% defines a negative change in PCT result representing a lower risk for 28-day all-cause mortality of patients diagnosed with severe sepsis or septic shock.      RESPIRATORY PANEL PCR W/ COVID-19 (SARS-COV-2) JERI/SONIA/MARQUITA/PAD/COR/MAD/[facility]ISS[facility] IN-HOUSE, NP SWAB IN [facility], 3-4 HR TAT - Normal    Narrative:     In the setting of a positive respiratory panel with a viral " infection PLUS a negative procalcitonin without other underlying concern for bacterial infection, consider observing off antibiotics or discontinuation of antibiotics and continue supportive care. If the respiratory panel is positive for atypical bacterial infection (Bordetella pertussis, Chlamydophila pneumoniae, or Mycoplasma pneumoniae), consider antibiotic de-escalation to target atypical bacterial infection.   BNP (IN-HOUSE) - Normal    Narrative:     Among patients with dyspnea, NT-proBNP is highly sensitive for the detection of acute congestive heart failure. In addition NT-proBNP of <300 pg/ml effectively rules out acute congestive heart failure with 99% negative predictive value.    Results may be falsely decreased if patient taking Biotin.     LACTIC ACID, PLASMA - Normal   HIGH SENSITIVITIY TROPONIN T 2HR   CBC AND DIFFERENTIAL    Narrative:     The following orders were created for panel order CBC & Differential.  Procedure                               Abnormality         Status                     ---------                               -----------         ------                     CBC Auto Differential[[id number]]        Abnormal            Final result                 Please view results for these tests on the individual orders.       EKG:   ECG 12 Lead Dyspnea   Preliminary Result   HEART RATE= 75  bpm   RR Interval= 800  ms   CO Interval= 144  ms   P Horizontal Axis= -7  deg   P Front Axis= 57  deg   QRSD Interval= 107  ms   QT Interval= 365  ms   QRS Axis= 54  deg   T Wave Axis= 9  deg   - NORMAL ECG -   Sinus rhythm   Electronically Signed By:    Date and Time of Study: 2023-05-16 18:20:05          Meds given in ED:   Medications   sodium chloride 0.9 % flush 10 mL (has no administration in time range)       Imaging results:  CT Chest Without Contrast Diagnostic    Result Date: 5/16/2023   Mild to moderate loculated right pleural effusion. Occlusion right lower lobe branch airways is seen, with  limited because of mucous plugging or lesion (bronchoscopic correlation can be obtained), with right lower lobe atelectasis (possibility of underlying pneumonia or lesion not excluded). Nonspecific mediastinal adenopathy. Follow-up/further evaluation recommended as indicated.  This report was finalized on 2023 7:00 PM by Dr. Eder Gallego M.D.       Ambulatory status:   - up with assist    Social issues:   Social History     Socioeconomic History    Marital status:    Tobacco Use    Smoking status: Former     Types: Cigarettes     Quit date: 1990     Years since quittin.4    Smokeless tobacco: Never   Vaping Use    Vaping Use: Never used   Substance and Sexual Activity    Alcohol use: Not Currently    Drug use: No    Sexual activity: Defer       NIH Stroke Scale:         Valencia Horta RN  23 19:04 EDT

## 2023-05-16 NOTE — ED PROVIDER NOTES
EMERGENCY DEPARTMENT ENCOUNTER    Room Number:  24/24  Date seen:  5/16/2023  PCP: Naveed Nunez DO  Historian: Patient      HPI:  Chief Complaint: Shortness of breath  A complete HPI/ROS/PMH/PSH/SH/FH are unobtainable due to: Nothing  Context: Calos Moseley is a 62 y.o. male who presents to the ED from Dr. Martinez's office c/o shortness of breath.  Patient complains of shortness of breath for the past 1 week.  Also reports a productive cough with occasional scant amount of red blood.  Denies fever or chills.  Shortness of breath is worse with exertion.  He also complains of pain in his right lateral chest.  He saw Dr. Martinez today.  Chest x-ray was performed and reportedly showed a right pleural effusion.  Patient was sent to the ED for further evaluation.  He denies history of heart or lung disease.  Also complains of increased leg swelling for the past several days.  Patient is not on home O2.  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            PAST MEDICAL HISTORY  Active Ambulatory Problems     Diagnosis Date Noted   • HLD (hyperlipidemia) 01/07/2022   • Obesity, Class III, BMI 40-49.9 (morbid obesity) 01/07/2022   • Bilateral kidney stones 01/07/2022   • COPD (chronic obstructive pulmonary disease) 01/07/2022   • Left flank pain 02/14/2022   • Anxiety and depression 01/29/2018   • Hypothyroidism 01/29/2018   • ISIAH (obstructive sleep apnea) 01/26/2022   • Essential hypertension 01/29/2018   • Calculus, renal 02/21/2022   • Restless leg syndrome    • Bipolar affective    • Calculus of ureter 03/07/2022   • Gross hematuria 02/25/2022   • Encounter for screening for malignant neoplasm of colon 01/31/2023     Resolved Ambulatory Problems     Diagnosis Date Noted   • SBO (small bowel obstruction) (HCC) 11/03/2017   • MESHA (acute kidney injury) 01/07/2022   • Acute cystitis with hematuria 01/07/2022   • Hyperglycemia 01/07/2022   • HTN (hypertension) 01/07/2022   • Acute pyelonephritis  01/07/2022   • Hematuria 01/07/2022   • Acute renal failure (ARF) 02/14/2022   • Cervical spondylosis 09/11/2018   • History of renal stone 01/29/2018   • Hypokalemia 01/29/2018   • Prediabetes 09/06/2019   • Right ureteral stone 01/04/2018   • Hyperlipidemia 01/29/2018   • Obesity 12/01/2021     Past Medical History:   Diagnosis Date   • Anesthesia complication    • COVID 2022   • Disease of thyroid gland    • Elevated cholesterol    • Hypertension    • Kidney stone on left side    • Mood disorder    • Sleep apnea    • UTI (urinary tract infection) 12/2021         PAST SURGICAL HISTORY  Past Surgical History:   Procedure Laterality Date   • ADENOIDECTOMY     • APPENDECTOMY     • COLONOSCOPY     • COLONOSCOPY N/A 3/23/2023    Procedure: COLONOSCOPY into cecum with cold snare polypectomies;  Surgeon: Yoan Leonard MD;  Location: [facility] ENDOSCOPY;  Service: Gastroenterology;  Laterality: N/A;  polyps   • CYSTOSCOPY WITH CLOT EVACUATION N/A 3/8/2022    Procedure: CYSTOSCOPY WITH CLOT EVACUATION;  Surgeon: Ihsan Lopez MD;  Location: [facility];  Service: Urology;  Laterality: N/A;   • CYSTOSCOPY, URETEROSCOPY, RETROGRADE PYELOGRAM, STENT INSERTION Left 3/7/2022    Procedure: CYSTOSCOPY URETEROSCOPY LASER LITHOTRIPSY and stent exchange;  Surgeon: Deuce Leija MD;  Location: [facility];  Service: Urology;  Laterality: Left;   • KIDNEY STONE SURGERY     • NEPHROLITHOTRIPSY PERCUTANEOUS Left 2/21/2022    Procedure: NEPHROSTLITHOTOMY PERCUTANEOUS;  Surgeon: Deuce Leija MD;  Location: [facility] OR;  Service: Urology;  Laterality: Left;   • NEPHROLITHOTRIPSY PERCUTANEOUS Left 2/23/2022    Procedure: LEFT FLEXIBLE NEPHROSTOMY WITH STONE EXTRACTION, LEFT NEPHROSTOMY, TUBE REMOVAL, BILATERAL URETERAL STENT PLACEMENT;  Surgeon: Deuce Leija MD;  Location: [facility];  Service: Urology;  Laterality: Left;   • NEPHROLITHOTRIPSY PERCUTANEOUS Right 3/7/2022    Procedure: RIGHT  PERCUTANEOUS NEPHROLITHOTOMY W ACCESS;  Surgeon: Deuce Leija MD;  Location: [facility] MAIN OR;  Service: Urology;  Laterality: Right;   • TONSILLECTOMY           FAMILY HISTORY  Family History   Problem Relation Age of Onset   • Malig Hyperthermia Neg Hx          SOCIAL HISTORY  Social History     Socioeconomic History   • Marital status:    Tobacco Use   • Smoking status: Former     Types: Cigarettes     Quit date: 1990     Years since quittin.4   • Smokeless tobacco: Never   Vaping Use   • Vaping Use: Never used   Substance and Sexual Activity   • Alcohol use: Not Currently   • Drug use: No   • Sexual activity: Defer         ALLERGIES  Patient has no known allergies.        REVIEW OF SYSTEMS  Review of Systems     All systems have been reviewed and are negative except as as discussed in the HPI    PHYSICAL EXAM  ED Triage Vitals [23 1713]   Temp Heart Rate Resp BP SpO2   99.1 °F (37.3 °C) 94 18 -- 92 %      Temp src Heart Rate Source Patient Position BP Location FiO2 (%)   Tympanic Monitor -- -- --       Physical Exam      GENERAL: Awake, alert, oriented x3.  Well-developed and nontoxic-appearing male.  Appears mildly dyspneic   HENT: NCAT, nares patent  EYES: no scleral icterus  CV: regular rhythm, normal rate  RESPIRATORY: Mildly tachypneic.  Diminished breath sounds in the right lung base.  Scattered rhonchi bilaterally  ABDOMEN: soft, obese, nontender  MUSCULOSKELETAL: Extremities are nontender with full range of motion.  2+ pedal edema in both lower legs  NEURO: Speech is normal.  No facial droop.  Follows commands PSYCH:  calm, cooperative  SKIN: warm, dry    Vital signs and nursing notes reviewed.          LAB RESULTS  Recent Results (from the past 24 hour(s))   Comprehensive Metabolic Panel    Collection Time: 23  5:30 PM    Specimen: Blood   Result Value Ref Range    Glucose 132 (H) 65 - 99 mg/dL    BUN 33 (H) 8 - 23 mg/dL    Creatinine 1.66 (H) 0.76 - 1.27 mg/dL     Sodium 137 136 - 145 mmol/L    Potassium 4.6 3.5 - 5.2 mmol/L    Chloride 100 98 - 107 mmol/L    CO2 27.0 22.0 - 29.0 mmol/L    Calcium 9.5 8.6 - 10.5 mg/dL    Total Protein 7.3 6.0 - 8.5 g/dL    Albumin 4.0 3.5 - 5.2 g/dL    ALT (SGPT) 34 1 - 41 U/L    AST (SGOT) 19 1 - 40 U/L    Alkaline Phosphatase 89 39 - 117 U/L    Total Bilirubin 0.7 0.0 - 1.2 mg/dL    Globulin 3.3 gm/dL    A/G Ratio 1.2 g/dL    BUN/Creatinine Ratio 19.9 7.0 - 25.0    Anion Gap 10.0 5.0 - 15.0 mmol/L    eGFR 46.6 (L) >60.0 mL/min/1.73   BNP    Collection Time: 05/16/23  5:30 PM    Specimen: Blood   Result Value Ref Range    proBNP 167.0 0.0 - 900.0 pg/mL   High Sensitivity Troponin T    Collection Time: 05/16/23  5:30 PM    Specimen: Blood   Result Value Ref Range    HS Troponin T 19 (H) <15 ng/L   CBC Auto Differential    Collection Time: 05/16/23  5:30 PM    Specimen: Blood   Result Value Ref Range    WBC 10.09 3.40 - 10.80 10*3/mm3    RBC 4.78 4.14 - 5.80 10*6/mm3    Hemoglobin 14.1 13.0 - 17.7 g/dL    Hematocrit 42.0 37.5 - 51.0 %    MCV 87.9 79.0 - 97.0 fL    MCH 29.5 26.6 - 33.0 pg    MCHC 33.6 31.5 - 35.7 g/dL    RDW 13.6 12.3 - 15.4 %    RDW-SD 43.3 37.0 - 54.0 fl    MPV 9.4 6.0 - 12.0 fL    Platelets 273 140 - 450 10*3/mm3    Neutrophil % 67.7 42.7 - 76.0 %    Lymphocyte % 12.8 (L) 19.6 - 45.3 %    Monocyte % 13.6 (H) 5.0 - 12.0 %    Eosinophil % 0.8 0.3 - 6.2 %    Basophil % 0.5 0.0 - 1.5 %    Immature Grans % 4.6 (H) 0.0 - 0.5 %    Neutrophils, Absolute 6.84 1.70 - 7.00 10*3/mm3    Lymphocytes, Absolute 1.29 0.70 - 3.10 10*3/mm3    Monocytes, Absolute 1.37 (H) 0.10 - 0.90 10*3/mm3    Eosinophils, Absolute 0.08 0.00 - 0.40 10*3/mm3    Basophils, Absolute 0.05 0.00 - 0.20 10*3/mm3    Immature Grans, Absolute 0.46 (H) 0.00 - 0.05 10*3/mm3    nRBC 0.0 0.0 - 0.2 /100 WBC   Lactic Acid, Plasma    Collection Time: 05/16/23  5:30 PM    Specimen: Blood   Result Value Ref Range    Lactate 1.3 0.5 - 2.0 mmol/L   Procalcitonin    Collection  Time: 05/16/23  5:30 PM    Specimen: Blood   Result Value Ref Range    Procalcitonin 0.98 (H) 0.00 - 0.25 ng/mL   Respiratory Panel PCR w/COVID-19(SARS-CoV-2) [facility]/SONIA/MARQUITA/PAD/[facility]/M[facility]/ULISSES In-House, NP Swab in UTM/VTM, 3-4 HR TAT - Swab, Nasopharynx    Collection Time: 05/16/23  5:33 PM    Specimen: Nasopharynx; Swab   Result Value Ref Range    ADENOVIRUS, PCR Not Detected Not Detected    Coronavirus 229E Not Detected Not Detected    Coronavirus HKU1 Not Detected Not Detected    Coronavirus NL63 Not Detected Not Detected    Coronavirus OC43 Not Detected Not Detected    COVID19 Not Detected Not Detected - Ref. Range    Human Metapneumovirus Not Detected Not Detected    Human Rhinovirus/Enterovirus Not Detected Not Detected    Influenza A PCR Not Detected Not Detected    Influenza B PCR Not Detected Not Detected    Parainfluenza Virus 1 Not Detected Not Detected    Parainfluenza Virus 2 Not Detected Not Detected    Parainfluenza Virus 3 Not Detected Not Detected    Parainfluenza Virus 4 Not Detected Not Detected    RSV, PCR Not Detected Not Detected    Bordetella pertussis pcr Not Detected Not Detected    Bordetella parapertussis PCR Not Detected Not Detected    Chlamydophila pneumoniae PCR Not Detected Not Detected    Mycoplasma pneumo by PCR Not Detected Not Detected   ECG 12 Lead Dyspnea    Collection Time: 05/16/23  6:20 PM   Result Value Ref Range    QT Interval 365 ms       Ordered the above labs and reviewed the results.        RADIOLOGY  CT Chest Without Contrast Diagnostic    Result Date: 5/16/2023  CT CHEST WO CONTRAST DIAGNOSTIC-  INDICATIONS: Cough, short of breath, right pleural effusion  TECHNIQUE: Radiation dose reduction techniques were utilized, including automated exposure control and exposure modulation based on body size. Unenhanced CHEST CT  COMPARISON: None available  FINDINGS:    The heart size is normal without pericardial effusion. Increasing aorta is borderline dilated, 3.6 cm. Enlarged right  paratracheal lymph node is noted, 1.3 cm short axis, axial image 28, could be reactive in nature or potentially evidence of neoplasm, follow-up recommended; if further imaging evaluation is indicated, positron emission tomography could be considered. A right paraesophageal lymph node measures 1 cm short axis on axial image 44. Assessment of vascular and mediastinal structures is limited without intravenous contrast material.   Occlusion right lower lobe branch airways is seen, with limited because of mucous plugging or lesion (bronchoscopic correlation can be obtained), with right lower lobe atelectasis, possibility of underlying pneumonia or lesion not excluded, clinical correlation and follow-up advised.  The lungs also show old granulomatous disease.  Mild to moderate loculated right pleural effusion is increased from 03/08/2022.  Upper abdominal structures appear unremarkable.  Degenerative changes are seen in the spine. No acute fracture is identified.       Mild to moderate loculated right pleural effusion. Occlusion right lower lobe branch airways is seen, with limited because of mucous plugging or lesion (bronchoscopic correlation can be obtained), with right lower lobe atelectasis (possibility of underlying pneumonia or lesion not excluded). Nonspecific mediastinal adenopathy. Follow-up/further evaluation recommended as indicated.  This report was finalized on 5/16/2023 7:00 PM by Dr. Eder Gallego M.D.        Ordered the above noted radiological studies. Reviewed by me in PACS.            PROCEDURES  Procedures              MEDICATIONS GIVEN IN ER  Medications   sodium chloride 0.9 % flush 10 mL (has no administration in time range)                   MEDICAL DECISION MAKING, PROGRESS, and CONSULTS    All labs have been independently reviewed by me.  All radiology studies have been reviewed by me and I have also reviewed the radiology report.   EKG's independently viewed and interpreted by me.   Discussion below represents my analysis of pertinent findings related to patient's condition, differential diagnosis, treatment plan and final disposition.      Additional sources:  - Discussed/ obtained information from independent historians: N/A    - External (non-ED) record review: Patient was admitted here in January 2022 for acute kidney injury and flank pain.  Patient saw his PCP 5 days ago complaining of cough, shortness of breath, chills, and body aches.  He was diagnosed with otitis media and started on amoxicillin.  He was also given an albuterol inhaler.    - Chronic or social conditions impacting care: N/A          Orders placed during this visit:  Orders Placed This Encounter   Procedures   • Respiratory Panel PCR w/COVID-19(SARS-CoV-2) [facility]/MARQUITA/[facility]ISSES In-House, NP Swab in UTM/VTM, 3-4 HR TAT - Swab, Nasopharynx   • CT Chest Without Contrast Diagnostic   • Comprehensive Metabolic Panel   • BNP   • High Sensitivity Troponin T   • CBC Auto Differential   • Lactic Acid, Plasma   • Procalcitonin   • High Sensitivity Troponin T 2Hr   • Monitor Blood Pressure   • Pulse Oximetry, Continuous   • LHA (on-call MD unless specified) Details   • ECG 12 Lead Dyspnea   • Insert Peripheral IV   • Initiate Observation Status   • CBC & Differential         Additional orders considered but not ordered:  N/A        Differential diagnosis:    Differential diagnosis includes but is not limited to CHF, acute coronary syndrome, pulmonary embolism, pneumothorax, pneumonia, asthma/COPD, deconditioning, anemia, anxiety.           Independent interpretation of labs, radiology studies, and discussions with consultants:  ED Course as of 05/16/23 1921   Tue May 16, 2023   1741 WBC: 10.09 [WH]   1741 Hemoglobin: 14.1 [WH]   1750 Oxygen saturation dropped to 89% on room air.  Patient will be placed on 2 L. [WH]   1805 Lactate: 1.3 [WH]   1823 EKG personally interpreted by me.  My personal interpretation is:          EKG  time: 1820  Rhythm/Rate: Sinus rhythm, rate 75  P waves and HI: Normal  QRS, axis: Normal  ST and T waves: Normal    Interpreted Contemporaneously by me at 1822, independently viewed  EKG is not changed compared to prior EKG done on 3/1/2022   [WH]   1823 Procalcitonin(!): 0.98 [WH]   1824 proBNP: 167.0 [WH]   1824 HS Troponin T(!): 19 [WH]   1843 CT chest personally interpreted by me.  My personal interpretation is: There is a large right pleural effusion.  No pneumothorax [WH]   1845 Respiratory panel is negative [WH]   1848 Test results and plan for admission were discussed with the patient is resting and breathing comfortably.  O2 sats are 93% on 2 L. [WH]   1851 Case discussed with Dr. Lomeli and he agrees to admit the patient to a monitored bed.  Pertinent history, exam findings, test results, ED course, and diagnoses were discussed with him. [WH]   1919 Per the radiologist, CT scan shows a moderate loculated right pleural effusion.  There is occlusion of the right lower lobe branch airways.  There is right lower lobe atelectasis.  See dictated report for details. [WH]   1920 Patient presented the ED complaining of shortness of breath and cough for the past 1 week.  He saw his pulmonologist today.  Chest x-ray showed right pleural effusion.  Patient was mildly hypoxic in the ED.  CT chest showed a moderate size right pleural effusion with occlusion of the right lower lobe branch airways.  Respiratory panel was negative.  Lactic acid and white blood cell count were normal.  Procalcitonin was elevated.  EKG was normal.  Troponin was 19.  proBNP was normal so doubt the patient has CHF.  Symptoms are not really suggestive of pneumonia.  Case was discussed with the hospitalist and he will be admitted. [WH]      ED Course User Index  [WH] Cameron Montes MD               DIAGNOSIS  Final diagnoses:   Acute dyspnea   Hypoxia   Pleural effusion, right         DISPOSITION  ADMISSION    Discussed treatment plan and  reason for admission with pt/family and admitting physician.  Pt/family voiced understanding of the plan for admission for further testing/treatment as needed.                 Latest Documented Vital Signs:  As of 19:21 EDT  BP- 152/66 HR- 71 Temp- 99.1 °F (37.3 °C) (Tympanic) O2 sat- 93%              --    Please note that portions of this were completed with a voice recognition program.       Note Disclaimer: At [facility], we believe that sharing information builds trust and better relationships. You are receiving this note because you are receiving care at [facility] or recently visited. It is possible you will see health information before a provider has talked with you about it. This kind of information can be easy to misunderstand. To help you fully understand what it means for your health, we urge you to discuss this note with your provider.           Cameron Montes MD  05/16/23 1926

## 2023-05-16 NOTE — H&P
Internal medicine history and physical  INTERNAL MEDICINE   [facility]       Patient Identification:  Name: Calos Moseley  Age: 62 y.o.  Sex: male  :  1960  MRN: [id number]                   Primary Care Physician: Naveed Nunez DO                               Date of admission:2023    Chief Complaint: Progressive pain and discomfort in the right side of the chest worse with coughing and taking deep breaths since early morning Wednesday last week.    History of Present Illness:   Patient is a 62-year-old male with past medical history as noted below was in his usual state of his health until about a month ago when he started noticing that he is feeling rundown and tired and activities that he could do without any problem was becoming a chore for him.  Despite that he was pushing through and was going to his work and able to do his things but was noticing that something was not right.  In that background he went to bed last Tuesday and woke up early in the morning with significant pain and discomfort in the right lower back which moved to across his chest and since then he has been having significant chest discomfort which gets worse upon coughing and taking deep breath.  He was also becoming very short of breath with minimal activity.  He is also noticing that his both lower legs are getting more swollen.  He was seen by his primary care provider/urgent care and was prescribed 5-day course of prednisone and amoxicillin which he finished recently.  Despite these medications his symptoms did not improve.  On Saturday he was cough was associated with blood-tinged phlegm.  This has resolved since.  He does not have appetite.  He also describes some night sweats.  Because of these persistent symptoms he went to see Dr. Martinez and had a chest x-ray performed which showed right-sided effusion resulting in him directed the emergency room earlier today.  CT scan of the chest without  contrast shows evidence of occlusion of the right lower branch airways with atelectasis either due to lesion or mucous plugging associated with possibility of underlying pneumonia.  Patient was noted to have right-sided loculated effusion.      Past Medical History:  Past Medical History:   Diagnosis Date   • Anesthesia complication     STATES HARD TO WAKE UP AFTER PREVIOUS KIDNEY STONE SURGERY AND STATES HE CODED   • Bilateral kidney stones    • Bipolar affective    • COVID 2022   • Disease of thyroid gland    • Elevated cholesterol    • Hypertension    • Kidney stone on left side    • Left flank pain    • Mood disorder    • Restless leg syndrome    • Sleep apnea     cpap  bring dos   • UTI (urinary tract infection) 12/2021    PREV FINISHED ANTIBIOTIC     Past Surgical History:  Past Surgical History:   Procedure Laterality Date   • ADENOIDECTOMY     • APPENDECTOMY     • COLONOSCOPY     • COLONOSCOPY N/A 3/23/2023    Procedure: COLONOSCOPY into cecum with cold snare polypectomies;  Surgeon: Yoan Leonard MD;  Location: [facility] ENDOSCOPY;  Service: Gastroenterology;  Laterality: N/A;  polyps   • CYSTOSCOPY WITH CLOT EVACUATION N/A 3/8/2022    Procedure: CYSTOSCOPY WITH CLOT EVACUATION;  Surgeon: Ihsan Lopez MD;  Location: [facility];  Service: Urology;  Laterality: N/A;   • CYSTOSCOPY, URETEROSCOPY, RETROGRADE PYELOGRAM, STENT INSERTION Left 3/7/2022    Procedure: CYSTOSCOPY URETEROSCOPY LASER LITHOTRIPSY and stent exchange;  Surgeon: Deuce Leija MD;  Location: [facility];  Service: Urology;  Laterality: Left;   • KIDNEY STONE SURGERY     • NEPHROLITHOTRIPSY PERCUTANEOUS Left 2/21/2022    Procedure: NEPHROSTLITHOTOMY PERCUTANEOUS;  Surgeon: Deuce Leija MD;  Location: [facility];  Service: Urology;  Laterality: Left;   • NEPHROLITHOTRIPSY PERCUTANEOUS Left 2/23/2022    Procedure: LEFT FLEXIBLE NEPHROSTOMY WITH STONE EXTRACTION, LEFT NEPHROSTOMY, TUBE REMOVAL, BILATERAL  URETERAL STENT PLACEMENT;  Surgeon: Deuce Leija MD;  Location: [facility] MAIN OR;  Service: Urology;  Laterality: Left;   • NEPHROLITHOTRIPSY PERCUTANEOUS Right 3/7/2022    Procedure: RIGHT PERCUTANEOUS NEPHROLITHOTOMY W ACCESS;  Surgeon: Deuce Leija MD;  Location: [facility] MAIN OR;  Service: Urology;  Laterality: Right;   • TONSILLECTOMY        Home Meds:  Medications Prior to Admission   Medication Sig Dispense Refill Last Dose   • albuterol sulfate  (90 Base) MCG/ACT inhaler INHALE ONE TO TWO PUFFS BY MOUTH EVERY 4 TO 6 HOURS 18 g 0    • albuterol sulfate  (90 Base) MCG/ACT inhaler Inhale 1 puff by mouth via inhaler every 6 (six) hours as needed for Wheezing. 8.5 g 11    • atorvastatin (LIPITOR) 20 MG tablet Take 1 tablet by mouth every night at bedtime. 90 tablet 3    • budesonide-formoterol (SYMBICORT) 160-4.5 MCG/ACT inhaler Inhale 2 puffs 2 (Two) Times a Day.      • buPROPion XL (Wellbutrin XL) 150 MG 24 hr tablet Take 1 tablet by mouth Every Morning. 90 tablet 2    • divalproex (Depakote ER) 250 MG 24 hr tablet Take 1 tablet by mouth 2 (Two) Times a Day. 180 tablet 3    • hydrALAZINE (APRESOLINE) 100 MG tablet Take 1 tablet by mouth 3 (Three) Times a Day - morning, evening, and before bedtime 270 tablet 3    • hydrALAZINE (APRESOLINE) 50 MG tablet Take 1 tablet by mouth 3 (Three) Times a Day As Needed. 90 tablet 11    • lamoTRIgine (LaMICtal) 100 MG tablet Take 1 tablet by mouth 2 (Two) Times a Day. 60 tablet 3    • lamoTRIgine (LaMICtal) 100 MG tablet Take 1 tablet by mouth 2 (Two) Times a Day. 180 tablet 3    • levothyroxine (SYNTHROID, LEVOTHROID) 50 MCG tablet Take 1 tablet by mouth Every Morning. TAKE PREOP      • lisinopril (PRINIVIL,ZESTRIL) 40 MG tablet Take 1 tablet by mouth once daily 90 tablet 3    • metoprolol succinate XL (TOPROL-XL) 100 MG 24 hr tablet Take 1 tablet by mouth 2 (Two) Times a Day. TAKE PREOP      • metoprolol succinate XL (TOPROL-XL) 100 MG 24 hr tablet  Take 2 tablets by mouth Daily. 180 tablet 3    • naltrexone (DEPADE) 50 MG tablet Take 1 tablet by mouth Every Morning. 90 tablet 2    • NIFEdipine CC (ADALAT CC) 90 MG 24 hr tablet Take 1 tablet by mouth Daily. 90 tablet 3    • oxyCODONE-acetaminophen (Percocet) 5-325 MG per tablet Take 1 tablet by mouth Every 4 (Four) to 6 (Six) Hours As Needed for pain. 12 tablet 0    • potassium chloride (K-DUR,KLOR-CON) 20 MEQ CR tablet take 1 tablet by mouth once daily 90 tablet 3    • potassium citrate (UROCIT-K) 10 MEQ (1080 MG) CR tablet Take 1 tablet by mouth 2 (Two) Times a Day - morning and evening. Take with meals. Do not crush, chew, or split. 180 tablet 3    • pramipexole (MIRAPEX) 0.5 MG tablet take 1 tablet by mouth at bedtime 90 tablet 1    • sildenafil (VIAGRA) 100 MG tablet Take 1 tablet by mouth Daily As Needed. 6 tablet 3    • spironolactone (ALDACTONE) 25 MG tablet Take 1 tablet by mouth Daily. 90 tablet 3    • tamsulosin (FLOMAX) 0.4 MG capsule 24 hr capsule Take 1 capsule by mouth Daily. 30 capsule 0    • vilazodone (Viibryd) 40 MG tablet tablet Take 1 tablet by mouth Daily. 90 tablet 3    • amoxicillin (AMOXIL) 875 MG tablet Take 1 tablet by mouth in the morning and 1 tablet before bedtime. 14 tablet 0    • predniSONE (DELTASONE) 10 MG tablet Take 1 tablet by mouth Daily for 5 days. 5 tablet 0    • promethazine-dextromethorphan (PROMETHAZINE-DM) 6.25-15 MG/5ML syrup Take 5-10 ml TID prn for cough. 150 mL 0    • tamsulosin (Flomax) 0.4 MG capsule 24 hr capsule Take 1 capsule by mouth every night at bedtime. 90 capsule 3      Current Meds:     Current Facility-Administered Medications:   •  [COMPLETED] Insert Peripheral IV, , , Once **AND** sodium chloride 0.9 % flush 10 mL, 10 mL, Intravenous, PRN, Cameron Montes MD  Allergies:  No Known Allergies  Social History:   Social History     Tobacco Use   • Smoking status: Former     Types: Cigarettes     Quit date: 1990     Years since quittin.4    • Smokeless tobacco: Never   Substance Use Topics   • Alcohol use: Not Currently      Family History:  Family History   Problem Relation Age of Onset   • Malig Hyperthermia Neg Hx           Review of Systems  See history of present illness and past medical history.    As described in history presenting illness.      Vitals:   /66   Pulse 71   Temp 99.1 °F (37.3 °C) (Tympanic)   Resp 14   SpO2 93%   I/O: No intake or output data in the 24 hours ending 05/16/23 1950  Exam:  Patient is examined using the personal protective equipment as per guidelines from infection control for this particular patient as enacted.  Hand washing was performed before and after patient interaction.  General Appearance:   Ill-appearing male who is in no acute distress   Head:    Normocephalic, without obvious abnormality, atraumatic   Eyes:    PERRL, conjunctiva/corneas clear, EOM's intact, both eyes   Ears:    Normal external ear canals, both ears   Nose:   Nares normal, septum midline, mucosa normal, no drainage    or sinus tenderness   Throat:   Lips, tongue, gums normal; oral mucosa pink and moist   Neck:   Supple, symmetrical, trachea midline, no adenopathy;     thyroid:  no enlargement/tenderness/nodules; no carotid    bruit or JVD   Back:     Symmetric, no curvature, ROM normal, no CVA tenderness   Lungs:    Decreased breath sounds in the right side.   Chest Wall:    No tenderness or deformity    Heart:    Regular rate and rhythm, S1 and S2 normal, no murmur, rub   or gallop   Abdomen:    Obese soft nontender   Extremities:  Chronic lower extremity edema noted   Pulses:   Pulses palpable in all extremities; symmetric all extremities   Skin:  Chronic skin changes noted.   Neurologic:   CNII-XII intact, motor strength grossly intact, sensation grossly intact to light touch, no focal deficits noted       Data Review:      I reviewed the patient's new clinical results.  Results from last 7 days   Lab Units 05/16/23  6424    WBC 10*3/mm3 10.09   HEMOGLOBIN g/dL 14.1   PLATELETS 10*3/mm3 273     Results from last 7 days   Lab Units 05/16/23  1730   SODIUM mmol/L 137   POTASSIUM mmol/L 4.6   CHLORIDE mmol/L 100   CO2 mmol/L 27.0   BUN mg/dL 33*   CREATININE mg/dL 1.66*   CALCIUM mg/dL 9.5   GLUCOSE mg/dL 132*     CT Chest Without Contrast Diagnostic    Result Date: 5/16/2023   Mild to moderate loculated right pleural effusion. Occlusion right lower lobe branch airways is seen, with limited because of mucous plugging or lesion (bronchoscopic correlation can be obtained), with right lower lobe atelectasis (possibility of underlying pneumonia or lesion not excluded). Nonspecific mediastinal adenopathy. Follow-up/further evaluation recommended as indicated.  This report was finalized on 5/16/2023 7:00 PM by Dr. Eder Gallego M.D.        Assessment:  Active Hospital Problems    Diagnosis  POA   • **Acute dyspnea [R06.00]  Yes   • Pleural effusion on right [J90]  Yes   • CKD (chronic kidney disease) [N18.9]  Unknown   • ISIAH (obstructive sleep apnea) [G47.33]  Yes   • COPD (chronic obstructive pulmonary disease) [J44.9]  Yes   • Anxiety and depression [F41.9, F32.A]  Yes     Formatting of this note might be different from the original.  Lamictal and Vibryd     • Essential hypertension [I10]  Yes   • Hypothyroidism [E03.9]  Yes       Medical decision making/care plan: See admitting orders  · Right-sided pleuritic chest pain in the setting of month-long history of rundown and tired feeling now with cough transient hemoptysis and chest x-ray and CT scan evidence of right-sided complicated effusion with possible mucous plugging or endobronchial lesion and pneumonic infiltrate other possibility to consider would be renal pulmonary vasculitis-plan is to admit the patient provided oxygen supplementation monitor his vital signs including heart rate, oxygen saturation and blood pressure.  Get CT-guided right-sided thoracentesis and send fluid for  cytology, pH, cell count and differential and culture as well as get pulmonary and possible thoracic surgery consultation.  We will go ahead and get blood cultures and hold antibiotic therapy and only start IV Unasyn if he shows worsening hypoxia, tachycardia, hypertension or elevated lactic acid level.  · COPD obesity and obstructive sleep apnea-continue with his nebulizer treatments/inhalers, and provide him with continuous pulse ox monitoring and allow him to use his CPAP device.  · Hypertension-continue antihypertensive regimen and avoid hypotensive episodes.  · Acute kidney injury on chronic kidney disease-monitor renal function and avoid nephrotoxic agents.  · Anxiety and depression-continue with his current regimen.  · Hypothyroidism-continue thyroid replacement therapy.      Ramya Lomeli MD   5/16/2023  19:50 EDT    Parts of this note may be an electronic transcription/translation of spoken language to printed text using the Dragon dictation system.

## 2023-05-17 ENCOUNTER — APPOINTMENT (OUTPATIENT)
Dept: CARDIOLOGY | Facility: HOSPITAL | Age: 63
End: 2023-05-17
Payer: COMMERCIAL

## 2023-05-17 ENCOUNTER — APPOINTMENT (OUTPATIENT)
Dept: ULTRASOUND IMAGING | Facility: HOSPITAL | Age: 63
End: 2023-05-17
Payer: COMMERCIAL

## 2023-05-17 ENCOUNTER — APPOINTMENT (OUTPATIENT)
Dept: CT IMAGING | Facility: HOSPITAL | Age: 63
End: 2023-05-17
Payer: COMMERCIAL

## 2023-05-17 PROBLEM — J96.01 ACUTE RESPIRATORY FAILURE WITH HYPOXIA: Status: ACTIVE | Noted: 2023-05-17

## 2023-05-17 LAB
ALBUMIN SERPL-MCNC: 3.3 G/DL (ref 3.5–5.2)
ALBUMIN/GLOB SERPL: 1.1 G/DL
ALP SERPL-CCNC: 75 U/L (ref 39–117)
ALT SERPL W P-5'-P-CCNC: 24 U/L (ref 1–41)
ANION GAP SERPL CALCULATED.3IONS-SCNC: 12.5 MMOL/L (ref 5–15)
AORTIC DIMENSIONLESS INDEX: 0.7 (DI)
APPEARANCE FLD: ABNORMAL
AST SERPL-CCNC: 17 U/L (ref 1–40)
BASOPHILS # BLD AUTO: 0.03 10*3/MM3 (ref 0–0.2)
BASOPHILS NFR BLD AUTO: 0.3 % (ref 0–1.5)
BH CV ECHO MEAS - AO MAX PG: 19.2 MMHG
BH CV ECHO MEAS - AO MEAN PG: 7.8 MMHG
BH CV ECHO MEAS - AO ROOT DIAM: 3.1 CM
BH CV ECHO MEAS - AO V2 MAX: 218.9 CM/SEC
BH CV ECHO MEAS - AO V2 VTI: 37.2 CM
BH CV ECHO MEAS - AVA(I,D): 2.37 CM2
BH CV ECHO MEAS - EDV(CUBED): 203 ML
BH CV ECHO MEAS - EDV(MOD-SP2): 194 ML
BH CV ECHO MEAS - EDV(MOD-SP4): 193 ML
BH CV ECHO MEAS - EF(MOD-BP): 58.1 %
BH CV ECHO MEAS - EF(MOD-SP2): 58.8 %
BH CV ECHO MEAS - EF(MOD-SP4): 58 %
BH CV ECHO MEAS - ESV(CUBED): 61.3 ML
BH CV ECHO MEAS - ESV(MOD-SP2): 80 ML
BH CV ECHO MEAS - ESV(MOD-SP4): 81 ML
BH CV ECHO MEAS - FS: 32.9 %
BH CV ECHO MEAS - IVS/LVPW: 1.05 CM
BH CV ECHO MEAS - IVSD: 1.24 CM
BH CV ECHO MEAS - LAT PEAK E' VEL: 10.7 CM/SEC
BH CV ECHO MEAS - LV DIASTOLIC VOL/BSA (35-75): 87 CM2
BH CV ECHO MEAS - LV MASS(C)D: 306 GRAMS
BH CV ECHO MEAS - LV MAX PG: 7.3 MMHG
BH CV ECHO MEAS - LV MEAN PG: 3.1 MMHG
BH CV ECHO MEAS - LV SYSTOLIC VOL/BSA (12-30): 36.5 CM2
BH CV ECHO MEAS - LV V1 MAX: 135.3 CM/SEC
BH CV ECHO MEAS - LV V1 VTI: 24.9 CM
BH CV ECHO MEAS - LVIDD: 5.9 CM
BH CV ECHO MEAS - LVIDS: 3.9 CM
BH CV ECHO MEAS - LVOT AREA: 3.5 CM2
BH CV ECHO MEAS - LVOT DIAM: 2.12 CM
BH CV ECHO MEAS - LVPWD: 1.18 CM
BH CV ECHO MEAS - MED PEAK E' VEL: 8.6 CM/SEC
BH CV ECHO MEAS - MV A DUR: 0.09 SEC
BH CV ECHO MEAS - MV A MAX VEL: 122.5 CM/SEC
BH CV ECHO MEAS - MV DEC SLOPE: 486.4 CM/SEC2
BH CV ECHO MEAS - MV DEC TIME: 238 MSEC
BH CV ECHO MEAS - MV E MAX VEL: 106 CM/SEC
BH CV ECHO MEAS - MV E/A: 0.87
BH CV ECHO MEAS - MV MAX PG: 6.6 MMHG
BH CV ECHO MEAS - MV MEAN PG: 2.8 MMHG
BH CV ECHO MEAS - MV P1/2T: 69.4 MSEC
BH CV ECHO MEAS - MV V2 VTI: 35 CM
BH CV ECHO MEAS - MVA(P1/2T): 3.2 CM2
BH CV ECHO MEAS - MVA(VTI): 2.5 CM2
BH CV ECHO MEAS - PA ACC TIME: 0.11 SEC
BH CV ECHO MEAS - PA PR(ACCEL): 31.5 MMHG
BH CV ECHO MEAS - PA V2 MAX: 118.1 CM/SEC
BH CV ECHO MEAS - RAP SYSTOLE: 15 MMHG
BH CV ECHO MEAS - RV MAX PG: 2.27 MMHG
BH CV ECHO MEAS - RV V1 MAX: 75.4 CM/SEC
BH CV ECHO MEAS - RV V1 VTI: 15.4 CM
BH CV ECHO MEAS - RVSP: 36.3 MMHG
BH CV ECHO MEAS - SI(MOD-SP2): 51.4 ML/M2
BH CV ECHO MEAS - SI(MOD-SP4): 50.5 ML/M2
BH CV ECHO MEAS - SV(LVOT): 88 ML
BH CV ECHO MEAS - SV(MOD-SP2): 114 ML
BH CV ECHO MEAS - SV(MOD-SP4): 112 ML
BH CV ECHO MEAS - TAPSE (>1.6): 2.8 CM
BH CV ECHO MEAS - TR MAX PG: 21.3 MMHG
BH CV ECHO MEAS - TR MAX VEL: 230.6 CM/SEC
BH CV ECHO MEASUREMENTS AVERAGE E/E' RATIO: 10.98
BH CV XLRA - RV BASE: 3.9 CM
BH CV XLRA - TDI S': 18.1 CM/SEC
BILIRUB SERPL-MCNC: 0.6 MG/DL (ref 0–1.2)
BILIRUB UR QL STRIP: NEGATIVE
BUN SERPL-MCNC: 32 MG/DL (ref 8–23)
BUN/CREAT SERPL: 20 (ref 7–25)
CALCIUM SPEC-SCNC: 9 MG/DL (ref 8.6–10.5)
CHLORIDE SERPL-SCNC: 102 MMOL/L (ref 98–107)
CLARITY UR: CLEAR
CO2 SERPL-SCNC: 24.5 MMOL/L (ref 22–29)
COLOR FLD: YELLOW
COLOR UR: YELLOW
CREAT SERPL-MCNC: 1.6 MG/DL (ref 0.76–1.27)
CREAT UR-MCNC: 116.1 MG/DL
DEPRECATED RDW RBC AUTO: 42.2 FL (ref 37–54)
EGFRCR SERPLBLD CKD-EPI 2021: 48.7 ML/MIN/1.73
EOSINOPHIL # BLD AUTO: 0.11 10*3/MM3 (ref 0–0.4)
EOSINOPHIL NFR BLD AUTO: 1.2 % (ref 0.3–6.2)
ERYTHROCYTE [DISTWIDTH] IN BLOOD BY AUTOMATED COUNT: 13.1 % (ref 12.3–15.4)
GLOBULIN UR ELPH-MCNC: 3 GM/DL
GLUCOSE SERPL-MCNC: 129 MG/DL (ref 65–99)
GLUCOSE UR STRIP-MCNC: NEGATIVE MG/DL
HCT VFR BLD AUTO: 38.8 % (ref 37.5–51)
HGB BLD-MCNC: 13 G/DL (ref 13–17.7)
HGB UR QL STRIP.AUTO: NEGATIVE
IMM GRANULOCYTES # BLD AUTO: 0.4 10*3/MM3 (ref 0–0.05)
IMM GRANULOCYTES NFR BLD AUTO: 4.4 % (ref 0–0.5)
KETONES UR QL STRIP: NEGATIVE
L PNEUMO1 AG UR QL IA: NEGATIVE
LDH FLD-CCNC: 839 U/L
LEFT ATRIUM VOLUME INDEX: 27.9 ML/M2
LEUKOCYTE ESTERASE UR QL STRIP.AUTO: NEGATIVE
LYMPHOCYTES # BLD AUTO: 1.06 10*3/MM3 (ref 0.7–3.1)
LYMPHOCYTES NFR BLD AUTO: 11.5 % (ref 19.6–45.3)
LYMPHOCYTES NFR FLD MANUAL: 34 %
MAXIMAL PREDICTED HEART RATE: 159 BPM
MCH RBC QN AUTO: 29.3 PG (ref 26.6–33)
MCHC RBC AUTO-ENTMCNC: 33.5 G/DL (ref 31.5–35.7)
MCV RBC AUTO: 87.6 FL (ref 79–97)
METHOD: ABNORMAL
MONOCYTES # BLD AUTO: 1.32 10*3/MM3 (ref 0.1–0.9)
MONOCYTES NFR BLD AUTO: 14.4 % (ref 5–12)
MONOCYTES NFR FLD: 1 %
NEUTROPHILS NFR BLD AUTO: 6.26 10*3/MM3 (ref 1.7–7)
NEUTROPHILS NFR BLD AUTO: 68.2 % (ref 42.7–76)
NEUTROPHILS NFR FLD MANUAL: 65 %
NITRITE UR QL STRIP: NEGATIVE
NRBC BLD AUTO-RTO: 0 /100 WBC (ref 0–0.2)
NUC CELL # FLD: 254 /MM3
PH FLD: 7.56 [PH]
PH UR STRIP.AUTO: 6 [PH] (ref 5–8)
PLATELET # BLD AUTO: 265 10*3/MM3 (ref 140–450)
PMV BLD AUTO: 9.6 FL (ref 6–12)
POTASSIUM SERPL-SCNC: 4.8 MMOL/L (ref 3.5–5.2)
PROT ?TM UR-MCNC: 10 MG/DL
PROT FLD-MCNC: 4.4 G/DL
PROT SERPL-MCNC: 6.3 G/DL (ref 6–8.5)
PROT UR QL STRIP: NEGATIVE
QT INTERVAL: 365 MS
RBC # BLD AUTO: 4.43 10*6/MM3 (ref 4.14–5.8)
RBC # FLD AUTO: 1898 /MM3
S PNEUM AG SPEC QL LA: NEGATIVE
SODIUM SERPL-SCNC: 139 MMOL/L (ref 136–145)
SP GR UR STRIP: 1.02 (ref 1–1.03)
STRESS TARGET HR: 135 BPM
UROBILINOGEN UR QL STRIP: NORMAL
WBC NRBC COR # BLD: 9.18 10*3/MM3 (ref 3.4–10.8)

## 2023-05-17 PROCEDURE — 93306 TTE W/DOPPLER COMPLETE: CPT | Performed by: INTERNAL MEDICINE

## 2023-05-17 PROCEDURE — 25510000001 PERFLUTREN (DEFINITY) 8.476 MG IN SODIUM CHLORIDE (PF) 0.9 % 10 ML INJECTION: Performed by: HOSPITALIST

## 2023-05-17 PROCEDURE — G0378 HOSPITAL OBSERVATION PER HR: HCPCS

## 2023-05-17 PROCEDURE — 94761 N-INVAS EAR/PLS OXIMETRY MLT: CPT

## 2023-05-17 PROCEDURE — 84156 ASSAY OF PROTEIN URINE: CPT | Performed by: HOSPITALIST

## 2023-05-17 PROCEDURE — 81003 URINALYSIS AUTO W/O SCOPE: CPT | Performed by: HOSPITALIST

## 2023-05-17 PROCEDURE — 0 LIDOCAINE 1 % SOLUTION: Performed by: RADIOLOGY

## 2023-05-17 PROCEDURE — 89051 BODY FLUID CELL COUNT: CPT | Performed by: INTERNAL MEDICINE

## 2023-05-17 PROCEDURE — 94799 UNLISTED PULMONARY SVC/PX: CPT

## 2023-05-17 PROCEDURE — 88305 TISSUE EXAM BY PATHOLOGIST: CPT | Performed by: INTERNAL MEDICINE

## 2023-05-17 PROCEDURE — 93306 TTE W/DOPPLER COMPLETE: CPT

## 2023-05-17 PROCEDURE — 80053 COMPREHEN METABOLIC PANEL: CPT | Performed by: INTERNAL MEDICINE

## 2023-05-17 PROCEDURE — 83986 ASSAY PH BODY FLUID NOS: CPT | Performed by: INTERNAL MEDICINE

## 2023-05-17 PROCEDURE — 82570 ASSAY OF URINE CREATININE: CPT | Performed by: HOSPITALIST

## 2023-05-17 PROCEDURE — 84157 ASSAY OF PROTEIN OTHER: CPT | Performed by: INTERNAL MEDICINE

## 2023-05-17 PROCEDURE — 87449 NOS EACH ORGANISM AG IA: CPT | Performed by: INTERNAL MEDICINE

## 2023-05-17 PROCEDURE — 94760 N-INVAS EAR/PLS OXIMETRY 1: CPT

## 2023-05-17 PROCEDURE — 94640 AIRWAY INHALATION TREATMENT: CPT

## 2023-05-17 PROCEDURE — 85025 COMPLETE CBC W/AUTO DIFF WBC: CPT | Performed by: INTERNAL MEDICINE

## 2023-05-17 PROCEDURE — 76942 ECHO GUIDE FOR BIOPSY: CPT

## 2023-05-17 PROCEDURE — 88112 CYTOPATH CELL ENHANCE TECH: CPT | Performed by: INTERNAL MEDICINE

## 2023-05-17 PROCEDURE — 25010000002 CEFTRIAXONE PER 250 MG: Performed by: INTERNAL MEDICINE

## 2023-05-17 PROCEDURE — 25010000002 FUROSEMIDE PER 20 MG: Performed by: INTERNAL MEDICINE

## 2023-05-17 PROCEDURE — 83615 LACTATE (LD) (LDH) ENZYME: CPT | Performed by: INTERNAL MEDICINE

## 2023-05-17 PROCEDURE — 87899 AGENT NOS ASSAY W/OPTIC: CPT | Performed by: INTERNAL MEDICINE

## 2023-05-17 RX ORDER — LIDOCAINE HYDROCHLORIDE 10 MG/ML
10 INJECTION, SOLUTION INFILTRATION; PERINEURAL ONCE
Status: COMPLETED | OUTPATIENT
Start: 2023-05-17 | End: 2023-05-17

## 2023-05-17 RX ORDER — FUROSEMIDE 10 MG/ML
40 INJECTION INTRAMUSCULAR; INTRAVENOUS ONCE
Status: COMPLETED | OUTPATIENT
Start: 2023-05-17 | End: 2023-05-17

## 2023-05-17 RX ADMIN — LEVOTHYROXINE SODIUM 50 MCG: 0.05 TABLET ORAL at 06:11

## 2023-05-17 RX ADMIN — HYDRALAZINE HYDROCHLORIDE 50 MG: 50 TABLET, FILM COATED ORAL at 22:20

## 2023-05-17 RX ADMIN — PRAMIPEXOLE DIHYDROCHLORIDE 0.5 MG: 0.5 TABLET ORAL at 20:00

## 2023-05-17 RX ADMIN — ATORVASTATIN CALCIUM 20 MG: 20 TABLET, FILM COATED ORAL at 20:00

## 2023-05-17 RX ADMIN — LAMOTRIGINE 100 MG: 100 TABLET ORAL at 08:07

## 2023-05-17 RX ADMIN — OXYCODONE AND ACETAMINOPHEN 1 TABLET: 5; 325 TABLET ORAL at 04:47

## 2023-05-17 RX ADMIN — HYDRALAZINE HYDROCHLORIDE 50 MG: 50 TABLET, FILM COATED ORAL at 06:11

## 2023-05-17 RX ADMIN — FUROSEMIDE 40 MG: 10 INJECTION, SOLUTION INTRAMUSCULAR; INTRAVENOUS at 16:34

## 2023-05-17 RX ADMIN — OXYCODONE AND ACETAMINOPHEN 1 TABLET: 5; 325 TABLET ORAL at 13:19

## 2023-05-17 RX ADMIN — Medication 10 ML: at 20:00

## 2023-05-17 RX ADMIN — LAMOTRIGINE 100 MG: 100 TABLET ORAL at 20:00

## 2023-05-17 RX ADMIN — NIFEDIPINE 90 MG: 60 TABLET, FILM COATED, EXTENDED RELEASE ORAL at 08:07

## 2023-05-17 RX ADMIN — HYDRALAZINE HYDROCHLORIDE 50 MG: 50 TABLET, FILM COATED ORAL at 13:22

## 2023-05-17 RX ADMIN — Medication 10 ML: at 10:09

## 2023-05-17 RX ADMIN — TAMSULOSIN HYDROCHLORIDE 0.4 MG: 0.4 CAPSULE ORAL at 20:00

## 2023-05-17 RX ADMIN — LIDOCAINE HYDROCHLORIDE 10 ML: 10 INJECTION, SOLUTION INFILTRATION; PERINEURAL at 15:43

## 2023-05-17 RX ADMIN — NALTREXONE HYDROCHLORIDE 50 MG: 50 TABLET, FILM COATED ORAL at 06:12

## 2023-05-17 RX ADMIN — CEFTRIAXONE SODIUM 1 G: 1 INJECTION, POWDER, FOR SOLUTION INTRAMUSCULAR; INTRAVENOUS at 22:19

## 2023-05-17 RX ADMIN — BUDESONIDE AND FORMOTEROL FUMARATE DIHYDRATE 2 PUFF: 160; 4.5 AEROSOL RESPIRATORY (INHALATION) at 08:10

## 2023-05-17 RX ADMIN — METOPROLOL SUCCINATE 100 MG: 100 TABLET, EXTENDED RELEASE ORAL at 08:07

## 2023-05-17 RX ADMIN — LISINOPRIL 40 MG: 40 TABLET ORAL at 08:07

## 2023-05-17 RX ADMIN — BUPROPION HYDROCHLORIDE 150 MG: 150 TABLET, EXTENDED RELEASE ORAL at 06:11

## 2023-05-17 RX ADMIN — PERFLUTREN 2 ML: 6.52 INJECTION, SUSPENSION INTRAVENOUS at 18:51

## 2023-05-17 RX ADMIN — DOCUSATE SODIUM 50MG AND SENNOSIDES 8.6MG 2 TABLET: 8.6; 5 TABLET, FILM COATED ORAL at 08:07

## 2023-05-17 RX ADMIN — DOCUSATE SODIUM 50MG AND SENNOSIDES 8.6MG 2 TABLET: 8.6; 5 TABLET, FILM COATED ORAL at 20:00

## 2023-05-17 RX ADMIN — METOPROLOL SUCCINATE 100 MG: 100 TABLET, EXTENDED RELEASE ORAL at 20:00

## 2023-05-17 RX ADMIN — OXYCODONE AND ACETAMINOPHEN 1 TABLET: 5; 325 TABLET ORAL at 19:50

## 2023-05-17 RX ADMIN — CEFTRIAXONE SODIUM 1 G: 1 INJECTION, POWDER, FOR SOLUTION INTRAMUSCULAR; INTRAVENOUS at 00:00

## 2023-05-17 RX ADMIN — VILAZODONE HYDROCHLORIDE 40 MG: 40 TABLET, FILM COATED ORAL at 08:07

## 2023-05-17 NOTE — PLAN OF CARE
Goal Outcome Evaluation:            Pt A&Ox4, 4L n/c (none at baseline, just CPAP at HS for ISIAH), NSR, pt c/o dizziness at start of shift but felt this resolved after he had a boxed lunch.  Pt SOA with exertion, adventitious lung sounds present, weight shifting with and without pillows and with a preventative mepilex for comfort on sacrum.  IS provided for pulmonary hygiene.  Pt to have CT-guided thoracentesis today, consent signed and in pt's chart.            Problem: Adult Inpatient Plan of Care  Goal: Plan of Care Review  Outcome: Ongoing, Progressing  Goal: Absence of Hospital-Acquired Illness or Injury  Outcome: Ongoing, Progressing  Intervention: Identify and Manage Fall Risk  Description: Perform standard risk assessment on admission using a validated tool or comprehensive approach appropriate to the patient; reassess fall risk frequently, with change in status or transfer to another level of care.  Communicate fall injury risk to interprofessional healthcare team.  Determine need for increased observation, equipment and environmental modification, such as low bed, signage and supportive, nonskid footwear.  Adjust safety measures to individual developmental age, stage and identified risk factors.  Reinforce the importance of safety and physical activity with patient and family.  Perform regular intentional rounding to assess need for position change, pain assessment and personal needs, including assistance with toileting.  Recent Flowsheet Documentation  Taken 5/17/2023 0611 by Dee Flores, RN  Safety Promotion/Fall Prevention: safety round/check completed  Taken 5/17/2023 0408 by Dee Flores, RN  Safety Promotion/Fall Prevention: safety round/check completed  Taken 5/17/2023 0210 by Dee Flores, RN  Safety Promotion/Fall Prevention: safety round/check completed  Taken 5/17/2023 0000 by Dee Flores, RN  Safety Promotion/Fall Prevention: safety round/check completed  Taken 5/16/2023 2227  by Wells, Dee, RN  Safety Promotion/Fall Prevention: safety round/check completed  Taken 5/16/2023 1953 by Dee Flores RN  Safety Promotion/Fall Prevention: safety round/check completed  Intervention: Prevent Skin Injury  Description: Perform a screening for skin injury risk, such as pressure or moisture associated skin damage on admission and at regular intervals throughout hospital stay.  Keep all areas of skin (especially folds) clean and dry.  Maintain adequate skin hydration.  Relieve and redistribute pressure and protect bony prominences; implement measures based on patient-specific risk factors.  Match turning and repositioning schedule to clinical condition.  Encourage weight shift frequently; assist with reposition if unable to complete independently.  Float heels off bed; avoid pressure on the Achilles tendon.  Keep skin free from extended contact with medical devices.  Encourage functional activity and mobility, as early as tolerated.  Use aids (e.g., slide boards, mechanical lift) during transfer.  Recent Flowsheet Documentation  Taken 5/17/2023 0611 by Dee Flores RN  Body Position:   position changed independently   weight shifting   patient/family refused  Taken 5/17/2023 0408 by Dee Flores RN  Body Position:   position changed independently   weight shifting   patient/family refused  Taken 5/17/2023 0210 by Dee Flores RN  Body Position: position changed independently  Taken 5/17/2023 0000 by Dee Flores RN  Body Position: (sitting up in chair)   position changed independently   other (see comments)  Skin Protection:   adhesive use limited   tubing/devices free from skin contact   transparent dressing maintained  Taken 5/16/2023 2227 by Dee Flores RN  Body Position:   position changed independently   legs elevated  Taken 5/16/2023 1953 by Dee Flores RN  Body Position:   position changed independently   dangle, side of bed   sitting up in bed  Skin  Protection:   adhesive use limited   transparent dressing maintained   tubing/devices free from skin contact  Intervention: Prevent and Manage VTE (Venous Thromboembolism) Risk  Description: Assess for VTE (venous thromboembolism) risk.  Encourage and assist with early ambulation.  Initiate and maintain compression or other therapy, as indicated, based on identified risk in accordance with organizational protocol and provider order.  Encourage both active and passive leg exercises while in bed, if unable to ambulate.  Recent Flowsheet Documentation  Taken 5/17/2023 0611 by Dee Flores RN  Activity Management: up in chair  Taken 5/17/2023 0408 by Dee Flores RN  Activity Management: up in chair  Taken 5/17/2023 0210 by Dee Flores RN  Activity Management: up in chair  Taken 5/17/2023 0000 by Dee Flores RN  Activity Management:   ambulated in room   ambulated to bathroom   up in chair  Taken 5/16/2023 2227 by Dee Flores RN  Activity Management: up in chair  Taken 5/16/2023 1953 by Dee Flores RN  Activity Management:   ambulated in room   back to bed   sitting, edge of bed  Goal: Optimal Comfort and Wellbeing  Outcome: Ongoing, Progressing  Intervention: Monitor Pain and Promote Comfort  Description: Assess pain level, treatment efficacy and patient response at regular intervals using a consistent pain scale.  Consider the presence and impact of preexisting chronic pain.  Encourage patient and caregiver involvement in pain assessment, interventions and safety measures.  Recent Flowsheet Documentation  Taken 5/17/2023 0000 by Dee Flores, RN  Pain Management Interventions:   care clustered   diversional activity provided   pillow support provided   position adjusted   quiet environment facilitated  Taken 5/16/2023 1953 by Dee Florse, RN  Pain Management Interventions:   care clustered   diversional activity provided   pillow support provided   position adjusted   quiet  environment facilitated   see MAR  Intervention: Provide Person-Centered Care  Description: Use a family-focused approach to care.  Develop trust and rapport by proactively providing information, encouraging questions, addressing concerns and offering reassurance.  Acknowledge emotional response to hospitalization.  Recognize and utilize personal coping strategies.  Honor spiritual and cultural preferences.  Recent Flowsheet Documentation  Taken 5/17/2023 0000 by Dee Flores, RN  Trust Relationship/Rapport:   care explained   choices provided   questions encouraged   reassurance provided   thoughts/feelings acknowledged   questions answered   empathic listening provided  Taken 5/16/2023 1953 by Dee Flores, RN  Trust Relationship/Rapport:   care explained   choices provided   questions encouraged   reassurance provided   thoughts/feelings acknowledged   questions answered   emotional support provided  Goal: Readiness for Transition of Care  Outcome: Ongoing, Progressing  Intervention: Mutually Develop Transition Plan  Description: Identify available resources for support (e.g., family, friends, community).  Identify and address barriers to ongoing treatment and home management (e.g., environmental, financial).  Provide opportunities to practice self-management skills.  Assess and monitor emotional readiness for transition.  Establish or reconnect linkage with outpatient providers or community-based services.  Recent Flowsheet Documentation  Taken 5/16/2023 2011 by Dee Flores, RN  Transportation Anticipated: family or friend will provide  Patient/Family Anticipated Services at Transition: none  Patient/Family Anticipates Transition to: home with family  Taken 5/16/2023 2009 by Dee Flores, RN  Equipment Currently Used at Home: cpap

## 2023-05-17 NOTE — CONSULTS
"  Infectious Diseases Progress Note    Ramya Lomeli MD     [facility]  Los: 0 days  Patient Identification:  Name: Calos Moseley  Age: 62 y.o.  Sex: male  :  1960  MRN: [id number]         Primary Care Physician: Naveed Nunez DO    Requesting physician: Dr. Carvajal  Reason for consultation: Empyema    Subjective: Feeling somewhat better going for his thoracentesis.  Interval History: See my admission history and physical.  Started on IV ceftriaxone by pulmonary service last night.    Objective:    Scheduled Meds:atorvastatin, 20 mg, Oral, Nightly  budesonide-formoterol, 2 puff, Inhalation, BID - RT  buPROPion XL, 150 mg, Oral, QAM  cefTRIAXone, 1 g, Intravenous, Q24H  divalproex, 250 mg, Oral, BID  hydrALAZINE, 50 mg, Oral, Q8H  lamoTRIgine, 100 mg, Oral, BID  levothyroxine, 50 mcg, Oral, Q AM  lisinopril, 40 mg, Oral, Daily  metoprolol succinate XL, 100 mg, Oral, BID  naltrexone, 50 mg, Oral, QAM  NIFEdipine XL, 90 mg, Oral, Q24H  pramipexole, 0.5 mg, Oral, Nightly  senna-docusate sodium, 2 tablet, Oral, BID  sodium chloride, 10 mL, Intravenous, Q12H  tamsulosin, 0.4 mg, Oral, Nightly  vilazodone, 40 mg, Oral, Daily      Continuous Infusions:hold, 1 each        Vital signs in last 24 hours:  Temp:  [97.9 °F (36.6 °C)-99.1 °F (37.3 °C)] 97.9 °F (36.6 °C)  Heart Rate:  [68-94] 69  Resp:  [14-20] 20  BP: (135-178)/(66-77) 155/73    Intake/Output:    Intake/Output Summary (Last 24 hours) at 2023 0842  Last data filed at 2023 0724  Gross per 24 hour   Intake 240 ml   Output 605 ml   Net -365 ml       Exam:  /73 (BP Location: Left arm, Patient Position: Lying)   Pulse 69   Temp 97.9 °F (36.6 °C) (Oral)   Resp 20   Ht 167.6 cm (66\")   Wt 114 kg (252 lb)   SpO2 95%   BMI 40.67 kg/m²   Patient is examined using the personal protective equipment as per guidelines from infection control for this particular patient as enacted.  Hand washing was performed before and after " patient interaction.  General Appearance:    Alert, cooperative, no distress, AAOx3                          Head:    Normocephalic, without obvious abnormality, atraumatic                           Eyes:    PERRL, conjunctivae/corneas clear, EOM's intact, both eyes                         Throat:   Lips, tongue, gums normal; oral mucosa pink and moist                           Neck:   Supple, symmetrical, trachea midline, no JVD                         Lungs:    Decreased breath sounds at the right lung base                 Chest Wall:    No tenderness or deformity                          Heart:  S1-S2 regular                  Abdomen:   Obese soft nontender                 Extremities: Lateral lower extremity edema                        Pulses:   Pulses palpable in all extremities                            Skin:   Skin is warm and dry,  no rashes or palpable lesions                  Neurologic: Grossly nonfocal       Data Review:    I reviewed the patient's new clinical results.  Results from last 7 days   Lab Units 05/17/23  0433 05/16/23  1730   WBC 10*3/mm3 9.18 10.09   HEMOGLOBIN g/dL 13.0 14.1   PLATELETS 10*3/mm3 265 273     Results from last 7 days   Lab Units 05/17/23  0433 05/16/23  1730   SODIUM mmol/L 139 137   POTASSIUM mmol/L 4.8 4.6   CHLORIDE mmol/L 102 100   CO2 mmol/L 24.5 27.0   BUN mg/dL 32* 33*   CREATININE mg/dL 1.60* 1.66*   CALCIUM mg/dL 9.0 9.5   GLUCOSE mg/dL 129* 132*     Microbiology Results (last 10 days)     Procedure Component Value - Date/Time    Legionella Antigen, Urine - Urine, Urine, Clean Catch [[id number]]  (Normal) Collected: 05/17/23 0452    Lab Status: Final result Specimen: Urine, Clean Catch Updated: 05/17/23 0644     LEGIONELLA ANTIGEN, URINE Negative    S. Pneumo Ag Urine or CSF - Urine, Urine, Clean Catch [[id number]]  (Normal) Collected: 05/17/23 0452    Lab Status: Final result Specimen: Urine, Clean Catch Updated: 05/17/23 0644     Strep Pneumo Ag Negative     Respiratory Panel PCR w/COVID-19(SARS-CoV-2) JERI/SONIA/MARQUITA/PAD/COR/MAD/ULISSES In-House, NP Swab in UTM/VTM, 3-4 HR TAT - Swab, Nasopharynx [[id number]]  (Normal) Collected: 05/16/23 2110    Lab Status: Final result Specimen: Swab from Nasopharynx Updated: 05/16/23 1832     ADENOVIRUS, PCR Not Detected     Coronavirus 229E Not Detected     Coronavirus HKU1 Not Detected     Coronavirus NL63 Not Detected     Coronavirus OC43 Not Detected     COVID19 Not Detected     Human Metapneumovirus Not Detected     Human Rhinovirus/Enterovirus Not Detected     Influenza A PCR Not Detected     Influenza B PCR Not Detected     Parainfluenza Virus 1 Not Detected     Parainfluenza Virus 2 Not Detected     Parainfluenza Virus 3 Not Detected     Parainfluenza Virus 4 Not Detected     RSV, PCR Not Detected     Bordetella pertussis pcr Not Detected     Bordetella parapertussis PCR Not Detected     Chlamydophila pneumoniae PCR Not Detected     Mycoplasma pneumo by PCR Not Detected    Narrative:      In the setting of a positive respiratory panel with a viral infection PLUS a negative procalcitonin without other underlying concern for bacterial infection, consider observing off antibiotics or discontinuation of antibiotics and continue supportive care. If the respiratory panel is positive for atypical bacterial infection (Bordetella pertussis, Chlamydophila pneumoniae, or Mycoplasma pneumoniae), consider antibiotic de-escalation to target atypical bacterial infection.            Assessment:    Acute dyspnea    COPD (chronic obstructive pulmonary disease)    Anxiety and depression    Hypothyroidism    ISIAH (obstructive sleep apnea)    Essential hypertension    Pleural effusion on right    CKD (chronic kidney disease)  Complicated right pleural effusion with loculation in the setting of recent symptoms suggestive of pneumonia-this likely represent complicated effusion with empyema but rule out endobronchial lesion and possible malignancy related  postobstructive pneumonia complicating this effusion.  Recommendations/discussion:   As discussed patient will need assessment of his pleural fluid including cytology and may be consultation with thoracic surgery service.  Pulmonary evaluation in terms of bronchoscopy plans noted.  Duration of antibiotic treatment will depend upon whether or not he has empyema or all of this could be explained by other pathology.  Ramya Lomeli MD  5/17/2023  08:42 EDT    Parts of this note may be an electronic transcription/translation of spoken language to printed text using the Dragon dictation system.

## 2023-05-17 NOTE — CASE MANAGEMENT/SOCIAL WORK
Discharge Planning Assessment  [facility]     Patient Name: Calos Moseley  MRN: [id number]  Today's Date: 5/17/2023    Admit Date: 5/16/2023    Plan: Home with spouse. Family will transport at D/C. Currently on O2@2LNC. Follow for possible Home O2 need at D/C. If nebs ordered at D/C will need nebulizer machine.   Discharge Needs Assessment     Row Name 05/17/23 1415       Living Environment    People in Home spouse    Current Living Arrangements home    Potentially Unsafe Housing Conditions none    Primary Care Provided by self    Provides Primary Care For no one    Family Caregiver if Needed spouse    Quality of Family Relationships supportive;involved;helpful    Able to Return to Prior Arrangements yes       Resource/Environmental Concerns    Resource/Environmental Concerns none    Transportation Concerns none       Transition Planning    Patient/Family Anticipates Transition to home with family    Patient/Family Anticipated Services at Transition none    Transportation Anticipated family or friend will provide       Discharge Needs Assessment    Readmission Within the Last 30 Days no previous admission in last 30 days    Equipment Currently Used at Home cpap    Concerns to be Addressed care coordination/care conferences;discharge planning    Anticipated Changes Related to Illness none    Equipment Needed After Discharge oxygen;nebulizer  Follow for possible new home O2 or nebulizer need at D/C    Provided Post Acute Provider List? N/A    N/A Provider List Comment Denies any needs at this time    Current Discharge Risk chronically ill               Discharge Plan     Row Name 05/17/23 8016       Plan    Plan Home with spouse. Family will transport at D/C. Currently on O2@2LNC. Follow for possible Home O2 need at D/C. If nebs ordered at D/C will need nebulizer machine.    Patient/Family in Agreement with Plan yes    Plan Comments Met with pt. and wife at bedside. Explained roll of . Face sheet and  pharmacy verified. Pt lives with wife in a home with a loft. States he does not go up to loft. Home DME equipment includes a CPAP.  Pt is independent with ADLs. Pt has never been to Rehab or used HH. Pt’s PCP is Naveed Nunez MD. Pt enrolled with Meds to Bed. Pt normally drives. At discharge, family will transport. Currently denies any discharge needs. On O2@2LNC at this time and does not use home O2. Will need to follow for possible Home O2 or nebulizer need at D/C.  Explained that CCP would follow to assess for discharge needs.  Jacob Medellin RN-BC              Continued Care and Services - Admitted Since 5/16/2023    Coordination has not been started for this encounter.     Selected Continued Care - Episodes Includes continued care and service providers with selected services from the active episodes listed below    Rising Risk Care Management Episode start date: 3/21/2023   There are no active outsourced providers for this episode.               Expected Discharge Date and Time     Expected Discharge Date Expected Discharge Time    May 19, 2023          Demographic Summary     Row Name 05/17/23 1414       General Information    Admission Type observation    Arrived From emergency department    Reason for Consult discharge planning;decision-making;care coordination/care conference    Preferred Language English               Functional Status     Row Name 05/17/23 1414       Functional Status    Usual Activity Tolerance good    Current Activity Tolerance moderate       Functional Status, IADL    Medications independent    Meal Preparation independent    Housekeeping independent    Laundry independent    Shopping independent       Mental Status    General Appearance WDL WDL       Mental Status Summary    Recent Changes in Mental Status/Cognitive Functioning no changes       Employment/    Employment Status employed full-time                    Jacob Medellin RN

## 2023-05-17 NOTE — CONSULTS
CONSULT NOTE    Patient Identification:  Calos Moseley  62 y.o.  male  1960  [id number]            Requesting physician: Dr. Joy Tellez    Reason for Consultation: Pleural effusion concern for bronchial obstruction    CC: Shortness of breath    History of Present Illness:  Patient is a 62-year-old with a previous medical history of sleep apnea restless leg syndrome hypertension bipolar affective disorder who presented to the emergency room with worsening cough congestion shortness of breath.  Also describes lower extremity edema that has been progressive.  Patient has been treated as an outpatient with prednisone and amoxicillin however has had worsening of his symptoms.  He developed hemoptysis submassive.  However this has resolved.  He was evaluated in the [facility] care office and found to have concern for endobronchial obstruction and was sent to the ER.    CT scan has shown as well a right-sided loculated effusion.  The patient is been afebrile since arrival is on 4 L nasal cannula.    History of smoking quitting about 35 years ago smoked about 10 years more than a pack per day    History of sleep apnea on his home CPAP machine at present    Review of Systems:  As per hpi otherwise a 12 system review of systems performed and all else negative    Past Medical History:   Diagnosis Date   • Anesthesia complication     STATES HARD TO WAKE UP AFTER PREVIOUS KIDNEY STONE SURGERY AND STATES HE CODED   • Bilateral kidney stones    • Bipolar affective    • COVID 2022   • Disease of thyroid gland    • Elevated cholesterol    • Hypertension    • Kidney stone on left side    • Left flank pain    • Mood disorder    • Restless leg syndrome    • Sleep apnea     cpap  bring dos   • UTI (urinary tract infection) 12/2021    PREV FINISHED ANTIBIOTIC       Past Surgical History:   Procedure Laterality Date   • ADENOIDECTOMY     • APPENDECTOMY     • COLONOSCOPY     • COLONOSCOPY N/A 3/23/2023     Procedure: COLONOSCOPY into cecum with cold snare polypectomies;  Surgeon: Yoan Leonard MD;  [facility] ENDOSCOPY;  Service: Gastroenterology;  Laterality: N/A;  polyps   • CYSTOSCOPY WITH CLOT EVACUATION N/A 3/8/2022    Procedure: CYSTOSCOPY WITH CLOT EVACUATION;  Surgeon: Ihsan Lopez MD;  Location: [facility] MAIN OR;  Service: Urology;  Laterality: N/A;   • CYSTOSCOPY, URETEROSCOPY, RETROGRADE PYELOGRAM, STENT INSERTION Left 3/7/2022    Procedure: CYSTOSCOPY URETEROSCOPY LASER LITHOTRIPSY and stent exchange;  Surgeon: Deuce Leija MD;  Location: [facility] MAIN OR;  Service: Urology;  Laterality: Left;   • KIDNEY STONE SURGERY     • NEPHROLITHOTRIPSY PERCUTANEOUS Left 2/21/2022    Procedure: NEPHROSTLITHOTOMY PERCUTANEOUS;  Surgeon: Deuce Leija MD;  Location: [facility] MAIN OR;  Service: Urology;  Laterality: Left;   • NEPHROLITHOTRIPSY PERCUTANEOUS Left 2/23/2022    Procedure: LEFT FLEXIBLE NEPHROSTOMY WITH STONE EXTRACTION, LEFT NEPHROSTOMY, TUBE REMOVAL, BILATERAL URETERAL STENT PLACEMENT;  Surgeon: Deuce Leija MD;  Location: [facility] MAIN OR;  Service: Urology;  Laterality: Left;   • NEPHROLITHOTRIPSY PERCUTANEOUS Right 3/7/2022    Procedure: RIGHT PERCUTANEOUS NEPHROLITHOTOMY W ACCESS;  Surgeon: Deuce Leija MD;  Location: [facility] MAIN OR;  Service: Urology;  Laterality: Right;   • TONSILLECTOMY          Medications Prior to Admission   Medication Sig Dispense Refill Last Dose   • albuterol sulfate  (90 Base) MCG/ACT inhaler INHALE ONE TO TWO PUFFS BY MOUTH EVERY 4 TO 6 HOURS 18 g 0 5/15/2023   • albuterol sulfate  (90 Base) MCG/ACT inhaler Inhale 1 puff by mouth via inhaler every 6 (six) hours as needed for Wheezing. 8.5 g 11    • amoxicillin (AMOXIL) 875 MG tablet Take 1 tablet by mouth in the morning and 1 tablet before bedtime. 14 tablet 0 5/16/2023   • atorvastatin (LIPITOR) 20 MG tablet Take 1 tablet by mouth every night at bedtime. 90 tablet 3  5/15/2023   • budesonide-formoterol (SYMBICORT) 160-4.5 MCG/ACT inhaler Inhale 2 puffs 2 (Two) Times a Day As Needed.   Past Week   • buPROPion XL (Wellbutrin XL) 150 MG 24 hr tablet Take 1 tablet by mouth Every Morning. 90 tablet 2 5/16/2023   • divalproex (Depakote ER) 250 MG 24 hr tablet Take 1 tablet by mouth 2 (Two) Times a Day. 180 tablet 3 5/16/2023   • hydrALAZINE (APRESOLINE) 100 MG tablet Take 1 tablet by mouth 3 (Three) Times a Day - morning, evening, and before bedtime 270 tablet 3 5/16/2023   • hydrALAZINE (APRESOLINE) 50 MG tablet Take 1 tablet by mouth 3 (Three) Times a Day As Needed. 90 tablet 11    • lamoTRIgine (LaMICtal) 100 MG tablet Take 1 tablet by mouth 2 (Two) Times a Day. 60 tablet 3 5/16/2023   • lamoTRIgine (LaMICtal) 100 MG tablet Take 1 tablet by mouth 2 (Two) Times a Day. 180 tablet 3    • levothyroxine (SYNTHROID, LEVOTHROID) 50 MCG tablet Take 1 tablet by mouth Every Morning. TAKE PREOP   5/16/2023   • lisinopril (PRINIVIL,ZESTRIL) 40 MG tablet Take 1 tablet by mouth once daily 90 tablet 3 5/16/2023   • metoprolol succinate XL (TOPROL-XL) 100 MG 24 hr tablet Take 1 tablet by mouth 2 (Two) Times a Day. TAKE PREOP   5/16/2023   • naltrexone (DEPADE) 50 MG tablet Take 1 tablet by mouth Every Morning. 90 tablet 2 5/16/2023   • NIFEdipine CC (ADALAT CC) 90 MG 24 hr tablet Take 1 tablet by mouth Daily. 90 tablet 3 5/16/2023   • potassium citrate (UROCIT-K) 10 MEQ (1080 MG) CR tablet Take 1 tablet by mouth 2 (Two) Times a Day - morning and evening. Take with meals. Do not crush, chew, or split. 180 tablet 3 5/16/2023   • pramipexole (MIRAPEX) 0.5 MG tablet take 1 tablet by mouth at bedtime 90 tablet 1 5/15/2023   • predniSONE (DELTASONE) 10 MG tablet Take 1 tablet by mouth Daily for 5 days. (Patient taking differently: Take 1 tablet by mouth Daily. Now finished) 5 tablet 0 Past Week   • spironolactone (ALDACTONE) 25 MG tablet Take 1 tablet by mouth Daily. 90 tablet 3 5/16/2023   • tamsulosin  "(Flomax) 0.4 MG capsule 24 hr capsule Take 1 capsule by mouth every night at bedtime. 90 capsule 3 5/15/2023   • vilazodone (Viibryd) 40 MG tablet tablet Take 1 tablet by mouth Daily. 90 tablet 3 2023   • oxyCODONE-acetaminophen (Percocet) 5-325 MG per tablet Take 1 tablet by mouth Every 4 (Four) to 6 (Six) Hours As Needed for pain. 12 tablet 0 Unknown   • promethazine-dextromethorphan (PROMETHAZINE-DM) 6.25-15 MG/5ML syrup Take 5-10 ml TID prn for cough. 150 mL 0    • sildenafil (VIAGRA) 100 MG tablet Take 1 tablet by mouth Daily As Needed. 6 tablet 3 Unknown       No Known Allergies    Social History     Socioeconomic History   • Marital status:    Tobacco Use   • Smoking status: Former     Types: Cigarettes     Quit date: 1990     Years since quittin.4   • Smokeless tobacco: Never   Vaping Use   • Vaping Use: Never used   Substance and Sexual Activity   • Alcohol use: Not Currently   • Drug use: No   • Sexual activity: Defer       Family History   Problem Relation Age of Onset   • Malig Hyperthermia Neg Hx        Physical Exam:  /75 (BP Location: Left arm, Patient Position: Lying)   Pulse 72   Temp 99.1 °F (37.3 °C) (Tympanic)   Resp 18   Ht 167.6 cm (66\")   Wt 114 kg (252 lb)   SpO2 93%   BMI 40.67 kg/m²   Body mass index is 40.67 kg/m².   General appearance: NAD, conversant   Eyes: Anicteric sclerae, moist conjunctivae; no lid lag;   HENT: Atraumatic; oropharynx clear with moist mucous membranes Mallampati 3  Neck: Trachea midline; large neck circumference unable to appreciate any JVD secondary to thickness  Lungs: Diminished right base greater than left, with normal respiratory effort and no intercostal retractions  CV: RRR, no MRGs   Abdomen: Obese bowel sounds positive nonrigid  Extremities: Pitting 2+ bilateral peripheral edema no noticeable extremity lymphadenopathy  Skin: Warm dry well-perfused no diffuse visible rash  Psych: Appropriate affect, alert   Neuro cranials 2 " through 12 grossly intact speech intact moves extremities    LABS:  Results from last 7 days   Lab Units 05/16/23  1730   WBC 10*3/mm3 10.09   HEMOGLOBIN g/dL 14.1   PLATELETS 10*3/mm3 273     Results from last 7 days   Lab Units 05/16/23  1730   SODIUM mmol/L 137   POTASSIUM mmol/L 4.6   CHLORIDE mmol/L 100   CO2 mmol/L 27.0   BUN mg/dL 33*   CREATININE mg/dL 1.66*   GLUCOSE mg/dL 132*   CALCIUM mg/dL 9.5   Estimated Creatinine Clearance: 55.5 mL/min (A) (by C-G formula based on SCr of 1.66 mg/dL (H)).    Imaging: I personally visualized the images of scans/x-rays performed within last 3 days.  Imaging Results (Most Recent)     Procedure Component Value Units Date/Time    CT Chest Without Contrast Diagnostic [[id number]] Collected: 05/16/23 1853     Updated: 05/16/23 1903    Narrative:      CT CHEST WO CONTRAST DIAGNOSTIC-     INDICATIONS: Cough, short of breath, right pleural effusion     TECHNIQUE: Radiation dose reduction techniques were utilized, including  automated exposure control and exposure modulation based on body size.  Unenhanced CHEST CT     COMPARISON: None available      FINDINGS:           The heart size is normal without pericardial effusion. Increasing aorta  is borderline dilated, 3.6 cm. Enlarged right paratracheal lymph node is  noted, 1.3 cm short axis, axial image 28, could be reactive in nature or  potentially evidence of neoplasm, follow-up recommended; if further  imaging evaluation is indicated, positron emission tomography could be  considered. A right paraesophageal lymph node measures 1 cm short axis  on axial image 44. Assessment of vascular and mediastinal structures is  limited without intravenous contrast material.        Occlusion right lower lobe branch airways is seen, with limited because  of mucous plugging or lesion (bronchoscopic correlation can be  obtained), with right lower lobe atelectasis, possibility of underlying  pneumonia or lesion not excluded, clinical correlation  and follow-up  advised.     The lungs also show old granulomatous disease.     Mild to moderate loculated right pleural effusion is increased from  03/08/2022.     Upper abdominal structures appear unremarkable.     Degenerative changes are seen in the spine. No acute fracture is  identified.       Impression:         Mild to moderate loculated right pleural effusion. Occlusion right lower  lobe branch airways is seen, with limited because of mucous plugging or  lesion (bronchoscopic correlation can be obtained), with right lower  lobe atelectasis (possibility of underlying pneumonia or lesion not  excluded). Nonspecific mediastinal adenopathy. Follow-up/further  evaluation recommended as indicated.     This report was finalized on 5/16/2023 7:00 PM by Dr. Eder Gallego M.D.             Assessment / Recommendations:  Increasing moderate right-sided loculated pleural effusion  Occluded right lower lobe bronchus  Lymphadenopathy  Patient Active Problem List   Diagnosis   • HLD (hyperlipidemia)   • Obesity, Class III, BMI 40-49.9 (morbid obesity)   • Bilateral kidney stones   • COPD (chronic obstructive pulmonary disease)   • Left flank pain   • Anxiety and depression   • Hypothyroidism   • ISIAH (obstructive sleep apnea)   • Essential hypertension   • Calculus, renal   • Restless leg syndrome   • Bipolar affective   • Calculus of ureter   • Gross hematuria   • Encounter for screening for malignant neoplasm of colon   • Acute dyspnea   • Pleural effusion on right   • CKD (chronic kidney disease)           Continue home CPAP   diagnostic therapeutic thoracentesis -pending results may need VATS  N.p.o. for potential bronchoscopy with endobronchial ultrasound  Continue antibiotics  Follow cultures  Discussed with the patient reviewed imaging.  Patient of Dr. Isrrael Schwarz MD  [facility]  05/16/23  21:41 EDT

## 2023-05-17 NOTE — CASE MANAGEMENT/SOCIAL WORK
Continued Stay Note  [facility]     Patient Name: Calos Moseley  MRN: [id number]  Today's Date: 5/17/2023    Admit Date: 5/16/2023    Plan: Home with spouse. Family will transport at D/C. Currently on O2@2LNC. Follow for possible Home O2 need at D/C. If nebs ordered at D/C will need nebulizer machine.   Discharge Plan     Row Name 05/17/23 1435       Plan    Plan Home with spouse. Family will transport at D/C. Currently on O2@2LNC. Follow for possible Home O2 need at D/C. If nebs ordered at D/C will need nebulizer machine.    Patient/Family in Agreement with Plan yes    Plan Comments Met with pt. and wife at bedside. Explained roll of . Face sheet and pharmacy verified. Pt lives with wife in a home with a loft. States he does not go up to loft. Home DME equipment includes a CPAP.  Pt is independent with ADLs. Pt has never been to Rehab or used HH. Pt’s PCP is Naveed Nunez MD. Pt enrolled with Meds to Bed. Pt normally drives. At discharge, family will transport. Currently denies any discharge needs. On O2@2LNC at this time and does not use home O2. Will need to follow for possible Home O2 or nebulizer need at D/C.  Explained that CCP would follow to assess for discharge needs.  Jacob Medellin RN-BC               Discharge Codes    No documentation.               Expected Discharge Date and Time     Expected Discharge Date Expected Discharge Time    May 19, 2023             Jacob Medellin RN

## 2023-05-17 NOTE — PROGRESS NOTES
Aniceto Bruno MD                          [phone]            Patient ID:    Name:  Calos Moseley    MRN:  [id number]    1960   62 y.o.  male            Patient Care Team:  Naveed Nunez DO as PCP - General (Family Medicine)  Naveed Nunez DO as PCP - Family Medicine  Chris Martinez Jr., MD as Consulting Physician (Pulmonary Disease)  Ihsan Lopez MD as Consulting Physician (Urology)  Dimas Jackson MD as Consulting Physician (Nephrology)  Fely Ferrer RN as Ambulatory  ([facility])    CC/ Reason for visit: Acute hypoxic respiratory failure, right-sided pleural effusion, abnormal CAT scan of the chest, pedal edema    Subjective: Pt seen and examined this AM.  Patient remains on supplemental oxygen.  States that he does not have any hemoptysis anymore continues to have coughing fits.  Sees Dr. Martinez for her intermittent episodes of wheezing.  Remote smoker.    States that he has been having cough along with occasional sputum production since late last week along with some fevers and chills and intermittent wheezing previously along with pedal edema.  His awaiting thoracentesis.  Denies any choking episodes does not drink significant alcohol.    ROS: Denies any subjective fevers, syncope or presyncopal events, new neurological deficits, nausea or vomiting currently    Objective     Vital Signs past 24hrs    BP range: BP: (135-178)/(66-80) 146/80  Pulse range: Heart Rate:  [68-94] 73  Resp rate range: Resp:  [14-20] 16  Temp range: Temp (24hrs), Av.2 °F (36.8 °C), Min:97.6 °F (36.4 °C), Max:99.1 °F (37.3 °C)      Ventilator/Non-Invasive Ventilation Settings (From admission, onward)    None          Vent Settings                                         Device (Oxygen Therapy): nasal cannula       114 kg (252 lb); Body mass index is 40.67 kg/m².      Intake/Output Summary (Last 24 hours) at 2023 1340  Last data filed at  5/17/2023 0724  Gross per 24 hour   Intake 240 ml   Output 605 ml   Net -365 ml       PHYSICAL EXAM   Constitutional: Middle-aged morbidly obese white male pt in bed, + acute respiratory distress, + accessory muscle use  Head: - NCAT  Eyes: No pallor.  Anicteric sclerae, EOMI.  ENMT:  Mallampati 4, no oral thrush. Moist MM.   NECK: Trachea midline, No thyromegaly, no palpable cervical lymphadenopathy  Heart: RRR, no murmur. 1+ pedal edema   Lungs: JAMES +, decreased breath sounds in the right base.  No wheezes/ crackles heard    Abdomen: Soft.  Obese, no tenderness, guarding or rigidity. No palpable masses  Extremities: Extremities warm and well perfused. No cyanosis/ clubbing  Neuro: Conscious, answers appropriately, no gross focal neuro deficits  Psych: Mood and affect appropriate    PPE recommended per [facility] infectious disease Isolation protocol for the current clinical scenario (as mentioned below) was followed.     Scheduled meds:  atorvastatin, 20 mg, Oral, Nightly  budesonide-formoterol, 2 puff, Inhalation, BID - RT  buPROPion XL, 150 mg, Oral, QAM  cefTRIAXone, 1 g, Intravenous, Q24H  divalproex, 250 mg, Oral, BID  hydrALAZINE, 50 mg, Oral, Q8H  lamoTRIgine, 100 mg, Oral, BID  levothyroxine, 50 mcg, Oral, Q AM  lisinopril, 40 mg, Oral, Daily  metoprolol succinate XL, 100 mg, Oral, BID  naltrexone, 50 mg, Oral, QAM  NIFEdipine XL, 90 mg, Oral, Q24H  pramipexole, 0.5 mg, Oral, Nightly  senna-docusate sodium, 2 tablet, Oral, BID  sodium chloride, 10 mL, Intravenous, Q12H  tamsulosin, 0.4 mg, Oral, Nightly  vilazodone, 40 mg, Oral, Daily        IV meds:                      hold, 1 each        Data Review:      Results from last 7 days   Lab Units 05/17/23  0433 05/16/23  1730   SODIUM mmol/L 139 137   POTASSIUM mmol/L 4.8 4.6   CHLORIDE mmol/L 102 100   CO2 mmol/L 24.5 27.0   BUN mg/dL 32* 33*   CREATININE mg/dL 1.60* 1.66*   CALCIUM mg/dL 9.0 9.5   BILIRUBIN mg/dL 0.6 0.7   ALK PHOS U/L 75 89   ALT  (SGPT) U/L 24 34   AST (SGOT) U/L 17 19   GLUCOSE mg/dL 129* 132*   WBC 10*3/mm3 9.18 10.09   HEMOGLOBIN g/dL 13.0 14.1   PLATELETS 10*3/mm3 265 273   PROBNP pg/mL  --  167.0   PROCALCITONIN ng/mL  --  0.98*       Lab Results   Component Value Date    CALCIUM 9.0 05/17/2023                    I have personally reviewed the results from the time of this admission to 5/17/2023 13:40 EDT and agree with these findings:  [x]  Laboratory  [x]  Microbiology  [x]  Radiology  []  EKG/Telemetry   [x]  Cardiology/Vascular   []  Pathology  []  Old records    ASSESSMENT   Acute hypoxic respiratory failure  Intermittent hemoptysis  Right lower lobe endobronchial obstruction-mucous plugging versus lesion  Right-sided pleural effusion  Nonspecific mediastinal lymphadenopathy  Suspected pneumonia-outpatient antibiotics  Pedal edema  CKD  Remote smoker  ISIAH on CPAP  Morbid obesity    PLAN:  All problems are new to me.  Work-up done so far as well as recommendations from prior pulmonologist noted.  Patient is doing a little better since admitted to the hospital.  On 2 to 3 L nasal cannula. Awaiting thoracentesis.  Continues to have coughing fits with not much sputum production.  Agree with working up of pedal edema with echocardiogram and working on diuresis - ?  Congestive heart failure  Would repeat a CT of the chest post thoracentesis to get a better picture of the right lower lobe and see if that would help delineate pulmonary parenchyma better.  Unfortunately cannot do contrast given CKD concerns.   Will need to evaluate pleural fluid as well to rule out parapneumonic effusion.  Continue with empiric antibiotics  Continue with CPAP for ISIAH  Guarded prognosis    Aniceto Bruno MD  5/17/2023

## 2023-05-17 NOTE — PLAN OF CARE
Goal Outcome Evaluation:  Plan of Care Reviewed With: patient        Progress: improving     VSS, some pain in shoulder and abd areas. O2 weaned to 2L. Pt went for thoracentesis today and 300cc were removed. ECHO ordered as well. 4+ edema in BLE. Lasix order to be given when pt returns from procedure.

## 2023-05-18 ENCOUNTER — APPOINTMENT (OUTPATIENT)
Dept: GENERAL RADIOLOGY | Facility: HOSPITAL | Age: 63
End: 2023-05-18
Payer: COMMERCIAL

## 2023-05-18 LAB
ANION GAP SERPL CALCULATED.3IONS-SCNC: 9.7 MMOL/L (ref 5–15)
BUN SERPL-MCNC: 36 MG/DL (ref 8–23)
BUN/CREAT SERPL: 18.7 (ref 7–25)
CALCIUM SPEC-SCNC: 9.6 MG/DL (ref 8.6–10.5)
CHLORIDE SERPL-SCNC: 102 MMOL/L (ref 98–107)
CO2 SERPL-SCNC: 28.3 MMOL/L (ref 22–29)
CREAT SERPL-MCNC: 1.93 MG/DL (ref 0.76–1.27)
DEPRECATED RDW RBC AUTO: 42.8 FL (ref 37–54)
EGFRCR SERPLBLD CKD-EPI 2021: 38.9 ML/MIN/1.73
ERYTHROCYTE [DISTWIDTH] IN BLOOD BY AUTOMATED COUNT: 13.1 % (ref 12.3–15.4)
GLUCOSE SERPL-MCNC: 168 MG/DL (ref 65–99)
HCT VFR BLD AUTO: 39.6 % (ref 37.5–51)
HGB BLD-MCNC: 13.3 G/DL (ref 13–17.7)
MCH RBC QN AUTO: 30 PG (ref 26.6–33)
MCHC RBC AUTO-ENTMCNC: 33.6 G/DL (ref 31.5–35.7)
MCV RBC AUTO: 89.2 FL (ref 79–97)
PLATELET # BLD AUTO: 352 10*3/MM3 (ref 140–450)
PMV BLD AUTO: 9.8 FL (ref 6–12)
POTASSIUM SERPL-SCNC: 4.8 MMOL/L (ref 3.5–5.2)
RBC # BLD AUTO: 4.44 10*6/MM3 (ref 4.14–5.8)
SODIUM SERPL-SCNC: 140 MMOL/L (ref 136–145)
TSH SERPL DL<=0.05 MIU/L-ACNC: 1.54 UIU/ML (ref 0.27–4.2)
WBC NRBC COR # BLD: 10.67 10*3/MM3 (ref 3.4–10.8)

## 2023-05-18 PROCEDURE — 80048 BASIC METABOLIC PNL TOTAL CA: CPT | Performed by: HOSPITALIST

## 2023-05-18 PROCEDURE — 85027 COMPLETE CBC AUTOMATED: CPT | Performed by: HOSPITALIST

## 2023-05-18 PROCEDURE — 84443 ASSAY THYROID STIM HORMONE: CPT | Performed by: HOSPITALIST

## 2023-05-18 PROCEDURE — 25010000002 CEFTRIAXONE PER 250 MG: Performed by: INTERNAL MEDICINE

## 2023-05-18 PROCEDURE — 71045 X-RAY EXAM CHEST 1 VIEW: CPT

## 2023-05-18 RX ORDER — BENZONATATE 100 MG/1
200 CAPSULE ORAL 3 TIMES DAILY
Status: DISCONTINUED | OUTPATIENT
Start: 2023-05-18 | End: 2023-05-26 | Stop reason: HOSPADM

## 2023-05-18 RX ORDER — GUAIFENESIN/DEXTROMETHORPHAN 100-10MG/5
5 SYRUP ORAL EVERY 4 HOURS PRN
Status: DISCONTINUED | OUTPATIENT
Start: 2023-05-18 | End: 2023-05-26 | Stop reason: HOSPADM

## 2023-05-18 RX ORDER — ACETAMINOPHEN 325 MG/1
650 TABLET ORAL EVERY 6 HOURS PRN
Status: DISCONTINUED | OUTPATIENT
Start: 2023-05-18 | End: 2023-05-20

## 2023-05-18 RX ADMIN — NIFEDIPINE 90 MG: 60 TABLET, FILM COATED, EXTENDED RELEASE ORAL at 09:39

## 2023-05-18 RX ADMIN — LEVOTHYROXINE SODIUM 50 MCG: 0.05 TABLET ORAL at 05:14

## 2023-05-18 RX ADMIN — CEFTRIAXONE SODIUM 1 G: 1 INJECTION, POWDER, FOR SOLUTION INTRAMUSCULAR; INTRAVENOUS at 23:34

## 2023-05-18 RX ADMIN — Medication 10 ML: at 09:40

## 2023-05-18 RX ADMIN — OXYCODONE AND ACETAMINOPHEN 1 TABLET: 5; 325 TABLET ORAL at 17:55

## 2023-05-18 RX ADMIN — TAMSULOSIN HYDROCHLORIDE 0.4 MG: 0.4 CAPSULE ORAL at 20:37

## 2023-05-18 RX ADMIN — PRAMIPEXOLE DIHYDROCHLORIDE 0.5 MG: 0.5 TABLET ORAL at 20:38

## 2023-05-18 RX ADMIN — OXYCODONE AND ACETAMINOPHEN 1 TABLET: 5; 325 TABLET ORAL at 23:39

## 2023-05-18 RX ADMIN — HYDRALAZINE HYDROCHLORIDE 50 MG: 50 TABLET, FILM COATED ORAL at 05:14

## 2023-05-18 RX ADMIN — METOPROLOL SUCCINATE 100 MG: 100 TABLET, EXTENDED RELEASE ORAL at 09:39

## 2023-05-18 RX ADMIN — LAMOTRIGINE 100 MG: 100 TABLET ORAL at 09:39

## 2023-05-18 RX ADMIN — METOPROLOL SUCCINATE 100 MG: 100 TABLET, EXTENDED RELEASE ORAL at 20:37

## 2023-05-18 RX ADMIN — HYDRALAZINE HYDROCHLORIDE 50 MG: 50 TABLET, FILM COATED ORAL at 13:25

## 2023-05-18 RX ADMIN — VILAZODONE HYDROCHLORIDE 40 MG: 40 TABLET, FILM COATED ORAL at 09:39

## 2023-05-18 RX ADMIN — LISINOPRIL 40 MG: 40 TABLET ORAL at 09:39

## 2023-05-18 RX ADMIN — GUAIFENESIN AND DEXTROMETHORPHAN 5 ML: 100; 10 SYRUP ORAL at 19:46

## 2023-05-18 RX ADMIN — BENZONATATE 200 MG: 100 CAPSULE ORAL at 20:39

## 2023-05-18 RX ADMIN — DOCUSATE SODIUM 50MG AND SENNOSIDES 8.6MG 2 TABLET: 8.6; 5 TABLET, FILM COATED ORAL at 09:39

## 2023-05-18 RX ADMIN — Medication 10 ML: at 23:41

## 2023-05-18 RX ADMIN — OXYCODONE AND ACETAMINOPHEN 1 TABLET: 5; 325 TABLET ORAL at 11:58

## 2023-05-18 RX ADMIN — LAMOTRIGINE 100 MG: 100 TABLET ORAL at 20:38

## 2023-05-18 RX ADMIN — DIVALPROEX SODIUM 250 MG: 250 TABLET, EXTENDED RELEASE ORAL at 20:38

## 2023-05-18 RX ADMIN — DIVALPROEX SODIUM 250 MG: 250 TABLET, EXTENDED RELEASE ORAL at 00:40

## 2023-05-18 RX ADMIN — NALTREXONE HYDROCHLORIDE 50 MG: 50 TABLET, FILM COATED ORAL at 05:17

## 2023-05-18 RX ADMIN — HYDRALAZINE HYDROCHLORIDE 50 MG: 50 TABLET, FILM COATED ORAL at 20:38

## 2023-05-18 RX ADMIN — OXYCODONE AND ACETAMINOPHEN 1 TABLET: 5; 325 TABLET ORAL at 04:16

## 2023-05-18 RX ADMIN — ATORVASTATIN CALCIUM 20 MG: 20 TABLET, FILM COATED ORAL at 20:39

## 2023-05-18 RX ADMIN — DIVALPROEX SODIUM 250 MG: 250 TABLET, EXTENDED RELEASE ORAL at 09:40

## 2023-05-18 RX ADMIN — BUPROPION HYDROCHLORIDE 150 MG: 150 TABLET, EXTENDED RELEASE ORAL at 05:15

## 2023-05-18 NOTE — PROGRESS NOTES
Name: Calos Moseley ADMIT: 2023   : 1960  PCP: Naveed Nunez DO    MRN: [id number] LOS: 0 days   AGE/SEX: 62 y.o. male  ROOM: [facility]     Subjective   Subjective   Patient feels about the same, still some right chest and flank pain as well as right shoulder pain.  He does not feel like he diuresed much    Review of Systems     Objective   Objective   Vital Signs  Temp:  [97.5 °F (36.4 °C)-98.7 °F (37.1 °C)] 97.5 °F (36.4 °C)  Heart Rate:  [60-75] 72  Resp:  [14-20] 16  BP: (101-142)/(59-69) 129/59  SpO2:  [92 %-93 %] 92 %  on  Flow (L/min):  [2] 2;   Device (Oxygen Therapy): nasal cannula  Body mass index is 41.3 kg/m².  Physical Exam  Vitals reviewed.   Constitutional:       General: He is not in acute distress.     Appearance: He is obese. He is ill-appearing.   HENT:      Head: Normocephalic and atraumatic.   Pulmonary:      Effort: Pulmonary effort is normal.      Breath sounds: Wheezing and rhonchi present.   Abdominal:      General: Bowel sounds are normal. There is no distension.      Palpations: Abdomen is soft.      Tenderness: There is no abdominal tenderness.   Musculoskeletal:      Right lower leg: Edema present.      Left lower leg: Edema present.   Skin:     General: Skin is warm and dry.      Findings: No rash.   Neurological:      Mental Status: He is alert and oriented to person, place, and time.   Psychiatric:         Mood and Affect: Mood normal.         Behavior: Behavior normal.       Results Review     I reviewed the patient's new clinical results.  Results from last 7 days   Lab Units 23  0520 23  0433 23  1730   WBC 10*3/mm3 10.67 9.18 10.09   HEMOGLOBIN g/dL 13.3 13.0 14.1   PLATELETS 10*3/mm3 352 265 273     Results from last 7 days   Lab Units 23  0520 23  0433 23  1730   SODIUM mmol/L 140 139 137   POTASSIUM mmol/L 4.8 4.8 4.6   CHLORIDE mmol/L 102 102 100   CO2 mmol/L 28.3 24.5 27.0   BUN mg/dL 36* 32* 33*   CREATININE mg/dL 1.93*  1.60* 1.66*   GLUCOSE mg/dL 168* 129* 132*   EGFR mL/min/1.73 38.9* 48.7* 46.6*     Results from last 7 days   Lab Units 05/17/23  0433 05/16/23  1730   ALBUMIN g/dL 3.3* 4.0   BILIRUBIN mg/dL 0.6 0.7   ALK PHOS U/L 75 89   AST (SGOT) U/L 17 19   ALT (SGPT) U/L 24 34     Results from last 7 days   Lab Units 05/18/23  0520 05/17/23  0433 05/16/23  1730   CALCIUM mg/dL 9.6 9.0 9.5   ALBUMIN g/dL  --  3.3* 4.0     Results from last 7 days   Lab Units 05/16/23  1730   PROCALCITONIN ng/mL 0.98*   LACTATE mmol/L 1.3     No results found for: HGBA1C, POCGLU    CT Chest Without Contrast Diagnostic    Result Date: 5/16/2023   Mild to moderate loculated right pleural effusion. Occlusion right lower lobe branch airways is seen, with limited because of mucous plugging or lesion (bronchoscopic correlation can be obtained), with right lower lobe atelectasis (possibility of underlying pneumonia or lesion not excluded). Nonspecific mediastinal adenopathy. Follow-up/further evaluation recommended as indicated.  This report was finalized on 5/16/2023 7:00 PM by Dr. Eder Gallego M.D.      US Thoracentesis    Result Date: 5/17/2023  Successful ultrasound guided right thoracentesis.  This report was finalized on 5/17/2023 4:16 PM by Dr. Nabil Casas M.D.      I have personally reviewed all medications:  Scheduled Medications  atorvastatin, 20 mg, Oral, Nightly  buPROPion XL, 150 mg, Oral, QAM  cefTRIAXone, 1 g, Intravenous, Q24H  divalproex, 250 mg, Oral, BID  hydrALAZINE, 50 mg, Oral, Q8H  lamoTRIgine, 100 mg, Oral, BID  levothyroxine, 50 mcg, Oral, Q AM  lisinopril, 40 mg, Oral, Daily  metoprolol succinate XL, 100 mg, Oral, BID  naltrexone, 50 mg, Oral, QAM  NIFEdipine XL, 90 mg, Oral, Q24H  pramipexole, 0.5 mg, Oral, Nightly  senna-docusate sodium, 2 tablet, Oral, BID  sodium chloride, 10 mL, Intravenous, Q12H  tamsulosin, 0.4 mg, Oral, Nightly  vilazodone, 40 mg, Oral, Daily    Infusions   Diet  Diet: Cardiac Diets, Diabetic  Diets, Renal Diets; Healthy Heart (2-3 Na+); Gestational Consistent Carbohydrate; Low Sodium (2-3g), Low Potassium, Low Phosphorus; Texture: Regular Texture (IDDSI 7); Fluid Consistency: Thin (IDDSI 0)    I have personally reviewed:  [x]  Laboratory   [x]  Microbiology   [x]  Radiology   [x]  EKG/Telemetry  [x]  Cardiology/Vascular   []  Pathology    []  Records       Assessment/Plan     Active Hospital Problems    Diagnosis  POA   • **Acute dyspnea [R06.00]  Yes   • Acute respiratory failure with hypoxia [J96.01]  Yes   • Pleural effusion on right [J90]  Yes   • CKD (chronic kidney disease) [N18.9]  Yes   • ISIAH (obstructive sleep apnea) [G47.33]  Yes   • COPD (chronic obstructive pulmonary disease) [J44.9]  Yes   • Anxiety and depression [F41.9, F32.A]  Yes   • Essential hypertension [I10]  Yes   • Hypothyroidism [E03.9]  Yes      Resolved Hospital Problems   No resolved problems to display.       62 y.o. male admitted with Acute dyspnea, right-sided chest pain and hypoxia, findings of loculated right pleural effusion and possible right lower lobe endobronchial lesion.    Loculated pleural effusion status post thoracentesis.  Fluid looks to be exudative, follow-up Gram stain and culture, cytology.  Likely he will need bronchoscopy to evaluate further, may need thoracic eval as well but will defer to pulmonology.  I discussed with    - On ceftriaxone.  Discussed with ID, likely will be able to DC soon   - Supplement O2 as needed.  Wean to keep sats greater than 90%    Peripheral edema/weight gain- reviewed echocardiogram.  Relatively unremarkable other than mild pulmonary hypertension.  No diastolic dysfunction and normal BNP.    -Lasix x1 given.  Mild elevation in creatinine although this is still not far from baseline.  - Urine studies do not suggest significant proteinuria though there is very small amount  - Also note that he is on nifedipine which can cause peripheral edema    CKD stable other than mild  elevation as discussed above.  We will follow    History of hypothyroidism, on replacement with normal TSH    HTN, apparently refractory based on his extensive medication regimen but actually well controlled currently      · SCDs for DVT prophylaxis.  Consider pharmacologic prophylaxis after procedures are done  · Dispo TBD    Tristen Perez MD  [facility]  05/18/23  13:36 EDT

## 2023-05-18 NOTE — PAYOR COMM NOTE
"Fabiola Moseley \"Bobby\" (62 y.o. Male)     PLEASE SEE ATTACHED FOR INPT AUTH.  PT CAME IN ON 23 AS OBS AND CONVERTED TO INPT ON 23.    REF#[id number]    THANK YOU    KETAN HERRERA LPN CCP        Date of Birth   1960    Social Security Number       Address   [address] [place] [address]    Home Phone   [phone]    MRN   [id number]       [employer]   [employer]    Marital Status                               Admission Date   23  OBS       23 INPT Admission Type   Emergency    Admitting Provider   Ramya Lomeli MD    Attending Provider   Tristen Perez MD    Department, Room/Bed   [address], E449/1       Discharge Date       Discharge Disposition       Discharge Destination                               Attending Provider: Tristen Perez MD    Allergies: No Known Allergies    Isolation: None   Infection: None   Code Status: CPR    Ht: 167.6 cm (65.98\")   Wt: 116 kg (255 lb 11.7 oz)    Admission Cmt: None   Principal Problem: Acute dyspnea [R06.00]                 Active Insurance as of 2023     Primary Coverage     Payor Plan Insurance Group Employer/Plan Group    [facility] BLUE CROSS [employer] [facility] [employer] EMPLOYEE [id number]     Payor Plan Address Payor Plan Phone Number Payor Plan Fax Number Effective Dates    [address] [phone]  2023 - None Entered    [place] [address]       Subscriber Name Subscriber Birth Date Member ID       FABIOLA MOSELEY 1960 [id number]                 Emergency Contacts      (Rel.) Home Phone Work Phone Mobile Phone    Rebeca Moseley (Spouse) [phone] -- [phone]            [place]: [facility] [id number]  Tax ID [id number]     History & Physical      Ramya Lomeli MD at 23 1950          Internal medicine history and physical  INTERNAL MEDICINE   [facility]       Patient Identification:  Name: Fabiola Moseley  Age: 62 y.o.  Sex: male  :  " 1960  MRN: [id number]                   Primary Care Physician: Naveed Nunez DO                               Date of admission:5/16/2023    Chief Complaint: Progressive pain and discomfort in the right side of the chest worse with coughing and taking deep breaths since early morning Wednesday last week.    History of Present Illness:   Patient is a 62-year-old male with past medical history as noted below was in his usual state of his health until about a month ago when he started noticing that he is feeling rundown and tired and activities that he could do without any problem was becoming a chore for him.  Despite that he was pushing through and was going to his work and able to do his things but was noticing that something was not right.  In that background he went to bed last Tuesday and woke up early in the morning with significant pain and discomfort in the right lower back which moved to across his chest and since then he has been having significant chest discomfort which gets worse upon coughing and taking deep breath.  He was also becoming very short of breath with minimal activity.  He is also noticing that his both lower legs are getting more swollen.  He was seen by his primary care provider/urgent care and was prescribed 5-day course of prednisone and amoxicillin which he finished recently.  Despite these medications his symptoms did not improve.  On Saturday he was cough was associated with blood-tinged phlegm.  This has resolved since.  He does not have appetite.  He also describes some night sweats.  Because of these persistent symptoms he went to see Dr. Martinez and had a chest x-ray performed which showed right-sided effusion resulting in him directed the emergency room earlier today.  CT scan of the chest without contrast shows evidence of occlusion of the right lower branch airways with atelectasis either due to lesion or mucous plugging associated with possibility of underlying  pneumonia.  Patient was noted to have right-sided loculated effusion.      Past Medical History:  Past Medical History:   Diagnosis Date   • Anesthesia complication     STATES HARD TO WAKE UP AFTER PREVIOUS KIDNEY STONE SURGERY AND STATES HE CODED   • Bilateral kidney stones    • Bipolar affective    • COVID 2022   • Disease of thyroid gland    • Elevated cholesterol    • Hypertension    • Kidney stone on left side    • Left flank pain    • Mood disorder    • Restless leg syndrome    • Sleep apnea     cpap  bring dos   • UTI (urinary tract infection) 12/2021    PREV FINISHED ANTIBIOTIC     Past Surgical History:  Past Surgical History:   Procedure Laterality Date   • ADENOIDECTOMY     • APPENDECTOMY     • COLONOSCOPY     • COLONOSCOPY N/A 3/23/2023    Procedure: COLONOSCOPY into cecum with cold snare polypectomies;  Surgeon: Yoan Leonard MD;  Location: [facility] ENDOSCOPY;  Service: Gastroenterology;  Laterality: N/A;  polyps   • CYSTOSCOPY WITH CLOT EVACUATION N/A 3/8/2022    Procedure: CYSTOSCOPY WITH CLOT EVACUATION;  Surgeon: Ihsan Lopez MD;  Location: [facility];  Service: Urology;  Laterality: N/A;   • CYSTOSCOPY, URETEROSCOPY, RETROGRADE PYELOGRAM, STENT INSERTION Left 3/7/2022    Procedure: CYSTOSCOPY URETEROSCOPY LASER LITHOTRIPSY and stent exchange;  Surgeon: Deuce Leija MD;  Location: [facility] OR;  Service: Urology;  Laterality: Left;   • KIDNEY STONE SURGERY     • NEPHROLITHOTRIPSY PERCUTANEOUS Left 2/21/2022    Procedure: NEPHROSTLITHOTOMY PERCUTANEOUS;  Surgeon: Deuce Leija MD;  Location: [facility] OR;  Service: Urology;  Laterality: Left;   • NEPHROLITHOTRIPSY PERCUTANEOUS Left 2/23/2022    Procedure: LEFT FLEXIBLE NEPHROSTOMY WITH STONE EXTRACTION, LEFT NEPHROSTOMY, TUBE REMOVAL, BILATERAL URETERAL STENT PLACEMENT;  Surgeon: Deuce Leija MD;  Location: [facility] OR;  Service: Urology;  Laterality: Left;   • NEPHROLITHOTRIPSY PERCUTANEOUS Right  3/7/2022    Procedure: RIGHT PERCUTANEOUS NEPHROLITHOTOMY W ACCESS;  Surgeon: Deuce Leija MD;  Location: [facility] MAIN OR;  Service: Urology;  Laterality: Right;   • TONSILLECTOMY        Home Meds:  Medications Prior to Admission   Medication Sig Dispense Refill Last Dose   • albuterol sulfate  (90 Base) MCG/ACT inhaler INHALE ONE TO TWO PUFFS BY MOUTH EVERY 4 TO 6 HOURS 18 g 0    • albuterol sulfate  (90 Base) MCG/ACT inhaler Inhale 1 puff by mouth via inhaler every 6 (six) hours as needed for Wheezing. 8.5 g 11    • atorvastatin (LIPITOR) 20 MG tablet Take 1 tablet by mouth every night at bedtime. 90 tablet 3    • budesonide-formoterol (SYMBICORT) 160-4.5 MCG/ACT inhaler Inhale 2 puffs 2 (Two) Times a Day.      • buPROPion XL (Wellbutrin XL) 150 MG 24 hr tablet Take 1 tablet by mouth Every Morning. 90 tablet 2    • divalproex (Depakote ER) 250 MG 24 hr tablet Take 1 tablet by mouth 2 (Two) Times a Day. 180 tablet 3    • hydrALAZINE (APRESOLINE) 100 MG tablet Take 1 tablet by mouth 3 (Three) Times a Day - morning, evening, and before bedtime 270 tablet 3    • hydrALAZINE (APRESOLINE) 50 MG tablet Take 1 tablet by mouth 3 (Three) Times a Day As Needed. 90 tablet 11    • lamoTRIgine (LaMICtal) 100 MG tablet Take 1 tablet by mouth 2 (Two) Times a Day. 60 tablet 3    • lamoTRIgine (LaMICtal) 100 MG tablet Take 1 tablet by mouth 2 (Two) Times a Day. 180 tablet 3    • levothyroxine (SYNTHROID, LEVOTHROID) 50 MCG tablet Take 1 tablet by mouth Every Morning. TAKE PREOP      • lisinopril (PRINIVIL,ZESTRIL) 40 MG tablet Take 1 tablet by mouth once daily 90 tablet 3    • metoprolol succinate XL (TOPROL-XL) 100 MG 24 hr tablet Take 1 tablet by mouth 2 (Two) Times a Day. TAKE PREOP      • metoprolol succinate XL (TOPROL-XL) 100 MG 24 hr tablet Take 2 tablets by mouth Daily. 180 tablet 3    • naltrexone (DEPADE) 50 MG tablet Take 1 tablet by mouth Every Morning. 90 tablet 2    • NIFEdipine CC (ADALAT CC) 90  MG 24 hr tablet Take 1 tablet by mouth Daily. 90 tablet 3    • oxyCODONE-acetaminophen (Percocet) 5-325 MG per tablet Take 1 tablet by mouth Every 4 (Four) to 6 (Six) Hours As Needed for pain. 12 tablet 0    • potassium chloride (K-DUR,KLOR-CON) 20 MEQ CR tablet take 1 tablet by mouth once daily 90 tablet 3    • potassium citrate (UROCIT-K) 10 MEQ (1080 MG) CR tablet Take 1 tablet by mouth 2 (Two) Times a Day - morning and evening. Take with meals. Do not crush, chew, or split. 180 tablet 3    • pramipexole (MIRAPEX) 0.5 MG tablet take 1 tablet by mouth at bedtime 90 tablet 1    • sildenafil (VIAGRA) 100 MG tablet Take 1 tablet by mouth Daily As Needed. 6 tablet 3    • spironolactone (ALDACTONE) 25 MG tablet Take 1 tablet by mouth Daily. 90 tablet 3    • tamsulosin (FLOMAX) 0.4 MG capsule 24 hr capsule Take 1 capsule by mouth Daily. 30 capsule 0    • vilazodone (Viibryd) 40 MG tablet tablet Take 1 tablet by mouth Daily. 90 tablet 3    • amoxicillin (AMOXIL) 875 MG tablet Take 1 tablet by mouth in the morning and 1 tablet before bedtime. 14 tablet 0    • predniSONE (DELTASONE) 10 MG tablet Take 1 tablet by mouth Daily for 5 days. 5 tablet 0    • promethazine-dextromethorphan (PROMETHAZINE-DM) 6.25-15 MG/5ML syrup Take 5-10 ml TID prn for cough. 150 mL 0    • tamsulosin (Flomax) 0.4 MG capsule 24 hr capsule Take 1 capsule by mouth every night at bedtime. 90 capsule 3      Current Meds:     Current Facility-Administered Medications:   •  [COMPLETED] Insert Peripheral IV, , , Once **AND** sodium chloride 0.9 % flush 10 mL, 10 mL, Intravenous, PRN, Cameron Montes MD  Allergies:  No Known Allergies  Social History:   Social History     Tobacco Use   • Smoking status: Former     Types: Cigarettes     Quit date: 1990     Years since quittin.4   • Smokeless tobacco: Never   Substance Use Topics   • Alcohol use: Not Currently      Family History:  Family History   Problem Relation Age of Onset   • Zulma  Hyperthermia Neg Hx           Review of Systems  See history of present illness and past medical history.    As described in history presenting illness.      Vitals:   /66   Pulse 71   Temp 99.1 °F (37.3 °C) (Tympanic)   Resp 14   SpO2 93%   I/O: No intake or output data in the 24 hours ending 05/16/23 1950  Exam:  Patient is examined using the personal protective equipment as per guidelines from infection control for this particular patient as enacted.  Hand washing was performed before and after patient interaction.  General Appearance:   Ill-appearing male who is in no acute distress   Head:    Normocephalic, without obvious abnormality, atraumatic   Eyes:    PERRL, conjunctiva/corneas clear, EOM's intact, both eyes   Ears:    Normal external ear canals, both ears   Nose:   Nares normal, septum midline, mucosa normal, no drainage    or sinus tenderness   Throat:   Lips, tongue, gums normal; oral mucosa pink and moist   Neck:   Supple, symmetrical, trachea midline, no adenopathy;     thyroid:  no enlargement/tenderness/nodules; no carotid    bruit or JVD   Back:     Symmetric, no curvature, ROM normal, no CVA tenderness   Lungs:    Decreased breath sounds in the right side.   Chest Wall:    No tenderness or deformity    Heart:    Regular rate and rhythm, S1 and S2 normal, no murmur, rub   or gallop   Abdomen:    Obese soft nontender   Extremities:  Chronic lower extremity edema noted   Pulses:   Pulses palpable in all extremities; symmetric all extremities   Skin:  Chronic skin changes noted.   Neurologic:   CNII-XII intact, motor strength grossly intact, sensation grossly intact to light touch, no focal deficits noted       Data Review:      I reviewed the patient's new clinical results.  Results from last 7 days   Lab Units 05/16/23  1730   WBC 10*3/mm3 10.09   HEMOGLOBIN g/dL 14.1   PLATELETS 10*3/mm3 273     Results from last 7 days   Lab Units 05/16/23  1730   SODIUM mmol/L 137   POTASSIUM mmol/L  4.6   CHLORIDE mmol/L 100   CO2 mmol/L 27.0   BUN mg/dL 33*   CREATININE mg/dL 1.66*   CALCIUM mg/dL 9.5   GLUCOSE mg/dL 132*     CT Chest Without Contrast Diagnostic    Result Date: 5/16/2023   Mild to moderate loculated right pleural effusion. Occlusion right lower lobe branch airways is seen, with limited because of mucous plugging or lesion (bronchoscopic correlation can be obtained), with right lower lobe atelectasis (possibility of underlying pneumonia or lesion not excluded). Nonspecific mediastinal adenopathy. Follow-up/further evaluation recommended as indicated.  This report was finalized on 5/16/2023 7:00 PM by Dr. Eder Gallego M.D.        Assessment:  Active Hospital Problems    Diagnosis  POA   • **Acute dyspnea [R06.00]  Yes   • Pleural effusion on right [J90]  Yes   • CKD (chronic kidney disease) [N18.9]  Unknown   • ISIAH (obstructive sleep apnea) [G47.33]  Yes   • COPD (chronic obstructive pulmonary disease) [J44.9]  Yes   • Anxiety and depression [F41.9, F32.A]  Yes     Formatting of this note might be different from the original.  Lamictal and Vibryd     • Essential hypertension [I10]  Yes   • Hypothyroidism [E03.9]  Yes       Medical decision making/care plan: See admitting orders  · Right-sided pleuritic chest pain in the setting of month-long history of rundown and tired feeling now with cough transient hemoptysis and chest x-ray and CT scan evidence of right-sided complicated effusion with possible mucous plugging or endobronchial lesion and pneumonic infiltrate other possibility to consider would be renal pulmonary vasculitis-plan is to admit the patient provided oxygen supplementation monitor his vital signs including heart rate, oxygen saturation and blood pressure.  Get CT-guided right-sided thoracentesis and send fluid for cytology, pH, cell count and differential and culture as well as get pulmonary and possible thoracic surgery consultation.  We will go ahead and get blood cultures  and hold antibiotic therapy and only start IV Unasyn if he shows worsening hypoxia, tachycardia, hypertension or elevated lactic acid level.  · COPD obesity and obstructive sleep apnea-continue with his nebulizer treatments/inhalers, and provide him with continuous pulse ox monitoring and allow him to use his CPAP device.  · Hypertension-continue antihypertensive regimen and avoid hypotensive episodes.  · Acute kidney injury on chronic kidney disease-monitor renal function and avoid nephrotoxic agents.  · Anxiety and depression-continue with his current regimen.  · Hypothyroidism-continue thyroid replacement therapy.      Ramya Lomeli MD   5/16/2023  19:50 EDT    Parts of this note may be an electronic transcription/translation of spoken language to printed text using the Dragon dictation system.      Electronically signed by Ramya Lomeli MD at 05/16/23 2133          Emergency Department Notes      Cameron Montes MD at 05/16/23 1725           EMERGENCY DEPARTMENT ENCOUNTER    Room Number:  24/24  Date seen:  5/16/2023  PCP: Naveed Nunez DO  Historian: Patient      HPI:  Chief Complaint: Shortness of breath  A complete HPI/ROS/PMH/PSH/SH/FH are unobtainable due to: Nothing  Context: Calos Moseley is a 62 y.o. male who presents to the ED from Dr. Martinez's office c/o shortness of breath.  Patient complains of shortness of breath for the past 1 week.  Also reports a productive cough with occasional scant amount of red blood.  Denies fever or chills.  Shortness of breath is worse with exertion.  He also complains of pain in his right lateral chest.  He saw Dr. Martinez today.  Chest x-ray was performed and reportedly showed a right pleural effusion.  Patient was sent to the ED for further evaluation.  He denies history of heart or lung disease.  Also complains of increased leg swelling for the past several days.  Patient is not on home O2.  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            PAST MEDICAL HISTORY  Active Ambulatory Problems     Diagnosis Date Noted   • HLD (hyperlipidemia) 01/07/2022   • Obesity, Class III, BMI 40-49.9 (morbid obesity) 01/07/2022   • Bilateral kidney stones 01/07/2022   • COPD (chronic obstructive pulmonary disease) 01/07/2022   • Left flank pain 02/14/2022   • Anxiety and depression 01/29/2018   • Hypothyroidism 01/29/2018   • ISIAH (obstructive sleep apnea) 01/26/2022   • Essential hypertension 01/29/2018   • Calculus, renal 02/21/2022   • Restless leg syndrome    • Bipolar affective    • Calculus of ureter 03/07/2022   • Gross hematuria 02/25/2022   • Encounter for screening for malignant neoplasm of colon 01/31/2023     Resolved Ambulatory Problems     Diagnosis Date Noted   • SBO (small bowel obstruction) (HCC) 11/03/2017   • MESHA (acute kidney injury) 01/07/2022   • Acute cystitis with hematuria 01/07/2022   • Hyperglycemia 01/07/2022   • HTN (hypertension) 01/07/2022   • Acute pyelonephritis 01/07/2022   • Hematuria 01/07/2022   • Acute renal failure (ARF) 02/14/2022   • Cervical spondylosis 09/11/2018   • History of renal stone 01/29/2018   • Hypokalemia 01/29/2018   • Prediabetes 09/06/2019   • Right ureteral stone 01/04/2018   • Hyperlipidemia 01/29/2018   • Obesity 12/01/2021     Past Medical History:   Diagnosis Date   • Anesthesia complication    • COVID 2022   • Disease of thyroid gland    • Elevated cholesterol    • Hypertension    • Kidney stone on left side    • Mood disorder    • Sleep apnea    • UTI (urinary tract infection) 12/2021         PAST SURGICAL HISTORY  Past Surgical History:   Procedure Laterality Date   • ADENOIDECTOMY     • APPENDECTOMY     • COLONOSCOPY     • COLONOSCOPY N/A 3/23/2023    Procedure: COLONOSCOPY into cecum with cold snare polypectomies;  Surgeon: Yoan Leonard MD;  Location: [facility] ENDOSCOPY;  Service: Gastroenterology;  Laterality: N/A;  polyps   • CYSTOSCOPY WITH CLOT EVACUATION N/A 3/8/2022     Procedure: CYSTOSCOPY WITH CLOT EVACUATION;  Surgeon: Ihsan Lopez MD;  Location: [facility] MAIN OR;  Service: Urology;  Laterality: N/A;   • CYSTOSCOPY, URETEROSCOPY, RETROGRADE PYELOGRAM, STENT INSERTION Left 3/7/2022    Procedure: CYSTOSCOPY URETEROSCOPY LASER LITHOTRIPSY and stent exchange;  Surgeon: Deuce Leija MD;  Location: [facility] MAIN OR;  Service: Urology;  Laterality: Left;   • KIDNEY STONE SURGERY     • NEPHROLITHOTRIPSY PERCUTANEOUS Left 2022    Procedure: NEPHROSTLITHOTOMY PERCUTANEOUS;  Surgeon: Deuce Leija MD;  Location: [facility] MAIN OR;  Service: Urology;  Laterality: Left;   • NEPHROLITHOTRIPSY PERCUTANEOUS Left 2022    Procedure: LEFT FLEXIBLE NEPHROSTOMY WITH STONE EXTRACTION, LEFT NEPHROSTOMY, TUBE REMOVAL, BILATERAL URETERAL STENT PLACEMENT;  Surgeon: Deuce Leija MD;  Location: [facility] OR;  Service: Urology;  Laterality: Left;   • NEPHROLITHOTRIPSY PERCUTANEOUS Right 3/7/2022    Procedure: RIGHT PERCUTANEOUS NEPHROLITHOTOMY W ACCESS;  Surgeon: Deuce Leija MD;  Location: [facility] OR;  Service: Urology;  Laterality: Right;   • TONSILLECTOMY           FAMILY HISTORY  Family History   Problem Relation Age of Onset   • Malig Hyperthermia Neg Hx          SOCIAL HISTORY  Social History     Socioeconomic History   • Marital status:    Tobacco Use   • Smoking status: Former     Types: Cigarettes     Quit date: 1990     Years since quittin.4   • Smokeless tobacco: Never   Vaping Use   • Vaping Use: Never used   Substance and Sexual Activity   • Alcohol use: Not Currently   • Drug use: No   • Sexual activity: Defer         ALLERGIES  Patient has no known allergies.        REVIEW OF SYSTEMS  Review of Systems     All systems have been reviewed and are negative except as as discussed in the HPI    PHYSICAL EXAM  ED Triage Vitals [23 1713]   Temp Heart Rate Resp BP SpO2   99.1 °F (37.3 °C) 94 18 -- 92 %      Temp src Heart Rate  Source Patient Position BP Location FiO2 (%)   Tympanic Monitor -- -- --       Physical Exam      GENERAL: Awake, alert, oriented x3.  Well-developed and nontoxic-appearing male.  Appears mildly dyspneic   HENT: NCAT, nares patent  EYES: no scleral icterus  CV: regular rhythm, normal rate  RESPIRATORY: Mildly tachypneic.  Diminished breath sounds in the right lung base.  Scattered rhonchi bilaterally  ABDOMEN: soft, obese, nontender  MUSCULOSKELETAL: Extremities are nontender with full range of motion.  2+ pedal edema in both lower legs  NEURO: Speech is normal.  No facial droop.  Follows commands PSYCH:  calm, cooperative  SKIN: warm, dry    Vital signs and nursing notes reviewed.          LAB RESULTS  Recent Results (from the past 24 hour(s))   Comprehensive Metabolic Panel    Collection Time: 05/16/23  5:30 PM    Specimen: Blood   Result Value Ref Range    Glucose 132 (H) 65 - 99 mg/dL    BUN 33 (H) 8 - 23 mg/dL    Creatinine 1.66 (H) 0.76 - 1.27 mg/dL    Sodium 137 136 - 145 mmol/L    Potassium 4.6 3.5 - 5.2 mmol/L    Chloride 100 98 - 107 mmol/L    CO2 27.0 22.0 - 29.0 mmol/L    Calcium 9.5 8.6 - 10.5 mg/dL    Total Protein 7.3 6.0 - 8.5 g/dL    Albumin 4.0 3.5 - 5.2 g/dL    ALT (SGPT) 34 1 - 41 U/L    AST (SGOT) 19 1 - 40 U/L    Alkaline Phosphatase 89 39 - 117 U/L    Total Bilirubin 0.7 0.0 - 1.2 mg/dL    Globulin 3.3 gm/dL    A/G Ratio 1.2 g/dL    BUN/Creatinine Ratio 19.9 7.0 - 25.0    Anion Gap 10.0 5.0 - 15.0 mmol/L    eGFR 46.6 (L) >60.0 mL/min/1.73   BNP    Collection Time: 05/16/23  5:30 PM    Specimen: Blood   Result Value Ref Range    proBNP 167.0 0.0 - 900.0 pg/mL   High Sensitivity Troponin T    Collection Time: 05/16/23  5:30 PM    Specimen: Blood   Result Value Ref Range    HS Troponin T 19 (H) <15 ng/L   CBC Auto Differential    Collection Time: 05/16/23  5:30 PM    Specimen: Blood   Result Value Ref Range    WBC 10.09 3.40 - 10.80 10*3/mm3    RBC 4.78 4.14 - 5.80 10*6/mm3    Hemoglobin 14.1  13.0 - 17.7 g/dL    Hematocrit 42.0 37.5 - 51.0 %    MCV 87.9 79.0 - 97.0 fL    MCH 29.5 26.6 - 33.0 pg    MCHC 33.6 31.5 - 35.7 g/dL    RDW 13.6 12.3 - 15.4 %    RDW-SD 43.3 37.0 - 54.0 fl    MPV 9.4 6.0 - 12.0 fL    Platelets 273 140 - 450 10*3/mm3    Neutrophil % 67.7 42.7 - 76.0 %    Lymphocyte % 12.8 (L) 19.6 - 45.3 %    Monocyte % 13.6 (H) 5.0 - 12.0 %    Eosinophil % 0.8 0.3 - 6.2 %    Basophil % 0.5 0.0 - 1.5 %    Immature Grans % 4.6 (H) 0.0 - 0.5 %    Neutrophils, Absolute 6.84 1.70 - 7.00 10*3/mm3    Lymphocytes, Absolute 1.29 0.70 - 3.10 10*3/mm3    Monocytes, Absolute 1.37 (H) 0.10 - 0.90 10*3/mm3    Eosinophils, Absolute 0.08 0.00 - 0.40 10*3/mm3    Basophils, Absolute 0.05 0.00 - 0.20 10*3/mm3    Immature Grans, Absolute 0.46 (H) 0.00 - 0.05 10*3/mm3    nRBC 0.0 0.0 - 0.2 /100 WBC   Lactic Acid, Plasma    Collection Time: 05/16/23  5:30 PM    Specimen: Blood   Result Value Ref Range    Lactate 1.3 0.5 - 2.0 mmol/L   Procalcitonin    Collection Time: 05/16/23  5:30 PM    Specimen: Blood   Result Value Ref Range    Procalcitonin 0.98 (H) 0.00 - 0.25 ng/mL   Respiratory Panel PCR w/COVID-19(SARS-CoV-2) JERI/SONIA/MARQUITA/PAD/COR/MAD/ULISSES In-House, NP Swab in [facility], 3-4 HR TAT - Swab, Nasopharynx    Collection Time: 05/16/23  5:33 PM    Specimen: Nasopharynx; Swab   Result Value Ref Range    ADENOVIRUS, PCR Not Detected Not Detected    Coronavirus 229E Not Detected Not Detected    Coronavirus HKU1 Not Detected Not Detected    Coronavirus NL63 Not Detected Not Detected    Coronavirus OC43 Not Detected Not Detected    COVID19 Not Detected Not Detected - Ref. Range    Human Metapneumovirus Not Detected Not Detected    Human Rhinovirus/Enterovirus Not Detected Not Detected    Influenza A PCR Not Detected Not Detected    Influenza B PCR Not Detected Not Detected    Parainfluenza Virus 1 Not Detected Not Detected    Parainfluenza Virus 2 Not Detected Not Detected    Parainfluenza Virus 3 Not Detected Not Detected     Parainfluenza Virus 4 Not Detected Not Detected    RSV, PCR Not Detected Not Detected    Bordetella pertussis pcr Not Detected Not Detected    Bordetella parapertussis PCR Not Detected Not Detected    Chlamydophila pneumoniae PCR Not Detected Not Detected    Mycoplasma pneumo by PCR Not Detected Not Detected   ECG 12 Lead Dyspnea    Collection Time: 05/16/23  6:20 PM   Result Value Ref Range    QT Interval 365 ms       Ordered the above labs and reviewed the results.        RADIOLOGY  CT Chest Without Contrast Diagnostic    Result Date: 5/16/2023  CT CHEST WO CONTRAST DIAGNOSTIC-  INDICATIONS: Cough, short of breath, right pleural effusion  TECHNIQUE: Radiation dose reduction techniques were utilized, including automated exposure control and exposure modulation based on body size. Unenhanced CHEST CT  COMPARISON: None available  FINDINGS:    The heart size is normal without pericardial effusion. Increasing aorta is borderline dilated, 3.6 cm. Enlarged right paratracheal lymph node is noted, 1.3 cm short axis, axial image 28, could be reactive in nature or potentially evidence of neoplasm, follow-up recommended; if further imaging evaluation is indicated, positron emission tomography could be considered. A right paraesophageal lymph node measures 1 cm short axis on axial image 44. Assessment of vascular and mediastinal structures is limited without intravenous contrast material.   Occlusion right lower lobe branch airways is seen, with limited because of mucous plugging or lesion (bronchoscopic correlation can be obtained), with right lower lobe atelectasis, possibility of underlying pneumonia or lesion not excluded, clinical correlation and follow-up advised.  The lungs also show old granulomatous disease.  Mild to moderate loculated right pleural effusion is increased from 03/08/2022.  Upper abdominal structures appear unremarkable.  Degenerative changes are seen in the spine. No acute fracture is identified.        Mild to moderate loculated right pleural effusion. Occlusion right lower lobe branch airways is seen, with limited because of mucous plugging or lesion (bronchoscopic correlation can be obtained), with right lower lobe atelectasis (possibility of underlying pneumonia or lesion not excluded). Nonspecific mediastinal adenopathy. Follow-up/further evaluation recommended as indicated.  This report was finalized on 5/16/2023 7:00 PM by Dr. Eder Gallego M.D.        Ordered the above noted radiological studies. Reviewed by me in PACS.            PROCEDURES  Procedures              MEDICATIONS GIVEN IN ER  Medications   sodium chloride 0.9 % flush 10 mL (has no administration in time range)                   MEDICAL DECISION MAKING, PROGRESS, and CONSULTS    All labs have been independently reviewed by me.  All radiology studies have been reviewed by me and I have also reviewed the radiology report.   EKG's independently viewed and interpreted by me.  Discussion below represents my analysis of pertinent findings related to patient's condition, differential diagnosis, treatment plan and final disposition.      Additional sources:  - Discussed/ obtained information from independent historians: N/A    - External (non-ED) record review: Patient was admitted here in January 2022 for acute kidney injury and flank pain.  Patient saw his PCP 5 days ago complaining of cough, shortness of breath, chills, and body aches.  He was diagnosed with otitis media and started on amoxicillin.  He was also given an albuterol inhaler.    - Chronic or social conditions impacting care: N/A          Orders placed during this visit:  Orders Placed This Encounter   Procedures   • Respiratory Panel PCR w/COVID-19(SARS-CoV-2) JERI/SONIA/MARQUITA/PAD/COR/MAD/ULISSES In-House, NP Swab in UTM/VTM, 3-4 HR TAT - Swab, Nasopharynx   • CT Chest Without Contrast Diagnostic   • Comprehensive Metabolic Panel   • BNP   • High Sensitivity Troponin T   • CBC Auto  Differential   • Lactic Acid, Plasma   • Procalcitonin   • High Sensitivity Troponin T 2Hr   • Monitor Blood Pressure   • Pulse Oximetry, Continuous   • LHA (on-call MD unless specified) Details   • ECG 12 Lead Dyspnea   • Insert Peripheral IV   • Initiate Observation Status   • CBC & Differential         Additional orders considered but not ordered:  N/A        Differential diagnosis:    Differential diagnosis includes but is not limited to CHF, acute coronary syndrome, pulmonary embolism, pneumothorax, pneumonia, asthma/COPD, deconditioning, anemia, anxiety.           Independent interpretation of labs, radiology studies, and discussions with consultants:  ED Course as of 05/16/23 1921   Tue May 16, 2023   1741 WBC: 10.09 [WH]   1741 Hemoglobin: 14.1 [WH]   1750 Oxygen saturation dropped to 89% on room air.  Patient will be placed on 2 L. [WH]   1805 Lactate: 1.3 [WH]   1823 EKG personally interpreted by me.  My personal interpretation is:          EKG time: 1820  Rhythm/Rate: Sinus rhythm, rate 75  P waves and CT: Normal  QRS, axis: Normal  ST and T waves: Normal    Interpreted Contemporaneously by me at 1822, independently viewed  EKG is not changed compared to prior EKG done on 3/1/2022   [WH]   1823 Procalcitonin(!): 0.98 [WH]   1824 proBNP: 167.0 [WH]   1824 HS Troponin T(!): 19 [WH]   1843 CT chest personally interpreted by me.  My personal interpretation is: There is a large right pleural effusion.  No pneumothorax [WH]   1845 Respiratory panel is negative [WH]   1848 Test results and plan for admission were discussed with the patient is resting and breathing comfortably.  O2 sats are 93% on 2 L. [WH]   1851 Case discussed with Dr. Lomeli and he agrees to admit the patient to a monitored bed.  Pertinent history, exam findings, test results, ED course, and diagnoses were discussed with him. [WH]   1919 Per the radiologist, CT scan shows a moderate loculated right pleural effusion.  There is occlusion of the  right lower lobe branch airways.  There is right lower lobe atelectasis.  See dictated report for details. [WH]   1920 Patient presented the ED complaining of shortness of breath and cough for the past 1 week.  He saw his pulmonologist today.  Chest x-ray showed right pleural effusion.  Patient was mildly hypoxic in the ED.  CT chest showed a moderate size right pleural effusion with occlusion of the right lower lobe branch airways.  Respiratory panel was negative.  Lactic acid and white blood cell count were normal.  Procalcitonin was elevated.  EKG was normal.  Troponin was 19.  proBNP was normal so doubt the patient has CHF.  Symptoms are not really suggestive of pneumonia.  Case was discussed with the hospitalist and he will be admitted. [WH]      ED Course User Index  [WH] Cameron Montes MD               DIAGNOSIS  Final diagnoses:   Acute dyspnea   Hypoxia   Pleural effusion, right         DISPOSITION  ADMISSION    Discussed treatment plan and reason for admission with pt/family and admitting physician.  Pt/family voiced understanding of the plan for admission for further testing/treatment as needed.                 Latest Documented Vital Signs:  As of 19:21 EDT  BP- 152/66 HR- 71 Temp- 99.1 °F (37.3 °C) (Tympanic) O2 sat- 93%              --    Please note that portions of this were completed with a voice recognition program.       Note Disclaimer: At [facility], we believe that sharing information builds trust and better relationships. You are receiving this note because you are receiving care at [facility] or recently visited. It is possible you will see health information before a provider has talked with you about it. This kind of information can be easy to misunderstand. To help you fully understand what it means for your health, we urge you to discuss this note with your provider.           Cameron Montes MD  05/16/23 1921      Electronically signed by Cameron Montes MD at  05/16/23 1921           Electronically signed by Valencia Horta RN at 05/16/23 1904       Oxygen Therapy (since admission)     Date/Time SpO2 Device (Oxygen Therapy) Flow (L/min) Oxygen Concentration (%) ETCO2 (mmHg)    05/18/23 0930 -- nasal cannula 2 -- --    05/18/23 0725 92 nasal cannula 2 -- --    05/17/23 2308 93 CPAP -- -- --    05/17/23 2005 -- nasal cannula 2 -- --    05/17/23 1922 92 nasal cannula 2 -- --    05/17/23 1640 93 nasal cannula 2 -- --    05/17/23 1610 93 -- 2 -- --    05/17/23 1440 93 nasal cannula 2 -- --    05/17/23 1425 -- nasal cannula 2 -- --    05/17/23 1322 92 nasal cannula 2 -- --    05/17/23 0826 -- nasal cannula 4.5 -- --    05/17/23 0809 95 nasal cannula 4.5 -- --    05/17/23 0724 95 nasal cannula 4 -- --    05/17/23 0000 -- nasal cannula 4 -- --    05/16/23 2332 94 high-flow nasal cannula 4 -- --    05/16/23 1953 93 nasal cannula 4 -- --    05/16/23 1904 93 -- -- -- --    05/16/23 17:50:09 -- nasal cannula 3 93 --    05/16/23 1750 92 -- -- -- --    05/16/23 1748 89 -- -- -- --    05/16/23 1731 91 -- -- -- --    05/16/23 1722 92 -- -- -- --    05/16/23 17:13:31 92 nasal cannula 3 -- --        Intake & Output (last 3 days)       05/15 0701  05/16 0700 05/16 0701  05/17 0700 05/17 0701  05/18 0700 05/18 0701  05/19 0700    P.O.  240 120 240    IV Piggyback   200     Total Intake(mL/kg)  240 (2.1) 320 (2.8) 240 (2.1)    Urine (mL/kg/hr)  605 1550 (0.6)     Other   300     Total Output  605 1850     Net  -365 -1530 +240                 Lines, Drains & Airways     Active LDAs     Name Placement date Placement time Site Days    Peripheral IV 05/16/23 1741 Right Antecubital 05/16/23  1741  Antecubital  1          Inactive LDAs     None                Operative/Procedure Notes (all)    No notes of this type exist for this encounter.            Physician Progress Notes (all)      Aniceto Bruno MD at 05/17/23 1340                                      Aniceto Bruno,  MD                          [phone]            Patient ID:    Name:  Calos Moseley    MRN:  [id number]    1960   62 y.o.  male            Patient Care Team:  Naveed Nunez DO as PCP - General (Family Medicine)  Naveed Nunez DO as PCP - Family Medicine  Chris Martinez Jr., MD as Consulting Physician (Pulmonary Disease)  Ihsan Lopez MD as Consulting Physician (Urology)  Dimas Jackson MD as Consulting Physician (Nephrology)  Fely Ferrer RN as Ambulatory  ([facility])    CC/ Reason for visit: Acute hypoxic respiratory failure, right-sided pleural effusion, abnormal CAT scan of the chest, pedal edema    Subjective: Pt seen and examined this AM.  Patient remains on supplemental oxygen.  States that he does not have any hemoptysis anymore continues to have coughing fits.  Sees Dr. Martinez for her intermittent episodes of wheezing.  Remote smoker.    States that he has been having cough along with occasional sputum production since late last week along with some fevers and chills and intermittent wheezing previously along with pedal edema.  His awaiting thoracentesis.  Denies any choking episodes does not drink significant alcohol.    ROS: Denies any subjective fevers, syncope or presyncopal events, new neurological deficits, nausea or vomiting currently    Objective     Vital Signs past 24hrs    BP range: BP: (135-178)/(66-80) 146/80  Pulse range: Heart Rate:  [68-94] 73  Resp rate range: Resp:  [14-20] 16  Temp range: Temp (24hrs), Av.2 °F (36.8 °C), Min:97.6 °F (36.4 °C), Max:99.1 °F (37.3 °C)      Device (Oxygen Therapy): nasal cannula       114 kg (252 lb); Body mass index is 40.67 kg/m².      Intake/Output Summary (Last 24 hours) at 2023 1340  Last data filed at 2023 0724  Gross per 24 hour   Intake 240 ml   Output 605 ml   Net -365 ml       PHYSICAL EXAM   Constitutional: Middle-aged morbidly obese white male pt in bed, + acute respiratory  distress, + accessory muscle use  Head: - NCAT  Eyes: No pallor.  Anicteric sclerae, EOMI.  ENMT:  Mallampati 4, no oral thrush. Moist MM.   NECK: Trachea midline, No thyromegaly, no palpable cervical lymphadenopathy  Heart: RRR, no murmur. 1+ pedal edema   Lungs: JAMES +, decreased breath sounds in the right base.  No wheezes/ crackles heard    Abdomen: Soft.  Obese, no tenderness, guarding or rigidity. No palpable masses  Extremities: Extremities warm and well perfused. No cyanosis/ clubbing  Neuro: Conscious, answers appropriately, no gross focal neuro deficits  Psych: Mood and affect appropriate    PPE recommended per [facility] infectious disease Isolation protocol for the current clinical scenario (as mentioned below) was followed.     Scheduled meds:  atorvastatin, 20 mg, Oral, Nightly  budesonide-formoterol, 2 puff, Inhalation, BID - RT  buPROPion XL, 150 mg, Oral, QAM  cefTRIAXone, 1 g, Intravenous, Q24H  divalproex, 250 mg, Oral, BID  hydrALAZINE, 50 mg, Oral, Q8H  lamoTRIgine, 100 mg, Oral, BID  levothyroxine, 50 mcg, Oral, Q AM  lisinopril, 40 mg, Oral, Daily  metoprolol succinate XL, 100 mg, Oral, BID  naltrexone, 50 mg, Oral, QAM  NIFEdipine XL, 90 mg, Oral, Q24H  pramipexole, 0.5 mg, Oral, Nightly  senna-docusate sodium, 2 tablet, Oral, BID  sodium chloride, 10 mL, Intravenous, Q12H  tamsulosin, 0.4 mg, Oral, Nightly  vilazodone, 40 mg, Oral, Daily        IV meds:                      hold, 1 each        Data Review:      Results from last 7 days   Lab Units 05/17/23  0433 05/16/23  1730   SODIUM mmol/L 139 137   POTASSIUM mmol/L 4.8 4.6   CHLORIDE mmol/L 102 100   CO2 mmol/L 24.5 27.0   BUN mg/dL 32* 33*   CREATININE mg/dL 1.60* 1.66*   CALCIUM mg/dL 9.0 9.5   BILIRUBIN mg/dL 0.6 0.7   ALK PHOS U/L 75 89   ALT (SGPT) U/L 24 34   AST (SGOT) U/L 17 19   GLUCOSE mg/dL 129* 132*   WBC 10*3/mm3 9.18 10.09   HEMOGLOBIN g/dL 13.0 14.1   PLATELETS 10*3/mm3 265 273   PROBNP pg/mL  --  167.0    PROCALCITONIN ng/mL  --  0.98*       Lab Results   Component Value Date    CALCIUM 9.0 2023                    I have personally reviewed the results from the time of this admission to 2023 13:40 EDT and agree with these findings:  [x]  Laboratory  [x]  Microbiology  [x]  Radiology  []  EKG/Telemetry   [x]  Cardiology/Vascular   []  Pathology  []  Old records    ASSESSMENT   Acute hypoxic respiratory failure  Intermittent hemoptysis  Right lower lobe endobronchial obstruction-mucous plugging versus lesion  Right-sided pleural effusion  Nonspecific mediastinal lymphadenopathy  Suspected pneumonia-outpatient antibiotics  Pedal edema  CKD  Remote smoker  ISIAH on CPAP  Morbid obesity    PLAN:  All problems are new to me.  Work-up done so far as well as recommendations from prior pulmonologist noted.  Patient is doing a little better since admitted to the hospital.  On 2 to 3 L nasal cannula. Awaiting thoracentesis.  Continues to have coughing fits with not much sputum production.  Agree with working up of pedal edema with echocardiogram and working on diuresis - ?  Congestive heart failure  Would repeat a CT of the chest post thoracentesis to get a better picture of the right lower lobe and see if that would help delineate pulmonary parenchyma better.  Unfortunately cannot do contrast given CKD concerns.   Will need to evaluate pleural fluid as well to rule out parapneumonic effusion.  Continue with empiric antibiotics  Continue with CPAP for ISIAH  Guarded prognosis    Aniceto Bruno MD  2023    Electronically signed by Aniceto Bruno MD at 23 1349     Tristen Perez MD at 23 1207              Name: Calos Moseley ADMIT: 2023   : 1960  PCP: Naveed Nunez DO    MRN: [id number] LOS: 0 days   AGE/SEX: 62 y.o. male  ROOM: E449/1     Subjective   Subjective   Patient complains of right-sided chest pain and flank pain as well as right shoulder pain.   Has been present for several days now. Still having some shortness of breath but feels better with the O2 on.    Reports that his weight is approximately 7 to 10 pounds over his usual baseline.  Previously had only peripheral edema after being on his feet for a while but has stayed persistently swollen over the last week or so.  Complains of increased abdominal girth    Review of Systems    Objective   Objective   Vital Signs  Temp:  [97.9 °F (36.6 °C)-99.1 °F (37.3 °C)] 97.9 °F (36.6 °C)  Heart Rate:  [68-94] 69  Resp:  [14-20] 20  BP: (135-178)/(66-77) 155/73  SpO2:  [89 %-95 %] 95 %  on  Flow (L/min):  [3-4.5] 4.5;   Device (Oxygen Therapy): nasal cannula  Body mass index is 40.67 kg/m².  Physical Exam  Vitals reviewed.   Constitutional:       General: He is not in acute distress.     Appearance: He is obese. He is ill-appearing.   Pulmonary:      Effort: Pulmonary effort is normal.      Breath sounds: Wheezing and rhonchi present.   Abdominal:      General: Bowel sounds are normal. There is no distension.      Palpations: Abdomen is soft.      Tenderness: There is no abdominal tenderness.   Musculoskeletal:      Right lower leg: Edema present.      Left lower leg: Edema present.   Skin:     General: Skin is warm and dry.      Findings: No rash.   Neurological:      Mental Status: He is alert and oriented to person, place, and time.   Psychiatric:         Mood and Affect: Mood normal.         Behavior: Behavior normal.       Results Review     I reviewed the patient's new clinical results.  Results from last 7 days   Lab Units 05/17/23  0433 05/16/23  1730   WBC 10*3/mm3 9.18 10.09   HEMOGLOBIN g/dL 13.0 14.1   PLATELETS 10*3/mm3 265 273     Results from last 7 days   Lab Units 05/17/23  0433 05/16/23  1730   SODIUM mmol/L 139 137   POTASSIUM mmol/L 4.8 4.6   CHLORIDE mmol/L 102 100   CO2 mmol/L 24.5 27.0   BUN mg/dL 32* 33*   CREATININE mg/dL 1.60* 1.66*   GLUCOSE mg/dL 129* 132*   EGFR mL/min/1.73 48.7* 46.6*      Results from last 7 days   Lab Units 05/17/23  0433 05/16/23  1730   ALBUMIN g/dL 3.3* 4.0   BILIRUBIN mg/dL 0.6 0.7   ALK PHOS U/L 75 89   AST (SGOT) U/L 17 19   ALT (SGPT) U/L 24 34     Results from last 7 days   Lab Units 05/17/23  0433 05/16/23  1730   CALCIUM mg/dL 9.0 9.5   ALBUMIN g/dL 3.3* 4.0     Results from last 7 days   Lab Units 05/16/23  1730   PROCALCITONIN ng/mL 0.98*   LACTATE mmol/L 1.3     No results found for: HGBA1C, POCGLU    CT Chest Without Contrast Diagnostic    Result Date: 5/16/2023   Mild to moderate loculated right pleural effusion. Occlusion right lower lobe branch airways is seen, with limited because of mucous plugging or lesion (bronchoscopic correlation can be obtained), with right lower lobe atelectasis (possibility of underlying pneumonia or lesion not excluded). Nonspecific mediastinal adenopathy. Follow-up/further evaluation recommended as indicated.  This report was finalized on 5/16/2023 7:00 PM by Dr. Eder Gallego M.D.      I have personally reviewed all medications:  Scheduled Medications  atorvastatin, 20 mg, Oral, Nightly  budesonide-formoterol, 2 puff, Inhalation, BID - RT  buPROPion XL, 150 mg, Oral, QAM  cefTRIAXone, 1 g, Intravenous, Q24H  divalproex, 250 mg, Oral, BID  hydrALAZINE, 50 mg, Oral, Q8H  lamoTRIgine, 100 mg, Oral, BID  levothyroxine, 50 mcg, Oral, Q AM  lisinopril, 40 mg, Oral, Daily  metoprolol succinate XL, 100 mg, Oral, BID  naltrexone, 50 mg, Oral, QAM  NIFEdipine XL, 90 mg, Oral, Q24H  pramipexole, 0.5 mg, Oral, Nightly  senna-docusate sodium, 2 tablet, Oral, BID  sodium chloride, 10 mL, Intravenous, Q12H  tamsulosin, 0.4 mg, Oral, Nightly  vilazodone, 40 mg, Oral, Daily    Infusions  hold, 1 each    Diet  NPO Diet NPO Type: Sips with Meds    I have personally reviewed:  [x]  Laboratory   [x]  Microbiology   [x]  Radiology   [x]  EKG/Telemetry  [x]  Cardiology/Vascular   []  Pathology    []  Records      Assessment/Plan     Active Hospital  Problems    Diagnosis  POA   • **Acute dyspnea [R06.00]  Yes   • Acute respiratory failure with hypoxia [J96.01]  Yes   • Pleural effusion on right [J90]  Yes   • CKD (chronic kidney disease) [N18.9]  Yes   • ISIAH (obstructive sleep apnea) [G47.33]  Yes   • COPD (chronic obstructive pulmonary disease) [J44.9]  Yes   • Anxiety and depression [F41.9, F32.A]  Yes   • Essential hypertension [I10]  Yes   • Hypothyroidism [E03.9]  Yes      Resolved Hospital Problems   No resolved problems to display.       62 y.o. male admitted with Acute dyspnea, right-sided chest pain and hypoxia, findings of loculated right pleural effusion and possible right lower lobe endobronchial lesion.    Interesting presentation, unsure of etiology for above.  Labs and presentation do not really suggest sepsis or empyema but obviously need thoracentesis to evaluate further.  - Follow-up cultures and fluid studies from pleural fluid  - Antibiotics initiated by pulmonology.  ID to follow  - Supplement O2 as needed.  Wean to keep sats greater than 90%  - Likely will need bronchoscopy as well during hospitalization    Peripheral edema/weight gain-we will check echocardiogram.  BNP is normal but can be misleading in obese patients.  Patient reports that he is up about 10 pounds or so from usual baseline.  No prior echo in system  - Also note that he is on nifedipine which can cause peripheral edema  - Check urine studies including urine protein/creatinine.  Serum albumin 3.3    CKD stable and at usual baseline.  He does not have MESHA    History of hypothyroidism, on replacement.  Will check TSH    HTN, apparently refractory based on his extensive medication regimen.    · SCDs for DVT prophylaxis.  Consider pharmacologic prophylaxis after procedures are done  · Dispo TBD    Tristen Perez MD  [facility]  05/17/23  12:07 EDT      Electronically signed by Tristen Perez MD at 05/17/23 1216          Consult  "Notes (all)      Ramya Lomeli MD at 23 0842      Consult Orders    1. Inpatient Infectious Diseases Consult [[id number]] ordered by Tristen Perez MD at 23 0832                 Infectious Diseases Progress Note    Ramya Lomeli MD     [facility]  [place]: 0 days  Patient Identification:  Name: Calos Moseley  Age: 62 y.o.  Sex: male  :  1960  MRN: [id number]         Primary Care Physician: Naveed Nunez DO    Requesting physician: Dr. Carvajal  Reason for consultation: Empyema    Subjective: Feeling somewhat better going for his thoracentesis.  Interval History: See my admission history and physical.  Started on IV ceftriaxone by pulmonary service last night.    Objective:    Scheduled Meds:atorvastatin, 20 mg, Oral, Nightly  budesonide-formoterol, 2 puff, Inhalation, BID - RT  buPROPion XL, 150 mg, Oral, QAM  cefTRIAXone, 1 g, Intravenous, Q24H  divalproex, 250 mg, Oral, BID  hydrALAZINE, 50 mg, Oral, Q8H  lamoTRIgine, 100 mg, Oral, BID  levothyroxine, 50 mcg, Oral, Q AM  lisinopril, 40 mg, Oral, Daily  metoprolol succinate XL, 100 mg, Oral, BID  naltrexone, 50 mg, Oral, QAM  NIFEdipine XL, 90 mg, Oral, Q24H  pramipexole, 0.5 mg, Oral, Nightly  senna-docusate sodium, 2 tablet, Oral, BID  sodium chloride, 10 mL, Intravenous, Q12H  tamsulosin, 0.4 mg, Oral, Nightly  vilazodone, 40 mg, Oral, Daily      Continuous Infusions:hold, 1 each        Vital signs in last 24 hours:  Temp:  [97.9 °F (36.6 °C)-99.1 °F (37.3 °C)] 97.9 °F (36.6 °C)  Heart Rate:  [68-94] 69  Resp:  [14-20] 20  BP: (135-178)/(66-77) 155/73    Intake/Output:    Intake/Output Summary (Last 24 hours) at 2023 0842  Last data filed at 2023 0724  Gross per 24 hour   Intake 240 ml   Output 605 ml   Net -365 ml       Exam:  /73 (BP Location: Left arm, Patient Position: Lying)   Pulse 69   Temp 97.9 °F (36.6 °C) (Oral)   Resp 20   Ht 167.6 cm (66\")   Wt 114 kg (252 lb)   SpO2 95%   BMI " 40.67 kg/m²   Patient is examined using the personal protective equipment as per guidelines from infection control for this particular patient as enacted.  Hand washing was performed before and after patient interaction.  General Appearance:    Alert, cooperative, no distress, AAOx3                          Head:    Normocephalic, without obvious abnormality, atraumatic                           Eyes:    PERRL, conjunctivae/corneas clear, EOM's intact, both eyes                         Throat:   Lips, tongue, gums normal; oral mucosa pink and moist                           Neck:   Supple, symmetrical, trachea midline, no JVD                         Lungs:    Decreased breath sounds at the right lung base                 Chest Wall:    No tenderness or deformity                          Heart:  S1-S2 regular                  Abdomen:   Obese soft nontender                 Extremities: Lateral lower extremity edema                        Pulses:   Pulses palpable in all extremities                            Skin:   Skin is warm and dry,  no rashes or palpable lesions                  Neurologic: Grossly nonfocal       Data Review:    I reviewed the patient's new clinical results.  Results from last 7 days   Lab Units 05/17/23  0433 05/16/23  1730   WBC 10*3/mm3 9.18 10.09   HEMOGLOBIN g/dL 13.0 14.1   PLATELETS 10*3/mm3 265 273     Results from last 7 days   Lab Units 05/17/23  0433 05/16/23  1730   SODIUM mmol/L 139 137   POTASSIUM mmol/L 4.8 4.6   CHLORIDE mmol/L 102 100   CO2 mmol/L 24.5 27.0   BUN mg/dL 32* 33*   CREATININE mg/dL 1.60* 1.66*   CALCIUM mg/dL 9.0 9.5   GLUCOSE mg/dL 129* 132*     Microbiology Results (last 10 days)     Procedure Component Value - Date/Time    Legionella Antigen, Urine - Urine, Urine, Clean Catch [[id number]]  (Normal) Collected: 05/17/23 0452    Lab Status: Final result Specimen: Urine, Clean Catch Updated: 05/17/23 0644     LEGIONELLA ANTIGEN, URINE Negative    S. Pneumo Ag Urine  or CSF - Urine, Urine, Clean Catch [[id number]]  (Normal) Collected: 05/17/23 0452    Lab Status: Final result Specimen: Urine, Clean Catch Updated: 05/17/23 0644     Strep Pneumo Ag Negative    Respiratory Panel PCR w/COVID-19(SARS-CoV-2) JERI/SONIA/MARQUITA/PAD/COR/MAD/ULISSES In-House, NP Swab in UTM/VTM, 3-4 HR TAT - Swab, Nasopharynx [[id number]]  (Normal) Collected: 05/16/23 1733    Lab Status: Final result Specimen: Swab from Nasopharynx Updated: 05/16/23 1832     ADENOVIRUS, PCR Not Detected     Coronavirus 229E Not Detected     Coronavirus HKU1 Not Detected     Coronavirus NL63 Not Detected     Coronavirus OC43 Not Detected     COVID19 Not Detected     Human Metapneumovirus Not Detected     Human Rhinovirus/Enterovirus Not Detected     Influenza A PCR Not Detected     Influenza B PCR Not Detected     Parainfluenza Virus 1 Not Detected     Parainfluenza Virus 2 Not Detected     Parainfluenza Virus 3 Not Detected     Parainfluenza Virus 4 Not Detected     RSV, PCR Not Detected     Bordetella pertussis pcr Not Detected     Bordetella parapertussis PCR Not Detected     Chlamydophila pneumoniae PCR Not Detected     Mycoplasma pneumo by PCR Not Detected    Narrative:      In the setting of a positive respiratory panel with a viral infection PLUS a negative procalcitonin without other underlying concern for bacterial infection, consider observing off antibiotics or discontinuation of antibiotics and continue supportive care. If the respiratory panel is positive for atypical bacterial infection (Bordetella pertussis, Chlamydophila pneumoniae, or Mycoplasma pneumoniae), consider antibiotic de-escalation to target atypical bacterial infection.            Assessment:    Acute dyspnea    COPD (chronic obstructive pulmonary disease)    Anxiety and depression    Hypothyroidism    ISIAH (obstructive sleep apnea)    Essential hypertension    Pleural effusion on right    CKD (chronic kidney disease)  Complicated right pleural effusion with  loculation in the setting of recent symptoms suggestive of pneumonia-this likely represent complicated effusion with empyema but rule out endobronchial lesion and possible malignancy related postobstructive pneumonia complicating this effusion.  Recommendations/discussion:   As discussed patient will need assessment of his pleural fluid including cytology and may be consultation with thoracic surgery service.  Pulmonary evaluation in terms of bronchoscopy plans noted.  Duration of antibiotic treatment will depend upon whether or not he has empyema or all of this could be explained by other pathology.  Ramya Lomeli MD  5/17/2023  08:42 EDT    Parts of this note may be an electronic transcription/translation of spoken language to printed text using the Dragon dictation system.      Electronically signed by Ramya Lomeli MD at 05/18/23 0952     Yg Schwarz MD at 05/16/23 2141      Consult Orders    1. Inpatient Pulmonology Consult [[id number]] ordered by Ramya Lomeli MD at 05/16/23 2126                          CONSULT NOTE    Patient Identification:  Calos Moseley  62 y.o.  male  1960  [id number]            Requesting physician: Dr. Joy Tellez    Reason for Consultation: Pleural effusion concern for bronchial obstruction    CC: Shortness of breath    History of Present Illness:  Patient is a 62-year-old with a previous medical history of sleep apnea restless leg syndrome hypertension bipolar affective disorder who presented to the emergency room with worsening cough congestion shortness of breath.  Also describes lower extremity edema that has been progressive.  Patient has been treated as an outpatient with prednisone and amoxicillin however has had worsening of his symptoms.  He developed hemoptysis submassive.  However this has resolved.  He was evaluated in the [place] pulmonary care office and found to have concern for endobronchial obstruction and was sent to the ER.    CT scan has shown as  well a right-sided loculated effusion.  The patient is been afebrile since arrival is on 4 L nasal cannula.    History of smoking quitting about 35 years ago smoked about 10 years more than a pack per day    History of sleep apnea on his home CPAP machine at present    Review of Systems:  As per hpi otherwise a 12 system review of systems performed and all else negative    Past Medical History:   Diagnosis Date   • Anesthesia complication     STATES HARD TO WAKE UP AFTER PREVIOUS KIDNEY STONE SURGERY AND STATES HE CODED   • Bilateral kidney stones    • Bipolar affective    • COVID 2022   • Disease of thyroid gland    • Elevated cholesterol    • Hypertension    • Kidney stone on left side    • Left flank pain    • Mood disorder    • Restless leg syndrome    • Sleep apnea     cpap  bring dos   • UTI (urinary tract infection) 12/2021    PREV FINISHED ANTIBIOTIC       Past Surgical History:   Procedure Laterality Date   • ADENOIDECTOMY     • APPENDECTOMY     • COLONOSCOPY     • COLONOSCOPY N/A 3/23/2023    Procedure: COLONOSCOPY into cecum with cold snare polypectomies;  Surgeon: Yoan Leonard MD;  Location: [facility] ENDOSCOPY;  Service: Gastroenterology;  Laterality: N/A;  polyps   • CYSTOSCOPY WITH CLOT EVACUATION N/A 3/8/2022    Procedure: CYSTOSCOPY WITH CLOT EVACUATION;  Surgeon: Ihsan Lopez MD;  Location: [facility];  Service: Urology;  Laterality: N/A;   • CYSTOSCOPY, URETEROSCOPY, RETROGRADE PYELOGRAM, STENT INSERTION Left 3/7/2022    Procedure: CYSTOSCOPY URETEROSCOPY LASER LITHOTRIPSY and stent exchange;  Surgeon: Deuce Leija MD;  Location: [facility];  Service: Urology;  Laterality: Left;   • KIDNEY STONE SURGERY     • NEPHROLITHOTRIPSY PERCUTANEOUS Left 2/21/2022    Procedure: NEPHROSTLITHOTOMY PERCUTANEOUS;  Surgeon: Deuce Leija MD;  Location: [facility] OR;  Service: Urology;  Laterality: Left;   • NEPHROLITHOTRIPSY PERCUTANEOUS Left 2/23/2022    Procedure: LEFT  FLEXIBLE NEPHROSTOMY WITH STONE EXTRACTION, LEFT NEPHROSTOMY, TUBE REMOVAL, BILATERAL URETERAL STENT PLACEMENT;  Surgeon: Deuce Leija MD;  Location: [facility] MAIN OR;  Service: Urology;  Laterality: Left;   • NEPHROLITHOTRIPSY PERCUTANEOUS Right 3/7/2022    Procedure: RIGHT PERCUTANEOUS NEPHROLITHOTOMY W ACCESS;  Surgeon: Deuce Leija MD;  Location: [facility] MAIN OR;  Service: Urology;  Laterality: Right;   • TONSILLECTOMY          Medications Prior to Admission   Medication Sig Dispense Refill Last Dose   • albuterol sulfate  (90 Base) MCG/ACT inhaler INHALE ONE TO TWO PUFFS BY MOUTH EVERY 4 TO 6 HOURS 18 g 0 5/15/2023   • albuterol sulfate  (90 Base) MCG/ACT inhaler Inhale 1 puff by mouth via inhaler every 6 (six) hours as needed for Wheezing. 8.5 g 11    • amoxicillin (AMOXIL) 875 MG tablet Take 1 tablet by mouth in the morning and 1 tablet before bedtime. 14 tablet 0 5/16/2023   • atorvastatin (LIPITOR) 20 MG tablet Take 1 tablet by mouth every night at bedtime. 90 tablet 3 5/15/2023   • budesonide-formoterol (SYMBICORT) 160-4.5 MCG/ACT inhaler Inhale 2 puffs 2 (Two) Times a Day As Needed.   Past Week   • buPROPion XL (Wellbutrin XL) 150 MG 24 hr tablet Take 1 tablet by mouth Every Morning. 90 tablet 2 5/16/2023   • divalproex (Depakote ER) 250 MG 24 hr tablet Take 1 tablet by mouth 2 (Two) Times a Day. 180 tablet 3 5/16/2023   • hydrALAZINE (APRESOLINE) 100 MG tablet Take 1 tablet by mouth 3 (Three) Times a Day - morning, evening, and before bedtime 270 tablet 3 5/16/2023   • hydrALAZINE (APRESOLINE) 50 MG tablet Take 1 tablet by mouth 3 (Three) Times a Day As Needed. 90 tablet 11    • lamoTRIgine (LaMICtal) 100 MG tablet Take 1 tablet by mouth 2 (Two) Times a Day. 60 tablet 3 5/16/2023   • lamoTRIgine (LaMICtal) 100 MG tablet Take 1 tablet by mouth 2 (Two) Times a Day. 180 tablet 3    • levothyroxine (SYNTHROID, LEVOTHROID) 50 MCG tablet Take 1 tablet by mouth Every Morning. TAKE  PREOP   2023   • lisinopril (PRINIVIL,ZESTRIL) 40 MG tablet Take 1 tablet by mouth once daily 90 tablet 3 2023   • metoprolol succinate XL (TOPROL-XL) 100 MG 24 hr tablet Take 1 tablet by mouth 2 (Two) Times a Day. TAKE PREOP   2023   • naltrexone (DEPADE) 50 MG tablet Take 1 tablet by mouth Every Morning. 90 tablet 2 2023   • NIFEdipine CC (ADALAT CC) 90 MG 24 hr tablet Take 1 tablet by mouth Daily. 90 tablet 3 2023   • potassium citrate (UROCIT-K) 10 MEQ (1080 MG) CR tablet Take 1 tablet by mouth 2 (Two) Times a Day - morning and evening. Take with meals. Do not crush, chew, or split. 180 tablet 3 2023   • pramipexole (MIRAPEX) 0.5 MG tablet take 1 tablet by mouth at bedtime 90 tablet 1 5/15/2023   • predniSONE (DELTASONE) 10 MG tablet Take 1 tablet by mouth Daily for 5 days. (Patient taking differently: Take 1 tablet by mouth Daily. Now finished) 5 tablet 0 Past Week   • spironolactone (ALDACTONE) 25 MG tablet Take 1 tablet by mouth Daily. 90 tablet 3 2023   • tamsulosin (Flomax) 0.4 MG capsule 24 hr capsule Take 1 capsule by mouth every night at bedtime. 90 capsule 3 5/15/2023   • vilazodone (Viibryd) 40 MG tablet tablet Take 1 tablet by mouth Daily. 90 tablet 3 2023   • oxyCODONE-acetaminophen (Percocet) 5-325 MG per tablet Take 1 tablet by mouth Every 4 (Four) to 6 (Six) Hours As Needed for pain. 12 tablet 0 Unknown   • promethazine-dextromethorphan (PROMETHAZINE-DM) 6.25-15 MG/5ML syrup Take 5-10 ml TID prn for cough. 150 mL 0    • sildenafil (VIAGRA) 100 MG tablet Take 1 tablet by mouth Daily As Needed. 6 tablet 3 Unknown       No Known Allergies    Social History     Socioeconomic History   • Marital status:    Tobacco Use   • Smoking status: Former     Types: Cigarettes     Quit date: 1990     Years since quittin.4   • Smokeless tobacco: Never   Vaping Use   • Vaping Use: Never used   Substance and Sexual Activity   • Alcohol use: Not Currently   •  "Drug use: No   • Sexual activity: Defer       Family History   Problem Relation Age of Onset   • Malig Hyperthermia Neg Hx        Physical Exam:  /75 (BP Location: Left arm, Patient Position: Lying)   Pulse 72   Temp 99.1 °F (37.3 °C) (Tympanic)   Resp 18   Ht 167.6 cm (66\")   Wt 114 kg (252 lb)   SpO2 93%   BMI 40.67 kg/m²   Body mass index is 40.67 kg/m².   General appearance: NAD, conversant   Eyes: Anicteric sclerae, moist conjunctivae; no lid lag;   HENT: Atraumatic; oropharynx clear with moist mucous membranes Mallampati 3  Neck: Trachea midline; large neck circumference unable to appreciate any JVD secondary to thickness  Lungs: Diminished right base greater than left, with normal respiratory effort and no intercostal retractions  CV: RRR, no MRGs   Abdomen: Obese bowel sounds positive nonrigid  Extremities: Pitting 2+ bilateral peripheral edema no noticeable extremity lymphadenopathy  Skin: Warm dry well-perfused no diffuse visible rash  Psych: Appropriate affect, alert   Neuro cranials 2 through 12 grossly intact speech intact moves extremities    LABS:  Results from last 7 days   Lab Units 05/16/23  1730   WBC 10*3/mm3 10.09   HEMOGLOBIN g/dL 14.1   PLATELETS 10*3/mm3 273     Results from last 7 days   Lab Units 05/16/23  1730   SODIUM mmol/L 137   POTASSIUM mmol/L 4.6   CHLORIDE mmol/L 100   CO2 mmol/L 27.0   BUN mg/dL 33*   CREATININE mg/dL 1.66*   GLUCOSE mg/dL 132*   CALCIUM mg/dL 9.5   Estimated Creatinine Clearance: 55.5 mL/min (A) (by C-G formula based on SCr of 1.66 mg/dL (H)).    Imaging: I personally visualized the images of scans/x-rays performed within last 3 days.  Imaging Results (Most Recent)     Procedure Component Value Units Date/Time    CT Chest Without Contrast Diagnostic [[id number]] Collected: 05/16/23 1853     Updated: 05/16/23 1903    Narrative:      CT CHEST WO CONTRAST DIAGNOSTIC-     INDICATIONS: Cough, short of breath, right pleural effusion     TECHNIQUE: Radiation " dose reduction techniques were utilized, including  automated exposure control and exposure modulation based on body size.  Unenhanced CHEST CT     COMPARISON: None available      FINDINGS:           The heart size is normal without pericardial effusion. Increasing aorta  is borderline dilated, 3.6 cm. Enlarged right paratracheal lymph node is  noted, 1.3 cm short axis, axial image 28, could be reactive in nature or  potentially evidence of neoplasm, follow-up recommended; if further  imaging evaluation is indicated, positron emission tomography could be  considered. A right paraesophageal lymph node measures 1 cm short axis  on axial image 44. Assessment of vascular and mediastinal structures is  limited without intravenous contrast material.        Occlusion right lower lobe branch airways is seen, with limited because  of mucous plugging or lesion (bronchoscopic correlation can be  obtained), with right lower lobe atelectasis, possibility of underlying  pneumonia or lesion not excluded, clinical correlation and follow-up  advised.     The lungs also show old granulomatous disease.     Mild to moderate loculated right pleural effusion is increased from  03/08/2022.     Upper abdominal structures appear unremarkable.     Degenerative changes are seen in the spine. No acute fracture is  identified.       Impression:         Mild to moderate loculated right pleural effusion. Occlusion right lower  lobe branch airways is seen, with limited because of mucous plugging or  lesion (bronchoscopic correlation can be obtained), with right lower  lobe atelectasis (possibility of underlying pneumonia or lesion not  excluded). Nonspecific mediastinal adenopathy. Follow-up/further  evaluation recommended as indicated.     This report was finalized on 5/16/2023 7:00 PM by Dr. Eder Gallego M.D.             Assessment / Recommendations:  Increasing moderate right-sided loculated pleural effusion  Occluded right lower lobe  bronchus  Lymphadenopathy  Patient Active Problem List   Diagnosis   • HLD (hyperlipidemia)   • Obesity, Class III, BMI 40-49.9 (morbid obesity)   • Bilateral kidney stones   • COPD (chronic obstructive pulmonary disease)   • Left flank pain   • Anxiety and depression   • Hypothyroidism   • ISIAH (obstructive sleep apnea)   • Essential hypertension   • Calculus, renal   • Restless leg syndrome   • Bipolar affective   • Calculus of ureter   • Gross hematuria   • Encounter for screening for malignant neoplasm of colon   • Acute dyspnea   • Pleural effusion on right   • CKD (chronic kidney disease)           Continue home CPAP   diagnostic therapeutic thoracentesis -pending results may need VATS  N.p.o. for potential bronchoscopy with endobronchial ultrasound  Continue antibiotics  Follow cultures  Discussed with the patient reviewed imaging.  Patient of Dr. Isrrael Schwarz MD  [facility]  05/16/23  21:41 EDT      Electronically signed by Yg Schwarz MD at 05/16/23 5247

## 2023-05-18 NOTE — SIGNIFICANT NOTE
05/18/23 1325   OTHER   Discipline physical therapist   Rehab Time/Intention   Session Not Performed patient/family declined evaluation  (pt noted to score 23 on AMPAC, pt reports no mobility concerns states he has been getting up on his own. Pt reports no therapy concerns. acute PT will sign off.)

## 2023-05-18 NOTE — PROGRESS NOTES
Aniceto Bruno MD                          [phone]            Patient ID:    Name:  Calos Moseley    MRN:  [id number]    1960   62 y.o.  male            Patient Care Team:  Naveed Nunez DO as PCP - General (Family Medicine)  Naveed Nunez DO as PCP - Family Medicine  Chris Martinez Jr., MD as Consulting Physician (Pulmonary Disease)  Ihsan Lopez MD as Consulting Physician (Urology)  Dimas Jackson MD as Consulting Physician (Nephrology)  Fely Ferrer RN as Ambulatory  ([facility])    CC/ Reason for visit: Acute hypoxic respiratory failure, right-sided pleural effusion, abnormal CAT scan of the chest, pedal edema    Subjective: Pt seen and examined this AM.  Patient remains on supplemental oxygen.  Underwent right-sided thoracentesis with 300 cc of fluid drained.  Pleural fluid analysis is showing exudative pattern.  No new fevers.  Tolerating antibiotics.  Had some cough with sputum production with dark blood hemoptysis but not a lot of copious secretions.    ROS: Denies any subjective fevers, syncope or presyncopal events, new neurological deficits, nausea or vomiting currently    Objective     Vital Signs past 24hrs    BP range: BP: (101-142)/(57-65) 135/57  Pulse range: Heart Rate:  [60-75] 71  Resp rate range: Resp:  [16-18] 16  Temp range: Temp (24hrs), Av.1 °F (36.7 °C), Min:97.5 °F (36.4 °C), Max:98.7 °F (37.1 °C)      Ventilator/Non-Invasive Ventilation Settings (From admission, onward)    None          Vent Settings                                         Device (Oxygen Therapy): nasal cannula       116 kg (255 lb 11.7 oz); Body mass index is 41.3 kg/m².      Intake/Output Summary (Last 24 hours) at 2023 1738  Last data filed at 2023 1554  Gross per 24 hour   Intake 560 ml   Output 1550 ml   Net -990 ml       PHYSICAL EXAM   Constitutional: Middle-aged morbidly obese white male pt in bed, + acute  respiratory distress, + accessory muscle use  Head: - NCAT  Eyes: No pallor.  Anicteric sclerae, EOMI.  ENMT:  Mallampati 4, no oral thrush. Moist MM.   NECK: Trachea midline, No thyromegaly, no palpable cervical lymphadenopathy  Heart: RRR, no murmur. 1+ pedal edema   Lungs: JAMES +, decreased breath sounds in the right base.  No wheezes/ crackles heard    Abdomen: Soft.  Obese, no tenderness, guarding or rigidity. No palpable masses  Extremities: Extremities warm and well perfused. No cyanosis/ clubbing  Neuro: Conscious, answers appropriately, no gross focal neuro deficits  Psych: Mood and affect appropriate    PPE recommended per [facility] infectious disease Isolation protocol for the current clinical scenario (as mentioned below) was followed.     Scheduled meds:  atorvastatin, 20 mg, Oral, Nightly  buPROPion XL, 150 mg, Oral, QAM  cefTRIAXone, 1 g, Intravenous, Q24H  divalproex, 250 mg, Oral, BID  hydrALAZINE, 50 mg, Oral, Q8H  lamoTRIgine, 100 mg, Oral, BID  levothyroxine, 50 mcg, Oral, Q AM  lisinopril, 40 mg, Oral, Daily  metoprolol succinate XL, 100 mg, Oral, BID  naltrexone, 50 mg, Oral, QAM  NIFEdipine XL, 90 mg, Oral, Q24H  pramipexole, 0.5 mg, Oral, Nightly  senna-docusate sodium, 2 tablet, Oral, BID  sodium chloride, 10 mL, Intravenous, Q12H  tamsulosin, 0.4 mg, Oral, Nightly  vilazodone, 40 mg, Oral, Daily        IV meds:                           Data Review:      Results from last 7 days   Lab Units 05/18/23  0520 05/17/23  0433 05/16/23  1730   SODIUM mmol/L 140 139 137   POTASSIUM mmol/L 4.8 4.8 4.6   CHLORIDE mmol/L 102 102 100   CO2 mmol/L 28.3 24.5 27.0   BUN mg/dL 36* 32* 33*   CREATININE mg/dL 1.93* 1.60* 1.66*   CALCIUM mg/dL 9.6 9.0 9.5   BILIRUBIN mg/dL  --  0.6 0.7   ALK PHOS U/L  --  75 89   ALT (SGPT) U/L  --  24 34   AST (SGOT) U/L  --  17 19   GLUCOSE mg/dL 168* 129* 132*   WBC 10*3/mm3 10.67 9.18 10.09   HEMOGLOBIN g/dL 13.3 13.0 14.1   PLATELETS 10*3/mm3 352 265 273   PROBNP  pg/mL  --   --  167.0   PROCALCITONIN ng/mL  --   --  0.98*       Lab Results   Component Value Date    CALCIUM 9.6 05/18/2023       Results from last 7 days   Lab Units 05/16/23  2253 05/16/23  2247   BLOODCX  No growth at 24 hours No growth at 24 hours              I have personally reviewed the results from the time of this admission to 5/18/2023 17:38 EDT and agree with these findings:  [x]  Laboratory  [x]  Microbiology  [x]  Radiology  []  EKG/Telemetry   [x]  Cardiology/Vascular   []  Pathology  []  Old records    ASSESSMENT   Acute hypoxic respiratory failure  Intermittent hemoptysis  Right lower lobe endobronchial obstruction-mucous plugging versus lesion  Right-sided loculated pleural effusion s/p right Thora-300 cc; exudative  Nonspecific mediastinal lymphadenopathy  Suspected pneumonia-outpatient antibiotics  Pedal edema  CKD  Remote smoker  ISIAH on CPAP  Morbid obesity    PLAN:  Patient stable to mildly improved postthoracentesis.  Continue with supplemental oxygen and wean as tolerated  Pleural fluid analysis is exudative and await further testing.  Would offer bronchoscopic evaluation given chest x-ray findings with persistent right lower lobe volume loss along with some residual pleural effusion and concern for parapneumonic effusion.  Cannot rule out foreign body aspiration as well in the setting of hemoptysis.  N.p.o. after midnight  Echocardiogram reviewed and defer diuresis to primary service given CKD   Continue with empiric antibiotics  Continue with CPAP for ISIAH  Guarded prognosis    Long discussion with the patient as well as family numbers at bedside and explained to them about the alternatives, risks and benefits of the plan and they understand and would like to continue with it.    Aniceto Bruno MD  5/18/2023

## 2023-05-18 NOTE — PROGRESS NOTES
"  Infectious Diseases Progress Note    Ramya Lomeli MD     [facility]  [place]: 0 days  Patient Identification:  Name: Calos Moseley  Age: 62 y.o.  Sex: male  :  1960  MRN: [id number]         Primary Care Physician: Naveed Nunez DO    Requesting physician: Dr. Carvajal  Reason for consultation: Empyema    Subjective: Feels about the same.  Denies any fever and chills.  Had his thoracentesis.  Interval History: See my admission history and physical.  Started on IV ceftriaxone by pulmonary service at the time of admission.  Thoracentesis performed on 2023 reveals exudative noninfectious process and cytology pending.  Objective:    Scheduled Meds:atorvastatin, 20 mg, Oral, Nightly  buPROPion XL, 150 mg, Oral, QAM  cefTRIAXone, 1 g, Intravenous, Q24H  divalproex, 250 mg, Oral, BID  hydrALAZINE, 50 mg, Oral, Q8H  lamoTRIgine, 100 mg, Oral, BID  levothyroxine, 50 mcg, Oral, Q AM  lisinopril, 40 mg, Oral, Daily  metoprolol succinate XL, 100 mg, Oral, BID  naltrexone, 50 mg, Oral, QAM  NIFEdipine XL, 90 mg, Oral, Q24H  pramipexole, 0.5 mg, Oral, Nightly  senna-docusate sodium, 2 tablet, Oral, BID  sodium chloride, 10 mL, Intravenous, Q12H  tamsulosin, 0.4 mg, Oral, Nightly  vilazodone, 40 mg, Oral, Daily      Continuous Infusions:     Vital signs in last 24 hours:  Temp:  [97.5 °F (36.4 °C)-98.7 °F (37.1 °C)] 97.5 °F (36.4 °C)  Heart Rate:  [60-75] 67  Resp:  [14-20] 16  BP: (101-146)/(61-80) 134/63    Intake/Output:    Intake/Output Summary (Last 24 hours) at 2023 0931  Last data filed at 2023 0927  Gross per 24 hour   Intake 560 ml   Output 1850 ml   Net -1290 ml       Exam:  /63 (BP Location: Left arm, Patient Position: Lying)   Pulse 67   Temp 97.5 °F (36.4 °C) (Oral)   Resp 16   Ht 167.6 cm (65.98\")   Wt 116 kg (255 lb 11.7 oz)   SpO2 92%   BMI 41.30 kg/m²   Patient is examined using the personal protective equipment as per guidelines from infection control for " this particular patient as enacted.  Hand washing was performed before and after patient interaction.  General Appearance:    Alert, cooperative, no distress, AAOx3                          Head:    Normocephalic, without obvious abnormality, atraumatic                           Eyes:    PERRL, conjunctivae/corneas clear, EOM's intact, both eyes                         Throat:   Lips, tongue, gums normal; oral mucosa pink and moist                           Neck:   Supple, symmetrical, trachea midline, no JVD                         Lungs:    Decreased breath sounds at the right lung base                 Chest Wall:    No tenderness or deformity                          Heart:  S1-S2 regular                  Abdomen:   Obese soft nontender                 Extremities: Lateral lower extremity edema                        Pulses:   Pulses palpable in all extremities                            Skin:   Skin is warm and dry,  no rashes or palpable lesions                  Neurologic: Grossly nonfocal       Data Review:    I reviewed the patient's new clinical results.  Results from last 7 days   Lab Units 05/18/23  0520 05/17/23  0433 05/16/23  1730   WBC 10*3/mm3 10.67 9.18 10.09   HEMOGLOBIN g/dL 13.3 13.0 14.1   PLATELETS 10*3/mm3 352 265 273     Results from last 7 days   Lab Units 05/18/23  0520 05/17/23  0433 05/16/23  1730   SODIUM mmol/L 140 139 137   POTASSIUM mmol/L 4.8 4.8 4.6   CHLORIDE mmol/L 102 102 100   CO2 mmol/L 28.3 24.5 27.0   BUN mg/dL 36* 32* 33*   CREATININE mg/dL 1.93* 1.60* 1.66*   CALCIUM mg/dL 9.6 9.0 9.5   GLUCOSE mg/dL 168* 129* 132*     Microbiology Results (last 10 days)     Procedure Component Value - Date/Time    Legionella Antigen, Urine - Urine, Urine, Clean Catch [[id number]]  (Normal) Collected: 05/17/23 0452    Lab Status: Final result Specimen: Urine, Clean Catch Updated: 05/17/23 0644     LEGIONELLA ANTIGEN, URINE Negative    S. Pneumo Ag Urine or CSF - Urine, Urine, Clean Catch  [[id number]]  (Normal) Collected: 05/17/23 0452    Lab Status: Final result Specimen: Urine, Clean Catch Updated: 05/17/23 0644     Strep Pneumo Ag Negative    Blood Culture - Blood, Hand, Right [[id number]]  (Normal) Collected: 05/16/23 2253    Lab Status: Preliminary result Specimen: Blood from Hand, Right Updated: 05/17/23 2300     Blood Culture No growth at 24 hours    Blood Culture - Blood, Arm, Left [[id number]]  (Normal) Collected: 05/16/23 2247    Lab Status: Preliminary result Specimen: Blood from Arm, Left Updated: 05/17/23 2315     Blood Culture No growth at 24 hours    Respiratory Panel PCR w/COVID-19(SARS-CoV-2) JERI/SONIA/MARQUITA/PAD/COR/MAD/ULISSES In-House, NP Swab in UTM/VTM, 3-4 HR TAT - Swab, Nasopharynx [[id number]]  (Normal) Collected: 05/16/23 1733    Lab Status: Final result Specimen: Swab from Nasopharynx Updated: 05/16/23 1832     ADENOVIRUS, PCR Not Detected     Coronavirus 229E Not Detected     Coronavirus HKU1 Not Detected     Coronavirus NL63 Not Detected     Coronavirus OC43 Not Detected     COVID19 Not Detected     Human Metapneumovirus Not Detected     Human Rhinovirus/Enterovirus Not Detected     Influenza A PCR Not Detected     Influenza B PCR Not Detected     Parainfluenza Virus 1 Not Detected     Parainfluenza Virus 2 Not Detected     Parainfluenza Virus 3 Not Detected     Parainfluenza Virus 4 Not Detected     RSV, PCR Not Detected     Bordetella pertussis pcr Not Detected     Bordetella parapertussis PCR Not Detected     Chlamydophila pneumoniae PCR Not Detected     Mycoplasma pneumo by PCR Not Detected    Narrative:      In the setting of a positive respiratory panel with a viral infection PLUS a negative procalcitonin without other underlying concern for bacterial infection, consider observing off antibiotics or discontinuation of antibiotics and continue supportive care. If the respiratory panel is positive for atypical bacterial infection (Bordetella pertussis, Chlamydophila pneumoniae, or  Mycoplasma pneumoniae), consider antibiotic de-escalation to target atypical bacterial infection.        Pleural fluid appears to be exudative but does not appear infectious.    Assessment:    Acute dyspnea    COPD (chronic obstructive pulmonary disease)    Anxiety and depression    Hypothyroidism    ISIAH (obstructive sleep apnea)    Essential hypertension    Pleural effusion on right    CKD (chronic kidney disease)    Acute respiratory failure with hypoxia  Complicated right pleural effusion with loculation in the setting of recent symptoms suggestive of pneumonia-this likely represent complicated effusion with empyema but rule out endobronchial lesion and possible malignancy related postobstructive pneumonia complicating this effusion.  Recommendations/discussion:   Continue present antibiotics for postobstructive pneumonia while awaiting cytology on pleural fluid.  Continue supportive care.  Discussed with Dr. Carvajal.  Ramya Lomeli MD  5/18/2023  09:31 EDT    Parts of this note may be an electronic transcription/translation of spoken language to printed text using the Dragon dictation system.

## 2023-05-18 NOTE — PLAN OF CARE
Goal Outcome Evaluation:  Plan of Care Reviewed With: patient        Progress: improving  Outcome Evaluation: PT sitting up in chair last NOC and this Am. PT had c/o right shoulder and coccyx pain,given PRN pain medicaton per order. PT pleasant and cooperative with staff this shift. Remains on O2 to maintain O2 level 90% or above.

## 2023-05-18 NOTE — PLAN OF CARE
Goal Outcome Evaluation:  Plan of Care Reviewed With: patient        Progress: no change       VSS, pt not having much relief today after thoracentesis and diuretics yesterday. According to pt Pulm doc said he was planning to do a bronch tomorrow, no orders or notes as of now.

## 2023-05-19 ENCOUNTER — ANESTHESIA (OUTPATIENT)
Dept: GASTROENTEROLOGY | Facility: HOSPITAL | Age: 63
End: 2023-05-19
Payer: COMMERCIAL

## 2023-05-19 ENCOUNTER — ANESTHESIA EVENT (OUTPATIENT)
Dept: GASTROENTEROLOGY | Facility: HOSPITAL | Age: 63
End: 2023-05-19
Payer: COMMERCIAL

## 2023-05-19 PROBLEM — R59.0 LYMPHADENOPATHY, MEDIASTINAL: Status: ACTIVE | Noted: 2023-05-16

## 2023-05-19 LAB
ANION GAP SERPL CALCULATED.3IONS-SCNC: 8.9 MMOL/L (ref 5–15)
APPEARANCE FLD: ABNORMAL
BUN SERPL-MCNC: 44 MG/DL (ref 8–23)
BUN/CREAT SERPL: 22.3 (ref 7–25)
CALCIUM SPEC-SCNC: 9.3 MG/DL (ref 8.6–10.5)
CHLORIDE SERPL-SCNC: 103 MMOL/L (ref 98–107)
CO2 SERPL-SCNC: 29.1 MMOL/L (ref 22–29)
COLOR FLD: ABNORMAL
CREAT SERPL-MCNC: 1.97 MG/DL (ref 0.76–1.27)
CYTO UR: NORMAL
EGFRCR SERPLBLD CKD-EPI 2021: 38 ML/MIN/1.73
GIE STN SPEC: NORMAL
GLUCOSE SERPL-MCNC: 116 MG/DL (ref 65–99)
LAB AP CASE REPORT: NORMAL
LYMPHOCYTES NFR FLD MANUAL: 7 %
METHOD: ABNORMAL
MONOS+MACROS NFR FLD: 25 %
NEUTROPHILS NFR FLD MANUAL: 68 %
NUC CELL # FLD: 430 /MM3
PATH REPORT.FINAL DX SPEC: NORMAL
PATH REPORT.GROSS SPEC: NORMAL
POTASSIUM SERPL-SCNC: 5.1 MMOL/L (ref 3.5–5.2)
RBC # FLD AUTO: 3900 /MM3
SODIUM SERPL-SCNC: 141 MMOL/L (ref 136–145)

## 2023-05-19 PROCEDURE — 07D78ZX EXTRACTION OF THORAX LYMPHATIC, VIA NATURAL OR ARTIFICIAL OPENING ENDOSCOPIC, DIAGNOSTIC: ICD-10-PCS | Performed by: INTERNAL MEDICINE

## 2023-05-19 PROCEDURE — 88305 TISSUE EXAM BY PATHOLOGIST: CPT | Performed by: INTERNAL MEDICINE

## 2023-05-19 PROCEDURE — 80048 BASIC METABOLIC PNL TOTAL CA: CPT | Performed by: HOSPITALIST

## 2023-05-19 PROCEDURE — 89051 BODY FLUID CELL COUNT: CPT | Performed by: INTERNAL MEDICINE

## 2023-05-19 PROCEDURE — 87206 SMEAR FLUORESCENT/ACID STAI: CPT | Performed by: INTERNAL MEDICINE

## 2023-05-19 PROCEDURE — 25010000002 PROPOFOL 10 MG/ML EMULSION: Performed by: ANESTHESIOLOGY

## 2023-05-19 PROCEDURE — 99222 1ST HOSP IP/OBS MODERATE 55: CPT | Performed by: SURGERY

## 2023-05-19 PROCEDURE — 87116 MYCOBACTERIA CULTURE: CPT | Performed by: INTERNAL MEDICINE

## 2023-05-19 PROCEDURE — 87071 CULTURE AEROBIC QUANT OTHER: CPT | Performed by: INTERNAL MEDICINE

## 2023-05-19 PROCEDURE — 0B9F8ZX DRAINAGE OF RIGHT LOWER LUNG LOBE, VIA NATURAL OR ARTIFICIAL OPENING ENDOSCOPIC, DIAGNOSTIC: ICD-10-PCS | Performed by: INTERNAL MEDICINE

## 2023-05-19 PROCEDURE — 94761 N-INVAS EAR/PLS OXIMETRY MLT: CPT

## 2023-05-19 PROCEDURE — 88112 CYTOPATH CELL ENHANCE TECH: CPT | Performed by: INTERNAL MEDICINE

## 2023-05-19 PROCEDURE — 88312 SPECIAL STAINS GROUP 1: CPT | Performed by: INTERNAL MEDICINE

## 2023-05-19 PROCEDURE — 87102 FUNGUS ISOLATION CULTURE: CPT | Performed by: INTERNAL MEDICINE

## 2023-05-19 PROCEDURE — 87205 SMEAR GRAM STAIN: CPT | Performed by: INTERNAL MEDICINE

## 2023-05-19 PROCEDURE — 25010000002 PHENYLEPHRINE 10 MG/ML SOLUTION: Performed by: ANESTHESIOLOGY

## 2023-05-19 PROCEDURE — 94799 UNLISTED PULMONARY SVC/PX: CPT

## 2023-05-19 PROCEDURE — 88173 CYTOPATH EVAL FNA REPORT: CPT | Performed by: INTERNAL MEDICINE

## 2023-05-19 RX ORDER — LIDOCAINE HYDROCHLORIDE 20 MG/ML
SOLUTION OROPHARYNGEAL AS NEEDED
Status: DISCONTINUED | OUTPATIENT
Start: 2023-05-19 | End: 2023-05-19 | Stop reason: HOSPADM

## 2023-05-19 RX ORDER — PHENYLEPHRINE HYDROCHLORIDE 10 MG/ML
INJECTION INTRAVENOUS AS NEEDED
Status: DISCONTINUED | OUTPATIENT
Start: 2023-05-19 | End: 2023-05-19 | Stop reason: SURG

## 2023-05-19 RX ORDER — SODIUM CHLORIDE 9 MG/ML
75 INJECTION, SOLUTION INTRAVENOUS ONCE
Status: COMPLETED | OUTPATIENT
Start: 2023-05-20 | End: 2023-05-20

## 2023-05-19 RX ORDER — SODIUM CHLORIDE 9 MG/ML
30 INJECTION, SOLUTION INTRAVENOUS CONTINUOUS PRN
Status: DISCONTINUED | OUTPATIENT
Start: 2023-05-19 | End: 2023-05-20

## 2023-05-19 RX ORDER — IPRATROPIUM BROMIDE AND ALBUTEROL SULFATE 2.5; .5 MG/3ML; MG/3ML
3 SOLUTION RESPIRATORY (INHALATION) ONCE
Status: COMPLETED | OUTPATIENT
Start: 2023-05-19 | End: 2023-05-19

## 2023-05-19 RX ORDER — LIDOCAINE HYDROCHLORIDE 20 MG/ML
INJECTION, SOLUTION INFILTRATION; PERINEURAL AS NEEDED
Status: DISCONTINUED | OUTPATIENT
Start: 2023-05-19 | End: 2023-05-19 | Stop reason: SURG

## 2023-05-19 RX ORDER — EPHEDRINE SULFATE 50 MG/ML
INJECTION, SOLUTION INTRAVENOUS AS NEEDED
Status: DISCONTINUED | OUTPATIENT
Start: 2023-05-19 | End: 2023-05-19 | Stop reason: SURG

## 2023-05-19 RX ORDER — LIDOCAINE 50 MG/G
1 PATCH TOPICAL
Status: DISCONTINUED | OUTPATIENT
Start: 2023-05-19 | End: 2023-05-26 | Stop reason: HOSPADM

## 2023-05-19 RX ORDER — PROPOFOL 10 MG/ML
VIAL (ML) INTRAVENOUS AS NEEDED
Status: DISCONTINUED | OUTPATIENT
Start: 2023-05-19 | End: 2023-05-19 | Stop reason: SURG

## 2023-05-19 RX ORDER — LIDOCAINE HYDROCHLORIDE 10 MG/ML
INJECTION, SOLUTION EPIDURAL; INFILTRATION; INTRACAUDAL; PERINEURAL AS NEEDED
Status: DISCONTINUED | OUTPATIENT
Start: 2023-05-19 | End: 2023-05-19 | Stop reason: HOSPADM

## 2023-05-19 RX ORDER — SODIUM CHLORIDE 0.9 % (FLUSH) 0.9 %
10 SYRINGE (ML) INJECTION AS NEEDED
Status: DISCONTINUED | OUTPATIENT
Start: 2023-05-19 | End: 2023-05-20 | Stop reason: HOSPADM

## 2023-05-19 RX ORDER — SODIUM CHLORIDE 0.9 % (FLUSH) 0.9 %
10 SYRINGE (ML) INJECTION EVERY 12 HOURS SCHEDULED
Status: DISCONTINUED | OUTPATIENT
Start: 2023-05-19 | End: 2023-05-20 | Stop reason: HOSPADM

## 2023-05-19 RX ADMIN — PHENYLEPHRINE HYDROCHLORIDE 100 MCG: 10 INJECTION INTRAVENOUS at 15:49

## 2023-05-19 RX ADMIN — HYDRALAZINE HYDROCHLORIDE 50 MG: 50 TABLET, FILM COATED ORAL at 18:08

## 2023-05-19 RX ADMIN — LAMOTRIGINE 100 MG: 100 TABLET ORAL at 21:31

## 2023-05-19 RX ADMIN — OXYCODONE AND ACETAMINOPHEN 1 TABLET: 5; 325 TABLET ORAL at 18:09

## 2023-05-19 RX ADMIN — PHENYLEPHRINE HYDROCHLORIDE 200 MCG: 10 INJECTION INTRAVENOUS at 16:02

## 2023-05-19 RX ADMIN — PROPOFOL 300 MCG/KG/MIN: 10 INJECTION, EMULSION INTRAVENOUS at 15:40

## 2023-05-19 RX ADMIN — PRAMIPEXOLE DIHYDROCHLORIDE 0.5 MG: 0.5 TABLET ORAL at 21:31

## 2023-05-19 RX ADMIN — IPRATROPIUM BROMIDE AND ALBUTEROL SULFATE 3 ML: 2.5; .5 SOLUTION RESPIRATORY (INHALATION) at 16:41

## 2023-05-19 RX ADMIN — EPHEDRINE SULFATE 10 MG: 50 INJECTION INTRAVENOUS at 15:54

## 2023-05-19 RX ADMIN — EPHEDRINE SULFATE 10 MG: 50 INJECTION INTRAVENOUS at 15:49

## 2023-05-19 RX ADMIN — PROPOFOL 200 MG: 10 INJECTION, EMULSION INTRAVENOUS at 15:40

## 2023-05-19 RX ADMIN — Medication 10 ML: at 21:33

## 2023-05-19 RX ADMIN — METOPROLOL SUCCINATE 100 MG: 100 TABLET, EXTENDED RELEASE ORAL at 21:31

## 2023-05-19 RX ADMIN — METOPROLOL SUCCINATE 100 MG: 100 TABLET, EXTENDED RELEASE ORAL at 08:41

## 2023-05-19 RX ADMIN — LISINOPRIL 40 MG: 40 TABLET ORAL at 08:41

## 2023-05-19 RX ADMIN — VILAZODONE HYDROCHLORIDE 40 MG: 40 TABLET, FILM COATED ORAL at 18:08

## 2023-05-19 RX ADMIN — DIVALPROEX SODIUM 250 MG: 250 TABLET, EXTENDED RELEASE ORAL at 21:32

## 2023-05-19 RX ADMIN — Medication 10 ML: at 08:43

## 2023-05-19 RX ADMIN — HYDRALAZINE HYDROCHLORIDE 50 MG: 50 TABLET, FILM COATED ORAL at 21:31

## 2023-05-19 RX ADMIN — LIDOCAINE HYDROCHLORIDE 100 MG: 20 INJECTION, SOLUTION INFILTRATION; PERINEURAL at 15:40

## 2023-05-19 RX ADMIN — GUAIFENESIN AND DEXTROMETHORPHAN 5 ML: 100; 10 SYRUP ORAL at 21:33

## 2023-05-19 RX ADMIN — OXYCODONE AND ACETAMINOPHEN 1 TABLET: 5; 325 TABLET ORAL at 08:47

## 2023-05-19 RX ADMIN — LIDOCAINE 1 PATCH: 700 PATCH TOPICAL at 12:33

## 2023-05-19 RX ADMIN — SODIUM CHLORIDE 30 ML/HR: 9 INJECTION, SOLUTION INTRAVENOUS at 15:27

## 2023-05-19 RX ADMIN — PROPOFOL 50 MG: 10 INJECTION, EMULSION INTRAVENOUS at 15:57

## 2023-05-19 RX ADMIN — PHENYLEPHRINE HYDROCHLORIDE 100 MCG: 10 INJECTION INTRAVENOUS at 15:54

## 2023-05-19 RX ADMIN — ATORVASTATIN CALCIUM 20 MG: 20 TABLET, FILM COATED ORAL at 21:32

## 2023-05-19 RX ADMIN — TAMSULOSIN HYDROCHLORIDE 0.4 MG: 0.4 CAPSULE ORAL at 21:31

## 2023-05-19 RX ADMIN — NIFEDIPINE 90 MG: 60 TABLET, FILM COATED, EXTENDED RELEASE ORAL at 08:41

## 2023-05-19 RX ADMIN — EPHEDRINE SULFATE 10 MG: 50 INJECTION INTRAVENOUS at 16:02

## 2023-05-19 RX ADMIN — BENZONATATE 200 MG: 100 CAPSULE ORAL at 18:08

## 2023-05-19 NOTE — PROGRESS NOTES
Aniceto Bruno MD                          [phone]            Patient ID:    Name:  Calos Moseley    MRN:  [id number]    1960   62 y.o.  male            Patient Care Team:  Naveed Nunez DO as PCP - General (Family Medicine)  Naveed Nunez DO as PCP - Family Medicine  Chris Martinez Jr., MD as Consulting Physician (Pulmonary Disease)  Ihsan Lopez MD as Consulting Physician (Urology)  Dimas Jackson MD as Consulting Physician (Nephrology)  Fely Ferrer RN as Ambulatory  ([facility])    CC/ Reason for visit: Acute hypoxic respiratory failure, right-sided pleural effusion, abnormal CAT scan of the chest, pedal edema    Subjective: Pt seen and examined this AM.  Patient remains on supplemental oxygen.  Continues to have dry cough.  No further hemoptysis reported.  N.p.o. for bronchoscopy today    ROS: Denies any subjective fevers, syncope or presyncopal events, new neurological deficits, nausea or vomiting currently    Objective     Vital Signs past 24hrs    BP range: BP: (123-156)/(62-71) 137/66  Pulse range: Heart Rate:  [61-73] 69  Resp rate range: Resp:  [16-18] 18  Temp range: Temp (24hrs), Av.1 °F (36.7 °C), Min:98 °F (36.7 °C), Max:98.2 °F (36.8 °C)      Ventilator/Non-Invasive Ventilation Settings (From admission, onward)    None          Vent Settings                                         Device (Oxygen Therapy): room air       116 kg (255 lb 11.7 oz); Body mass index is 41.3 kg/m².      Intake/Output Summary (Last 24 hours) at 2023 1632  Last data filed at 2023 1624  Gross per 24 hour   Intake 730 ml   Output 1400 ml   Net -670 ml       PHYSICAL EXAM   Constitutional: Middle-aged morbidly obese white male pt in bed, + acute respiratory distress, + accessory muscle use  Head: - NCAT  Eyes: No pallor.  Anicteric sclerae, EOMI.  ENMT:  Mallampati 4, no oral thrush. Moist MM.   NECK: Trachea midline, No  thyromegaly, no palpable cervical lymphadenopathy  Heart: RRR, no murmur. 1+ pedal edema   Lungs: JAMES +, decreased breath sounds in the right base.  No wheezes/ crackles heard    Abdomen: Soft.  Obese, no tenderness, guarding or rigidity. No palpable masses  Extremities: Extremities warm and well perfused. No cyanosis/ clubbing  Neuro: Conscious, answers appropriately, no gross focal neuro deficits  Psych: Mood and affect appropriate    PPE recommended per [facility] infectious disease Isolation protocol for the current clinical scenario (as mentioned below) was followed.     Scheduled meds:  atorvastatin, 20 mg, Oral, Nightly  benzonatate, 200 mg, Oral, TID  cefTRIAXone, 1 g, Intravenous, Q24H  divalproex, 250 mg, Oral, BID  hydrALAZINE, 50 mg, Oral, Q8H  ipratropium-albuterol, 3 mL, Nebulization, Once  lamoTRIgine, 100 mg, Oral, BID  levothyroxine, 50 mcg, Oral, Q AM  lidocaine, 1 patch, Transdermal, Q24H  lisinopril, 40 mg, Oral, Daily  metoprolol succinate XL, 100 mg, Oral, BID  naltrexone, 50 mg, Oral, QAM  NIFEdipine XL, 90 mg, Oral, Q24H  pramipexole, 0.5 mg, Oral, Nightly  senna-docusate sodium, 2 tablet, Oral, BID  sodium chloride, 10 mL, Intravenous, Q12H  sodium chloride, 10 mL, Intravenous, Q12H  [START ON 5/20/2023] sodium chloride, 75 mL/hr, Intravenous, Once  tamsulosin, 0.4 mg, Oral, Nightly  vilazodone, 40 mg, Oral, Daily        IV meds:                      sodium chloride, 30 mL/hr, Last Rate: 30 mL/hr (05/19/23 1532)        Data Review:      Results from last 7 days   Lab Units 05/19/23  0332 05/18/23  0520 05/17/23  0433 05/16/23  1730   SODIUM mmol/L 141 140 139 137   POTASSIUM mmol/L 5.1 4.8 4.8 4.6   CHLORIDE mmol/L 103 102 102 100   CO2 mmol/L 29.1* 28.3 24.5 27.0   BUN mg/dL 44* 36* 32* 33*   CREATININE mg/dL 1.97* 1.93* 1.60* 1.66*   CALCIUM mg/dL 9.3 9.6 9.0 9.5   BILIRUBIN mg/dL  --   --  0.6 0.7   ALK PHOS U/L  --   --  75 89   ALT (SGPT) U/L  --   --  24 34   AST (SGOT) U/L  --    --  17 19   GLUCOSE mg/dL 116* 168* 129* 132*   WBC 10*3/mm3  --  10.67 9.18 10.09   HEMOGLOBIN g/dL  --  13.3 13.0 14.1   PLATELETS 10*3/mm3  --  352 265 273   PROBNP pg/mL  --   --   --  167.0   PROCALCITONIN ng/mL  --   --   --  0.98*       Lab Results   Component Value Date    CALCIUM 9.3 05/19/2023       Results from last 7 days   Lab Units 05/16/23  2253 05/16/23  2247   BLOODCX  No growth at 2 days No growth at 2 days              I have personally reviewed the results from the time of this admission to 5/19/2023 16:32 EDT and agree with these findings:  [x]  Laboratory  [x]  Microbiology  [x]  Radiology  []  EKG/Telemetry   [x]  Cardiology/Vascular   []  Pathology  []  Old records    ASSESSMENT   Acute hypoxic respiratory failure  Intermittent hemoptysis  Right lower lobe endobronchial obstruction-mucous plugging versus lesion  Right-sided loculated pleural effusion s/p right Thora-300 cc; exudative  Nonspecific mediastinal lymphadenopathy  Suspected pneumonia-outpatient antibiotics  Pedal edema  CKD  Remote smoker  ISIAH on CPAP  Morbid obesity    PLAN:  Patient stable.  Continues to have dry cough which is annoying but otherwise not really any worsening.  Does not report any further hemoptysis.  Pleural fluid analysis is exudative.  Cultures are negative still.  Repeat chest x-ray shows persistent right-sided pleural effusion.  Given loculated nature of the pleural fluid on CT we will consult thoracic surgery.  I have discussed with them and the plan for VATS decortication over the weekend if bronchoscopy does not show any acute obstruction or foreign body.  Continue with supplemental oxygen and wean as tolerated  Defer diuresis to primary service given CKD   Continue with empiric antibiotics  Continue with CPAP for ISIAH  Guarded prognosis    Aniceto Bruno MD  5/19/2023

## 2023-05-19 NOTE — CONSULTS
THORACIC SURGERY INPATIENT CONSULT NOTE    REASON FOR CONSULT: Right loculated parapneumonic effusion    REFERRING PROVIDER: Aniceto Bruno MD    Subjective   HISTORY OF PRESENTING ILLNESS:   Calos Moseley is a 62 y.o. male has significant medical problems as mentioned below who presented to the emergency room with worsening cough, congestion, shortness of breath and lower extremities edema on 5/16/2023.  He has been treated as an outpatient with prednisone and amoxicillin however has had worsening of his symptoms.  He also reported hemoptysis which has been resolved.  He was evaluated in the [facility] pulmonary care office and found to have concern for endobronchial obstruction and was sent to the ER. CT scan of the chest revealed right-sided loculated effusion.  He was started on IV antibiotics for presumed pneumonia.  He underwent right-sided thoracentesis on 5/18/2023 with 300 cc of exudative fluid drainage.  The chest x-ray showed persistent pleural effusion concerning for parapneumonic process.  He underwent flexible bronchoscopy on 5/19/2023 which revealed edematous airways with external compression on the right.  BAL was performed from the right lower lobe.  Thoracic surgery was consulted for loculated parapneumonic effusion.    He has history of smoking but quit about 35 years ago. He also has history of sleep apnea and is on his home CPAP machine at present.    Past Medical History:   Diagnosis Date   • Anesthesia complication     STATES HARD TO WAKE UP AFTER PREVIOUS KIDNEY STONE SURGERY AND STATES HE CODED   • Bilateral kidney stones    • Bipolar affective    • COVID 2022   • Disease of thyroid gland    • Elevated cholesterol    • Hypertension    • Kidney stone on left side    • Left flank pain    • Mood disorder    • Restless leg syndrome    • Sleep apnea     cpap  bring dos   • UTI (urinary tract infection) 12/2021    PREV FINISHED ANTIBIOTIC       Past Surgical History:   Procedure  Laterality Date   • ADENOIDECTOMY     • APPENDECTOMY     • COLONOSCOPY     • COLONOSCOPY N/A 3/23/2023    Procedure: COLONOSCOPY into cecum with cold snare polypectomies;  Surgeon: Yoan Leonard MD;  Location: [facility] ENDOSCOPY;  Service: Gastroenterology;  Laterality: N/A;  polyps   • CYSTOSCOPY WITH CLOT EVACUATION N/A 3/8/2022    Procedure: CYSTOSCOPY WITH CLOT EVACUATION;  Surgeon: Ihsan Lopez MD;  Location: [facility] OR;  Service: Urology;  Laterality: N/A;   • CYSTOSCOPY, URETEROSCOPY, RETROGRADE PYELOGRAM, STENT INSERTION Left 3/7/2022    Procedure: CYSTOSCOPY URETEROSCOPY LASER LITHOTRIPSY and stent exchange;  Surgeon: Deuce Leija MD;  Location: [facility];  Service: Urology;  Laterality: Left;   • KIDNEY STONE SURGERY     • NEPHROLITHOTRIPSY PERCUTANEOUS Left 2022    Procedure: NEPHROSTLITHOTOMY PERCUTANEOUS;  Surgeon: Deuce Leija MD;  Location: [facility] OR;  Service: Urology;  Laterality: Left;   • NEPHROLITHOTRIPSY PERCUTANEOUS Left 2022    Procedure: LEFT FLEXIBLE NEPHROSTOMY WITH STONE EXTRACTION, LEFT NEPHROSTOMY, TUBE REMOVAL, BILATERAL URETERAL STENT PLACEMENT;  Surgeon: Deuce Leija MD;  Location: [facility] OR;  Service: Urology;  Laterality: Left;   • NEPHROLITHOTRIPSY PERCUTANEOUS Right 3/7/2022    Procedure: RIGHT PERCUTANEOUS NEPHROLITHOTOMY W ACCESS;  Surgeon: Deuce Leija MD;  Location: [facility];  Service: Urology;  Laterality: Right;   • TONSILLECTOMY         Family History   Problem Relation Age of Onset   • Malig Hyperthermia Neg Hx        Social History     Socioeconomic History   • Marital status:    Tobacco Use   • Smoking status: Former     Types: Cigarettes     Quit date: 1990     Years since quittin.4   • Smokeless tobacco: Never   Vaping Use   • Vaping Use: Never used   Substance and Sexual Activity   • Alcohol use: Not Currently   • Drug use: No   • Sexual activity: Defer         Current  Facility-Administered Medications:   •  acetaminophen (TYLENOL) tablet 650 mg, 650 mg, Oral, Q6H PRN, Tristen Perez MD  •  albuterol sulfate HFA (PROVENTIL HFA;VENTOLIN HFA;PROAIR HFA) inhaler 2 puff, 2 puff, Inhalation, Q6H PRN, Ramya Lomeli MD  •  atorvastatin (LIPITOR) tablet 20 mg, 20 mg, Oral, Nightly, Ramya Lomeli MD, 20 mg at 05/18/23 2039  •  benzonatate (TESSALON) capsule 200 mg, 200 mg, Oral, TID, Aniceto Bruno MD, 200 mg at 05/19/23 1808  •  sennosides-docusate (PERICOLACE) 8.6-50 MG per tablet 2 tablet, 2 tablet, Oral, BID, 2 tablet at 05/18/23 0939 **AND** polyethylene glycol (MIRALAX) packet 17 g, 17 g, Oral, Daily PRN **AND** bisacodyl (DULCOLAX) EC tablet 5 mg, 5 mg, Oral, Daily PRN **AND** bisacodyl (DULCOLAX) suppository 10 mg, 10 mg, Rectal, Daily PRN, Ramya Lomeli MD  •  cefTRIAXone (ROCEPHIN) 1 g in sodium chloride 0.9 % 100 mL IVPB-VTB, 1 g, Intravenous, Q24H, Yg Schwarz MD, Last Rate: 200 mL/hr at 05/18/23 2334, 1 g at 05/18/23 2334  •  divalproex (DEPAKOTE ER) 24 hr tablet 250 mg, 250 mg, Oral, BID, Ramya Lomeli MD, 250 mg at 05/18/23 2038  •  guaiFENesin-dextromethorphan (ROBITUSSIN DM) 100-10 MG/5ML syrup 5 mL, 5 mL, Oral, Q4H PRN, Aniceto Bruno MD, 5 mL at 05/18/23 1946  •  hydrALAZINE (APRESOLINE) tablet 50 mg, 50 mg, Oral, Q8H, Ramya Lomeli MD, 50 mg at 05/19/23 1808  •  lamoTRIgine (LaMICtal) tablet 100 mg, 100 mg, Oral, BID, Ramya Lomeli MD, 100 mg at 05/18/23 2038  •  levothyroxine (SYNTHROID, LEVOTHROID) tablet 50 mcg, 50 mcg, Oral, Q AM, Ramya Lomeli MD, 50 mcg at 05/18/23 0514  •  lidocaine (LIDODERM) 5 % 1 patch, 1 patch, Transdermal, Q24H, Tristen Perez MD, 1 patch at 05/19/23 1233  •  lisinopril (PRINIVIL,ZESTRIL) tablet 40 mg, 40 mg, Oral, Daily, Ramya Lomeli MD, 40 mg at 05/19/23 0841  •  metoprolol succinate XL (TOPROL-XL) 24 hr tablet 100 mg, 100 mg, Oral, BID, Ramya Lomeli MD, 100 mg at 05/19/23 0841  •   "naltrexone (DEPADE) tablet 50 mg, 50 mg, Oral, QAM, Ramya Lomeli MD, 50 mg at 05/18/23 0517  •  NIFEdipine XL (PROCARDIA XL) 24 hr tablet 90 mg, 90 mg, Oral, Q24H, Ramya Lomeli MD, 90 mg at 05/19/23 0841  •  nitroglycerin (NITROSTAT) SL tablet 0.4 mg, 0.4 mg, Sublingual, Q5 Min PRN, Ramya Lomeli MD  •  ondansetron (ZOFRAN) injection 4 mg, 4 mg, Intravenous, Q6H PRN, Ramya Lomeli MD  •  oxyCODONE-acetaminophen (PERCOCET) 5-325 MG per tablet 1 tablet, 1 tablet, Oral, Q6H PRN, Ramya Lomeli MD, 1 tablet at 05/19/23 1809  •  pramipexole (MIRAPEX) tablet 0.5 mg, 0.5 mg, Oral, Nightly, Ramya Lomeli MD, 0.5 mg at 05/18/23 2038  •  [COMPLETED] Insert Peripheral IV, , , Once **AND** sodium chloride 0.9 % flush 10 mL, 10 mL, Intravenous, PRN, Ramya Lomeli MD  •  sodium chloride 0.9 % flush 10 mL, 10 mL, Intravenous, Q12H, Ramya Lomeli MD, 10 mL at 05/19/23 0843  •  sodium chloride 0.9 % flush 10 mL, 10 mL, Intravenous, PRN, Ramya Lomeli MD  •  sodium chloride 0.9 % flush 10 mL, 10 mL, Intravenous, Q12H, Aniceto Bruno MD  •  sodium chloride 0.9 % flush 10 mL, 10 mL, Intravenous, PRN, Aniceto Bruno MD  •  sodium chloride 0.9 % infusion 40 mL, 40 mL, Intravenous, PRN, Ramya Lomeli MD  •  sodium chloride 0.9 % infusion, 30 mL/hr, Intravenous, Continuous PRN, Aniceto Bruno MD, Stopped at 05/19/23 1639  •  [START ON 5/20/2023] sodium chloride 0.9 % infusion, 75 mL/hr, Intravenous, Once, Vandana Gaines APRN  •  tamsulosin (FLOMAX) 24 hr capsule 0.4 mg, 0.4 mg, Oral, Nightly, Ramya Lomeli MD, 0.4 mg at 05/18/23 2037  •  vilazodone (VIIBRYD) tablet 40 mg, 40 mg, Oral, Daily, Ramya Lomeli MD, 40 mg at 05/19/23 1808     No Known Allergies          Objective    OBJECTIVE:     VITAL SIGNS:  /70   Pulse 85   Temp 98 °F (36.7 °C) (Oral)   Resp 20   Ht 167.6 cm (65.98\")   Wt 116 kg (255 lb 11.7 oz)   SpO2 95%   BMI 41.30 kg/m²     PHYSICAL EXAM:  Constitutional:       Appearance: " Normal appearance.   HENT:      Head: Normocephalic.      Right Ear: External ear normal.      Left Ear: External ear normal.      Nose: Nose normal.      Mouth/Throat: No obvious deformity     Pharynx: Oropharynx is clear.   Eyes:      Conjunctiva/sclera: Conjunctivae normal.   Cardiovascular:      Rate and Rhythm: Normal rate.      Pulses: Normal pulses.   Pulmonary:      Effort: Pulmonary effort is normal.   Abdominal:      Palpations: Abdomen is soft.   Musculoskeletal:         General: Normal range of motion.      Cervical back: Normal range of motion.   Skin:     General: Skin is warm.   Neurological:      General: No focal deficit present.      Mental Status: He is alert and oriented to person, place, and time.     LAB RESULTS:  I have reviewed all the available laboratory results in the chart.    RESULTS REVIEW:  I have reviewed the patient's all relevant laboratory and imaging findings.         ASSESSMENT & PLAN:  Calos Moseley is a 62 y.o. male with significant medical conditions as mentioned above presented to the hospital with right-sided loculated parapneumonic effusion.  There was no bronchoscopic evidence of endobronchial lesion in the right lower lobe.  He has loculated effusion and inadequate expansion of lung.  I have offered the patient robotic assisted right decortication, pleurectomy on 5/20/2023.    I discussed the patients findings and my recommendations with the patient. The patient was given adequate time to ask questions and all questions were answered to patient satisfaction. Thank you for this consult and allowing us to participate in the care of your patient.      Jose Rice MD  Thoracic Surgeon  [facility]        Dictated utilizing Dragon dictation    I spent 60 minutes caring for Calos on this date of service. This time includes time spent by me in the following activities:reviewing tests, obtaining and/or reviewing a separately obtained history, performing a  medically appropriate examination and/or evaluation , counseling and educating the patient/family/caregiver, ordering medications, tests, or procedures, referring and communicating with other health care professionals , documenting information in the medical record, independently interpreting results and communicating that information with the patient/family/caregiver, and care coordination and more than half the time was spent in direct face to face evaluation and decision making.

## 2023-05-19 NOTE — OP NOTE
Bronchoscopy Procedure Note    Procedure:  1. Bronchoscopy, Diagnostic  2. Bronchoalveolar lavage, BAL  3. EBUS guided 10 R ln tbna    Pre-Operative Diagnosis:   RLL atelectasis/ pna   Rt hilar LNpathy     Post-Operative Diagnosis: Same    Anesthesia: Monitored Anesthesia Care (MAC)    Procedure Details: Patient/ family was consented for the procedure with all risks and benefits of the procedure explained in detail.  Patient/ family was given the opportunity to ask questions and all concerns were answered.  The bronchocope was inserted into the main airway via the LMA. A detailed anatomical survey was done of the airways from the trachea till  the visualized subsegmental bronchi and the findings are as reported below.     PPE recommended per [facility] infectious disease protocol was followed.     A bronchoalveolar lavage was performed using aliquots of normal saline, a total of 90 mL was instilled into the airways and 30mL was aspirated back.     EBUS scope was used to perform a mediastinal LN evaluation.Multiple small 10 R LN were identified to be enlarged. 10 R sampled. The samples were assessed using Rapid Onsite Evaluation (ISA) which has confirmed lymphocytes and inflammatory cells. We will share information with the patient once we have a confirmed diagnosis from the pathologist.       Findings:  1) Normal larynx, trachea, bilateral mainstem, lobar and visualized segmental bronchi other than mentioned below  2) Edematous airways with external compression on the Right. No sig mucus plugging. + EDAC   3) Successful BAL of the RLL was performed as desribed above.    4) Successful  EBUS guided TBNA of the 10 T LN obtained as described above    Estimated Blood Loss: Minimal                Complications: None;   Patient tolerated the procedure well and handed over to the anesthesia team for recovery.           Disposition: Endoscopy Recovery room    Pt will be discharged to the floor per endo protocol when  cleared by the anesthesia service.      Patient tolerated the procedure well and I have updated the family at bedside in the recovery room.     Aniceto Bruno MD  5/19/2023  16:07 EDT

## 2023-05-19 NOTE — ANESTHESIA PROCEDURE NOTES
Airway  Urgency: elective    Date/Time: 5/19/2023 3:42 PM  Airway not difficult    General Information and Staff    Patient location during procedure: OR  Anesthesiologist: Juancho Ashley MD    Indications and Patient Condition  Indications for airway management: airway protection    Preoxygenated: yes  MILS maintained throughout  Mask difficulty assessment: 1 - vent by mask    Final Airway Details  Final airway type: supraglottic airway      Successful airway: LMA  Size 5     Number of attempts at approach: 1  Assessment: lips, teeth, and gum same as pre-op and atraumatic intubation

## 2023-05-19 NOTE — PROGRESS NOTES
Name: Calos Moseley ADMIT: 2023   : 1960  PCP: Naveed Nunez DO    MRN: [id number] LOS: 1 days   AGE/SEX: 62 y.o. male  ROOM: ENDO/ENDO     Subjective   Subjective   No change in right-sided chest pain.  Breathing is stable    Review of Systems     Objective   Objective   Vital Signs  Temp:  [98 °F (36.7 °C)-98.2 °F (36.8 °C)] 98 °F (36.7 °C)  Heart Rate:  [61-73] 69  Resp:  [16-18] 18  BP: (123-156)/(62-71) 137/66  SpO2:  [91 %-94 %] 91 %  on  Flow (L/min):  [2] 2;   Device (Oxygen Therapy): room air  Body mass index is 41.3 kg/m².  Physical Exam  Vitals reviewed.   Constitutional:       General: He is not in acute distress.     Appearance: He is obese. He is ill-appearing.   HENT:      Head: Normocephalic and atraumatic.   Pulmonary:      Effort: Pulmonary effort is normal.      Breath sounds: Rhonchi present.   Abdominal:      General: Bowel sounds are normal. There is no distension.      Palpations: Abdomen is soft.      Tenderness: There is no abdominal tenderness.   Musculoskeletal:      Right lower leg: Edema present.      Left lower leg: Edema present.   Skin:     General: Skin is warm and dry.      Findings: No rash.   Neurological:      Mental Status: He is alert and oriented to person, place, and time.   Psychiatric:         Mood and Affect: Mood normal.         Behavior: Behavior normal.       Results Review     I reviewed the patient's new clinical results.  Results from last 7 days   Lab Units 23  0520 23  0433 23  1730   WBC 10*3/mm3 10.67 9.18 10.09   HEMOGLOBIN g/dL 13.3 13.0 14.1   PLATELETS 10*3/mm3 352 265 273     Results from last 7 days   Lab Units 23  0332 23  0520 23  0433 23  1730   SODIUM mmol/L 141 140 139 137   POTASSIUM mmol/L 5.1 4.8 4.8 4.6   CHLORIDE mmol/L 103 102 102 100   CO2 mmol/L 29.1* 28.3 24.5 27.0   BUN mg/dL 44* 36* 32* 33*   CREATININE mg/dL 1.97* 1.93* 1.60* 1.66*   GLUCOSE mg/dL 116* 168* 129* 132*   EGFR  mL/min/1.73 38.0* 38.9* 48.7* 46.6*     Results from last 7 days   Lab Units 05/17/23  0433 05/16/23  1730   ALBUMIN g/dL 3.3* 4.0   BILIRUBIN mg/dL 0.6 0.7   ALK PHOS U/L 75 89   AST (SGOT) U/L 17 19   ALT (SGPT) U/L 24 34     Results from last 7 days   Lab Units 05/19/23  0332 05/18/23  0520 05/17/23  0433 05/16/23  1730   CALCIUM mg/dL 9.3 9.6 9.0 9.5   ALBUMIN g/dL  --   --  3.3* 4.0     Results from last 7 days   Lab Units 05/16/23  1730   PROCALCITONIN ng/mL 0.98*   LACTATE mmol/L 1.3     No results found for: HGBA1C, POCGLU    XR Chest 1 View    Result Date: 5/18/2023  Right lung base pneumonia and/or atelectasis with a moderate size right pleural effusion.  This report was finalized on 5/18/2023 9:21 PM by Dr. Vega Fowler M.D.      I have personally reviewed all medications:  Scheduled Medications  atorvastatin, 20 mg, Oral, Nightly  benzonatate, 200 mg, Oral, TID  cefTRIAXone, 1 g, Intravenous, Q24H  divalproex, 250 mg, Oral, BID  hydrALAZINE, 50 mg, Oral, Q8H  ipratropium-albuterol, 3 mL, Nebulization, Once  lamoTRIgine, 100 mg, Oral, BID  levothyroxine, 50 mcg, Oral, Q AM  lidocaine, 1 patch, Transdermal, Q24H  lisinopril, 40 mg, Oral, Daily  metoprolol succinate XL, 100 mg, Oral, BID  naltrexone, 50 mg, Oral, QAM  NIFEdipine XL, 90 mg, Oral, Q24H  pramipexole, 0.5 mg, Oral, Nightly  senna-docusate sodium, 2 tablet, Oral, BID  sodium chloride, 10 mL, Intravenous, Q12H  sodium chloride, 10 mL, Intravenous, Q12H  [START ON 5/20/2023] sodium chloride, 75 mL/hr, Intravenous, Once  tamsulosin, 0.4 mg, Oral, Nightly  vilazodone, 40 mg, Oral, Daily    Infusions  sodium chloride, 30 mL/hr, Last Rate: 30 mL/hr (05/19/23 1532)    Diet  NPO Diet NPO Type: Strict NPO  NPO Diet NPO Type: Sips with Meds    I have personally reviewed:  [x]  Laboratory   [x]  Microbiology   [x]  Radiology   [x]  EKG/Telemetry  [x]  Cardiology/Vascular   []  Pathology    []  Records       Assessment/Plan     Active Hospital Problems     Diagnosis  POA   • **Acute dyspnea [R06.00]  Yes   • Acute respiratory failure with hypoxia [J96.01]  Yes   • Pleural effusion on right [J90]  Yes   • CKD (chronic kidney disease) [N18.9]  Yes   • Lymphadenopathy, mediastinal [R59.0]  Unknown   • ISIAH (obstructive sleep apnea) [G47.33]  Yes   • COPD (chronic obstructive pulmonary disease) [J44.9]  Yes   • Anxiety and depression [F41.9, F32.A]  Yes   • Essential hypertension [I10]  Yes   • Hypothyroidism [E03.9]  Yes      Resolved Hospital Problems   No resolved problems to display.       62 y.o. male admitted with Acute dyspnea, right-sided chest pain and hypoxia, findings of loculated right pleural effusion and possible right lower lobe endobronchial lesion.    Loculated pleural effusion status post thoracentesis.  Fluid looks to be exudative, cytology negative for malignant cells.  I do not see where Gram stain or culture were ever sent.    - Bronchoscopy pending today  - On ceftriaxone, duration per ID  - Supplement O2 as needed.  Wean to keep sats greater than 90%  - We will add Lidoderm patch for right-sided chest pain    Peripheral edema/weight gain- reviewed echocardiogram.  Relatively unremarkable other than mild pulmonary hypertension.  No diastolic dysfunction and normal BNP.    -Lasix x1 given.  Mild elevation in creatinine although this is still not far from baseline.  - Urine studies do not suggest significant proteinuria though there is very small amount  - Also note that he is on nifedipine which can cause peripheral edema    CKD relatively stable, continue to follow    History of hypothyroidism, on replacement with normal TSH    HTN, apparently refractory based on his extensive medication regimen but actually well controlled currently      · SCDs for DVT prophylaxis.  Consider pharmacologic prophylaxis after procedures are done  · Dispo: Hopefully home in the next couple days depending on results of above    Tristen Perez MD  [facility]  05/19/23  16:31 EDT

## 2023-05-19 NOTE — PROGRESS NOTES
"  Infectious Diseases Progress Note    Ramya Lomeli MD     [facility]  Los: 1 day  Patient Identification:  Name: Calos Moseley  Age: 62 y.o.  Sex: male  :  1960  MRN: [id number]         Primary Care Physician: Naveed Nunez DO    Requesting physician: Dr. Carvajal  Reason for consultation: Empyema    Subjective: Still has discomfort in the right chest.  Earlier had bronchoscopy.  Denies any fever and chills.  Interval History: See my admission history and physical.  Started on IV ceftriaxone by pulmonary service at the time of admission.  Thoracentesis performed on 2023 reveals exudative noninfectious process and cytology pending.  2023 underwent bronchoscopy  Objective:    Scheduled Meds:atorvastatin, 20 mg, Oral, Nightly  benzonatate, 200 mg, Oral, TID  cefTRIAXone, 1 g, Intravenous, Q24H  divalproex, 250 mg, Oral, BID  hydrALAZINE, 50 mg, Oral, Q8H  lamoTRIgine, 100 mg, Oral, BID  levothyroxine, 50 mcg, Oral, Q AM  lisinopril, 40 mg, Oral, Daily  metoprolol succinate XL, 100 mg, Oral, BID  naltrexone, 50 mg, Oral, QAM  NIFEdipine XL, 90 mg, Oral, Q24H  pramipexole, 0.5 mg, Oral, Nightly  senna-docusate sodium, 2 tablet, Oral, BID  sodium chloride, 10 mL, Intravenous, Q12H  tamsulosin, 0.4 mg, Oral, Nightly  vilazodone, 40 mg, Oral, Daily      Continuous Infusions:     Vital signs in last 24 hours:  Temp:  [97.8 °F (36.6 °C)-98.2 °F (36.8 °C)] 98.2 °F (36.8 °C)  Heart Rate:  [61-73] 69  Resp:  [16] 16  BP: (123-150)/(57-69) 150/66    Intake/Output:    Intake/Output Summary (Last 24 hours) at 2023 0845  Last data filed at 2023 0844  Gross per 24 hour   Intake 340 ml   Output 1400 ml   Net -1060 ml       Exam:  /66   Pulse 69   Temp 98.2 °F (36.8 °C) (Oral)   Resp 16   Ht 167.6 cm (65.98\")   Wt 116 kg (255 lb 11.7 oz)   SpO2 93%   BMI 41.30 kg/m²   Patient is examined using the personal protective equipment as per guidelines from infection control for " this particular patient as enacted.  Hand washing was performed before and after patient interaction.  General Appearance:    Alert, cooperative, no distress, AAOx3                          Head:    Normocephalic, without obvious abnormality, atraumatic                           Eyes:    PERRL, conjunctivae/corneas clear, EOM's intact, both eyes                         Throat:   Lips, tongue, gums normal; oral mucosa pink and moist                           Neck:   Supple, symmetrical, trachea midline, no JVD                         Lungs:    Decreased breath sounds at the right lung base                 Chest Wall:    No tenderness or deformity                          Heart:  S1-S2 regular                  Abdomen:   Obese soft nontender                 Extremities: Lateral lower extremity edema                        Pulses:   Pulses palpable in all extremities                            Skin:   Skin is warm and dry,  no rashes or palpable lesions                  Neurologic: Grossly nonfocal       Data Review:    I reviewed the patient's new clinical results.  Results from last 7 days   Lab Units 05/18/23  0520 05/17/23  0433 05/16/23  1730   WBC 10*3/mm3 10.67 9.18 10.09   HEMOGLOBIN g/dL 13.3 13.0 14.1   PLATELETS 10*3/mm3 352 265 273     Results from last 7 days   Lab Units 05/19/23  0332 05/18/23  0520 05/17/23  0433 05/16/23  1730   SODIUM mmol/L 141 140 139 137   POTASSIUM mmol/L 5.1 4.8 4.8 4.6   CHLORIDE mmol/L 103 102 102 100   CO2 mmol/L 29.1* 28.3 24.5 27.0   BUN mg/dL 44* 36* 32* 33*   CREATININE mg/dL 1.97* 1.93* 1.60* 1.66*   CALCIUM mg/dL 9.3 9.6 9.0 9.5   GLUCOSE mg/dL 116* 168* 129* 132*     Microbiology Results (last 10 days)     Procedure Component Value - Date/Time    Legionella Antigen, Urine - Urine, Urine, Clean Catch [[id number]]  (Normal) Collected: 05/17/23 0452    Lab Status: Final result Specimen: Urine, Clean Catch Updated: 05/17/23 0644     LEGIONELLA ANTIGEN, URINE Negative    S.  Pneumo Ag Urine or CSF - Urine, Urine, Clean Catch [[id number]]  (Normal) Collected: 05/17/23 0452    Lab Status: Final result Specimen: Urine, Clean Catch Updated: 05/17/23 0644     Strep Pneumo Ag Negative    Blood Culture - Blood, Hand, Right [[id number]]  (Normal) Collected: 05/16/23 2253    Lab Status: Preliminary result Specimen: Blood from Hand, Right Updated: 05/18/23 2301     Blood Culture No growth at 2 days    Blood Culture - Blood, Arm, Left [[id number]]  (Normal) Collected: 05/16/23 2247    Lab Status: Preliminary result Specimen: Blood from Arm, Left Updated: 05/18/23 2301     Blood Culture No growth at 2 days    Respiratory Panel PCR w/COVID-19(SARS-CoV-2) JERI/SONIA/MARQUITA/PAD/COR/MAD/ULISSES In-House, NP Swab in UTM/VTM, 3-4 HR TAT - Swab, Nasopharynx [[id number]]  (Normal) Collected: 05/16/23 1733    Lab Status: Final result Specimen: Swab from Nasopharynx Updated: 05/16/23 1832     ADENOVIRUS, PCR Not Detected     Coronavirus 229E Not Detected     Coronavirus HKU1 Not Detected     Coronavirus NL63 Not Detected     Coronavirus OC43 Not Detected     COVID19 Not Detected     Human Metapneumovirus Not Detected     Human Rhinovirus/Enterovirus Not Detected     Influenza A PCR Not Detected     Influenza B PCR Not Detected     Parainfluenza Virus 1 Not Detected     Parainfluenza Virus 2 Not Detected     Parainfluenza Virus 3 Not Detected     Parainfluenza Virus 4 Not Detected     RSV, PCR Not Detected     Bordetella pertussis pcr Not Detected     Bordetella parapertussis PCR Not Detected     Chlamydophila pneumoniae PCR Not Detected     Mycoplasma pneumo by PCR Not Detected    Narrative:      In the setting of a positive respiratory panel with a viral infection PLUS a negative procalcitonin without other underlying concern for bacterial infection, consider observing off antibiotics or discontinuation of antibiotics and continue supportive care. If the respiratory panel is positive for atypical bacterial infection  (Bordetella pertussis, Chlamydophila pneumoniae, or Mycoplasma pneumoniae), consider antibiotic de-escalation to target atypical bacterial infection.        Pleural fluid appears to be exudative but does not appear infectious.    Assessment:    Acute dyspnea    COPD (chronic obstructive pulmonary disease)    Anxiety and depression    Hypothyroidism    ISIAH (obstructive sleep apnea)    Essential hypertension    Pleural effusion on right    CKD (chronic kidney disease)    Acute respiratory failure with hypoxia    Lymphadenopathy, mediastinal  Complicated right pleural effusion with loculation in the setting of recent symptoms suggestive of pneumonia-this likely represent complicated effusion with empyema but rule out endobronchial lesion and possible malignancy related postobstructive pneumonia complicating this effusion.  Recommendations/discussion:   Continue present antibiotics for postobstructive pneumonia while awaiting cytology on pleural fluid.  If no obvious infectious process.  Would recommend  5 days of antibiotic treatment.  See my initial H&P performed as cross cover.  Continue supportive care.  Discussed with Dr. Perez.  Ramya Lomeli MD  5/19/2023  08:45 EDT    Parts of this note may be an electronic transcription/translation of spoken language to printed text using the Dragon dictation system.

## 2023-05-19 NOTE — ANESTHESIA PREPROCEDURE EVALUATION
Anesthesia Evaluation     Patient summary reviewed and Nursing notes reviewed   NPO Solid Status: > 8 hours  NPO Liquid Status: > 4 hours           Airway   Mallampati: III  TM distance: >3 FB  Neck ROM: full  Difficult intubation highly probable and Large neck circumference  Comment: CMAC D used in past  Dental - normal exam     Pulmonary    (+) pleural effusion, a smoker Former, COPD, shortness of breath, sleep apnea on CPAP, rhonchi,     ROS comment: Hx acute respiratory failure with hypoxia  Cardiovascular - normal exam    ECG reviewed  Rhythm: regular  Rate: normal    (+) hypertension 2 medications or greater, hyperlipidemia,       Neuro/Psych  (+) psychiatric history Anxiety, Depression and Bipolar,      ROS Comment: RLS  GI/Hepatic/Renal/Endo    (+) obesity, morbid obesity,  renal disease stones, thyroid problem hypothyroidism    Musculoskeletal     Abdominal   (+) obese,    Substance History      OB/GYN          Other                      Anesthesia Plan    ASA 3     general   total IV anesthesia  (LMA)  intravenous induction     Anesthetic plan, risks, benefits, and alternatives have been provided, discussed and informed consent has been obtained with: patient.        CODE STATUS:    Code Status (Patient has no pulse and is not breathing): CPR (Attempt to Resuscitate)  Medical Interventions (Patient has pulse or is breathing): Full Support

## 2023-05-19 NOTE — ANESTHESIA POSTPROCEDURE EVALUATION
"Patient: Calos Moseley    Procedure Summary     Date: 05/19/23 Room / Location: [facility] ENDOSCOPY 7 / [facility] ENDOSCOPY    Anesthesia Start: 1532 Anesthesia Stop: 1630    Procedure: BRONCHOSCOPY WITH ENDOBRONCHIAL ULTRASOUND with BAL of RLL and FNA (Right: Bronchus) Diagnosis:       Acute respiratory failure with hypoxia      Lymphadenopathy, mediastinal      (Acute respiratory failure with hypoxia [J96.01])      (Lymphadenopathy, mediastinal [R59.0])    Surgeons: Aniceto Bruno MD Provider: Juancho Ashley MD    Anesthesia Type: general ASA Status: 3          Anesthesia Type: general    Vitals  Vitals Value Taken Time   /73 05/19/23 1655   Temp 36.7 °C (98 °F) 05/19/23 1655   Pulse 85 05/19/23 1655   Resp 20 05/19/23 1655   SpO2 95 % 05/19/23 1655           Post Anesthesia Care and Evaluation    Patient location during evaluation: bedside  Patient participation: complete - patient participated  Level of consciousness: awake and alert  Pain management: adequate    Airway patency: patent  Anesthetic complications: No anesthetic complications    Cardiovascular status: acceptable  Respiratory status: acceptable  Hydration status: acceptable    Comments: /70   Pulse 85   Temp 36.7 °C (98 °F) (Oral)   Resp 20   Ht 167.6 cm (65.98\")   Wt 116 kg (255 lb 11.7 oz)   SpO2 95%   BMI 41.30 kg/m²       "

## 2023-05-19 NOTE — CASE MANAGEMENT/SOCIAL WORK
Continued Stay Note  [facility]     Patient Name: Calos Moseley  MRN: [id number]  Today's Date: 5/19/2023    Admit Date: 5/16/2023    Plan: Home with spouse. Family will transport at D/C. Currently on O2@2LNC. Follow for possible Home O2 need at D/C. If nebs ordered at D/C will need nebulizer machine.   Discharge Plan     Row Name 05/19/23 1425       Plan    Plan Home with spouse. Family will transport at D/C. Currently on O2@2LNC. Follow for possible Home O2 need at D/C. If nebs ordered at D/C will need nebulizer machine.    Patient/Family in Agreement with Plan yes    Plan Comments Pt to have Bronchoscopy today. Consult to Thoracic surgery for persistent Loculated effusion. Pt had right US guided thoracentesis 5/18 with 300cc removed. On O2@2LNC and does not use home O2. Will need to follow for possible home O2 need at D/C. Jacob Medellin RN-BC               Discharge Codes    No documentation.               Expected Discharge Date and Time     Expected Discharge Date Expected Discharge Time    May 21, 2023             Jacob Medellin RN

## 2023-05-19 NOTE — PLAN OF CARE
Goal Outcome Evaluation:    Progress: no change  Outcome Evaluation: Vitals stable. On 4L O2 NC post - bronchoscopy performed this afternoon. CXR ordered for the AM. Thoracoscopy ordered for tomorrow afternoon. NPO after midnight. IV Rocephin continued. PRN Percocet given for right shoulder/chest wall pain 2x. Thoracic Surgery consulted. Family at bedside. Patient stable and needs met at this time.

## 2023-05-19 NOTE — PLAN OF CARE
Goal Outcome Evaluation: VS stable , patient on NPO for possible Bronchoscopy today , no time / order yet as of this hour. Medicated with pain meds one time. Will continue to monitor.

## 2023-05-20 ENCOUNTER — ANESTHESIA (OUTPATIENT)
Dept: PERIOP | Facility: HOSPITAL | Age: 63
End: 2023-05-20
Payer: COMMERCIAL

## 2023-05-20 ENCOUNTER — APPOINTMENT (OUTPATIENT)
Dept: GENERAL RADIOLOGY | Facility: HOSPITAL | Age: 63
End: 2023-05-20
Payer: COMMERCIAL

## 2023-05-20 ENCOUNTER — ANESTHESIA EVENT (OUTPATIENT)
Dept: PERIOP | Facility: HOSPITAL | Age: 63
End: 2023-05-20
Payer: COMMERCIAL

## 2023-05-20 LAB
ABO GROUP BLD: NORMAL
ALBUMIN SERPL-MCNC: 3.2 G/DL (ref 3.5–5.2)
ANION GAP SERPL CALCULATED.3IONS-SCNC: 10 MMOL/L (ref 5–15)
ANION GAP SERPL CALCULATED.3IONS-SCNC: 14 MMOL/L (ref 5–15)
ANION GAP SERPL CALCULATED.3IONS-SCNC: 6 MMOL/L (ref 5–15)
APTT PPP: 35.8 SECONDS (ref 22.7–35.4)
ARTERIAL PATENCY WRIST A: ABNORMAL
ARTERIAL PATENCY WRIST A: POSITIVE
ATMOSPHERIC PRESS: 749.6 MMHG
ATMOSPHERIC PRESS: 750.7 MMHG
BASE EXCESS BLDA CALC-SCNC: 0.2 MMOL/L (ref 0–2)
BASE EXCESS BLDA CALC-SCNC: 0.3 MMOL/L (ref 0–2)
BDY SITE: ABNORMAL
BDY SITE: ABNORMAL
BLD GP AB SCN SERPL QL: NEGATIVE
BUN SERPL-MCNC: 33 MG/DL (ref 8–23)
BUN SERPL-MCNC: 34 MG/DL (ref 8–23)
BUN SERPL-MCNC: 37 MG/DL (ref 8–23)
BUN/CREAT SERPL: 20.7 (ref 7–25)
BUN/CREAT SERPL: 21 (ref 7–25)
BUN/CREAT SERPL: 21 (ref 7–25)
CALCIUM SPEC-SCNC: 8.4 MG/DL (ref 8.6–10.5)
CALCIUM SPEC-SCNC: 8.9 MG/DL (ref 8.6–10.5)
CALCIUM SPEC-SCNC: 9 MG/DL (ref 8.6–10.5)
CHLORIDE SERPL-SCNC: 103 MMOL/L (ref 98–107)
CHLORIDE SERPL-SCNC: 104 MMOL/L (ref 98–107)
CHLORIDE SERPL-SCNC: 113 MMOL/L (ref 98–107)
CO2 SERPL-SCNC: 23 MMOL/L (ref 22–29)
CO2 SERPL-SCNC: 29 MMOL/L (ref 22–29)
CO2 SERPL-SCNC: 31 MMOL/L (ref 22–29)
CREAT SERPL-MCNC: 1.57 MG/DL (ref 0.76–1.27)
CREAT SERPL-MCNC: 1.64 MG/DL (ref 0.76–1.27)
CREAT SERPL-MCNC: 1.76 MG/DL (ref 0.76–1.27)
DEPRECATED RDW RBC AUTO: 44.5 FL (ref 37–54)
DEPRECATED RDW RBC AUTO: 44.9 FL (ref 37–54)
EGFRCR SERPLBLD CKD-EPI 2021: 43.5 ML/MIN/1.73
EGFRCR SERPLBLD CKD-EPI 2021: 47.3 ML/MIN/1.73
EGFRCR SERPLBLD CKD-EPI 2021: 49.8 ML/MIN/1.73
ERYTHROCYTE [DISTWIDTH] IN BLOOD BY AUTOMATED COUNT: 13.3 % (ref 12.3–15.4)
ERYTHROCYTE [DISTWIDTH] IN BLOOD BY AUTOMATED COUNT: 13.5 % (ref 12.3–15.4)
GAS FLOW AIRWAY: 7 LPM
GLUCOSE BLDC GLUCOMTR-MCNC: 111 MG/DL (ref 70–130)
GLUCOSE BLDC GLUCOMTR-MCNC: 133 MG/DL (ref 70–130)
GLUCOSE SERPL-MCNC: 110 MG/DL (ref 65–99)
GLUCOSE SERPL-MCNC: 111 MG/DL (ref 65–99)
GLUCOSE SERPL-MCNC: 118 MG/DL (ref 65–99)
HCO3 BLDA-SCNC: 27.8 MMOL/L (ref 22–28)
HCO3 BLDA-SCNC: 29.1 MMOL/L (ref 22–28)
HCT VFR BLD AUTO: 35.6 % (ref 37.5–51)
HCT VFR BLD AUTO: 37.3 % (ref 37.5–51)
HGB BLD-MCNC: 11.5 G/DL (ref 13–17.7)
HGB BLD-MCNC: 11.9 G/DL (ref 13–17.7)
INHALED O2 CONCENTRATION: 30 %
INR PPP: 1.08 (ref 0.9–1.1)
MCH RBC QN AUTO: 29.1 PG (ref 26.6–33)
MCH RBC QN AUTO: 29.3 PG (ref 26.6–33)
MCHC RBC AUTO-ENTMCNC: 31.9 G/DL (ref 31.5–35.7)
MCHC RBC AUTO-ENTMCNC: 32.3 G/DL (ref 31.5–35.7)
MCV RBC AUTO: 90.6 FL (ref 79–97)
MCV RBC AUTO: 91.2 FL (ref 79–97)
MODALITY: ABNORMAL
MODALITY: ABNORMAL
O2 A-A PPRESDIFF RESPIRATORY: 0.5 MMHG
PCO2 BLDA: 57.5 MM HG (ref 35–45)
PCO2 BLDA: 64 MM HG (ref 35–45)
PH BLDA: 7.27 PH UNITS (ref 7.35–7.45)
PH BLDA: 7.29 PH UNITS (ref 7.35–7.45)
PHOSPHATE SERPL-MCNC: 3.8 MG/DL (ref 2.5–4.5)
PLATELET # BLD AUTO: 341 10*3/MM3 (ref 140–450)
PLATELET # BLD AUTO: 428 10*3/MM3 (ref 140–450)
PMV BLD AUTO: 9 FL (ref 6–12)
PMV BLD AUTO: 9.9 FL (ref 6–12)
PO2 BLDA: 69.7 MM HG (ref 80–100)
PO2 BLDA: 93.2 MM HG (ref 80–100)
POTASSIUM SERPL-SCNC: 5.1 MMOL/L (ref 3.5–5.2)
POTASSIUM SERPL-SCNC: 5.4 MMOL/L (ref 3.5–5.2)
POTASSIUM SERPL-SCNC: 5.5 MMOL/L (ref 3.5–5.2)
PROTHROMBIN TIME: 14.2 SECONDS (ref 11.7–14.2)
RBC # BLD AUTO: 3.93 10*6/MM3 (ref 4.14–5.8)
RBC # BLD AUTO: 4.09 10*6/MM3 (ref 4.14–5.8)
RH BLD: NEGATIVE
SAO2 % BLDCOA: 91.1 % (ref 92–99)
SAO2 % BLDCOA: 95.6 % (ref 92–99)
SET MECH RESP RATE: 20
SODIUM SERPL-SCNC: 140 MMOL/L (ref 136–145)
SODIUM SERPL-SCNC: 141 MMOL/L (ref 136–145)
SODIUM SERPL-SCNC: 152 MMOL/L (ref 136–145)
T&S EXPIRATION DATE: NORMAL
TOTAL RATE: 20 BREATHS/MINUTE
TOTAL RATE: 24 BREATHS/MINUTE
WBC NRBC COR # BLD: 16.88 10*3/MM3 (ref 3.4–10.8)
WBC NRBC COR # BLD: 22.24 10*3/MM3 (ref 3.4–10.8)

## 2023-05-20 PROCEDURE — 94799 UNLISTED PULMONARY SVC/PX: CPT

## 2023-05-20 PROCEDURE — C9290 INJ, BUPIVACAINE LIPOSOME: HCPCS | Performed by: ANESTHESIOLOGY

## 2023-05-20 PROCEDURE — 32656 THORACOSCOPY W/PLEURECTOMY: CPT | Performed by: SPECIALIST/TECHNOLOGIST, OTHER

## 2023-05-20 PROCEDURE — 25010000002 HYDRALAZINE PER 20 MG: Performed by: ANESTHESIOLOGY

## 2023-05-20 PROCEDURE — 80048 BASIC METABOLIC PNL TOTAL CA: CPT | Performed by: NURSE PRACTITIONER

## 2023-05-20 PROCEDURE — 85018 HEMOGLOBIN: CPT

## 2023-05-20 PROCEDURE — 32652 THORACOSCOPY REM TOTL CORTEX: CPT | Performed by: SURGERY

## 2023-05-20 PROCEDURE — 85027 COMPLETE CBC AUTOMATED: CPT | Performed by: SURGERY

## 2023-05-20 PROCEDURE — 8E0W4CZ ROBOTIC ASSISTED PROCEDURE OF TRUNK REGION, PERCUTANEOUS ENDOSCOPIC APPROACH: ICD-10-PCS | Performed by: SURGERY

## 2023-05-20 PROCEDURE — 25010000002 HYDROMORPHONE PER 4 MG: Performed by: ANESTHESIOLOGY

## 2023-05-20 PROCEDURE — 86901 BLOOD TYPING SEROLOGIC RH(D): CPT | Performed by: NURSE PRACTITIONER

## 2023-05-20 PROCEDURE — 80069 RENAL FUNCTION PANEL: CPT | Performed by: INTERNAL MEDICINE

## 2023-05-20 PROCEDURE — 25010000002 MIDAZOLAM PER 1 MG: Performed by: ANESTHESIOLOGY

## 2023-05-20 PROCEDURE — 87070 CULTURE OTHR SPECIMN AEROBIC: CPT | Performed by: SURGERY

## 2023-05-20 PROCEDURE — 25010000002 HYDROMORPHONE 1 MG/ML SOLUTION: Performed by: ANESTHESIOLOGY

## 2023-05-20 PROCEDURE — 85730 THROMBOPLASTIN TIME PARTIAL: CPT | Performed by: NURSE PRACTITIONER

## 2023-05-20 PROCEDURE — 87176 TISSUE HOMOGENIZATION CULTR: CPT | Performed by: SURGERY

## 2023-05-20 PROCEDURE — 80048 BASIC METABOLIC PNL TOTAL CA: CPT | Performed by: SURGERY

## 2023-05-20 PROCEDURE — 25010000002 CEFAZOLIN IN DEXTROSE 2-4 GM/100ML-% SOLUTION: Performed by: NURSE PRACTITIONER

## 2023-05-20 PROCEDURE — 25010000002 HYDRALAZINE PER 20 MG: Performed by: SURGERY

## 2023-05-20 PROCEDURE — 0 BUPIVACAINE LIPOSOME 1.3 % SUSPENSION: Performed by: ANESTHESIOLOGY

## 2023-05-20 PROCEDURE — 94760 N-INVAS EAR/PLS OXIMETRY 1: CPT

## 2023-05-20 PROCEDURE — 25010000002 CEFAZOLIN IN DEXTROSE 2-4 GM/100ML-% SOLUTION: Performed by: ANESTHESIOLOGY

## 2023-05-20 PROCEDURE — 88305 TISSUE EXAM BY PATHOLOGIST: CPT | Performed by: SURGERY

## 2023-05-20 PROCEDURE — 25010000002 SUGAMMADEX 200 MG/2ML SOLUTION: Performed by: ANESTHESIOLOGY

## 2023-05-20 PROCEDURE — 87075 CULTR BACTERIA EXCEPT BLOOD: CPT | Performed by: SURGERY

## 2023-05-20 PROCEDURE — 85014 HEMATOCRIT: CPT

## 2023-05-20 PROCEDURE — 86900 BLOOD TYPING SEROLOGIC ABO: CPT | Performed by: NURSE PRACTITIONER

## 2023-05-20 PROCEDURE — 25010000002 HEPARIN (PORCINE) PER 1000 UNITS: Performed by: NURSE PRACTITIONER

## 2023-05-20 PROCEDURE — 94664 DEMO&/EVAL PT USE INHALER: CPT

## 2023-05-20 PROCEDURE — 25010000002 DEXAMETHASONE SODIUM PHOSPHATE 20 MG/5ML SOLUTION: Performed by: ANESTHESIOLOGY

## 2023-05-20 PROCEDURE — 82803 BLOOD GASES ANY COMBINATION: CPT

## 2023-05-20 PROCEDURE — 94761 N-INVAS EAR/PLS OXIMETRY MLT: CPT

## 2023-05-20 PROCEDURE — 25010000002 ONDANSETRON PER 1 MG: Performed by: ANESTHESIOLOGY

## 2023-05-20 PROCEDURE — 87205 SMEAR GRAM STAIN: CPT | Performed by: SURGERY

## 2023-05-20 PROCEDURE — 32652 THORACOSCOPY REM TOTL CORTEX: CPT | Performed by: SPECIALIST/TECHNOLOGIST, OTHER

## 2023-05-20 PROCEDURE — 71045 X-RAY EXAM CHEST 1 VIEW: CPT

## 2023-05-20 PROCEDURE — 86850 RBC ANTIBODY SCREEN: CPT | Performed by: NURSE PRACTITIONER

## 2023-05-20 PROCEDURE — 32656 THORACOSCOPY W/PLEURECTOMY: CPT | Performed by: SURGERY

## 2023-05-20 PROCEDURE — 25010000002 FENTANYL CITRATE (PF) 50 MCG/ML SOLUTION: Performed by: ANESTHESIOLOGY

## 2023-05-20 PROCEDURE — 0BBN4ZZ EXCISION OF RIGHT PLEURA, PERCUTANEOUS ENDOSCOPIC APPROACH: ICD-10-PCS | Performed by: SURGERY

## 2023-05-20 PROCEDURE — 25010000002 PROPOFOL 10 MG/ML EMULSION: Performed by: ANESTHESIOLOGY

## 2023-05-20 PROCEDURE — 82948 REAGENT STRIP/BLOOD GLUCOSE: CPT

## 2023-05-20 PROCEDURE — 85027 COMPLETE CBC AUTOMATED: CPT | Performed by: NURSE PRACTITIONER

## 2023-05-20 PROCEDURE — 25010000002 CEFTRIAXONE PER 250 MG: Performed by: INTERNAL MEDICINE

## 2023-05-20 PROCEDURE — 0BJ08ZZ INSPECTION OF TRACHEOBRONCHIAL TREE, VIA NATURAL OR ARTIFICIAL OPENING ENDOSCOPIC: ICD-10-PCS | Performed by: SURGERY

## 2023-05-20 PROCEDURE — 82947 ASSAY GLUCOSE BLOOD QUANT: CPT

## 2023-05-20 PROCEDURE — 85610 PROTHROMBIN TIME: CPT | Performed by: NURSE PRACTITIONER

## 2023-05-20 PROCEDURE — 25010000002 FENTANYL PER 0.1 MG: Performed by: INTERNAL MEDICINE

## 2023-05-20 PROCEDURE — C1729 CATH, DRAINAGE: HCPCS | Performed by: SURGERY

## 2023-05-20 PROCEDURE — 94660 CPAP INITIATION&MGMT: CPT

## 2023-05-20 RX ORDER — NALOXONE HCL 0.4 MG/ML
0.2 VIAL (ML) INJECTION AS NEEDED
Status: DISCONTINUED | OUTPATIENT
Start: 2023-05-20 | End: 2023-05-20

## 2023-05-20 RX ORDER — HEPARIN SODIUM 5000 [USP'U]/ML
5000 INJECTION, SOLUTION INTRAVENOUS; SUBCUTANEOUS ONCE
Status: DISCONTINUED | OUTPATIENT
Start: 2023-05-21 | End: 2023-05-20

## 2023-05-20 RX ORDER — NITROGLYCERIN 0.4 MG/1
0.4 TABLET SUBLINGUAL
Status: DISCONTINUED | OUTPATIENT
Start: 2023-05-20 | End: 2023-05-20 | Stop reason: SDUPTHER

## 2023-05-20 RX ORDER — HYDROMORPHONE HYDROCHLORIDE 1 MG/ML
0.5 INJECTION, SOLUTION INTRAMUSCULAR; INTRAVENOUS; SUBCUTANEOUS
Status: DISCONTINUED | OUTPATIENT
Start: 2023-05-20 | End: 2023-05-20

## 2023-05-20 RX ORDER — DIAZEPAM 5 MG/ML
5 INJECTION, SOLUTION INTRAMUSCULAR; INTRAVENOUS EVERY 4 HOURS PRN
Status: DISCONTINUED | OUTPATIENT
Start: 2023-05-20 | End: 2023-05-21

## 2023-05-20 RX ORDER — POLYETHYLENE GLYCOL 3350 17 G/17G
17 POWDER, FOR SOLUTION ORAL DAILY PRN
Status: DISCONTINUED | OUTPATIENT
Start: 2023-05-20 | End: 2023-05-26 | Stop reason: HOSPADM

## 2023-05-20 RX ORDER — BUPIVACAINE HYDROCHLORIDE 2.5 MG/ML
INJECTION, SOLUTION INFILTRATION; PERINEURAL AS NEEDED
Status: DISCONTINUED | OUTPATIENT
Start: 2023-05-20 | End: 2023-05-20 | Stop reason: HOSPADM

## 2023-05-20 RX ORDER — FENTANYL CITRATE 50 UG/ML
INJECTION, SOLUTION INTRAMUSCULAR; INTRAVENOUS
Status: DISCONTINUED | OUTPATIENT
Start: 2023-05-20 | End: 2023-05-20

## 2023-05-20 RX ORDER — EPHEDRINE SULFATE 50 MG/ML
5 INJECTION, SOLUTION INTRAVENOUS ONCE AS NEEDED
Status: DISCONTINUED | OUTPATIENT
Start: 2023-05-20 | End: 2023-05-20 | Stop reason: HOSPADM

## 2023-05-20 RX ORDER — SODIUM CHLORIDE 450 MG/100ML
50 INJECTION, SOLUTION INTRAVENOUS CONTINUOUS
Status: ACTIVE | OUTPATIENT
Start: 2023-05-20 | End: 2023-05-20

## 2023-05-20 RX ORDER — IPRATROPIUM BROMIDE AND ALBUTEROL SULFATE 2.5; .5 MG/3ML; MG/3ML
3 SOLUTION RESPIRATORY (INHALATION) ONCE
Status: COMPLETED | OUTPATIENT
Start: 2023-05-20 | End: 2023-05-20

## 2023-05-20 RX ORDER — KETOROLAC TROMETHAMINE 30 MG/ML
15 INJECTION, SOLUTION INTRAMUSCULAR; INTRAVENOUS EVERY 8 HOURS
Status: DISCONTINUED | OUTPATIENT
Start: 2023-05-20 | End: 2023-05-20

## 2023-05-20 RX ORDER — NALOXONE HCL 0.4 MG/ML
0.4 VIAL (ML) INJECTION
Status: DISCONTINUED | OUTPATIENT
Start: 2023-05-20 | End: 2023-05-20

## 2023-05-20 RX ORDER — SODIUM CHLORIDE 0.9 % (FLUSH) 0.9 %
3-10 SYRINGE (ML) INJECTION AS NEEDED
Status: DISCONTINUED | OUTPATIENT
Start: 2023-05-20 | End: 2023-05-20 | Stop reason: HOSPADM

## 2023-05-20 RX ORDER — ONDANSETRON 2 MG/ML
4 INJECTION INTRAMUSCULAR; INTRAVENOUS ONCE AS NEEDED
Status: DISCONTINUED | OUTPATIENT
Start: 2023-05-20 | End: 2023-05-20 | Stop reason: HOSPADM

## 2023-05-20 RX ORDER — DIPHENHYDRAMINE HYDROCHLORIDE 50 MG/ML
12.5 INJECTION INTRAMUSCULAR; INTRAVENOUS
Status: DISCONTINUED | OUTPATIENT
Start: 2023-05-20 | End: 2023-05-20 | Stop reason: HOSPADM

## 2023-05-20 RX ORDER — LIDOCAINE HYDROCHLORIDE 20 MG/ML
INJECTION, SOLUTION INFILTRATION; PERINEURAL AS NEEDED
Status: DISCONTINUED | OUTPATIENT
Start: 2023-05-20 | End: 2023-05-20 | Stop reason: SURG

## 2023-05-20 RX ORDER — IPRATROPIUM BROMIDE AND ALBUTEROL SULFATE 2.5; .5 MG/3ML; MG/3ML
3 SOLUTION RESPIRATORY (INHALATION)
Status: DISCONTINUED | OUTPATIENT
Start: 2023-05-20 | End: 2023-05-20

## 2023-05-20 RX ORDER — PROPOFOL 10 MG/ML
VIAL (ML) INTRAVENOUS AS NEEDED
Status: DISCONTINUED | OUTPATIENT
Start: 2023-05-20 | End: 2023-05-20 | Stop reason: SURG

## 2023-05-20 RX ORDER — NALOXONE HCL 0.4 MG/ML
0.4 VIAL (ML) INJECTION AS NEEDED
Status: DISCONTINUED | OUTPATIENT
Start: 2023-05-20 | End: 2023-05-20

## 2023-05-20 RX ORDER — HYDRALAZINE HYDROCHLORIDE 20 MG/ML
5 INJECTION INTRAMUSCULAR; INTRAVENOUS
Status: DISCONTINUED | OUTPATIENT
Start: 2023-05-20 | End: 2023-05-20 | Stop reason: HOSPADM

## 2023-05-20 RX ORDER — NITROGLYCERIN 0.4 MG/1
0.4 TABLET SUBLINGUAL
Status: DISCONTINUED | OUTPATIENT
Start: 2023-05-20 | End: 2023-05-26 | Stop reason: HOSPADM

## 2023-05-20 RX ORDER — BUPIVACAINE HYDROCHLORIDE 2.5 MG/ML
INJECTION, SOLUTION EPIDURAL; INFILTRATION; INTRACAUDAL
Status: COMPLETED | OUTPATIENT
Start: 2023-05-20 | End: 2023-05-20

## 2023-05-20 RX ORDER — DROPERIDOL 2.5 MG/ML
0.62 INJECTION, SOLUTION INTRAMUSCULAR; INTRAVENOUS
Status: DISCONTINUED | OUTPATIENT
Start: 2023-05-20 | End: 2023-05-20 | Stop reason: HOSPADM

## 2023-05-20 RX ORDER — PROMETHAZINE HYDROCHLORIDE 25 MG/1
25 TABLET ORAL ONCE AS NEEDED
Status: DISCONTINUED | OUTPATIENT
Start: 2023-05-20 | End: 2023-05-20 | Stop reason: HOSPADM

## 2023-05-20 RX ORDER — ACETAMINOPHEN 500 MG
1000 TABLET ORAL ONCE
Status: COMPLETED | OUTPATIENT
Start: 2023-05-20 | End: 2023-05-20

## 2023-05-20 RX ORDER — HYDROMORPHONE HYDROCHLORIDE 1 MG/ML
0.25 INJECTION, SOLUTION INTRAMUSCULAR; INTRAVENOUS; SUBCUTANEOUS
Status: DISCONTINUED | OUTPATIENT
Start: 2023-05-20 | End: 2023-05-20

## 2023-05-20 RX ORDER — LIDOCAINE HYDROCHLORIDE 10 MG/ML
0.5 INJECTION, SOLUTION EPIDURAL; INFILTRATION; INTRACAUDAL; PERINEURAL ONCE AS NEEDED
Status: DISCONTINUED | OUTPATIENT
Start: 2023-05-20 | End: 2023-05-20 | Stop reason: HOSPADM

## 2023-05-20 RX ORDER — OXYCODONE HYDROCHLORIDE 5 MG/1
5 TABLET ORAL ONCE AS NEEDED
Status: DISCONTINUED | OUTPATIENT
Start: 2023-05-20 | End: 2023-05-20

## 2023-05-20 RX ORDER — LABETALOL HYDROCHLORIDE 5 MG/ML
5 INJECTION, SOLUTION INTRAVENOUS
Status: DISCONTINUED | OUTPATIENT
Start: 2023-05-20 | End: 2023-05-20

## 2023-05-20 RX ORDER — MIDAZOLAM HYDROCHLORIDE 1 MG/ML
1 INJECTION INTRAMUSCULAR; INTRAVENOUS
Status: DISCONTINUED | OUTPATIENT
Start: 2023-05-20 | End: 2023-05-20 | Stop reason: HOSPADM

## 2023-05-20 RX ORDER — POLYETHYLENE GLYCOL 3350 17 G/17G
17 POWDER, FOR SOLUTION ORAL DAILY
Status: DISCONTINUED | OUTPATIENT
Start: 2023-05-21 | End: 2023-05-26 | Stop reason: HOSPADM

## 2023-05-20 RX ORDER — IPRATROPIUM BROMIDE AND ALBUTEROL SULFATE 2.5; .5 MG/3ML; MG/3ML
3 SOLUTION RESPIRATORY (INHALATION) ONCE
Status: DISCONTINUED | OUTPATIENT
Start: 2023-05-20 | End: 2023-05-20 | Stop reason: HOSPADM

## 2023-05-20 RX ORDER — HYDROCODONE BITARTRATE AND ACETAMINOPHEN 7.5; 325 MG/1; MG/1
1 TABLET ORAL ONCE AS NEEDED
Status: DISCONTINUED | OUTPATIENT
Start: 2023-05-20 | End: 2023-05-20 | Stop reason: HOSPADM

## 2023-05-20 RX ORDER — CEFAZOLIN SODIUM 2 G/100ML
INJECTION, SOLUTION INTRAVENOUS AS NEEDED
Status: DISCONTINUED | OUTPATIENT
Start: 2023-05-20 | End: 2023-05-20 | Stop reason: SURG

## 2023-05-20 RX ORDER — HYDRALAZINE HYDROCHLORIDE 20 MG/ML
10 INJECTION INTRAMUSCULAR; INTRAVENOUS EVERY 6 HOURS PRN
Status: DISCONTINUED | OUTPATIENT
Start: 2023-05-20 | End: 2023-05-20 | Stop reason: SDUPTHER

## 2023-05-20 RX ORDER — SODIUM CHLORIDE 0.9 % (FLUSH) 0.9 %
10 SYRINGE (ML) INJECTION AS NEEDED
Status: DISCONTINUED | OUTPATIENT
Start: 2023-05-20 | End: 2023-05-21

## 2023-05-20 RX ORDER — BISACODYL 10 MG
10 SUPPOSITORY, RECTAL RECTAL DAILY PRN
Status: DISCONTINUED | OUTPATIENT
Start: 2023-05-20 | End: 2023-05-26 | Stop reason: HOSPADM

## 2023-05-20 RX ORDER — SODIUM CHLORIDE, SODIUM LACTATE, POTASSIUM CHLORIDE, CALCIUM CHLORIDE 600; 310; 30; 20 MG/100ML; MG/100ML; MG/100ML; MG/100ML
INJECTION, SOLUTION INTRAVENOUS CONTINUOUS PRN
Status: DISCONTINUED | OUTPATIENT
Start: 2023-05-20 | End: 2023-05-20 | Stop reason: SURG

## 2023-05-20 RX ORDER — HYDROMORPHONE HYDROCHLORIDE 1 MG/ML
0.5 INJECTION, SOLUTION INTRAMUSCULAR; INTRAVENOUS; SUBCUTANEOUS
Status: DISCONTINUED | OUTPATIENT
Start: 2023-05-20 | End: 2023-05-20 | Stop reason: HOSPADM

## 2023-05-20 RX ORDER — NALOXONE HCL 0.4 MG/ML
0.1 VIAL (ML) INJECTION
Status: DISCONTINUED | OUTPATIENT
Start: 2023-05-20 | End: 2023-05-22

## 2023-05-20 RX ORDER — CEFAZOLIN SODIUM 2 G/100ML
2 INJECTION, SOLUTION INTRAVENOUS ONCE
Status: COMPLETED | OUTPATIENT
Start: 2023-05-20 | End: 2023-05-20

## 2023-05-20 RX ORDER — SODIUM CHLORIDE 0.9 % (FLUSH) 0.9 %
3 SYRINGE (ML) INJECTION EVERY 12 HOURS SCHEDULED
Status: DISCONTINUED | OUTPATIENT
Start: 2023-05-20 | End: 2023-05-20 | Stop reason: HOSPADM

## 2023-05-20 RX ORDER — IPRATROPIUM BROMIDE AND ALBUTEROL SULFATE 2.5; .5 MG/3ML; MG/3ML
3 SOLUTION RESPIRATORY (INHALATION) ONCE AS NEEDED
Status: DISCONTINUED | OUTPATIENT
Start: 2023-05-20 | End: 2023-05-20

## 2023-05-20 RX ORDER — HEPARIN SODIUM 5000 [USP'U]/ML
5000 INJECTION, SOLUTION INTRAVENOUS; SUBCUTANEOUS ONCE
Status: COMPLETED | OUTPATIENT
Start: 2023-05-20 | End: 2023-05-20

## 2023-05-20 RX ORDER — FENTANYL CITRATE 50 UG/ML
25 INJECTION, SOLUTION INTRAMUSCULAR; INTRAVENOUS ONCE AS NEEDED
Status: COMPLETED | OUTPATIENT
Start: 2023-05-20 | End: 2023-05-20

## 2023-05-20 RX ORDER — PROMETHAZINE HYDROCHLORIDE 25 MG/1
25 SUPPOSITORY RECTAL ONCE AS NEEDED
Status: DISCONTINUED | OUTPATIENT
Start: 2023-05-20 | End: 2023-05-20 | Stop reason: HOSPADM

## 2023-05-20 RX ORDER — FENTANYL CITRATE 50 UG/ML
INJECTION, SOLUTION INTRAMUSCULAR; INTRAVENOUS AS NEEDED
Status: DISCONTINUED | OUTPATIENT
Start: 2023-05-20 | End: 2023-05-20 | Stop reason: SURG

## 2023-05-20 RX ORDER — FLUMAZENIL 0.1 MG/ML
0.2 INJECTION INTRAVENOUS AS NEEDED
Status: DISCONTINUED | OUTPATIENT
Start: 2023-05-20 | End: 2023-05-20

## 2023-05-20 RX ORDER — FLUMAZENIL 0.1 MG/ML
0.2 INJECTION INTRAVENOUS AS NEEDED
Status: DISCONTINUED | OUTPATIENT
Start: 2023-05-20 | End: 2023-05-20 | Stop reason: HOSPADM

## 2023-05-20 RX ORDER — DROPERIDOL 2.5 MG/ML
0.62 INJECTION, SOLUTION INTRAMUSCULAR; INTRAVENOUS
Status: DISCONTINUED | OUTPATIENT
Start: 2023-05-20 | End: 2023-05-20

## 2023-05-20 RX ORDER — KETAMINE HCL IN NACL, ISO-OSM 100MG/10ML
10 SYRINGE (ML) INJECTION
Status: DISCONTINUED | OUTPATIENT
Start: 2023-05-20 | End: 2023-05-20

## 2023-05-20 RX ORDER — OXYCODONE AND ACETAMINOPHEN 7.5; 325 MG/1; MG/1
1 TABLET ORAL EVERY 4 HOURS PRN
Status: DISCONTINUED | OUTPATIENT
Start: 2023-05-20 | End: 2023-05-20 | Stop reason: HOSPADM

## 2023-05-20 RX ORDER — ROCURONIUM BROMIDE 10 MG/ML
INJECTION, SOLUTION INTRAVENOUS AS NEEDED
Status: DISCONTINUED | OUTPATIENT
Start: 2023-05-20 | End: 2023-05-20 | Stop reason: SURG

## 2023-05-20 RX ORDER — DIPHENHYDRAMINE HYDROCHLORIDE 50 MG/ML
25 INJECTION INTRAMUSCULAR; INTRAVENOUS EVERY 4 HOURS PRN
Status: DISCONTINUED | OUTPATIENT
Start: 2023-05-20 | End: 2023-05-20

## 2023-05-20 RX ORDER — MIDAZOLAM HYDROCHLORIDE 1 MG/ML
INJECTION INTRAMUSCULAR; INTRAVENOUS
Status: DISCONTINUED | OUTPATIENT
Start: 2023-05-20 | End: 2023-05-26 | Stop reason: HOSPADM

## 2023-05-20 RX ORDER — SODIUM CHLORIDE, SODIUM LACTATE, POTASSIUM CHLORIDE, CALCIUM CHLORIDE 600; 310; 30; 20 MG/100ML; MG/100ML; MG/100ML; MG/100ML
40 INJECTION, SOLUTION INTRAVENOUS CONTINUOUS
Status: DISCONTINUED | OUTPATIENT
Start: 2023-05-20 | End: 2023-05-21

## 2023-05-20 RX ORDER — OXYCODONE HYDROCHLORIDE 5 MG/1
5 TABLET ORAL EVERY 4 HOURS PRN
Status: DISCONTINUED | OUTPATIENT
Start: 2023-05-20 | End: 2023-05-20

## 2023-05-20 RX ORDER — ACETYLCYSTEINE 200 MG/ML
3 SOLUTION ORAL; RESPIRATORY (INHALATION)
Status: DISCONTINUED | OUTPATIENT
Start: 2023-05-20 | End: 2023-05-22

## 2023-05-20 RX ORDER — ALBUTEROL SULFATE 2.5 MG/3ML
2.5 SOLUTION RESPIRATORY (INHALATION)
Status: DISCONTINUED | OUTPATIENT
Start: 2023-05-20 | End: 2023-05-22

## 2023-05-20 RX ORDER — KETAMINE HCL IN NACL, ISO-OSM 100MG/10ML
SYRINGE (ML) INJECTION AS NEEDED
Status: DISCONTINUED | OUTPATIENT
Start: 2023-05-20 | End: 2023-05-20 | Stop reason: SURG

## 2023-05-20 RX ORDER — IPRATROPIUM BROMIDE AND ALBUTEROL SULFATE 2.5; .5 MG/3ML; MG/3ML
3 SOLUTION RESPIRATORY (INHALATION) ONCE AS NEEDED
Status: COMPLETED | OUTPATIENT
Start: 2023-05-20 | End: 2023-05-20

## 2023-05-20 RX ORDER — LABETALOL HYDROCHLORIDE 5 MG/ML
20 INJECTION, SOLUTION INTRAVENOUS
Status: DISCONTINUED | OUTPATIENT
Start: 2023-05-20 | End: 2023-05-26 | Stop reason: HOSPADM

## 2023-05-20 RX ORDER — GABAPENTIN 300 MG/1
300 CAPSULE ORAL EVERY 8 HOURS SCHEDULED
Status: DISCONTINUED | OUTPATIENT
Start: 2023-05-20 | End: 2023-05-21

## 2023-05-20 RX ORDER — ENOXAPARIN SODIUM 100 MG/ML
40 INJECTION SUBCUTANEOUS DAILY
Status: DISCONTINUED | OUTPATIENT
Start: 2023-05-21 | End: 2023-05-21

## 2023-05-20 RX ORDER — SODIUM CHLORIDE 0.9 % (FLUSH) 0.9 %
10 SYRINGE (ML) INJECTION EVERY 12 HOURS SCHEDULED
Status: DISCONTINUED | OUTPATIENT
Start: 2023-05-20 | End: 2023-05-26 | Stop reason: HOSPADM

## 2023-05-20 RX ORDER — ONDANSETRON 2 MG/ML
4 INJECTION INTRAMUSCULAR; INTRAVENOUS ONCE AS NEEDED
Status: DISCONTINUED | OUTPATIENT
Start: 2023-05-20 | End: 2023-05-20

## 2023-05-20 RX ORDER — FENTANYL CITRATE 50 UG/ML
50 INJECTION, SOLUTION INTRAMUSCULAR; INTRAVENOUS
Status: DISCONTINUED | OUTPATIENT
Start: 2023-05-20 | End: 2023-05-20 | Stop reason: HOSPADM

## 2023-05-20 RX ORDER — BISACODYL 5 MG/1
5 TABLET, DELAYED RELEASE ORAL DAILY PRN
Status: DISCONTINUED | OUTPATIENT
Start: 2023-05-20 | End: 2023-05-26 | Stop reason: HOSPADM

## 2023-05-20 RX ORDER — HYDRALAZINE HYDROCHLORIDE 20 MG/ML
5 INJECTION INTRAMUSCULAR; INTRAVENOUS
Status: DISCONTINUED | OUTPATIENT
Start: 2023-05-20 | End: 2023-05-20

## 2023-05-20 RX ORDER — SODIUM CHLORIDE, SODIUM LACTATE, POTASSIUM CHLORIDE, CALCIUM CHLORIDE 600; 310; 30; 20 MG/100ML; MG/100ML; MG/100ML; MG/100ML
9 INJECTION, SOLUTION INTRAVENOUS CONTINUOUS
Status: DISCONTINUED | OUTPATIENT
Start: 2023-05-20 | End: 2023-05-21

## 2023-05-20 RX ORDER — DEXAMETHASONE SODIUM PHOSPHATE 4 MG/ML
INJECTION, SOLUTION INTRA-ARTICULAR; INTRALESIONAL; INTRAMUSCULAR; INTRAVENOUS; SOFT TISSUE AS NEEDED
Status: DISCONTINUED | OUTPATIENT
Start: 2023-05-20 | End: 2023-05-20 | Stop reason: SURG

## 2023-05-20 RX ORDER — SODIUM CHLORIDE 9 MG/ML
40 INJECTION, SOLUTION INTRAVENOUS AS NEEDED
Status: DISCONTINUED | OUTPATIENT
Start: 2023-05-20 | End: 2023-05-21

## 2023-05-20 RX ORDER — DIPHENHYDRAMINE HCL 25 MG
25 CAPSULE ORAL EVERY 4 HOURS PRN
Status: DISCONTINUED | OUTPATIENT
Start: 2023-05-20 | End: 2023-05-20

## 2023-05-20 RX ORDER — DOCUSATE SODIUM 100 MG/1
100 CAPSULE, LIQUID FILLED ORAL 2 TIMES DAILY
Status: DISCONTINUED | OUTPATIENT
Start: 2023-05-20 | End: 2023-05-26 | Stop reason: HOSPADM

## 2023-05-20 RX ORDER — ACETAMINOPHEN 500 MG
1000 TABLET ORAL 3 TIMES DAILY
Status: DISCONTINUED | OUTPATIENT
Start: 2023-05-20 | End: 2023-05-26 | Stop reason: HOSPADM

## 2023-05-20 RX ORDER — SODIUM CHLORIDE 9 MG/ML
INJECTION, SOLUTION INTRAVENOUS AS NEEDED
Status: DISCONTINUED | OUTPATIENT
Start: 2023-05-20 | End: 2023-05-20 | Stop reason: HOSPADM

## 2023-05-20 RX ORDER — AMOXICILLIN 250 MG
2 CAPSULE ORAL 2 TIMES DAILY
Status: DISCONTINUED | OUTPATIENT
Start: 2023-05-20 | End: 2023-05-26 | Stop reason: HOSPADM

## 2023-05-20 RX ORDER — ONDANSETRON 2 MG/ML
INJECTION INTRAMUSCULAR; INTRAVENOUS AS NEEDED
Status: DISCONTINUED | OUTPATIENT
Start: 2023-05-20 | End: 2023-05-20 | Stop reason: SURG

## 2023-05-20 RX ADMIN — SODIUM CHLORIDE, POTASSIUM CHLORIDE, SODIUM LACTATE AND CALCIUM CHLORIDE 40 ML/HR: 600; 310; 30; 20 INJECTION, SOLUTION INTRAVENOUS at 22:30

## 2023-05-20 RX ADMIN — HYDROMORPHONE HYDROCHLORIDE 0.5 MG: 1 INJECTION, SOLUTION INTRAMUSCULAR; INTRAVENOUS; SUBCUTANEOUS at 18:59

## 2023-05-20 RX ADMIN — FENTANYL CITRATE 50 MCG: 50 INJECTION, SOLUTION INTRAMUSCULAR; INTRAVENOUS at 21:04

## 2023-05-20 RX ADMIN — MIDAZOLAM 1 MG: 1 INJECTION INTRAMUSCULAR; INTRAVENOUS at 13:06

## 2023-05-20 RX ADMIN — SODIUM CHLORIDE 100 ML/HR: 4.5 INJECTION, SOLUTION INTRAVENOUS at 10:30

## 2023-05-20 RX ADMIN — BUPIVACAINE 10 ML: 13.3 INJECTION, SUSPENSION, LIPOSOMAL INFILTRATION at 13:12

## 2023-05-20 RX ADMIN — HYDROMORPHONE HYDROCHLORIDE 0.5 MG: 1 INJECTION, SOLUTION INTRAMUSCULAR; INTRAVENOUS; SUBCUTANEOUS at 20:05

## 2023-05-20 RX ADMIN — LIDOCAINE HYDROCHLORIDE 100 MG: 20 INJECTION, SOLUTION INFILTRATION; PERINEURAL at 15:43

## 2023-05-20 RX ADMIN — HYDRALAZINE HYDROCHLORIDE 5 MG: 20 INJECTION INTRAMUSCULAR; INTRAVENOUS at 20:32

## 2023-05-20 RX ADMIN — ONDANSETRON 4 MG: 2 INJECTION INTRAMUSCULAR; INTRAVENOUS at 18:50

## 2023-05-20 RX ADMIN — SODIUM CHLORIDE, POTASSIUM CHLORIDE, SODIUM LACTATE AND CALCIUM CHLORIDE: 600; 310; 30; 20 INJECTION, SOLUTION INTRAVENOUS at 15:32

## 2023-05-20 RX ADMIN — CEFTRIAXONE SODIUM 1 G: 1 INJECTION, POWDER, FOR SOLUTION INTRAMUSCULAR; INTRAVENOUS at 00:15

## 2023-05-20 RX ADMIN — CEFAZOLIN SODIUM 2 G: 2 INJECTION, SOLUTION INTRAVENOUS at 16:06

## 2023-05-20 RX ADMIN — SUGAMMADEX 200 MG: 100 INJECTION, SOLUTION INTRAVENOUS at 18:57

## 2023-05-20 RX ADMIN — BUPIVACAINE HYDROCHLORIDE 20 ML: 2.5 INJECTION, SOLUTION EPIDURAL; INFILTRATION; INTRACAUDAL; PERINEURAL at 13:12

## 2023-05-20 RX ADMIN — FENTANYL CITRATE 25 MCG: 50 INJECTION, SOLUTION INTRAMUSCULAR; INTRAVENOUS at 13:15

## 2023-05-20 RX ADMIN — Medication 30 MG: at 16:24

## 2023-05-20 RX ADMIN — FENTANYL CITRATE 25 MCG: 50 INJECTION, SOLUTION INTRAMUSCULAR; INTRAVENOUS at 13:06

## 2023-05-20 RX ADMIN — CEFAZOLIN SODIUM 2 G: 2 INJECTION, SOLUTION INTRAVENOUS at 15:20

## 2023-05-20 RX ADMIN — FENTANYL CITRATE 50 MCG: 50 INJECTION, SOLUTION INTRAMUSCULAR; INTRAVENOUS at 16:54

## 2023-05-20 RX ADMIN — IPRATROPIUM BROMIDE AND ALBUTEROL SULFATE 3 ML: .5; 3 SOLUTION RESPIRATORY (INHALATION) at 13:52

## 2023-05-20 RX ADMIN — IPRATROPIUM BROMIDE AND ALBUTEROL SULFATE 3 ML: 2.5; .5 SOLUTION RESPIRATORY (INHALATION) at 20:24

## 2023-05-20 RX ADMIN — FENTANYL CITRATE 50 MCG: 50 INJECTION, SOLUTION INTRAMUSCULAR; INTRAVENOUS at 20:12

## 2023-05-20 RX ADMIN — OXYCODONE AND ACETAMINOPHEN 1 TABLET: 5; 325 TABLET ORAL at 00:15

## 2023-05-20 RX ADMIN — HYDRALAZINE HYDROCHLORIDE 10 MG: 20 INJECTION INTRAMUSCULAR; INTRAVENOUS at 19:55

## 2023-05-20 RX ADMIN — SODIUM CHLORIDE, POTASSIUM CHLORIDE, SODIUM LACTATE AND CALCIUM CHLORIDE 9 ML/HR: 600; 310; 30; 20 INJECTION, SOLUTION INTRAVENOUS at 13:45

## 2023-05-20 RX ADMIN — FENTANYL CITRATE 50 MCG: 50 INJECTION, SOLUTION INTRAMUSCULAR; INTRAVENOUS at 21:52

## 2023-05-20 RX ADMIN — OXYCODONE AND ACETAMINOPHEN 1 TABLET: 5; 325 TABLET ORAL at 07:59

## 2023-05-20 RX ADMIN — SODIUM CHLORIDE 75 ML/HR: 9 INJECTION, SOLUTION INTRAVENOUS at 00:20

## 2023-05-20 RX ADMIN — FENTANYL CITRATE 50 MCG: 50 INJECTION, SOLUTION INTRAMUSCULAR; INTRAVENOUS at 15:43

## 2023-05-20 RX ADMIN — Medication 20 MG: at 17:23

## 2023-05-20 RX ADMIN — FENTANYL CITRATE 50 MCG: 50 INJECTION, SOLUTION INTRAMUSCULAR; INTRAVENOUS at 20:33

## 2023-05-20 RX ADMIN — ROCURONIUM BROMIDE 20 MG: 10 INJECTION, SOLUTION INTRAVENOUS at 16:20

## 2023-05-20 RX ADMIN — FENTANYL CITRATE: 0.05 INJECTION, SOLUTION INTRAMUSCULAR; INTRAVENOUS at 22:27

## 2023-05-20 RX ADMIN — Medication 10 ML: at 08:16

## 2023-05-20 RX ADMIN — IPRATROPIUM BROMIDE AND ALBUTEROL SULFATE 3 ML: .5; 3 SOLUTION RESPIRATORY (INHALATION) at 13:46

## 2023-05-20 RX ADMIN — ROCURONIUM BROMIDE 20 MG: 10 INJECTION, SOLUTION INTRAVENOUS at 18:20

## 2023-05-20 RX ADMIN — HYDRALAZINE HYDROCHLORIDE 5 MG: 20 INJECTION INTRAMUSCULAR; INTRAVENOUS at 20:17

## 2023-05-20 RX ADMIN — OXYCODONE AND ACETAMINOPHEN 1 TABLET: 5; 325 TABLET ORAL at 20:03

## 2023-05-20 RX ADMIN — SODIUM CHLORIDE, SODIUM LACTATE, POTASSIUM CHLORIDE, CALCIUM CHLORIDE: 600; 310; 30; 20 INJECTION, SOLUTION INTRAVENOUS at 15:32

## 2023-05-20 RX ADMIN — HEPARIN SODIUM 5000 UNITS: 5000 INJECTION INTRAVENOUS; SUBCUTANEOUS at 15:20

## 2023-05-20 RX ADMIN — HYDROMORPHONE HYDROCHLORIDE 0.5 MG: 1 INJECTION, SOLUTION INTRAMUSCULAR; INTRAVENOUS; SUBCUTANEOUS at 19:04

## 2023-05-20 RX ADMIN — PROPOFOL 180 MG: 10 INJECTION, EMULSION INTRAVENOUS at 15:43

## 2023-05-20 RX ADMIN — ROCURONIUM BROMIDE 50 MG: 10 INJECTION, SOLUTION INTRAVENOUS at 15:43

## 2023-05-20 RX ADMIN — BENZONATATE 200 MG: 100 CAPSULE ORAL at 00:16

## 2023-05-20 RX ADMIN — ACETAMINOPHEN 1000 MG: 500 TABLET ORAL at 13:00

## 2023-05-20 RX ADMIN — LIDOCAINE 1 PATCH: 700 PATCH TOPICAL at 08:00

## 2023-05-20 RX ADMIN — METOPROLOL SUCCINATE 100 MG: 100 TABLET, EXTENDED RELEASE ORAL at 08:00

## 2023-05-20 RX ADMIN — MIDAZOLAM 1 MG: 1 INJECTION INTRAMUSCULAR; INTRAVENOUS at 13:42

## 2023-05-20 RX ADMIN — Medication 10 MG: at 19:52

## 2023-05-20 RX ADMIN — HYDROMORPHONE HYDROCHLORIDE 0.5 MG: 1 INJECTION, SOLUTION INTRAMUSCULAR; INTRAVENOUS; SUBCUTANEOUS at 21:24

## 2023-05-20 RX ADMIN — ROCURONIUM BROMIDE 20 MG: 10 INJECTION, SOLUTION INTRAVENOUS at 17:00

## 2023-05-20 RX ADMIN — DEXAMETHASONE SODIUM PHOSPHATE 10 MG: 4 INJECTION, SOLUTION INTRAMUSCULAR; INTRAVENOUS at 16:01

## 2023-05-20 NOTE — PLAN OF CARE
Goal Outcome Evaluation:    Progress: no change  Outcome Evaluation: Vitals stable. PRN Norco given 1x for right shoulder and right chest wall pain - intervention effective. IVF changed to 0.45% NS @ 100mL/hr. 3L O2 NC continued. Patient currently down for thoracoscopy. Wife at bedside. Patient stable and needs met at this time.

## 2023-05-20 NOTE — PROGRESS NOTES
[facility] Medicine Services  PROGRESS NOTE    Patient Name: Calos Moseley  : 1960  MRN: [id number]    Date of Admission: 2023  Primary Care Physician: Naveed Nunez DO    Subjective   Subjective     CC:  Follow-up multiple issues    Subjective:  Patient says he is breathing decently well.  No new complaints.  Patient is awaiting his thoracic surgical intervention today.    Review of Systems  No current fevers or chills  No current nausea, vomiting, or diarrhea  No current chest pain or palpitations      Objective   Objective     Vital Signs:   Temp:  [98 °F (36.7 °C)-98.9 °F (37.2 °C)] 98.3 °F (36.8 °C)  Heart Rate:  [66-85] 68  Resp:  [18-20] 18  BP: (125-162)/(62-74) 159/63        Physical Exam:  Constitutional:Awake, alert  HENT: NCAT, mucous membranes moist, neck supple  Respiratory: Occasional cough, occasional coarse sounds, no wheezing, relatively nonlabored breathing  Cardiovascular: Pulse rate is normal, palpable radial pulse bilaterally  Gastrointestinal:soft, nontender, nondistended  Musculoskeletal: Normal musculature for age, mild lower extremity edema, BMI 41, morbidly obese  Psychiatric: Appropriate affect, cooperative, conversational  Neurologic: No slurred speech or facial droop, follows commands  Skin: No rashes or jaundice, warm      Results Reviewed:  Results from last 7 days   Lab Units 23  0405 23  0520 23  0433 23  1730   WBC 10*3/mm3 16.88* 10.67 9.18 10.09   HEMOGLOBIN g/dL 11.5* 13.3 13.0 14.1   HEMATOCRIT % 35.6* 39.6 38.8 42.0   PLATELETS 10*3/mm3 341 352 265 273   INR  1.08  --   --   --    PROCALCITONIN ng/mL  --   --   --  0.98*     Results from last 7 days   Lab Units 23  1054 23  0405 23  0332 23  0520 23  0433 23  1918 23  1730   SODIUM mmol/L 141 152* 141   < > 139  --  137   POTASSIUM mmol/L 5.1 5.4* 5.1   < > 4.8  --  4.6   CHLORIDE mmol/L 104 113* 103   < > 102  --  100   CO2 mmol/L 31.0*  29.0 29.1*   < > 24.5  --  27.0   BUN mg/dL 34* 37* 44*   < > 32*  --  33*   CREATININE mg/dL 1.64* 1.76* 1.97*   < > 1.60*  --  1.66*   GLUCOSE mg/dL 111* 110* 116*   < > 129*  --  132*   CALCIUM mg/dL 8.9 9.0 9.3   < > 9.0  --  9.5   ALT (SGPT) U/L  --   --   --   --  24  --  34   AST (SGOT) U/L  --   --   --   --  17  --  19   HSTROP T ng/L  --   --   --   --   --  17* 19*   PROBNP pg/mL  --   --   --   --   --   --  167.0    < > = values in this interval not displayed.     Estimated Creatinine Clearance: 56.7 mL/min (A) (by C-G formula based on SCr of 1.64 mg/dL (H)).    Microbiology Results Abnormal     Procedure Component Value - Date/Time    AFB Culture - Lavage, Lung, Right Lower Lobe [[id number]] Collected: 05/19/23 1546    Lab Status: Preliminary result Specimen: Lavage from Lung, Right Lower Lobe Updated: 05/20/23 1342     AFB Stain No acid fast bacilli seen on concentrated smear    BAL Culture, Quantitative - Lavage, Lung, Right Lower Lobe [[id number]] Collected: 05/19/23 1546    Lab Status: Preliminary result Specimen: Lavage from Lung, Right Lower Lobe Updated: 05/20/23 0903     BAL Culture No growth     Gram Stain No organisms seen      Rare (1+) WBCs seen    Blood Culture - Blood, Arm, Left [[id number]]  (Normal) Collected: 05/16/23 2247    Lab Status: Preliminary result Specimen: Blood from Arm, Left Updated: 05/19/23 2301     Blood Culture No growth at 3 days    Blood Culture - Blood, Hand, Right [[id number]]  (Normal) Collected: 05/16/23 2253    Lab Status: Preliminary result Specimen: Blood from Hand, Right Updated: 05/19/23 2301     Blood Culture No growth at 3 days    Fungus Smear - Lavage, Lung, Right Lower Lobe [[id number]] Collected: 05/19/23 1546    Lab Status: Final result Specimen: Lavage from Lung, Right Lower Lobe Updated: 05/19/23 2113     Fungal Stain No fungal elements seen    S. Pneumo Ag Urine or CSF - Urine, Urine, Clean Catch [[id number]]  (Normal) Collected: 05/17/23 0459    Lab  Status: Final result Specimen: Urine, Clean Catch Updated: 05/17/23 0644     Strep Pneumo Ag Negative    Legionella Antigen, Urine - Urine, Urine, Clean Catch [[id number]]  (Normal) Collected: 05/17/23 0452    Lab Status: Final result Specimen: Urine, Clean Catch Updated: 05/17/23 0644     LEGIONELLA ANTIGEN, URINE Negative    Respiratory Panel PCR w/COVID-19(SARS-CoV-2) [facility]/SONIA/MARQUITA/PAD/[facility]/MAD/ULISSES In-House, NP Swab in UTM/VTM, 3-4 HR TAT - Swab, Nasopharynx [[id number]]  (Normal) Collected: 05/16/23 1733    Lab Status: Final result Specimen: Swab from Nasopharynx Updated: 05/16/23 1832     ADENOVIRUS, PCR Not Detected     Coronavirus 229E Not Detected     Coronavirus HKU1 Not Detected     Coronavirus NL63 Not Detected     Coronavirus OC43 Not Detected     COVID19 Not Detected     Human Metapneumovirus Not Detected     Human Rhinovirus/Enterovirus Not Detected     Influenza A PCR Not Detected     Influenza B PCR Not Detected     Parainfluenza Virus 1 Not Detected     Parainfluenza Virus 2 Not Detected     Parainfluenza Virus 3 Not Detected     Parainfluenza Virus 4 Not Detected     RSV, PCR Not Detected     Bordetella pertussis pcr Not Detected     Bordetella parapertussis PCR Not Detected     Chlamydophila pneumoniae PCR Not Detected     Mycoplasma pneumo by PCR Not Detected    Narrative:      In the setting of a positive respiratory panel with a viral infection PLUS a negative procalcitonin without other underlying concern for bacterial infection, consider observing off antibiotics or discontinuation of antibiotics and continue supportive care. If the respiratory panel is positive for atypical bacterial infection (Bordetella pertussis, Chlamydophila pneumoniae, or Mycoplasma pneumoniae), consider antibiotic de-escalation to target atypical bacterial infection.          Imaging Results (Last 24 Hours)     Procedure Component Value Units Date/Time    XR Chest 1 View [[id number]] Collected: 05/20/23 0647     Updated:  05/20/23 0651    Narrative:      XR CHEST 1 VW-     HISTORY: Male who is 62 years-old,  preoperative evaluation     TECHNIQUE: Frontal view of the chest     COMPARISON: 5/18/2023     FINDINGS: The heart size is borderline. Mild central vascular  prominence.. Persistent right basilar atelectasis/infiltrate/effusion.  No pneumothorax. No acute osseous process.       Impression:      No significant change.     This report was finalized on 5/20/2023 6:48 AM by Dr. Eder Gallego M.D.             Results for orders placed during the hospital encounter of 05/16/23    Adult Transthoracic Echo Complete W/ Cont if Necessary Per Protocol    Interpretation Summary  •  Left ventricular systolic function is normal. Calculated left ventricular EF = 58.1% The left ventricular cavity is moderately dilated. Left ventricular wall thickness is consistent with mild concentric hypertrophy. All left ventricular wall segments contract normally. Left ventricular diastolic function was normal.  •  Trace mitral valve regurgitation is present.  •  Trace tricuspid valve regurgitation is present. Estimated right ventricular systolic pressure from tricuspid regurgitation is mildly elevated (35-45 mmHg). Calculated right ventricular systolic pressure from tricuspid regurgitation is 36.3 mmHg.      I have reviewed the medications:  Scheduled Meds:[MAR Hold] atorvastatin, 20 mg, Oral, Nightly  [MAR Hold] benzonatate, 200 mg, Oral, TID  cefTRIAXone, 1 g, Intravenous, Q24H  divalproex, 250 mg, Oral, BID  hydrALAZINE, 50 mg, Oral, Q8H  lamoTRIgine, 100 mg, Oral, BID  [MAR Hold] levothyroxine, 50 mcg, Oral, Q AM  [MAR Hold] lidocaine, 1 patch, Transdermal, Q24H  lisinopril, 40 mg, Oral, Daily  metoprolol succinate XL, 100 mg, Oral, BID  [MAR Hold] naltrexone, 50 mg, Oral, QAM  NIFEdipine XL, 90 mg, Oral, Q24H  [MAR Hold] pramipexole, 0.5 mg, Oral, Nightly  [MAR Hold] senna-docusate sodium, 2 tablet, Oral, BID  [MAR Hold] sodium chloride, 10 mL,  "Intravenous, Q12H  sodium chloride, 10 mL, Intravenous, Q12H  sodium chloride, 3 mL, Intravenous, Q12H  [MAR Hold] tamsulosin, 0.4 mg, Oral, Nightly  [MAR Hold] vilazodone, 40 mg, Oral, Daily      Continuous Infusions:lactated ringers, 9 mL/hr, Last Rate: 9 mL/hr (05/20/23 1345)  sodium chloride, 100 mL/hr, Last Rate: 100 mL/hr (05/20/23 1030)  sodium chloride, 30 mL/hr, Last Rate: Stopped (05/19/23 1639)      PRN Meds:.•  [MAR Hold] acetaminophen  •  [MAR Hold] albuterol sulfate HFA  •  [MAR Hold] senna-docusate sodium **AND** [MAR Hold] polyethylene glycol **AND** [MAR Hold] bisacodyl **AND** [MAR Hold] bisacodyl  •  fentanyl  •  fentaNYL citrate (PF)  •  [MAR Hold] guaiFENesin-dextromethorphan  •  lidocaine PF 1%  •  midazolam  •  midazolam  •  [MAR Hold] nitroglycerin  •  [MAR Hold] ondansetron  •  [MAR Hold] oxyCODONE-acetaminophen  •  [COMPLETED] Insert Peripheral IV **AND** [MAR Hold] sodium chloride  •  [MAR Hold] sodium chloride  •  sodium chloride  •  sodium chloride  •  [MAR Hold] sodium chloride  •  sodium chloride    Assessment & Plan   Assessment & Plan     Active Hospital Problems    Diagnosis  POA   • **Acute dyspnea [R06.00]  Yes   • Acute respiratory failure with hypoxia [J96.01]  Yes   • Pleural effusion on right [J90]  Yes   • CKD (chronic kidney disease) [N18.9]  Yes   • Lymphadenopathy, mediastinal [R59.0]  Unknown   • ISIAH (obstructive sleep apnea) [G47.33]  Yes   • COPD (chronic obstructive pulmonary disease) [J44.9]  Yes   • Anxiety and depression [F41.9, F32.A]  Yes   • Essential hypertension [I10]  Yes   • Hypothyroidism [E03.9]  Yes      Resolved Hospital Problems   No resolved problems to display.        Brief Hospital Course to date:  Calos Moseley is a 62 y.o. male \"admitted with Acute dyspnea, right-sided chest pain and hypoxia, findings of loculated right pleural effusion and possible right lower lobe endobronchial lesion.\"    Discussion/plan for today:  All medical problems are new " under my management today.  Thoracic surgical intervention is planned for today.  Chest x-ray images reviewed from 520.  Patient has cardiomegaly and right effusion.  Continue broad-spectrum antibiotics.  Continue blood pressure management with careful monitoring.  Add labetalol for greater than 180.  Supportive care and symptom treatment.  Careful monitoring while on pain medication.  Continue to follow-up bronchoscopy and thoracic surgery findings.  Treatment plan discussed with patient is in agreement.  It should be noted the patient's initial laboratory studies returned quite abnormal.  It is thought that this may be related to lab error.  I ordered a stat repeat later in the morning and laboratory studies returned much more as expected.  IV fluid has been adjusted.    Loculated pleural effusion status post thoracentesis.  Fluid looks to be exudative, cytology negative for malignant cells.  I do not see where Gram stain or culture were ever sent.    - Bronchoscopy completed on 5/19  - On ceftriaxone, duration per ID  - Supplement O2 as needed.  Wean to keep sats greater than 90%  - Lidoderm patch for right-sided chest pain     Peripheral edema/weight gain- reviewed echocardiogram.  Relatively unremarkable other than mild pulmonary hypertension.  No diastolic dysfunction and normal BNP.    -Lasix x1 given.  Mild elevation in creatinine although this is still not far from baseline.  - Urine studies do not suggest significant proteinuria though there is very small amount  - Also note that he is on nifedipine which can cause peripheral edema, may adjust at some point     CKD relatively stable, continue to follow     History of hypothyroidism, on replacement with normal TSH     HTN, apparently refractory based on his extensive medication regimen but actually well controlled currently, labetalol for greater than 180.  Monitor carefully while on multiple medications.        • SCDs for DVT prophylaxis.  Consider  pharmacologic prophylaxis after procedures are done  • Dispo: Hopefully home in the next couple days depending on results of above        CODE STATUS:   Code Status and Medical Interventions:   Ordered at: 05/16/23 1955     Code Status (Patient has no pulse and is not breathing):    CPR (Attempt to Resuscitate)     Medical Interventions (Patient has pulse or is breathing):    Full Support       Dre Pearce MD  05/20/23

## 2023-05-20 NOTE — BRIEF OP NOTE
THORACOSCOPY VIDEO ASSISTED WITH DECORTICATION WITH DAVINCI ROBOT  Progress Note    Calos Moseley  5/20/2023    Pre-op Diagnosis:   Pleural effusion, right [J90]       Post-Op Diagnosis Codes:     * Pleural effusion, right [J90]    Procedure/CPT® Codes:  Procedure(s):  THORACOSCOPY VIDEO ASSISTED WITH DECORTICATION WITH DAVINCI ROBOT      Surgeon(s):  Jose Rice MD    Anesthesia: General with Block    Staff:   Circulator: Molly Gustafson RN; Steven Harkins RN; Gosia Jarvis RN  Scrub Person: Hernesto Wiggins; Vandana Aleman  Assistant: Jacob Naylor CSA  Assistant: Jacob Naylor CSA      Estimated Blood Loss: 100ml    Urine Voided: 125 mL    Specimens:                ID Type Source Tests Collected by Time   1 (Not marked as sent) :  Surgical Site Pleura ANAEROBIC CULTURE, BODY FLUID CULTURE Jose Rice MD 5/20/2023 1851   A (Not marked as sent) :  Tissue Pleura TISSUE / BONE CULTURE, TISSUE PATHOLOGY EXAM Jose Rice MD 5/20/2023 1856         Drains:   Chest Tube 1 Right Midaxillary (Active)       Chest Tube 2 Right Midaxillary (Active)       Urethral Catheter Silicone 16 Fr. (Active)       Complications: None    Assistant: Jacob Naylor CSA  was responsible for performing the following activities: Retraction, Suction, Irrigation, Suturing, Closing, Placing Dressing and Held/Positioned Camera and their skilled assistance was necessary for the success of this case.    Jose Rice MD     Date: 5/20/2023  Time: 19:17 EDT

## 2023-05-20 NOTE — PROGRESS NOTES
Pt unable to be evaluated during my rounds.  Going downstairs to the OR today for thoracoscopy procedure.  BAL results reviewed and no new concerns noted.  Continue with current treatment plan as mentioned yesterday.

## 2023-05-20 NOTE — ANESTHESIA PREPROCEDURE EVALUATION
Anesthesia Evaluation     Patient summary reviewed and Nursing notes reviewed   no history of anesthetic complications:  NPO Solid Status: > 6 hours  NPO Liquid Status: > 2 hours           Airway   Mallampati: III  TM distance: >3 FB  Neck ROM: full  Dental - normal exam     Pulmonary    (+) pleural effusion, a smoker Former, COPD moderate, sleep apnea on CPAP,   (-) asthma  Cardiovascular     ECG reviewed  Patient on routine beta blocker and Beta blocker given within 24 hours of surgery    (+) hypertension, hyperlipidemia,   (-) CAD, dysrhythmias, angina, cardiac stents    ROS comment: TTE 5/2023: LVEF 58%, mild LVH, nl diastolic fxn, RV fxn nl, no significant valve issues.     Neuro/Psych  (+) psychiatric history Anxiety, Depression and Bipolar,    (-) seizures, CVA  GI/Hepatic/Renal/Endo    (+) morbid obesity,  renal disease CRI, thyroid problem hypothyroidism  (-) GERD, liver disease, diabetes    Musculoskeletal     Abdominal    Substance History   (+) alcohol use (prior hx, none recent),      OB/GYN          Other                      Anesthesia Plan    ASA 3     general with block, ERAS Protocol and Monae       Anesthetic plan, risks, benefits, and alternatives have been provided, discussed and informed consent has been obtained with: patient.        CODE STATUS:    Code Status (Patient has no pulse and is not breathing): CPR (Attempt to Resuscitate)  Medical Interventions (Patient has pulse or is breathing): Full Support

## 2023-05-20 NOTE — PLAN OF CARE
Goal Outcome Evaluation:  Plan of Care Reviewed With: patient        Progress: no change  Outcome Evaluation: VSS. PRN pain medication given x1 and intervention effective. NPO after midnight for Thoracospoy ordered. IV rocephin continued. NS started per orders at 75/hr. 3L NC continuous pulse oximetry. ABX and Heparin sub Q ordered on call to OR. No complaints or concerns per patient. Safety maintained. Needs met at this time. Will continue to monitor.

## 2023-05-21 ENCOUNTER — APPOINTMENT (OUTPATIENT)
Dept: GENERAL RADIOLOGY | Facility: HOSPITAL | Age: 63
End: 2023-05-21
Payer: COMMERCIAL

## 2023-05-21 LAB
ANION GAP SERPL CALCULATED.3IONS-SCNC: 5.3 MMOL/L (ref 5–15)
ARTERIAL PATENCY WRIST A: ABNORMAL
ATMOSPHERIC PRESS: 751.2 MMHG
BACTERIA SPEC AEROBE CULT: NO GROWTH
BACTERIA SPEC AEROBE CULT: NORMAL
BACTERIA SPEC AEROBE CULT: NORMAL
BASE EXCESS BLDA CALC-SCNC: 2.8 MMOL/L (ref 0–2)
BASOPHILS # BLD AUTO: 0.02 10*3/MM3 (ref 0–0.2)
BASOPHILS NFR BLD AUTO: 0.1 % (ref 0–1.5)
BDY SITE: ABNORMAL
BUN SERPL-MCNC: 30 MG/DL (ref 8–23)
BUN/CREAT SERPL: 21.1 (ref 7–25)
CALCIUM SPEC-SCNC: 9 MG/DL (ref 8.6–10.5)
CHLORIDE SERPL-SCNC: 102 MMOL/L (ref 98–107)
CO2 SERPL-SCNC: 30.7 MMOL/L (ref 22–29)
CREAT SERPL-MCNC: 1.42 MG/DL (ref 0.76–1.27)
DEPRECATED RDW RBC AUTO: 43 FL (ref 37–54)
EGFRCR SERPLBLD CKD-EPI 2021: 56.2 ML/MIN/1.73
EOSINOPHIL # BLD AUTO: 0 10*3/MM3 (ref 0–0.4)
EOSINOPHIL NFR BLD AUTO: 0 % (ref 0.3–6.2)
ERYTHROCYTE [DISTWIDTH] IN BLOOD BY AUTOMATED COUNT: 13.1 % (ref 12.3–15.4)
GLUCOSE BLDC GLUCOMTR-MCNC: 138 MG/DL (ref 70–130)
GLUCOSE BLDC GLUCOMTR-MCNC: 301 MG/DL (ref 70–130)
GLUCOSE SERPL-MCNC: 156 MG/DL (ref 65–99)
GRAM STN SPEC: NORMAL
GRAM STN SPEC: NORMAL
HCO3 BLDA-SCNC: 31 MMOL/L (ref 22–28)
HCT VFR BLD AUTO: 35.7 % (ref 37.5–51)
HGB BLD-MCNC: 11.2 G/DL (ref 13–17.7)
IMM GRANULOCYTES # BLD AUTO: 0.14 10*3/MM3 (ref 0–0.05)
IMM GRANULOCYTES NFR BLD AUTO: 0.8 % (ref 0–0.5)
INHALED O2 CONCENTRATION: 30 %
LYMPHOCYTES # BLD AUTO: 0.41 10*3/MM3 (ref 0.7–3.1)
LYMPHOCYTES NFR BLD AUTO: 2.5 % (ref 19.6–45.3)
MCH RBC QN AUTO: 28.2 PG (ref 26.6–33)
MCHC RBC AUTO-ENTMCNC: 31.4 G/DL (ref 31.5–35.7)
MCV RBC AUTO: 89.9 FL (ref 79–97)
MODALITY: ABNORMAL
MONOCYTES # BLD AUTO: 0.5 10*3/MM3 (ref 0.1–0.9)
MONOCYTES NFR BLD AUTO: 3 % (ref 5–12)
NEUTROPHILS NFR BLD AUTO: 15.48 10*3/MM3 (ref 1.7–7)
NEUTROPHILS NFR BLD AUTO: 93.6 % (ref 42.7–76)
NRBC BLD AUTO-RTO: 0 /100 WBC (ref 0–0.2)
O2 A-A PPRESDIFF RESPIRATORY: 0.5 MMHG
PCO2 BLDA: 60.5 MM HG (ref 35–45)
PEEP RESPIRATORY: 8 CM[H2O]
PH BLDA: 7.32 PH UNITS (ref 7.35–7.45)
PLATELET # BLD AUTO: 370 10*3/MM3 (ref 140–450)
PMV BLD AUTO: 9.3 FL (ref 6–12)
PO2 BLDA: 74.6 MM HG (ref 80–100)
POTASSIUM SERPL-SCNC: 5.8 MMOL/L (ref 3.5–5.2)
RBC # BLD AUTO: 3.97 10*6/MM3 (ref 4.14–5.8)
SAO2 % BLDCOA: 93 % (ref 92–99)
SET MECH RESP RATE: 20
SODIUM SERPL-SCNC: 138 MMOL/L (ref 136–145)
TOTAL RATE: 20 BREATHS/MINUTE
VT ON VENT VENT: 600 ML
WBC NRBC COR # BLD: 16.55 10*3/MM3 (ref 3.4–10.8)

## 2023-05-21 PROCEDURE — 94799 UNLISTED PULMONARY SVC/PX: CPT

## 2023-05-21 PROCEDURE — 71045 X-RAY EXAM CHEST 1 VIEW: CPT

## 2023-05-21 PROCEDURE — 97166 OT EVAL MOD COMPLEX 45 MIN: CPT | Performed by: OCCUPATIONAL THERAPIST

## 2023-05-21 PROCEDURE — 94760 N-INVAS EAR/PLS OXIMETRY 1: CPT

## 2023-05-21 PROCEDURE — 85025 COMPLETE CBC W/AUTO DIFF WBC: CPT | Performed by: SURGERY

## 2023-05-21 PROCEDURE — 97530 THERAPEUTIC ACTIVITIES: CPT | Performed by: OCCUPATIONAL THERAPIST

## 2023-05-21 PROCEDURE — 94667 MNPJ CHEST WALL 1ST: CPT

## 2023-05-21 PROCEDURE — 94660 CPAP INITIATION&MGMT: CPT

## 2023-05-21 PROCEDURE — 97530 THERAPEUTIC ACTIVITIES: CPT | Performed by: PHYSICAL THERAPIST

## 2023-05-21 PROCEDURE — 94668 MNPJ CHEST WALL SBSQ: CPT

## 2023-05-21 PROCEDURE — 82948 REAGENT STRIP/BLOOD GLUCOSE: CPT

## 2023-05-21 PROCEDURE — 25010000002 CEFTRIAXONE PER 250 MG: Performed by: SURGERY

## 2023-05-21 PROCEDURE — 25010000002 ENOXAPARIN PER 10 MG: Performed by: SURGERY

## 2023-05-21 PROCEDURE — 94761 N-INVAS EAR/PLS OXIMETRY MLT: CPT

## 2023-05-21 PROCEDURE — 80048 BASIC METABOLIC PNL TOTAL CA: CPT | Performed by: SURGERY

## 2023-05-21 PROCEDURE — 82803 BLOOD GASES ANY COMBINATION: CPT

## 2023-05-21 RX ORDER — ENOXAPARIN SODIUM 100 MG/ML
40 INJECTION SUBCUTANEOUS EVERY 12 HOURS
Status: DISCONTINUED | OUTPATIENT
Start: 2023-05-22 | End: 2023-05-26 | Stop reason: HOSPADM

## 2023-05-21 RX ORDER — GABAPENTIN 100 MG/1
200 CAPSULE ORAL EVERY 12 HOURS SCHEDULED
Status: DISCONTINUED | OUTPATIENT
Start: 2023-05-21 | End: 2023-05-23

## 2023-05-21 RX ORDER — DIAZEPAM 2 MG/1
2 TABLET ORAL EVERY 6 HOURS PRN
Status: DISCONTINUED | OUTPATIENT
Start: 2023-05-21 | End: 2023-05-26 | Stop reason: HOSPADM

## 2023-05-21 RX ADMIN — DIVALPROEX SODIUM 250 MG: 250 TABLET, EXTENDED RELEASE ORAL at 08:28

## 2023-05-21 RX ADMIN — DIVALPROEX SODIUM 250 MG: 250 TABLET, EXTENDED RELEASE ORAL at 20:54

## 2023-05-21 RX ADMIN — METOPROLOL SUCCINATE 100 MG: 100 TABLET, EXTENDED RELEASE ORAL at 20:53

## 2023-05-21 RX ADMIN — ACETAMINOPHEN 1000 MG: 500 TABLET, FILM COATED ORAL at 00:12

## 2023-05-21 RX ADMIN — LEVOTHYROXINE SODIUM 50 MCG: 0.05 TABLET ORAL at 05:47

## 2023-05-21 RX ADMIN — POLYETHYLENE GLYCOL 3350 17 G: 17 POWDER, FOR SOLUTION ORAL at 08:34

## 2023-05-21 RX ADMIN — DOCUSATE SODIUM 100 MG: 100 CAPSULE, LIQUID FILLED ORAL at 08:33

## 2023-05-21 RX ADMIN — ALBUTEROL SULFATE 2.5 MG: 2.5 SOLUTION RESPIRATORY (INHALATION) at 14:50

## 2023-05-21 RX ADMIN — GABAPENTIN 300 MG: 300 CAPSULE ORAL at 05:47

## 2023-05-21 RX ADMIN — GABAPENTIN 200 MG: 100 CAPSULE ORAL at 20:53

## 2023-05-21 RX ADMIN — LAMOTRIGINE 100 MG: 100 TABLET ORAL at 20:53

## 2023-05-21 RX ADMIN — ALBUTEROL SULFATE 2.5 MG: 2.5 SOLUTION RESPIRATORY (INHALATION) at 06:46

## 2023-05-21 RX ADMIN — CEFTRIAXONE SODIUM 1 G: 1 INJECTION, POWDER, FOR SOLUTION INTRAMUSCULAR; INTRAVENOUS at 23:12

## 2023-05-21 RX ADMIN — ACETYLCYSTEINE 3 ML: 200 SOLUTION ORAL; RESPIRATORY (INHALATION) at 14:50

## 2023-05-21 RX ADMIN — ACETAMINOPHEN 1000 MG: 500 TABLET, FILM COATED ORAL at 16:01

## 2023-05-21 RX ADMIN — VILAZODONE HYDROCHLORIDE 40 MG: 40 TABLET, FILM COATED ORAL at 08:28

## 2023-05-21 RX ADMIN — DIVALPROEX SODIUM 250 MG: 250 TABLET, EXTENDED RELEASE ORAL at 00:49

## 2023-05-21 RX ADMIN — LABETALOL HYDROCHLORIDE 20 MG: 5 INJECTION, SOLUTION INTRAVENOUS at 05:46

## 2023-05-21 RX ADMIN — DOCUSATE SODIUM 100 MG: 100 CAPSULE, LIQUID FILLED ORAL at 20:53

## 2023-05-21 RX ADMIN — DOCUSATE SODIUM 100 MG: 100 CAPSULE, LIQUID FILLED ORAL at 00:12

## 2023-05-21 RX ADMIN — NIFEDIPINE 90 MG: 60 TABLET, FILM COATED, EXTENDED RELEASE ORAL at 08:28

## 2023-05-21 RX ADMIN — LISINOPRIL 40 MG: 40 TABLET ORAL at 08:28

## 2023-05-21 RX ADMIN — HYDRALAZINE HYDROCHLORIDE 50 MG: 50 TABLET, FILM COATED ORAL at 23:11

## 2023-05-21 RX ADMIN — HYDRALAZINE HYDROCHLORIDE 50 MG: 50 TABLET, FILM COATED ORAL at 06:00

## 2023-05-21 RX ADMIN — TAMSULOSIN HYDROCHLORIDE 0.4 MG: 0.4 CAPSULE ORAL at 20:53

## 2023-05-21 RX ADMIN — ACETYLCYSTEINE 3 ML: 200 SOLUTION ORAL; RESPIRATORY (INHALATION) at 06:46

## 2023-05-21 RX ADMIN — HYDRALAZINE HYDROCHLORIDE 50 MG: 50 TABLET, FILM COATED ORAL at 00:51

## 2023-05-21 RX ADMIN — CEFTRIAXONE SODIUM 1 G: 1 INJECTION, POWDER, FOR SOLUTION INTRAMUSCULAR; INTRAVENOUS at 00:12

## 2023-05-21 RX ADMIN — BENZONATATE 200 MG: 100 CAPSULE ORAL at 20:53

## 2023-05-21 RX ADMIN — METOPROLOL SUCCINATE 100 MG: 100 TABLET, EXTENDED RELEASE ORAL at 08:28

## 2023-05-21 RX ADMIN — ATORVASTATIN CALCIUM 20 MG: 20 TABLET, FILM COATED ORAL at 20:53

## 2023-05-21 RX ADMIN — Medication 10 ML: at 08:35

## 2023-05-21 RX ADMIN — HYDRALAZINE HYDROCHLORIDE 50 MG: 50 TABLET, FILM COATED ORAL at 14:54

## 2023-05-21 RX ADMIN — Medication 10 ML: at 00:52

## 2023-05-21 RX ADMIN — ACETAMINOPHEN 1000 MG: 500 TABLET, FILM COATED ORAL at 08:33

## 2023-05-21 RX ADMIN — ALBUTEROL SULFATE 2.5 MG: 2.5 SOLUTION RESPIRATORY (INHALATION) at 00:08

## 2023-05-21 RX ADMIN — DOCUSATE SODIUM 50MG AND SENNOSIDES 8.6MG 2 TABLET: 8.6; 5 TABLET, FILM COATED ORAL at 08:34

## 2023-05-21 RX ADMIN — BENZONATATE 200 MG: 100 CAPSULE ORAL at 16:01

## 2023-05-21 RX ADMIN — ACETAMINOPHEN 1000 MG: 500 TABLET, FILM COATED ORAL at 20:53

## 2023-05-21 RX ADMIN — BENZONATATE 200 MG: 100 CAPSULE ORAL at 08:27

## 2023-05-21 RX ADMIN — ALBUTEROL SULFATE 2.5 MG: 2.5 SOLUTION RESPIRATORY (INHALATION) at 23:30

## 2023-05-21 RX ADMIN — GABAPENTIN 300 MG: 300 CAPSULE ORAL at 00:12

## 2023-05-21 RX ADMIN — Medication 10 ML: at 20:59

## 2023-05-21 RX ADMIN — LIDOCAINE 1 PATCH: 700 PATCH TOPICAL at 08:28

## 2023-05-21 RX ADMIN — LAMOTRIGINE 100 MG: 100 TABLET ORAL at 00:49

## 2023-05-21 RX ADMIN — ENOXAPARIN SODIUM 40 MG: 100 INJECTION SUBCUTANEOUS at 08:34

## 2023-05-21 RX ADMIN — PRAMIPEXOLE DIHYDROCHLORIDE 0.5 MG: 0.5 TABLET ORAL at 20:59

## 2023-05-21 RX ADMIN — Medication 10 ML: at 08:34

## 2023-05-21 RX ADMIN — DOCUSATE SODIUM 50MG AND SENNOSIDES 8.6MG 2 TABLET: 8.6; 5 TABLET, FILM COATED ORAL at 00:12

## 2023-05-21 RX ADMIN — LAMOTRIGINE 100 MG: 100 TABLET ORAL at 08:28

## 2023-05-21 RX ADMIN — METOPROLOL SUCCINATE 100 MG: 100 TABLET, EXTENDED RELEASE ORAL at 00:49

## 2023-05-21 RX ADMIN — ACETYLCYSTEINE 3 ML: 200 SOLUTION ORAL; RESPIRATORY (INHALATION) at 00:08

## 2023-05-21 RX ADMIN — ACETYLCYSTEINE 3 ML: 200 SOLUTION ORAL; RESPIRATORY (INHALATION) at 23:35

## 2023-05-21 NOTE — PROGRESS NOTES
Infectious Diseases Progress Note    Ramya Lomeli MD     [facility]  [place]: 3 days  Patient Identification:  Name: Calos Moseley  Age: 62 y.o.  Sex: male  :  1960  MRN: [id number]         Primary Care Physician: Naveed Nunez DO    Requesting physician: Dr. Carvajal  Reason for consultation: Empyema    Subjective: Pain and discomfort in the right chest much improved compared to last night.  Denies any fever and chills.  Interval History: See my admission history and physical.  Started on IV ceftriaxone by pulmonary service at the time of admission.  Thoracentesis performed on 2023 reveals exudative noninfectious process and cytology pending.  2023 underwent bronchoscopy  2023 THORACOSCOPY VIDEO ASSISTED WITH DECORTICATION WITH [employer] ROBOT  Objective:    Scheduled Meds:acetaminophen, 1,000 mg, Oral, TID  acetylcysteine, 3 mL, Nebulization, Q8H - RT  albuterol, 2.5 mg, Nebulization, Q8H - RT  atorvastatin, 20 mg, Oral, Nightly  benzonatate, 200 mg, Oral, TID  cefTRIAXone, 1 g, Intravenous, Q24H  divalproex, 250 mg, Oral, BID  docusate sodium, 100 mg, Oral, BID  enoxaparin, 40 mg, Subcutaneous, Daily  gabapentin, 300 mg, Oral, Q8H  hydrALAZINE, 50 mg, Oral, Q8H  lamoTRIgine, 100 mg, Oral, BID  levothyroxine, 50 mcg, Oral, Q AM  lidocaine, 1 patch, Transdermal, Q24H  lisinopril, 40 mg, Oral, Daily  metoprolol succinate XL, 100 mg, Oral, BID  NIFEdipine XL, 90 mg, Oral, Q24H  polyethylene glycol, 17 g, Oral, Daily  pramipexole, 0.5 mg, Oral, Nightly  senna-docusate sodium, 2 tablet, Oral, BID  sodium chloride, 10 mL, Intravenous, Q12H  sodium chloride, 10 mL, Intravenous, Q12H  tamsulosin, 0.4 mg, Oral, Nightly  vilazodone, 40 mg, Oral, Daily      Continuous Infusions:fentaNYL (SUBLIMAZE) PCA 25 mcg/mL 50 mL syringe,   lactated ringers, 9 mL/hr, Last Rate: 9 mL/hr (23)  lactated ringers, 40 mL/hr, Last Rate: 40 mL/hr (23)  Pharmacy Consult,  "        Vital signs in last 24 hours:  Temp:  [98.1 °F (36.7 °C)-98.7 °F (37.1 °C)] 98.7 °F (37.1 °C)  Heart Rate:  [59-90] 64  Resp:  [17-29] 22  BP: (146-205)/() 155/69    Intake/Output:    Intake/Output Summary (Last 24 hours) at 5/21/2023 0713  Last data filed at 5/21/2023 0700  Gross per 24 hour   Intake 487.35 ml   Output 1715 ml   Net -1227.65 ml       Exam:  /69   Pulse 64   Temp 98.7 °F (37.1 °C) (Oral)   Resp 22   Ht 167.6 cm (66\")   Wt 117 kg (258 lb 9.6 oz)   SpO2 97%   BMI 41.74 kg/m²   Patient is examined using the personal protective equipment as per guidelines from infection control for this particular patient as enacted.  Hand washing was performed before and after patient interaction.  General Appearance:    Alert, cooperative, no distress, AAOx3                          Head:    Normocephalic, without obvious abnormality, atraumatic                           Eyes:    PERRL, conjunctivae/corneas clear, EOM's intact, both eyes                         Throat:   Lips, tongue, gums normal; oral mucosa pink and moist                           Neck:   Supple, symmetrical, trachea midline, no JVD                         Lungs:    Decreased breath sounds at the right lung base                 Chest Wall:    No tenderness or deformity                          Heart:  S1-S2 regular                  Abdomen:   Obese soft nontender                 Extremities: Lateral lower extremity edema                        Pulses:   Pulses palpable in all extremities                            Skin:   Skin is warm and dry,  no rashes or palpable lesions                  Neurologic: Grossly nonfocal       Data Review:    I reviewed the patient's new clinical results.  Results from last 7 days   Lab Units 05/21/23  0521 05/20/23  2030 05/20/23  0405 05/18/23  0520 05/17/23  0433 05/16/23  1730   WBC 10*3/mm3 16.55* 22.24* 16.88* 10.67 9.18 10.09   HEMOGLOBIN g/dL 11.2* 11.9* 11.5* 13.3 13.0 14.1 "   PLATELETS 10*3/mm3 370 428 341 352 265 273     Results from last 7 days   Lab Units 05/21/23  0521 05/20/23  2030 05/20/23  1054 05/20/23  0405 05/19/23  0332 05/18/23  0520 05/17/23  0433   SODIUM mmol/L 138 140 141 152* 141 140 139   POTASSIUM mmol/L 5.8* 5.5* 5.1 5.4* 5.1 4.8 4.8   CHLORIDE mmol/L 102 103 104 113* 103 102 102   CO2 mmol/L 30.7* 23.0 31.0* 29.0 29.1* 28.3 24.5   BUN mg/dL 30* 33* 34* 37* 44* 36* 32*   CREATININE mg/dL 1.42* 1.57* 1.64* 1.76* 1.97* 1.93* 1.60*   CALCIUM mg/dL 9.0 8.4* 8.9 9.0 9.3 9.6 9.0   GLUCOSE mg/dL 156* 118* 111* 110* 116* 168* 129*     Microbiology Results (last 10 days)     Procedure Component Value - Date/Time    Tissue / Bone Culture - Tissue, Pleura [[id number]] Collected: 05/20/23 1856    Lab Status: Preliminary result Specimen: Tissue from Pleura Updated: 05/21/23 0617     Gram Stain No WBCs or organisms seen    Body Fluid Culture - Surgical Site, Pleura [[id number]] Collected: 05/20/23 1851    Lab Status: Preliminary result Specimen: Surgical Site from Pleura Updated: 05/21/23 0615     Gram Stain No WBCs or organisms seen    AFB Culture - Lavage, Lung, Right Lower Lobe [[id number]] Collected: 05/19/23 1546    Lab Status: Preliminary result Specimen: Lavage from Lung, Right Lower Lobe Updated: 05/20/23 1342     AFB Stain No acid fast bacilli seen on concentrated smear    Fungus Smear - Lavage, Lung, Right Lower Lobe [[id number]] Collected: 05/19/23 1546    Lab Status: Final result Specimen: Lavage from Lung, Right Lower Lobe Updated: 05/19/23 2113     Fungal Stain No fungal elements seen    BAL Culture, Quantitative - Lavage, Lung, Right Lower Lobe [[id number]] Collected: 05/19/23 1546    Lab Status: Preliminary result Specimen: Lavage from Lung, Right Lower Lobe Updated: 05/20/23 0903     BAL Culture No growth     Gram Stain No organisms seen      Rare (1+) WBCs seen    Legionella Antigen, Urine - Urine, Urine, Clean Catch [[id number]]  (Normal) Collected: 05/17/23 0458     Lab Status: Final result Specimen: Urine, Clean Catch Updated: 05/17/23 0644     LEGIONELLA ANTIGEN, URINE Negative    S. Pneumo Ag Urine or CSF - Urine, Urine, Clean Catch [[id number]]  (Normal) Collected: 05/17/23 0452    Lab Status: Final result Specimen: Urine, Clean Catch Updated: 05/17/23 0644     Strep Pneumo Ag Negative    Blood Culture - Blood, Hand, Right [[id number]]  (Normal) Collected: 05/16/23 2253    Lab Status: Preliminary result Specimen: Blood from Hand, Right Updated: 05/20/23 2301     Blood Culture No growth at 4 days    Blood Culture - Blood, Arm, Left [[id number]]  (Normal) Collected: 05/16/23 2247    Lab Status: Preliminary result Specimen: Blood from Arm, Left Updated: 05/20/23 2301     Blood Culture No growth at 4 days    Respiratory Panel PCR w/COVID-19(SARS-CoV-2) JERI/SONIA/MARQUITA/PAD/COR/MAD/ULISSES In-House, NP Swab in UTM/VTM, 3-4 HR TAT - Swab, Nasopharynx [[id number]]  (Normal) Collected: 05/16/23 1733    Lab Status: Final result Specimen: Swab from Nasopharynx Updated: 05/16/23 1832     ADENOVIRUS, PCR Not Detected     Coronavirus 229E Not Detected     Coronavirus HKU1 Not Detected     Coronavirus NL63 Not Detected     Coronavirus OC43 Not Detected     COVID19 Not Detected     Human Metapneumovirus Not Detected     Human Rhinovirus/Enterovirus Not Detected     Influenza A PCR Not Detected     Influenza B PCR Not Detected     Parainfluenza Virus 1 Not Detected     Parainfluenza Virus 2 Not Detected     Parainfluenza Virus 3 Not Detected     Parainfluenza Virus 4 Not Detected     RSV, PCR Not Detected     Bordetella pertussis pcr Not Detected     Bordetella parapertussis PCR Not Detected     Chlamydophila pneumoniae PCR Not Detected     Mycoplasma pneumo by PCR Not Detected    Narrative:      In the setting of a positive respiratory panel with a viral infection PLUS a negative procalcitonin without other underlying concern for bacterial infection, consider observing off antibiotics or  discontinuation of antibiotics and continue supportive care. If the respiratory panel is positive for atypical bacterial infection (Bordetella pertussis, Chlamydophila pneumoniae, or Mycoplasma pneumoniae), consider antibiotic de-escalation to target atypical bacterial infection.        Pleural fluid appears to be exudative but does not appear infectious.    Assessment:    Acute dyspnea    COPD (chronic obstructive pulmonary disease)    Anxiety and depression    Hypothyroidism    ISIAH (obstructive sleep apnea)    Essential hypertension    Pleural effusion on right    CKD (chronic kidney disease)    Acute respiratory failure with hypoxia    Lymphadenopathy, mediastinal  Complicated right pleural effusion with loculation in the setting of recent symptoms suggestive of pneumonia-this likely represent complicated effusion with empyema but rule out endobronchial lesion and possible malignancy related postobstructive pneumonia complicating this effusion-s/p THORACOSCOPY VIDEO ASSISTED WITH DECORTICATION WITH [employer] ROBOT  Recommendations/discussion:   Discussed with Dr. Rice and it does not appear that he has active infectious process going on and findings were consistent with scarring and chronic inflammation from recent partially treated pneumonic process.  We will discuss with our pulmonary colleagues.  In the meantime continue Rocephin for 24 to 48 hours after his surgery on 5/20/2023.  Further antibiotic therapy likely not needed based on the operative findings as discussed with Dr. Rice.  Ramya Lomeli MD  5/21/2023  07:13 EDT    Parts of this note may be an electronic transcription/translation of spoken language to printed text using the Dragon dictation system.

## 2023-05-21 NOTE — THERAPY EVALUATION
Patient Name: Calos Moseley  : 1960    MRN: [id number]                              Today's Date: 2023       Admit Date: 2023    Visit Dx:     ICD-10-CM ICD-9-CM   1. Acute dyspnea  R06.00 786.09   2. Hypoxia  R09.02 799.02   3. Pleural effusion, right  J90 511.9   4. Acute respiratory failure with hypoxia  J96.01 518.81   5. Lymphadenopathy, mediastinal  R59.0 785.6     Patient Active Problem List   Diagnosis   • HLD (hyperlipidemia)   • Obesity, Class III, BMI 40-49.9 (morbid obesity)   • Bilateral kidney stones   • COPD (chronic obstructive pulmonary disease)   • Left flank pain   • Anxiety and depression   • Hypothyroidism   • ISIAH (obstructive sleep apnea)   • Essential hypertension   • Calculus, renal   • Restless leg syndrome   • Bipolar affective   • Calculus of ureter   • Gross hematuria   • Encounter for screening for malignant neoplasm of colon   • Acute dyspnea   • Pleural effusion on right   • CKD (chronic kidney disease)   • Acute respiratory failure with hypoxia   • Lymphadenopathy, mediastinal     Past Medical History:   Diagnosis Date   • Anesthesia complication     STATES HARD TO WAKE UP AFTER PREVIOUS KIDNEY STONE SURGERY AND STATES HE CODED   • Bilateral kidney stones    • Bipolar affective    • COVID    • Disease of thyroid gland    • Elevated cholesterol    • Hypertension    • Kidney stone on left side    • Left flank pain    • Mood disorder    • Restless leg syndrome    • Sleep apnea     cpap  bring dos   • UTI (urinary tract infection) 2021    PREV FINISHED ANTIBIOTIC     Past Surgical History:   Procedure Laterality Date   • ADENOIDECTOMY     • APPENDECTOMY     • COLONOSCOPY     • COLONOSCOPY N/A 3/23/2023    Procedure: COLONOSCOPY into cecum with cold snare polypectomies;  Surgeon: Yoan Leonard MD;  Location: [facility] ENDOSCOPY;  Service: Gastroenterology;  Laterality: N/A;  polyps   • CYSTOSCOPY WITH CLOT EVACUATION N/A 3/8/2022    Procedure: CYSTOSCOPY WITH  CLOT EVACUATION;  Surgeon: Ihsan Lopez MD;  Location: [facility] MAIN OR;  Service: Urology;  Laterality: N/A;   • CYSTOSCOPY, URETEROSCOPY, RETROGRADE PYELOGRAM, STENT INSERTION Left 3/7/2022    Procedure: CYSTOSCOPY URETEROSCOPY LASER LITHOTRIPSY and stent exchange;  Surgeon: Deuce Leija MD;  Location: [facility] MAIN OR;  Service: Urology;  Laterality: Left;   • KIDNEY STONE SURGERY     • NEPHROLITHOTRIPSY PERCUTANEOUS Left 2/21/2022    Procedure: NEPHROSTLITHOTOMY PERCUTANEOUS;  Surgeon: Deuce Leija MD;  Location: [facility] MAIN OR;  Service: Urology;  Laterality: Left;   • NEPHROLITHOTRIPSY PERCUTANEOUS Left 2/23/2022    Procedure: LEFT FLEXIBLE NEPHROSTOMY WITH STONE EXTRACTION, LEFT NEPHROSTOMY, TUBE REMOVAL, BILATERAL URETERAL STENT PLACEMENT;  Surgeon: Deuce Leija MD;  Location: [facility] MAIN OR;  Service: Urology;  Laterality: Left;   • NEPHROLITHOTRIPSY PERCUTANEOUS Right 3/7/2022    Procedure: RIGHT PERCUTANEOUS NEPHROLITHOTOMY W ACCESS;  Surgeon: Deuce Leija MD;  Location: [facility] MAIN OR;  Service: Urology;  Laterality: Right;   • TONSILLECTOMY        General Information     Row Name 05/21/23 1514          Physical Therapy Time and Intention    Document Type evaluation  -     Mode of Treatment individual therapy;physical therapy  -     Row Name 05/21/23 1514          General Information    Patient Profile Reviewed yes  -KH     Prior Level of Function independent:  -     Existing Precautions/Restrictions fall;oxygen therapy device and L/min  -     Barriers to Rehab medically complex  -     Row Name 05/21/23 1514          Living Environment    People in Home spouse  -     Row Name 05/21/23 1514          Cognition    Orientation Status (Cognition) oriented x 3  -     Row Name 05/21/23 1514          Safety Issues, Functional Mobility    Safety Issues Affecting Function (Mobility) impulsivity  -     Impairments Affecting Function (Mobility) endurance/activity  tolerance;pain  -           User Key  (r) = Recorded By, (t) = Taken By, (c) = Cosigned By    Initials Name Provider Type    Geena Angela, PT Physical Therapist               Mobility     Row Name 05/21/23 1515          Bed Mobility    Bed Mobility --  -     Supine-Sit [place] (Bed Mobility) --  -     Sit-Supine [place] (Bed Mobility) --  -     Comment, (Bed Mobility) up in chair  -     Row Name 05/21/23 1515          Sit-Stand Transfer    Sit-Stand [place] (Transfers) minimum assist (75% patient effort)  -     Assistive Device (Sit-Stand Transfers) --  -EPI     Comment, (Sit-Stand Transfer) --  -     Row Name 05/21/23 1515          Gait/Stairs (Locomotion)    [place] Level (Gait) contact guard  -     Assistive Device (Gait) --  -     Distance in Feet (Gait) 200ft  -     Deviations/Abnormal Patterns (Gait) antalgic;gait speed decreased  -     Bilateral Gait Deviations forward flexed posture  -     Comment, (Gait/Stairs) mod A when stepping backwards  -           User Key  (r) = Recorded By, (t) = Taken By, (c) = Cosigned By    Initials Name Provider Type    Geena Angela PT Physical Therapist               Obj/Interventions     Row Name 05/21/23 1517          Range of Motion Comprehensive    Comment, General Range of Motion BLE WFL  -     Row Name 05/21/23 1517          Strength Comprehensive (MMT)    Comment, General Manual Muscle Testing (MMT) Assessment BLE L  -     Row Name 05/21/23 1517          Motor Skills    Therapeutic Exercise --  BLE Ap,LAQ x 10 reps  -           User Key  (r) = Recorded By, (t) = Taken By, (c) = Cosigned By    Initials Name Provider Type    Geena Angela, PT Physical Therapist               Goals/Plan     Row Name 05/21/23 1524          Bed Mobility Goal 1 (PT)    Activity/Assistive Device (Bed Mobility Goal 1, PT) bed mobility activities, all  -     [place] Level/Cues Needed (Bed  Mobility Goal 1, PT) independent  -     Time Frame (Bed Mobility Goal 1, PT) 1 week  -     Row Name 05/21/23 1524          Transfer Goal 1 (PT)    Activity/Assistive Device (Transfer Goal 1, PT) transfers, all  -     [place] Level/Cues Needed (Transfer Goal 1, PT) independent  -KH     Time Frame (Transfer Goal 1, PT) 1 week  -     Row Name 05/21/23 1524          Gait Training Goal 1 (PT)    Activity/Assistive Device (Gait Training Goal 1, PT) gait (walking locomotion)  -     [place] Level (Gait Training Goal 1, PT) independent  -     Distance (Gait Training Goal 1, PT) 300 ft  -     Time Frame (Gait Training Goal 1, PT) 1 week  -     Row Name 05/21/23 1524          Therapy Assessment/Plan (PT)    Planned Therapy Interventions (PT) bed mobility training;gait training;home exercise program;patient/family education;stair training;transfer training;strengthening  -           User Key  (r) = Recorded By, (t) = Taken By, (c) = Cosigned By    Initials Name Provider Type    Geena Angela, PT Physical Therapist               Clinical Impression     Row Name 05/21/23 1521          Pain    Pretreatment Pain Rating 0/10 - no pain  -     Posttreatment Pain Rating 0/10 - no pain  -[facility] Name 05/21/23 1521          Plan of Care Review    Plan of Care Reviewed With patient  -     Outcome Evaluation pt is s/p VAT on 5/20/23. He was up in the chair when PT arrived and eager to walker. Pt is slightly impulsive, but stood and ambulated 200 ft with CGA. Pt would benefit from PT to address mobiltiy and endurance. Suspect pt will progress quickly with mobility once he has less wires and tubes. Pt plans to d/c home when medically stalbe.  -     Row Name 05/21/23 1521          Therapy Assessment/Plan (PT)    Patient/Family Therapy Goals Statement (PT) return home to [facility]  -     Rehab Potential (PT) good, to achieve stated therapy goals  -     Criteria for Skilled Interventions Met (PT)  yes  -     Therapy Frequency (PT) 5 times/wk  -     Row Name 05/21/23 1521          Positioning and Restraints    Pre-Treatment Position sitting in chair/recliner  -     Post Treatment Position chair  -KH     In Chair sitting;call light within reach;encouraged to call for assist;notified nsg  -           User Key  (r) = Recorded By, (t) = Taken By, (c) = Cosigned By    Initials Name Provider Type    Geena Angela, PT Physical Therapist               Outcome Measures     Row Name 05/21/23 1525          How much help from another person do you currently need...    Turning from your back to your side while in flat bed without using bedrails? 4  -KH     Moving from lying on back to sitting on the side of a flat bed without bedrails? 3  -KH     Moving to and from a bed to a chair (including a wheelchair)? 3  -KH     Standing up from a chair using your arms (e.g., wheelchair, bedside chair)? 3  -KH     Climbing 3-5 steps with a railing? 3  -KH     To walk in hospital room? 3  -KH     AM-PAC 6 Clicks Score (PT) 19  -     Highest level of mobility 6 --> Walked 10 steps or more  -     Row Name 05/21/23 1525 05/21/23 1200       Functional Assessment    Outcome Measure Options AM-PAC 6 Clicks Basic Mobility (PT)  - AM-PAC 6 Clicks Daily Activity (OT)  -SG          User Key  (r) = Recorded By, (t) = Taken By, (c) = Cosigned By    Initials Name Provider Type    Geena Angela, PT Physical Therapist    Ginger Merida OTR Occupational Therapist                             Physical Therapy Education     Title: PT OT SLP Therapies (Done)     Topic: Physical Therapy (Done)     Point: Mobility training (Done)     Learning Progress Summary           Patient Tina, RAS, HUNTER,NR by EPI at 5/21/2023 1525                   Point: Home exercise program (Done)     Learning Progress Summary           Patient Tina, RAS, HUNTER,NR by EPI at 5/21/2023 1525                   Point: Body mechanics (Done)      Learning Progress Summary           Patient RAS Wilder, VU,NR by EPI at 5/21/2023 1525                   Point: Precautions (Done)     Learning Progress Summary           Patient Tina, E, VU,NR by  at 5/21/2023 1525                               User Key     Initials Effective Dates Name Provider Type [facility] 06/16/21 -  Geena Macario, PT Physical Therapist PT              PT Recommendation and Plan  Planned Therapy Interventions (PT): bed mobility training, gait training, home exercise program, patient/family education, stair training, transfer training, strengthening  Plan of Care Reviewed With: patient  Outcome Evaluation: pt is s/p VAT on 5/20/23. He was up in the chair when PT arrived and eager to walker. Pt is slightly impulsive, but stood and ambulated 200 ft with CGA. Pt would benefit from PT to address mobiltiy and endurance. Suspect pt will progress quickly with mobility once he has less wires and tubes. Pt plans to d/c home when medically stalbe.     Time Calculation:    PT Charges     Row Name 05/21/23 1526             Time Calculation    Start Time 1320  -      Stop Time 1337  -      Time Calculation (min) 17 min  -      PT Received On 05/21/23  -      PT - Next Appointment 05/22/23  -         Time Calculation- PT    Total Timed Code Minutes- PT 9 minute(s)  -         Timed Charges    05459 - PT Therapeutic Activity Minutes 9  -         Untimed Charges    PT Eval/Re-eval Minutes 8  -KH         Total Minutes    Timed Charges Total Minutes 9  -KH      Untimed Charges Total Minutes 8  -KH       Total Minutes 17  -KH            User Key  (r) = Recorded By, (t) = Taken By, (c) = Cosigned By    Initials Name Provider Type    Geena Angela, PT Physical Therapist              Therapy Charges for Today     Code Description Service Date Service Provider Modifiers Qty    57665772911 HC PT THERAPEUTIC ACT EA 15 MIN 5/21/2023 Geena Macario, PT GP 1          PT  G-Codes  Outcome Measure Options: AM-PAC 6 Clicks Basic Mobility (PT)  AM-PAC 6 Clicks Score (PT): 19  AM-PAC 6 Clicks Score (OT): 13  PT Discharge Summary  Anticipated Discharge Disposition (PT): home with assist    Geena Macario, PT  5/21/2023

## 2023-05-21 NOTE — NURSING NOTE
Patient status post VAT. History of COPD and sleep apnea with home CPAP use, patient obese. Patient with shallow, tachypneic breathing upon arrival to PACU from OR. O2 sats low 90s on 10L NRB mask. ABG's requested per Dr. Angel with anesthesia, see results. Critical results called to Dr. Guerrero with anesthesia, asked to defer management to Dr. Rice. Dr. Rice notified of results, stated he would like patient on BiPAP and to consult pulmonary service for management. Patient currently satting 93% on 6L high flow N/C. Tachypnea has resolved, but breaths still overall shallow, no distress noted at this time.

## 2023-05-21 NOTE — PROGRESS NOTES
Progress note    Patient had respiratory acidosis postprocedure due to significant pain and shallow breathing.  He was transferred to the ICU for close monitoring and management of respiratory acidosis.    His pain is better controlled with multimodal pain management.  He complains of pain with coughing and deep breathing. He is having difficulty clearing his secretions.  He worked with physical therapy today and got out of the bed into the chair.  He is in good spirits.    No acute distress.  Hemodynamically stable.  Sinus rhythm.  Nonlabored breathing on 6 LPM high flow nasal cannula.  Chest tube with serosanguineous drainage and no air leak on -40 suction.  Extremities warm.  Lobo catheter in place with clear urine.    Labs reviewed.  Creatinine 1.42, potassium 5.8.  WBC 16.5, trending down.  Hemoglobin 11.2 and stable.  Imaging showed better expansion of the right lower lobe.  There is haziness over the right hemithorax which is likely due to postop inflammation and will take couple of weeks to show improvement.    In summary, he is  POD #1 s/p robotic assisted total decortication, partial viscera and parietal pleurectomy.  He had trapped lung from early scar formation in the right lower chest.  There was thickened parietal pleural peel and loculated effusion which required total decortication and pleurectomy.  There was no evidence of gross purulent fluid/pus in the chest.  I think he might have subclinical pneumonia or partially treated pneumonia that resulted into parapneumonic loculated effusion and trapped lung.    Postop management will be challenging in terms of his low lung volumes, history of sleep apnea, truncal obesity and chronic kidney disease.  I recommend judicious use of IV fluids.  The Lobo catheter can be removed tomorrow.  He has history of BPH and is currently on Flomax.  The arterial line can be removed.  He is more on the hypertensive side.  He is at high risk of postop pneumonia.  I  emphasized the importance of early mobilization, adequate pain control and incentive spirometer.  He can use CPAP if required.  It is not a contraindication after chest surgery.  There is no evidence of air leak on -40 suction.  We will transition to -20 suction tomorrow.    Appreciate ICU cares.  He will stay in the ICU today and likely transfer to the floor tomorrow.    Jose Rice MD  Thoracic surgeon

## 2023-05-21 NOTE — PLAN OF CARE
Problem: Adult Inpatient Plan of Care  Goal: Plan of Care Review  5/21/2023 1913 by Flaquita Arshad RN  Outcome: Ongoing, Progressing  5/21/2023 1913 by Flaquita Arshad RN  Outcome: Ongoing, Progressing  Goal: Patient-Specific Goal (Individualized)  5/21/2023 1913 by Flaquita Arshad RN  Outcome: Ongoing, Progressing  5/21/2023 1913 by Flaquita Arshad RN  Outcome: Ongoing, Progressing  Goal: Absence of Hospital-Acquired Illness or Injury  5/21/2023 1913 by Flaquita Arshad RN  Outcome: Ongoing, Progressing  5/21/2023 1913 by Flaquita Arshad RN  Outcome: Ongoing, Progressing  Intervention: Identify and Manage Fall Risk  Recent Flowsheet Documentation  Taken 5/21/2023 1900 by Flaquita Arshad RN  Safety Promotion/Fall Prevention:   safety round/check completed   clutter free environment maintained  Taken 5/21/2023 1800 by Flaquita Arshad RN  Safety Promotion/Fall Prevention:   safety round/check completed   clutter free environment maintained  Taken 5/21/2023 1700 by Flaquita Arshad RN  Safety Promotion/Fall Prevention:   safety round/check completed   clutter free environment maintained  Taken 5/21/2023 1615 by Flaquita Arshad RN  Safety Promotion/Fall Prevention:   safety round/check completed   clutter free environment maintained  Taken 5/21/2023 1300 by Flaquita Arshad RN  Safety Promotion/Fall Prevention:   safety round/check completed   clutter free environment maintained  Taken 5/21/2023 1200 by Flaquita Arshad RN  Safety Promotion/Fall Prevention:   safety round/check completed   clutter free environment maintained  Taken 5/21/2023 1100 by Flaquita Arshad RN  Safety Promotion/Fall Prevention:   safety round/check completed   clutter free environment maintained  Taken 5/21/2023 1000 by Flaquita Arshad RN  Safety Promotion/Fall Prevention:   safety round/check completed   clutter free environment maintained  Taken 5/21/2023 0900 by Flaquita Arshad RN  Safety Promotion/Fall Prevention:   safety  round/check completed   clutter free environment maintained  Taken 5/21/2023 0800 by Flaquita Arshad RN  Safety Promotion/Fall Prevention:   safety round/check completed   clutter free environment maintained  Intervention: Prevent Skin Injury  Recent Flowsheet Documentation  Taken 5/21/2023 1800 by Flaquita Arshad RN  Body Position:   turned   right   side-lying  Taken 5/21/2023 1615 by Flaquita Arshad RN  Body Position: position changed independently  Skin Protection:   skin-to-skin areas padded   tubing/devices free from skin contact  Taken 5/21/2023 1200 by Flaquita Arshad RN  Body Position: position changed independently  Skin Protection:   skin-to-device areas padded   tubing/devices free from skin contact  Taken 5/21/2023 1000 by Flaquita Arshad RN  Body Position: position changed independently  Taken 5/21/2023 0800 by Flaquita Arshad RN  Body Position:   turned   right   side-lying  Skin Protection:   skin-to-device areas padded   tubing/devices free from skin contact  Intervention: Prevent and Manage VTE (Venous Thromboembolism) Risk  Recent Flowsheet Documentation  Taken 5/21/2023 1615 by Flaquita Arshad RN  Activity Management: up in chair  Range of Motion: ROM (range of motion) performed  Taken 5/21/2023 1300 by Flaquita Arshad RN  Activity Management: ambulated outside room  Taken 5/21/2023 1000 by Flaquita Arshad RN  Activity Management: up in chair  Taken 5/21/2023 0800 by Flaquita Arshad RN  Activity Management: bedrest  VTE Prevention/Management:   bilateral   sequential compression devices on  Range of Motion: ROM (range of motion) performed  Goal: Optimal Comfort and Wellbeing  5/21/2023 1913 by Flaquita Arshad RN  Outcome: Ongoing, Progressing  5/21/2023 1913 by Flqauita Arshad RN  Outcome: Ongoing, Progressing  Intervention: Monitor Pain and Promote Comfort  Recent Flowsheet Documentation  Taken 5/21/2023 1800 by Flaquita Arshad RN  Pain Management Interventions: pain pump in  use  Taken 5/21/2023 1700 by Flaquita Arshad RN  Pain Management Interventions:   pain pump in use   pillow support provided   care clustered  Taken 5/21/2023 1615 by Flaquita Arshad RN  Pain Management Interventions: pain pump in use  Taken 5/21/2023 1000 by Flaquita Arshad RN  Pain Management Interventions: pain pump in use  Taken 5/21/2023 0900 by Flaquita Arshad RN  Pain Management Interventions:   pain pump in use   pillow support provided  Taken 5/21/2023 0800 by Flaquita Arshad RN  Pain Management Interventions:   pain pump in use   pillow support provided   position adjusted   care clustered   quiet environment facilitated   see MAR  Intervention: Provide Person-Centered Care  Recent Flowsheet Documentation  Taken 5/21/2023 1615 by Flaquita Arshad RN  Trust Relationship/Rapport:   care explained   thoughts/feelings acknowledged  Taken 5/21/2023 1200 by Flaquita Arshad RN  Trust Relationship/Rapport:   care explained   thoughts/feelings acknowledged  Taken 5/21/2023 0800 by Flaquita Arshad RN  Trust Relationship/Rapport:   care explained   thoughts/feelings acknowledged  Goal: Readiness for Transition of Care  5/21/2023 1913 by Flaquita Arshad RN  Outcome: Ongoing, Progressing  5/21/2023 1913 by Flaquita Arshad RN  Outcome: Ongoing, Progressing   Goal Outcome Evaluation:  Pt remain in ccu on 6L NC all day. Currently on Bipap. PCA pump continued till tomorrow as per md. A &O X4. Pt walked to the jacobo way with PT. Chest tube in place with minimal output. Lobo in place with adequate output. On regular diet. Removed Monae. Vital stable. Will keep monitoring.

## 2023-05-21 NOTE — PLAN OF CARE
Goal Outcome Evaluation:  Plan of Care Reviewed With: patient           Outcome Evaluation: Pt seen for OT re-eval this date s/p VAT on 5/20/23. Normally indep at baseline. This date pt requires min A for bed mobility and transfers to chair min A. Pt with overall decreased endurance and strength, normally independent at baseline. Pt will benefit from OT while inpt, will follow for best d/c plans pending progress.

## 2023-05-21 NOTE — PLAN OF CARE
Goal Outcome Evaluation:  Plan of Care Reviewed With: patient           Outcome Evaluation: pt is s/p VAT on 5/20/23. He was up in the chair when PT arrived and eager to walker. Pt is slightly impulsive, but stood and ambulated 200 ft with CGA. Pt would benefit from PT to address mobiltiy and endurance. Suspect pt will progress quickly with mobility once he has less wires and tubes. Pt plans to d/c home when medically stalbe.

## 2023-05-21 NOTE — OP NOTE
Operative Note     Date of procedure: 5/21/2023     Patient name: Calos Moseley  MRN: [id number]    Pre OP diagnosis:  1. Right loculated parapneumonic effusion.  2. Right trapped lung.  3. Community-acquired pneumonia.    4. Chronic obstructive pulmonary disease.  5. Essential hypertension.  6. Hyperlipidemia.  7. Morbid obesity, BMI 41.  8. Obstructive sleep apnea.  9. Chronic kidney disease.  10. Renal calculus.  11. Bipolar disease.  12. Restless leg syndrome.  13. Hypothyroidism.  14. Anxiety and depression.    Post OP diagnosis:  1. Right loculated parapneumonic effusion s/p right thoracoscopy with drainage.  2. Right trapped lung s/p right complete decortication, partial visceral and parietal pleurectomy.  3. Community-acquired pneumonia.    4. Chronic obstructive pulmonary disease.  5. Essential hypertension.  6. Hyperlipidemia.  7. Morbid obesity, BMI 41.  8. Obstructive sleep apnea.  9. Chronic kidney disease.  10. Renal calculus.  11. Bipolar disease.  12. Restless leg syndrome.  13. Hypothyroidism.  14. Anxiety and depression.    Procedure performed:   1. Flexible bronchoscopy.  2. Right robotic assisted total decortication, partial visceral and parietal pleurectomy.  3. Intercostal nerve block.    Indications:   Calos Moseley is a 62-year-old male who has significant medical problems as mentioned above presented to the emergency room with worsening cough, congestion, shortness of breath and lower extremities edema on 5/16/2023.  He has been treated as an outpatient with prednisone and amoxicillin however has had worsening of his symptoms.  He also reported hemoptysis which has been resolved.  He was evaluated in the [place] pulmonary care office and found to have concern for endobronchial obstruction and was sent to the ER. CT scan of the chest revealed right-sided loculated effusion.  He was started on IV antibiotics for presumed pneumonia.  He underwent right-sided thoracentesis on 5/18/2023 with 300  cc of exudative fluid drainage.  The chest x-ray showed persistent pleural effusion concerning for parapneumonic process.  He underwent flexible bronchoscopy on 5/19/2023 which revealed edematous airways with external compression on the right.  BAL was performed from the right lower lobe.  Thoracic surgery was consulted for loculated parapneumonic effusion.  I discussed with him in detail the option of conservative management with chest tube and intrapleural lytic therapy versus surgical management.  He opted for surgical intervention.  The risk and benefit of the procedure were discussed in detail and he signed the consent form.       Surgeon: Jose Rice MD     Assistants: Jacob Naylor, ANABELLE was responsible for performing the following activities: Retraction, Suction, Irrigation, Suturing, Closing, Placing Dressing and Held/Positioned Camera and their skilled assistance was necessary for the success of this case.    Anesthesia: General endotracheal - Double lumen tube    ASA Class: 3    Procedure Details   On 5/21/2023, the patient was brought to the operating room and placed in the supine position on the operating room table. Following an uneventful induction of general anesthesia, the patient was intubated with a double-lumen endotracheal tube without incident. A radial arterial line was placed by the Anesthesia team. A Lobo catheter was inserted. Pneumatic compression devices were placed on his lower extremities.  He received 2 gram of Cefazolin for intravenous antibiotic prophylaxis.  Patient was positioned in the left lateral decubitus position.  The table was jackknifed.  All bony prominences were well padded.  The right chest was prepped and draped in the usual sterile fashion. Prior to beginning the operation, a time-out was conducted with all members of surgical team present. The patient was identified as Calos Moseley, the procedure and the correct site were verified.      An incision was made in  the eighth intercostal space anteriorly along the posterior axillary line.  An 8 mm robotic port was placed.  Upon entry to the thoracic cavity, there was dense adhesions noted.  His diaphragm was high up in the chest and he was not tolerating single lung ventilation well.  This made the procedure challenging due to low lung volumes, high diaphragm and intermittent single lung ventilation.  I placed two additional 8 mm ports along the posterior axillary line in the fourth and sixth intercostal spaces.  An additional 8 mm port was placed posterior to the mid scapular line in the ninth intercostal space.  There was thick rind of parietal pleura over the posterolateral lower chest.  There was multiple loculated fluid collection in the posterolateral chest and in between the fissure.  A complete decortication was performed.  I performed partial visceral and parietal pleurectomy of the lower two third of the chest wall and right lower lobe lateral surface.  This allowed adequate lung expansion.  The pleurectomy specimen was sent for histopathology.  The effusion was sent for cytology and culture. The instruments were removed.  The robot was undocked.  I extended the anterior and inferior most 8 mm port site to 4 cm to remove chunks of the parietal and visceral pleura.  The chest was thoroughly irrigated with warm normal saline and suctioned.  0.25 % Marcaine was injected into the intercostal space 4th to 10th intercostal spaces  Hemostasis was achieved.  Two straight 28 [place]ian chest tubes were placed anterior and posterior to the hilum.  The chest tube were secured with #1 Ethibond suture to the skin.  All the chest incisions were inspected for hemostasis.  The lung was reinflated completely under direct visualization and double lung ventilation was reestablished.  The scope was removed.  The incisions were closed in layers with 2-0 Vicryl deep dermal layer and 4-0 Monocryl for subcuticular closure.  The incisions were  cleaned and dried and Dermabond was applied.  A sterile dressing was applied to the chest tube. The patient was then turned to supine position. Flexible bronchoscopy was performed.  There was moderate amount of thick secretions with blood in the bronchus intermedius.  The secretions were cleared and right lower lobe was inflated using jet ventilation.  The scope was withdrawn.    All instruments, sponges and needle counts were correct at the end of the operation.  The patient was extubated and taken to the PACU in stable condition.  There was mild air leak noted at the end of the procedure.    Findings:  1. Loculated parapneumonic effusion.  2. No evidence of madhuri pus or empyema.  3. Thickened parietal peel covering the posterior lateral right lower chest.  4. Lower lung volume and diaphragm elevation up to the 6 intercostal space likely due to significant truncal obesity.  5. Total decortication, parietal and visceral partial pleurectomy performed.  6. Frequent desaturation during the procedure on single lung ventilation.  7. The procedure was very challenging due to low lung volume and not adequate lung isolation.  8. Small air leak at the end of the procedure on positive pressure ventilation.    Estimated Blood Loss:  100ml           Drains: 28 [place]an chest tube anterior and posterior to the hilum.                 Specimens:   ID Type Source Tests Collected by Time   1 :  Surgical Site Pleura ANAEROBIC CULTURE, BODY FLUID CULTURE Jose Rice MD 5/20/2023 1851   A :  Tissue Pleura TISSUE / BONE CULTURE, TISSUE PATHOLOGY EXAM Jose Rice MD 5/20/2023 1856            Implants: * No implants in log *           Complications: None           Disposition: PACU - hemodynamically stable.           Condition: stable     Jose Rice MD   Thoracic Surgeon  [facility]

## 2023-05-21 NOTE — PROGRESS NOTES
Aniceto Bruno MD                          [phone]            Patient ID:    Name:  Calos Moseley    MRN:  [id number]    1960   62 y.o.  male            Patient Care Team:  Naveed Nunez DO as PCP - General (Family Medicine)  Naveed Nunez DO as PCP - Family Medicine  Chris Martinez Jr., MD as Consulting Physician (Pulmonary Disease)  Ihsan Lopez MD as Consulting Physician (Urology)  Dimas Jackson MD as Consulting Physician (Nephrology)  Fely Ferrer RN as Ambulatory  ([facility])    CC/ Reason for visit: Acute hypoxic respiratory failure, right-sided pleural effusion, abnormal CAT scan of the chest, pedal edema    Subjective: Pt seen and examined this AM.  Patient transferred to ICU postoperatively due to concern for hypercapnia respiratory failure and poor pain control requiring PCA pump.  Sitting up in the chair currently and on high flow nasal cannula.  Able to cough up secretions but appears to be uncomfortable    ROS: Denies any subjective fevers, syncope or presyncopal events, new neurological deficits, nausea or vomiting currently    Objective     Vital Signs past 24hrs    BP range: BP: (123-205)/() 123/66  Pulse range: Heart Rate:  [62-90] 63  Resp rate range: Resp:  [17-29] 20  Temp range: Temp (24hrs), Av.6 °F (37 °C), Min:98 °F (36.7 °C), Max:99.5 °F (37.5 °C)      Ventilator/Non-Invasive Ventilation Settings (From admission, onward)    None          Vent Settings                                         Device (Oxygen Therapy): humidified;nasal cannula       117 kg (258 lb 9.6 oz); Body mass index is 41.74 kg/m².      Intake/Output Summary (Last 24 hours) at 2023 1611  Last data filed at 2023 0900  Gross per 24 hour   Intake 457.35 ml   Output 1765 ml   Net -1307.65 ml       PHYSICAL EXAM   Constitutional: Middle-aged morbidly obese white male pt in bed, + acute respiratory distress, +  MD at bedside.   accessory muscle use  Head: - NCAT  Eyes: No pallor.  Anicteric sclerae, EOMI.  ENMT:  Mallampati 4, no oral thrush. Moist MM.   NECK: Trachea midline, No thyromegaly, no palpable cervical lymphadenopathy  Heart: RRR, no murmur. 1+ pedal edema   Lungs: JAMES +, improved breath sounds in the right base.  No wheezes/ crackles heard .  Right-sided chest tube x2   Abdomen: Soft.  Obese, no tenderness, guarding or rigidity. No palpable masses  Extremities: Extremities warm and well perfused. No cyanosis/ clubbing  Neuro: Conscious, answers appropriately, no gross focal neuro deficits  Psych: Mood and affect appropriate    PPE recommended per [facility] infectious disease Isolation protocol for the current clinical scenario (as mentioned below) was followed.     Scheduled meds:  acetaminophen, 1,000 mg, Oral, TID  acetylcysteine, 3 mL, Nebulization, Q8H - RT  albuterol, 2.5 mg, Nebulization, Q8H - RT  atorvastatin, 20 mg, Oral, Nightly  benzonatate, 200 mg, Oral, TID  cefTRIAXone, 1 g, Intravenous, Q24H  divalproex, 250 mg, Oral, BID  docusate sodium, 100 mg, Oral, BID  [START ON 5/22/2023] enoxaparin, 40 mg, Subcutaneous, Q12H  gabapentin, 200 mg, Oral, Q12H  hydrALAZINE, 50 mg, Oral, Q8H  lamoTRIgine, 100 mg, Oral, BID  levothyroxine, 50 mcg, Oral, Q AM  lidocaine, 1 patch, Transdermal, Q24H  lisinopril, 40 mg, Oral, Daily  metoprolol succinate XL, 100 mg, Oral, BID  NIFEdipine XL, 90 mg, Oral, Q24H  polyethylene glycol, 17 g, Oral, Daily  pramipexole, 0.5 mg, Oral, Nightly  senna-docusate sodium, 2 tablet, Oral, BID  sodium chloride, 10 mL, Intravenous, Q12H  sodium chloride, 10 mL, Intravenous, Q12H  tamsulosin, 0.4 mg, Oral, Nightly  vilazodone, 40 mg, Oral, Daily        IV meds:                      fentaNYL (SUBLIMAZE) PCA 25 mcg/mL 50 mL syringe,         Data Review:      Results from last 7 days   Lab Units 05/21/23  0521 05/20/23  2030 05/20/23  1054 05/20/23  0405 05/18/23  0520 05/17/23  0433  05/16/23  1730   SODIUM mmol/L 138 140 141 152*   < > 139 137   POTASSIUM mmol/L 5.8* 5.5* 5.1 5.4*   < > 4.8 4.6   CHLORIDE mmol/L 102 103 104 113*   < > 102 100   CO2 mmol/L 30.7* 23.0 31.0* 29.0   < > 24.5 27.0   BUN mg/dL 30* 33* 34* 37*   < > 32* 33*   CREATININE mg/dL 1.42* 1.57* 1.64* 1.76*   < > 1.60* 1.66*   CALCIUM mg/dL 9.0 8.4* 8.9 9.0   < > 9.0 9.5   BILIRUBIN mg/dL  --   --   --   --   --  0.6 0.7   ALK PHOS U/L  --   --   --   --   --  75 89   ALT (SGPT) U/L  --   --   --   --   --  24 34   AST (SGOT) U/L  --   --   --   --   --  17 19   GLUCOSE mg/dL 156* 118* 111* 110*   < > 129* 132*   WBC 10*3/mm3 16.55* 22.24*  --  16.88*   < > 9.18 10.09   HEMOGLOBIN g/dL 11.2* 11.9*  --  11.5*   < > 13.0 14.1   PLATELETS 10*3/mm3 370 428  --  341   < > 265 273   INR   --   --   --  1.08  --   --   --    PROBNP pg/mL  --   --   --   --   --   --  167.0   PROCALCITONIN ng/mL  --   --   --   --   --   --  0.98*    < > = values in this interval not displayed.       Lab Results   Component Value Date    CALCIUM 9.0 05/21/2023    PHOS 3.8 05/20/2023       Results from last 7 days   Lab Units 05/20/23  1851 05/16/23 2253 05/16/23 2247   BLOODCX   --  No growth at 4 days No growth at 4 days   BODYFLDCX  No growth  --   --        Results from last 7 days   Lab Units 05/21/23  0401 05/20/23  2348 05/20/23 2036   PH, ARTERIAL pH units 7.317* 7.293* 7.266*   PO2 ART mm Hg 74.6* 69.7* 93.2   PCO2, ARTERIAL mm Hg 60.5* 57.5* 64.0*   HCO3 ART mmol/L 31.0* 27.8 29.1*        I have personally reviewed the results from the time of this admission to 5/21/2023 16:11 EDT and agree with these findings:  [x]  Laboratory  [x]  Microbiology  [x]  Radiology  []  EKG/Telemetry   [x]  Cardiology/Vascular   []  Pathology  []  Old records    ASSESSMENT   Acute hypoxic/hypercapnic respiratory failure  Noninvasive ventilator use  Right-sided loculated pleural effusion s/p right Thora-300 cc; exudative s/p VATS  decortication-5/20  Nonspecific mediastinal lymphadenopathy s/p EBUS 5/19  Right lower lobe pneumonia s/p antibiotics s/p bronc-5/19  CKD  Remote smoker  ISIAH on CPAP  Morbid obesity    PLAN:  Patient is stable from a respiratory standpoint.  Making slow progress.  Postop chest x-ray reviewed with no new concerns for pneumothorax.  Appreciate thoracic surgery input and defer postop management to them  Continue with pain control and incentive spirometry to help with secretion clearance  BAL cultures are negative so far.  EBUS sample is pending still  VATS decortication sample is pending as well  Continue with as needed diuretics to keep him as negative as possible  Would discontinue Lobo catheter  Continue with nightly CPAP for ISIAH  Guarded prognosis    Aniceto Bruno MD  5/21/2023

## 2023-05-21 NOTE — PROGRESS NOTES
Pulmonary/critical care    Patient known to me he is in the postanesthesia care unit status post VAT decortication for an empyema he is having severe right-sided pleuritic type chest pain and he is having hypercapnic respiratory failure.  He is awake and alert he is begging for treatment for his pain.  Unfortunately it is difficult to treat his pain with his hypercapnia.  He is agreeable to trying noninvasive ventilation.  On exam he is definitely splinting very minimal air movement particularly on the right chest tubes just have small amount of sanguinous fluid no air leaks.  The chest x-ray shows a chest tubes in place no pneumothorax.  Reviewing records his cultures have been negative thus far, he is on Rocephin.  He is quite hypertensive but I think this is pain driven right now I think we get his pain under control we will get his blood pressure under control.  I am going to start a fentanyl PCA to try and get his pain under control.  We will   move him to the ICU.    CC time 39 min

## 2023-05-21 NOTE — ANESTHESIA POSTPROCEDURE EVALUATION
Patient: Calos Moseley    Procedure Summary     Date: 05/20/23 Room / Location: [facility] OR [address] [place] [address] MAIN OR    Anesthesia Start: 1531 Anesthesia Stop: 1940    Procedure: THORACOSCOPY VIDEO ASSISTED WITH DECORTICATION WITH DAVINCI ROBOT (Right: Chest) Diagnosis:       Pleural effusion, right      (Pleural effusion, right [J90])    Surgeons: Jose Rice MD Provider: Joshua Angel MD    Anesthesia Type: general with block, ERAS ProtocolMonae ASA Status: 3          Anesthesia Type: general with block, ERAS Protocol, Monae    Vitals  Vitals Value Taken Time   /66 05/20/23 2115   Temp 36.9 °C (98.5 °F) 05/20/23 1940   Pulse 86 05/20/23 2122   Resp 25 05/20/23 2029   SpO2 94 % 05/20/23 2122   Vitals shown include unvalidated device data.        Post Anesthesia Care and Evaluation    Patient location during evaluation: PACU  Patient participation: complete - patient participated  Level of consciousness: awake  Pain management: adequate    Airway patency: patent  Anesthetic complications: No anesthetic complications  PONV Status: none  Cardiovascular status: acceptable  Respiratory status: acceptable  Hydration status: acceptable    Comments: Patient seen and examined postoperatively; vital signs stable; SpO2 greater than or equal to 90%; cardiopulmonary status stable; nausea/vomiting adequately controlled; pain adequately controlled; no apparent anesthesia complications; patient discharged from anesthesia care when discharge criteria were met

## 2023-05-22 ENCOUNTER — APPOINTMENT (OUTPATIENT)
Dept: GENERAL RADIOLOGY | Facility: HOSPITAL | Age: 63
End: 2023-05-22
Payer: COMMERCIAL

## 2023-05-22 LAB
ANION GAP SERPL CALCULATED.3IONS-SCNC: 5.1 MMOL/L (ref 5–15)
BUN SERPL-MCNC: 39 MG/DL (ref 8–23)
BUN/CREAT SERPL: 18.8 (ref 7–25)
CALCIUM SPEC-SCNC: 8.5 MG/DL (ref 8.6–10.5)
CHLORIDE SERPL-SCNC: 99 MMOL/L (ref 98–107)
CO2 SERPL-SCNC: 30.9 MMOL/L (ref 22–29)
CREAT SERPL-MCNC: 2.07 MG/DL (ref 0.76–1.27)
DEPRECATED RDW RBC AUTO: 43.7 FL (ref 37–54)
EGFRCR SERPLBLD CKD-EPI 2021: 35.8 ML/MIN/1.73
ERYTHROCYTE [DISTWIDTH] IN BLOOD BY AUTOMATED COUNT: 13.1 % (ref 12.3–15.4)
GLUCOSE BLDC GLUCOMTR-MCNC: 144 MG/DL (ref 70–130)
GLUCOSE BLDC GLUCOMTR-MCNC: 175 MG/DL (ref 70–130)
GLUCOSE BLDC GLUCOMTR-MCNC: 190 MG/DL (ref 70–130)
GLUCOSE SERPL-MCNC: 134 MG/DL (ref 65–99)
HCT VFR BLD AUTO: 31.7 % (ref 37.5–51)
HGB BLD-MCNC: 10.3 G/DL (ref 13–17.7)
MCH RBC QN AUTO: 29.5 PG (ref 26.6–33)
MCHC RBC AUTO-ENTMCNC: 32.5 G/DL (ref 31.5–35.7)
MCV RBC AUTO: 90.8 FL (ref 79–97)
PLATELET # BLD AUTO: 315 10*3/MM3 (ref 140–450)
PMV BLD AUTO: 9 FL (ref 6–12)
POTASSIUM SERPL-SCNC: 5.4 MMOL/L (ref 3.5–5.2)
RBC # BLD AUTO: 3.49 10*6/MM3 (ref 4.14–5.8)
SODIUM SERPL-SCNC: 135 MMOL/L (ref 136–145)
WBC NRBC COR # BLD: 14.39 10*3/MM3 (ref 3.4–10.8)

## 2023-05-22 PROCEDURE — 85027 COMPLETE CBC AUTOMATED: CPT | Performed by: INTERNAL MEDICINE

## 2023-05-22 PROCEDURE — 94799 UNLISTED PULMONARY SVC/PX: CPT

## 2023-05-22 PROCEDURE — 71045 X-RAY EXAM CHEST 1 VIEW: CPT

## 2023-05-22 PROCEDURE — 97535 SELF CARE MNGMENT TRAINING: CPT

## 2023-05-22 PROCEDURE — 99024 POSTOP FOLLOW-UP VISIT: CPT | Performed by: NURSE PRACTITIONER

## 2023-05-22 PROCEDURE — 97110 THERAPEUTIC EXERCISES: CPT

## 2023-05-22 PROCEDURE — 94664 DEMO&/EVAL PT USE INHALER: CPT

## 2023-05-22 PROCEDURE — 25010000002 ENOXAPARIN PER 10 MG: Performed by: SURGERY

## 2023-05-22 PROCEDURE — 82948 REAGENT STRIP/BLOOD GLUCOSE: CPT

## 2023-05-22 PROCEDURE — 94668 MNPJ CHEST WALL SBSQ: CPT

## 2023-05-22 PROCEDURE — 80048 BASIC METABOLIC PNL TOTAL CA: CPT | Performed by: SURGERY

## 2023-05-22 PROCEDURE — 94761 N-INVAS EAR/PLS OXIMETRY MLT: CPT

## 2023-05-22 RX ORDER — IPRATROPIUM BROMIDE AND ALBUTEROL SULFATE 2.5; .5 MG/3ML; MG/3ML
3 SOLUTION RESPIRATORY (INHALATION)
Status: DISCONTINUED | OUTPATIENT
Start: 2023-05-23 | End: 2023-05-26 | Stop reason: HOSPADM

## 2023-05-22 RX ORDER — HYDROMORPHONE HYDROCHLORIDE 1 MG/ML
0.5 INJECTION, SOLUTION INTRAMUSCULAR; INTRAVENOUS; SUBCUTANEOUS
Status: DISCONTINUED | OUTPATIENT
Start: 2023-05-22 | End: 2023-05-26 | Stop reason: HOSPADM

## 2023-05-22 RX ORDER — GUAIFENESIN 600 MG/1
1200 TABLET, EXTENDED RELEASE ORAL EVERY 12 HOURS SCHEDULED
Status: DISCONTINUED | OUTPATIENT
Start: 2023-05-22 | End: 2023-05-26 | Stop reason: HOSPADM

## 2023-05-22 RX ORDER — OXYCODONE HYDROCHLORIDE 5 MG/1
5 TABLET ORAL EVERY 4 HOURS PRN
Status: DISCONTINUED | OUTPATIENT
Start: 2023-05-22 | End: 2023-05-26 | Stop reason: HOSPADM

## 2023-05-22 RX ORDER — IPRATROPIUM BROMIDE AND ALBUTEROL SULFATE 2.5; .5 MG/3ML; MG/3ML
3 SOLUTION RESPIRATORY (INHALATION)
Status: DISCONTINUED | OUTPATIENT
Start: 2023-05-22 | End: 2023-05-22 | Stop reason: SDUPTHER

## 2023-05-22 RX ORDER — SODIUM CHLORIDE FOR INHALATION 7 %
4 VIAL, NEBULIZER (ML) INHALATION
Status: DISCONTINUED | OUTPATIENT
Start: 2023-05-22 | End: 2023-05-26 | Stop reason: HOSPADM

## 2023-05-22 RX ADMIN — ATORVASTATIN CALCIUM 20 MG: 20 TABLET, FILM COATED ORAL at 21:04

## 2023-05-22 RX ADMIN — DOCUSATE SODIUM 100 MG: 100 CAPSULE, LIQUID FILLED ORAL at 21:04

## 2023-05-22 RX ADMIN — OXYCODONE HYDROCHLORIDE 5 MG: 5 TABLET ORAL at 10:02

## 2023-05-22 RX ADMIN — ACETYLCYSTEINE 3 ML: 200 SOLUTION ORAL; RESPIRATORY (INHALATION) at 16:00

## 2023-05-22 RX ADMIN — GABAPENTIN 200 MG: 100 CAPSULE ORAL at 08:46

## 2023-05-22 RX ADMIN — DIVALPROEX SODIUM 250 MG: 250 TABLET, EXTENDED RELEASE ORAL at 21:02

## 2023-05-22 RX ADMIN — PRAMIPEXOLE DIHYDROCHLORIDE 0.5 MG: 0.5 TABLET ORAL at 21:02

## 2023-05-22 RX ADMIN — BENZONATATE 200 MG: 100 CAPSULE ORAL at 16:05

## 2023-05-22 RX ADMIN — HYDRALAZINE HYDROCHLORIDE 50 MG: 50 TABLET, FILM COATED ORAL at 21:02

## 2023-05-22 RX ADMIN — DIVALPROEX SODIUM 250 MG: 250 TABLET, EXTENDED RELEASE ORAL at 08:46

## 2023-05-22 RX ADMIN — IPRATROPIUM BROMIDE AND ALBUTEROL SULFATE 3 ML: 2.5; .5 SOLUTION RESPIRATORY (INHALATION) at 20:37

## 2023-05-22 RX ADMIN — METOPROLOL SUCCINATE 100 MG: 100 TABLET, EXTENDED RELEASE ORAL at 21:02

## 2023-05-22 RX ADMIN — ENOXAPARIN SODIUM 40 MG: 100 INJECTION SUBCUTANEOUS at 21:02

## 2023-05-22 RX ADMIN — ACETAMINOPHEN 1000 MG: 500 TABLET, FILM COATED ORAL at 16:05

## 2023-05-22 RX ADMIN — BENZONATATE 200 MG: 100 CAPSULE ORAL at 21:02

## 2023-05-22 RX ADMIN — ALBUTEROL SULFATE 2.5 MG: 2.5 SOLUTION RESPIRATORY (INHALATION) at 06:47

## 2023-05-22 RX ADMIN — Medication 10 ML: at 08:47

## 2023-05-22 RX ADMIN — Medication 4 ML: at 06:48

## 2023-05-22 RX ADMIN — DOCUSATE SODIUM 50MG AND SENNOSIDES 8.6MG 2 TABLET: 8.6; 5 TABLET, FILM COATED ORAL at 08:46

## 2023-05-22 RX ADMIN — HYDRALAZINE HYDROCHLORIDE 50 MG: 50 TABLET, FILM COATED ORAL at 14:20

## 2023-05-22 RX ADMIN — LISINOPRIL 40 MG: 40 TABLET ORAL at 08:46

## 2023-05-22 RX ADMIN — BENZONATATE 200 MG: 100 CAPSULE ORAL at 08:46

## 2023-05-22 RX ADMIN — ACETYLCYSTEINE 3 ML: 200 SOLUTION ORAL; RESPIRATORY (INHALATION) at 06:47

## 2023-05-22 RX ADMIN — OXYCODONE HYDROCHLORIDE 5 MG: 5 TABLET ORAL at 14:24

## 2023-05-22 RX ADMIN — Medication 10 ML: at 21:04

## 2023-05-22 RX ADMIN — ALBUTEROL SULFATE 2.5 MG: 2.5 SOLUTION RESPIRATORY (INHALATION) at 16:00

## 2023-05-22 RX ADMIN — GUAIFENESIN 1200 MG: 600 TABLET, EXTENDED RELEASE ORAL at 21:02

## 2023-05-22 RX ADMIN — DOCUSATE SODIUM 50MG AND SENNOSIDES 8.6MG 2 TABLET: 8.6; 5 TABLET, FILM COATED ORAL at 21:02

## 2023-05-22 RX ADMIN — GUAIFENESIN 1200 MG: 600 TABLET, EXTENDED RELEASE ORAL at 08:46

## 2023-05-22 RX ADMIN — VILAZODONE HYDROCHLORIDE 40 MG: 40 TABLET, FILM COATED ORAL at 08:46

## 2023-05-22 RX ADMIN — Medication 4 ML: at 20:37

## 2023-05-22 RX ADMIN — LAMOTRIGINE 100 MG: 100 TABLET ORAL at 08:46

## 2023-05-22 RX ADMIN — NIFEDIPINE 90 MG: 60 TABLET, FILM COATED, EXTENDED RELEASE ORAL at 08:46

## 2023-05-22 RX ADMIN — GABAPENTIN 200 MG: 100 CAPSULE ORAL at 21:04

## 2023-05-22 RX ADMIN — Medication 4 ML: at 00:33

## 2023-05-22 RX ADMIN — METOPROLOL SUCCINATE 100 MG: 100 TABLET, EXTENDED RELEASE ORAL at 08:46

## 2023-05-22 RX ADMIN — TAMSULOSIN HYDROCHLORIDE 0.4 MG: 0.4 CAPSULE ORAL at 21:04

## 2023-05-22 RX ADMIN — ACETAMINOPHEN 1000 MG: 500 TABLET, FILM COATED ORAL at 08:46

## 2023-05-22 RX ADMIN — ENOXAPARIN SODIUM 40 MG: 100 INJECTION SUBCUTANEOUS at 08:46

## 2023-05-22 RX ADMIN — HYDRALAZINE HYDROCHLORIDE 50 MG: 50 TABLET, FILM COATED ORAL at 06:41

## 2023-05-22 RX ADMIN — LAMOTRIGINE 100 MG: 100 TABLET ORAL at 21:04

## 2023-05-22 RX ADMIN — ACETAMINOPHEN 1000 MG: 500 TABLET, FILM COATED ORAL at 21:04

## 2023-05-22 RX ADMIN — GUAIFENESIN 1200 MG: 600 TABLET, EXTENDED RELEASE ORAL at 04:12

## 2023-05-22 RX ADMIN — OXYCODONE HYDROCHLORIDE 5 MG: 5 TABLET ORAL at 21:03

## 2023-05-22 RX ADMIN — LEVOTHYROXINE SODIUM 50 MCG: 0.05 TABLET ORAL at 06:41

## 2023-05-22 NOTE — PROGRESS NOTES
"  POST-OPERATIVE NOTE     Chief Complaint: Right loculated parapneumonic effusion, postoperative care  S/P: Right video-assisted thoracoscopy with da Coby robot assisted decortication  POD # 2    Subjective:  Symptoms:  Stable.  He reports shortness of breath.    Diet:  No nausea or vomiting.    Activity level: Impaired due to pain.    Pain:  He complains of pain that is mild.  Pain is partially controlled.        Objective:  General Appearance:  Comfortable and in no acute distress.    Vital signs: (most recent): Blood pressure 138/57, pulse 58, temperature 98.3 °F (36.8 °C), temperature source Oral, resp. rate 18, height 167.6 cm (66\"), weight 118 kg (260 lb 2.3 oz), SpO2 97 %.  No fever.    Output: Producing urine.    Lungs:  Normal effort and normal respiratory rate.  There are decreased breath sounds and rhonchi.    Heart: Normal rate.  Regular rhythm.    Chest: Chest wall tenderness present.    Abdomen: Abdomen is soft.  There is no abdominal tenderness.     Neurological: Patient is alert and oriented to person, place and time.    Skin:  Warm and dry.              Chest tube:   Site: Right, Clean, Dry, Intact and Securement device intact  Suction: -40 cm  Air Leak: negative  24 Hour Total: 200/25 ml    Results Review:     I reviewed the patient's new clinical results.  I reviewed the patient's new imaging results and agree with the interpretation.  I reviewed the patient's other test results and agree with the interpretation  Discussed with Patient, spouse at bedside, RN, Dr. Rice.    Assessment & Plan     Mr. Moseley is doing very well POD #2, s/p right robot-assisted decortication.  Chest tube output has dropped off.  No evidence of air leak in the lung appears expanded on chest x-ray.  Wean chest tube to -20 cm suction.  Wean oxygen as able.  Encourage good pulmonary hygiene with incentive spirometry 10 times per hour while awake and flutter valve.   Transfer to  anytime from our standpoint.    Vandana MCGINNIS" SALVADOR Gaines  [facility]  05/22/23  16:38 EDT    Patient was seen and assessed while wearing personal protective equipment including facemask, protective eyewear and gloves.  Hand hygiene performed prior to entering the room and upon exiting with doffing of gloves.

## 2023-05-22 NOTE — PLAN OF CARE
Goal Outcome Evaluation:  Plan of Care Reviewed With: patient        Progress: improving  Outcome Evaluation: Pt demonstrates improvements in transfers and activity tolerance for ADLs today. He is able to complete mobility to bathroom, toilet transfers, sink side standing grooming, and bath in chair SBA. RN present during bath to wash chest tube sites and catheter. Pt with some deficits in endurance but progressing well. Anticipate need for 1-2 more OT sessions while admitted to progress towards stated goals. Pt active at baseline and works as a manager for [employer] and plans to return home at [place].

## 2023-05-22 NOTE — PROGRESS NOTES
Continued Stay Note  [facility]     Patient Name: Calos Moseley  MRN: [id number]  Today's Date: 5/22/2023    Admit Date: 5/16/2023    Plan: Home with no needs   Discharge Plan     Row Name 05/22/23 1433       Plan    Plan Home with no needs    Plan Comments Plan is home  Pt working with therapy's  CCP following               Discharge Codes    No documentation.               Expected Discharge Date and Time     Expected Discharge Date Expected Discharge Time    May 21, 2023             Catrachita Dahl RN

## 2023-05-22 NOTE — THERAPY TREATMENT NOTE
Patient Name: Calos Moseley  : 1960    MRN: [id number]                              Today's Date: 2023       Admit Date: 2023    Visit Dx:     ICD-10-CM ICD-9-CM   1. Acute dyspnea  R06.00 786.09   2. Hypoxia  R09.02 799.02   3. Pleural effusion, right  J90 511.9   4. Acute respiratory failure with hypoxia  J96.01 518.81   5. Lymphadenopathy, mediastinal  R59.0 785.6     Patient Active Problem List   Diagnosis   • HLD (hyperlipidemia)   • Obesity, Class III, BMI 40-49.9 (morbid obesity)   • Bilateral kidney stones   • COPD (chronic obstructive pulmonary disease)   • Left flank pain   • Anxiety and depression   • Hypothyroidism   • ISIAH (obstructive sleep apnea)   • Essential hypertension   • Calculus, renal   • Restless leg syndrome   • Bipolar affective   • Calculus of ureter   • Gross hematuria   • Encounter for screening for malignant neoplasm of colon   • Acute dyspnea   • Pleural effusion on right   • CKD (chronic kidney disease)   • Acute respiratory failure with hypoxia   • Lymphadenopathy, mediastinal     Past Medical History:   Diagnosis Date   • Anesthesia complication     STATES HARD TO WAKE UP AFTER PREVIOUS KIDNEY STONE SURGERY AND STATES HE CODED   • Bilateral kidney stones    • Bipolar affective    • COVID    • Disease of thyroid gland    • Elevated cholesterol    • Hypertension    • Kidney stone on left side    • Left flank pain    • Mood disorder    • Restless leg syndrome    • Sleep apnea     cpap  bring dos   • UTI (urinary tract infection) 2021    PREV FINISHED ANTIBIOTIC     Past Surgical History:   Procedure Laterality Date   • ADENOIDECTOMY     • APPENDECTOMY     • BRONCHOSCOPY Right 2023    Procedure: BRONCHOSCOPY WITH ENDOBRONCHIAL ULTRASOUND with BAL of RLL and FNA;  Surgeon: Aniceto Bruno MD;  Location: [facility] ENDOSCOPY;  Service: Pulmonary;  Laterality: Right;  pre- RLL pneumonia, lymphadenopathy  post- same   • COLONOSCOPY     • COLONOSCOPY N/A  3/23/2023    Procedure: COLONOSCOPY into cecum with cold snare polypectomies;  Surgeon: Yoan Leonard MD;  Location: [facility] ENDOSCOPY;  Service: Gastroenterology;  Laterality: N/A;  polyps   • CYSTOSCOPY WITH CLOT EVACUATION N/A 3/8/2022    Procedure: CYSTOSCOPY WITH CLOT EVACUATION;  Surgeon: Ihsan Lopez MD;  Location: [facility] OR;  Service: Urology;  Laterality: N/A;   • CYSTOSCOPY, URETEROSCOPY, RETROGRADE PYELOGRAM, STENT INSERTION Left 3/7/2022    Procedure: CYSTOSCOPY URETEROSCOPY LASER LITHOTRIPSY and stent exchange;  Surgeon: Deuce Leija MD;  Location: [facility] OR;  Service: Urology;  Laterality: Left;   • KIDNEY STONE SURGERY     • NEPHROLITHOTRIPSY PERCUTANEOUS Left 2/21/2022    Procedure: NEPHROSTLITHOTOMY PERCUTANEOUS;  Surgeon: Deuce Leija MD;  Location: [facility] OR;  Service: Urology;  Laterality: Left;   • NEPHROLITHOTRIPSY PERCUTANEOUS Left 2/23/2022    Procedure: LEFT FLEXIBLE NEPHROSTOMY WITH STONE EXTRACTION, LEFT NEPHROSTOMY, TUBE REMOVAL, BILATERAL URETERAL STENT PLACEMENT;  Surgeon: Deuce Leija MD;  Location: [facility] OR;  Service: Urology;  Laterality: Left;   • NEPHROLITHOTRIPSY PERCUTANEOUS Right 3/7/2022    Procedure: RIGHT PERCUTANEOUS NEPHROLITHOTOMY W ACCESS;  Surgeon: Deuce Leija MD;  Location: [facility] OR;  Service: Urology;  Laterality: Right;   • THORACOSCOPY WITH DECORTICATION Right 5/20/2023    Procedure: THORACOSCOPY VIDEO ASSISTED WITH DECORTICATION WITH DAVINCI ROBOT;  Surgeon: Jose Rice MD;  Location: [facility] MAIN OR;  Service: Robotics - DaVinci;  Laterality: Right;   • TONSILLECTOMY        General Information     Row Name 05/22/23 2183          Physical Therapy Time and Intention    Document Type therapy note (daily note)  -EJ     Mode of Treatment physical therapy  -EJ     Row Name 05/22/23 0954          General Information    Existing Precautions/Restrictions fall;oxygen therapy device and L/min  2 chest  tubes  -EJ           User Key  (r) = Recorded By, (t) = Taken By, (c) = Cosigned By    Initials Name Provider Type    Regina Bauman, PT Physical Therapist               Mobility     Row Name 05/22/23 1555          Bed Mobility    Comment, (Bed Mobility) up in chair  -EJ     Row Name 05/22/23 1555          Sit-Stand Transfer    Sit-Stand [place] (Transfers) verbal cues;standby assist  -EJ     Row Name 05/22/23 1555          Gait/Stairs (Locomotion)    [place] Level (Gait) verbal cues;standby assist  -EJ     Distance in Feet (Gait) 300  -EJ     Deviations/Abnormal Patterns (Gait) julieta decreased;stride length decreased  -EJ     Comment, (Gait/Stairs) no unsteadiness noted  -EJ           User Key  (r) = Recorded By, (t) = Taken By, (c) = Cosigned By    Initials Name Provider Type    Regina Bauman, PT Physical Therapist               Obj/Interventions    No documentation.                Goals/Plan    No documentation.                Clinical Impression     Row Name 05/22/23 1555          Pain    Pretreatment Pain Rating 0/10 - no pain  -EJ     [facility] Name 05/22/23 1555          Plan of Care Review    Plan of Care Reviewed With patient  -EJ     Progress improving  -EJ     Outcome Evaluation Pt seen for PT this afternoon. He is doing well and sitting up in chair upon entry to room. Pt denies pain at this time. He is eager to mobilize. Pt able to stand w sBA and then ambulate over 300 ft w SBA/CGA. No unsteadiness noted. Pt is on 5L O2 throughout session. O2 at 97% after session. Pt back in chair at end of session w all needs in reach. He is progressing well with mobility at this time.  -EJ     Row Name 05/22/23 1558          Vital Signs    Pre SpO2 (%) 97  -EJ     O2 Delivery Pre Treatment supplemental O2  -EJ     O2 Delivery Intra Treatment supplemental O2  -EJ     Post SpO2 (%) 97  -EJ     O2 Delivery Post Treatment supplemental O2  -EJ     Pre Patient Position Sitting  -EJ     Intra Patient  Position Standing  -EJ     Post Patient Position Sitting  -EJ     Row Name 05/22/23 1555          Positioning and Restraints    Pre-Treatment Position sitting in chair/recliner  -EJ     Post Treatment Position chair  -EJ     In Chair notified nsg;reclined;call light within reach;encouraged to call for assist;with family/caregiver  -EJ           User Key  (r) = Recorded By, (t) = Taken By, (c) = Cosigned By    Initials Name Provider Type    Regina Bauman, PT Physical Therapist               Outcome Measures     Row Name 05/22/23 1558          How much help from another person do you currently need...    Turning from your back to your side while in flat bed without using bedrails? 4  -EJ     Moving from lying on back to sitting on the side of a flat bed without bedrails? 3  -EJ     Moving to and from a bed to a chair (including a wheelchair)? 3  -EJ     Standing up from a chair using your arms (e.g., wheelchair, bedside chair)? 3  -EJ     Climbing 3-5 steps with a railing? 3  -EJ     To walk in hospital room? 3  -EJ     AM-PAC 6 Clicks Score (PT) 19  -EJ     Highest level of mobility 6 --> Walked 10 steps or more  -EJ           User Key  (r) = Recorded By, (t) = Taken By, (c) = Cosigned By    Initials Name Provider Type    Regina Bauman, PT Physical Therapist                             Physical Therapy Education     Title: PT OT SLP Therapies (Done)     Topic: Physical Therapy (Done)     Point: Mobility training (Done)     Learning Progress Summary           Patient RAS Wilder VU,NR by EPI at 5/21/2023 1525                   Point: Home exercise program (Done)     Learning Progress Summary           Patient RAS Wilder VU,NR by EPI at 5/21/2023 1525                   Point: Body mechanics (Done)     Learning Progress Summary           Patient RAS Wilder VU,NR by EPI at 5/21/2023 1525                   Point: Precautions (Done)     Learning Progress Summary           Patient RAS Wilder VU,NR by EPI at 5/21/2023  1525                               User Key     Initials Effective Dates Name Provider Type [facility] 06/16/21 -  Geena Macario, PT Physical Therapist PT              PT Recommendation and Plan     Plan of Care Reviewed With: patient  Progress: improving  Outcome Evaluation: Pt seen for PT this afternoon. He is doing well and sitting up in chair upon entry to room. Pt denies pain at this time. He is eager to mobilize. Pt able to stand w sBA and then ambulate over 300 ft w SBA/CGA. No unsteadiness noted. Pt is on 5L O2 throughout session. O2 at 97% after session. Pt back in chair at end of session w all needs in reach. He is progressing well with mobility at this time.     Time Calculation:    PT Charges     Row Name 05/22/23 1558             Time Calculation    Start Time 1458  -EJ      Stop Time 1515  -EJ      Time Calculation (min) 17 min  -EJ      PT Received On 05/22/23  -EJ      PT - Next Appointment 05/23/23  -EJ            User Key  (r) = Recorded By, (t) = Taken By, (c) = Cosigned By    Initials Name Provider Type    Regina Bauman, PT Physical Therapist              Therapy Charges for Today     Code Description Service Date Service Provider Modifiers Qty    53908323959 HC PT THER PROC EA 15 MIN 5/22/2023 Regina Moise, PT GP 1    80686747621 HC PT THER SUPP EA 15 MIN 5/22/2023 Regina Moise, PT GP 1          PT G-Codes  Outcome Measure Options: AM-PAC 6 Clicks Basic Mobility (PT)  AM-PAC 6 Clicks Score (PT): 19  AM-PAC 6 Clicks Score (OT): 19       Regina Moise PT  5/22/2023

## 2023-05-22 NOTE — PROGRESS NOTES
LOS: 4 days   Patient Care Team:  Naveed Nunez DO as PCP - General (Family Medicine)  Naveed Nunez DO as PCP - Family Medicine  Chris Martinez Jr., MD as Consulting Physician (Pulmonary Disease)  Ihsan Lopez MD as Consulting Physician (Urology)  Dimas Jackson MD as Consulting Physician (Nephrology)  Fely Ferrer, RN as Ambulatory  ([facility])    Chief Complaint:  F/up respiratory failure, post VATS decortication    Subjective   Interval History  I reviewed the admission note, progress notes, PMH, PSH, Family hx, social history, imagings and prior records on this admission, summarized the finding in my note and formulated a transition of care plan.    On 6 L oxygen.  Reported mild intermittent cough.  No significant phlegm.  Used the BiPAP overnight    REVIEW OF SYSTEMS:   CARDIOVASCULAR: No chest pain, chest pressure or chest discomfort. No palpitations or edema.   GASTROINTESTINAL: No anorexia, nausea, vomiting or diarrhea. No abdominal pain.  CONSTITUTIONAL: No fever or chills.     Ventilator/Non-Invasive Ventilation Settings (From admission, onward)     Start     Ordered    05/20/23 2114  NIPPV (CPAP or BIPAP)  Until Discontinued        Question Answer Comment   Indication Acute Respiratory Failure    Type AVAPS/PC/PS    Backup Rate 20    Target VT (mL) 600    EPAP/PEEP (cm H2O) 8    Min Pressure (cm H2O) 14    Max Pressure (cm H2O) 28    Titrate Oxygen for SpO2 90% or Greater        05/20/23 2114                      Physical Exam:     Vital Signs  Temp:  [97.6 °F (36.4 °C)-99.5 °F (37.5 °C)] 97.6 °F (36.4 °C)  Heart Rate:  [56-72] 65  Resp:  [18-20] 18  BP: (110-149)/(51-74) 128/60    Intake/Output Summary (Last 24 hours) at 5/22/2023 1026  Last data filed at 5/22/2023 0600  Gross per 24 hour   Intake 720 ml   Output 1275 ml   Net -555 ml     Flowsheet Rows    Flowsheet Row First Filed Value   Admission Height 167.6  "cm (66\") Documented at 05/16/2023 1953   Admission Weight 114 kg (252 lb) Documented at 05/16/2023 1953          PPE used per hospital policy    General Appearance:   Alert, cooperative, in no acute distress   ENMT:  Mallampati score 3, moist mucous membrane   Eyes:  Pupils equal and reactive to light. EOMI   Neck:   Large. Trachea midline. No thyromegaly.   Lungs:    Bilateral expiratory wheezing.  Coarse breath sounds.    Heart:   Regular rhythm and normal rate, normal S1 and S2, no         murmur   Skin:   No rash or ecchymosis   Abdomen:    Obese. Soft. No tenderness. No HSM.   Neuro/psych:  Conscious, alert, oriented x3. Strength 5/5 in upper and lower  ext.  Appropriate mood and affect   Extremities:  No cyanosis, clubbing but edema in legs.  Warm extremities and well-perfused          Results Review:        Results from last 7 days   Lab Units 05/22/23 0411 05/21/23 0521 05/20/23 2030   SODIUM mmol/L 135* 138 140   POTASSIUM mmol/L 5.4* 5.8* 5.5*   CHLORIDE mmol/L 99 102 103   CO2 mmol/L 30.9* 30.7* 23.0   BUN mg/dL 39* 30* 33*   CREATININE mg/dL 2.07* 1.42* 1.57*   GLUCOSE mg/dL 134* 156* 118*   CALCIUM mg/dL 8.5* 9.0 8.4*     Results from last 7 days   Lab Units 05/16/23  1918 05/16/23  1730   HSTROP T ng/L 17* 19*     Results from last 7 days   Lab Units 05/22/23 0411 05/21/23  0521 05/20/23  2030   WBC 10*3/mm3 14.39* 16.55* 22.24*   HEMOGLOBIN g/dL 10.3* 11.2* 11.9*   HEMATOCRIT % 31.7* 35.7* 37.3*   PLATELETS 10*3/mm3 315 370 428     Results from last 7 days   Lab Units 05/20/23  0405   INR  1.08   APTT seconds 35.8*     Results from last 7 days   Lab Units 05/16/23  1730   PROBNP pg/mL 167.0                   Results from last 7 days   Lab Units 05/21/23  0401 05/20/23  2348 05/20/23  2036   PH, ARTERIAL pH units 7.317* 7.293* 7.266*   PCO2, ARTERIAL mm Hg 60.5* 57.5* 64.0*   PO2 ART mm Hg 74.6* 69.7* 93.2   FLOW RATE lpm  --   --  7   MODALITY  BiPap BiPap HFNC   O2 SATURATION CALC % 93.0 91.1* " 95.6         I reviewed the patient's new clinical results.        Medication Review:   acetaminophen, 1,000 mg, Oral, TID  acetylcysteine, 3 mL, Nebulization, Q8H - RT  albuterol, 2.5 mg, Nebulization, Q8H - RT  atorvastatin, 20 mg, Oral, Nightly  benzonatate, 200 mg, Oral, TID  cefTRIAXone, 1 g, Intravenous, Q24H  divalproex, 250 mg, Oral, BID  docusate sodium, 100 mg, Oral, BID  enoxaparin, 40 mg, Subcutaneous, Q12H  gabapentin, 200 mg, Oral, Q12H  guaiFENesin, 1,200 mg, Oral, Q12H  hydrALAZINE, 50 mg, Oral, Q8H  lamoTRIgine, 100 mg, Oral, BID  levothyroxine, 50 mcg, Oral, Q AM  lidocaine, 1 patch, Transdermal, Q24H  lisinopril, 40 mg, Oral, Daily  metoprolol succinate XL, 100 mg, Oral, BID  NIFEdipine XL, 90 mg, Oral, Q24H  polyethylene glycol, 17 g, Oral, Daily  pramipexole, 0.5 mg, Oral, Nightly  senna-docusate sodium, 2 tablet, Oral, BID  sodium chloride, 10 mL, Intravenous, Q12H  sodium chloride, 10 mL, Intravenous, Q12H  sodium chloride, 4 mL, Nebulization, BID - RT  tamsulosin, 0.4 mg, Oral, Nightly  vilazodone, 40 mg, Oral, Daily             Diagnostic imaging:  I personally and independently reviewed the following images:  CXR 5/22/2023: Right pulmonary infiltrates.  Low lung volumes.  Chest tube noted    Assessment     1. Acute hypoxic/hypercapnic respiratory failure  2. Noninvasive ventilator use  3. Right-sided loculated pleural effusion s/p right Thora-300 cc; exudative s/p VATS decortication-5/20  4. Nonspecific mediastinal lymphadenopathy s/p EBUS 5/19  5. Right lower lobe pneumonia s/p antibiotics s/p bronc-5/19  6. CKD  7. Remote smoker  8. ISIAH on CPAP  9. Morbid obesity    All problems new to me    Plan     · Oxygen by NC and titrate keep SPO2 >90%  · Start DuoNeb 4 times a day.    · Mucomyst twice a day to improve mucus clearance.  · Flutter and I-S  · DC Rocephin.  No evidence of pneumonia blood culture  · Follow-up TBNA mediastinal adenopathy  · BiPAP at night  · Chest tube management per  thoracic surgery    Transfer to telemetry if okay with thoracic surgery    Jatinder James MD  05/22/23  10:26 EDT          This note was dictated utilizing Dragon dictation

## 2023-05-22 NOTE — PROGRESS NOTES
Infectious Diseases Progress Note    Ramya Lomeli MD     [facility]  [place]: 4 days  Patient Identification:  Name: Calos Moseley  Age: 62 y.o.  Sex: male  :  1960  MRN: [id number]         Primary Care Physician: Naveed Nunez DO    Requesting physician: Dr. Carvajal  Reason for consultation: Empyema    Subjective: Feeling better with significant improvement in pain  Interval History: See my admission history and physical.  Started on IV ceftriaxone by pulmonary service at the time of admission.  Thoracentesis performed on 2023 reveals exudative noninfectious process and cytology pending.  2023 underwent bronchoscopy  2023 THORACOSCOPY VIDEO ASSISTED WITH DECORTICATION WITH [employer] ROBOT  Objective:    Scheduled Meds:acetaminophen, 1,000 mg, Oral, TID  acetylcysteine, 3 mL, Nebulization, Q8H - RT  albuterol, 2.5 mg, Nebulization, Q8H - RT  atorvastatin, 20 mg, Oral, Nightly  benzonatate, 200 mg, Oral, TID  cefTRIAXone, 1 g, Intravenous, Q24H  divalproex, 250 mg, Oral, BID  docusate sodium, 100 mg, Oral, BID  enoxaparin, 40 mg, Subcutaneous, Q12H  gabapentin, 200 mg, Oral, Q12H  guaiFENesin, 1,200 mg, Oral, Q12H  hydrALAZINE, 50 mg, Oral, Q8H  lamoTRIgine, 100 mg, Oral, BID  levothyroxine, 50 mcg, Oral, Q AM  lidocaine, 1 patch, Transdermal, Q24H  lisinopril, 40 mg, Oral, Daily  metoprolol succinate XL, 100 mg, Oral, BID  NIFEdipine XL, 90 mg, Oral, Q24H  polyethylene glycol, 17 g, Oral, Daily  pramipexole, 0.5 mg, Oral, Nightly  senna-docusate sodium, 2 tablet, Oral, BID  sodium chloride, 10 mL, Intravenous, Q12H  sodium chloride, 10 mL, Intravenous, Q12H  sodium chloride, 4 mL, Nebulization, BID - RT  tamsulosin, 0.4 mg, Oral, Nightly  vilazodone, 40 mg, Oral, Daily      Continuous Infusions:fentaNYL (SUBLIMAZE) PCA 25 mcg/mL 50 mL syringe,         Vital signs in last 24 hours:  Temp:  [97.6 °F (36.4 °C)-99.5 °F (37.5 °C)] 97.6 °F (36.4 °C)  Heart Rate:  [56-73] 66  Resp:  " [18-20] 18  BP: (110-156)/(51-81) 110/59    Intake/Output:    Intake/Output Summary (Last 24 hours) at 5/22/2023 0756  Last data filed at 5/22/2023 0600  Gross per 24 hour   Intake 720 ml   Output 1725 ml   Net -1005 ml       Exam:  /59   Pulse 66   Temp 97.6 °F (36.4 °C) (Axillary)   Resp 18   Ht 167.6 cm (66\")   Wt 118 kg (260 lb 2.3 oz)   SpO2 98%   BMI 41.99 kg/m²   Patient is examined using the personal protective equipment as per guidelines from infection control for this particular patient as enacted.  Hand washing was performed before and after patient interaction.  General Appearance:    Alert, cooperative, no distress, AAOx3                          Head:    Normocephalic, without obvious abnormality, atraumatic                           Eyes:    PERRL, conjunctivae/corneas clear, EOM's intact, both eyes                         Throat:   Lips, tongue, gums normal; oral mucosa pink and moist                           Neck:   Supple, symmetrical, trachea midline, no JVD                         Lungs:    Decreased breath sounds at the right lung base                 Chest Wall:    No tenderness or deformity                          Heart:  S1-S2 regular                  Abdomen:   Obese soft nontender                 Extremities: Lateral lower extremity edema                        Pulses:   Pulses palpable in all extremities                            Skin:   Skin is warm and dry,  no rashes or palpable lesions                  Neurologic: Grossly nonfocal       Data Review:    I reviewed the patient's new clinical results.  Results from last 7 days   Lab Units 05/22/23  0411 05/21/23  0521 05/20/23  2030 05/20/23  0405 05/18/23  0520 05/17/23  0433 05/16/23  1730   WBC 10*3/mm3 14.39* 16.55* 22.24* 16.88* 10.67 9.18 10.09   HEMOGLOBIN g/dL 10.3* 11.2* 11.9* 11.5* 13.3 13.0 14.1   PLATELETS 10*3/mm3 315 370 428 341 352 265 273     Results from last 7 days   Lab Units 05/22/23 0411 " 05/21/23  0521 05/20/23  2030 05/20/23  1054 05/20/23  0405 05/19/23  0332 05/18/23  0520   SODIUM mmol/L 135* 138 140 141 152* 141 140   POTASSIUM mmol/L 5.4* 5.8* 5.5* 5.1 5.4* 5.1 4.8   CHLORIDE mmol/L 99 102 103 104 113* 103 102   CO2 mmol/L 30.9* 30.7* 23.0 31.0* 29.0 29.1* 28.3   BUN mg/dL 39* 30* 33* 34* 37* 44* 36*   CREATININE mg/dL 2.07* 1.42* 1.57* 1.64* 1.76* 1.97* 1.93*   CALCIUM mg/dL 8.5* 9.0 8.4* 8.9 9.0 9.3 9.6   GLUCOSE mg/dL 134* 156* 118* 111* 110* 116* 168*     Microbiology Results (last 10 days)     Procedure Component Value - Date/Time    Tissue / Bone Culture - Tissue, Pleura [[id number]] Collected: 05/20/23 1856    Lab Status: Preliminary result Specimen: Tissue from Pleura Updated: 05/21/23 0953     Tissue Culture No growth     Gram Stain No WBCs or organisms seen    Body Fluid Culture - Surgical Site, Pleura [[id number]] Collected: 05/20/23 1851    Lab Status: Preliminary result Specimen: Surgical Site from Pleura Updated: 05/21/23 0954     Body Fluid Culture No growth     Gram Stain No WBCs or organisms seen    AFB Culture - Lavage, Lung, Right Lower Lobe [[id number]] Collected: 05/19/23 1546    Lab Status: Preliminary result Specimen: Lavage from Lung, Right Lower Lobe Updated: 05/20/23 1342     AFB Stain No acid fast bacilli seen on concentrated smear    Fungus Smear - Lavage, Lung, Right Lower Lobe [[id number]] Collected: 05/19/23 1546    Lab Status: Final result Specimen: Lavage from Lung, Right Lower Lobe Updated: 05/19/23 2113     Fungal Stain No fungal elements seen    BAL Culture, Quantitative - Lavage, Lung, Right Lower Lobe [[id number]] Collected: 05/19/23 1546    Lab Status: Final result Specimen: Lavage from Lung, Right Lower Lobe Updated: 05/21/23 0822     BAL Culture No growth     Gram Stain No organisms seen      Rare (1+) WBCs seen    Legionella Antigen, Urine - Urine, Urine, Clean Catch [[id number]]  (Normal) Collected: 05/17/23 0452    Lab Status: Final result Specimen: Urine,  Clean Catch Updated: 05/17/23 0644     LEGIONELLA ANTIGEN, URINE Negative    S. Pneumo Ag Urine or CSF - Urine, Urine, Clean Catch [[id number]]  (Normal) Collected: 05/17/23 0452    Lab Status: Final result Specimen: Urine, Clean Catch Updated: 05/17/23 0644     Strep Pneumo Ag Negative    Blood Culture - Blood, Hand, Right [[id number]]  (Normal) Collected: 05/16/23 2253    Lab Status: Final result Specimen: Blood from Hand, Right Updated: 05/21/23 2301     Blood Culture No growth at 5 days    Blood Culture - Blood, Arm, Left [[id number]]  (Normal) Collected: 05/16/23 2247    Lab Status: Final result Specimen: Blood from Arm, Left Updated: 05/21/23 2301     Blood Culture No growth at 5 days    Respiratory Panel PCR w/COVID-19(SARS-CoV-2) JERI/SONIA/MARQUITA/PAD/COR/MAD/ULISSES In-House, NP Swab in UTM/VTM, 3-4 HR TAT - Swab, Nasopharynx [[id number]]  (Normal) Collected: 05/16/23 1733    Lab Status: Final result Specimen: Swab from Nasopharynx Updated: 05/16/23 1832     ADENOVIRUS, PCR Not Detected     Coronavirus 229E Not Detected     Coronavirus HKU1 Not Detected     Coronavirus NL63 Not Detected     Coronavirus OC43 Not Detected     COVID19 Not Detected     Human Metapneumovirus Not Detected     Human Rhinovirus/Enterovirus Not Detected     Influenza A PCR Not Detected     Influenza B PCR Not Detected     Parainfluenza Virus 1 Not Detected     Parainfluenza Virus 2 Not Detected     Parainfluenza Virus 3 Not Detected     Parainfluenza Virus 4 Not Detected     RSV, PCR Not Detected     Bordetella pertussis pcr Not Detected     Bordetella parapertussis PCR Not Detected     Chlamydophila pneumoniae PCR Not Detected     Mycoplasma pneumo by PCR Not Detected    Narrative:      In the setting of a positive respiratory panel with a viral infection PLUS a negative procalcitonin without other underlying concern for bacterial infection, consider observing off antibiotics or discontinuation of antibiotics and continue supportive care. If the  respiratory panel is positive for atypical bacterial infection (Bordetella pertussis, Chlamydophila pneumoniae, or Mycoplasma pneumoniae), consider antibiotic de-escalation to target atypical bacterial infection.        Pleural fluid appears to be exudative but does not appear infectious.    Assessment:    Acute dyspnea    COPD (chronic obstructive pulmonary disease)    Anxiety and depression    Hypothyroidism    ISIAH (obstructive sleep apnea)    Essential hypertension    Pleural effusion on right    CKD (chronic kidney disease)    Acute respiratory failure with hypoxia    Lymphadenopathy, mediastinal  Complicated right pleural effusion with loculation in the setting of recent symptoms suggestive of pneumonia-this likely represent complicated effusion with empyema but rule out endobronchial lesion and possible malignancy related postobstructive pneumonia complicating this effusion-s/p THORACOSCOPY VIDEO ASSISTED WITH DECORTICATION WITH [employer] ROBOT  Recommendations/discussion:   Discussed with Dr. Rice and it does not appear that he has active infectious process going on and findings were consistent with scarring and chronic inflammation from recent partially treated pneumonic process.  We will discuss with our pulmonary colleagues I reached out to him with a text message awaiting his conversation.  In the meantime continue Rocephin for 24 to 48 hours after his surgery on 5/20/2023.  Further antibiotic therapy likely not needed based on the operative findings as discussed with Dr. Rice.  Recommend DC antibiotics if neurology colleagues agree to it.  Ramya Lomeli MD  5/22/2023  07:56 EDT    Parts of this note may be an electronic transcription/translation of spoken language to printed text using the Dragon dictation system.

## 2023-05-22 NOTE — PLAN OF CARE
Goal Outcome Evaluation:  Progress: improving  VSS, sleeping between care. PRN pain meds given. Pt worked with PT/OT today. PCA stopped per orders. Requiring 6L highflow NC. Awaiting bed on 5E.

## 2023-05-22 NOTE — THERAPY TREATMENT NOTE
Patient Name: Calos Moseley  : 1960    MRN: [id number]                              Today's Date: 2023       Admit Date: 2023    Visit Dx:     ICD-10-CM ICD-9-CM   1. Acute dyspnea  R06.00 786.09   2. Hypoxia  R09.02 799.02   3. Pleural effusion, right  J90 511.9   4. Acute respiratory failure with hypoxia  J96.01 518.81   5. Lymphadenopathy, mediastinal  R59.0 785.6     Patient Active Problem List   Diagnosis   • HLD (hyperlipidemia)   • Obesity, Class III, BMI 40-49.9 (morbid obesity)   • Bilateral kidney stones   • COPD (chronic obstructive pulmonary disease)   • Left flank pain   • Anxiety and depression   • Hypothyroidism   • ISIAH (obstructive sleep apnea)   • Essential hypertension   • Calculus, renal   • Restless leg syndrome   • Bipolar affective   • Calculus of ureter   • Gross hematuria   • Encounter for screening for malignant neoplasm of colon   • Acute dyspnea   • Pleural effusion on right   • CKD (chronic kidney disease)   • Acute respiratory failure with hypoxia   • Lymphadenopathy, mediastinal     Past Medical History:   Diagnosis Date   • Anesthesia complication     STATES HARD TO WAKE UP AFTER PREVIOUS KIDNEY STONE SURGERY AND STATES HE CODED   • Bilateral kidney stones    • Bipolar affective    • COVID    • Disease of thyroid gland    • Elevated cholesterol    • Hypertension    • Kidney stone on left side    • Left flank pain    • Mood disorder    • Restless leg syndrome    • Sleep apnea     cpap  bring dos   • UTI (urinary tract infection) 2021    PREV FINISHED ANTIBIOTIC     Past Surgical History:   Procedure Laterality Date   • ADENOIDECTOMY     • APPENDECTOMY     • BRONCHOSCOPY Right 2023    Procedure: BRONCHOSCOPY WITH ENDOBRONCHIAL ULTRASOUND with BAL of RLL and FNA;  Surgeon: Aniceto Bruno MD;  Location: [facility] ENDOSCOPY;  Service: Pulmonary;  Laterality: Right;  pre- RLL pneumonia, lymphadenopathy  post- same   • COLONOSCOPY     • COLONOSCOPY N/A  3/23/2023    Procedure: COLONOSCOPY into cecum with cold snare polypectomies;  Surgeon: Yoan Leonard MD;  Location: [facility] ENDOSCOPY;  Service: Gastroenterology;  Laterality: N/A;  polyps   • CYSTOSCOPY WITH CLOT EVACUATION N/A 3/8/2022    Procedure: CYSTOSCOPY WITH CLOT EVACUATION;  Surgeon: Ihsan Lopez MD;  Location: [facility] MAIN OR;  Service: Urology;  Laterality: N/A;   • CYSTOSCOPY, URETEROSCOPY, RETROGRADE PYELOGRAM, STENT INSERTION Left 3/7/2022    Procedure: CYSTOSCOPY URETEROSCOPY LASER LITHOTRIPSY and stent exchange;  Surgeon: Deuce Leija MD;  Location: [facility] MAIN OR;  Service: Urology;  Laterality: Left;   • KIDNEY STONE SURGERY     • NEPHROLITHOTRIPSY PERCUTANEOUS Left 2/21/2022    Procedure: NEPHROSTLITHOTOMY PERCUTANEOUS;  Surgeon: Deuce Leija MD;  Location: [facility] OR;  Service: Urology;  Laterality: Left;   • NEPHROLITHOTRIPSY PERCUTANEOUS Left 2/23/2022    Procedure: LEFT FLEXIBLE NEPHROSTOMY WITH STONE EXTRACTION, LEFT NEPHROSTOMY, TUBE REMOVAL, BILATERAL URETERAL STENT PLACEMENT;  Surgeon: Deuce Leija MD;  Location: [facility] OR;  Service: Urology;  Laterality: Left;   • NEPHROLITHOTRIPSY PERCUTANEOUS Right 3/7/2022    Procedure: RIGHT PERCUTANEOUS NEPHROLITHOTOMY W ACCESS;  Surgeon: Deuce Leija MD;  Location: [facility] OR;  Service: Urology;  Laterality: Right;   • THORACOSCOPY WITH DECORTICATION Right 5/20/2023    Procedure: THORACOSCOPY VIDEO ASSISTED WITH DECORTICATION WITH [employer] ROBOT;  Surgeon: Jose Rice MD;  Location: [facility] MAIN OR;  Service: Robotics - [employer];  Laterality: Right;   • TONSILLECTOMY        General Information     Row Name 05/22/23 1236          OT Time and Intention    Document Type therapy note (daily note)  -     Mode of Treatment occupational therapy;individual therapy  -     Row Name 05/22/23 1236          General Information    Patient Profile Reviewed yes  -SM     Existing Precautions/Restrictions  fall;oxygen therapy device and L/min  X2 chest tubes  -     Row Name 05/22/23 1236          Cognition    Orientation Status (Cognition) oriented x 4  -     Row Name 05/22/23 1236          Safety Issues, Functional Mobility    Impairments Affecting Function (Mobility) endurance/activity tolerance  -           User Key  (r) = Recorded By, (t) = Taken By, (c) = Cosigned By    Initials Name Provider Type    Vandana Alvarez OT Occupational Therapist                 Mobility/ADL's     Row Name 05/22/23 1237          Bed Mobility    Comment, (Bed Mobility) sitting EOB with RN  -     Row Name 05/22/23 1237          Transfers    Transfers toilet transfer  -     Row Name 05/22/23 1237          Sit-Stand Transfer    Sit-Stand [place] (Transfers) standby assist  -[facility] Name 05/22/23 [facility]          Toilet Transfer    [place] Level (Toilet Transfer) stand assist  [facility]     Assistive Device (Toilet Transfer) grab bars/safety frame  -     Row Name 05/22/23 1237          Functional Mobility    Functional Mobility- Ind. Level standby assist  -     Row Name 05/22/23 1237          Activities of Daily Living    BADL Assessment/Intervention grooming;toileting;bathing  -     Row Name 05/22/23 123          Grooming Assessment/Training    [place] Level (Grooming) oral care regimen;standby assist  -     Position (Grooming) sink side;unsupported standing  -[facility] Name 05/22/23 1237          Toileting Assessment/Training    [place] Level (Toileting) standby assist  -[facility] Name 05/22/23 [facility]          Bathing Assessment/Intervention    [place] Level (Bathing) standby assist  -     Position (Bathing) supported sitting  -     Comment, (Bathing) Pt only wanting to wash UB and thighs  -           User Key  (r) = Recorded By, (t) = Taken By, (c) = Cosigned By    Initials Name Provider Type    Vandana Alvarez OT Occupational Therapist               Obj/Interventions     Row  Name 05/22/23 1240          Balance    Balance Assessment sitting static balance;standing static balance;standing dynamic balance  -     Static Sitting Balance independent  -     Position, Sitting Balance unsupported  -     Static Standing Balance standby assist  -     Dynamic Standing Balance standby assist  -     Position/Device Used, Standing Balance unsupported  -     Balance Interventions standing;occupation based/functional task  -           User Key  (r) = Recorded By, (t) = Taken By, (c) = Cosigned By    Initials Name Provider Type     Vandana Chopra OT Occupational Therapist               Goals/Plan    No documentation.                Clinical Impression     Row Name 05/22/23 1241          Pain Assessment    Pretreatment Pain Rating 0/10 - no pain  -     Posttreatment Pain Rating 0/10 - no pain  -     Row Name 05/22/23 1241          Plan of Care Review    Plan of Care Reviewed With patient  -     Progress improving  -     Outcome Evaluation Pt demonstrates improvements in transfers and activity tolerance for ADLs today. He is able to complete mobility to bathroom, toilet transfers, sink side standing grooming, and bath in chair SBA. RN present during bath to wash chest tube sites and catheter. Pt with some deficits in endurance but progressing well. Anticipate need for 1-2 more OT sessions while admitted to progress towards stated goals. 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  -     Row Name 05/22/23 1241          Therapy Plan Review/Discharge Plan (OT)    Anticipated Discharge Disposition (OT) home with assist  -     Row Name 05/22/23 1241          Vital Signs    O2 Delivery Pre Treatment hi-flow  -     O2 Delivery Intra Treatment hi-flow  -     O2 Delivery Post Treatment hi-flow  -     Row Name 05/22/23 1241          Positioning and Restraints    Pre-Treatment Position in bed  -     Post Treatment Position chair  -      In Chair reclined;call light within reach;encouraged to call for assist;with nsg  -SM           User Key  (r) = Recorded By, (t) = Taken By, (c) = Cosigned By    Initials Name Provider Type    Vandana Alvarez OT Occupational Therapist               Outcome Measures     Row Name 05/22/23 3514          How much help from another is currently needed...    Putting on and taking off regular lower body clothing? 3  -SM     Bathing (including washing, rinsing, and drying) 3  -SM     Toileting (which includes using toilet bed pan or urinal) 3  -SM     Putting on and taking off regular upper body clothing 3  -SM     Taking care of personal grooming (such as brushing teeth) 3  -SM     Eating meals 4  -SM     AM-PAC 6 Clicks Score (OT) 19  -SM           User Key  (r) = Recorded By, (t) = Taken By, (c) = Cosigned By    Initials Name Provider Type    Vandana Alvarez OT Occupational Therapist                  OT Recommendation and Plan     Plan of Care Review  Plan of Care Reviewed With: patient  Progress: improving  Outcome Evaluation: Pt demonstrates improvements in transfers and activity tolerance for ADLs today. He is able to complete mobility to bathroom, toilet transfers, sink side standing grooming, and bath in chair SBA. RN present during bath to wash chest tube sites and catheter. Pt with some deficits in endurance but progressing well. Anticipate need for 1-2 more OT sessions while admitted to progress towards stated goals. 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     Time Calculation:    Time Calculation- OT     Row Name 05/22/23 1244             Time Calculation- OT    OT Start Time 0933  -      OT Stop Time 0958  -      OT Time Calculation (min) 25 min  -      Total Timed Code Minutes- OT 25 minute(s)  -      OT Received On 05/22/23  -      OT - Next Appointment 05/23/23  -         Timed Charges    75651 - OT Self Care/Mgmt Minutes 25  -          Total Minutes    Timed Charges Total Minutes 25  -SM       Total Minutes 25  -SM            User Key  (r) = Recorded By, (t) = Taken By, (c) = Cosigned By    Initials Name Provider Type    Vandana Alvarez OT Occupational Therapist              Therapy Charges for Today     Code Description Service Date Service Provider Modifiers Qty    [id number] HC OT SELF CARE/MGMT/TRAIN EA 15 MIN 5/22/2023 Vandana Chopra OT GO 2               Vandana Chopra OT  5/22/2023

## 2023-05-22 NOTE — PLAN OF CARE
Problem: Adult Inpatient Plan of Care  Goal: Plan of Care Review  Outcome: Ongoing, Progressing  Goal: Patient-Specific Goal (Individualized)  Outcome: Ongoing, Progressing  Goal: Readiness for Transition of Care  Outcome: Ongoing, Progressing     Problem: Adult Inpatient Plan of Care  Goal: Absence of Hospital-Acquired Illness or Injury  Intervention: Identify and Manage Fall Risk  Recent Flowsheet Documentation  Taken 5/22/2023 0400 by Feliciano New RN  Safety Promotion/Fall Prevention:   activity supervised   fall prevention program maintained   clutter free environment maintained   toileting scheduled   safety round/check completed   nonskid shoes/slippers when out of bed   lighting adjusted  Taken 5/22/2023 0000 by Feliciano New RN  Safety Promotion/Fall Prevention:   activity supervised   fall prevention program maintained   muscle strengthening facilitated   toileting scheduled   safety round/check completed   nonskid shoes/slippers when out of bed  Intervention: Prevent Skin Injury  Recent Flowsheet Documentation  Taken 5/22/2023 0600 by Feliciano New RN  Body Position:   turned   upper extremity elevated   lower extremity elevated  Taken 5/22/2023 0400 by Feliciano New RN  Body Position:   turned   position changed independently   left   upper extremity elevated   lower extremity elevated  Taken 5/22/2023 0200 by Feliciano New RN  Body Position:   turned   upper extremity elevated   lower extremity elevated  Taken 5/22/2023 0000 by Feliciano New RN  Body Position:   position changed independently   turned   right  Taken 5/21/2023 2200 by Feliciano New RN  Body Position:   position changed independently   upper extremity elevated   lower extremity elevated  Intervention: Prevent and Manage VTE (Venous Thromboembolism) Risk  Recent Flowsheet Documentation  Taken 5/22/2023 0400 by Feliciano New RN  Activity Management: bedrest  Taken 5/22/2023 0000 by Feliciano New RN  VTE Prevention/Management:    bilateral   sequential compression devices on  Taken 5/21/2023 2000 by Feliciano New RN  Activity Management: bedrest  Intervention: Prevent Infection  Recent Flowsheet Documentation  Taken 5/22/2023 0400 by Feliciano New RN  Infection Prevention:   environmental surveillance performed   equipment surfaces disinfected   hand hygiene promoted   personal protective equipment utilized   rest/sleep promoted   single patient room provided  Taken 5/22/2023 0000 by Feliciano New RN  Infection Prevention:   hand hygiene promoted   equipment surfaces disinfected   environmental surveillance performed   personal protective equipment utilized   rest/sleep promoted   single patient room provided  Goal: Optimal Comfort and Wellbeing  Intervention: Provide Person-Centered Care  Recent Flowsheet Documentation  Taken 5/21/2023 2000 by Feliciano New RN  Trust Relationship/Rapport:   care explained   choices provided   emotional support provided   empathic listening provided   questions encouraged   questions answered   reassurance provided   thoughts/feelings acknowledged     Problem: Behavioral Health Comorbidity  Goal: Maintenance of Behavioral Health Symptom Control  Intervention: Maintain Behavioral Health Symptom Control  Recent Flowsheet Documentation  Taken 5/22/2023 0400 by Feliciano New RN  Medication Review/Management: medications reviewed  Taken 5/22/2023 0000 by Feliciano New RN  Medication Review/Management: medications reviewed  Taken 5/21/2023 2000 by Feliciano New RN  Medication Review/Management: medications reviewed     Problem: Fall Injury Risk  Goal: Absence of Fall and Fall-Related Injury  Intervention: Identify and Manage Contributors  Recent Flowsheet Documentation  Taken 5/22/2023 0400 by Feliciano New RN  Medication Review/Management: medications reviewed  Taken 5/22/2023 0000 by Feliciano New RN  Medication Review/Management: medications reviewed  Taken 5/21/2023 2000 by Feliciano New  RN  Medication Review/Management: medications reviewed  Intervention: Promote Injury-Free Environment  Recent Flowsheet Documentation  Taken 5/22/2023 0400 by Feliciano New RN  Safety Promotion/Fall Prevention:   activity supervised   fall prevention program maintained   clutter free environment maintained   toileting scheduled   safety round/check completed   nonskid shoes/slippers when out of bed   lighting adjusted  Taken 5/22/2023 0000 by Feliciano New RN  Safety Promotion/Fall Prevention:   activity supervised   fall prevention program maintained   muscle strengthening facilitated   toileting scheduled   safety round/check completed   nonskid shoes/slippers when out of bed     Problem: Pain Acute  Goal: Acceptable Pain Control and Functional Ability  Intervention: Prevent or Manage Pain  Recent Flowsheet Documentation  Taken 5/22/2023 0400 by Feliciano New RN  Medication Review/Management: medications reviewed  Taken 5/22/2023 0000 by Feliciano New RN  Medication Review/Management: medications reviewed  Taken 5/21/2023 2000 by Feliciano New RN  Medication Review/Management: medications reviewed     Problem: Skin Injury Risk Increased  Goal: Skin Health and Integrity  Intervention: Optimize Skin Protection  Recent Flowsheet Documentation  Taken 5/22/2023 0600 by Feliciano New RN  Head of Bed (HOB) Positioning: HOB at 30 degrees  Taken 5/22/2023 0400 by Feliciano New RN  Head of Bed (HOB) Positioning: HOB at 30 degrees  Taken 5/22/2023 0200 by Feliciano New RN  Head of Bed (HOB) Positioning: HOB at 30 degrees  Taken 5/22/2023 0000 by Feliciano New RN  Head of Bed (HOB) Positioning: HOB at 30 degrees  Taken 5/21/2023 2200 by Feliciano New RN  Head of Bed (HOB) Positioning: HOB at 20-30 degrees   Goal Outcome Evaluation:

## 2023-05-22 NOTE — PLAN OF CARE
Goal Outcome Evaluation:  Plan of Care Reviewed With: patient        Progress: improving  Outcome Evaluation: Pt seen for PT this afternoon. He is doing well and sitting up in chair upon entry to room. Pt denies pain at this time. He is eager to mobilize. Pt able to stand w sBA and then ambulate over 300 ft w SBA/CGA. No unsteadiness noted. Pt is on 5L O2 throughout session. O2 at 97% after session. Pt back in chair at end of session w all needs in reach. He is progressing well with mobility at this time.

## 2023-05-23 ENCOUNTER — APPOINTMENT (OUTPATIENT)
Dept: GENERAL RADIOLOGY | Facility: HOSPITAL | Age: 63
End: 2023-05-23
Payer: COMMERCIAL

## 2023-05-23 LAB
ALBUMIN SERPL-MCNC: 2.8 G/DL (ref 3.5–5.2)
ALBUMIN SERPL-MCNC: 3 G/DL (ref 3.5–5.2)
ALBUMIN/GLOB SERPL: 0.9 G/DL
ALP SERPL-CCNC: 66 U/L (ref 39–117)
ALT SERPL W P-5'-P-CCNC: 17 U/L (ref 1–41)
ANION GAP SERPL CALCULATED.3IONS-SCNC: 5.4 MMOL/L (ref 5–15)
ANION GAP SERPL CALCULATED.3IONS-SCNC: 9.4 MMOL/L (ref 5–15)
AST SERPL-CCNC: 15 U/L (ref 1–40)
BACTERIA FLD CULT: NORMAL
BACTERIA SPEC AEROBE CULT: NORMAL
BILIRUB SERPL-MCNC: 0.2 MG/DL (ref 0–1.2)
BUN SERPL-MCNC: 50 MG/DL (ref 8–23)
BUN SERPL-MCNC: 52 MG/DL (ref 8–23)
BUN/CREAT SERPL: 21.5 (ref 7–25)
BUN/CREAT SERPL: 21.7 (ref 7–25)
CALCIUM SPEC-SCNC: 8.9 MG/DL (ref 8.6–10.5)
CALCIUM SPEC-SCNC: 8.9 MG/DL (ref 8.6–10.5)
CHLORIDE SERPL-SCNC: 101 MMOL/L (ref 98–107)
CHLORIDE SERPL-SCNC: 98 MMOL/L (ref 98–107)
CHLORIDE UR-SCNC: <20 MMOL/L
CO2 SERPL-SCNC: 31.6 MMOL/L (ref 22–29)
CO2 SERPL-SCNC: 32.6 MMOL/L (ref 22–29)
CREAT SERPL-MCNC: 2.33 MG/DL (ref 0.76–1.27)
CREAT SERPL-MCNC: 2.4 MG/DL (ref 0.76–1.27)
CREAT UR-MCNC: 126 MG/DL
CYTO UR: NORMAL
CYTO UR: NORMAL
EGFRCR SERPLBLD CKD-EPI 2021: 29.9 ML/MIN/1.73
EGFRCR SERPLBLD CKD-EPI 2021: 31 ML/MIN/1.73
GLOBULIN UR ELPH-MCNC: 3.2 GM/DL
GLUCOSE SERPL-MCNC: 146 MG/DL (ref 65–99)
GLUCOSE SERPL-MCNC: 152 MG/DL (ref 65–99)
GRAM STN SPEC: NORMAL
GRAM STN SPEC: NORMAL
LAB AP CASE REPORT: NORMAL
LAB AP CLINICAL INFORMATION: NORMAL
LAB AP CLINICAL INFORMATION: NORMAL
LAB AP NON-GYN SPECIMEN ADEQUACY: NORMAL
PATH REPORT.FINAL DX SPEC: NORMAL
PATH REPORT.GROSS SPEC: NORMAL
PHOSPHATE SERPL-MCNC: 3.7 MG/DL (ref 2.5–4.5)
POTASSIUM SERPL-SCNC: 4.9 MMOL/L (ref 3.5–5.2)
POTASSIUM SERPL-SCNC: 5.8 MMOL/L (ref 3.5–5.2)
PROT ?TM UR-MCNC: 30.5 MG/DL
PROT SERPL-MCNC: 6 G/DL (ref 6–8.5)
PROT/CREAT UR: 242.1 MG/G CREA (ref 0–200)
SODIUM SERPL-SCNC: 138 MMOL/L (ref 136–145)
SODIUM SERPL-SCNC: 140 MMOL/L (ref 136–145)
SODIUM UR-SCNC: <20 MMOL/L

## 2023-05-23 PROCEDURE — 84300 ASSAY OF URINE SODIUM: CPT | Performed by: INTERNAL MEDICINE

## 2023-05-23 PROCEDURE — 97530 THERAPEUTIC ACTIVITIES: CPT

## 2023-05-23 PROCEDURE — 71045 X-RAY EXAM CHEST 1 VIEW: CPT

## 2023-05-23 PROCEDURE — 84156 ASSAY OF PROTEIN URINE: CPT | Performed by: INTERNAL MEDICINE

## 2023-05-23 PROCEDURE — 25010000002 ENOXAPARIN PER 10 MG: Performed by: NURSE PRACTITIONER

## 2023-05-23 PROCEDURE — 94799 UNLISTED PULMONARY SVC/PX: CPT

## 2023-05-23 PROCEDURE — 94668 MNPJ CHEST WALL SBSQ: CPT

## 2023-05-23 PROCEDURE — 99024 POSTOP FOLLOW-UP VISIT: CPT | Performed by: NURSE PRACTITIONER

## 2023-05-23 PROCEDURE — 84100 ASSAY OF PHOSPHORUS: CPT | Performed by: NURSE PRACTITIONER

## 2023-05-23 PROCEDURE — 82570 ASSAY OF URINE CREATININE: CPT | Performed by: INTERNAL MEDICINE

## 2023-05-23 PROCEDURE — 94760 N-INVAS EAR/PLS OXIMETRY 1: CPT

## 2023-05-23 PROCEDURE — 80053 COMPREHEN METABOLIC PANEL: CPT | Performed by: NURSE PRACTITIONER

## 2023-05-23 PROCEDURE — 82436 ASSAY OF URINE CHLORIDE: CPT | Performed by: INTERNAL MEDICINE

## 2023-05-23 PROCEDURE — 94664 DEMO&/EVAL PT USE INHALER: CPT

## 2023-05-23 PROCEDURE — 94761 N-INVAS EAR/PLS OXIMETRY MLT: CPT

## 2023-05-23 RX ORDER — NIFEDIPINE 60 MG/1
60 TABLET, EXTENDED RELEASE ORAL
Status: DISCONTINUED | OUTPATIENT
Start: 2023-05-23 | End: 2023-05-24

## 2023-05-23 RX ORDER — BUMETANIDE 0.25 MG/ML
4 INJECTION INTRAMUSCULAR; INTRAVENOUS ONCE
Status: COMPLETED | OUTPATIENT
Start: 2023-05-23 | End: 2023-05-23

## 2023-05-23 RX ORDER — ALBUTEROL SULFATE 2.5 MG/.5ML
2.5 SOLUTION RESPIRATORY (INHALATION)
Status: CANCELLED | OUTPATIENT
Start: 2023-05-23

## 2023-05-23 RX ADMIN — POLYETHYLENE GLYCOL 3350 17 G: 17 POWDER, FOR SOLUTION ORAL at 08:20

## 2023-05-23 RX ADMIN — ACETAMINOPHEN 1000 MG: 500 TABLET, FILM COATED ORAL at 20:12

## 2023-05-23 RX ADMIN — VILAZODONE HYDROCHLORIDE 40 MG: 40 TABLET, FILM COATED ORAL at 08:18

## 2023-05-23 RX ADMIN — DOCUSATE SODIUM 50MG AND SENNOSIDES 8.6MG 2 TABLET: 8.6; 5 TABLET, FILM COATED ORAL at 08:20

## 2023-05-23 RX ADMIN — DIVALPROEX SODIUM 250 MG: 250 TABLET, EXTENDED RELEASE ORAL at 20:12

## 2023-05-23 RX ADMIN — Medication 10 ML: at 20:13

## 2023-05-23 RX ADMIN — HYDRALAZINE HYDROCHLORIDE 50 MG: 50 TABLET, FILM COATED ORAL at 05:02

## 2023-05-23 RX ADMIN — DOCUSATE SODIUM 50MG AND SENNOSIDES 8.6MG 2 TABLET: 8.6; 5 TABLET, FILM COATED ORAL at 20:11

## 2023-05-23 RX ADMIN — METOPROLOL SUCCINATE 100 MG: 100 TABLET, EXTENDED RELEASE ORAL at 08:19

## 2023-05-23 RX ADMIN — Medication 4 ML: at 08:06

## 2023-05-23 RX ADMIN — HYDRALAZINE HYDROCHLORIDE 50 MG: 50 TABLET, FILM COATED ORAL at 22:12

## 2023-05-23 RX ADMIN — OXYCODONE HYDROCHLORIDE 5 MG: 5 TABLET ORAL at 18:11

## 2023-05-23 RX ADMIN — OXYCODONE HYDROCHLORIDE 5 MG: 5 TABLET ORAL at 22:12

## 2023-05-23 RX ADMIN — Medication 4 ML: at 19:56

## 2023-05-23 RX ADMIN — IPRATROPIUM BROMIDE AND ALBUTEROL SULFATE 3 ML: 2.5; .5 SOLUTION RESPIRATORY (INHALATION) at 12:12

## 2023-05-23 RX ADMIN — BENZONATATE 200 MG: 100 CAPSULE ORAL at 20:11

## 2023-05-23 RX ADMIN — IPRATROPIUM BROMIDE AND ALBUTEROL SULFATE 3 ML: 2.5; .5 SOLUTION RESPIRATORY (INHALATION) at 08:05

## 2023-05-23 RX ADMIN — ENOXAPARIN SODIUM 40 MG: 100 INJECTION SUBCUTANEOUS at 08:20

## 2023-05-23 RX ADMIN — LAMOTRIGINE 100 MG: 100 TABLET ORAL at 08:19

## 2023-05-23 RX ADMIN — ACETAMINOPHEN 1000 MG: 500 TABLET, FILM COATED ORAL at 15:29

## 2023-05-23 RX ADMIN — BUMETANIDE 4 MG: 0.25 INJECTION INTRAMUSCULAR; INTRAVENOUS at 12:58

## 2023-05-23 RX ADMIN — METOPROLOL SUCCINATE 100 MG: 100 TABLET, EXTENDED RELEASE ORAL at 20:11

## 2023-05-23 RX ADMIN — DOCUSATE SODIUM 100 MG: 100 CAPSULE, LIQUID FILLED ORAL at 08:20

## 2023-05-23 RX ADMIN — IPRATROPIUM BROMIDE AND ALBUTEROL SULFATE 3 ML: 2.5; .5 SOLUTION RESPIRATORY (INHALATION) at 19:56

## 2023-05-23 RX ADMIN — GUAIFENESIN 1200 MG: 600 TABLET, EXTENDED RELEASE ORAL at 08:18

## 2023-05-23 RX ADMIN — HYDRALAZINE HYDROCHLORIDE 50 MG: 50 TABLET, FILM COATED ORAL at 15:29

## 2023-05-23 RX ADMIN — OXYCODONE HYDROCHLORIDE 5 MG: 5 TABLET ORAL at 09:05

## 2023-05-23 RX ADMIN — SODIUM ZIRCONIUM CYCLOSILICATE 10 G: 10 POWDER, FOR SUSPENSION ORAL at 12:58

## 2023-05-23 RX ADMIN — BENZONATATE 200 MG: 100 CAPSULE ORAL at 08:19

## 2023-05-23 RX ADMIN — LAMOTRIGINE 100 MG: 100 TABLET ORAL at 20:11

## 2023-05-23 RX ADMIN — OXYCODONE HYDROCHLORIDE 5 MG: 5 TABLET ORAL at 05:02

## 2023-05-23 RX ADMIN — ENOXAPARIN SODIUM 40 MG: 100 INJECTION SUBCUTANEOUS at 20:12

## 2023-05-23 RX ADMIN — LEVOTHYROXINE SODIUM 50 MCG: 0.05 TABLET ORAL at 05:02

## 2023-05-23 RX ADMIN — LIDOCAINE 1 PATCH: 700 PATCH TOPICAL at 12:58

## 2023-05-23 RX ADMIN — DIVALPROEX SODIUM 250 MG: 250 TABLET, EXTENDED RELEASE ORAL at 08:19

## 2023-05-23 RX ADMIN — ATORVASTATIN CALCIUM 20 MG: 20 TABLET, FILM COATED ORAL at 20:11

## 2023-05-23 RX ADMIN — GABAPENTIN 200 MG: 100 CAPSULE ORAL at 08:19

## 2023-05-23 RX ADMIN — IPRATROPIUM BROMIDE AND ALBUTEROL SULFATE 3 ML: 2.5; .5 SOLUTION RESPIRATORY (INHALATION) at 15:25

## 2023-05-23 RX ADMIN — BENZONATATE 200 MG: 100 CAPSULE ORAL at 15:29

## 2023-05-23 RX ADMIN — SODIUM ZIRCONIUM CYCLOSILICATE 10 G: 10 POWDER, FOR SUSPENSION ORAL at 20:09

## 2023-05-23 RX ADMIN — NIFEDIPINE 60 MG: 60 TABLET, FILM COATED, EXTENDED RELEASE ORAL at 08:18

## 2023-05-23 RX ADMIN — OXYCODONE HYDROCHLORIDE 5 MG: 5 TABLET ORAL at 13:11

## 2023-05-23 RX ADMIN — TAMSULOSIN HYDROCHLORIDE 0.4 MG: 0.4 CAPSULE ORAL at 20:11

## 2023-05-23 RX ADMIN — ACETAMINOPHEN 1000 MG: 500 TABLET, FILM COATED ORAL at 08:19

## 2023-05-23 RX ADMIN — Medication 10 ML: at 08:20

## 2023-05-23 RX ADMIN — PRAMIPEXOLE DIHYDROCHLORIDE 0.5 MG: 0.5 TABLET ORAL at 20:11

## 2023-05-23 RX ADMIN — GUAIFENESIN 1200 MG: 600 TABLET, EXTENDED RELEASE ORAL at 20:11

## 2023-05-23 NOTE — PROGRESS NOTES
Infectious Diseases Progress Note    Ramya Lomeli MD     [facility]  [place]: 5 days  Patient Identification:  Name: Calos Moseley  Age: 62 y.o.  Sex: male  :  1960  MRN: [id number]         Primary Care Physician: Naveed Nunez DO    Requesting physician: Dr. Carvajal  Reason for consultation: Empyema    Subjective: felling better and denies fever and chills, has appetite  Interval History: See my admission history and physical.  Started on IV ceftriaxone by pulmonary service at the time of admission.  Thoracentesis performed on 2023 reveals exudative noninfectious process and cytology pending.  2023 underwent bronchoscopy  2023 THORACOSCOPY VIDEO ASSISTED WITH DECORTICATION WITH [employer] ROBOT  Objective:    Scheduled Meds:acetaminophen, 1,000 mg, Oral, TID  atorvastatin, 20 mg, Oral, Nightly  benzonatate, 200 mg, Oral, TID  divalproex, 250 mg, Oral, BID  docusate sodium, 100 mg, Oral, BID  enoxaparin, 40 mg, Subcutaneous, Q12H  gabapentin, 200 mg, Oral, Q12H  guaiFENesin, 1,200 mg, Oral, Q12H  hydrALAZINE, 50 mg, Oral, Q8H  ipratropium-albuterol, 3 mL, Nebulization, 4x Daily - RT  lamoTRIgine, 100 mg, Oral, BID  levothyroxine, 50 mcg, Oral, Q AM  lidocaine, 1 patch, Transdermal, Q24H  metoprolol succinate XL, 100 mg, Oral, BID  NIFEdipine XL, 60 mg, Oral, Q24H  polyethylene glycol, 17 g, Oral, Daily  pramipexole, 0.5 mg, Oral, Nightly  senna-docusate sodium, 2 tablet, Oral, BID  sodium chloride, 10 mL, Intravenous, Q12H  sodium chloride, 10 mL, Intravenous, Q12H  sodium chloride, 4 mL, Nebulization, BID - RT  tamsulosin, 0.4 mg, Oral, Nightly  vilazodone, 40 mg, Oral, Daily      Continuous Infusions:     Vital signs in last 24 hours:  Temp:  [98.1 °F (36.7 °C)-99.1 °F (37.3 °C)] 99.1 °F (37.3 °C)  Heart Rate:  [53-70] 64  Resp:  [14-22] 20  BP: (116-161)/() 126/81    Intake/Output:    Intake/Output Summary (Last 24 hours) at 2023 0846  Last data filed at  "5/23/2023 0500  Gross per 24 hour   Intake 794 ml   Output 1145 ml   Net -351 ml       Exam:  /81 (BP Location: Right arm, Patient Position: Lying)   Pulse 64   Temp 99.1 °F (37.3 °C) (Oral)   Resp 20   Ht 167.6 cm (66\")   Wt 111 kg (244 lb)   SpO2 99%   BMI 39.38 kg/m²   Patient is examined using the personal protective equipment as per guidelines from infection control for this particular patient as enacted.  Hand washing was performed before and after patient interaction.  General Appearance:    Alert, cooperative, no distress, AAOx3                          Head:    Normocephalic, without obvious abnormality, atraumatic                           Eyes:    PERRL, conjunctivae/corneas clear, EOM's intact, both eyes                         Throat:   Lips, tongue, gums normal; oral mucosa pink and moist                           Neck:   Supple, symmetrical, trachea midline, no JVD                         Lungs:    Decreased breath sounds at the right lung base, chest tube inplace                 Chest Wall:    No tenderness or deformity                          Heart:  S1-S2 regular                  Abdomen:   Obese soft nontender                 Extremities: Lateral lower extremity edema                        Pulses:   Pulses palpable in all extremities                            Skin:   Skin is warm and dry,  no rashes or palpable lesions                  Neurologic: Grossly nonfocal       Data Review:    I reviewed the patient's new clinical results.  Results from last 7 days   Lab Units 05/22/23  0411 05/21/23  0521 05/20/23 2030 05/20/23  0405 05/18/23  0520 05/17/23  0433 05/16/23  1730   WBC 10*3/mm3 14.39* 16.55* 22.24* 16.88* 10.67 9.18 10.09   HEMOGLOBIN g/dL 10.3* 11.2* 11.9* 11.5* 13.3 13.0 14.1   PLATELETS 10*3/mm3 315 370 428 341 352 265 273     Results from last 7 days   Lab Units 05/23/23  0457 05/22/23  0411 05/21/23  0521 05/20/23 2030 05/20/23  1054 05/20/23  0405 05/19/23  0332 "   SODIUM mmol/L 138 135* 138 140 141 152* 141   POTASSIUM mmol/L 5.8* 5.4* 5.8* 5.5* 5.1 5.4* 5.1   CHLORIDE mmol/L 101 99 102 103 104 113* 103   CO2 mmol/L 31.6* 30.9* 30.7* 23.0 31.0* 29.0 29.1*   BUN mg/dL 52* 39* 30* 33* 34* 37* 44*   CREATININE mg/dL 2.40* 2.07* 1.42* 1.57* 1.64* 1.76* 1.97*   CALCIUM mg/dL 8.9 8.5* 9.0 8.4* 8.9 9.0 9.3   GLUCOSE mg/dL 152* 134* 156* 118* 111* 110* 116*     Microbiology Results (last 10 days)     Procedure Component Value - Date/Time    Tissue / Bone Culture - Tissue, Pleura [[id number]] Collected: 05/20/23 1856    Lab Status: Preliminary result Specimen: Tissue from Pleura Updated: 05/22/23 0944     Tissue Culture No growth at 2 days     Gram Stain No WBCs or organisms seen    Anaerobic Culture - Surgical Site, Pleura [[id number]]  (Normal) Collected: 05/20/23 1851    Lab Status: Preliminary result Specimen: Surgical Site from Pleura Updated: 05/23/23 0757     Anaerobic Culture No anaerobes isolated at 3 days    Body Fluid Culture - Surgical Site, Pleura [[id number]] Collected: 05/20/23 1851    Lab Status: Preliminary result Specimen: Surgical Site from Pleura Updated: 05/22/23 0942     Body Fluid Culture No growth at 2 days     Gram Stain No WBCs or organisms seen    AFB Culture - Lavage, Lung, Right Lower Lobe [[id number]] Collected: 05/19/23 1546    Lab Status: Preliminary result Specimen: Lavage from Lung, Right Lower Lobe Updated: 05/20/23 1342     AFB Stain No acid fast bacilli seen on concentrated smear    Fungus Smear - Lavage, Lung, Right Lower Lobe [[id number]] Collected: 05/19/23 1546    Lab Status: Final result Specimen: Lavage from Lung, Right Lower Lobe Updated: 05/19/23 2113     Fungal Stain No fungal elements seen    BAL Culture, Quantitative - Lavage, Lung, Right Lower Lobe [[id number]] Collected: 05/19/23 1546    Lab Status: Final result Specimen: Lavage from Lung, Right Lower Lobe Updated: 05/21/23 0822     BAL Culture No growth     Gram Stain No organisms seen       Rare (1+) WBCs seen    Legionella Antigen, Urine - Urine, Urine, Clean Catch [[id number]]  (Normal) Collected: 05/17/23 0452    Lab Status: Final result Specimen: Urine, Clean Catch Updated: 05/17/23 0644     LEGIONELLA ANTIGEN, URINE Negative    S. Pneumo Ag Urine or CSF - Urine, Urine, Clean Catch [[id number]]  (Normal) Collected: 05/17/23 0452    Lab Status: Final result Specimen: Urine, Clean Catch Updated: 05/17/23 0644     Strep Pneumo Ag Negative    Blood Culture - Blood, Hand, Right [[id number]]  (Normal) Collected: 05/16/23 2253    Lab Status: Final result Specimen: Blood from Hand, Right Updated: 05/21/23 2301     Blood Culture No growth at 5 days    Blood Culture - Blood, Arm, Left [[id number]]  (Normal) Collected: 05/16/23 2247    Lab Status: Final result Specimen: Blood from Arm, Left Updated: 05/21/23 2301     Blood Culture No growth at 5 days    Respiratory Panel PCR w/COVID-19(SARS-CoV-2) JERI/SONIA/MARQUITA/PAD/COR/MAD/ULISSES In-House, NP Swab in UTM/VTM, 3-4 HR TAT - Swab, Nasopharynx [[id number]]  (Normal) Collected: 05/16/23 1733    Lab Status: Final result Specimen: Swab from Nasopharynx Updated: 05/16/23 1832     ADENOVIRUS, PCR Not Detected     Coronavirus 229E Not Detected     Coronavirus HKU1 Not Detected     Coronavirus NL63 Not Detected     Coronavirus OC43 Not Detected     COVID19 Not Detected     Human Metapneumovirus Not Detected     Human Rhinovirus/Enterovirus Not Detected     Influenza A PCR Not Detected     Influenza B PCR Not Detected     Parainfluenza Virus 1 Not Detected     Parainfluenza Virus 2 Not Detected     Parainfluenza Virus 3 Not Detected     Parainfluenza Virus 4 Not Detected     RSV, PCR Not Detected     Bordetella pertussis pcr Not Detected     Bordetella parapertussis PCR Not Detected     Chlamydophila pneumoniae PCR Not Detected     Mycoplasma pneumo by PCR Not Detected    Narrative:      In the setting of a positive respiratory panel with a viral infection PLUS a negative  procalcitonin without other underlying concern for bacterial infection, consider observing off antibiotics or discontinuation of antibiotics and continue supportive care. If the respiratory panel is positive for atypical bacterial infection (Bordetella pertussis, Chlamydophila pneumoniae, or Mycoplasma pneumoniae), consider antibiotic de-escalation to target atypical bacterial infection.        Pleural fluid appears to be exudative but does not appear infectious.    Assessment:    Acute dyspnea    COPD (chronic obstructive pulmonary disease)    Anxiety and depression    Hypothyroidism    ISIAH (obstructive sleep apnea)    Essential hypertension    Pleural effusion on right    CKD (chronic kidney disease)    Acute respiratory failure with hypoxia    Lymphadenopathy, mediastinal  Complicated right pleural effusion with loculation in the setting of recent symptoms suggestive of pneumonia-this likely represent complicated effusion with empyema but rule out endobronchial lesion and possible malignancy related postobstructive pneumonia complicating this effusion-s/p THORACOSCOPY VIDEO ASSISTED WITH DECORTICATION WITH [employer]  Recommendations/discussion:   Observe off antibiotics  Ramya Lomeli MD  5/23/2023  08:46 EDT    Parts of this note may be an electronic transcription/translation of spoken language to printed text using the Dragon dictation system.

## 2023-05-23 NOTE — PLAN OF CARE
Problem: Adult Inpatient Plan of Care  Goal: Plan of Care Review  Flowsheets (Taken 5/23/2023 1557)  Progress: improving  Plan of Care Reviewed With: patient  Outcome Evaluation: vss, 1 chest tube removed per aprn, 2nd chest tube remains waterseal, pain medicated per orders, dillard bedside drain, bumex given x1,  Goal: Absence of Hospital-Acquired Illness or Injury  Intervention: Identify and Manage Fall Risk  Recent Flowsheet Documentation  Taken 5/23/2023 1411 by Angelita Hull RN  Safety Promotion/Fall Prevention:   safety round/check completed   room organization consistent   nonskid shoes/slippers when out of bed   lighting adjusted   fall prevention program maintained   clutter free environment maintained   assistive device/personal items within reach  Taken 5/23/2023 1250 by Angelita Hull RN  Safety Promotion/Fall Prevention:   safety round/check completed   room organization consistent   nonskid shoes/slippers when out of bed   lighting adjusted   fall prevention program maintained   assistive device/personal items within reach   clutter free environment maintained  Taken 5/23/2023 1000 by Angelita Hull RN  Safety Promotion/Fall Prevention:   safety round/check completed   room organization consistent   nonskid shoes/slippers when out of bed   lighting adjusted   fall prevention program maintained   clutter free environment maintained   assistive device/personal items within reach  Taken 5/23/2023 0821 by Angelita Hull RN  Safety Promotion/Fall Prevention:   safety round/check completed   room organization consistent   nonskid shoes/slippers when out of bed   lighting adjusted   fall prevention program maintained   clutter free environment maintained   assistive device/personal items within reach  Intervention: Prevent Skin Injury  Recent Flowsheet Documentation  Taken 5/23/2023 0821 by Angelita Hull RN  Skin Protection:   adhesive use limited   tubing/devices free from skin  contact  Intervention: Prevent and Manage VTE (Venous Thromboembolism) Risk  Recent Flowsheet Documentation  Taken 5/23/2023 1250 by Angelita Hull RN  Activity Management: up in chair  Taken 5/23/2023 1000 by Angelita Hull RN  Activity Management: ambulated outside room  Goal: Optimal Comfort and Wellbeing  Intervention: Monitor Pain and Promote Comfort  Recent Flowsheet Documentation  Taken 5/23/2023 1311 by Angelita Hull RN  Pain Management Interventions: see MAR  Taken 5/23/2023 0900 by Angelita Hull RN  Pain Management Interventions: see MAR   Goal Outcome Evaluation:  Plan of Care Reviewed With: patient        Progress: improving  Outcome Evaluation: vss, 1 chest tube removed per aprn, 2nd chest tube remains waterseal, pain medicated per orders, dillard bedside drain, bumex given x1,

## 2023-05-23 NOTE — THERAPY TREATMENT NOTE
Patient Name: Calos Moseley  : 1960    MRN: [id number]                              Today's Date: 2023       Admit Date: 2023    Visit Dx:     ICD-10-CM ICD-9-CM   1. Acute dyspnea  R06.00 786.09   2. Hypoxia  R09.02 799.02   3. Pleural effusion, right  J90 511.9   4. Acute respiratory failure with hypoxia  J96.01 518.81   5. Lymphadenopathy, mediastinal  R59.0 785.6     Patient Active Problem List   Diagnosis   • HLD (hyperlipidemia)   • Obesity, Class III, BMI 40-49.9 (morbid obesity)   • Bilateral kidney stones   • COPD (chronic obstructive pulmonary disease)   • Left flank pain   • Anxiety and depression   • Hypothyroidism   • ISIAH (obstructive sleep apnea)   • Essential hypertension   • Calculus, renal   • Restless leg syndrome   • Bipolar affective   • Calculus of ureter   • Gross hematuria   • Encounter for screening for malignant neoplasm of colon   • Acute dyspnea   • Pleural effusion on right   • CKD (chronic kidney disease)   • Acute respiratory failure with hypoxia   • Lymphadenopathy, mediastinal     Past Medical History:   Diagnosis Date   • Anesthesia complication     STATES HARD TO WAKE UP AFTER PREVIOUS KIDNEY STONE SURGERY AND STATES HE CODED   • Bilateral kidney stones    • Bipolar affective    • COVID    • Disease of thyroid gland    • Elevated cholesterol    • Hypertension    • Kidney stone on left side    • Left flank pain    • Mood disorder    • Restless leg syndrome    • Sleep apnea     cpap  bring dos   • UTI (urinary tract infection) 2021    PREV FINISHED ANTIBIOTIC     Past Surgical History:   Procedure Laterality Date   • ADENOIDECTOMY     • APPENDECTOMY     • BRONCHOSCOPY Right 2023    Procedure: BRONCHOSCOPY WITH ENDOBRONCHIAL ULTRASOUND with BAL of RLL and FNA;  Surgeon: Aniceto Bruno MD;  Location: [facility] ENDOSCOPY;  Service: Pulmonary;  Laterality: Right;  pre- RLL pneumonia, lymphadenopathy  post- same   • COLONOSCOPY     • COLONOSCOPY N/A  3/23/2023    Procedure: COLONOSCOPY into cecum with cold snare polypectomies;  Surgeon: Yoan Leonard MD;  Location: [facility] ENDOSCOPY;  Service: Gastroenterology;  Laterality: N/A;  polyps   • CYSTOSCOPY WITH CLOT EVACUATION N/A 3/8/2022    Procedure: CYSTOSCOPY WITH CLOT EVACUATION;  Surgeon: Ihsan Lopez MD;  Location: [facility] MAIN OR;  Service: Urology;  Laterality: N/A;   • CYSTOSCOPY, URETEROSCOPY, RETROGRADE PYELOGRAM, STENT INSERTION Left 3/7/2022    Procedure: CYSTOSCOPY URETEROSCOPY LASER LITHOTRIPSY and stent exchange;  Surgeon: Deuce Leija MD;  Location: [facility] MAIN OR;  Service: Urology;  Laterality: Left;   • KIDNEY STONE SURGERY     • NEPHROLITHOTRIPSY PERCUTANEOUS Left 2/21/2022    Procedure: NEPHROSTLITHOTOMY PERCUTANEOUS;  Surgeon: Deuce Leija MD;  Location: [facility] OR;  Service: Urology;  Laterality: Left;   • NEPHROLITHOTRIPSY PERCUTANEOUS Left 2/23/2022    Procedure: LEFT FLEXIBLE NEPHROSTOMY WITH STONE EXTRACTION, LEFT NEPHROSTOMY, TUBE REMOVAL, BILATERAL URETERAL STENT PLACEMENT;  Surgeon: Deuce Leija MD;  Location: [facility] OR;  Service: Urology;  Laterality: Left;   • NEPHROLITHOTRIPSY PERCUTANEOUS Right 3/7/2022    Procedure: RIGHT PERCUTANEOUS NEPHROLITHOTOMY W ACCESS;  Surgeon: Deuce Leija MD;  Location: [facility] OR;  Service: Urology;  Laterality: Right;   • THORACOSCOPY WITH DECORTICATION Right 5/20/2023    Procedure: THORACOSCOPY VIDEO ASSISTED WITH DECORTICATION WITH DAVINCI ROBOT;  Surgeon: Jose Rice MD;  Location: [facility] MAIN OR;  Service: Robotics - DaVinci;  Laterality: Right;   • TONSILLECTOMY        General Information     Row Name 05/23/23 6410          Physical Therapy Time and Intention    Document Type therapy note (daily note)  -PC     Mode of Treatment physical therapy  -PC     Row Name 05/23/23 1730          General Information    Existing Precautions/Restrictions fall;oxygen therapy device and L/min  -PC            User Key  (r) = Recorded By, (t) = Taken By, (c) = Cosigned By    Initials Name Provider Type    PC Jacinta Wilson, PT Physical Therapist               Mobility     Row Name 05/23/23 1730          Bed Mobility    Supine-Sit [place] (Bed Mobility) minimum assist (75% patient effort)  -PC     Row Name 05/23/23 1730          Sit-Stand Transfer    Sit-Stand [place] (Transfers) standby assist  -PC     Row Name 05/23/23 1730          Gait/Stairs (Locomotion)    [place] Level (Gait) standby assist  -PC     Distance in Feet (Gait) 300 ft, 5L O2 and chest tube off suction  -PC     Deviations/Abnormal Patterns (Gait) julieta decreased;stride length decreased  -PC     Bilateral Gait Deviations forward flexed posture  -PC           User Key  (r) = Recorded By, (t) = Taken By, (c) = Cosigned By    Initials Name Provider Type    PC Jacinta Wilson, PT Physical Therapist               Obj/Interventions    No documentation.                Goals/Plan    No documentation.                Clinical Impression     Row Name 05/23/23 1731          Pain    Pretreatment Pain Rating 0/10 - no pain  -PC     Row Name 05/23/23 1731          Plan of Care Review    Plan of Care Reviewed With patient  -PC     Progress improving  -PC     Outcome Evaluation Pt cont to show steady improvement of toleration of activity, able to walk 300 ft without assistive device, still on 5L o2 and one chest tube off suction  -PC     Row Name 05/23/23 1731          Positioning and Restraints    Pre-Treatment Position in bed  -PC     Post Treatment Position bed  -PC     In Bed call light within reach;encouraged to call for assist;supine;with family/caregiver  -PC           User Key  (r) = Recorded By, (t) = Taken By, (c) = Cosigned By    Initials Name Provider Type    PC Jacinta Wilson, PT Physical Therapist               Outcome Measures     Row Name 05/23/23 1732 05/23/23 0821       How much help from another person do you currently need...     Turning from your back to your side while in flat bed without using bedrails? 3  -PC 3  -JA    Moving from lying on back to sitting on the side of a flat bed without bedrails? 3  -PC 3  -JA    Moving to and from a bed to a chair (including a wheelchair)? 3  -PC 3  -JA    Standing up from a chair using your arms (e.g., wheelchair, bedside chair)? 3  -PC 3  -JA    Climbing 3-5 steps with a railing? 2  -PC 2  -JA    To walk in hospital room? 3  -PC 3  -JA    AM-PAC 6 Clicks Score (PT) 17  -PC 17  -JA    Highest level of mobility 5 --> Static standing  -PC 5 --> Static standing  -JA          User Key  (r) = Recorded By, (t) = Taken By, (c) = Cosigned By    Initials Name Provider Type    Angelita Campo RN Registered Nurse    PC Jacinta Wilson, PT Physical Therapist                             Physical Therapy Education     Title: PT OT SLP Therapies (Done)     Topic: Physical Therapy (Done)     Point: Mobility training (Done)     Learning Progress Summary           Patient Acceptance, E,D, DU by  at 5/23/2023 1732    Eager, E, VU,NR by  at 5/21/2023 1525                   Point: Home exercise program (Done)     Learning Progress Summary           Patient Acceptance, E,D, DU by  at 5/23/2023 1732    Eager, E, VU,NR by  at 5/21/2023 1525                   Point: Body mechanics (Done)     Learning Progress Summary           Patient Acceptance, E,D, DU by  at 5/23/2023 1732    Eager, E, VU,NR by  at 5/21/2023 1525                   Point: Precautions (Done)     Learning Progress Summary           Patient Acceptance, E,D, DU by  at 5/23/2023 1732    Eager, E, VU,NR by  at 5/21/2023 1525                               User Key     Initials Effective Dates Name Provider Type Discipline    EPI 06/16/21 -  Geena Macario, PT Physical Therapist PT    PC 06/16/21 -  Jacinta Wilson, PT Physical Therapist PT              PT Recommendation and Plan     Plan of Care Reviewed With: patient  Progress:  improving  Outcome Evaluation: Pt cont to show steady improvement of toleration of activity, able to walk 300 ft without assistive device, still on 5L o2 and one chest tube off suction     Time Calculation:    PT Charges     Row Name 05/23/23 1732             Time Calculation    Start Time 1700  -PC      Stop Time 1715  -PC      Time Calculation (min) 15 min  -PC      PT Received On 05/23/23  -PC      PT - Next Appointment 05/24/23  -PC            User Key  (r) = Recorded By, (t) = Taken By, (c) = Cosigned By    Initials Name Provider Type    PC Jacinta Wilson, PT Physical Therapist              Therapy Charges for Today     Code Description Service Date Service Provider Modifiers Qty    84211276416 HC PT THERAPEUTIC ACT EA 15 MIN 5/23/2023 Jacinta Wilson PT GP 1          PT G-Codes  Outcome Measure Options: AM-PAC 6 Clicks Basic Mobility (PT)  AM-PAC 6 Clicks Score (PT): 17  AM-PAC 6 Clicks Score (OT): 19       Jacinta Wilson PT  5/23/2023

## 2023-05-23 NOTE — CONSULTS
[facility] Consult Note      Patient Name: Calos Moseley  : 1960  MRN: [id number]  Primary Care Physician:  Naveed Nunez DO  Referring Physician: No ref. provider found  Date of admission: 2023    Subjective     Reason for Consult: Acute kidney injury and hyperkalemia    HPI:   Calos Moseley is a 62 y.o. male patient was admitted on 2023 he had a right loculated parapneumonic effusion and he underwent right video-assisted thoracoscopy with da Coby robotic assisted decortication on 2023.  Noted to have increased creatinine from his baseline of 1.5-1.7.  Creatinine up to 2.4, and potassium is 5.8.  This morning his ACE inhibitor was put on hold.  The patient has chronic kidney disease associated with hypertension also nephrolithiasis he have 16 kidney stones with numerous stones passing and he had less frequent stones and he was last seen in our office last year where he was instructed about sodium restriction and increased water intake.  He has a history of obstructive sleep apnea using CPAP, bipolar affective disorder, restless leg syndrome.  Also he has hypothyroidism.  He currently has a Lobo catheter anchored in place and he has lower extremity edema, has right chest tubes in place he has chest discomfort related to the surgery, no orthopnea or PND, no nausea or vomiting, no abdominal pain, no lightheadedness.  There was no demonstrated hypotension in the past few days and reviewing his medication there was no obvious nephrotoxic agent given    Review of Systems:   14 point review of systems is otherwise negative except for mentioned above on HPI    Personal History     Past Medical History:   Diagnosis Date   • Anesthesia complication     STATES HARD TO WAKE UP AFTER PREVIOUS KIDNEY STONE SURGERY AND STATES HE CODED   • Bilateral kidney stones    • Bipolar affective    • COVID    • Disease of thyroid gland    • Elevated cholesterol    • Hypertension     • Kidney stone on left side    • Left flank pain    • Mood disorder    • Restless leg syndrome    • Sleep apnea     cpap  bring dos   • UTI (urinary tract infection) 12/2021    PREV FINISHED ANTIBIOTIC       Past Surgical History:   Procedure Laterality Date   • ADENOIDECTOMY     • APPENDECTOMY     • BRONCHOSCOPY Right 5/19/2023    Procedure: BRONCHOSCOPY WITH ENDOBRONCHIAL ULTRASOUND with BAL of RLL and FNA;  Surgeon: Aniceto Bruno MD;  Location: [facility] ENDOSCOPY;  Service: Pulmonary;  Laterality: Right;  pre- RLL pneumonia, lymphadenopathy  post- same   • COLONOSCOPY     • COLONOSCOPY N/A 3/23/2023    Procedure: COLONOSCOPY into cecum with cold snare polypectomies;  Surgeon: Yoan Leonard MD;  Location: [facility] ENDOSCOPY;  Service: Gastroenterology;  Laterality: N/A;  polyps   • CYSTOSCOPY WITH CLOT EVACUATION N/A 3/8/2022    Procedure: CYSTOSCOPY WITH CLOT EVACUATION;  Surgeon: Ihsan Lopez MD;  Location: [facility];  Service: Urology;  Laterality: N/A;   • CYSTOSCOPY, URETEROSCOPY, RETROGRADE PYELOGRAM, STENT INSERTION Left 3/7/2022    Procedure: CYSTOSCOPY URETEROSCOPY LASER LITHOTRIPSY and stent exchange;  Surgeon: Deuce Leija MD;  Location: [facility] MAIN OR;  Service: Urology;  Laterality: Left;   • KIDNEY STONE SURGERY     • NEPHROLITHOTRIPSY PERCUTANEOUS Left 2/21/2022    Procedure: NEPHROSTLITHOTOMY PERCUTANEOUS;  Surgeon: Deuce Leija MD;  Location: [facility] OR;  Service: Urology;  Laterality: Left;   • NEPHROLITHOTRIPSY PERCUTANEOUS Left 2/23/2022    Procedure: LEFT FLEXIBLE NEPHROSTOMY WITH STONE EXTRACTION, LEFT NEPHROSTOMY, TUBE REMOVAL, BILATERAL URETERAL STENT PLACEMENT;  Surgeon: Deuce Leija MD;  Location: [facility] MAIN OR;  Service: Urology;  Laterality: Left;   • NEPHROLITHOTRIPSY PERCUTANEOUS Right 3/7/2022    Procedure: RIGHT PERCUTANEOUS NEPHROLITHOTOMY W ACCESS;  Surgeon: Deuce Leija MD;  Location: [facility] MAIN OR;  Service:  Urology;  Laterality: Right;   • THORACOSCOPY WITH DECORTICATION Right 5/20/2023    Procedure: THORACOSCOPY VIDEO ASSISTED WITH DECORTICATION WITH DAVINCI ROBOT;  Surgeon: Jose Rice MD;  Location: [facility];  Service: Robotics - DaVinci;  Laterality: Right;   • TONSILLECTOMY         Family History: family history is not on file.    Social History:  reports that he quit smoking about 32 years ago. His smoking use included cigarettes. He has never used smokeless tobacco. He reports that he does not currently use alcohol. He reports that he does not use drugs.    Home Medications:  Prior to Admission medications    Medication Sig Start Date End Date Taking? Authorizing Provider   albuterol sulfate  (90 Base) MCG/ACT inhaler INHALE ONE TO TWO PUFFS BY MOUTH EVERY 4 TO 6 HOURS 11/22/22  Yes Tereso Desai MD   albuterol sulfate  (90 Base) MCG/ACT inhaler Inhale 1 puff by mouth via inhaler every 6 (six) hours as needed for Wheezing. 2/3/23  Yes    amoxicillin (AMOXIL) 875 MG tablet Take 1 tablet by mouth in the morning and 1 tablet before bedtime. 5/11/23  Yes    atorvastatin (LIPITOR) 20 MG tablet Take 1 tablet by mouth every night at bedtime. 2/1/23  Yes    budesonide-formoterol (SYMBICORT) 160-4.5 MCG/ACT inhaler Inhale 2 puffs 2 (Two) Times a Day As Needed.   Yes Provider, MD Gentry   buPROPion XL (Wellbutrin XL) 150 MG 24 hr tablet Take 1 tablet by mouth Every Morning. 5/18/22  Yes    divalproex (Depakote ER) 250 MG 24 hr tablet Take 1 tablet by mouth 2 (Two) Times a Day. 11/11/22  Yes    hydrALAZINE (APRESOLINE) 100 MG tablet Take 1 tablet by mouth 3 (Three) Times a Day - morning, evening, and before bedtime 11/28/22  Yes    hydrALAZINE (APRESOLINE) 50 MG tablet Take 1 tablet by mouth 3 (Three) Times a Day As Needed. 2/13/22  Yes    lamoTRIgine (LaMICtal) 100 MG tablet Take 1 tablet by mouth 2 (Two) Times a Day. 2/7/23  Yes    lamoTRIgine (LaMICtal) 100 MG tablet Take 1 tablet by  mouth 2 (Two) Times a Day. 2/9/23  Yes    levothyroxine (SYNTHROID, LEVOTHROID) 50 MCG tablet Take 1 tablet by mouth Every Morning. TAKE PREOP   Yes Gentry Sawyer MD   lisinopril (PRINIVIL,ZESTRIL) 40 MG tablet Take 1 tablet by mouth once daily 5/3/22  Yes    metoprolol succinate XL (TOPROL-XL) 100 MG 24 hr tablet Take 1 tablet by mouth 2 (Two) Times a Day. TAKE PREOP   Yes Gentry Sawyer MD   naltrexone (DEPADE) 50 MG tablet Take 1 tablet by mouth Every Morning. 5/18/22  Yes    NIFEdipine CC (ADALAT CC) 90 MG 24 hr tablet Take 1 tablet by mouth Daily. 2/2/23  Yes    potassium citrate (UROCIT-K) 10 MEQ (1080 MG) CR tablet Take 1 tablet by mouth 2 (Two) Times a Day - morning and evening. Take with meals. Do not crush, chew, or split. 11/28/22  Yes    pramipexole (MIRAPEX) 0.5 MG tablet take 1 tablet by mouth at bedtime 12/13/22  Yes    spironolactone (ALDACTONE) 25 MG tablet Take 1 tablet by mouth Daily. 7/21/22  Yes    tamsulosin (Flomax) 0.4 MG capsule 24 hr capsule Take 1 capsule by mouth every night at bedtime. 2/24/23  Yes    vilazodone (Viibryd) 40 MG tablet tablet Take 1 tablet by mouth Daily. 11/11/22  Yes    oxyCODONE-acetaminophen (Percocet) 5-325 MG per tablet Take 1 tablet by mouth Every 4 (Four) to 6 (Six) Hours As Needed for pain. 2/24/23      promethazine-dextromethorphan (PROMETHAZINE-DM) 6.25-15 MG/5ML syrup Take 5-10 ml TID prn for cough. 11/22/22   [place]o torres MD   sildenafil (VIAGRA) 100 MG tablet Take 1 tablet by mouth Daily As Needed. 5/5/22          Allergies:  No Known Allergies    Objective     Vitals:   Temp:  [98.1 °F (36.7 °C)-99.1 °F (37.3 °C)] 99.1 °F (37.3 °C)  Heart Rate:  [53-67] 64  Resp:  [14-22] 20  BP: (116-161)/() 126/81  Flow (L/min):  [5-6] 6    Intake/Output Summary (Last 24 hours) at 5/23/2023 1027  Last data filed at 5/23/2023 0500  Gross per 24 hour   Intake 794 ml   Output 1145 ml   Net -351 ml       Physical Exam:   Constitutional: Awake,  alert, no acute distress.  Morbidly obese, chronically  HEENT: Sclera anicteric, no conjunctival injection, oral mucosa slightly  Neck: Supple, no thyromegaly, no lymphadenopathy, trachea at midline, no JVD  Respiratory: Decreased breath sounds on the right, he has bilateral rhonchi, nonlabored respiration  Cardiovascular: RRR, no murmurs, no rubs or gallops, no carotid bruit  Gastrointestinal: Positive bowel sounds, abdomen is soft, nontender and nondistended  : Lobo catheter anchored in place  Musculoskeletal: 2+ pretibial edema, no clubbing or cyanosis  Psychiatric: Appropriate affect, cooperative  Neurologic: Oriented x3, moving all extremities, normal speech and mental status  Skin: Warm and dry       Scheduled Meds:     acetaminophen, 1,000 mg, Oral, TID  atorvastatin, 20 mg, Oral, Nightly  benzonatate, 200 mg, Oral, TID  divalproex, 250 mg, Oral, BID  docusate sodium, 100 mg, Oral, BID  enoxaparin, 40 mg, Subcutaneous, Q12H  gabapentin, 200 mg, Oral, Q12H  guaiFENesin, 1,200 mg, Oral, Q12H  hydrALAZINE, 50 mg, Oral, Q8H  ipratropium-albuterol, 3 mL, Nebulization, 4x Daily - RT  lamoTRIgine, 100 mg, Oral, BID  levothyroxine, 50 mcg, Oral, Q AM  lidocaine, 1 patch, Transdermal, Q24H  metoprolol succinate XL, 100 mg, Oral, BID  NIFEdipine XL, 60 mg, Oral, Q24H  polyethylene glycol, 17 g, Oral, Daily  pramipexole, 0.5 mg, Oral, Nightly  senna-docusate sodium, 2 tablet, Oral, BID  sodium chloride, 10 mL, Intravenous, Q12H  sodium chloride, 10 mL, Intravenous, Q12H  sodium chloride, 4 mL, Nebulization, BID - RT  tamsulosin, 0.4 mg, Oral, Nightly  vilazodone, 40 mg, Oral, Daily      IV Meds:        Results Reviewed:   I have personally reviewed the results from the time of this admission to 5/23/2023 10:27 EDT     Lab Results   Component Value Date    GLUCOSE 152 (H) 05/23/2023    CALCIUM 8.9 05/23/2023     05/23/2023    K 5.8 (H) 05/23/2023    CO2 31.6 (H) 05/23/2023     05/23/2023    BUN 52 (H)  05/23/2023    CREATININE 2.40 (H) 05/23/2023    EGFRIFNONA 42 (L) 02/23/2022    BCR 21.7 05/23/2023    ANIONGAP 5.4 05/23/2023      Lab Results   Component Value Date    MG 1.9 01/24/2022    PHOS 3.8 05/20/2023    ALBUMIN 2.8 (L) 05/23/2023           Assessment / Plan       Acute dyspnea    COPD (chronic obstructive pulmonary disease)    Anxiety and depression    Hypothyroidism    ISIAH (obstructive sleep apnea)    Essential hypertension    Pleural effusion on right    CKD (chronic kidney disease)    Acute respiratory failure with hypoxia    Lymphadenopathy, mediastinal      ASSESSMENT:  -Acute kidney injury on chronic kidney disease stage IIIa, etiology not very clear could be hemodynamic changes associated with volume shift and having decreased effective intravascular volume and the patient being on ACE inhibitor.  -Hyperkalemia most likely associate with acute kidney injury and ACE inhibitor  -History of recurrent cystine stones  -Hypertension associate with chronic kidney disease reasonably controlled  -Hypothyroid  -Bipolar disorder  -Obstructive sleep apnea    PLAN:  -Check random urine for sodium, chloride and protein to creatinine rate  -Will give Lokelma to lower potassium  -Will try to diurese with IV bumetanide  -Surveillance labs    I discussed the case with the patient and his wife at the bedside    Thank you for involving us in the care of Calos Moseley.  Please feel free to call with any questions.    Davis Angel MD  05/23/23  10:27 EDT    [facility]  [phone]      Please note that portions of this note were completed with a voice recognition program.

## 2023-05-23 NOTE — PLAN OF CARE
Goal Outcome Evaluation:  Plan of Care Reviewed With: patient        Progress: improving  Outcome Evaluation: Pt cont to show steady improvement of toleration of activity, able to walk 300 ft without assistive device, still on 5L o2 and one chest tube off suction

## 2023-05-23 NOTE — PLAN OF CARE
Goal Outcome Evaluation:  Plan of Care Reviewed With: patient        Progress: improving  Outcome Evaluation: VSS. Alert and oriented x4. Pt c/o right-sided chest pain. PRN oxy for pain. Pt on 6L HFNC. Pt wore BiPAP while resting. Pt continues with congested cough but nonproductive. Right sided chest tubes to -20cm suction. No air leak noted. Will continue to provide supportive care.

## 2023-05-23 NOTE — PROGRESS NOTES
[facility] Medicine Services  PROGRESS NOTE    Patient Name: Calos Moseley  : 1960  MRN: [id number]    Date of Admission: 2023  Primary Care Physician: Naveed Nunez DO    Subjective   Subjective     CC:  Follow-up multiple issues    Subjective:  Patient is transferred out of ICU.  [facility] is taking back over primary medical treatment.  Patient notes he is doing decent after his surgery.  He is eager to have the chest tube out.  He does note some pleuritic pain related to the chest tubes.  No other new complaints.  We discussed his MESHA and other issues    Review of Systems  No current fevers or chills  No current nausea, vomiting, or diarrhea  No current chest pain or palpitations      Objective   Objective     Vital Signs:   Temp:  [97.8 °F (36.6 °C)-99.1 °F (37.3 °C)] 97.8 °F (36.6 °C)  Heart Rate:  [53-67] 66  Resp:  [14-22] 20  BP: (116-161)/() 128/57        Physical Exam:  Constitutional:Awake, alert  HENT: NCAT, mucous membranes moist, neck supple  Respiratory: Occasional cough, occasional coarse sounds, no wheezing, relatively nonlabored breathing  Cardiovascular: Pulse rate is normal, palpable radial pulse bilaterally  Gastrointestinal:soft, nontender, nondistended  Musculoskeletal: Normal musculature for age, mild lower extremity edema, BMI 41, morbidly obese  Psychiatric: Appropriate affect, cooperative, conversational  Neurologic: No slurred speech or facial droop, follows commands  Skin: No rashes or jaundice, warm      Results Reviewed:  Results from last 7 days   Lab Units 23  0411 23  0521 23  2030 23  0405 23  0433 23  1730   WBC 10*3/mm3 14.39* 16.55* 22.24* 16.88*   < > 10.09   HEMOGLOBIN g/dL 10.3* 11.2* 11.9* 11.5*   < > 14.1   HEMATOCRIT % 31.7* 35.7* 37.3* 35.6*   < > 42.0   PLATELETS 10*3/mm3 315 370 428 341   < > 273   INR   --   --   --  1.08  --   --    PROCALCITONIN ng/mL  --   --   --   --   --  0.98*    < > = values in this  interval not displayed.     Results from last 7 days   Lab Units 05/23/23  0457 05/22/23  0411 05/21/23  0521 05/18/23  0520 05/17/23  0433 05/16/23  1918 05/16/23  1730   SODIUM mmol/L 138 135* 138   < > 139  --  137   POTASSIUM mmol/L 5.8* 5.4* 5.8*   < > 4.8  --  4.6   CHLORIDE mmol/L 101 99 102   < > 102  --  100   CO2 mmol/L 31.6* 30.9* 30.7*   < > 24.5  --  27.0   BUN mg/dL 52* 39* 30*   < > 32*  --  33*   CREATININE mg/dL 2.40* 2.07* 1.42*   < > 1.60*  --  1.66*   GLUCOSE mg/dL 152* 134* 156*   < > 129*  --  132*   CALCIUM mg/dL 8.9 8.5* 9.0   < > 9.0  --  9.5   ALT (SGPT) U/L 17  --   --   --  24  --  34   AST (SGOT) U/L 15  --   --   --  17  --  19   HSTROP T ng/L  --   --   --   --   --  17* 19*   PROBNP pg/mL  --   --   --   --   --   --  167.0    < > = values in this interval not displayed.     Estimated Creatinine Clearance: 37.8 mL/min (A) (by C-G formula based on SCr of 2.4 mg/dL (H)).    Microbiology Results Abnormal     Procedure Component Value - Date/Time    Tissue / Bone Culture - Tissue, Pleura [[id number]] Collected: 05/20/23 1856    Lab Status: Final result Specimen: Tissue from Pleura Updated: 05/23/23 0930     Tissue Culture No growth at 3 days     Gram Stain No WBCs or organisms seen    Body Fluid Culture - Surgical Site, Pleura [[id number]] Collected: 05/20/23 1851    Lab Status: Final result Specimen: Surgical Site from Pleura Updated: 05/23/23 0928     Body Fluid Culture No growth at 3 days     Gram Stain No WBCs or organisms seen    Anaerobic Culture - Surgical Site, Pleura [[id number]]  (Normal) Collected: 05/20/23 1851    Lab Status: Preliminary result Specimen: Surgical Site from Pleura Updated: 05/23/23 0757     Anaerobic Culture No anaerobes isolated at 3 days    Blood Culture - Blood, Arm, Left [[id number]]  (Normal) Collected: 05/16/23 2244    Lab Status: Final result Specimen: Blood from Arm, Left Updated: 05/21/23 2301     Blood Culture No growth at 5 days    Blood Culture - Blood,  Hand, Right [[id number]]  (Normal) Collected: 05/16/23 2253    Lab Status: Final result Specimen: Blood from Hand, Right Updated: 05/21/23 2301     Blood Culture No growth at 5 days    BAL Culture, Quantitative - Lavage, Lung, Right Lower Lobe [[id number]] Collected: 05/19/23 1546    Lab Status: Final result Specimen: Lavage from Lung, Right Lower Lobe Updated: 05/21/23 0822     BAL Culture No growth     Gram Stain No organisms seen      Rare (1+) WBCs seen    AFB Culture - Lavage, Lung, Right Lower Lobe [[id number]] Collected: 05/19/23 1546    Lab Status: Preliminary result Specimen: Lavage from Lung, Right Lower Lobe Updated: 05/20/23 1342     AFB Stain No acid fast bacilli seen on concentrated smear    Fungus Smear - Lavage, Lung, Right Lower Lobe [[id number]] Collected: 05/19/23 1546    Lab Status: Final result Specimen: Lavage from Lung, Right Lower Lobe Updated: 05/19/23 2113     Fungal Stain No fungal elements seen    S. Pneumo Ag Urine or CSF - Urine, Urine, Clean Catch [[id number]]  (Normal) Collected: 05/17/23 0452    Lab Status: Final result Specimen: Urine, Clean Catch Updated: 05/17/23 0644     Strep Pneumo Ag Negative    Legionella Antigen, Urine - Urine, Urine, Clean Catch [[id number]]  (Normal) Collected: 05/17/23 0452    Lab Status: Final result Specimen: Urine, Clean Catch Updated: 05/17/23 0644     LEGIONELLA ANTIGEN, URINE Negative    Respiratory Panel PCR w/COVID-19(SARS-CoV-2) JERI/SONIA/MARQUITA/PAD/COR/MAD/ULISSES In-House, NP Swab in UTM/VTM, 3-4 HR TAT - Swab, Nasopharynx [[id number]]  (Normal) Collected: 05/16/23 1733    Lab Status: Final result Specimen: Swab from Nasopharynx Updated: 05/16/23 1832     ADENOVIRUS, PCR Not Detected     Coronavirus 229E Not Detected     Coronavirus HKU1 Not Detected     Coronavirus NL63 Not Detected     Coronavirus OC43 Not Detected     COVID19 Not Detected     Human Metapneumovirus Not Detected     Human Rhinovirus/Enterovirus Not Detected     Influenza A PCR Not Detected      Influenza B PCR Not Detected     Parainfluenza Virus 1 Not Detected     Parainfluenza Virus 2 Not Detected     Parainfluenza Virus 3 Not Detected     Parainfluenza Virus 4 Not Detected     RSV, PCR Not Detected     Bordetella pertussis pcr Not Detected     Bordetella parapertussis PCR Not Detected     Chlamydophila pneumoniae PCR Not Detected     Mycoplasma pneumo by PCR Not Detected    Narrative:      In the setting of a positive respiratory panel with a viral infection PLUS a negative procalcitonin without other underlying concern for bacterial infection, consider observing off antibiotics or discontinuation of antibiotics and continue supportive care. If the respiratory panel is positive for atypical bacterial infection (Bordetella pertussis, Chlamydophila pneumoniae, or Mycoplasma pneumoniae), consider antibiotic de-escalation to target atypical bacterial infection.          Imaging Results (Last 24 Hours)     Procedure Component Value Units Date/Time    XR Chest 1 View [[id number]] Collected: 05/23/23 0811     Updated: 05/23/23 0811    Narrative:      EXAMINATION: SINGLE VIEW CHEST RADIOGRAPH     HISTORY: 62-year-old male undergoing postoperative lung surgery  evaluation.     FINDINGS: A semierect AP portable chest radiograph was obtained.  Comparison is made to a chest radiograph dated 05/22/2023. There are 2  apically oriented right-sided chest tubes that are stable in position.  Hazy opacification at the base of the right lung has developed since the  prior examination. There is a pleural-based opacification seen laterally  in the right mid and lower lung field that has developed since prior  examination. Linear opacities are seen at the base of the left lung. A  pneumothorax is not visualized. Mild interstitial opacification in both  lungs is noted.       Impression:      There are findings suggestive of interval development of  small right pleural effusion. Mild bibasilar atelectasis is  noted.  Superimposed mild interstitial edema cannot be completely excluded.  Right-sided chest tubes remain stable.             Results for orders placed during the hospital encounter of 05/16/23    Adult Transthoracic Echo Complete W/ Cont if Necessary Per Protocol    Interpretation Summary  •  Left ventricular systolic function is normal. Calculated left ventricular EF = 58.1% The left ventricular cavity is moderately dilated. Left ventricular wall thickness is consistent with mild concentric hypertrophy. All left ventricular wall segments contract normally. Left ventricular diastolic function was normal.  •  Trace mitral valve regurgitation is present.  •  Trace tricuspid valve regurgitation is present. Estimated right ventricular systolic pressure from tricuspid regurgitation is mildly elevated (35-45 mmHg). Calculated right ventricular systolic pressure from tricuspid regurgitation is 36.3 mmHg.      I have reviewed the medications:  Scheduled Meds:acetaminophen, 1,000 mg, Oral, TID  atorvastatin, 20 mg, Oral, Nightly  benzonatate, 200 mg, Oral, TID  bumetanide, 4 mg, Intravenous, Once  divalproex, 250 mg, Oral, BID  docusate sodium, 100 mg, Oral, BID  enoxaparin, 40 mg, Subcutaneous, Q12H  guaiFENesin, 1,200 mg, Oral, Q12H  hydrALAZINE, 50 mg, Oral, Q8H  ipratropium-albuterol, 3 mL, Nebulization, 4x Daily - RT  lamoTRIgine, 100 mg, Oral, BID  levothyroxine, 50 mcg, Oral, Q AM  lidocaine, 1 patch, Transdermal, Q24H  metoprolol succinate XL, 100 mg, Oral, BID  NIFEdipine XL, 60 mg, Oral, Q24H  polyethylene glycol, 17 g, Oral, Daily  pramipexole, 0.5 mg, Oral, Nightly  senna-docusate sodium, 2 tablet, Oral, BID  sodium chloride, 10 mL, Intravenous, Q12H  sodium chloride, 10 mL, Intravenous, Q12H  sodium chloride, 4 mL, Nebulization, BID - RT  sodium zirconium cyclosilicate, 10 g, Oral, Q8H  tamsulosin, 0.4 mg, Oral, Nightly  vilazodone, 40 mg, Oral, Daily      Continuous Infusions:   PRN Meds:.•  albuterol  "sulfate HFA  •  senna-docusate sodium **AND** polyethylene glycol **AND** bisacodyl **AND** bisacodyl  •  Calcium Replacement - Follow Nurse / BPA Driven Protocol  •  diazePAM  •  guaiFENesin-dextromethorphan  •  HYDROmorphone  •  labetalol  •  Magnesium Standard Dose Replacement - Follow Nurse / BPA Driven Protocol  •  midazolam  •  nitroglycerin  •  ondansetron  •  oxyCODONE  •  Phosphorus Replacement - Follow Nurse / BPA Driven Protocol  •  [COMPLETED] Insert Peripheral IV **AND** sodium chloride    Assessment & Plan   Assessment & Plan     Active Hospital Problems    Diagnosis  POA   • **Acute dyspnea [R06.00]  Yes   • Acute respiratory failure with hypoxia [J96.01]  Yes   • Pleural effusion on right [J90]  Yes   • CKD (chronic kidney disease) [N18.9]  Yes   • Lymphadenopathy, mediastinal [R59.0]  Unknown   • ISIAH (obstructive sleep apnea) [G47.33]  Yes   • COPD (chronic obstructive pulmonary disease) [J44.9]  Yes   • Anxiety and depression [F41.9, F32.A]  Yes   • Essential hypertension [I10]  Yes   • Hypothyroidism [E03.9]  Yes      Resolved Hospital Problems   No resolved problems to display.        Brief Hospital Course to date:  Calos Moseley is a 62 y.o. male \"admitted with Acute dyspnea, right-sided chest pain and hypoxia, findings of loculated right pleural effusion and possible right lower lobe endobronchial lesion.\"    Discussion/plan for today:  Patient with MESHA today.  I have discussed case with nephrology.  I have discontinued ACE inhibitor for now.  I have decreased calcium channel blocker.  Plan to allow more permissive hypertension in the short-term.  Continue careful monitoring of urine output.  Patient with some peripheral edema he does not appear particularly hypovolemic.  Uncertain if this could be some fluctuations in blood pressure or other associations with acute issues/illness.  Encouraging oral intake.  Treatment plan discussed with the patient who is in agreement.  Otherwise per " below.    Loculated pleural effusion status post thoracentesis.  Fluid looks to be exudative, cytology negative for malignant cells.  I do not see where Gram stain or culture were ever sent.    - Bronchoscopy completed on 5/19  - Completed antibiotics  - Supplement O2 as needed.  Wean to keep sats greater than 90%  - Lidoderm patch for right-sided chest pain  -Status post right robot-assisted decortication on 5/20/2023     Peripheral edema/weight gain- reviewed echocardiogram.  Relatively unremarkable other than mild pulmonary hypertension.  No diastolic dysfunction and normal BNP.    -Patient improved with diuresis earlier in hospital stay.  - Urine studies do not suggest significant proteinuria though there is very small amount  - Also note that he is on nifedipine which can cause peripheral edema, may adjust at some point     CKD relatively stable, continue to follow     History of hypothyroidism, on replacement with normal TSH     HTN, apparently refractory based on his extensive medication regimen but actually well controlled currently, labetalol for greater than 180.  Monitor carefully while on multiple medications.        • SCDs for DVT prophylaxis.  Consider pharmacologic prophylaxis after procedures are done  • Dispo: Hopefully home in the next couple days depending on results of above        CODE STATUS:   Code Status and Medical Interventions:   Ordered at: 05/20/23 5396     Code Status (Patient has no pulse and is not breathing):    CPR (Attempt to Resuscitate)     Medical Interventions (Patient has pulse or is breathing):    Full Support     Release to patient:    Routine Release       Dre Pearce MD  05/23/23

## 2023-05-23 NOTE — PROGRESS NOTES
MESHA noted.  Hold ACEi.  Decr CCB.  Consult nephro.  Full note to follow.  Electronically signed by Dre Pearce MD, 05/23/23, 6:28 AM EDT.

## 2023-05-24 ENCOUNTER — APPOINTMENT (OUTPATIENT)
Dept: GENERAL RADIOLOGY | Facility: HOSPITAL | Age: 63
End: 2023-05-24
Payer: COMMERCIAL

## 2023-05-24 ENCOUNTER — TELEPHONE (OUTPATIENT)
Dept: GASTROENTEROLOGY | Facility: CLINIC | Age: 63
End: 2023-05-24
Payer: COMMERCIAL

## 2023-05-24 LAB
ALBUMIN SERPL-MCNC: 2.6 G/DL (ref 3.5–5.2)
ANION GAP SERPL CALCULATED.3IONS-SCNC: 5.4 MMOL/L (ref 5–15)
BASOPHILS # BLD AUTO: 0.03 10*3/MM3 (ref 0–0.2)
BASOPHILS NFR BLD AUTO: 0.4 % (ref 0–1.5)
BUN SERPL-MCNC: 43 MG/DL (ref 8–23)
BUN/CREAT SERPL: 22.8 (ref 7–25)
CALCIUM SPEC-SCNC: 8.5 MG/DL (ref 8.6–10.5)
CHLORIDE SERPL-SCNC: 104 MMOL/L (ref 98–107)
CO2 SERPL-SCNC: 31.6 MMOL/L (ref 22–29)
CREAT SERPL-MCNC: 1.89 MG/DL (ref 0.76–1.27)
DEPRECATED RDW RBC AUTO: 42.5 FL (ref 37–54)
EGFRCR SERPLBLD CKD-EPI 2021: 39.9 ML/MIN/1.73
EOSINOPHIL # BLD AUTO: 0.25 10*3/MM3 (ref 0–0.4)
EOSINOPHIL NFR BLD AUTO: 3.2 % (ref 0.3–6.2)
ERYTHROCYTE [DISTWIDTH] IN BLOOD BY AUTOMATED COUNT: 12.7 % (ref 12.3–15.4)
GLUCOSE SERPL-MCNC: 167 MG/DL (ref 65–99)
HCT VFR BLD AUTO: 29.4 % (ref 37.5–51)
HGB BLD-MCNC: 9.4 G/DL (ref 13–17.7)
IMM GRANULOCYTES # BLD AUTO: 0.1 10*3/MM3 (ref 0–0.05)
IMM GRANULOCYTES NFR BLD AUTO: 1.3 % (ref 0–0.5)
LYMPHOCYTES # BLD AUTO: 1.3 10*3/MM3 (ref 0.7–3.1)
LYMPHOCYTES NFR BLD AUTO: 16.9 % (ref 19.6–45.3)
MAGNESIUM SERPL-MCNC: 2.6 MG/DL (ref 1.6–2.4)
MCH RBC QN AUTO: 28.9 PG (ref 26.6–33)
MCHC RBC AUTO-ENTMCNC: 32 G/DL (ref 31.5–35.7)
MCV RBC AUTO: 90.5 FL (ref 79–97)
MONOCYTES # BLD AUTO: 0.86 10*3/MM3 (ref 0.1–0.9)
MONOCYTES NFR BLD AUTO: 11.2 % (ref 5–12)
NEUTROPHILS NFR BLD AUTO: 5.16 10*3/MM3 (ref 1.7–7)
NEUTROPHILS NFR BLD AUTO: 67 % (ref 42.7–76)
NRBC BLD AUTO-RTO: 0 /100 WBC (ref 0–0.2)
PHOSPHATE SERPL-MCNC: 3.3 MG/DL (ref 2.5–4.5)
PLATELET # BLD AUTO: 338 10*3/MM3 (ref 140–450)
PMV BLD AUTO: 9.6 FL (ref 6–12)
POTASSIUM SERPL-SCNC: 4.6 MMOL/L (ref 3.5–5.2)
RBC # BLD AUTO: 3.25 10*6/MM3 (ref 4.14–5.8)
SODIUM SERPL-SCNC: 141 MMOL/L (ref 136–145)
URATE SERPL-MCNC: 9.4 MG/DL (ref 3.4–7)
WBC NRBC COR # BLD: 7.7 10*3/MM3 (ref 3.4–10.8)

## 2023-05-24 PROCEDURE — 94668 MNPJ CHEST WALL SBSQ: CPT

## 2023-05-24 PROCEDURE — 94760 N-INVAS EAR/PLS OXIMETRY 1: CPT

## 2023-05-24 PROCEDURE — 80069 RENAL FUNCTION PANEL: CPT | Performed by: INTERNAL MEDICINE

## 2023-05-24 PROCEDURE — 94799 UNLISTED PULMONARY SVC/PX: CPT

## 2023-05-24 PROCEDURE — 85025 COMPLETE CBC W/AUTO DIFF WBC: CPT | Performed by: INTERNAL MEDICINE

## 2023-05-24 PROCEDURE — 25010000002 ENOXAPARIN PER 10 MG: Performed by: NURSE PRACTITIONER

## 2023-05-24 PROCEDURE — 83735 ASSAY OF MAGNESIUM: CPT | Performed by: INTERNAL MEDICINE

## 2023-05-24 PROCEDURE — 94761 N-INVAS EAR/PLS OXIMETRY MLT: CPT

## 2023-05-24 PROCEDURE — 97110 THERAPEUTIC EXERCISES: CPT

## 2023-05-24 PROCEDURE — 84550 ASSAY OF BLOOD/URIC ACID: CPT | Performed by: INTERNAL MEDICINE

## 2023-05-24 PROCEDURE — 71045 X-RAY EXAM CHEST 1 VIEW: CPT

## 2023-05-24 PROCEDURE — 97110 THERAPEUTIC EXERCISES: CPT | Performed by: OCCUPATIONAL THERAPIST

## 2023-05-24 PROCEDURE — 99024 POSTOP FOLLOW-UP VISIT: CPT | Performed by: NURSE PRACTITIONER

## 2023-05-24 PROCEDURE — 94664 DEMO&/EVAL PT USE INHALER: CPT

## 2023-05-24 RX ORDER — HYDRALAZINE HYDROCHLORIDE 25 MG/1
25 TABLET, FILM COATED ORAL EVERY 8 HOURS SCHEDULED
Status: DISCONTINUED | OUTPATIENT
Start: 2023-05-24 | End: 2023-05-25

## 2023-05-24 RX ORDER — TORSEMIDE 20 MG/1
40 TABLET ORAL
Status: DISCONTINUED | OUTPATIENT
Start: 2023-05-24 | End: 2023-05-26 | Stop reason: HOSPADM

## 2023-05-24 RX ORDER — NIFEDIPINE 30 MG/1
30 TABLET, EXTENDED RELEASE ORAL
Status: DISCONTINUED | OUTPATIENT
Start: 2023-05-24 | End: 2023-05-26 | Stop reason: HOSPADM

## 2023-05-24 RX ADMIN — HYDRALAZINE HYDROCHLORIDE 25 MG: 25 TABLET, FILM COATED ORAL at 22:08

## 2023-05-24 RX ADMIN — IPRATROPIUM BROMIDE AND ALBUTEROL SULFATE 3 ML: 2.5; .5 SOLUTION RESPIRATORY (INHALATION) at 20:53

## 2023-05-24 RX ADMIN — DOCUSATE SODIUM 100 MG: 100 CAPSULE, LIQUID FILLED ORAL at 09:13

## 2023-05-24 RX ADMIN — DIVALPROEX SODIUM 250 MG: 250 TABLET, EXTENDED RELEASE ORAL at 20:36

## 2023-05-24 RX ADMIN — IPRATROPIUM BROMIDE AND ALBUTEROL SULFATE 3 ML: 2.5; .5 SOLUTION RESPIRATORY (INHALATION) at 11:11

## 2023-05-24 RX ADMIN — GUAIFENESIN 1200 MG: 600 TABLET, EXTENDED RELEASE ORAL at 09:12

## 2023-05-24 RX ADMIN — LIDOCAINE 1 PATCH: 700 PATCH TOPICAL at 09:00

## 2023-05-24 RX ADMIN — METOPROLOL SUCCINATE 100 MG: 100 TABLET, EXTENDED RELEASE ORAL at 09:13

## 2023-05-24 RX ADMIN — BENZONATATE 200 MG: 100 CAPSULE ORAL at 20:36

## 2023-05-24 RX ADMIN — PRAMIPEXOLE DIHYDROCHLORIDE 0.5 MG: 0.5 TABLET ORAL at 20:36

## 2023-05-24 RX ADMIN — Medication 4 ML: at 07:49

## 2023-05-24 RX ADMIN — ACETAMINOPHEN 1000 MG: 500 TABLET, FILM COATED ORAL at 20:36

## 2023-05-24 RX ADMIN — ENOXAPARIN SODIUM 40 MG: 100 INJECTION SUBCUTANEOUS at 09:13

## 2023-05-24 RX ADMIN — IPRATROPIUM BROMIDE AND ALBUTEROL SULFATE 3 ML: 2.5; .5 SOLUTION RESPIRATORY (INHALATION) at 15:08

## 2023-05-24 RX ADMIN — BENZONATATE 200 MG: 100 CAPSULE ORAL at 09:13

## 2023-05-24 RX ADMIN — POLYETHYLENE GLYCOL 3350 17 G: 17 POWDER, FOR SOLUTION ORAL at 09:13

## 2023-05-24 RX ADMIN — ACETAMINOPHEN 1000 MG: 500 TABLET, FILM COATED ORAL at 16:03

## 2023-05-24 RX ADMIN — OXYCODONE HYDROCHLORIDE 5 MG: 5 TABLET ORAL at 02:06

## 2023-05-24 RX ADMIN — Medication 4 ML: at 20:53

## 2023-05-24 RX ADMIN — DIVALPROEX SODIUM 250 MG: 250 TABLET, EXTENDED RELEASE ORAL at 09:13

## 2023-05-24 RX ADMIN — DOCUSATE SODIUM 50MG AND SENNOSIDES 8.6MG 2 TABLET: 8.6; 5 TABLET, FILM COATED ORAL at 09:12

## 2023-05-24 RX ADMIN — TORSEMIDE 40 MG: 20 TABLET ORAL at 19:00

## 2023-05-24 RX ADMIN — ATORVASTATIN CALCIUM 20 MG: 20 TABLET, FILM COATED ORAL at 20:36

## 2023-05-24 RX ADMIN — OXYCODONE HYDROCHLORIDE 5 MG: 5 TABLET ORAL at 06:45

## 2023-05-24 RX ADMIN — BENZONATATE 200 MG: 100 CAPSULE ORAL at 16:03

## 2023-05-24 RX ADMIN — ACETAMINOPHEN 1000 MG: 500 TABLET, FILM COATED ORAL at 09:13

## 2023-05-24 RX ADMIN — METOPROLOL SUCCINATE 100 MG: 100 TABLET, EXTENDED RELEASE ORAL at 20:36

## 2023-05-24 RX ADMIN — DOCUSATE SODIUM 100 MG: 100 CAPSULE, LIQUID FILLED ORAL at 20:36

## 2023-05-24 RX ADMIN — LAMOTRIGINE 100 MG: 100 TABLET ORAL at 09:16

## 2023-05-24 RX ADMIN — Medication 10 ML: at 09:14

## 2023-05-24 RX ADMIN — NIFEDIPINE 30 MG: 30 TABLET, FILM COATED, EXTENDED RELEASE ORAL at 09:12

## 2023-05-24 RX ADMIN — ENOXAPARIN SODIUM 40 MG: 100 INJECTION SUBCUTANEOUS at 20:36

## 2023-05-24 RX ADMIN — LAMOTRIGINE 100 MG: 100 TABLET ORAL at 20:36

## 2023-05-24 RX ADMIN — LEVOTHYROXINE SODIUM 50 MCG: 0.05 TABLET ORAL at 06:45

## 2023-05-24 RX ADMIN — IPRATROPIUM BROMIDE AND ALBUTEROL SULFATE 3 ML: 2.5; .5 SOLUTION RESPIRATORY (INHALATION) at 07:48

## 2023-05-24 RX ADMIN — TAMSULOSIN HYDROCHLORIDE 0.4 MG: 0.4 CAPSULE ORAL at 20:36

## 2023-05-24 RX ADMIN — OXYCODONE HYDROCHLORIDE 5 MG: 5 TABLET ORAL at 22:08

## 2023-05-24 RX ADMIN — VILAZODONE HYDROCHLORIDE 40 MG: 40 TABLET, FILM COATED ORAL at 09:13

## 2023-05-24 RX ADMIN — TORSEMIDE 40 MG: 20 TABLET ORAL at 11:57

## 2023-05-24 RX ADMIN — OXYCODONE HYDROCHLORIDE 5 MG: 5 TABLET ORAL at 12:00

## 2023-05-24 RX ADMIN — OXYCODONE HYDROCHLORIDE 5 MG: 5 TABLET ORAL at 16:03

## 2023-05-24 RX ADMIN — GUAIFENESIN 1200 MG: 600 TABLET, EXTENDED RELEASE ORAL at 20:36

## 2023-05-24 NOTE — PROGRESS NOTES
[facility] Progress Note      Patient Name: Calos Moseley  : 1960  MRN: [id number]  Primary Care Physician:  Naveed Nunez DO  Date of admission: 2023    Subjective     Interval History:   Follow-up acute kidney injury on chronic kidney disease, and hyperkalemia    Patient is feeling better today, one of the chest tube was removed, still have Lobo catheter anchored in place, denies any chest pain or shortness of air, no orthopnea or PND, no nausea or vomiting, no abdominal pain, continues to have some lower extremity edema    Review of Systems:   As noted above    Objective     Vitals:   Temp:  [97.6 °F (36.4 °C)-98.9 °F (37.2 °C)] 98.9 °F (37.2 °C)  Heart Rate:  [62-73] 66  Resp:  [20] 20  BP: (125-151)/(51-88) 135/68  Flow (L/min):  [6] 6    Intake/Output Summary (Last 24 hours) at 2023 0818  Last data filed at 2023 0600  Gross per 24 hour   Intake 720 ml   Output 3124 ml   Net -2404 ml       Physical Exam:    General Appearance: alert, awake, chronically ill, morbidly obese, no acute  Skin: warm and dry  HEENT: oral mucosa normal, nonicteric sclera  Neck: supple, no JVD  Lungs: Decreased breath sounds on the right with bilateral rhonchi, he has unlabored breathing effort and he has right chest tube  Heart: RRR, normal S1 and S2  Abdomen: soft, nontender, nondistended, protuberant, normoactive bowels  : Lobo catheter is anchored in  Extremities: 2+ lower extremity edema  Neuro: normal speech and mental status     Scheduled Meds:     acetaminophen, 1,000 mg, Oral, TID  atorvastatin, 20 mg, Oral, Nightly  benzonatate, 200 mg, Oral, TID  divalproex, 250 mg, Oral, BID  docusate sodium, 100 mg, Oral, BID  enoxaparin, 40 mg, Subcutaneous, Q12H  guaiFENesin, 1,200 mg, Oral, Q12H  hydrALAZINE, 25 mg, Oral, Q8H  ipratropium-albuterol, 3 mL, Nebulization, 4x Daily - RT  lamoTRIgine, 100 mg, Oral, BID  levothyroxine, 50 mcg, Oral, Q AM  lidocaine, 1 patch,  Transdermal, Q24H  metoprolol succinate XL, 100 mg, Oral, BID  NIFEdipine XL, 30 mg, Oral, Q24H  polyethylene glycol, 17 g, Oral, Daily  pramipexole, 0.5 mg, Oral, Nightly  senna-docusate sodium, 2 tablet, Oral, BID  sodium chloride, 10 mL, Intravenous, Q12H  sodium chloride, 10 mL, Intravenous, Q12H  sodium chloride, 4 mL, Nebulization, BID - RT  tamsulosin, 0.4 mg, Oral, Nightly  vilazodone, 40 mg, Oral, Daily      IV Meds:        Results Reviewed:   I have personally reviewed the results from the time of this admission to 5/24/2023 08:18 EDT     Results from last 7 days   Lab Units 05/24/23 0518 05/23/23 1630 05/23/23  0457   SODIUM mmol/L 141 140 138   POTASSIUM mmol/L 4.6 4.9 5.8*   CHLORIDE mmol/L 104 98 101   CO2 mmol/L 31.6* 32.6* 31.6*   BUN mg/dL 43* 50* 52*   CREATININE mg/dL 1.89* 2.33* 2.40*   CALCIUM mg/dL 8.5* 8.9 8.9   BILIRUBIN mg/dL  --   --  0.2   ALK PHOS U/L  --   --  66   ALT (SGPT) U/L  --   --  17   AST (SGOT) U/L  --   --  15   GLUCOSE mg/dL 167* 146* 152*       Estimated Creatinine Clearance: 49.2 mL/min (A) (by C-G formula based on SCr of 1.89 mg/dL (H)).    Results from last 7 days   Lab Units 05/24/23 0518 05/23/23  1630 05/20/23  1054   MAGNESIUM mg/dL 2.6*  --   --    PHOSPHORUS mg/dL 3.3 3.7 3.8       Results from last 7 days   Lab Units 05/24/23 0518   URIC ACID mg/dL 9.4*       Results from last 7 days   Lab Units 05/24/23 0518 05/22/23  0411 05/21/23  0521 05/20/23  2030 05/20/23  0405   WBC 10*3/mm3 7.70 14.39* 16.55* 22.24* 16.88*   HEMOGLOBIN g/dL 9.4* 10.3* 11.2* 11.9* 11.5*   PLATELETS 10*3/mm3 338 315 370 428 341       Results from last 7 days   Lab Units 05/20/23  0405   INR  1.08       Assessment / Plan     ASSESSMENT:  -Acute kidney injury on chronic kidney disease stage IIIa, etiology not very clear could be hemodynamic changes associated with volume shift and having decreased effective intravascular volume and the patient being on ACE inhibitor.  Creatinine down to  1.89, electrolyte within acceptable range, the renal function is improved  -Hyperkalemia most likely associate with acute kidney injury and ACE inhibitor, improving, potassium down to four-point  -Fluid excess, will continue to diurese  -History of recurrent cystine stones  -Hypertension associate with chronic kidney disease reasonably controlled  -Hypothyroid  -Bipolar disorder  -Obstructive sleep apnea  -Anemia, hemoglobin 9.4    PLAN:  -Add torsemide 40 mg twice daily  -Surveillance labs    Thank you for involving us in the care of Calos Moseley.  Please feel free to call with any questions.    Davis Angel MD  05/24/23  08:18 EDT    [facility]  [phone]    Please note that portions of this note were completed with a voice recognition program.

## 2023-05-24 NOTE — CASE MANAGEMENT/SOCIAL WORK
Continued Stay Note  [facility]     Patient Name: Calos Moseley  MRN: [id number]  Today's Date: 5/24/2023    Admit Date: 5/16/2023    Plan: Home   Discharge Plan     Row Name 05/24/23 1142       Plan    Plan Home    Patient/Family in Agreement with Plan yes    Plan Comments Met with pt at bedside who confirms plan to return home at discharge.  If home O2 needed will use [facility].  Watch for possible need for nebulizer as well.  CCP continues to follow.  ABIEL Ventura RN               Discharge Codes    No documentation.               Expected Discharge Date and Time     Expected Discharge Date Expected Discharge Time    May 25, 2023             Dee Ventura, RN

## 2023-05-24 NOTE — PROGRESS NOTES
[facility] Medicine Services  PROGRESS NOTE    Patient Name: Calos Moseley  : 1960  MRN: [id number]    Date of Admission: 2023  Primary Care Physician: Naveed Nunez DO    Subjective   Subjective     CC:  Follow-up multiple issues    Subjective:  Patient states he continues to feel little bit better today.  He is making good urine output.  He is pleased with his progress.  He is very eager to get his chest tubes out.    Review of Systems  No current fevers or chills  No current nausea, vomiting, or diarrhea  No current chest pain or palpitations      Objective   Objective     Vital Signs:   Temp:  [97.6 °F (36.4 °C)-98.9 °F (37.2 °C)] 98.6 °F (37 °C)  Heart Rate:  [66-73] 68  Resp:  [20] 20  BP: (125-151)/(51-88) 142/68        Physical Exam:  Constitutional:Awake, alert  HENT: NCAT, mucous membranes moist, neck supple  Respiratory: Occasional cough, occasional coarse sounds, no wheezing, relatively nonlabored breathing  Cardiovascular: Pulse rate is normal, palpable radial pulse bilaterally  Gastrointestinal:soft, nontender, nondistended  Musculoskeletal: Normal musculature for age, mild lower extremity edema, BMI 41, morbidly obese  Psychiatric: Appropriate affect, cooperative, conversational  Neurologic: No slurred speech or facial droop, follows commands  Skin: No rashes or jaundice, warm      Results Reviewed:  Results from last 7 days   Lab Units 23  0518 23  0411 23  0521 23  2030 23  0405   WBC 10*3/mm3 7.70 14.39* 16.55*   < > 16.88*   HEMOGLOBIN g/dL 9.4* 10.3* 11.2*   < > 11.5*   HEMATOCRIT % 29.4* 31.7* 35.7*   < > 35.6*   PLATELETS 10*3/mm3 338 315 370   < > 341   INR   --   --   --   --  1.08    < > = values in this interval not displayed.     Results from last 7 days   Lab Units 23  0518 23  1630 23  0457   SODIUM mmol/L 141 140 138   POTASSIUM mmol/L 4.6 4.9 5.8*   CHLORIDE mmol/L 104 98 101   CO2 mmol/L 31.6* 32.6* 31.6*   BUN  mg/dL 43* 50* 52*   CREATININE mg/dL 1.89* 2.33* 2.40*   GLUCOSE mg/dL 167* 146* 152*   CALCIUM mg/dL 8.5* 8.9 8.9   ALT (SGPT) U/L  --   --  17   AST (SGOT) U/L  --   --  15     Estimated Creatinine Clearance: 49.2 mL/min (A) (by C-G formula based on SCr of 1.89 mg/dL (H)).    Microbiology Results Abnormal     Procedure Component Value - Date/Time    Tissue / Bone Culture - Tissue, Pleura [[id number]] Collected: 05/20/23 1856    Lab Status: Final result Specimen: Tissue from Pleura Updated: 05/23/23 0930     Tissue Culture No growth at 3 days     Gram Stain No WBCs or organisms seen    Body Fluid Culture - Surgical Site, Pleura [[id number]] Collected: 05/20/23 1851    Lab Status: Final result Specimen: Surgical Site from Pleura Updated: 05/23/23 0928     Body Fluid Culture No growth at 3 days     Gram Stain No WBCs or organisms seen    Anaerobic Culture - Surgical Site, Pleura [[id number]]  (Normal) Collected: 05/20/23 1851    Lab Status: Preliminary result Specimen: Surgical Site from Pleura Updated: 05/23/23 0757     Anaerobic Culture No anaerobes isolated at 3 days    Blood Culture - Blood, Arm, Left [[id number]]  (Normal) Collected: 05/16/23 2247    Lab Status: Final result Specimen: Blood from Arm, Left Updated: 05/21/23 2301     Blood Culture No growth at 5 days    Blood Culture - Blood, Hand, Right [[id number]]  (Normal) Collected: 05/16/23 2253    Lab Status: Final result Specimen: Blood from Hand, Right Updated: 05/21/23 2301     Blood Culture No growth at 5 days    BAL Culture, Quantitative - Lavage, Lung, Right Lower Lobe [[id number]] Collected: 05/19/23 1546    Lab Status: Final result Specimen: Lavage from Lung, Right Lower Lobe Updated: 05/21/23 0822     BAL Culture No growth     Gram Stain No organisms seen      Rare (1+) WBCs seen    AFB Culture - Lavage, Lung, Right Lower Lobe [[id number]] Collected: 05/19/23 1546    Lab Status: Preliminary result Specimen: Lavage from Lung, Right Lower Lobe Updated:  05/20/23 1342     AFB Stain No acid fast bacilli seen on concentrated smear    Fungus Smear - Lavage, Lung, Right Lower Lobe [[id number]] Collected: 05/19/23 1546    Lab Status: Final result Specimen: Lavage from Lung, Right Lower Lobe Updated: 05/19/23 2113     Fungal Stain No fungal elements seen    S. Pneumo Ag Urine or CSF - Urine, Urine, Clean Catch [[id number]]  (Normal) Collected: 05/17/23 0452    Lab Status: Final result Specimen: Urine, Clean Catch Updated: 05/17/23 0644     Strep Pneumo Ag Negative    Legionella Antigen, Urine - Urine, Urine, Clean Catch [[id number]]  (Normal) Collected: 05/17/23 0452    Lab Status: Final result Specimen: Urine, Clean Catch Updated: 05/17/23 0644     LEGIONELLA ANTIGEN, URINE Negative    Respiratory Panel PCR w/COVID-19(SARS-CoV-2) JERI/SONIA/MARQUITA/PAD/COR/MAD/ULISSES In-House, NP Swab in UTM/VTM, 3-4 HR TAT - Swab, Nasopharynx [[id number]]  (Normal) Collected: 05/16/23 1733    Lab Status: Final result Specimen: Swab from Nasopharynx Updated: 05/16/23 1832     ADENOVIRUS, PCR Not Detected     Coronavirus 229E Not Detected     Coronavirus HKU1 Not Detected     Coronavirus NL63 Not Detected     Coronavirus OC43 Not Detected     COVID19 Not Detected     Human Metapneumovirus Not Detected     Human Rhinovirus/Enterovirus Not Detected     Influenza A PCR Not Detected     Influenza B PCR Not Detected     Parainfluenza Virus 1 Not Detected     Parainfluenza Virus 2 Not Detected     Parainfluenza Virus 3 Not Detected     Parainfluenza Virus 4 Not Detected     RSV, PCR Not Detected     Bordetella pertussis pcr Not Detected     Bordetella parapertussis PCR Not Detected     Chlamydophila pneumoniae PCR Not Detected     Mycoplasma pneumo by PCR Not Detected    Narrative:      In the setting of a positive respiratory panel with a viral infection PLUS a negative procalcitonin without other underlying concern for bacterial infection, consider observing off antibiotics or discontinuation of  antibiotics and continue supportive care. If the respiratory panel is positive for atypical bacterial infection (Bordetella pertussis, Chlamydophila pneumoniae, or Mycoplasma pneumoniae), consider antibiotic de-escalation to target atypical bacterial infection.          Imaging Results (Last 24 Hours)     Procedure Component Value Units Date/Time    XR Chest 1 View [[id number]] Collected: 05/24/23 0658     Updated: 05/24/23 0703    Narrative:      XR CHEST 1 VW-     HISTORY:  Postop lung surgery, chest tube.     COMPARISON:  Chest radiograph 05/23/2023 at 5:45 AM     FINDINGS:    A single view of the chest was obtained. There is a right apical chest  tube. A second previously noted chest tube has been removed. The cardiac  silhouette is upper normal to mildly enlarged. The mediastinal and hilar  contours are not significantly changed. Right greater than left airspace  opacities and pleural effusions are not significantly changed. There is  no definite pneumothorax. There is multilevel degenerative disc disease.     This report was finalized on 5/24/2023 7:00 AM by Dr. Mahsa Paulino M.D.       XR Chest 1 View [[id number]] Collected: 05/23/23 0811     Updated: 05/23/23 1551    Narrative:      EXAMINATION: SINGLE VIEW CHEST RADIOGRAPH     HISTORY: 62-year-old male undergoing postoperative lung surgery  evaluation.     FINDINGS: A semierect AP portable chest radiograph was obtained.  Comparison is made to a chest radiograph dated 05/22/2023. There are 2  apically oriented right-sided chest tubes that are stable in position.  Hazy opacification at the base of the right lung has developed since the  prior examination. There is a pleural-based opacification seen laterally  in the right mid and lower lung field that has developed since prior  examination. Linear opacities are seen at the base of the left lung. A  pneumothorax is not visualized. Mild interstitial opacification in both  lungs is noted.       Impression:       There are findings suggestive of interval development of  small right pleural effusion. Mild bibasilar atelectasis is noted.  Superimposed mild interstitial edema cannot be completely excluded.  Right-sided chest tubes remain stable.     This report was finalized on 5/23/2023 3:48 PM by Dr. Vega Fowler M.D.             Results for orders placed during the hospital encounter of 05/16/23    Adult Transthoracic Echo Complete W/ Cont if Necessary Per Protocol    Interpretation Summary  •  Left ventricular systolic function is normal. Calculated left ventricular EF = 58.1% The left ventricular cavity is moderately dilated. Left ventricular wall thickness is consistent with mild concentric hypertrophy. All left ventricular wall segments contract normally. Left ventricular diastolic function was normal.  •  Trace mitral valve regurgitation is present.  •  Trace tricuspid valve regurgitation is present. Estimated right ventricular systolic pressure from tricuspid regurgitation is mildly elevated (35-45 mmHg). Calculated right ventricular systolic pressure from tricuspid regurgitation is 36.3 mmHg.      I have reviewed the medications:  Scheduled Meds:acetaminophen, 1,000 mg, Oral, TID  atorvastatin, 20 mg, Oral, Nightly  benzonatate, 200 mg, Oral, TID  divalproex, 250 mg, Oral, BID  docusate sodium, 100 mg, Oral, BID  enoxaparin, 40 mg, Subcutaneous, Q12H  guaiFENesin, 1,200 mg, Oral, Q12H  hydrALAZINE, 25 mg, Oral, Q8H  ipratropium-albuterol, 3 mL, Nebulization, 4x Daily - RT  lamoTRIgine, 100 mg, Oral, BID  levothyroxine, 50 mcg, Oral, Q AM  lidocaine, 1 patch, Transdermal, Q24H  metoprolol succinate XL, 100 mg, Oral, BID  NIFEdipine XL, 30 mg, Oral, Q24H  polyethylene glycol, 17 g, Oral, Daily  pramipexole, 0.5 mg, Oral, Nightly  senna-docusate sodium, 2 tablet, Oral, BID  sodium chloride, 10 mL, Intravenous, Q12H  sodium chloride, 10 mL, Intravenous, Q12H  sodium chloride, 4 mL, Nebulization, BID - RT  tamsulosin,  "0.4 mg, Oral, Nightly  torsemide, 40 mg, Oral, BID  vilazodone, 40 mg, Oral, Daily      Continuous Infusions:   PRN Meds:.•  albuterol sulfate HFA  •  senna-docusate sodium **AND** polyethylene glycol **AND** bisacodyl **AND** bisacodyl  •  Calcium Replacement - Follow Nurse / BPA Driven Protocol  •  diazePAM  •  guaiFENesin-dextromethorphan  •  HYDROmorphone  •  labetalol  •  Magnesium Standard Dose Replacement - Follow Nurse / BPA Driven Protocol  •  midazolam  •  nitroglycerin  •  ondansetron  •  oxyCODONE  •  Phosphorus Replacement - Follow Nurse / BPA Driven Protocol  •  [COMPLETED] Insert Peripheral IV **AND** sodium chloride    Assessment & Plan   Assessment & Plan     Active Hospital Problems    Diagnosis  POA   • **Acute dyspnea [R06.00]  Yes   • Acute respiratory failure with hypoxia [J96.01]  Yes   • Pleural effusion on right [J90]  Yes   • CKD (chronic kidney disease) [N18.9]  Yes   • Lymphadenopathy, mediastinal [R59.0]  Unknown   • ISIAH (obstructive sleep apnea) [G47.33]  Yes   • COPD (chronic obstructive pulmonary disease) [J44.9]  Yes   • Anxiety and depression [F41.9, F32.A]  Yes   • Essential hypertension [I10]  Yes   • Hypothyroidism [E03.9]  Yes      Resolved Hospital Problems   No resolved problems to display.        Brief Hospital Course to date:  Calos Moseley is a 62 y.o. male \"admitted with Acute dyspnea, right-sided chest pain and hypoxia, findings of loculated right pleural effusion and possible right lower lobe endobronchial lesion.\"    Discussion/plan for today:  Case discussed with nephrology today.  Torsemide has been initiated for diuresis per their recommendation.  Nephrology has cleared patient to discontinue Lobo catheter.  Lobo catheter discontinuation ordered.  Chest tubes as per thoracic surgery team.  I have decreased calcium channel blocker further and decreased hydralazine further.  Avoid hypotension while recovering from kidney injury and diuresing.    Patient with some " peripheral edema he does not appear particularly hypovolemic.  Encouraging oral intake.  Treatment plan discussed with the patient who is in agreement.  Otherwise per below.    Loculated pleural effusion status post thoracentesis.  Fluid looks to be exudative, cytology negative for malignant cells.  I do not see where Gram stain or culture were ever sent.    - Bronchoscopy completed on 5/19  - Completed antibiotics  - Supplement O2 as needed.  Wean to keep sats greater than 90%  - Lidoderm patch for right-sided chest pain  -Status post right robot-assisted decortication on 5/20/2023     Peripheral edema/weight gain- reviewed echocardiogram.  Relatively unremarkable other than mild pulmonary hypertension.  No diastolic dysfunction and normal BNP.    -Patient improved with diuresis earlier in hospital stay.  - Urine studies do not suggest significant proteinuria though there is very small amount  - Also note that he is on nifedipine which can cause peripheral edema, may adjust at some point     CKD relatively stable, continue to follow     History of hypothyroidism, on replacement with normal TSH     HTN, apparently refractory based on his extensive medication regimen but actually well controlled currently, labetalol for greater than 180.  Monitor carefully while on multiple medications.        • Lovenox for DVT prophylaxis.    • Dispo: Hopefully home in the next couple days depending on results of above        CODE STATUS:   Code Status and Medical Interventions:   Ordered at: 05/20/23 2288     Code Status (Patient has no pulse and is not breathing):    CPR (Attempt to Resuscitate)     Medical Interventions (Patient has pulse or is breathing):    Full Support     Release to patient:    Routine Release       Dre Pearce MD  05/24/23

## 2023-05-24 NOTE — PLAN OF CARE
Goal Outcome Evaluation:  Plan of Care Reviewed With: patient        Progress: improving  Outcome Evaluation: pt progressing well in therapy and incr strength and endurance. pt already up and walked to tsf and just got to the chair, ADLs already completed. pt completed B UE ex AROM and red theraband ex 10x2 to incr strength/endurance. pt reports may dc home tomorrow after tubes removed. cont therapy to incr strength and endurnace. provided pt w red theraband to keep and cont to work on strength.

## 2023-05-24 NOTE — PAYOR COMM NOTE
"Fabiola Moseley \"Bobby\" (62 y.o. Male)   PLEASE SEE ATTACHED FOR CONTINUED INPT STAY DAYS    REF # [id number]    PLEASE CALL AMBERLY EISENBERG RN/ DEPT @ [phone]   OR [phone]    THANK YOU  AMBERLY EISENBERG RN  [facility]         Date of Birth   1960    Social Security Number       Address   [address] [place] [address]    Home Phone   [phone]    MRN   [id number]       Shinto   Gnosticism    Marital Status                               Admission Date   5/16/23    Admission Type   Emergency    Admitting Provider   Ramya Lomeli MD    Attending Provider   Dre Pearce MD    Department, Room/Bed   [address], E565/1       Discharge Date       Discharge Disposition       Discharge Destination                               Attending Provider: Dre Pearce MD    Allergies: No Known Allergies    Isolation: None   Infection: None   Code Status: CPR    Ht: 167.6 cm (66\")   Wt: 116 kg (255 lb 6.4 oz)    Admission Cmt: None   Principal Problem: Acute dyspnea [R06.00]                 Active Insurance as of 5/16/2023     Primary Coverage     Payor Plan Insurance Group Employer/Plan Group    [facility] BLUE [employer] EMPLOYEE [id number]     Payor Plan Address Payor Plan Phone Number Payor Plan Fax Number Effective Dates    [address] [phone]  1/1/2023 - None Entered    [place] [id number]       Subscriber Name Subscriber Birth Date Member ID       FABIOLA MOSELEY 1960 [id number]                 Emergency Contacts      (Rel.) Home Phone Work Phone Mobile Phone    Rebeca Moseley (Spouse) [phone] -- [phone]            [place]: NPI [id number]  Tax ID [id number]  Lines, Drains & Airways     Active LDAs     Name Placement date Placement time Site Days    Peripheral IV 05/20/23 1244 Anterior;Left Forearm 05/20/23  1244  Forearm  3    Peripheral IV 05/20/23 1347 Right Forearm 05/20/23  1347  " "created via procedure documentation  Forearm  3    Chest Tube 2 Right Midaxillary 05/20/23  1834  Midaxillary  3    Urethral Catheter Silicone 16 Fr. 05/20/23  0718  -- 3                     Physician Progress Notes (last 72 hours)      Sosa Romero DNP, APRN at 05/24/23 1003     Attestation signed by Jose Rice MD at 05/24/23 1009    I have reviewed this documentation and agree.                  POST-OPERATIVE NOTE     Chief Complaint: Right loculated parapneumonic effusion, postoperative care  S/P: Right video-assisted thoracoscopy with da Coby robot assisted decortication  POD # 4    Subjective:  Symptoms:  Stable.  He reports shortness of breath.  No weakness.    Diet:  Adequate intake.  No nausea or vomiting.    Activity level: Impaired due to pain.    Pain:  He complains of pain that is mild.  Pain is well controlled and requiring pain medication.    Out of bed.  Ambulating in the jacobo on 4 L.    Objective:  General Appearance:  Comfortable and in no acute distress.    Vital signs: (most recent): Blood pressure 135/68, pulse 66, temperature 98.9 °F (37.2 °C), temperature source Oral, resp. rate 20, height 167.6 cm (66\"), weight 116 kg (255 lb 6.4 oz), SpO2 97 %.  Vital signs are normal.  No fever.    Output: Producing urine.    Lungs:  Normal effort and normal respiratory rate.  There are decreased breath sounds and rhonchi.    Heart: Normal rate.  Regular rhythm.    Chest: Chest wall tenderness present.    Abdomen: Abdomen is soft.  There is no abdominal tenderness.     Neurological: Patient is alert and oriented to person, place and time.    Skin:  Warm and dry.              Chest tube:   Site: Right, Clean, Dry, Intact and Securement device intact  Suction: waterseal  Air Leak: negative  24 Hour Total: 24ml    Results Review:     I reviewed the patient's new clinical results.  I reviewed the patient's new imaging results and agree with the interpretation.  I reviewed the patient's other test results " and agree with the interpretation  Discussed with Patient, spouse at bedside, RN, Dr. Rice.    Assessment & Plan     Mr. Moseley is doing very well POD #4, s/p right robot-assisted decortication.  Posterior chest tube was removed yesterday.  Remaining chest tube to waterseal.  Creatinine is improving.  Appreciate assistance from nephrology.  Remains on 4 L nasal cannula, continue to wean as tolerated.  Plan for chest tube removal tomorrow.  Recheck chest x-ray in a.m.    Sosa Romero, JONATHAN, APRN  [facility]  23  10:03 EDT    Patient was seen and assessed while wearing personal protective equipment including facemask, protective eyewear and gloves.  Hand hygiene performed prior to entering the room and upon exiting with doffing of gloves.         Electronically signed by Jose Rice MD at 23 1009     Davis Angel MD at 23 0818              [facility] Progress Note      Patient Name: Calos Moseley  : 1960  MRN: [id number]  Primary Care Physician:  Naveed Nunez DO  Date of admission: 2023    Subjective     Interval History:   Follow-up acute kidney injury on chronic kidney disease, and hyperkalemia    Patient is feeling better today, one of the chest tube was removed, still have Lobo catheter anchored in place, denies any chest pain or shortness of air, no orthopnea or PND, no nausea or vomiting, no abdominal pain, continues to have some lower extremity edema    Review of Systems:   As noted above    Objective     Vitals:   Temp:  [97.6 °F (36.4 °C)-98.9 °F (37.2 °C)] 98.9 °F (37.2 °C)  Heart Rate:  [62-73] 66  Resp:  [20] 20  BP: (125-151)/(51-88) 135/68  Flow (L/min):  [6] 6    Intake/Output Summary (Last 24 hours) at 2023 0818  Last data filed at 2023 0600  Gross per 24 hour   Intake 720 ml   Output 3124 ml   Net -2404 ml       Physical Exam:    General Appearance: alert, awake, chronically ill, morbidly obese, no  acute  Skin: warm and dry  HEENT: oral mucosa normal, nonicteric sclera  Neck: supple, no JVD  Lungs: Decreased breath sounds on the right with bilateral rhonchi, he has unlabored breathing effort and he has right chest tube  Heart: RRR, normal S1 and S2  Abdomen: soft, nontender, nondistended, protuberant, normoactive bowels  : Lobo catheter is anchored in  Extremities: 2+ lower extremity edema  Neuro: normal speech and mental status     Scheduled Meds:     acetaminophen, 1,000 mg, Oral, TID  atorvastatin, 20 mg, Oral, Nightly  benzonatate, 200 mg, Oral, TID  divalproex, 250 mg, Oral, BID  docusate sodium, 100 mg, Oral, BID  enoxaparin, 40 mg, Subcutaneous, Q12H  guaiFENesin, 1,200 mg, Oral, Q12H  hydrALAZINE, 25 mg, Oral, Q8H  ipratropium-albuterol, 3 mL, Nebulization, 4x Daily - RT  lamoTRIgine, 100 mg, Oral, BID  levothyroxine, 50 mcg, Oral, Q AM  lidocaine, 1 patch, Transdermal, Q24H  metoprolol succinate XL, 100 mg, Oral, BID  NIFEdipine XL, 30 mg, Oral, Q24H  polyethylene glycol, 17 g, Oral, Daily  pramipexole, 0.5 mg, Oral, Nightly  senna-docusate sodium, 2 tablet, Oral, BID  sodium chloride, 10 mL, Intravenous, Q12H  sodium chloride, 10 mL, Intravenous, Q12H  sodium chloride, 4 mL, Nebulization, BID - RT  tamsulosin, 0.4 mg, Oral, Nightly  vilazodone, 40 mg, Oral, Daily      IV Meds:        Results Reviewed:   I have personally reviewed the results from the time of this admission to 5/24/2023 08:18 EDT     Results from last 7 days   Lab Units 05/24/23  0518 05/23/23  1630 05/23/23  0457   SODIUM mmol/L 141 140 138   POTASSIUM mmol/L 4.6 4.9 5.8*   CHLORIDE mmol/L 104 98 101   CO2 mmol/L 31.6* 32.6* 31.6*   BUN mg/dL 43* 50* 52*   CREATININE mg/dL 1.89* 2.33* 2.40*   CALCIUM mg/dL 8.5* 8.9 8.9   BILIRUBIN mg/dL  --   --  0.2   ALK PHOS U/L  --   --  66   ALT (SGPT) U/L  --   --  17   AST (SGOT) U/L  --   --  15   GLUCOSE mg/dL 167* 146* 152*       Estimated Creatinine Clearance: 49.2 mL/min (A) (by C-G  formula based on SCr of 1.89 mg/dL (H)).    Results from last 7 days   Lab Units 05/24/23  0518 05/23/23  1630 05/20/23  1054   MAGNESIUM mg/dL 2.6*  --   --    PHOSPHORUS mg/dL 3.3 3.7 3.8       Results from last 7 days   Lab Units 05/24/23  0518   URIC ACID mg/dL 9.4*       Results from last 7 days   Lab Units 05/24/23  0518 05/22/23  0411 05/21/23  0521 05/20/23  2030 05/20/23  0405   WBC 10*3/mm3 7.70 14.39* 16.55* 22.24* 16.88*   HEMOGLOBIN g/dL 9.4* 10.3* 11.2* 11.9* 11.5*   PLATELETS 10*3/mm3 338 315 370 428 341       Results from last 7 days   Lab Units 05/20/23  0405   INR  1.08       Assessment / Plan     ASSESSMENT:  -Acute kidney injury on chronic kidney disease stage IIIa, etiology not very clear could be hemodynamic changes associated with volume shift and having decreased effective intravascular volume and the patient being on ACE inhibitor.  Creatinine down to 1.89, electrolyte within acceptable range, the renal function is improved  -Hyperkalemia most likely associate with acute kidney injury and ACE inhibitor, improving, potassium down to four-point  -Fluid excess, will continue to diurese  -History of recurrent cystine stones  -Hypertension associate with chronic kidney disease reasonably controlled  -Hypothyroid  -Bipolar disorder  -Obstructive sleep apnea  -Anemia, hemoglobin 9.4    PLAN:  -Add torsemide 40 mg twice daily  -Surveillance labs    Thank you for involving us in the care of Calos Moseley.  Please feel free to call with any questions.    Davis Angel MD  05/24/23  08:18 EDT    [facility]  [phone]    Please note that portions of this note were completed with a voice recognition program.    Electronically signed by Davis Angel MD at 05/24/23 0823     Jatinder James MD at 05/23/23 9044                                                        LOS: 5 days   Patient Care Team:  Naveed Nunez DO as PCP - General (Family Medicine)  Enrique  "Naveed MACK DO as PCP - Family Medicine  Chris Martinez Jr., MD as Consulting Physician (Pulmonary Disease)  Ihsan Lopez MD as Consulting Physician (Urology)  Dimas Jackson MD as Consulting Physician (Nephrology)  Fely Ferrer, LOLA as Ambulatory  ([facility])    Chief Complaint:  F/up respiratory failure, post VATS decortication    Subjective   Interval History  Continues to have cough.  Sometimes productive.  On 6 L oxygen.  Used the hospital NIPPV overnight.    REVIEW OF SYSTEMS:   CARDIOVASCULAR: No chest pain, chest pressure or chest discomfort. No palpitations or edema.   GASTROINTESTINAL: No anorexia, nausea, vomiting or diarrhea. No abdominal pain.  CONSTITUTIONAL: No fever or chills.     Ventilator/Non-Invasive Ventilation Settings (From admission, onward)     Start     Ordered    05/20/23 2114  NIPPV (CPAP or BIPAP)  Until Discontinued        Question Answer Comment   Indication Acute Respiratory Failure    Type AVAPS/PC/PS    Backup Rate 20    Target VT (mL) 600    EPAP/PEEP (cm H2O) 8    Min Pressure (cm H2O) 14    Max Pressure (cm H2O) 28    Titrate Oxygen for SpO2 90% or Greater        05/20/23 2114                      Physical Exam:     Vital Signs  Temp:  [97.6 °F (36.4 °C)-99.1 °F (37.3 °C)] 97.6 °F (36.4 °C)  Heart Rate:  [60-69] 69  Resp:  [14-22] 20  BP: (117-136)/(51-88) 125/51    Intake/Output Summary (Last 24 hours) at 5/23/2023 2158  Last data filed at 5/23/2023 1830  Gross per 24 hour   Intake 720 ml   Output 2391 ml   Net -1671 ml     Flowsheet Rows    Flowsheet Row First Filed Value   Admission Height 167.6 cm (66\") Documented at 05/16/2023 1953   Admission Weight 114 kg (252 lb) Documented at 05/16/2023 1953          PPE used per hospital policy    General Appearance:   Alert, cooperative, in no acute distress   ENMT:  Mallampati score 3, moist mucous membrane   Eyes:  Pupils equal and reactive to light. EOMI   Neck:   Large. Trachea midline. No " thyromegaly.   Lungs:    Equal but coarse breath sounds bilaterally with rhonchi and mild wheezing.  No use of accessory muscles    Heart:   Regular rhythm and normal rate, normal S1 and S2, no         murmur   Skin:   No rash or ecchymosis   Abdomen:    Obese. Soft. No tenderness. No HSM.   Neuro/psych:  Conscious, alert, oriented x3. Strength 5/5 in upper and lower  ext.  Appropriate mood and affect   Extremities:  No cyanosis, clubbing but edema in legs.  Warm extremities and well-perfused          Results Review:        Results from last 7 days   Lab Units 05/23/23  1630 05/23/23  0457 05/22/23  0411   SODIUM mmol/L 140 138 135*   POTASSIUM mmol/L 4.9 5.8* 5.4*   CHLORIDE mmol/L 98 101 99   CO2 mmol/L 32.6* 31.6* 30.9*   BUN mg/dL 50* 52* 39*   CREATININE mg/dL 2.33* 2.40* 2.07*   GLUCOSE mg/dL 146* 152* 134*   CALCIUM mg/dL 8.9 8.9 8.5*         Results from last 7 days   Lab Units 05/22/23  0411 05/21/23  0521 05/20/23  2030   WBC 10*3/mm3 14.39* 16.55* 22.24*   HEMOGLOBIN g/dL 10.3* 11.2* 11.9*   HEMATOCRIT % 31.7* 35.7* 37.3*   PLATELETS 10*3/mm3 315 370 428     Results from last 7 days   Lab Units 05/20/23  0405   INR  1.08   APTT seconds 35.8*                       Results from last 7 days   Lab Units 05/21/23  0401 05/20/23  2348 05/20/23  2036   PH, ARTERIAL pH units 7.317* 7.293* 7.266*   PCO2, ARTERIAL mm Hg 60.5* 57.5* 64.0*   PO2 ART mm Hg 74.6* 69.7* 93.2   FLOW RATE lpm  --   --  7   MODALITY  BiPap BiPap HFNC   O2 SATURATION CALC % 93.0 91.1* 95.6         I reviewed the patient's new clinical results.        Medication Review:   acetaminophen, 1,000 mg, Oral, TID  atorvastatin, 20 mg, Oral, Nightly  benzonatate, 200 mg, Oral, TID  divalproex, 250 mg, Oral, BID  docusate sodium, 100 mg, Oral, BID  enoxaparin, 40 mg, Subcutaneous, Q12H  guaiFENesin, 1,200 mg, Oral, Q12H  hydrALAZINE, 50 mg, Oral, Q8H  ipratropium-albuterol, 3 mL, Nebulization, 4x Daily - RT  lamoTRIgine, 100 mg, Oral,  BID  levothyroxine, 50 mcg, Oral, Q AM  lidocaine, 1 patch, Transdermal, Q24H  metoprolol succinate XL, 100 mg, Oral, BID  NIFEdipine XL, 60 mg, Oral, Q24H  polyethylene glycol, 17 g, Oral, Daily  pramipexole, 0.5 mg, Oral, Nightly  senna-docusate sodium, 2 tablet, Oral, BID  sodium chloride, 10 mL, Intravenous, Q12H  sodium chloride, 10 mL, Intravenous, Q12H  sodium chloride, 4 mL, Nebulization, BID - RT  tamsulosin, 0.4 mg, Oral, Nightly  vilazodone, 40 mg, Oral, Daily             Diagnostic imaging:  I personally and independently reviewed the following images:  CXR 5/23/2023: Increased interstitial markings bilaterally especially on the right.  Right pleural effusion.  Atelectasis.  2 right chest tubes noted    Assessment     1. Acute hypoxic/hypercapnic respiratory failure  2. Noninvasive ventilator use  3. Right-sided loculated pleural effusion s/p right Thora-300 cc; exudative s/p VATS decortication-5/20  4. Nonspecific mediastinal lymphadenopathy s/p EBUS 5/19.  Cytology negative  5. Right lower lobe pneumonia s/p antibiotics s/p bronc-5/19.  Culture and cytology negative.  6. CKD  7. Remote smoker  8. ISIAH on CPAP  9. Morbid obesity        Plan     · Oxygen by NC and titrate keep SPO2 >90%  · Start DuoNeb 4 times a day.    · Mucomyst twice a day to improve mucus clearance.  Add hypertonic saline twice a day  · Guaifenesin 1200 mg twice daily    · Flutter and I-S  · NIPPV at night  · Chest tube management per thoracic surgery    Transfer to telemetry if okay with thoracic surgery    Jatinder James MD  05/23/23  21:58 EDT          This note was dictated utilizing Dragon dictation    Electronically signed by Jatinder James MD at 05/23/23 2202     Vandana Gaines APRN at 05/23/23 1242     Attestation signed by Fern Martinez MD at 05/23/23 1635    I have reviewed this documentation and agree.                  POST-OPERATIVE NOTE     Chief Complaint: Right loculated parapneumonic effusion, postoperative  "care  S/P: Right video-assisted thoracoscopy with da Coby robot assisted decortication  POD # 3    Subjective:  Symptoms:  Improved.  He reports shortness of breath.    Diet:  Adequate intake.  No nausea or vomiting.    Activity level: Impaired due to pain.    Pain:  He complains of pain that is mild.  Pain is well controlled and requiring pain medication.        Objective:  General Appearance:  Comfortable and in no acute distress.    Vital signs: (most recent): Blood pressure 128/57, pulse 62, temperature 97.8 °F (36.6 °C), temperature source Oral, resp. rate 20, height 167.6 cm (66\"), weight 111 kg (244 lb), SpO2 92 %.  Vital signs are normal.  No fever.    Output: Producing urine.    Lungs:  Normal effort and normal respiratory rate.  There are decreased breath sounds and rhonchi.    Heart: Normal rate.  Regular rhythm.    Chest: Chest wall tenderness present.    Abdomen: Abdomen is soft.  There is no abdominal tenderness.     Neurological: Patient is alert and oriented to person, place and time.    Skin:  Warm and dry.              Chest tube:   Site: Right, Clean, Dry, Intact and Securement device intact  Suction: -20 cm  Air Leak: negative  24 Hour Total: 30/40 ml    Results Review:     I reviewed the patient's new clinical results.  I reviewed the patient's new imaging results and agree with the interpretation.  I reviewed the patient's other test results and agree with the interpretation  Discussed with Patient, spouse at bedside, RN, Dr. Rice.    Assessment & Plan     Mr. Moseley is doing very well POD #3, s/p right robot-assisted decortication.  Chest tube output has dropped off.  No evidence of air leak in the lung appears expanded on chest x-ray.  I remove the anteriormost chest tube at the bedside today without difficulty.  Patient tolerated well.  We will leave the remaining chest tube to waterseal.  Encourage good pulmonary hygiene with incentive spirometry 10 times per hour while awake and flutter " valve.   Creatinine elevated with some hyperkalemia noted this morning.  Patient has baseline CKD.  We will consult nephrology.  Appreciate their assistance.    SALVADOR Escobar  [facility]  23  12:42 EDT    Patient was seen and assessed while wearing personal protective equipment including facemask, protective eyewear and gloves.  Hand hygiene performed prior to entering the room and upon exiting with doffing of gloves.         Electronically signed by Fern Martinez MD at 23 1955     Dre Pearce MD at 23 1154              [facility] Services  PROGRESS NOTE    Patient Name: Calos Moseley  : 1960  MRN: [id number]    Date of Admission: 2023  Primary Care Physician: Naveed Nunez DO    Subjective   Subjective     CC:  Follow-up multiple issues    Subjective:  Patient is transferred out of ICU.  [facility] taking [facility] over primary medical treatment.  Patient notes he is doing decent after his surgery.  He is eager to have the chest tube out.  He does note some pleuritic pain related to the chest tubes.  No other new complaints.  We discussed his MESHA and other issues    Review of Systems  No current fevers or chills  No current nausea, vomiting, or diarrhea  No current chest pain or palpitations      Objective   Objective     Vital Signs:   Temp:  [97.8 °F (36.6 °C)-99.1 °F (37.3 °C)] 97.8 °F (36.6 °C)  Heart Rate:  [53-67] 66  Resp:  [14-22] 20  BP: (116-161)/() 128/57        Physical Exam:  Constitutional:Awake, alert  HENT: NCAT, mucous membranes moist, neck supple  Respiratory: Occasional cough, occasional coarse sounds, no wheezing, relatively nonlabored breathing  Cardiovascular: Pulse rate is normal, palpable radial pulse bilaterally  Gastrointestinal:soft, nontender, nondistended  Musculoskeletal: Normal musculature for age, mild lower extremity edema, BMI 41, morbidly obese  Psychiatric: Appropriate affect, cooperative,  conversational  Neurologic: No slurred speech or facial droop, follows commands  Skin: No rashes or jaundice, warm      Results Reviewed:  Results from last 7 days   Lab Units 05/22/23 0411 05/21/23 0521 05/20/23  2030 05/20/23  0405 05/17/23 0433 05/16/23  1730   WBC 10*3/mm3 14.39* 16.55* 22.24* 16.88*   < > 10.09   HEMOGLOBIN g/dL 10.3* 11.2* 11.9* 11.5*   < > 14.1   HEMATOCRIT % 31.7* 35.7* 37.3* 35.6*   < > 42.0   PLATELETS 10*3/mm3 315 370 428 341   < > 273   INR   --   --   --  1.08  --   --    PROCALCITONIN ng/mL  --   --   --   --   --  0.98*    < > = values in this interval not displayed.     Results from last 7 days   Lab Units 05/23/23 0457 05/22/23 0411 05/21/23 0521 05/18/23 0520 05/17/23 0433 05/16/23  1918 05/16/23  1730   SODIUM mmol/L 138 135* 138   < > 139  --  137   POTASSIUM mmol/L 5.8* 5.4* 5.8*   < > 4.8  --  4.6   CHLORIDE mmol/L 101 99 102   < > 102  --  100   CO2 mmol/L 31.6* 30.9* 30.7*   < > 24.5  --  27.0   BUN mg/dL 52* 39* 30*   < > 32*  --  33*   CREATININE mg/dL 2.40* 2.07* 1.42*   < > 1.60*  --  1.66*   GLUCOSE mg/dL 152* 134* 156*   < > 129*  --  132*   CALCIUM mg/dL 8.9 8.5* 9.0   < > 9.0  --  9.5   ALT (SGPT) U/L 17  --   --   --  24  --  34   AST (SGOT) U/L 15  --   --   --  17  --  19   HSTROP T ng/L  --   --   --   --   --  17* 19*   PROBNP pg/mL  --   --   --   --   --   --  167.0    < > = values in this interval not displayed.     Estimated Creatinine Clearance: 37.8 mL/min (A) (by C-G formula based on SCr of 2.4 mg/dL (H)).    Microbiology Results Abnormal     Procedure Component Value - Date/Time    Tissue / Bone Culture - Tissue, Pleura [[id number]] Collected: 05/20/23 1856    Lab Status: Final result Specimen: Tissue from Pleura Updated: 05/23/23 0930     Tissue Culture No growth at 3 days     Gram Stain No WBCs or organisms seen    Body Fluid Culture - Surgical Site, Pleura [[id number]] Collected: 05/20/23 1851    Lab Status: Final result Specimen: Surgical Site  from Pleura Updated: 05/23/23 0928     Body Fluid Culture No growth at 3 days     Gram Stain No WBCs or organisms seen    Anaerobic Culture - Surgical Site, Pleura [[id number]]  (Normal) Collected: 05/20/23 1851    Lab Status: Preliminary result Specimen: Surgical Site from Pleura Updated: 05/23/23 0757     Anaerobic Culture No anaerobes isolated at 3 days    Blood Culture - Blood, Arm, Left [[id number]]  (Normal) Collected: 05/16/23 2247    Lab Status: Final result Specimen: Blood from Arm, Left Updated: 05/21/23 2301     Blood Culture No growth at 5 days    Blood Culture - Blood, Hand, Right [[id number]]  (Normal) Collected: 05/16/23 2253    Lab Status: Final result Specimen: Blood from Hand, Right Updated: 05/21/23 2301     Blood Culture No growth at 5 days    BAL Culture, Quantitative - Lavage, Lung, Right Lower Lobe [[id number]] Collected: 05/19/23 1546    Lab Status: Final result Specimen: Lavage from Lung, Right Lower Lobe Updated: 05/21/23 0822     BAL Culture No growth     Gram Stain No organisms seen      Rare (1+) WBCs seen    AFB Culture - Lavage, Lung, Right Lower Lobe [[id number]] Collected: 05/19/23 1546    Lab Status: Preliminary result Specimen: Lavage from Lung, Right Lower Lobe Updated: 05/20/23 1342     AFB Stain No acid fast bacilli seen on concentrated smear    Fungus Smear - Lavage, Lung, Right Lower Lobe [[id number]] Collected: 05/19/23 1546    Lab Status: Final result Specimen: Lavage from Lung, Right Lower Lobe Updated: 05/19/23 2113     Fungal Stain No fungal elements seen    S. Pneumo Ag Urine or CSF - Urine, Urine, Clean Catch [[id number]]  (Normal) Collected: 05/17/23 0452    Lab Status: Final result Specimen: Urine, Clean Catch Updated: 05/17/23 0644     Strep Pneumo Ag Negative    Legionella Antigen, Urine - Urine, Urine, Clean Catch [[id number]]  (Normal) Collected: 05/17/23 0452    Lab Status: Final result Specimen: Urine, Clean Catch Updated: 05/17/23 0644     LEGIONELLA ANTIGEN, URINE  Negative    Respiratory Panel PCR w/COVID-19(SARS-CoV-2) JERI/SONIA/MARQUITA/PAD/COR/MAD/ULISSES In-House, NP Swab in UTM/VTM, 3-4 HR TAT - Swab, Nasopharynx [[id number]]  (Normal) Collected: 05/16/23 1733    Lab Status: Final result Specimen: Swab from Nasopharynx Updated: 05/16/23 1832     ADENOVIRUS, PCR Not Detected     Coronavirus 229E Not Detected     Coronavirus HKU1 Not Detected     Coronavirus NL63 Not Detected     Coronavirus OC43 Not Detected     COVID19 Not Detected     Human Metapneumovirus Not Detected     Human Rhinovirus/Enterovirus Not Detected     Influenza A PCR Not Detected     Influenza B PCR Not Detected     Parainfluenza Virus 1 Not Detected     Parainfluenza Virus 2 Not Detected     Parainfluenza Virus 3 Not Detected     Parainfluenza Virus 4 Not Detected     RSV, PCR Not Detected     Bordetella pertussis pcr Not Detected     Bordetella parapertussis PCR Not Detected     Chlamydophila pneumoniae PCR Not Detected     Mycoplasma pneumo by PCR Not Detected    Narrative:      In the setting of a positive respiratory panel with a viral infection PLUS a negative procalcitonin without other underlying concern for bacterial infection, consider observing off antibiotics or discontinuation of antibiotics and continue supportive care. If the respiratory panel is positive for atypical bacterial infection (Bordetella pertussis, Chlamydophila pneumoniae, or Mycoplasma pneumoniae), consider antibiotic de-escalation to target atypical bacterial infection.          Imaging Results (Last 24 Hours)     Procedure Component Value Units Date/Time    XR Chest 1 View [[id number]] Collected: 05/23/23 0811     Updated: 05/23/23 0811    Narrative:      EXAMINATION: SINGLE VIEW CHEST RADIOGRAPH     HISTORY: 62-year-old male undergoing postoperative lung surgery  evaluation.     FINDINGS: A semierect AP portable chest radiograph was obtained.  Comparison is made to a chest radiograph dated 05/22/2023. There are 2  apically oriented  right-sided chest tubes that are stable in position.  Hazy opacification at the base of the right lung has developed since the  prior examination. There is a pleural-based opacification seen laterally  in the right mid and lower lung field that has developed since prior  examination. Linear opacities are seen at the base of the left lung. A  pneumothorax is not visualized. Mild interstitial opacification in both  lungs is noted.       Impression:      There are findings suggestive of interval development of  small right pleural effusion. Mild bibasilar atelectasis is noted.  Superimposed mild interstitial edema cannot be completely excluded.  Right-sided chest tubes remain stable.             Results for orders placed during the hospital encounter of 05/16/23    Adult Transthoracic Echo Complete W/ Cont if Necessary Per Protocol    Interpretation Summary  •  Left ventricular systolic function is normal. Calculated left ventricular EF = 58.1% The left ventricular cavity is moderately dilated. Left ventricular wall thickness is consistent with mild concentric hypertrophy. All left ventricular wall segments contract normally. Left ventricular diastolic function was normal.  •  Trace mitral valve regurgitation is present.  •  Trace tricuspid valve regurgitation is present. Estimated right ventricular systolic pressure from tricuspid regurgitation is mildly elevated (35-45 mmHg). Calculated right ventricular systolic pressure from tricuspid regurgitation is 36.3 mmHg.      I have reviewed the medications:  Scheduled Meds:acetaminophen, 1,000 mg, Oral, TID  atorvastatin, 20 mg, Oral, Nightly  benzonatate, 200 mg, Oral, TID  bumetanide, 4 mg, Intravenous, Once  divalproex, 250 mg, Oral, BID  docusate sodium, 100 mg, Oral, BID  enoxaparin, 40 mg, Subcutaneous, Q12H  guaiFENesin, 1,200 mg, Oral, Q12H  hydrALAZINE, 50 mg, Oral, Q8H  ipratropium-albuterol, 3 mL, Nebulization, 4x Daily - RT  lamoTRIgine, 100 mg, Oral,  "BID  levothyroxine, 50 mcg, Oral, Q AM  lidocaine, 1 patch, Transdermal, Q24H  metoprolol succinate XL, 100 mg, Oral, BID  NIFEdipine XL, 60 mg, Oral, Q24H  polyethylene glycol, 17 g, Oral, Daily  pramipexole, 0.5 mg, Oral, Nightly  senna-docusate sodium, 2 tablet, Oral, BID  sodium chloride, 10 mL, Intravenous, Q12H  sodium chloride, 10 mL, Intravenous, Q12H  sodium chloride, 4 mL, Nebulization, BID - RT  sodium zirconium cyclosilicate, 10 g, Oral, Q8H  tamsulosin, 0.4 mg, Oral, Nightly  vilazodone, 40 mg, Oral, Daily      Continuous Infusions:   PRN Meds:.•  albuterol sulfate HFA  •  senna-docusate sodium **AND** polyethylene glycol **AND** bisacodyl **AND** bisacodyl  •  Calcium Replacement - Follow Nurse / BPA Driven Protocol  •  diazePAM  •  guaiFENesin-dextromethorphan  •  HYDROmorphone  •  labetalol  •  Magnesium Standard Dose Replacement - Follow Nurse / BPA Driven Protocol  •  midazolam  •  nitroglycerin  •  ondansetron  •  oxyCODONE  •  Phosphorus Replacement - Follow Nurse / BPA Driven Protocol  •  [COMPLETED] Insert Peripheral IV **AND** sodium chloride    Assessment & Plan   Assessment & Plan     Active Hospital Problems    Diagnosis  POA   • **Acute dyspnea [R06.00]  Yes   • Acute respiratory failure with hypoxia [J96.01]  Yes   • Pleural effusion on right [J90]  Yes   • CKD (chronic kidney disease) [N18.9]  Yes   • Lymphadenopathy, mediastinal [R59.0]  Unknown   • ISIAH (obstructive sleep apnea) [G47.33]  Yes   • COPD (chronic obstructive pulmonary disease) [J44.9]  Yes   • Anxiety and depression [F41.9, F32.A]  Yes   • Essential hypertension [I10]  Yes   • Hypothyroidism [E03.9]  Yes      Resolved Hospital Problems   No resolved problems to display.        Brief Hospital Course to date:  Calos Moseley is a 62 y.o. male \"admitted with Acute dyspnea, right-sided chest pain and hypoxia, findings of loculated right pleural effusion and possible right lower lobe endobronchial lesion.\"    Discussion/plan for " today:  Patient with MESHA today.  I have discussed case with nephrology.  I have discontinued ACE inhibitor for now.  I have decreased calcium channel blocker.  Plan to allow more permissive hypertension in the short-term.  Continue careful monitoring of urine output.  Patient with some peripheral edema he does not appear particularly hypovolemic.  Uncertain if this could be some fluctuations in blood pressure or other associations with acute issues/illness.  Encouraging oral intake.  Treatment plan discussed with the patient who is in agreement.  Otherwise per below.    Loculated pleural effusion status post thoracentesis.  Fluid looks to be exudative, cytology negative for malignant cells.  I do not see where Gram stain or culture were ever sent.    - Bronchoscopy completed on 5/19  - Completed antibiotics  - Supplement O2 as needed.  Wean to keep sats greater than 90%  - Lidoderm patch for right-sided chest pain  -Status post right robot-assisted decortication on 5/20/2023     Peripheral edema/weight gain- reviewed echocardiogram.  Relatively unremarkable other than mild pulmonary hypertension.  No diastolic dysfunction and normal BNP.    -Patient improved with diuresis earlier in hospital stay.  - Urine studies do not suggest significant proteinuria though there is very small amount  - Also note that he is on nifedipine which can cause peripheral edema, may adjust at some point     CKD relatively stable, continue to follow     History of hypothyroidism, on replacement with normal TSH     HTN, apparently refractory based on his extensive medication regimen but actually well controlled currently, labetalol for greater than 180.  Monitor carefully while on multiple medications.        • SCDs for DVT prophylaxis.  Consider pharmacologic prophylaxis after procedures are done  • Dispo: Hopefully home in the next couple days depending on results of above        CODE STATUS:   Code Status and Medical Interventions:    Ordered at: 23 2254     Code Status (Patient has no pulse and is not breathing):    CPR (Attempt to Resuscitate)     Medical Interventions (Patient has pulse or is breathing):    Full Support     Release to patient:    Routine Release       Dre Pearce MD  23      Electronically signed by Dre Pearce MD at 23 [phone]     Ramya Lomeli MD at 23 0854            Infectious Diseases Progress Note    Ramya Lomeli MD     [facility]  [place]: 5 days  Patient Identification:  Name: Calos Moseley  Age: 62 y.o.  Sex: male  :  1960  MRN: [id number]         Primary Care Physician: Naveed Nunez DO    Requesting physician: Dr. Carvajal  Reason for consultation: Empyema    Subjective: felling better and denies fever and chills, has appetite  Interval History: See my admission history and physical.  Started on IV ceftriaxone by pulmonary service at the time of admission.  Thoracentesis performed on 2023 reveals exudative noninfectious process and cytology pending.  2023 underwent bronchoscopy  2023 THORACOSCOPY VIDEO ASSISTED WITH DECORTICATION WITH [employer] for [employer] ROBOT  Objective:    Scheduled Meds:acetaminophen, 1,000 mg, Oral, TID  atorvastatin, 20 mg, Oral, Nightly  benzonatate, 200 mg, Oral, TID  divalproex, 250 mg, Oral, BID  docusate sodium, 100 mg, Oral, BID  enoxaparin, 40 mg, Subcutaneous, Q12H  gabapentin, 200 mg, Oral, Q12H  guaiFENesin, 1,200 mg, Oral, Q12H  hydrALAZINE, 50 mg, Oral, Q8H  ipratropium-albuterol, 3 mL, Nebulization, 4x Daily - RT  lamoTRIgine, 100 mg, Oral, BID  levothyroxine, 50 mcg, Oral, Q AM  lidocaine, 1 patch, Transdermal, Q24H  metoprolol succinate XL, 100 mg, Oral, BID  NIFEdipine XL, 60 mg, Oral, Q24H  polyethylene glycol, 17 g, Oral, Daily  pramipexole, 0.5 mg, Oral, Nightly  senna-docusate sodium, 2 tablet, Oral, BID  sodium chloride, 10 mL, Intravenous, Q12H  sodium chloride, 10 mL, Intravenous, Q12H  sodium  "chloride, 4 mL, Nebulization, BID - RT  tamsulosin, 0.4 mg, Oral, Nightly  vilazodone, 40 mg, Oral, Daily      Continuous Infusions:     Vital signs in last 24 hours:  Temp:  [98.1 °F (36.7 °C)-99.1 °F (37.3 °C)] 99.1 °F (37.3 °C)  Heart Rate:  [53-70] 64  Resp:  [14-22] 20  BP: (116-161)/() 126/81    Intake/Output:    Intake/Output Summary (Last 24 hours) at 5/23/2023 0846  Last data filed at 5/23/2023 0500  Gross per 24 hour   Intake 794 ml   Output 1145 ml   Net -351 ml       Exam:  /81 (BP Location: Right arm, Patient Position: Lying)   Pulse 64   Temp 99.1 °F (37.3 °C) (Oral)   Resp 20   Ht 167.6 cm (66\")   Wt 111 kg (244 lb)   SpO2 99%   BMI 39.38 kg/m²   Patient is examined using the personal protective equipment as per guidelines from infection control for this particular patient as enacted.  Hand washing was performed before and after patient interaction.  General Appearance:    Alert, cooperative, no distress, AAOx3                          Head:    Normocephalic, without obvious abnormality, atraumatic                           Eyes:    PERRL, conjunctivae/corneas clear, EOM's intact, both eyes                         Throat:   Lips, tongue, gums normal; oral mucosa pink and moist                           Neck:   Supple, symmetrical, trachea midline, no JVD                         Lungs:    Decreased breath sounds at the right lung base, chest tube inplace                 Chest Wall:    No tenderness or deformity                          Heart:  S1-S2 regular                  Abdomen:   Obese soft nontender                 Extremities: Lateral lower extremity edema                        Pulses:   Pulses palpable in all extremities                            Skin:   Skin is warm and dry,  no rashes or palpable lesions                  Neurologic: Grossly nonfocal       Data Review:    I reviewed the patient's new clinical results.  Results from last 7 days   Lab Units 05/22/23  0411 " 05/21/23  0521 05/20/23  2030 05/20/23  0405 05/18/23  0520 05/17/23  0433 05/16/23  1730   WBC 10*3/mm3 14.39* 16.55* 22.24* 16.88* 10.67 9.18 10.09   HEMOGLOBIN g/dL 10.3* 11.2* 11.9* 11.5* 13.3 13.0 14.1   PLATELETS 10*3/mm3 315 370 428 341 352 265 273     Results from last 7 days   Lab Units 05/23/23  0457 05/22/23  0411 05/21/23  0521 05/20/23  2030 05/20/23  1054 05/20/23  0405 05/19/23  0332   SODIUM mmol/L 138 135* 138 140 141 152* 141   POTASSIUM mmol/L 5.8* 5.4* 5.8* 5.5* 5.1 5.4* 5.1   CHLORIDE mmol/L 101 99 102 103 104 113* 103   CO2 mmol/L 31.6* 30.9* 30.7* 23.0 31.0* 29.0 29.1*   BUN mg/dL 52* 39* 30* 33* 34* 37* 44*   CREATININE mg/dL 2.40* 2.07* 1.42* 1.57* 1.64* 1.76* 1.97*   CALCIUM mg/dL 8.9 8.5* 9.0 8.4* 8.9 9.0 9.3   GLUCOSE mg/dL 152* 134* 156* 118* 111* 110* 116*     Microbiology Results (last 10 days)     Procedure Component Value - Date/Time    Tissue / Bone Culture - Tissue, Pleura [[id number]] Collected: 05/20/23 1856    Lab Status: Preliminary result Specimen: Tissue from Pleura Updated: 05/22/23 0944     Tissue Culture No growth at 2 days     Gram Stain No WBCs or organisms seen    Anaerobic Culture - Surgical Site, Pleura [[id number]]  (Normal) Collected: 05/20/23 1851    Lab Status: Preliminary result Specimen: Surgical Site from Pleura Updated: 05/23/23 0757     Anaerobic Culture No anaerobes isolated at 3 days    Body Fluid Culture - Surgical Site, Pleura [[id number]] Collected: 05/20/23 1851    Lab Status: Preliminary result Specimen: Surgical Site from Pleura Updated: 05/22/23 0942     Body Fluid Culture No growth at 2 days     Gram Stain No WBCs or organisms seen    AFB Culture - Lavage, Lung, Right Lower Lobe [[id number]] Collected: 05/19/23 1546    Lab Status: Preliminary result Specimen: Lavage from Lung, Right Lower Lobe Updated: 05/20/23 1342     AFB Stain No acid fast bacilli seen on concentrated smear    Fungus Smear - Lavage, Lung, Right Lower Lobe [[id number]] Collected:  05/19/23 1546    Lab Status: Final result Specimen: Lavage from Lung, Right Lower Lobe Updated: 05/19/23 2113     Fungal Stain No fungal elements seen    BAL Culture, Quantitative - Lavage, Lung, Right Lower Lobe [[id number]] Collected: 05/19/23 1546    Lab Status: Final result Specimen: Lavage from Lung, Right Lower Lobe Updated: 05/21/23 0822     BAL Culture No growth     Gram Stain No organisms seen      Rare (1+) WBCs seen    Legionella Antigen, Urine - Urine, Urine, Clean Catch [[id number]]  (Normal) Collected: 05/17/23 0452    Lab Status: Final result Specimen: Urine, Clean Catch Updated: 05/17/23 0644     LEGIONELLA ANTIGEN, URINE Negative    S. Pneumo Ag Urine or CSF - Urine, Urine, Clean Catch [[id number]]  (Normal) Collected: 05/17/23 0452    Lab Status: Final result Specimen: Urine, Clean Catch Updated: 05/17/23 0644     Strep Pneumo Ag Negative    Blood Culture - Blood, Hand, Right [[id number]]  (Normal) Collected: 05/16/23 2253    Lab Status: Final result Specimen: Blood from Hand, Right Updated: 05/21/23 2301     Blood Culture No growth at 5 days    Blood Culture - Blood, Arm, Left [[id number]]  (Normal) Collected: 05/16/23 2247    Lab Status: Final result Specimen: Blood from Arm, Left Updated: 05/21/23 2301     Blood Culture No growth at 5 days    Respiratory Panel PCR w/COVID-19(SARS-CoV-2) JERI/SONIA/MARQUITA/PAD/COR/MAD/ULISSES In-House, NP Swab in UTM/VTM, 3-4 HR TAT - Swab, Nasopharynx [[id number]]  (Normal) Collected: 05/16/23 1733    Lab Status: Final result Specimen: Swab from Nasopharynx Updated: 05/16/23 1832     ADENOVIRUS, PCR Not Detected     Coronavirus 229E Not Detected     Coronavirus HKU1 Not Detected     Coronavirus NL63 Not Detected     Coronavirus OC43 Not Detected     COVID19 Not Detected     Human Metapneumovirus Not Detected     Human Rhinovirus/Enterovirus Not Detected     Influenza A PCR Not Detected     Influenza B PCR Not Detected     Parainfluenza Virus 1 Not Detected     Parainfluenza  Virus 2 Not Detected     Parainfluenza Virus 3 Not Detected     Parainfluenza Virus 4 Not Detected     RSV, PCR Not Detected     Bordetella pertussis pcr Not Detected     Bordetella parapertussis PCR Not Detected     Chlamydophila pneumoniae PCR Not Detected     Mycoplasma pneumo by PCR Not Detected    Narrative:      In the setting of a positive respiratory panel with a viral infection PLUS a negative procalcitonin without other underlying concern for bacterial infection, consider observing off antibiotics or discontinuation of antibiotics and continue supportive care. If the respiratory panel is positive for atypical bacterial infection (Bordetella pertussis, Chlamydophila pneumoniae, or Mycoplasma pneumoniae), consider antibiotic de-escalation to target atypical bacterial infection.        Pleural fluid appears to be exudative but does not appear infectious.    Assessment:    Acute dyspnea    COPD (chronic obstructive pulmonary disease)    Anxiety and depression    Hypothyroidism    ISIAH (obstructive sleep apnea)    Essential hypertension    Pleural effusion on right    CKD (chronic kidney disease)    Acute respiratory failure with hypoxia    Lymphadenopathy, mediastinal  Complicated right pleural effusion with loculation in the setting of recent symptoms suggestive of pneumonia-this likely represent complicated effusion with empyema but rule out endobronchial lesion and possible malignancy related postobstructive pneumonia complicating this effusion-s/p THORACOSCOPY VIDEO ASSISTED WITH DECORTICATION WITH [employer] ROBOT  Recommendations/discussion:   Observe off antibiotics  Ramya Lomeli MD  5/23/2023  08:46 EDT    Parts of this note may be an electronic transcription/translation of spoken language to printed text using the Dragon dictation system.      Electronically signed by Ramya Lomeli MD at 05/24/23 0612     Dre Pearce MD at 05/23/23 0628        MESHA noted.  Hold ACEi.  Decr CCB.  Consult  "nephro.  Full note to follow.  Electronically signed by Dre Pearce MD, 05/23/23, 6:28 AM EDT.      Electronically signed by Dre Pearce MD at 05/23/23 5519     Vandana Gaines APRN at 05/22/23 3762     Attestation signed by Jose Rice MD at 05/23/23 0331    I have reviewed this documentation and agree.                  POST-OPERATIVE NOTE     Chief Complaint: Right loculated parapneumonic effusion, postoperative care  S/P: Right video-assisted thoracoscopy with da [employer] robot assisted decortication  POD # 2    Subjective:  Symptoms:  Stable.  He reports shortness of breath.    Diet:  No nausea or vomiting.    Activity level: Impaired due to pain.    Pain:  He complains of pain that is mild.  Pain is partially controlled.        Objective:  General Appearance:  Comfortable and in no acute distress.    Vital signs: (most recent): Blood pressure 138/57, pulse 58, temperature 98.3 °F (36.8 °C), temperature source Oral, resp. rate 18, height 167.6 cm (66\"), weight 118 kg (260 lb 2.3 oz), SpO2 97 %.  No fever.    Output: Producing urine.    Lungs:  Normal effort and normal respiratory rate.  There are decreased breath sounds and rhonchi.    Heart: Normal rate.  Regular rhythm.    Chest: Chest wall tenderness present.    Abdomen: Abdomen is soft.  There is no abdominal tenderness.     Neurological: Patient is alert and oriented to person, place and time.    Skin:  Warm and dry.              Chest tube:   Site: Right, Clean, Dry, Intact and Securement device intact  Suction: -40 cm  Air Leak: negative  24 Hour Total: 200/25 ml    Results Review:     I reviewed the patient's new clinical results.  I reviewed the patient's new imaging results and agree with the interpretation.  I reviewed the patient's other test results and agree with the interpretation  Discussed with Patient, spouse at bedside, RN, Dr. Rice.    Assessment & Plan     Mr. Moseley is doing very well POD #2, s/p right robot-assisted " decortication.  Chest tube output has dropped off.  No evidence of air leak in the lung appears expanded on chest x-ray.  Wean chest tube to -20 cm suction.  Wean oxygen as able.  Encourage good pulmonary hygiene with incentive spirometry 10 times per hour while awake and flutter valve.   Transfer to  anytime from our standpoint.    SALVADOR Escobar  [facility]  05/22/23  16:38 EDT    Patient was seen and assessed while wearing personal protective equipment including facemask, protective eyewear and gloves.  Hand hygiene performed prior to entering the room and upon exiting with doffing of gloves.         Electronically signed by Jose Rice MD at 05/23/23 0736     Jatinder James MD at 05/22/23 1026                                                        LOS: 4 days   Patient Care Team:  Naveed Nunez DO as PCP - General (Family Medicine)  Naveed Nunez DO as PCP - Family Medicine  Chris Martinez Jr., MD as Consulting Physician (Pulmonary Disease)  Ihsan Lopez MD as Consulting Physician (Urology)  Dimas Jackson MD as Consulting Physician (Nephrology)  Fely Ferrer RN as Ambulatory  ([facility] [place][note 2])    Chief Complaint:  F/up respiratory failure, post VATS decortication    Subjective   Interval History  I reviewed the admission note, progress notes, PMH, PSH, Family hx, social history, imagings and prior records on this admission, summarized the finding in my note and formulated a transition of care plan.    On 6 L oxygen.  Reported mild intermittent cough.  No significant phlegm.  Used the BiPAP overnight    REVIEW OF SYSTEMS:   CARDIOVASCULAR: No chest pain, chest pressure or chest discomfort. No palpitations or edema.   GASTROINTESTINAL: No anorexia, nausea, vomiting or diarrhea. No abdominal pain.  CONSTITUTIONAL: No fever or chills.     Ventilator/Non-Invasive Ventilation Settings (From admission, onward)     Start     Ordered    05/20/23 6677   "NIPPV (CPAP or BIPAP)  Until Discontinued        Question Answer Comment   Indication Acute Respiratory Failure    Type AVAPS/PC/PS    Backup Rate 20    Target VT (mL) 600    EPAP/PEEP (cm H2O) 8    Min Pressure (cm H2O) 14    Max Pressure (cm H2O) 28    Titrate Oxygen for SpO2 90% or Greater        05/20/23 2114                      Physical Exam:     Vital Signs  Temp:  [97.6 °F (36.4 °C)-99.5 °F (37.5 °C)] 97.6 °F (36.4 °C)  Heart Rate:  [56-72] 65  Resp:  [18-20] 18  BP: (110-149)/(51-74) 128/60    Intake/Output Summary (Last 24 hours) at 5/22/2023 1026  Last data filed at 5/22/2023 0600  Gross per 24 hour   Intake 720 ml   Output 1275 ml   Net -555 ml     Flowsheet Rows    Flowsheet Row First Filed Value   Admission Height 167.6 cm (66\") Documented at 05/16/2023 1953   Admission Weight 114 kg (252 lb) Documented at 05/16/2023 1953          PPE used per hospital policy    General Appearance:   Alert, cooperative, in no acute distress   ENMT:  Mallampati score 3, moist mucous membrane   Eyes:  Pupils equal and reactive to light. EOMI   Neck:   Large. Trachea midline. No thyromegaly.   Lungs:    Bilateral expiratory wheezing.  Coarse breath sounds.    Heart:   Regular rhythm and normal rate, normal S1 and S2, no         murmur   Skin:   No rash or ecchymosis   Abdomen:    Obese. Soft. No tenderness. No HSM.   Neuro/psych:  Conscious, alert, oriented x3. Strength 5/5 in upper and lower  ext.  Appropriate mood and affect   Extremities:  No cyanosis, clubbing but edema in legs.  Warm extremities and well-perfused          Results Review:        Results from last 7 days   Lab Units 05/22/23  0411 05/21/23  0521 05/20/23 2030   SODIUM mmol/L 135* 138 140   POTASSIUM mmol/L 5.4* 5.8* 5.5*   CHLORIDE mmol/L 99 102 103   CO2 mmol/L 30.9* 30.7* 23.0   BUN mg/dL 39* 30* 33*   CREATININE mg/dL 2.07* 1.42* 1.57*   GLUCOSE mg/dL 134* 156* 118*   CALCIUM mg/dL 8.5* 9.0 8.4*     Results from last 7 days   Lab Units " 05/16/23  1918 05/16/23  1730   HSTROP T ng/L 17* 19*     Results from last 7 days   Lab Units 05/22/23  0411 05/21/23  0521 05/20/23  2030   WBC 10*3/mm3 14.39* 16.55* 22.24*   HEMOGLOBIN g/dL 10.3* 11.2* 11.9*   HEMATOCRIT % 31.7* 35.7* 37.3*   PLATELETS 10*3/mm3 315 370 428     Results from last 7 days   Lab Units 05/20/23  0405   INR  1.08   APTT seconds 35.8*     Results from last 7 days   Lab Units 05/16/23  1730   PROBNP pg/mL 167.0                   Results from last 7 days   Lab Units 05/21/23  0401 05/20/23  2348 05/20/23  2036   PH, ARTERIAL pH units 7.317* 7.293* 7.266*   PCO2, ARTERIAL mm Hg 60.5* 57.5* 64.0*   PO2 ART mm Hg 74.6* 69.7* 93.2   FLOW RATE lpm  --   --  7   MODALITY  BiPap BiPap HFNC   O2 SATURATION CALC % 93.0 91.1* 95.6         I reviewed the patient's new clinical results.        Medication Review:   acetaminophen, 1,000 mg, Oral, TID  acetylcysteine, 3 mL, Nebulization, Q8H - RT  albuterol, 2.5 mg, Nebulization, Q8H - RT  atorvastatin, 20 mg, Oral, Nightly  benzonatate, 200 mg, Oral, TID  cefTRIAXone, 1 g, Intravenous, Q24H  divalproex, 250 mg, Oral, BID  docusate sodium, 100 mg, Oral, BID  enoxaparin, 40 mg, Subcutaneous, Q12H  gabapentin, 200 mg, Oral, Q12H  guaiFENesin, 1,200 mg, Oral, Q12H  hydrALAZINE, 50 mg, Oral, Q8H  lamoTRIgine, 100 mg, Oral, BID  levothyroxine, 50 mcg, Oral, Q AM  lidocaine, 1 patch, Transdermal, Q24H  lisinopril, 40 mg, Oral, Daily  metoprolol succinate XL, 100 mg, Oral, BID  NIFEdipine XL, 90 mg, Oral, Q24H  polyethylene glycol, 17 g, Oral, Daily  pramipexole, 0.5 mg, Oral, Nightly  senna-docusate sodium, 2 tablet, Oral, BID  sodium chloride, 10 mL, Intravenous, Q12H  sodium chloride, 10 mL, Intravenous, Q12H  sodium chloride, 4 mL, Nebulization, BID - RT  tamsulosin, 0.4 mg, Oral, Nightly  vilazodone, 40 mg, Oral, Daily             Diagnostic imaging:  I personally and independently reviewed the following images:  CXR 5/22/2023: Right pulmonary  infiltrates.  Low lung volumes.  Chest tube noted    Assessment     10. Acute hypoxic/hypercapnic respiratory failure  11. Noninvasive ventilator use  12. Right-sided loculated pleural effusion s/p right Thora-300 cc; exudative s/p VATS decortication-  13. Nonspecific mediastinal lymphadenopathy s/p EBUS   14. Right lower lobe pneumonia s/p antibiotics s/p bronc-  15. CKD  16. Remote smoker  17. ISIAH on CPAP  18. Morbid obesity    All problems new to me    Plan     · Oxygen by NC and titrate keep SPO2 >90%  · Start DuoNeb 4 times a day.    · Mucomyst twice a day to improve mucus clearance.  · Flutter and I-S  · DC Rocephin.  No evidence of pneumonia blood culture  · Follow-up [facility]NA mediastinal adenopathy  · BiPAP at night  · Chest tube management per thoracic surgery    Transfer to telemetry if okay with thoracic surgery    Jatinder James MD  23  10:26 EDT          This note was dictated utilizing Dragon dictation    Electronically signed by Jatinder James MD at 23 2202     Ramya Lomeli MD at 23 0755            Infectious Diseases Progress Note    Ramya Lomeli MD     [facility]  [place]: 4 days  Patient Identification:  Name: Calos Moseley  Age: 62 y.o.  Sex: male  :  1960  MRN: [id number]         Primary Care Physician: Naveed Nunez DO    Requesting physician: Dr. Carvajal  Reason for consultation: Empyema    Subjective: Feeling better with significant improvement in pain  Interval History: See my admission history and physical.  Started on IV ceftriaxone by pulmonary service at the time of admission.  Thoracentesis performed on 2023 reveals exudative noninfectious process and cytology pending.  2023 underwent bronchoscopy  2023 THORACOSCOPY VIDEO ASSISTED WITH DECORTICATION WITH [employer] ROBOT  Objective:    Scheduled Meds:acetaminophen, 1,000 mg, Oral, TID  acetylcysteine, 3 mL, Nebulization, Q8H - RT  albuterol, 2.5 mg, Nebulization, Q8H -  "RT  atorvastatin, 20 mg, Oral, Nightly  benzonatate, 200 mg, Oral, TID  cefTRIAXone, 1 g, Intravenous, Q24H  divalproex, 250 mg, Oral, BID  docusate sodium, 100 mg, Oral, BID  enoxaparin, 40 mg, Subcutaneous, Q12H  gabapentin, 200 mg, Oral, Q12H  guaiFENesin, 1,200 mg, Oral, Q12H  hydrALAZINE, 50 mg, Oral, Q8H  lamoTRIgine, 100 mg, Oral, BID  levothyroxine, 50 mcg, Oral, Q AM  lidocaine, 1 patch, Transdermal, Q24H  lisinopril, 40 mg, Oral, Daily  metoprolol succinate XL, 100 mg, Oral, BID  NIFEdipine XL, 90 mg, Oral, Q24H  polyethylene glycol, 17 g, Oral, Daily  pramipexole, 0.5 mg, Oral, Nightly  senna-docusate sodium, 2 tablet, Oral, BID  sodium chloride, 10 mL, Intravenous, Q12H  sodium chloride, 10 mL, Intravenous, Q12H  sodium chloride, 4 mL, Nebulization, BID - RT  tamsulosin, 0.4 mg, Oral, Nightly  vilazodone, 40 mg, Oral, Daily      Continuous Infusions:fentaNYL (SUBLIMAZE) PCA 25 mcg/mL 50 mL syringe,         Vital signs in last 24 hours:  Temp:  [97.6 °F (36.4 °C)-99.5 °F (37.5 °C)] 97.6 °F (36.4 °C)  Heart Rate:  [56-73] 66  Resp:  [18-20] 18  BP: (110-156)/(51-81) 110/59    Intake/Output:    Intake/Output Summary (Last 24 hours) at 5/22/2023 0756  Last data filed at 5/22/2023 0600  Gross per 24 hour   Intake 720 ml   Output 1725 ml   Net -1005 ml       Exam:  /59   Pulse 66   Temp 97.6 °F (36.4 °C) (Axillary)   Resp 18   Ht 167.6 cm (66\")   Wt 118 kg (260 lb 2.3 oz)   SpO2 98%   BMI 41.99 kg/m²   Patient is examined using the personal protective equipment as per guidelines from infection control for this particular patient as enacted.  Hand washing was performed before and after patient interaction.  General Appearance:    Alert, cooperative, no distress, AAOx3                          Head:    Normocephalic, without obvious abnormality, atraumatic                           Eyes:    PERRL, conjunctivae/corneas clear, EOM's intact, both eyes                         Throat:   Lips, tongue, " gums normal; oral mucosa pink and moist                           Neck:   Supple, symmetrical, trachea midline, no JVD                         Lungs:    Decreased breath sounds at the right lung base                 Chest Wall:    No tenderness or deformity                          Heart:  S1-S2 regular                  Abdomen:   Obese soft nontender                 Extremities: Lateral lower extremity edema                        Pulses:   Pulses palpable in all extremities                            Skin:   Skin is warm and dry,  no rashes or palpable lesions                  Neurologic: Grossly nonfocal       Data Review:    I reviewed the patient's new clinical results.  Results from last 7 days   Lab Units 05/22/23 0411 05/21/23  0521 05/20/23 2030 05/20/23 0405 05/18/23  0520 05/17/23  0433 05/16/23  1730   WBC 10*3/mm3 14.39* 16.55* 22.24* 16.88* 10.67 9.18 10.09   HEMOGLOBIN g/dL 10.3* 11.2* 11.9* 11.5* 13.3 13.0 14.1   PLATELETS 10*3/mm3 315 370 428 341 352 265 273     Results from last 7 days   Lab Units 05/22/23 0411 05/21/23 0521 05/20/23 2030 05/20/23  1054 05/20/23 0405 05/19/23  0332 05/18/23  0520   SODIUM mmol/L 135* 138 140 141 152* 141 140   POTASSIUM mmol/L 5.4* 5.8* 5.5* 5.1 5.4* 5.1 4.8   CHLORIDE mmol/L 99 102 103 104 113* 103 102   CO2 mmol/L 30.9* 30.7* 23.0 31.0* 29.0 29.1* 28.3   BUN mg/dL 39* 30* 33* 34* 37* 44* 36*   CREATININE mg/dL 2.07* 1.42* 1.57* 1.64* 1.76* 1.97* 1.93*   CALCIUM mg/dL 8.5* 9.0 8.4* 8.9 9.0 9.3 9.6   GLUCOSE mg/dL 134* 156* 118* 111* 110* 116* 168*     Microbiology Results (last 10 days)     Procedure Component Value - Date/Time    Tissue / Bone Culture - Tissue, Pleura [[id number]] Collected: 05/20/23 6140    Lab Status: Preliminary result Specimen: Tissue from Pleura Updated: 05/21/23 0953     Tissue Culture No growth     Gram Stain No WBCs or organisms seen    Body Fluid Culture - Surgical Site, Pleura [[id number]] Collected: 05/20/23 5261    Lab Status:  Preliminary result Specimen: Surgical Site from Pleura Updated: 05/21/23 0954     Body Fluid Culture No growth     Gram Stain No WBCs or organisms seen    AFB Culture - Lavage, Lung, Right Lower Lobe [[id number]] Collected: 05/19/23 1546    Lab Status: Preliminary result Specimen: Lavage from Lung, Right Lower Lobe Updated: 05/20/23 1342     AFB Stain No acid fast bacilli seen on concentrated smear    Fungus Smear - Lavage, Lung, Right Lower Lobe [[id number]] Collected: 05/19/23 1546    Lab Status: Final result Specimen: Lavage from Lung, Right Lower Lobe Updated: 05/19/23 2113     Fungal Stain No fungal elements seen    BAL Culture, Quantitative - Lavage, Lung, Right Lower Lobe [[id number]] Collected: 05/19/23 1546    Lab Status: Final result Specimen: Lavage from Lung, Right Lower Lobe Updated: 05/21/23 0822     BAL Culture No growth     Gram Stain No organisms seen      Rare (1+) WBCs seen    Legionella Antigen, Urine - Urine, Urine, Clean Catch [[id number]]  (Normal) Collected: 05/17/23 0452    Lab Status: Final result Specimen: Urine, Clean Catch Updated: 05/17/23 0644     LEGIONELLA ANTIGEN, URINE Negative    S. Pneumo Ag Urine or CSF - Urine, Urine, Clean Catch [[id number]]  (Normal) Collected: 05/17/23 0452    Lab Status: Final result Specimen: Urine, Clean Catch Updated: 05/17/23 0644     Strep Pneumo Ag Negative    Blood Culture - Blood, Hand, Right [[id number]]  (Normal) Collected: 05/16/23 2253    Lab Status: Final result Specimen: Blood from Hand, Right Updated: 05/21/23 2301     Blood Culture No growth at 5 days    Blood Culture - Blood, Arm, Left [[id number]]  (Normal) Collected: 05/16/23 2247    Lab Status: Final result Specimen: Blood from Arm, Left Updated: 05/21/23 2301     Blood Culture No growth at 5 days    Respiratory Panel PCR w/COVID-19(SARS-CoV-2) JERI/SONIA/MARQUITA/PAD/COR/MAD/ULISSES In-House, NP Swab in UTM/VTM, 3-4 HR TAT - Swab, Nasopharynx [[id number]]  (Normal) Collected: 05/16/23 1733    Lab Status:  Final result Specimen: Swab from Nasopharynx Updated: 05/16/23 1832     ADENOVIRUS, PCR Not Detected     Coronavirus 229E Not Detected     Coronavirus HKU1 Not Detected     Coronavirus NL63 Not Detected     Coronavirus OC43 Not Detected     COVID19 Not Detected     Human Metapneumovirus Not Detected     Human Rhinovirus/Enterovirus Not Detected     Influenza A PCR Not Detected     Influenza B PCR Not Detected     Parainfluenza Virus 1 Not Detected     Parainfluenza Virus 2 Not Detected     Parainfluenza Virus 3 Not Detected     Parainfluenza Virus 4 Not Detected     RSV, PCR Not Detected     Bordetella pertussis pcr Not Detected     Bordetella parapertussis PCR Not Detected     Chlamydophila pneumoniae PCR Not Detected     Mycoplasma pneumo by PCR Not Detected    Narrative:      In the setting of a positive respiratory panel with a viral infection PLUS a negative procalcitonin without other underlying concern for bacterial infection, consider observing off antibiotics or discontinuation of antibiotics and continue supportive care. If the respiratory panel is positive for atypical bacterial infection (Bordetella pertussis, Chlamydophila pneumoniae, or Mycoplasma pneumoniae), consider antibiotic de-escalation to target atypical bacterial infection.        Pleural fluid appears to be exudative but does not appear infectious.    Assessment:    Acute dyspnea    COPD (chronic obstructive pulmonary disease)    Anxiety and depression    Hypothyroidism    ISIAH (obstructive sleep apnea)    Essential hypertension    Pleural effusion on right    CKD (chronic kidney disease)    Acute respiratory failure with hypoxia    Lymphadenopathy, mediastinal  Complicated right pleural effusion with loculation in the setting of recent symptoms suggestive of pneumonia-this likely represent complicated effusion with empyema but rule out endobronchial lesion and possible malignancy related postobstructive pneumonia complicating this  effusion-s/p THORACOSCOPY VIDEO ASSISTED WITH DECORTICATION WITH [employer] ROBOT  Recommendations/discussion:   Discussed with Dr. Rice and it does not appear that he has active infectious process going on and findings were consistent with scarring and chronic inflammation from recent partially treated pneumonic process.  We will discuss with our pulmonary colleagues I reached out to him with a text message awaiting his conversation.  In the meantime continue Rocephin for 24 to 48 hours after his surgery on 5/20/2023.  Further antibiotic therapy likely not needed based on the operative findings as discussed with Dr. Rice.  Recommend DC antibiotics if neurology colleagues agree to it.  Ramya Lomeli MD  5/22/2023  07:56 EDT    Parts of this note may be an electronic transcription/translation of spoken language to printed text using the Dragon dictation system.      Electronically signed by Ramya Lomeli MD at 05/23/23 0538     Aniceto Bruno MD at 05/21/23 1610                                      Aniceto Bruno MD                          [phone]            Patient ID:    Name:  Calos Moseley    MRN:  [id number]    1960   62 y.o.  male            Patient Care Team:  Naveed Nunez DO as PCP - General (Family Medicine)  Naveed Nunez DO as PCP - Family Medicine  Chris Martinez Jr., MD as Consulting Physician (Pulmonary Disease)  Ihsan Lopez MD as Consulting Physician (Urology)  Dimas Jackson MD as Consulting Physician (Nephrology)  Fely Ferrer, LOLA as Ambulatory  ([facility])    CC/ Reason for visit: Acute hypoxic respiratory failure, right-sided pleural effusion, abnormal CAT scan of the chest, pedal edema    Subjective: Pt seen and examined this AM.  Patient transferred to ICU postoperatively due to concern for hypercapnia respiratory failure and poor pain control requiring PCA pump.  Sitting up in the chair currently and on high flow nasal  cannula.  Able to cough up secretions but appears to be uncomfortable    ROS: Denies any subjective fevers, syncope or presyncopal events, new neurological deficits, nausea or vomiting currently    Objective     Vital Signs past 24hrs    BP range: BP: (123-205)/() 123/66  Pulse range: Heart Rate:  [62-90] 63  Resp rate range: Resp:  [17-29] 20  Temp range: Temp (24hrs), Av.6 °F (37 °C), Min:98 °F (36.7 °C), Max:99.5 °F (37.5 °C)      Ventilator/Non-Invasive Ventilation Settings (From admission, onward)    None          Vent Settings                                         Device (Oxygen Therapy): humidified;nasal cannula       117 kg (258 lb 9.6 oz); Body mass index is 41.74 kg/m².      Intake/Output Summary (Last 24 hours) at 2023 1611  Last data filed at 2023 0900  Gross per 24 hour   Intake 457.35 ml   Output 1765 ml   Net -1307.65 ml       PHYSICAL EXAM   Constitutional: Middle-aged morbidly obese white male pt in bed, + acute respiratory distress, + accessory muscle use  Head: - NCAT  Eyes: No pallor.  Anicteric sclerae, EOMI.  ENMT:  Mallampati 4, no oral thrush. Moist MM.   NECK: Trachea midline, No thyromegaly, no palpable cervical lymphadenopathy  Heart: RRR, no murmur. 1+ pedal edema   Lungs: JAMES +, improved breath sounds in the right base.  No wheezes/ crackles heard .  Right-sided chest tube x2   Abdomen: Soft.  Obese, no tenderness, guarding or rigidity. No palpable masses  Extremities: Extremities warm and well perfused. No cyanosis/ clubbing  Neuro: Conscious, answers appropriately, no gross focal neuro deficits  Psych: Mood and affect appropriate    PPE recommended per [facility] infectious disease Isolation protocol for the current clinical scenario (as mentioned below) was followed.     Scheduled meds:  acetaminophen, 1,000 mg, Oral, TID  acetylcysteine, 3 mL, Nebulization, Q8H - RT  albuterol, 2.5 mg, Nebulization, Q8H - RT  atorvastatin, 20 mg, Oral,  Nightly  benzonatate, 200 mg, Oral, TID  cefTRIAXone, 1 g, Intravenous, Q24H  divalproex, 250 mg, Oral, BID  docusate sodium, 100 mg, Oral, BID  [START ON 5/22/2023] enoxaparin, 40 mg, Subcutaneous, Q12H  gabapentin, 200 mg, Oral, Q12H  hydrALAZINE, 50 mg, Oral, Q8H  lamoTRIgine, 100 mg, Oral, BID  levothyroxine, 50 mcg, Oral, Q AM  lidocaine, 1 patch, Transdermal, Q24H  lisinopril, 40 mg, Oral, Daily  metoprolol succinate XL, 100 mg, Oral, BID  NIFEdipine XL, 90 mg, Oral, Q24H  polyethylene glycol, 17 g, Oral, Daily  pramipexole, 0.5 mg, Oral, Nightly  senna-docusate sodium, 2 tablet, Oral, BID  sodium chloride, 10 mL, Intravenous, Q12H  sodium chloride, 10 mL, Intravenous, Q12H  tamsulosin, 0.4 mg, Oral, Nightly  vilazodone, 40 mg, Oral, Daily        IV meds:                      fentaNYL (SUBLIMAZE) PCA 25 mcg/mL 50 mL syringe,         Data Review:      Results from last 7 days   Lab Units 05/21/23  0521 05/20/23  2030 05/20/23  1054 05/20/23  0405 05/18/23  0520 05/17/23  0433 05/16/23  1730   SODIUM mmol/L 138 140 141 152*   < > 139 137   POTASSIUM mmol/L 5.8* 5.5* 5.1 5.4*   < > 4.8 4.6   CHLORIDE mmol/L 102 103 104 113*   < > 102 100   CO2 mmol/L 30.7* 23.0 31.0* 29.0   < > 24.5 27.0   BUN mg/dL 30* 33* 34* 37*   < > 32* 33*   CREATININE mg/dL 1.42* 1.57* 1.64* 1.76*   < > 1.60* 1.66*   CALCIUM mg/dL 9.0 8.4* 8.9 9.0   < > 9.0 9.5   BILIRUBIN mg/dL  --   --   --   --   --  0.6 0.7   ALK PHOS U/L  --   --   --   --   --  75 89   ALT (SGPT) U/L  --   --   --   --   --  24 34   AST (SGOT) U/L  --   --   --   --   --  17 19   GLUCOSE mg/dL 156* 118* 111* 110*   < > 129* 132*   WBC 10*3/mm3 16.55* 22.24*  --  16.88*   < > 9.18 10.09   HEMOGLOBIN g/dL 11.2* 11.9*  --  11.5*   < > 13.0 14.1   PLATELETS 10*3/mm3 370 428  --  341   < > 265 273   INR   --   --   --  1.08  --   --   --    PROBNP pg/mL  --   --   --   --   --   --  167.0   PROCALCITONIN ng/mL  --   --   --   --   --   --  0.98*    < > = values in this  interval not displayed.       Lab Results   Component Value Date    CALCIUM 9.0 05/21/2023    PHOS 3.8 05/20/2023       Results from last 7 days   Lab Units 05/20/23  1851 05/16/23  2253 05/16/23  2247   BLOODCX   --  No growth at 4 days No growth at 4 days   BODYFLDCX  No growth  --   --        Results from last 7 days   Lab Units 05/21/23  0401 05/20/23  2348 05/20/23 2036   PH, ARTERIAL pH units 7.317* 7.293* 7.266*   PO2 ART mm Hg 74.6* 69.7* 93.2   PCO2, ARTERIAL mm Hg 60.5* 57.5* 64.0*   HCO3 ART mmol/L 31.0* 27.8 29.1*        I have personally reviewed the results from the time of this admission to 5/21/2023 16:11 EDT and agree with these findings:  [x]  Laboratory  [x]  Microbiology  [x]  Radiology  []  EKG/Telemetry   [x]  Cardiology/Vascular   []  Pathology  []  Old records    ASSESSMENT   Acute hypoxic/hypercapnic respiratory failure  Noninvasive ventilator use  Right-sided loculated pleural effusion s/p right Thora-300 cc; exudative s/p VATS decortication-5/20  Nonspecific mediastinal lymphadenopathy s/p EBUS 5/19  Right lower lobe pneumonia s/p antibiotics s/p bronc-5/19  CKD  Remote smoker  ISIAH on CPAP  Morbid obesity    PLAN:  Patient is stable from a respiratory standpoint.  Making slow progress.  Postop chest x-ray reviewed with no new concerns for pneumothorax.  Appreciate thoracic surgery input and defer postop management to them  Continue with pain control and incentive spirometry to help with secretion clearance  BAL cultures are negative so far.  EBUS sample is pending still  VATS decortication sample is pending as well  Continue with as needed diuretics to keep him as negative as possible  Would discontinue Lobo catheter  Continue with nightly CPAP for ISIAH  Guarded prognosis    Aniceto Bruno MD  5/21/2023    Electronically signed by Aniceto Bruno MD at 05/21/23 1615     Jose Rice MD at 05/21/23 1217        Progress note    Patient had respiratory acidosis  postprocedure due to significant pain and shallow breathing.  He was transferred to the ICU for close monitoring and management of respiratory acidosis.    His pain is better controlled with multimodal pain management.  He complains of pain with coughing and deep breathing. He is having difficulty clearing his secretions.  He worked with physical therapy today and got out of the bed into the chair.  He is in good spirits.    No acute distress.  Hemodynamically stable.  Sinus rhythm.  Nonlabored breathing on 6 LPM high flow nasal cannula.  Chest tube with serosanguineous drainage and no air leak on -40 suction.  Extremities warm.  Lobo catheter in place with clear urine.    Labs reviewed.  Creatinine 1.42, potassium 5.8.  WBC 16.5, trending down.  Hemoglobin 11.2 and stable.  Imaging showed better expansion of the right lower lobe.  There is haziness over the right hemithorax which is likely due to postop inflammation and will take couple of weeks to show improvement.    In summary, he is  POD #1 s/p robotic assisted total decortication, partial viscera and parietal pleurectomy.  He had trapped lung from early scar formation in the right lower chest.  There was thickened parietal pleural peel and loculated effusion which required total decortication and pleurectomy.  There was no evidence of gross purulent fluid/pus in the chest.  I think he might have subclinical pneumonia or partially treated pneumonia that resulted into parapneumonic loculated effusion and trapped lung.    Postop management will be challenging in terms of his low lung volumes, history of sleep apnea, truncal obesity and chronic kidney disease.  I recommend judicious use of IV fluids.  The Lobo catheter can be removed tomorrow.  He has history of BPH and is currently on Flomax.  The arterial line can be removed.  He is more on the hypertensive side.  He is at high risk of postop pneumonia.  I emphasized the importance of early mobilization,  adequate pain control and incentive spirometer.  He can use CPAP if required.  It is not a contraindication after chest surgery.  There is no evidence of air leak on -40 suction.  We will transition to -20 suction tomorrow.    Appreciate ICU cares.  He will stay in the ICU today and likely transfer to the floor tomorrow.    Jose Rice MD  Thoracic surgeon    Electronically signed by Jose Rice MD at 05/21/23 7369

## 2023-05-24 NOTE — THERAPY TREATMENT NOTE
Patient Name: Calos Moseley  : 1960    MRN: [id number]                              Today's Date: 2023       Admit Date: 2023    Visit Dx:     ICD-10-CM ICD-9-CM   1. Acute dyspnea  R06.00 786.09   2. Hypoxia  R09.02 799.02   3. Pleural effusion, right  J90 511.9   4. Acute respiratory failure with hypoxia  J96.01 518.81   5. Lymphadenopathy, mediastinal  R59.0 785.6     Patient Active Problem List   Diagnosis   • HLD (hyperlipidemia)   • Obesity, Class III, BMI 40-49.9 (morbid obesity)   • Bilateral kidney stones   • COPD (chronic obstructive pulmonary disease)   • Left flank pain   • Anxiety and depression   • Hypothyroidism   • ISIAH (obstructive sleep apnea)   • Essential hypertension   • Calculus, renal   • Restless leg syndrome   • Bipolar affective   • Calculus of ureter   • Gross hematuria   • Encounter for screening for malignant neoplasm of colon   • Acute dyspnea   • Pleural effusion on right   • CKD (chronic kidney disease)   • Acute respiratory failure with hypoxia   • Lymphadenopathy, mediastinal     Past Medical History:   Diagnosis Date   • Anesthesia complication     STATES HARD TO WAKE UP AFTER PREVIOUS KIDNEY STONE SURGERY AND STATES HE CODED   • Bilateral kidney stones    • Bipolar affective    • COVID    • Disease of thyroid gland    • Elevated cholesterol    • Hypertension    • Kidney stone on left side    • Left flank pain    • Mood disorder    • Restless leg syndrome    • Sleep apnea     cpap  bring dos   • UTI (urinary tract infection) 2021    PREV FINISHED ANTIBIOTIC     Past Surgical History:   Procedure Laterality Date   • ADENOIDECTOMY     • APPENDECTOMY     • BRONCHOSCOPY Right 2023    Procedure: BRONCHOSCOPY WITH ENDOBRONCHIAL ULTRASOUND with BAL of RLL and FNA;  Surgeon: Aniceto Bruno MD;  Location: [facility] ENDOSCOPY;  Service: Pulmonary;  Laterality: Right;  pre- RLL pneumonia, lymphadenopathy  post- same   • COLONOSCOPY     • COLONOSCOPY N/A  3/23/2023    Procedure: COLONOSCOPY into cecum with cold snare polypectomies;  Surgeon: Yoan Leonard MD;  Location: [facility] ENDOSCOPY;  Service: Gastroenterology;  Laterality: N/A;  polyps   • CYSTOSCOPY WITH CLOT EVACUATION N/A 3/8/2022    Procedure: CYSTOSCOPY WITH CLOT EVACUATION;  Surgeon: Ihsan Lopez MD;  Location: [facility] MAIN OR;  Service: Urology;  Laterality: N/A;   • CYSTOSCOPY, URETEROSCOPY, RETROGRADE PYELOGRAM, STENT INSERTION Left 3/7/2022    Procedure: CYSTOSCOPY URETEROSCOPY LASER LITHOTRIPSY and stent exchange;  Surgeon: Deuce Leija MD;  Location: [facility] OR;  Service: Urology;  Laterality: Left;   • KIDNEY STONE SURGERY     • NEPHROLITHOTRIPSY PERCUTANEOUS Left 2/21/2022    Procedure: NEPHROSTLITHOTOMY PERCUTANEOUS;  Surgeon: Deuce Leija MD;  Location: [facility] OR;  Service: Urology;  Laterality: Left;   • NEPHROLITHOTRIPSY PERCUTANEOUS Left 2/23/2022    Procedure: LEFT FLEXIBLE NEPHROSTOMY WITH STONE EXTRACTION, LEFT NEPHROSTOMY, TUBE REMOVAL, BILATERAL URETERAL STENT PLACEMENT;  Surgeon: Deuce Leija MD;  Location: [facility] OR;  Service: Urology;  Laterality: Left;   • NEPHROLITHOTRIPSY PERCUTANEOUS Right 3/7/2022    Procedure: RIGHT PERCUTANEOUS NEPHROLITHOTOMY W ACCESS;  Surgeon: Deuce Leija MD;  Location: [facility] OR;  Service: Urology;  Laterality: Right;   • THORACOSCOPY WITH DECORTICATION Right 5/20/2023    Procedure: THORACOSCOPY VIDEO ASSISTED WITH DECORTICATION WITH DAVINCI ROBOT;  Surgeon: Jose Rice MD;  Location: [facility] MAIN OR;  Service: Robotics - DaVinci;  Laterality: Right;   • TONSILLECTOMY        General Information     Row Name 05/24/23 1248          OT Time and Intention    Document Type therapy note (daily note)  -     Mode of Treatment occupational therapy;individual therapy  -     Row Name 05/24/23 1248          General Information    Existing Precautions/Restrictions fall;oxygen therapy device and L/min   -     Row Name 05/24/23 1248          Cognition    Orientation Status (Cognition) oriented x 4  -KP     Row Name 05/24/23 1248          Safety Issues, Functional Mobility    Impairments Affecting Function (Mobility) endurance/activity tolerance  -           User Key  (r) = Recorded By, (t) = Taken By, (c) = Cosigned By    Initials Name Provider Type    Piper Collier OTR Occupational Therapist                 Mobility/ADL's     Row Name 05/24/23 1248          Bed Mobility    Comment, (Bed Mobility) NT UIC  -     Row Name 05/24/23 1248          Transfers    Comment, (Transfers) NT SBA and just got to chair and reclined back. just finished a walk w ns aide  -     Row Name 05/24/23 1248          Functional Mobility    Functional Mobility- Comment SBA per notes. and just walked w nsg aide  -           User Key  (r) = Recorded By, (t) = Taken By, (c) = Cosigned By    Initials Name Provider Type    Piper Collier OTR Occupational Therapist               Obj/Interventions     Row Name 05/24/23 1248          Shoulder (Therapeutic Exercise)    Shoulder (Therapeutic Exercise) AROM (active range of motion);strengthening exercise  -     Shoulder AROM (Therapeutic Exercise) left;right;flexion;extension;10 repetitions;2 sets;sitting  -     Shoulder Strengthening (Therapeutic Exercise) bilateral;resisted diagonal exercise;sitting;horizontal aBduction/aDduction;10 repetitions;2 sets;red;resistance band  -     Row Name 05/24/23 1248          Motor Skills    Therapeutic Exercise shoulder  -     Row Name 05/24/23 1248          Balance    Static Sitting Balance independent  -     Position, Sitting Balance sitting in chair  -           User Key  (r) = Recorded By, (t) = Taken By, (c) = Cosigned By    Initials Name Provider Type    Piper Collier OTR Occupational Therapist               Goals/Plan    No documentation.                Clinical Impression     Row Name 05/24/23 1255           Pain Assessment    Pretreatment Pain Rating 0/10 - no pain  -KP     Posttreatment Pain Rating 0/10 - no pain  -KP     Row Name 05/24/23 1250          Plan of Care Review    Plan of Care Reviewed With patient  -KP     Progress improving  -     Outcome Evaluation pt progressing well in therapy and incr strength and endurance. pt already up and walked to tsf and just got to the chair, ADLs already completed. pt completed B UE ex AROM and red theraband ex 10x2 to incr strength/endurance. pt reports may dc home tomorrow after tubes removed. cont therapy to incr strength and endurnace. provided pt w red theraband to keep and cont to work on strength.  -     Row Name 05/24/23 1250          Positioning and Restraints    Pre-Treatment Position sitting in chair/recliner  -KP     Post Treatment Position chair  -KP     In Chair reclined;call light within reach;encouraged to call for assist  -KP           User Key  (r) = Recorded By, (t) = Taken By, (c) = Cosigned By    Initials Name Provider Type    Piper Collier OTR Occupational Therapist               Outcome Measures     Row Name 05/24/23 1251          How much help from another is currently needed...    Putting on and taking off regular lower body clothing? 3  -KP     Bathing (including washing, rinsing, and drying) 3  -KP     Toileting (which includes using toilet bed pan or urinal) 3  -KP     Putting on and taking off regular upper body clothing 4  -KP     Taking care of personal grooming (such as brushing teeth) 4  -KP     Eating meals 4  -KP     AM-PAC 6 Clicks Score (OT) 21  -KP     Row Name 05/24/23 1251          Functional Assessment    Outcome Measure Options AM-PAC 6 Clicks Daily Activity (OT)  -KP           User Key  (r) = Recorded By, (t) = Taken By, (c) = Cosigned By    Initials Name Provider Type    Piper Collier OTR Occupational Therapist                  OT Recommendation and Plan     Plan of Care Review  Plan of Care  Reviewed With: patient  Progress: improving  Outcome Evaluation: pt progressing well in therapy and incr strength and endurance. pt already up and walked to tsf and just got to the chair, ADLs already completed. pt completed B UE ex AROM and red theraband ex 10x2 to incr strength/endurance. pt reports may dc home tomorrow after tubes removed. cont therapy to incr strength and endurnace. provided pt w red theraband to keep and cont to work on strength.     Time Calculation:    Time Calculation- OT     Row Name 05/24/23 1251             Time Calculation- OT    OT Start Time 0941  -      OT Stop Time 0953  -      OT Time Calculation (min) 12 min  -      Total Timed Code Minutes- OT 12 minute(s)  -      OT Received On 05/24/23  -      OT - Next Appointment 05/25/23  -         Timed Charges    34323 - OT Therapeutic Exercise Minutes 12  -KP         Total Minutes    Timed Charges Total Minutes 12  -       Total Minutes 12  -            User Key  (r) = Recorded By, (t) = Taken By, (c) = Cosigned By    Initials Name Provider Type    Piper Collier OTR Occupational Therapist              Therapy Charges for Today     Code Description Service Date Service Provider Modifiers Qty    86743390427 HC OT THER PROC EA 15 MIN 5/24/2023 Piper Angulo OTR GO 1               SHELLIE Kc  5/24/2023

## 2023-05-24 NOTE — PROGRESS NOTES
"  POST-OPERATIVE NOTE     Chief Complaint: Right loculated parapneumonic effusion, postoperative care  S/P: Right video-assisted thoracoscopy with da Coby robot assisted decortication  POD # 4    Subjective:  Symptoms:  Stable.  He reports shortness of breath.  No weakness.    Diet:  Adequate intake.  No nausea or vomiting.    Activity level: Impaired due to pain.    Pain:  He complains of pain that is mild.  Pain is well controlled and requiring pain medication.    Out of bed.  Ambulating in the jacobo on 4 L.    Objective:  General Appearance:  Comfortable and in no acute distress.    Vital signs: (most recent): Blood pressure 135/68, pulse 66, temperature 98.9 °F (37.2 °C), temperature source Oral, resp. rate 20, height 167.6 cm (66\"), weight 116 kg (255 lb 6.4 oz), SpO2 97 %.  Vital signs are normal.  No fever.    Output: Producing urine.    Lungs:  Normal effort and normal respiratory rate.  There are decreased breath sounds and rhonchi.    Heart: Normal rate.  Regular rhythm.    Chest: Chest wall tenderness present.    Abdomen: Abdomen is soft.  There is no abdominal tenderness.     Neurological: Patient is alert and oriented to person, place and time.    Skin:  Warm and dry.              Chest tube:   Site: Right, Clean, Dry, Intact and Securement device intact  Suction: waterseal  Air Leak: negative  24 Hour Total: 24ml    Results Review:     I reviewed the patient's new clinical results.  I reviewed the patient's new imaging results and agree with the interpretation.  I reviewed the patient's other test results and agree with the interpretation  Discussed with Patient, spouse at bedside, RN, Dr. Rice.    Assessment & Plan     Mr. Moseley is doing very well POD #4, s/p right robot-assisted decortication.  Posterior chest tube was removed yesterday.  Remaining chest tube to waterseal.  Creatinine is improving.  Appreciate assistance from nephrology.  Remains on 4 L nasal cannula, continue to wean as tolerated.  " Plan for chest tube removal tomorrow.  Recheck chest x-ray in a.m.    Sosa Romero DNP, APRN  [facility]  05/24/23  10:03 EDT    Patient was seen and assessed while wearing personal protective equipment including facemask, protective eyewear and gloves.  Hand hygiene performed prior to entering the room and upon exiting with doffing of gloves.

## 2023-05-24 NOTE — PROGRESS NOTES
"                                              LOS: 6 days   Patient Care Team:  Naveed Nunez DO as PCP - General (Family Medicine)  Naveed Nunez DO as PCP - Family Medicine  Chris Martinez Jr., MD as Consulting Physician (Pulmonary Disease)  Ihsan Lopez MD as Consulting Physician (Urology)  Dimas Jackson MD as Consulting Physician (Nephrology)  Fely Ferrer, RN as Ambulatory  ([facility])    Chief Complaint:  F/up respiratory failure, post VATS decortication    Subjective   Interval History  Continues to have cough but improved.  Chest pain at the site of the chest tube.  On 6 L oxygen.  Used the hospital NIPPV overnight.    REVIEW OF SYSTEMS:     GASTROINTESTINAL: No anorexia, nausea, vomiting or diarrhea. No abdominal pain.  CONSTITUTIONAL: No fever or chills.  Cardiovascular: Chest pain at the site of the chest tube.    Ventilator/Non-Invasive Ventilation Settings (From admission, onward)     Start     Ordered    05/20/23 2114  NIPPV (CPAP or BIPAP)  Until Discontinued        Question Answer Comment   Indication Acute Respiratory Failure    Type AVAPS/PC/PS    Backup Rate 20    Target VT (mL) 600    EPAP/PEEP (cm H2O) 8    Min Pressure (cm H2O) 14    Max Pressure (cm H2O) 28    Titrate Oxygen for SpO2 90% or Greater        05/20/23 2114                      Physical Exam:     Vital Signs  Temp:  [97.8 °F (36.6 °C)-98.9 °F (37.2 °C)] 97.8 °F (36.6 °C)  Heart Rate:  [66-73] 66  Resp:  [20] 20  BP: (125-151)/(51-68) 138/59    Intake/Output Summary (Last 24 hours) at 5/24/2023 1638  Last data filed at 5/24/2023 1545  Gross per 24 hour   Intake 960 ml   Output 3424 ml   Net -2464 ml     Flowsheet Rows    Flowsheet Row First Filed Value   Admission Height 167.6 cm (66\") Documented at 05/16/2023 1953   Admission Weight 114 kg (252 lb) Documented at 05/16/2023 1953          PPE used per hospital policy    General Appearance:   Alert, cooperative, in no acute distress "   ENMT:  Mallampati score 3, moist mucous membrane   Eyes:  Pupils equal and reactive to light. EOMI   Neck:   Large. Trachea midline. No thyromegaly.   Lungs:    Diminished breath sounds bilaterally especially on the right.  Bilateral rhonchi and coarse breath sounds, improved.    Heart:   Regular rhythm and normal rate, normal S1 and S2, no         murmur   Skin:   No rash or ecchymosis   Abdomen:    Obese. Soft. No tenderness. No HSM.   Neuro/psych:  Conscious, alert, oriented x3. Strength 5/5 in upper and lower  ext.  Appropriate mood and affect   Extremities:  No cyanosis, clubbing but edema in legs.  Warm extremities and well-perfused          Results Review:        Results from last 7 days   Lab Units 05/24/23  0518 05/23/23  1630 05/23/23  0457   SODIUM mmol/L 141 140 138   POTASSIUM mmol/L 4.6 4.9 5.8*   CHLORIDE mmol/L 104 98 101   CO2 mmol/L 31.6* 32.6* 31.6*   BUN mg/dL 43* 50* 52*   CREATININE mg/dL 1.89* 2.33* 2.40*   GLUCOSE mg/dL 167* 146* 152*   CALCIUM mg/dL 8.5* 8.9 8.9         Results from last 7 days   Lab Units 05/24/23  0518 05/22/23  0411 05/21/23  0521   WBC 10*3/mm3 7.70 14.39* 16.55*   HEMOGLOBIN g/dL 9.4* 10.3* 11.2*   HEMATOCRIT % 29.4* 31.7* 35.7*   PLATELETS 10*3/mm3 338 315 370     Results from last 7 days   Lab Units 05/20/23  0405   INR  1.08   APTT seconds 35.8*                       Results from last 7 days   Lab Units 05/21/23  0401 05/20/23  2348 05/20/23  2036   PH, ARTERIAL pH units 7.317* 7.293* 7.266*   PCO2, ARTERIAL mm Hg 60.5* 57.5* 64.0*   PO2 ART mm Hg 74.6* 69.7* 93.2   FLOW RATE lpm  --   --  7   MODALITY  BiPap BiPap HFNC   O2 SATURATION CALC % 93.0 91.1* 95.6         I reviewed the patient's new clinical results.        Medication Review:   acetaminophen, 1,000 mg, Oral, TID  atorvastatin, 20 mg, Oral, Nightly  benzonatate, 200 mg, Oral, TID  divalproex, 250 mg, Oral, BID  docusate sodium, 100 mg, Oral, BID  enoxaparin, 40 mg, Subcutaneous, Q12H  guaiFENesin, 1,200  mg, Oral, Q12H  hydrALAZINE, 25 mg, Oral, Q8H  ipratropium-albuterol, 3 mL, Nebulization, 4x Daily - RT  lamoTRIgine, 100 mg, Oral, BID  levothyroxine, 50 mcg, Oral, Q AM  lidocaine, 1 patch, Transdermal, Q24H  metoprolol succinate XL, 100 mg, Oral, BID  NIFEdipine XL, 30 mg, Oral, Q24H  polyethylene glycol, 17 g, Oral, Daily  pramipexole, 0.5 mg, Oral, Nightly  senna-docusate sodium, 2 tablet, Oral, BID  sodium chloride, 10 mL, Intravenous, Q12H  sodium chloride, 10 mL, Intravenous, Q12H  sodium chloride, 4 mL, Nebulization, BID - RT  tamsulosin, 0.4 mg, Oral, Nightly  torsemide, 40 mg, Oral, BID  vilazodone, 40 mg, Oral, Daily             Diagnostic imaging:  I personally and independently reviewed the following images:  CXR 5/24/2023: Increased interstitial markings bilaterally especially on the right.  Right pleural effusion, similar to yesterday.  Atelectasis.  1 right chest tubes noted    Assessment     1. Acute hypoxic/hypercapnic respiratory failure  2. Noninvasive ventilator use  3. Right-sided loculated pleural effusion s/p right Thora-300 cc; exudative s/p VATS decortication-5/20  4. Nonspecific mediastinal lymphadenopathy s/p EBUS 5/19.  Cytology negative  5. Right lower lobe pneumonia s/p antibiotics s/p bronc-5/19.  Culture and cytology negative.  6. CKD  7. Remote smoker  8. ISIAH on CPAP  9. Morbid obesity        Plan     · Oxygen by NC and titrate keep SPO2 >90%.  Requiring 6 L oxygen.  · DuoNeb 4 times a day.    · Mucomyst twice alternating with hypertonic saline twice a day  · Guaifenesin 1200 mg twice daily  · Lidocaine patch for chest pain.  · Mucinex 1200 mg twice daily  · Flutter and I-S  · NIPPV at night    Jatinder James MD  05/24/23  16:38 EDT          This note was dictated utilizing [employer] dictation

## 2023-05-24 NOTE — TELEPHONE ENCOUNTER
Pt reviewed results via [email or web address]. Sent pt Write.my msg advising of results. Advised to call if any questions. Colonoscopy placed in  and recall for 3/23/30.

## 2023-05-24 NOTE — PLAN OF CARE
Goal Outcome Evaluation:  Plan of Care Reviewed With: patient        Progress: improving  Outcome Evaluation: Pt has demonstrated good progress with mobility and is walking 300 ft in jacobo independently without assistive device, he is still on o2 and has a chest tube to suction, pt is safe to cont walking with nursing staff until discharge, no further PT needs, probable home with spouse tomorrow

## 2023-05-24 NOTE — PLAN OF CARE
Goal Outcome Evaluation:  Plan of Care Reviewed With: patient        Progress: improving     Vital signs stable. Alert and oriented x 4, pleasant. Up with assistance of one out of bed. On 6 liter high flow nasal cannula. Normal sinus cardiac rhythm. Oral oxycodone given prn every 4 hours for complaint of right chest discomfort at CT insertion site. Right CT patent to water seal with serosanguinous drainage noted. Dressing change done to insertion site. No air leak or fluctuation noted. Tolerating regular diet well. Voiding per urinal. Will continue to monitor.

## 2023-05-24 NOTE — THERAPY TREATMENT NOTE
Patient Name: Calos Moseley  : 1960    MRN: [id number]                              Today's Date: 2023       Admit Date: 2023    Visit Dx:     ICD-10-CM ICD-9-CM   1. Acute dyspnea  R06.00 786.09   2. Hypoxia  R09.02 799.02   3. Pleural effusion, right  J90 511.9   4. Acute respiratory failure with hypoxia  J96.01 518.81   5. Lymphadenopathy, mediastinal  R59.0 785.6     Patient Active Problem List   Diagnosis   • HLD (hyperlipidemia)   • Obesity, Class III, BMI 40-49.9 (morbid obesity)   • Bilateral kidney stones   • COPD (chronic obstructive pulmonary disease)   • Left flank pain   • Anxiety and depression   • Hypothyroidism   • ISIAH (obstructive sleep apnea)   • Essential hypertension   • Calculus, renal   • Restless leg syndrome   • Bipolar affective   • Calculus of ureter   • Gross hematuria   • Encounter for screening for malignant neoplasm of colon   • Acute dyspnea   • Pleural effusion on right   • CKD (chronic kidney disease)   • Acute respiratory failure with hypoxia   • Lymphadenopathy, mediastinal     Past Medical History:   Diagnosis Date   • Anesthesia complication     STATES HARD TO WAKE UP AFTER PREVIOUS KIDNEY STONE SURGERY AND STATES HE CODED   • Bilateral kidney stones    • Bipolar affective    • COVID    • Disease of thyroid gland    • Elevated cholesterol    • Hypertension    • Kidney stone on left side    • Left flank pain    • Mood disorder    • Restless leg syndrome    • Sleep apnea     cpap  bring dos   • UTI (urinary tract infection) 2021    PREV FINISHED ANTIBIOTIC     Past Surgical History:   Procedure Laterality Date   • ADENOIDECTOMY     • APPENDECTOMY     • BRONCHOSCOPY Right 2023    Procedure: BRONCHOSCOPY WITH ENDOBRONCHIAL ULTRASOUND with BAL of RLL and FNA;  Surgeon: Aniceto Brnuo MD;  Location: [facility] ENDOSCOPY;  Service: Pulmonary;  Laterality: Right;  pre- RLL pneumonia, lymphadenopathy  post- same   • COLONOSCOPY     • COLONOSCOPY N/A  3/23/2023    Procedure: COLONOSCOPY into cecum with cold snare polypectomies;  Surgeon: Yoan Leonard MD;  Location: [facility] ENDOSCOPY;  Service: Gastroenterology;  Laterality: N/A;  polyps   • CYSTOSCOPY WITH CLOT EVACUATION N/A 3/8/2022    Procedure: CYSTOSCOPY WITH CLOT EVACUATION;  Surgeon: Ihsan Lopez MD;  Location: [facility] MAIN OR;  Service: Urology;  Laterality: N/A;   • CYSTOSCOPY, URETEROSCOPY, RETROGRADE PYELOGRAM, STENT INSERTION Left 3/7/2022    Procedure: CYSTOSCOPY URETEROSCOPY LASER LITHOTRIPSY and stent exchange;  Surgeon: Deuce Leija MD;  Location: [facility] MAIN OR;  Service: Urology;  Laterality: Left;   • KIDNEY STONE SURGERY     • NEPHROLITHOTRIPSY PERCUTANEOUS Left 2/21/2022    Procedure: NEPHROSTLITHOTOMY PERCUTANEOUS;  Surgeon: Deuce Leija MD;  Location: [facility] OR;  Service: Urology;  Laterality: Left;   • NEPHROLITHOTRIPSY PERCUTANEOUS Left 2/23/2022    Procedure: LEFT FLEXIBLE NEPHROSTOMY WITH STONE EXTRACTION, LEFT NEPHROSTOMY, TUBE REMOVAL, BILATERAL URETERAL STENT PLACEMENT;  Surgeon: Deuce Leija MD;  Location: [facility] OR;  Service: Urology;  Laterality: Left;   • NEPHROLITHOTRIPSY PERCUTANEOUS Right 3/7/2022    Procedure: RIGHT PERCUTANEOUS NEPHROLITHOTOMY W ACCESS;  Surgeon: Deuce Leija MD;  Location: [facility] OR;  Service: Urology;  Laterality: Right;   • THORACOSCOPY WITH DECORTICATION Right 5/20/2023    Procedure: THORACOSCOPY VIDEO ASSISTED WITH DECORTICATION WITH DAVINCI ROBOT;  Surgeon: Jose Rice MD;  Location: [facility] MAIN OR;  Service: Robotics - DaVinci;  Laterality: Right;   • TONSILLECTOMY        General Information     Row Name 05/24/23 1453          Physical Therapy Time and Intention    Document Type therapy note (daily note)  -PC     Mode of Treatment physical therapy  -PC     Row Name 05/24/23 1453          General Information    Existing Precautions/Restrictions fall;oxygen therapy device and L/min  -PC      Row Name 05/24/23 1453          Cognition    Orientation Status (Cognition) oriented x 4  -PC           User Key  (r) = Recorded By, (t) = Taken By, (c) = Cosigned By    Initials Name Provider Type    PC Jacinta Wilson, PT Physical Therapist               Mobility     Row Name 05/24/23 1453          Bed Mobility    Comment, (Bed Mobility) up in chair  -PC     Row Name 05/24/23 1453          Sit-Stand Transfer    Sit-Stand [place] (Transfers) independent  -PC     Row Name 05/24/23 1453          Gait/Stairs (Locomotion)    [place] Level (Gait) independent;1 person to manage equipment  -PC     Distance in Feet (Gait) 300 ft, 6L o2, chest tube off suction, no assistive device  -PC           User Key  (r) = Recorded By, (t) = Taken By, (c) = Cosigned By    Initials Name Provider Type    PC Jacinta Wilson, PT Physical Therapist               Obj/Interventions    No documentation.                Goals/Plan    No documentation.                Clinical Impression     Row Name 05/24/23 1454          Pain    Pretreatment Pain Rating 0/10 - no pain  -PC     Row Name 05/24/23 1454          Plan of Care Review    Plan of Care Reviewed With patient  -PC     Progress improving  -PC     Outcome Evaluation Pt has demonstrated good progress with mobility and is walking 300 ft in jac[place] independently without assistive device, he is still on o2 and has a chest tube to suction, pt is safe to cont walking with nursing staff until discharge, no further PT needs, probable home with spouse tomorrow  -PC     Row Name 05/24/23 1454          Positioning and Restraints    Pre-Treatment Position sitting in chair/recliner  -PC     Post Treatment Position chair  -PC     In Chair sitting;call light within reach;encouraged to call for assist;with family/caregiver  -PC           User Key  (r) = Recorded By, (t) = Taken By, (c) = Cosigned By    Initials Name Provider Type    Jacinta Baig, PT Physical Therapist               Outcome  Measures     Row Name 05/24/23 1456          How much help from another person do you currently need...    Turning from your back to your side while in flat bed without using bedrails? 4  -PC     Moving from lying on back to sitting on the side of a flat bed without bedrails? 3  -PC     Moving to and from a bed to a chair (including a wheelchair)? 4  -PC     Standing up from a chair using your arms (e.g., wheelchair, bedside chair)? 4  -PC     Climbing 3-5 steps with a railing? 3  -PC     To walk in hospital room? 4  -PC     AM-PAC 6 Clicks Score (PT) 22  -PC     Highest level of mobility 7 --> Walked 25 feet or more  -PC     Row Name 05/24/23 1251          Functional Assessment    Outcome Measure Options AM-PAC 6 Clicks Daily Activity (OT)  -           User Key  (r) = Recorded By, (t) = Taken By, (c) = Cosigned By    Initials Name Provider Type    Piper Collier, OTR Occupational Therapist    Jacinta Baig PT Physical Therapist                             Physical Therapy Education     Title: PT OT SLP Therapies (Done)     Topic: Physical Therapy (Done)     Point: Mobility training (Done)     Learning Progress Summary           Patient Acceptance, E,D, DU by PC at 5/24/2023 1456    Acceptance, E, VU by KT at 5/24/2023 1227    Acceptance, E,D, DU by PC at 5/23/2023 1732    Eager, E, VU,NR by  at 5/21/2023 1525                   Point: Home exercise program (Done)     Learning Progress Summary           Patient Acceptance, E,D, DU by PC at 5/24/2023 1456    Acceptance, E, VU by KT at 5/24/2023 1227    Acceptance, E,D, DU by PC at 5/23/2023 1732    Eager, E, VU,NR by  at 5/21/2023 1525                   Point: Body mechanics (Done)     Learning Progress Summary           Patient Acceptance, E,D, DU by PC at 5/24/2023 1456    Acceptance, E, VU by KT at 5/24/2023 1227    Acceptance, E,D, DU by PC at 5/23/2023 1732    Eager, E, VU,NR by  at 5/21/2023 1525                   Point: Precautions  (Done)     Learning Progress Summary           Patient Acceptance, E,D, DU by PC at 5/24/2023 1456    Acceptance, E, VU by KT at 5/24/2023 1227    Acceptance, E,D, DU by PC at 5/23/2023 1732    Eager, E, VU,NR by  at 5/21/2023 1525                               User Key     Initials Effective Dates Name Provider Type Discipline     06/16/21 -  Geena Macario, PT Physical Therapist PT    PC 06/16/21 -  Jacinta Wilson PT Physical Therapist PT    KT 06/16/21 -  Briana Wall RN Registered Nurse Nurse              PT Recommendation and Plan     Plan of Care Reviewed With: patient  Progress: improving  Outcome Evaluation: Pt has demonstrated good progress with mobility and is walking 300 ft in jac[place] independently without assistive device, he is still on o2 and has a chest tube to suction, pt is safe to cont walking with nursing staff until discharge, no further PT needs, probable home with spouse tomorrow     Time Calculation:    PT Charges     Row Name 05/24/23 1456             Time Calculation    Start Time --  2194-1634, 1429-3696, interrupted by MD visit  -PC            User Key  (r) = Recorded By, (t) = Taken By, (c) = Cosigned By    Initials Name Provider Type     Jacinta Wilson, PT Physical Therapist              Therapy Charges for Today     Code Description Service Date Service Provider Modifiers Qty    78502655294  PT THERAPEUTIC ACT EA 15 MIN 5/23/2023 Jacinta Wilson, PT GP 1    30928569389 HC PT THER PROC EA 15 MIN 5/24/2023 Jacinta Wilson, PT GP 1          PT G-Codes  Outcome Measure Options: AM-PAC 6 Clicks Daily Activity (OT)  AM-PAC 6 Clicks Score (PT): 22  AM-PAC 6 Clicks Score (OT): 21       Jacinta Wilson PT  5/24/2023

## 2023-05-24 NOTE — PROGRESS NOTES
Infectious Diseases Progress Note    Ramya Lomeli MD     [facility]  [place]: 7 days  Patient Identification:  Name: Calos Moseley  Age: 62 y.o.  Sex: male  :  1960  MRN: [id number]         Primary Care Physician: Naveed Nunez DO    Requesting physician: Dr. Carvajal  Reason for consultation: Empyema    Subjective: Still having discomfort at the chest tube site and have cough.  But overall feels better.  Denies any fever and chills.  Interval History: See my admission history and physical.  Started on IV ceftriaxone by pulmonary service at the time of admission.  Thoracentesis performed on 2023 reveals exudative noninfectious process and cytology pending.  2023 underwent bronchoscopy  2023 THORACOSCOPY VIDEO ASSISTED WITH DECORTICATION WITH DAVINCI   ROBOT  Received last dose of antibiotics on 2023  Objective:    Scheduled Meds:acetaminophen, 1,000 mg, Oral, TID  atorvastatin, 20 mg, Oral, Nightly  benzonatate, 200 mg, Oral, TID  divalproex, 250 mg, Oral, BID  docusate sodium, 100 mg, Oral, BID  enoxaparin, 40 mg, Subcutaneous, Q12H  guaiFENesin, 1,200 mg, Oral, Q12H  hydrALAZINE, 10 mg, Oral, Q8H  ipratropium-albuterol, 3 mL, Nebulization, 4x Daily - RT  lamoTRIgine, 100 mg, Oral, BID  levothyroxine, 50 mcg, Oral, Q AM  lidocaine, 1 patch, Transdermal, Q24H  metoprolol succinate XL, 100 mg, Oral, BID  NIFEdipine XL, 30 mg, Oral, Q24H  polyethylene glycol, 17 g, Oral, Daily  pramipexole, 0.5 mg, Oral, Nightly  senna-docusate sodium, 2 tablet, Oral, BID  sodium chloride, 10 mL, Intravenous, Q12H  sodium chloride, 10 mL, Intravenous, Q12H  sodium chloride, 4 mL, Nebulization, BID - RT  tamsulosin, 0.4 mg, Oral, Nightly  torsemide, 40 mg, Oral, BID  vilazodone, 40 mg, Oral, Daily      Continuous Infusions:     Vital signs in last 24 hours:  Temp:  [97.5 °F (36.4 °C)-98.6 °F (37 °C)] 97.5 °F (36.4 °C)  Heart Rate:  [65-75] 75  Resp:  [16-20] 20  BP: (122-170)/(58-75)  "170/70    Intake/Output:    Intake/Output Summary (Last 24 hours) at 5/25/2023 0814  Last data filed at 5/25/2023 0600  Gross per 24 hour   Intake 1200 ml   Output 3475 ml   Net -2275 ml       Exam:  /70 (BP Location: Left arm, Patient Position: Sitting)   Pulse 75   Temp 97.5 °F (36.4 °C) (Oral)   Resp 20   Ht 167.6 cm (66\")   Wt 115 kg (253 lb 14.4 oz)   SpO2 95%   BMI 40.98 kg/m²   Patient is examined using the personal protective equipment as per guidelines from infection control for this particular patient as enacted.  Hand washing was performed before and after patient interaction.  General Appearance:  Sitting in the recliner comfortable does not appear to be toxic or septic but does have episodes of cough during conversation.  Denies being short of breath.                          Head:    Normocephalic, without obvious abnormality, atraumatic                           Eyes:    PERRL, conjunctivae/corneas clear, EOM's intact, both eyes                         Throat:   Lips, tongue, gums normal; oral mucosa pink and moist                           Neck:   Supple, symmetrical, trachea midline, no JVD                         Lungs:    Decreased breath sounds at the right lung base, chest tube inplace                 Chest Wall:    No tenderness or deformity                          Heart:  S1-S2 regular                  Abdomen:   Obese soft nontender                 Extremities: Lateral lower extremity edema                        Pulses:   Pulses palpable in all extremities                            Skin:   Skin is warm and dry,  no rashes or palpable lesions                  Neurologic: Grossly nonfocal       Data Review:    I reviewed the patient's new clinical results.  Results from last 7 days   Lab Units 05/25/23  0419 05/24/23  0518 05/22/23  0411 05/21/23  0521 05/20/23 2030 05/20/23  0405   WBC 10*3/mm3 7.08 7.70 14.39* 16.55* 22.24* 16.88*   HEMOGLOBIN g/dL 9.9* 9.4* 10.3* 11.2* 11.9* " 11.5*   PLATELETS 10*3/mm3 400 338 315 370 428 341     Results from last 7 days   Lab Units 05/25/23  0419 05/24/23  0518 05/23/23  1630 05/23/23  0457 05/22/23  0411 05/21/23  0521 05/20/23  2030   SODIUM mmol/L 139 141 140 138 135* 138 140   POTASSIUM mmol/L 4.5 4.6 4.9 5.8* 5.4* 5.8* 5.5*   CHLORIDE mmol/L 99 104 98 101 99 102 103   CO2 mmol/L 34.3* 31.6* 32.6* 31.6* 30.9* 30.7* 23.0   BUN mg/dL 41* 43* 50* 52* 39* 30* 33*   CREATININE mg/dL 1.73* 1.89* 2.33* 2.40* 2.07* 1.42* 1.57*   CALCIUM mg/dL 8.9 8.5* 8.9 8.9 8.5* 9.0 8.4*   GLUCOSE mg/dL 127* 167* 146* 152* 134* 156* 118*     Microbiology Results (last 10 days)     Procedure Component Value - Date/Time    Tissue / Bone Culture - Tissue, Pleura [[id number]] Collected: 05/20/23 1856    Lab Status: Final result Specimen: Tissue from Pleura Updated: 05/23/23 0930     Tissue Culture No growth at 3 days     Gram Stain No WBCs or organisms seen    Anaerobic Culture - Surgical Site, Pleura [[id number]]  (Normal) Collected: 05/20/23 1851    Lab Status: Final result Specimen: Surgical Site from Pleura Updated: 05/25/23 0631     Anaerobic Culture No anaerobes isolated at 5 days    Body Fluid Culture - Surgical Site, Pleura [[id number]] Collected: 05/20/23 1851    Lab Status: Final result Specimen: Surgical Site from Pleura Updated: 05/23/23 0928     Body Fluid Culture No growth at 3 days     Gram Stain No WBCs or organisms seen    Fungus Culture - Lavage, Lung, Right Lower Lobe [[id number]] Collected: 05/19/23 1546    Lab Status: Preliminary result Specimen: Lavage from Lung, Right Lower Lobe Updated: 05/24/23 1645     Fungus Culture No fungus isolated at less than 1 week    AFB Culture - Lavage, Lung, Right Lower Lobe [[id number]] Collected: 05/19/23 1546    Lab Status: Preliminary result Specimen: Lavage from Lung, Right Lower Lobe Updated: 05/24/23 1644     AFB Culture No AFB isolated at less than 1 week     AFB Stain No acid fast bacilli seen on concentrated smear     Fungus Smear - Lavage, Lung, Right Lower Lobe [[id number]] Collected: 05/19/23 1546    Lab Status: Final result Specimen: Lavage from Lung, Right Lower Lobe Updated: 05/19/23 2113     Fungal Stain No fungal elements seen    BAL Culture, Quantitative - Lavage, Lung, Right Lower Lobe [[id number]] Collected: 05/19/23 1546    Lab Status: Final result Specimen: Lavage from Lung, Right Lower Lobe Updated: 05/21/23 0822     BAL Culture No growth     Gram Stain No organisms seen      Rare (1+) WBCs seen    Legionella Antigen, Urine - Urine, Urine, Clean Catch [[id number]]  (Normal) Collected: 05/17/23 0452    Lab Status: Final result Specimen: Urine, Clean Catch Updated: 05/17/23 0644     LEGIONELLA ANTIGEN, URINE Negative    S. Pneumo Ag Urine or CSF - Urine, Urine, Clean Catch [[id number]]  (Normal) Collected: 05/17/23 0452    Lab Status: Final result Specimen: Urine, Clean Catch Updated: 05/17/23 0644     Strep Pneumo Ag Negative    Blood Culture - Blood, Hand, Right [[id number]]  (Normal) Collected: 05/16/23 2253    Lab Status: Final result Specimen: Blood from Hand, Right Updated: 05/21/23 2301     Blood Culture No growth at 5 days    Blood Culture - Blood, Arm, Left [[id number]]  (Normal) Collected: 05/16/23 2247    Lab Status: Final result Specimen: Blood from Arm, Left Updated: 05/21/23 2301     Blood Culture No growth at 5 days    Respiratory Panel PCR w/COVID-19(SARS-CoV-2) J[facility]I/SONIA/MARQUITA/PAD/[facility]/ULISSES In-House, NP Swab in UTM/VTM, 3-4 HR TAT - Swab, Nasopharynx [[id number]]  (Normal) Collected: 05/16/23 1733    Lab Status: Final result Specimen: Swab from Nasopharynx Updated: 05/16/23 1832     ADENOVIRUS, PCR Not Detected     Coronavirus 229E Not Detected     Coronavirus HKU1 Not Detected     Coronavirus NL63 Not Detected     Coronavirus OC43 Not Detected     COVID19 Not Detected     Human Metapneumovirus Not Detected     Human Rhinovirus/Enterovirus Not Detected     Influenza A PCR Not Detected     Influenza B PCR Not  Detected     Parainfluenza Virus 1 Not Detected     Parainfluenza Virus 2 Not Detected     Parainfluenza Virus 3 Not Detected     Parainfluenza Virus 4 Not Detected     RSV, PCR Not Detected     Bordetella pertussis pcr Not Detected     Bordetella parapertussis PCR Not Detected     Chlamydophila pneumoniae PCR Not Detected     Mycoplasma pneumo by PCR Not Detected    Narrative:      In the setting of a positive respiratory panel with a viral infection PLUS a negative procalcitonin without other underlying concern for bacterial infection, consider observing off antibiotics or discontinuation of antibiotics and continue supportive care. If the respiratory panel is positive for atypical bacterial infection (Bordetella pertussis, Chlamydophila pneumoniae, or Mycoplasma pneumoniae), consider antibiotic de-escalation to target atypical bacterial infection.        Pleural fluid appears to be exudative but does not appear infectious.    Assessment:    Acute dyspnea    COPD (chronic obstructive pulmonary disease)    Anxiety and depression    Hypothyroidism    ISIAH (obstructive sleep apnea)    Essential hypertension    Pleural effusion on right    CKD (chronic kidney disease)    Acute respiratory failure with hypoxia    Lymphadenopathy, mediastinal  Complicated right pleural effusion with loculation in the setting of recent symptoms suggestive of pneumonia-this likely represent complicated effusion with empyema but rule out endobronchial lesion and possible malignancy related postobstructive pneumonia complicating this effusion-s/p THORACOSCOPY VIDEO ASSISTED WITH DECORTICATION WITH DAVINCI ROBOT  Recommendations/discussion:   Observe off antibiotics  He is doing well and will sign off.  Ramya Lomeli MD  5/25/2023  08:14 EDT    Parts of this note may be an electronic transcription/translation of spoken language to printed text using the Dragon dictation system.

## 2023-05-24 NOTE — TELEPHONE ENCOUNTER
----- Message from Yoan Leonard MD sent at 4/3/2023  8:35 AM EDT -----  Benign hyperplastic polyps and adenoma  Recall 7 years

## 2023-05-25 ENCOUNTER — APPOINTMENT (OUTPATIENT)
Dept: GENERAL RADIOLOGY | Facility: HOSPITAL | Age: 63
End: 2023-05-25
Payer: COMMERCIAL

## 2023-05-25 LAB
ALBUMIN SERPL-MCNC: 2.9 G/DL (ref 3.5–5.2)
ANION GAP SERPL CALCULATED.3IONS-SCNC: 5.7 MMOL/L (ref 5–15)
BACTERIA SPEC ANAEROBE CULT: NORMAL
BASOPHILS # BLD AUTO: 0.04 10*3/MM3 (ref 0–0.2)
BASOPHILS NFR BLD AUTO: 0.6 % (ref 0–1.5)
BUN SERPL-MCNC: 41 MG/DL (ref 8–23)
BUN/CREAT SERPL: 23.7 (ref 7–25)
CALCIUM SPEC-SCNC: 8.9 MG/DL (ref 8.6–10.5)
CHLORIDE SERPL-SCNC: 99 MMOL/L (ref 98–107)
CO2 SERPL-SCNC: 34.3 MMOL/L (ref 22–29)
CREAT SERPL-MCNC: 1.73 MG/DL (ref 0.76–1.27)
DEPRECATED RDW RBC AUTO: 42.7 FL (ref 37–54)
EGFRCR SERPLBLD CKD-EPI 2021: 44.4 ML/MIN/1.73
EOSINOPHIL # BLD AUTO: 0.26 10*3/MM3 (ref 0–0.4)
EOSINOPHIL NFR BLD AUTO: 3.7 % (ref 0.3–6.2)
ERYTHROCYTE [DISTWIDTH] IN BLOOD BY AUTOMATED COUNT: 12.8 % (ref 12.3–15.4)
GLUCOSE SERPL-MCNC: 127 MG/DL (ref 65–99)
HCT VFR BLD AUTO: 31.1 % (ref 37.5–51)
HGB BLD-MCNC: 9.9 G/DL (ref 13–17.7)
IMM GRANULOCYTES # BLD AUTO: 0.15 10*3/MM3 (ref 0–0.05)
IMM GRANULOCYTES NFR BLD AUTO: 2.1 % (ref 0–0.5)
LYMPHOCYTES # BLD AUTO: 1.29 10*3/MM3 (ref 0.7–3.1)
LYMPHOCYTES NFR BLD AUTO: 18.2 % (ref 19.6–45.3)
MAGNESIUM SERPL-MCNC: 2.1 MG/DL (ref 1.6–2.4)
MCH RBC QN AUTO: 28.9 PG (ref 26.6–33)
MCHC RBC AUTO-ENTMCNC: 31.8 G/DL (ref 31.5–35.7)
MCV RBC AUTO: 90.9 FL (ref 79–97)
MONOCYTES # BLD AUTO: 0.85 10*3/MM3 (ref 0.1–0.9)
MONOCYTES NFR BLD AUTO: 12 % (ref 5–12)
NEUTROPHILS NFR BLD AUTO: 4.49 10*3/MM3 (ref 1.7–7)
NEUTROPHILS NFR BLD AUTO: 63.4 % (ref 42.7–76)
NRBC BLD AUTO-RTO: 0 /100 WBC (ref 0–0.2)
NT-PROBNP SERPL-MCNC: 346 PG/ML (ref 0–900)
PHOSPHATE SERPL-MCNC: 3.2 MG/DL (ref 2.5–4.5)
PLATELET # BLD AUTO: 400 10*3/MM3 (ref 140–450)
PMV BLD AUTO: 9.5 FL (ref 6–12)
POTASSIUM SERPL-SCNC: 4.5 MMOL/L (ref 3.5–5.2)
RBC # BLD AUTO: 3.42 10*6/MM3 (ref 4.14–5.8)
SODIUM SERPL-SCNC: 139 MMOL/L (ref 136–145)
URATE SERPL-MCNC: 9.4 MG/DL (ref 3.4–7)
WBC NRBC COR # BLD: 7.08 10*3/MM3 (ref 3.4–10.8)

## 2023-05-25 PROCEDURE — 94799 UNLISTED PULMONARY SVC/PX: CPT

## 2023-05-25 PROCEDURE — 99024 POSTOP FOLLOW-UP VISIT: CPT | Performed by: NURSE PRACTITIONER

## 2023-05-25 PROCEDURE — 94762 N-INVAS EAR/PLS OXIMTRY CONT: CPT

## 2023-05-25 PROCEDURE — 83880 ASSAY OF NATRIURETIC PEPTIDE: CPT | Performed by: INTERNAL MEDICINE

## 2023-05-25 PROCEDURE — 94761 N-INVAS EAR/PLS OXIMETRY MLT: CPT

## 2023-05-25 PROCEDURE — 80069 RENAL FUNCTION PANEL: CPT | Performed by: INTERNAL MEDICINE

## 2023-05-25 PROCEDURE — 94668 MNPJ CHEST WALL SBSQ: CPT

## 2023-05-25 PROCEDURE — 94669 MECHANICAL CHEST WALL OSCILL: CPT

## 2023-05-25 PROCEDURE — 94660 CPAP INITIATION&MGMT: CPT

## 2023-05-25 PROCEDURE — 25010000002 ENOXAPARIN PER 10 MG: Performed by: NURSE PRACTITIONER

## 2023-05-25 PROCEDURE — 85025 COMPLETE CBC W/AUTO DIFF WBC: CPT | Performed by: INTERNAL MEDICINE

## 2023-05-25 PROCEDURE — 94760 N-INVAS EAR/PLS OXIMETRY 1: CPT

## 2023-05-25 PROCEDURE — 94664 DEMO&/EVAL PT USE INHALER: CPT

## 2023-05-25 PROCEDURE — 84550 ASSAY OF BLOOD/URIC ACID: CPT | Performed by: INTERNAL MEDICINE

## 2023-05-25 PROCEDURE — 71045 X-RAY EXAM CHEST 1 VIEW: CPT

## 2023-05-25 PROCEDURE — 83735 ASSAY OF MAGNESIUM: CPT | Performed by: INTERNAL MEDICINE

## 2023-05-25 RX ORDER — OXYCODONE HYDROCHLORIDE 5 MG/1
5 TABLET ORAL EVERY 4 HOURS PRN
Qty: 18 TABLET | Refills: 0 | Status: SHIPPED | OUTPATIENT
Start: 2023-05-25 | End: 2023-05-28

## 2023-05-25 RX ORDER — UREA 10 %
1 LOTION (ML) TOPICAL NIGHTLY
Status: DISCONTINUED | OUTPATIENT
Start: 2023-05-25 | End: 2023-05-26 | Stop reason: HOSPADM

## 2023-05-25 RX ORDER — ACETAMINOPHEN 500 MG
1000 TABLET ORAL 3 TIMES DAILY
Qty: 180 TABLET | Refills: 0 | Status: SHIPPED | OUTPATIENT
Start: 2023-05-25 | End: 2023-06-24

## 2023-05-25 RX ORDER — HYDRALAZINE HYDROCHLORIDE 25 MG/1
25 TABLET, FILM COATED ORAL EVERY 8 HOURS SCHEDULED
Status: DISCONTINUED | OUTPATIENT
Start: 2023-05-25 | End: 2023-05-26

## 2023-05-25 RX ORDER — HYDRALAZINE HYDROCHLORIDE 10 MG/1
10 TABLET, FILM COATED ORAL EVERY 8 HOURS SCHEDULED
Status: DISCONTINUED | OUTPATIENT
Start: 2023-05-25 | End: 2023-05-25

## 2023-05-25 RX ORDER — CLONAZEPAM 0.5 MG/1
0.5 TABLET ORAL EVERY 8 HOURS PRN
Status: DISCONTINUED | OUTPATIENT
Start: 2023-05-25 | End: 2023-05-26 | Stop reason: HOSPADM

## 2023-05-25 RX ADMIN — POLYETHYLENE GLYCOL 3350 17 G: 17 POWDER, FOR SOLUTION ORAL at 08:58

## 2023-05-25 RX ADMIN — BENZONATATE 200 MG: 100 CAPSULE ORAL at 21:33

## 2023-05-25 RX ADMIN — TAMSULOSIN HYDROCHLORIDE 0.4 MG: 0.4 CAPSULE ORAL at 21:33

## 2023-05-25 RX ADMIN — Medication 4 ML: at 08:07

## 2023-05-25 RX ADMIN — TORSEMIDE 40 MG: 20 TABLET ORAL at 08:57

## 2023-05-25 RX ADMIN — DOCUSATE SODIUM 100 MG: 100 CAPSULE, LIQUID FILLED ORAL at 08:58

## 2023-05-25 RX ADMIN — OXYCODONE HYDROCHLORIDE 5 MG: 5 TABLET ORAL at 23:13

## 2023-05-25 RX ADMIN — LIDOCAINE 1 PATCH: 700 PATCH TOPICAL at 08:59

## 2023-05-25 RX ADMIN — Medication 10 ML: at 09:00

## 2023-05-25 RX ADMIN — HYDRALAZINE HYDROCHLORIDE 25 MG: 25 TABLET, FILM COATED ORAL at 21:33

## 2023-05-25 RX ADMIN — IPRATROPIUM BROMIDE AND ALBUTEROL SULFATE 3 ML: 2.5; .5 SOLUTION RESPIRATORY (INHALATION) at 21:01

## 2023-05-25 RX ADMIN — IPRATROPIUM BROMIDE AND ALBUTEROL SULFATE 3 ML: 2.5; .5 SOLUTION RESPIRATORY (INHALATION) at 08:05

## 2023-05-25 RX ADMIN — GUAIFENESIN 1200 MG: 600 TABLET, EXTENDED RELEASE ORAL at 21:32

## 2023-05-25 RX ADMIN — OXYCODONE HYDROCHLORIDE 5 MG: 5 TABLET ORAL at 18:03

## 2023-05-25 RX ADMIN — BENZONATATE 200 MG: 100 CAPSULE ORAL at 08:58

## 2023-05-25 RX ADMIN — ATORVASTATIN CALCIUM 20 MG: 20 TABLET, FILM COATED ORAL at 21:32

## 2023-05-25 RX ADMIN — ENOXAPARIN SODIUM 40 MG: 100 INJECTION SUBCUTANEOUS at 21:34

## 2023-05-25 RX ADMIN — LAMOTRIGINE 100 MG: 100 TABLET ORAL at 08:58

## 2023-05-25 RX ADMIN — DIVALPROEX SODIUM 250 MG: 250 TABLET, EXTENDED RELEASE ORAL at 08:58

## 2023-05-25 RX ADMIN — CLONAZEPAM 0.5 MG: 0.5 TABLET ORAL at 23:13

## 2023-05-25 RX ADMIN — IPRATROPIUM BROMIDE AND ALBUTEROL SULFATE 3 ML: 2.5; .5 SOLUTION RESPIRATORY (INHALATION) at 14:57

## 2023-05-25 RX ADMIN — ENOXAPARIN SODIUM 40 MG: 100 INJECTION SUBCUTANEOUS at 08:57

## 2023-05-25 RX ADMIN — PRAMIPEXOLE DIHYDROCHLORIDE 0.5 MG: 0.5 TABLET ORAL at 21:33

## 2023-05-25 RX ADMIN — IPRATROPIUM BROMIDE AND ALBUTEROL SULFATE 3 ML: 2.5; .5 SOLUTION RESPIRATORY (INHALATION) at 11:40

## 2023-05-25 RX ADMIN — ACETAMINOPHEN 1000 MG: 500 TABLET, FILM COATED ORAL at 08:58

## 2023-05-25 RX ADMIN — OXYCODONE HYDROCHLORIDE 5 MG: 5 TABLET ORAL at 08:58

## 2023-05-25 RX ADMIN — LEVOTHYROXINE SODIUM 50 MCG: 0.05 TABLET ORAL at 05:43

## 2023-05-25 RX ADMIN — Medication 10 ML: at 21:33

## 2023-05-25 RX ADMIN — ACETAMINOPHEN 1000 MG: 500 TABLET, FILM COATED ORAL at 17:00

## 2023-05-25 RX ADMIN — DIVALPROEX SODIUM 250 MG: 250 TABLET, EXTENDED RELEASE ORAL at 21:34

## 2023-05-25 RX ADMIN — METOPROLOL SUCCINATE 100 MG: 100 TABLET, EXTENDED RELEASE ORAL at 08:57

## 2023-05-25 RX ADMIN — METOPROLOL SUCCINATE 100 MG: 100 TABLET, EXTENDED RELEASE ORAL at 21:33

## 2023-05-25 RX ADMIN — ACETAMINOPHEN 1000 MG: 500 TABLET, FILM COATED ORAL at 21:32

## 2023-05-25 RX ADMIN — HYDRALAZINE HYDROCHLORIDE 25 MG: 25 TABLET, FILM COATED ORAL at 13:45

## 2023-05-25 RX ADMIN — OXYCODONE HYDROCHLORIDE 5 MG: 5 TABLET ORAL at 13:44

## 2023-05-25 RX ADMIN — GUAIFENESIN 1200 MG: 600 TABLET, EXTENDED RELEASE ORAL at 08:57

## 2023-05-25 RX ADMIN — LAMOTRIGINE 100 MG: 100 TABLET ORAL at 21:33

## 2023-05-25 RX ADMIN — VILAZODONE HYDROCHLORIDE 40 MG: 40 TABLET, FILM COATED ORAL at 08:57

## 2023-05-25 RX ADMIN — TORSEMIDE 40 MG: 20 TABLET ORAL at 17:57

## 2023-05-25 RX ADMIN — Medication 1 MG: at 21:33

## 2023-05-25 RX ADMIN — BENZONATATE 200 MG: 100 CAPSULE ORAL at 17:00

## 2023-05-25 RX ADMIN — DOCUSATE SODIUM 50MG AND SENNOSIDES 8.6MG 2 TABLET: 8.6; 5 TABLET, FILM COATED ORAL at 21:32

## 2023-05-25 RX ADMIN — NIFEDIPINE 30 MG: 30 TABLET, FILM COATED, EXTENDED RELEASE ORAL at 08:58

## 2023-05-25 RX ADMIN — HYDRALAZINE HYDROCHLORIDE 25 MG: 25 TABLET, FILM COATED ORAL at 05:43

## 2023-05-25 RX ADMIN — Medication 4 ML: at 21:01

## 2023-05-25 NOTE — PROGRESS NOTES
[facility] Medicine Services  PROGRESS NOTE    Patient Name: Calos Moseley  : 1960  MRN: [id number]    Date of Admission: 2023  Primary Care Physician: Naveed Nunez DO    Subjective   Subjective     CC:  Follow-up multiple issues    Subjective:  Patient feels pretty well.  He notes a little bit of leg swelling.  He reports good urine output no retention since he has had the Lobo catheter out.  He is glad to have the Lobo catheter out.  He is eager to have the chest tubes out.  He is having some anxiety and insomnia and requesting medicines to treat these issues.      Review of Systems  No current fevers or chills  No current nausea, vomiting, or diarrhea  No current chest pain or palpitations      Objective   Objective     Vital Signs:   Temp:  [97.5 °F (36.4 °C)-97.8 °F (36.6 °C)] 97.8 °F (36.6 °C)  Heart Rate:  [65-75] 75  Resp:  [16-20] 16  BP: (122-170)/(58-75) 129/73        Physical Exam:  Constitutional:Awake, alert  HENT: NCAT, mucous membranes moist, neck supple  Respiratory: Decreased cough, improving coarse sounds, no wheezing, relatively nonlabored breathing  Cardiovascular: Pulse rate is normal, palpable radial pulse bilaterally  Gastrointestinal:soft, nontender, nondistended  Musculoskeletal: Normal musculature for age, mild lower extremity edema, BMI 41, morbidly obese  Psychiatric: Appropriate affect, cooperative, conversational  Neurologic: No slurred speech or facial droop, follows commands  Skin: No rashes or jaundice, warm      Results Reviewed:  Results from last 7 days   Lab Units 23  0419 23  0518 23  0411 23  2030 23  0405   WBC 10*3/mm3 7.08 7.70 14.39*   < > 16.88*   HEMOGLOBIN g/dL 9.9* 9.4* 10.3*   < > 11.5*   HEMATOCRIT % 31.1* 29.4* 31.7*   < > 35.6*   PLATELETS 10*3/mm3 400 338 315   < > 341   INR   --   --   --   --  1.08    < > = values in this interval not displayed.     Results from last 7 days   Lab Units 23  0419  05/24/23  0518 05/23/23  1630 05/23/23  0457   SODIUM mmol/L 139 141 140 138   POTASSIUM mmol/L 4.5 4.6 4.9 5.8*   CHLORIDE mmol/L 99 104 98 101   CO2 mmol/L 34.3* 31.6* 32.6* 31.6*   BUN mg/dL 41* 43* 50* 52*   CREATININE mg/dL 1.73* 1.89* 2.33* 2.40*   GLUCOSE mg/dL 127* 167* 146* 152*   CALCIUM mg/dL 8.9 8.5* 8.9 8.9   ALT (SGPT) U/L  --   --   --  17   AST (SGOT) U/L  --   --   --  15     Estimated Creatinine Clearance: 53.5 mL/min (A) (by C-G formula based on SCr of 1.73 mg/dL (H)).    Microbiology Results Abnormal     Procedure Component Value - Date/Time    Anaerobic Culture - Surgical Site, Pleura [[id number]]  (Normal) Collected: 05/20/23 1851    Lab Status: Final result Specimen: Surgical Site from Pleura Updated: 05/25/23 0631     Anaerobic Culture No anaerobes isolated at 5 days    Fungus Culture - Lavage, Lung, Right Lower Lobe [[id number]] Collected: 05/19/23 1546    Lab Status: Preliminary result Specimen: Lavage from Lung, Right Lower Lobe Updated: 05/24/23 1645     Fungus Culture No fungus isolated at less than 1 week    AFB Culture - Lavage, Lung, Right Lower Lobe [[id number]] Collected: 05/19/23 1546    Lab Status: Preliminary result Specimen: Lavage from Lung, Right Lower Lobe Updated: 05/24/23 1645     AFB Culture No AFB isolated at less than 1 week     AFB Stain No acid fast bacilli seen on concentrated smear    Tissue / Bone Culture - Tissue, Pleura [[id number]] Collected: 05/20/23 1856    Lab Status: Final result Specimen: Tissue from Pleura Updated: 05/23/23 0930     Tissue Culture No growth at 3 days     Gram Stain No WBCs or organisms seen    Body Fluid Culture - Surgical Site, Pleura [[id number]] Collected: 05/20/23 1851    Lab Status: Final result Specimen: Surgical Site from Pleura Updated: 05/23/23 0928     Body Fluid Culture No growth at 3 days     Gram Stain No WBCs or organisms seen    Blood Culture - Blood, Arm, Left [[id number]]  (Normal) Collected: 05/16/23 8943    Lab Status: Final  result Specimen: Blood from Arm, Left Updated: 05/21/23 2301     Blood Culture No growth at 5 days    Blood Culture - Blood, Hand, Right [[id number]]  (Normal) Collected: 05/16/23 2253    Lab Status: Final result Specimen: Blood from Hand, Right Updated: 05/21/23 2301     Blood Culture No growth at 5 days    BAL Culture, Quantitative - Lavage, Lung, Right Lower Lobe [[id number]] Collected: 05/19/23 1546    Lab Status: Final result Specimen: Lavage from Lung, Right Lower Lobe Updated: 05/21/23 0822     BAL Culture No growth     Gram Stain No organisms seen      Rare (1+) WBCs seen    Fungus Smear - Lavage, Lung, Right Lower Lobe [[id number]] Collected: 05/19/23 1546    Lab Status: Final result Specimen: Lavage from Lung, Right Lower Lobe Updated: 05/19/23 2113     Fungal Stain No fungal elements seen    S. Pneumo Ag Urine or CSF - Urine, Urine, Clean Catch [[id number]]  (Normal) Collected: 05/17/23 0452    Lab Status: Final result Specimen: Urine, Clean Catch Updated: 05/17/23 0644     Strep Pneumo Ag Negative    Legionella Antigen, Urine - Urine, Urine, Clean Catch [[id number]]  (Normal) Collected: 05/17/23 0452    Lab Status: Final result Specimen: Urine, Clean Catch Updated: 05/17/23 0644     LEGIONELLA ANTIGEN, URINE Negative    Respiratory Panel PCR w/COVID-19(SARS-CoV-2) JERI/SONIA/MARQUITA/PAD/COR/MAD/ULISSES In-House, NP Swab in UTM/VTM, 3-4 HR TAT - Swab, Nasopharynx [[id number]]  (Normal) Collected: 05/16/23 1733    Lab Status: Final result Specimen: Swab from Nasopharynx Updated: 05/16/23 1832     ADENOVIRUS, PCR Not Detected     Coronavirus 229E Not Detected     Coronavirus HKU1 Not Detected     Coronavirus NL63 Not Detected     Coronavirus OC43 Not Detected     COVID19 Not Detected     Human Metapneumovirus Not Detected     Human Rhinovirus/Enterovirus Not Detected     Influenza A PCR Not Detected     Influenza B PCR Not Detected     Parainfluenza Virus 1 Not Detected     Parainfluenza Virus 2 Not Detected      Parainfluenza Virus 3 Not Detected     Parainfluenza Virus 4 Not Detected     RSV, PCR Not Detected     Bordetella pertussis pcr Not Detected     Bordetella parapertussis PCR Not Detected     Chlamydophila pneumoniae PCR Not Detected     Mycoplasma pneumo by PCR Not Detected    Narrative:      In the setting of a positive respiratory panel with a viral infection PLUS a negative procalcitonin without other underlying concern for bacterial infection, consider observing off antibiotics or discontinuation of antibiotics and continue supportive care. If the respiratory panel is positive for atypical bacterial infection (Bordetella pertussis, Chlamydophila pneumoniae, or Mycoplasma pneumoniae), consider antibiotic de-escalation to target atypical bacterial infection.          Imaging Results (Last 24 Hours)     Procedure Component Value Units Date/Time    XR Chest 1 View [[id number]] Collected: 05/25/23 0635     Updated: 05/25/23 0642    Narrative:      XR CHEST 1 VW-     Clinical: Postop decortication     COMPARISON examination 05/24/2023     FINDINGS: Right-sided chest tube in position as before. There is small  amount of pleural thickening and/or pleural fusion along the lateral  aspect of the right lung. Mild volume loss right hemithorax similar to  the previous examination with vague infiltrate or atelectasis as before.  The cardiomediastinal silhouette is stable. The left lung is clear. No  pneumothorax.     CONCLUSION: Stable chest     This report was finalized on 5/25/2023 6:39 AM by Dr. Jed Shaffer M.D.             Results for orders placed during the hospital encounter of 05/16/23    Adult Transthoracic Echo Complete W/ Cont if Necessary Per Protocol    Interpretation Summary  •  Left ventricular systolic function is normal. Calculated left ventricular EF = 58.1% The left ventricular cavity is moderately dilated. Left ventricular wall thickness is consistent with mild concentric hypertrophy. All left  ventricular wall segments contract normally. Left ventricular diastolic function was normal.  •  Trace mitral valve regurgitation is present.  •  Trace tricuspid valve regurgitation is present. Estimated right ventricular systolic pressure from tricuspid regurgitation is mildly elevated (35-45 mmHg). Calculated right ventricular systolic pressure from tricuspid regurgitation is 36.3 mmHg.      I have reviewed the medications:  Scheduled Meds:acetaminophen, 1,000 mg, Oral, TID  atorvastatin, 20 mg, Oral, Nightly  benzonatate, 200 mg, Oral, TID  divalproex, 250 mg, Oral, BID  docusate sodium, 100 mg, Oral, BID  enoxaparin, 40 mg, Subcutaneous, Q12H  guaiFENesin, 1,200 mg, Oral, Q12H  hydrALAZINE, 25 mg, Oral, Q8H  ipratropium-albuterol, 3 mL, Nebulization, 4x Daily - RT  lamoTRIgine, 100 mg, Oral, BID  levothyroxine, 50 mcg, Oral, Q AM  lidocaine, 1 patch, Transdermal, Q24H  melatonin, 1 mg, Oral, Nightly  metoprolol succinate XL, 100 mg, Oral, BID  NIFEdipine XL, 30 mg, Oral, Q24H  polyethylene glycol, 17 g, Oral, Daily  pramipexole, 0.5 mg, Oral, Nightly  senna-docusate sodium, 2 tablet, Oral, BID  sodium chloride, 10 mL, Intravenous, Q12H  sodium chloride, 10 mL, Intravenous, Q12H  sodium chloride, 4 mL, Nebulization, BID - RT  tamsulosin, 0.4 mg, Oral, Nightly  torsemide, 40 mg, Oral, BID  vilazodone, 40 mg, Oral, Daily      Continuous Infusions:   PRN Meds:.•  albuterol sulfate HFA  •  senna-docusate sodium **AND** polyethylene glycol **AND** bisacodyl **AND** bisacodyl  •  Calcium Replacement - Follow Nurse / BPA Driven Protocol  •  clonazePAM  •  diazePAM  •  guaiFENesin-dextromethorphan  •  HYDROmorphone  •  labetalol  •  Magnesium Standard Dose Replacement - Follow Nurse / BPA Driven Protocol  •  midazolam  •  nitroglycerin  •  ondansetron  •  oxyCODONE  •  Phosphorus Replacement - Follow Nurse / BPA Driven Protocol  •  [COMPLETED] Insert Peripheral IV **AND** sodium chloride    Assessment & Plan   Assessment  "& Plan     Active Hospital Problems    Diagnosis  POA   • **Acute dyspnea [R06.00]  Yes   • Acute respiratory failure with hypoxia [J96.01]  Yes   • Pleural effusion on right [J90]  Yes   • CKD (chronic kidney disease) [N18.9]  Yes   • Lymphadenopathy, mediastinal [R59.0]  Unknown   • ISIAH (obstructive sleep apnea) [G47.33]  Yes   • COPD (chronic obstructive pulmonary disease) [J44.9]  Yes   • Anxiety and depression [F41.9, F32.A]  Yes   • Essential hypertension [I10]  Yes   • Hypothyroidism [E03.9]  Yes      Resolved Hospital Problems   No resolved problems to display.        Brief Hospital Course to date:  Calos Moseley is a 62 y.o. male \"admitted with Acute dyspnea, right-sided chest pain and hypoxia, findings of loculated right pleural effusion and possible right lower lobe endobronchial lesion.\"    Discussion/plan for today:  Patient with increasing issues with anxiety and insomnia.  Add very low-dose clonazepam for anxiety and monitor response.  Add very low-dose melatonin to promote sleep.  Encouraged normal day night cycle.      I have discussed case with nephrologist.  Patient has been transitioned to twice daily torsemide.  Renal function is seems to be improving.  He still has significant peripheral edema.  No urine retention with Lobo catheter removed and continues on Flomax.  Notes good stream.      Chest tubes as per thoracic surgery team.     Calcium channel blocker and hydralazine have both been adjusted as blood pressure needs have changed with acute issues and with diuretics.  Continue to monitor and adjust blood pressure medications further as needed.     Significant peripheral edema.  Normal EF.  Recheck proBNP.      Loculated pleural effusion status post thoracentesis.  Fluid looks to be exudative, cytology negative for malignant cells.  I do not see where Gram stain or culture were ever sent.    - Bronchoscopy completed on 5/19  - Completed antibiotics  - Supplement O2 as needed.  Wean to keep " sats greater than 90%  - Lidoderm patch for right-sided chest pain  -Status post right robot-assisted decortication on 5/20/2023     Peripheral edema/weight gain- reviewed echocardiogram.  Relatively unremarkable other than mild pulmonary hypertension.  No diastolic dysfunction and normal BNP.    -Patient improved with diuresis earlier in hospital stay.  - Urine studies do not suggest significant proteinuria though there is very small amount  - Also note that he is on nifedipine which can cause peripheral edema, dose decreased  -Received diuresis     CKD relatively stable, continue to follow.  Appreciate nephrology     History of hypothyroidism, on replacement with normal TSH     HTN, apparently refractory based on his extensive medication regimen but actually well controlled currently, labetalol for greater than 180.  Monitor carefully while on multiple medications.        • Lovenox for DVT prophylaxis.    Dispo: Hopefully home in the next couple days depending on clinical course      CODE STATUS:   Code Status and Medical Interventions:   Ordered at: 05/20/23 2819     Code Status (Patient has no pulse and is not breathing):    CPR (Attempt to Resuscitate)     Medical Interventions (Patient has pulse or is breathing):    Full Support     Release to patient:    Routine Release       Dre Pearce MD  05/25/23

## 2023-05-25 NOTE — PROGRESS NOTES
[facility] Progress Note      Patient Name: Calos Moseley  : 1960  MRN: [id number]  Primary Care Physician:  Naveed Nunez DO  Date of admission: 2023    Subjective     Interval History:   Follow-up acute kidney injury on chronic kidney disease, and hyperkalemia    The patient continues to have swelling, he has recurrent cough which is productive he has a chest tube in place, continued to have edema tolerated the diuresis yesterday with excellent urine output, no nausea or vomiting, no dysuria or gross hematuria voiding reasonably well after the Lobo catheter was removed    Review of Systems:   As noted above    Objective     Vitals:   Temp:  [97.5 °F (36.4 °C)-98.6 °F (37 °C)] 97.5 °F (36.4 °C)  Heart Rate:  [65-73] 73  Resp:  [16-20] 20  BP: (122-170)/(58-75) 170/70  Flow (L/min):  [5-6] 5    Intake/Output Summary (Last 24 hours) at 2023 0737  Last data filed at 2023 0600  Gross per 24 hour   Intake 1200 ml   Output 3475 ml   Net -2275 ml       Physical Exam:    General Appearance: alert, awake, chronically ill, morbidly obese, no acute  Skin: warm and dry  HEENT: oral mucosa normal, nonicteric sclera  Neck: supple, no JVD  Lungs: Decreased breath sounds on the right with bilateral rhonchi, he has unlabored breathing effort and he has right chest tube  Heart: RRR, normal S1 and S2  Abdomen: soft, nontender, nondistended, protuberant, normoactive bowels  : No palpable blood  Extremities: 2+ lower extremity edema  Neuro: normal speech and mental status     Scheduled Meds:     acetaminophen, 1,000 mg, Oral, TID  atorvastatin, 20 mg, Oral, Nightly  benzonatate, 200 mg, Oral, TID  divalproex, 250 mg, Oral, BID  docusate sodium, 100 mg, Oral, BID  enoxaparin, 40 mg, Subcutaneous, Q12H  guaiFENesin, 1,200 mg, Oral, Q12H  hydrALAZINE, 10 mg, Oral, Q8H  ipratropium-albuterol, 3 mL, Nebulization, 4x Daily - RT  lamoTRIgine, 100 mg, Oral, BID  levothyroxine, 50  mcg, Oral, Q AM  lidocaine, 1 patch, Transdermal, Q24H  metoprolol succinate XL, 100 mg, Oral, BID  NIFEdipine XL, 30 mg, Oral, Q24H  polyethylene glycol, 17 g, Oral, Daily  pramipexole, 0.5 mg, Oral, Nightly  senna-docusate sodium, 2 tablet, Oral, BID  sodium chloride, 10 mL, Intravenous, Q12H  sodium chloride, 10 mL, Intravenous, Q12H  sodium chloride, 4 mL, Nebulization, BID - RT  tamsulosin, 0.4 mg, Oral, Nightly  torsemide, 40 mg, Oral, BID  vilazodone, 40 mg, Oral, Daily      IV Meds:        Results Reviewed:   I have personally reviewed the results from the time of this admission to 5/25/2023 07:37 EDT     Results from last 7 days   Lab Units 05/25/23 0419 05/24/23 0518 05/23/23  1630 05/23/23  0457   SODIUM mmol/L 139 141 140 138   POTASSIUM mmol/L 4.5 4.6 4.9 5.8*   CHLORIDE mmol/L 99 104 98 101   CO2 mmol/L 34.3* 31.6* 32.6* 31.6*   BUN mg/dL 41* 43* 50* 52*   CREATININE mg/dL 1.73* 1.89* 2.33* 2.40*   CALCIUM mg/dL 8.9 8.5* 8.9 8.9   BILIRUBIN mg/dL  --   --   --  0.2   ALK PHOS U/L  --   --   --  66   ALT (SGPT) U/L  --   --   --  17   AST (SGOT) U/L  --   --   --  15   GLUCOSE mg/dL 127* 167* 146* 152*       Estimated Creatinine Clearance: 53.5 mL/min (A) (by C-G formula based on SCr of 1.73 mg/dL (H)).    Results from last 7 days   Lab Units 05/25/23 0419 05/24/23 0518 05/23/23  1630   MAGNESIUM mg/dL 2.1 2.6*  --    PHOSPHORUS mg/dL 3.2 3.3 3.7       Results from last 7 days   Lab Units 05/25/23 0419 05/24/23 0518   URIC ACID mg/dL 9.4* 9.4*       Results from last 7 days   Lab Units 05/25/23  0419 05/24/23  0518 05/22/23  0411 05/21/23  0521 05/20/23  2030   WBC 10*3/mm3 7.08 7.70 14.39* 16.55* 22.24*   HEMOGLOBIN g/dL 9.9* 9.4* 10.3* 11.2* 11.9*   PLATELETS 10*3/mm3 400 338 315 370 428       Results from last 7 days   Lab Units 05/20/23  0405   INR  1.08       Assessment / Plan     ASSESSMENT:  -Acute kidney injury on chronic kidney disease stage IIIa, etiology not very clear could be  hemodynamic changes associated with volume shift and having decreased effective intravascular volume and the patient being on ACE inhibitor.  Creatinine down to 1.73, electrolyte within acceptable range except total CO2 up to 34.3, uric acid is 9.4.-Hyperkalemia most likely associate with acute kidney injury and ACE inhibitor, improving, potassium down to four-point  -Fluid excess, was started on torsemide he had excellent urine output but continued to have significant fluid excess  -History of recurrent cystine stones  -Hypertension associate with chronic kidney disease, has systolic component of the hypertension associate with fluid excess  -Hypothyroid  -Bipolar disorder  -Obstructive sleep apnea  -Anemia, hemoglobin 9.9    PLAN:  -Continue the current dose of torsemide 40 mg twice daily  -Surveillance labs    Thank you for involving us in the care of Calos Moseley.  Please feel free to call with any questions.    Davis Angel MD  05/25/23  07:37 EDT    [facility]  [phone]    Please note that portions of this note were completed with a voice recognition program.

## 2023-05-25 NOTE — PROGRESS NOTES
"  POST-OPERATIVE NOTE     Chief Complaint: Right loculated parapneumonic effusion, postoperative care  S/P: Right video-assisted thoracoscopy with da Coby robot assisted decortication  POD # 5    Subjective:  Symptoms:  Stable.  He reports shortness of breath.  No weakness.    Diet:  Adequate intake.  No nausea or vomiting.    Activity level: Impaired due to pain.    Pain:  He complains of pain that is mild.  Pain is well controlled and requiring pain medication.    Supplemental oxygen weaned to 3 L.    Objective:  General Appearance:  Comfortable and in no acute distress.    Vital signs: (most recent): Blood pressure 129/73, pulse 74, temperature 97.8 °F (36.6 °C), temperature source Oral, resp. rate 18, height 167.6 cm (66\"), weight 115 kg (253 lb 14.4 oz), SpO2 93 %.  Vital signs are normal.  No fever.    Output: Producing urine.    Lungs:  Normal effort and normal respiratory rate.  There are decreased breath sounds and rhonchi.    Heart: Normal rate.  Regular rhythm.    Chest: Chest wall tenderness present.    Abdomen: Abdomen is soft.  There is no abdominal tenderness.     Neurological: Patient is alert and oriented to person, place and time.    Skin:  Warm and dry.              Chest tube:   Site: Right, Clean, Dry, Intact and Securement device intact  Suction: waterseal  Air Leak: negative  24 Hour Total: 25ml    Results Review:     I reviewed the patient's new clinical results.  I reviewed the patient's new imaging results and agree with the interpretation.  I reviewed the patient's other test results and agree with the interpretation  Discussed with Patient, spouse at bedside, RN, Dr. Rice.    Assessment & Plan     Mr. Moseley is doing very well POD #5, s/p right robot-assisted decortication.  Posterior chest tube was removed yesterday.  Remaining chest tube at the bedside without difficulty.  Creatinine is improving.  Appreciate assistance from nephrology.  Remains on 3 L nasal cannula, continue to wean as " tolerated.  Recheck 1 more x-ray in the morning.  We will check oximetry studies to plan for supplemental oxygen needs at discharge.    SALVADOR Escobar  [facility]  05/25/23  15:23 EDT    Patient was seen and assessed while wearing personal protective equipment including facemask, protective eyewear and gloves.  Hand hygiene performed prior to entering the room and upon exiting with doffing of gloves.

## 2023-05-25 NOTE — PLAN OF CARE
Goal Outcome Evaluation:  Plan of Care Reviewed With: patient        Progress: improving  Outcome Evaluation: Rested well through night with Bipap in place. 5L O2, sats stable. Right chest tube, minimal drainage. Still with loose and productive cough. Awaiting discharge plan

## 2023-05-25 NOTE — PLAN OF CARE
Goal Outcome Evaluation:  Plan of Care Reviewed With: patient        Progress: improving   Vital signs stable. Alert and oriented x 4, pleasant. On 3 liters oxygen per nasal cannula. Overnight oximetry and walking oximetry tests ordered. Normal sinus cardiac rhythm. Up with assistance of one out of bed. Ambulated in hallway, tolerated well. Voiding per urinal without difficulty. Tolerating consistent carbohydrate diet well. Oral prn oxycodone given for complaint of right chest discomfort. Right chest tube removed per thoracic nurse practitioner. Duoderm dressing applied. Possible discharge home tomorrow. Will continue to monitor.

## 2023-05-25 NOTE — DISCHARGE PLACEMENT REQUEST
"Faibola Beltran \"Bobby\" (62 y.o. Male)     Date of Birth   1960    Social Security Number       Address   [address] [address]    Home Phone   [phone]    MRN   [id number]       Judaism   [employer]    Marital Status                               Admission Date   5/16/23    Admission Type   Emergency    Admitting Provider   Ramya Lomeli MD    Attending Provider   Dre Pearce MD    Department, Room/Bed   [address], [address]/1       Discharge Date       Discharge Disposition       Discharge Destination                               Attending Provider: Dre Pearce MD    Allergies: No Known Allergies    Isolation: None   Infection: None   Code Status: CPR    Ht: 167.6 cm (66\")   Wt: 115 kg (253 lb 14.4 oz)    Admission Cmt: None   Principal Problem: Acute dyspnea [R06.00]                 Active Insurance as of 5/16/2023     Primary Coverage     Payor Plan Insurance Group Employer/Plan Group    [facility] [employer] EMPL[employer] [id number]     Payor Plan Address Payor Plan Phone Number Payor Plan Fax Number Effective Dates    [address] [phone]  1/1/2023 - None Entered    [facility] [id number]       Subscriber Name Subscriber Birth Date Member ID       FABIOLA BELTRAN 1960 [id number]                 Emergency Contacts      (Rel.) Home Phone Work Phone Mobile Phone    Rebeca Beltran (Spouse) [phone] -- [phone]              "

## 2023-05-25 NOTE — DISCHARGE INSTRUCTIONS
Post-op VAT / Thoracotomy Discharge Instructions    1. Activity:  · Climb stairs as tolerated  · May drive car after 2 weeks or as directed by surgeon  · Walk at least 3-4 times a day  · Limit lifting for first 6 weeks. No lifting, pushing, or pulling greater than 20 pounds for at least 2 weeks  · Continue to use your incentive spirometer 10x/hour    2. Nutrition:  · Resume previous diet  · Eat well balanced meals  · Avoid constipation by eating fresh fruits, vegetables, whole grain foods and increasing fluid intake.    3. Incision (wound) Care:  · Remove dressing after 48 hours, then leave open to air  · If continued drainage, change dressing every 24 hours and as needed with dry gauze  · May shower after discharge.  · Wash around incision area with soap and water daily. May also cleanse with hydrogen peroxide and/or betadine  · No lotions or creams on incision area. It is normal to have some drainage from your chest tube site. Please keep the area clean and dry.    4. When to call for Medical Advice: [phone]  · Fever greater than 101 degrees  · Unusual or severe pain  · Difficulty breathing  · Incision changes (redness, swelling, or increased purulent drainage)  · Any questions or problems    5. Medication Instructions:  · Take Pain medications as directed to stay comfortable     -Tylenol: 1000mg 3x/day, Gabapentin 3x/day (for nerve pain), Celebrex (anti-inflamatory) 2x/day, oxycodone as needed- FOR 30 DAYS  · No driving or drinking alcohol when taking prescribed pain meds  · Laxative of choice if needed for constipation.    6. Follow- up appointment:  · We will arrange a follow up appointment in about 2 weeks   Please go to the [facility] hospital for a chest x-ray prior to your appointment

## 2023-05-26 ENCOUNTER — APPOINTMENT (OUTPATIENT)
Dept: GENERAL RADIOLOGY | Facility: HOSPITAL | Age: 63
End: 2023-05-26
Payer: COMMERCIAL

## 2023-05-26 ENCOUNTER — READMISSION MANAGEMENT (OUTPATIENT)
Dept: CALL CENTER | Facility: HOSPITAL | Age: 63
End: 2023-05-26
Payer: COMMERCIAL

## 2023-05-26 VITALS
HEIGHT: 66 IN | HEART RATE: 73 BPM | BODY MASS INDEX: 40.87 KG/M2 | WEIGHT: 254.3 LBS | RESPIRATION RATE: 18 BRPM | OXYGEN SATURATION: 99 % | DIASTOLIC BLOOD PRESSURE: 81 MMHG | TEMPERATURE: 98.7 F | SYSTOLIC BLOOD PRESSURE: 171 MMHG

## 2023-05-26 PROBLEM — R09.02 HYPOXIA: Status: ACTIVE | Noted: 2023-05-26

## 2023-05-26 LAB
ALBUMIN SERPL-MCNC: 2.9 G/DL (ref 3.5–5.2)
ALBUMIN SERPL-MCNC: 2.9 G/DL (ref 3.5–5.2)
ALP SERPL-CCNC: 76 U/L (ref 39–117)
ALT SERPL W P-5'-P-CCNC: 31 U/L (ref 1–41)
ANION GAP SERPL CALCULATED.3IONS-SCNC: 7.4 MMOL/L (ref 5–15)
AST SERPL-CCNC: 26 U/L (ref 1–40)
BASE EXCESS BLDA CALC-SCNC: 2 MMOL/L (ref -5–5)
BILIRUB CONJ SERPL-MCNC: <0.2 MG/DL (ref 0–0.3)
BILIRUB INDIRECT SERPL-MCNC: ABNORMAL MG/DL
BILIRUB SERPL-MCNC: 0.2 MG/DL (ref 0–1.2)
BUN SERPL-MCNC: 35 MG/DL (ref 8–23)
BUN/CREAT SERPL: 21.3 (ref 7–25)
CA-I BLDA-SCNC: ABNORMAL MMOL/L
CALCIUM SPEC-SCNC: 9.1 MG/DL (ref 8.6–10.5)
CHLORIDE SERPL-SCNC: 97 MMOL/L (ref 98–107)
CO2 BLDA-SCNC: 30 MMOL/L (ref 24–29)
CO2 SERPL-SCNC: 37.6 MMOL/L (ref 22–29)
CREAT SERPL-MCNC: 1.64 MG/DL (ref 0.76–1.27)
DEPRECATED RDW RBC AUTO: 42.7 FL (ref 37–54)
EGFRCR SERPLBLD CKD-EPI 2021: 47.3 ML/MIN/1.73
ERYTHROCYTE [DISTWIDTH] IN BLOOD BY AUTOMATED COUNT: 12.7 % (ref 12.3–15.4)
FUNGUS WND CULT: NORMAL
GLUCOSE BLDC GLUCOMTR-MCNC: 128 MG/DL (ref 70–130)
GLUCOSE SERPL-MCNC: 143 MG/DL (ref 65–99)
HCO3 BLDA-SCNC: 28.6 MMOL/L (ref 22–26)
HCT VFR BLD AUTO: 29.1 % (ref 37.5–51)
HCT VFR BLDA CALC: 36 % (ref 38–51)
HGB BLD-MCNC: 9.3 G/DL (ref 13–17.7)
HGB BLDA-MCNC: 12.2 G/DL (ref 12–17)
MAGNESIUM SERPL-MCNC: 2 MG/DL (ref 1.6–2.4)
MCH RBC QN AUTO: 28.8 PG (ref 26.6–33)
MCHC RBC AUTO-ENTMCNC: 32 G/DL (ref 31.5–35.7)
MCV RBC AUTO: 90.1 FL (ref 79–97)
MYCOBACTERIUM SPEC CULT: NORMAL
NIGHT BLUE STAIN TISS: NORMAL
PCO2 BLDA: 62.8 MM HG (ref 35–45)
PH BLDA: 7.27 PH UNITS (ref 7.35–7.6)
PHOSPHATE SERPL-MCNC: 3.7 MG/DL (ref 2.5–4.5)
PLATELET # BLD AUTO: 340 10*3/MM3 (ref 140–450)
PMV BLD AUTO: 9.6 FL (ref 6–12)
PO2 BLDA: 67 MMHG (ref 80–105)
POTASSIUM BLDA-SCNC: 4.9 MMOL/L (ref 3.5–4.9)
POTASSIUM SERPL-SCNC: 4.5 MMOL/L (ref 3.5–5.2)
PROT SERPL-MCNC: 6 G/DL (ref 6–8.5)
RBC # BLD AUTO: 3.23 10*6/MM3 (ref 4.14–5.8)
SAO2 % BLDA: 89 % (ref 95–98)
SODIUM SERPL-SCNC: 142 MMOL/L (ref 136–145)
URATE SERPL-MCNC: 9.9 MG/DL (ref 3.4–7)
WBC NRBC COR # BLD: 7.64 10*3/MM3 (ref 3.4–10.8)

## 2023-05-26 PROCEDURE — 80053 COMPREHEN METABOLIC PANEL: CPT | Performed by: INTERNAL MEDICINE

## 2023-05-26 PROCEDURE — 94799 UNLISTED PULMONARY SVC/PX: CPT

## 2023-05-26 PROCEDURE — 84550 ASSAY OF BLOOD/URIC ACID: CPT | Performed by: INTERNAL MEDICINE

## 2023-05-26 PROCEDURE — 94664 DEMO&/EVAL PT USE INHALER: CPT

## 2023-05-26 PROCEDURE — 82248 BILIRUBIN DIRECT: CPT | Performed by: INTERNAL MEDICINE

## 2023-05-26 PROCEDURE — 83735 ASSAY OF MAGNESIUM: CPT | Performed by: INTERNAL MEDICINE

## 2023-05-26 PROCEDURE — 94761 N-INVAS EAR/PLS OXIMETRY MLT: CPT

## 2023-05-26 PROCEDURE — 84100 ASSAY OF PHOSPHORUS: CPT | Performed by: INTERNAL MEDICINE

## 2023-05-26 PROCEDURE — 71045 X-RAY EXAM CHEST 1 VIEW: CPT

## 2023-05-26 PROCEDURE — 94669 MECHANICAL CHEST WALL OSCILL: CPT

## 2023-05-26 PROCEDURE — 25010000002 ENOXAPARIN PER 10 MG: Performed by: NURSE PRACTITIONER

## 2023-05-26 PROCEDURE — 99024 POSTOP FOLLOW-UP VISIT: CPT | Performed by: NURSE PRACTITIONER

## 2023-05-26 PROCEDURE — 85027 COMPLETE CBC AUTOMATED: CPT | Performed by: INTERNAL MEDICINE

## 2023-05-26 RX ORDER — GUAIFENESIN/DEXTROMETHORPHAN 100-10MG/5
10 SYRUP ORAL 3 TIMES DAILY PRN
Qty: 118 ML | Refills: 0 | Status: SHIPPED | OUTPATIENT
Start: 2023-05-26

## 2023-05-26 RX ORDER — TORSEMIDE 20 MG/1
40 TABLET ORAL 2 TIMES DAILY
Qty: 120 TABLET | Refills: 0 | Status: SHIPPED | OUTPATIENT
Start: 2023-05-26 | End: 2023-05-31 | Stop reason: SDUPTHER

## 2023-05-26 RX ORDER — PSEUDOEPHEDRINE HCL 30 MG
100 TABLET ORAL 2 TIMES DAILY PRN
Qty: 60 CAPSULE | Refills: 0 | Status: SHIPPED | OUTPATIENT
Start: 2023-05-26

## 2023-05-26 RX ORDER — NIFEDIPINE 30 MG/1
30 TABLET, FILM COATED, EXTENDED RELEASE ORAL
Qty: 30 TABLET | Refills: 1 | Status: SHIPPED | OUTPATIENT
Start: 2023-05-27

## 2023-05-26 RX ORDER — DIAZEPAM 2 MG/1
2 TABLET ORAL EVERY 6 HOURS PRN
Qty: 12 TABLET | Refills: 0 | Status: SHIPPED | OUTPATIENT
Start: 2023-05-26 | End: 2023-05-30 | Stop reason: SDUPTHER

## 2023-05-26 RX ORDER — HYDRALAZINE HYDROCHLORIDE 50 MG/1
50 TABLET, FILM COATED ORAL EVERY 8 HOURS SCHEDULED
Status: DISCONTINUED | OUTPATIENT
Start: 2023-05-26 | End: 2023-05-26 | Stop reason: HOSPADM

## 2023-05-26 RX ORDER — POLYETHYLENE GLYCOL 3350 17 G/17G
17 POWDER, FOR SOLUTION ORAL 2 TIMES DAILY PRN
Start: 2023-05-26

## 2023-05-26 RX ADMIN — ACETAMINOPHEN 1000 MG: 500 TABLET, FILM COATED ORAL at 09:16

## 2023-05-26 RX ADMIN — LEVOTHYROXINE SODIUM 50 MCG: 0.05 TABLET ORAL at 04:35

## 2023-05-26 RX ADMIN — GUAIFENESIN 1200 MG: 600 TABLET, EXTENDED RELEASE ORAL at 09:15

## 2023-05-26 RX ADMIN — VILAZODONE HYDROCHLORIDE 40 MG: 40 TABLET, FILM COATED ORAL at 09:15

## 2023-05-26 RX ADMIN — TORSEMIDE 40 MG: 20 TABLET ORAL at 09:15

## 2023-05-26 RX ADMIN — DIVALPROEX SODIUM 250 MG: 250 TABLET, EXTENDED RELEASE ORAL at 09:15

## 2023-05-26 RX ADMIN — Medication 10 ML: at 09:18

## 2023-05-26 RX ADMIN — METOPROLOL SUCCINATE 100 MG: 100 TABLET, EXTENDED RELEASE ORAL at 09:15

## 2023-05-26 RX ADMIN — IPRATROPIUM BROMIDE AND ALBUTEROL SULFATE 3 ML: 2.5; .5 SOLUTION RESPIRATORY (INHALATION) at 08:33

## 2023-05-26 RX ADMIN — BENZONATATE 200 MG: 100 CAPSULE ORAL at 09:15

## 2023-05-26 RX ADMIN — NIFEDIPINE 30 MG: 30 TABLET, FILM COATED, EXTENDED RELEASE ORAL at 09:14

## 2023-05-26 RX ADMIN — ENOXAPARIN SODIUM 40 MG: 100 INJECTION SUBCUTANEOUS at 09:17

## 2023-05-26 RX ADMIN — OXYCODONE HYDROCHLORIDE 5 MG: 5 TABLET ORAL at 11:57

## 2023-05-26 RX ADMIN — OXYCODONE HYDROCHLORIDE 5 MG: 5 TABLET ORAL at 04:35

## 2023-05-26 RX ADMIN — LAMOTRIGINE 100 MG: 100 TABLET ORAL at 09:15

## 2023-05-26 RX ADMIN — IPRATROPIUM BROMIDE AND ALBUTEROL SULFATE 3 ML: 2.5; .5 SOLUTION RESPIRATORY (INHALATION) at 12:02

## 2023-05-26 RX ADMIN — HYDRALAZINE HYDROCHLORIDE 25 MG: 25 TABLET, FILM COATED ORAL at 04:35

## 2023-05-26 RX ADMIN — LIDOCAINE 1 PATCH: 700 PATCH TOPICAL at 09:15

## 2023-05-26 NOTE — PROGRESS NOTES
[facility] Progress Note      Patient Name: Calos Moseley  : 1960  MRN: [id number]  Primary Care Physician:  Naveed Nunez DO  Date of admission: 2023    Subjective     Interval History:   Follow-up acute kidney injury on chronic kidney disease, and hyperkalemia    The patient is feeling much better, he has no shortness of breath he has recurrent cough but less productive and he has pain when he takes a deep breath in the right side of his chest  No dysuria or gross hematuria, no nausea or vomiting overall feeling better    Review of Systems:   As noted above    Objective     Vitals:   Temp:  [97.8 °F (36.6 °C)-99.2 °F (37.3 °C)] 97.8 °F (36.6 °C)  Heart Rate:  [73-82] 82  Resp:  [16-24] 18  BP: (129-156)/(65-82) 150/69  Flow (L/min):  [2-5] 2    Intake/Output Summary (Last 24 hours) at 2023 1023  Last data filed at 2023 2131  Gross per 24 hour   Intake 1080 ml   Output 2750 ml   Net -1670 ml       Physical Exam:    General Appearance: alert, awake, chronically ill, morbidly obese, no acute  Skin: warm and dry  HEENT: oral mucosa normal, nonicteric sclera  Neck: supple, no JVD  Lungs: Decreased breath sounds on the right with bilateral rhonchi, he has unlabored breathing effort.  Heart: RRR, normal S1 and S2  Abdomen: soft, nontender, nondistended, protuberant, normoactive bowels  : No palpable blood  Extremities: 1+ lower extremity edema  Neuro: normal speech and mental status     Scheduled Meds:     acetaminophen, 1,000 mg, Oral, TID  atorvastatin, 20 mg, Oral, Nightly  benzonatate, 200 mg, Oral, TID  divalproex, 250 mg, Oral, BID  docusate sodium, 100 mg, Oral, BID  enoxaparin, 40 mg, Subcutaneous, Q12H  guaiFENesin, 1,200 mg, Oral, Q12H  hydrALAZINE, 25 mg, Oral, Q8H  ipratropium-albuterol, 3 mL, Nebulization, 4x Daily - RT  lamoTRIgine, 100 mg, Oral, BID  levothyroxine, 50 mcg, Oral, Q AM  lidocaine, 1 patch, Transdermal, Q24H  melatonin, 1 mg, Oral,  Nightly  metoprolol succinate XL, 100 mg, Oral, BID  NIFEdipine XL, 30 mg, Oral, Q24H  polyethylene glycol, 17 g, Oral, Daily  pramipexole, 0.5 mg, Oral, Nightly  senna-docusate sodium, 2 tablet, Oral, BID  sodium chloride, 10 mL, Intravenous, Q12H  sodium chloride, 10 mL, Intravenous, Q12H  sodium chloride, 4 mL, Nebulization, BID - RT  tamsulosin, 0.4 mg, Oral, Nightly  torsemide, 40 mg, Oral, BID  vilazodone, 40 mg, Oral, Daily      IV Meds:        Results Reviewed:   I have personally reviewed the results from the time of this admission to 5/26/2023 10:23 EDT     Results from last 7 days   Lab Units 05/26/23 0708 05/25/23 0419 05/24/23  0518 05/23/23  1630 05/23/23  0457   SODIUM mmol/L 142 139 141   < > 138   POTASSIUM mmol/L 4.5 4.5 4.6   < > 5.8*   CHLORIDE mmol/L 97* 99 104   < > 101   CO2 mmol/L 37.6* 34.3* 31.6*   < > 31.6*   BUN mg/dL 35* 41* 43*   < > 52*   CREATININE mg/dL 1.64* 1.73* 1.89*   < > 2.40*   CALCIUM mg/dL 9.1 8.9 8.5*   < > 8.9   BILIRUBIN mg/dL 0.2  --   --   --  0.2   ALK PHOS U/L 76  --   --   --  66   ALT (SGPT) U/L 31  --   --   --  17   AST (SGOT) U/L 26  --   --   --  15   GLUCOSE mg/dL 143* 127* 167*   < > 152*    < > = values in this interval not displayed.       Estimated Creatinine Clearance: 56.4 mL/min (A) (by C-G formula based on SCr of 1.64 mg/dL (H)).    Results from last 7 days   Lab Units 05/26/23  0708 05/25/23 0419 05/24/23 0518   MAGNESIUM mg/dL 2.0 2.1 2.6*   PHOSPHORUS mg/dL 3.7 3.2 3.3       Results from last 7 days   Lab Units 05/26/23 0708 05/25/23  0419 05/24/23  0518   URIC ACID mg/dL 9.9* 9.4* 9.4*       Results from last 7 days   Lab Units 05/26/23  0708 05/25/23  0419 05/24/23  0518 05/22/23  0411 05/21/23  0521   WBC 10*3/mm3 7.64 7.08 7.70 14.39* 16.55*   HEMOGLOBIN g/dL 9.3* 9.9* 9.4* 10.3* 11.2*   PLATELETS 10*3/mm3 340 400 338 315 370       Results from last 7 days   Lab Units 05/20/23  0405   INR  1.08       Assessment / Plan     ASSESSMENT:  -Acute  kidney injury on chronic kidney disease stage IIIa, etiology not very clear could be hemodynamic changes associated with volume shift and having decreased effective intravascular volume and the patient being on ACE inhibitor.  Creatinine down to 1.64, electrolyte within acceptable range except total CO2 up to 37.6, uric acid is 9.9  -Hyperkalemia most likely associate with acute kidney injury and ACE inhibitor, improving, potassium is 4.5.  -Fluid excess, was started on torsemide he had excellent urine output but continued to have significant fluid excess  -History of recurrent cystine stones  -Hypertension associate with chronic kidney disease, has systolic component of the hypertension associate with fluid excess  -Hypothyroid  -Bipolar disorder  -Obstructive sleep apnea  -Anemia, hemoglobin 9.3  -Metabolic alkalosis could be related to diuretics but also could be associated with CO2 retention related to his obstructive sleep apnea.    PLAN:  -Continue the current dose of torsemide 40 mg twice daily  -The patient could be discharged from the renal standpoint and to follow the same medication he is currently on  -I will plan to see him in my office in 1 week for follow-up.    I discussed the case with Dr. Pearce  Copied text in this note has been reviewed and is accurate as of 05/26/23.       Thank you for involving us in the care of Calos Moseley.  Please feel free to call with any questions.    Davis Angel MD  05/26/23  10:23 EDT    [facility]  [phone]    Please note that portions of this note were completed with a voice recognition program.

## 2023-05-26 NOTE — PROGRESS NOTES
"  POST-OPERATIVE NOTE     Chief Complaint: Right loculated parapneumonic effusion, postoperative care  S/P: Right video-assisted thoracoscopy with da Coby robot assisted decortication  POD # 6    Subjective:  Symptoms:  Stable.  He reports shortness of breath.  No weakness.    Diet:  Adequate intake.  No nausea or vomiting.    Activity level: Impaired due to pain.    Pain:  He complains of pain that is mild.  Pain is well controlled and requiring pain medication.    Supplemental oxygen weaned to 2 L.    Objective:  General Appearance:  Comfortable and in no acute distress.    Vital signs: (most recent): Blood pressure 150/69, pulse 82, temperature 97.8 °F (36.6 °C), temperature source Oral, resp. rate 18, height 167.6 cm (66\"), weight 115 kg (254 lb 4.8 oz), SpO2 95 %.  Vital signs are normal.  No fever.    Output: Producing urine.    Lungs:  Normal effort and normal respiratory rate.  There are decreased breath sounds and rhonchi.    Heart: Normal rate.  Regular rhythm.    Chest: Chest wall tenderness present.    Abdomen: Abdomen is soft.  There is no abdominal tenderness.     Neurological: Patient is alert and oriented to person, place and time.    Skin:  Warm and dry.                Results Review:     I reviewed the patient's new clinical results.  I reviewed the patient's new imaging results and agree with the interpretation.  I reviewed the patient's other test results and agree with the interpretation  Discussed with Patient, spouse at bedside, RN, Dr. Rice.    Assessment & Plan     Mr. Moseley is doing very well POD #6, s/p right robot-assisted decortication.  All chest tubes have been removed and follow-up imaging is stable.  His oxygen has been weaned to 2-3 L.  We will order this at discharge.  He is stable for discharge from a thoracic standpoint.  Postoperative care instructions have been discussed with him the medications have been sent to the pharmacy.  He will follow-up in clinic with Dr. Rice in 2 " weeks with a chest x-ray.      Sosa Romero DNP, APRN  [facility]  05/26/23  09:23 EDT    Patient was seen and assessed while wearing personal protective equipment including facemask, protective eyewear and gloves.  Hand hygiene performed prior to entering the room and upon exiting with doffing of gloves.

## 2023-05-26 NOTE — PROGRESS NOTES
"                                              LOS: 7 days   Patient Care Team:  Naveed Nunez DO as PCP - General (Family Medicine)  Naveed Nunez DO as PCP - Family Medicine  Chris Martinez Jr., MD as Consulting Physician (Pulmonary Disease)  Ihsan Lopez MD as Consulting Physician (Urology)  Dimas Jackson MD as Consulting Physician (Nephrology)  Fely Ferrer, RN as Ambulatory  ([facility])    Chief Complaint:  F/up respiratory failure, post VATS decortication    Subjective   Interval History  Last chest tube discontinued today.  Shortness of breath improved.  Continues to have cough.    On 2 L oxygen.    REVIEW OF SYSTEMS:     GASTROINTESTINAL: No anorexia, nausea, vomiting or diarrhea. No abdominal pain.  CONSTITUTIONAL: No fever or chills.  Cardiovascular: No chest pain, palpitation but edema..    Ventilator/Non-Invasive Ventilation Settings (From admission, onward)     Start     Ordered    05/20/23 2114  NIPPV (CPAP or BIPAP)  Until Discontinued        Question Answer Comment   Indication Acute Respiratory Failure    Type AVAPS/PC/PS    Backup Rate 20    Target VT (mL) 600    EPAP/PEEP (cm H2O) 8    Min Pressure (cm H2O) 14    Max Pressure (cm H2O) 28    Titrate Oxygen for SpO2 90% or Greater        05/20/23 2114                      Physical Exam:     Vital Signs  Temp:  [97.5 °F (36.4 °C)-99.2 °F (37.3 °C)] 99.2 °F (37.3 °C)  Heart Rate:  [66-76] 76  Resp:  [16-24] 20  BP: (122-170)/(58-76) 156/65    Intake/Output Summary (Last 24 hours) at 5/25/2023 2310  Last data filed at 5/25/2023 2131  Gross per 24 hour   Intake 1680 ml   Output 3660 ml   Net -1980 ml     Flowsheet Rows    Flowsheet Row First Filed Value   Admission Height 167.6 cm (66\") Documented at 05/16/2023 1953   Admission Weight 114 kg (252 lb) Documented at 05/16/2023 1953          PPE used per hospital policy    General Appearance:   Alert, cooperative, in no acute distress   ENMT:  Mallampati score " 3, moist mucous membrane   Eyes:  Pupils equal and reactive to light. EOMI   Neck:   Large. Trachea midline. No thyromegaly.   Lungs:    Coarse breath sounds mostly on the right.  No wheezing.  No use of accessory muscles.    Heart:   Regular rhythm and normal rate, normal S1 and S2, no         murmur   Skin:   No rash or ecchymosis   Abdomen:    Obese. Soft. No tenderness. No HSM.   Neuro/psych:  Conscious, alert, oriented x3. Strength 5/5 in upper and lower  ext.  Appropriate mood and affect   Extremities:  No cyanosis, clubbing but edema in legs.  Warm extremities and well-perfused          Results Review:        Results from last 7 days   Lab Units 05/25/23  0419 05/24/23  0518 05/23/23  1630   SODIUM mmol/L 139 141 140   POTASSIUM mmol/L 4.5 4.6 4.9   CHLORIDE mmol/L 99 104 98   CO2 mmol/L 34.3* 31.6* 32.6*   BUN mg/dL 41* 43* 50*   CREATININE mg/dL 1.73* 1.89* 2.33*   GLUCOSE mg/dL 127* 167* 146*   CALCIUM mg/dL 8.9 8.5* 8.9         Results from last 7 days   Lab Units 05/25/23  0419 05/24/23  0518 05/22/23  0411   WBC 10*3/mm3 7.08 7.70 14.39*   HEMOGLOBIN g/dL 9.9* 9.4* 10.3*   HEMATOCRIT % 31.1* 29.4* 31.7*   PLATELETS 10*3/mm3 400 338 315     Results from last 7 days   Lab Units 05/20/23  0405   INR  1.08   APTT seconds 35.8*     Results from last 7 days   Lab Units 05/25/23  0419   PROBNP pg/mL 346.0                   Results from last 7 days   Lab Units 05/21/23  0401 05/20/23  2348 05/20/23 2036   PH, ARTERIAL pH units 7.317* 7.293* 7.266*   PCO2, ARTERIAL mm Hg 60.5* 57.5* 64.0*   PO2 ART mm Hg 74.6* 69.7* 93.2   FLOW RATE lpm  --   --  7   MODALITY  BiPap BiPap HFNC   O2 SATURATION CALC % 93.0 91.1* 95.6         I reviewed the patient's new clinical results.        Medication Review:   acetaminophen, 1,000 mg, Oral, TID  atorvastatin, 20 mg, Oral, Nightly  benzonatate, 200 mg, Oral, TID  divalproex, 250 mg, Oral, BID  docusate sodium, 100 mg, Oral, BID  enoxaparin, 40 mg, Subcutaneous,  Q12H  guaiFENesin, 1,200 mg, Oral, Q12H  hydrALAZINE, 25 mg, Oral, Q8H  ipratropium-albuterol, 3 mL, Nebulization, 4x Daily - RT  lamoTRIgine, 100 mg, Oral, BID  levothyroxine, 50 mcg, Oral, Q AM  lidocaine, 1 patch, Transdermal, Q24H  melatonin, 1 mg, Oral, Nightly  metoprolol succinate XL, 100 mg, Oral, BID  NIFEdipine XL, 30 mg, Oral, Q24H  polyethylene glycol, 17 g, Oral, Daily  pramipexole, 0.5 mg, Oral, Nightly  senna-docusate sodium, 2 tablet, Oral, BID  sodium chloride, 10 mL, Intravenous, Q12H  sodium chloride, 10 mL, Intravenous, Q12H  sodium chloride, 4 mL, Nebulization, BID - RT  tamsulosin, 0.4 mg, Oral, Nightly  torsemide, 40 mg, Oral, BID  vilazodone, 40 mg, Oral, Daily             Diagnostic imaging:  I personally and independently reviewed the following images:  CXR 5/25/2023: Increased interstitial markings on the right with atelectasis.  Improved compared to prior.      Assessment     1. Acute hypoxic/hypercapnic respiratory failure  2. Noninvasive ventilator use  3. Right-sided loculated pleural effusion s/p right Thora-300 cc; exudative s/p VATS decortication-5/20  4. Nonspecific mediastinal lymphadenopathy s/p EBUS 5/19.  Cytology negative  5. Right lower lobe pneumonia s/p antibiotics s/p bronc-5/19.  Culture and cytology negative.  6. CKD  7. Remote smoker  8. ISIAH on CPAP  9. Morbid obesity        Plan     · NIPPV at night  · Oxygen 2 L/min at rest.  Walking oximetry in a.m.  · DuoNeb 4 times a day.    · Mucomyst twice alternating with hypertonic saline twice a day, can be discontinued on discharge  · Guaifenesin 1200 mg twice daily  · Mucinex 1200 mg twice daily  · Flutter and I-S  · Resume outpatient CPAP therapy on discharge.    Jatinder James MD  05/25/23  23:10 EDT          This note was dictated utilizing [employer] dictation

## 2023-05-26 NOTE — CASE MANAGEMENT/SOCIAL WORK
Case Management Discharge Note      Final Note: DC Home    Provided Post Acute Provider List?: N/A  N/A Provider List Comment: Denies any needs at this time    Selected Continued Care - Discharged on 5/26/2023 Admission date: 5/16/2023 - Discharge disposition: Home or Self Care    Destination    No services have been selected for the patient.              Durable Medical Equipment    No services have been selected for the patient.              Dialysis/Infusion    No services have been selected for the patient.              Home Medical Care    No services have been selected for the patient.              Therapy    No services have been selected for the patient.              Community Resources    No services have been selected for the patient.              Community & DME    No services have been selected for the patient.                Selected Continued Care - Episodes Includes continued care and service providers with selected services from the active episodes listed below    Rising Risk Care Management Episode start date: 3/21/2023   There are no active outsourced providers for this episode.                    Final Discharge Disposition Code: 01 - home or self-care

## 2023-05-26 NOTE — NURSING NOTE
Patient oxygenation decreased to 85% at rest, on room air. Oxygenation increased to 92% on 2 L oxygen.

## 2023-05-26 NOTE — DISCHARGE SUMMARY
"    [facility] Medicine Services  DISCHARGE SUMMARY    Patient Name: Calos Moseley  : 1960  MRN: [id number]    Date of Admission: 2023  5:16 PM  Date of Discharge: 2023  Primary Care Physician: Naveed Nunez DO    Consults     Date and Time Order Name Status Description    2023  6:28 AM Inpatient Nephrology Consult Completed     2023  6:20 AM Inpatient Nephrology Consult      2023  2:12 PM Inpatient Thoracic Surgery Consult Completed     2023  8:32 AM Inpatient Infectious Diseases Consult Completed     2023  9:26 PM Inpatient Pulmonology Consult Completed     2023  6:45 PM [facility] (on-call MD unless specified) Details            Hospital Course       Active Hospital Problems    Diagnosis  POA   • **Acute dyspnea [R06.00]  Yes   • Hypoxia [R09.02]  Yes   • Acute respiratory failure with hypoxia [J96.01]  Yes   • Pleural effusion on right [J90]  Yes   • CKD (chronic kidney disease) [N18.9]  Yes   • Lymphadenopathy, mediastinal [R59.0]  Yes   • ISIAH (obstructive sleep apnea) [G47.33]  Yes   • COPD (chronic obstructive pulmonary disease) [J44.9]  Yes   • Anxiety and depression [F41.9, F32.A]  Yes   • Essential hypertension [I10]  Yes   • Hypothyroidism [E03.9]  Yes      Resolved Hospital Problems   No resolved problems to display.          Hospital Course:  Calos Moseley is a 62 y.o. male \"admitted with Acute dyspnea, right-sided chest pain and hypoxia, findings of loculated right pleural effusion and possible right lower lobe endobronchial lesion.\"    Patient was seen by pulmonology and thoracic surgery.  He underwent robotic assisted total decortication on 2023 for loculated pleural effusion.  Please see operative report for full details.    Patient did well postoperatively and has now had chest tubes removed.  He has been cleared by thoracic surgery team to discharge and follow-up in their clinic for further management.    Patient was seen by nephrology who " assisted with MESHA and diuretic management.  Patient has done well diuresing for peripheral edema and renal function seems to be improving on diuretics perhaps showing some degree of cardiorenal.  I have spoken to nephrologist today who has cleared him to discharge on torsemide twice daily which she has been doing well on in the hospital.    Due to peripheral edema calcium channel blocker was decreased.  Hydralazine has also been decreased.  Patient has had lower need for blood pressure medications while hospitalized but now blood pressure is gradually increasing.  We are going to increase his hydralazine today back to 50 mg 3 times daily and continue lower dose calcium channel blocker from now.  He will have blood pressure check at primary care provider next week for further titration of medications.       Loculated pleural effusion status post thoracentesis.  Fluid looks to be exudative, cytology negative for malignant cells.  I do not see where Gram stain or culture were ever sent.    - Bronchoscopy completed on 5/19  - Completed antibiotics  - Supplement O2 as needed.  Wean to keep sats greater than 90%  - Lidoderm patch for right-sided chest pain  -Status post right robot-assisted decortication on 5/20/2023  Thoracic surgery is arranging for home oxygen at discharge.     Peripheral edema/weight gain- reviewed echocardiogram.  Relatively unremarkable other than mild pulmonary hypertension.  No diastolic dysfunction and normal BNP.    -Patient improved with diuresis earlier in hospital stay.  - Urine studies do not suggest significant proteinuria though there is very small amount  - Also note that he is on nifedipine which can cause peripheral edema, dose decreased  -Received diuresis  -Likely some degree of hypervolemia and acute diastolic congestive heart failure with pulmonary hypertension.  Needs to work on low-salt diet and continue diuretic and medical management.     CKD relatively stable, continue  "to follow.    Seem to have brief transient MESHA that was likely related to cardiorenal.  Appreciate nephrology.  He seems to be back to his long-term baseline creatinine     At the time of discharge patient was told to take all medications as prescribed, keep all follow-up appointments, and call their doctor or return to the hospital with any worsening or concerning symptoms.    Please note that this note was made using Dragon voice recognition software         Date of procedure: 5/21/2023     Patient name: Calos Moseley  MRN: [id number]     Pre OP diagnosis:  1. Right loculated parapneumonic effusion.  2. Right trapped lung.  3. Community-acquired pneumonia.    4. Chronic obstructive pulmonary disease.  5. Essential hypertension.  6. Hyperlipidemia.  7. Morbid obesity, BMI 41.  8. Obstructive sleep apnea.  9. Chronic kidney disease.  10. Renal calculus.  11. Bipolar disease.  12. Restless leg syndrome.  13. Hypothyroidism.  14. Anxiety and depression.     Post OP diagnosis:  1. Right loculated parapneumonic effusion s/p right thoracoscopy with drainage.  2. Right trapped lung s/p right complete decortication, partial visceral and parietal pleurectomy.  3. Community-acquired pneumonia.    4. Chronic obstructive pulmonary disease.  5. Essential hypertension.  6. Hyperlipidemia.  7. Morbid obesity, BMI 41.  8. Obstructive sleep apnea.  9. Chronic kidney disease.  10. Renal calculus.  11. Bipolar disease.  12. Restless leg syndrome.  13. Hypothyroidism.  14. Anxiety and depression.     Procedure performed:   1. Flexible bronchoscopy.  2. Right robotic assisted total decortication, partial visceral and parietal pleurectomy.  3. Intercostal nerve block.    \"Mr. Moseley is doing very well POD #6, s/p right robot-assisted decortication.  All chest tubes have been removed and follow-up imaging is stable.  His oxygen has been weaned to 2-3 L.  We will order this at discharge.  He is stable for discharge from a thoracic " "standpoint.  Postoperative care instructions have been discussed with him the medications have been sent to the pharmacy.  He will follow-up in clinic with Dr. Rice in 2 weeks with a chest x-ray.Sosa Romero DNP, APRN\"    \"PLAN:  -Continue the current dose of torsemide 40 mg twice daily  -The patient could be discharged from the renal standpoint and to follow the same medication he is currently on  -I will plan to see him in my office in 1 week for follow-up.  Davis Angel MD\"    Day of Discharge     Subjective:  Patient is walking around his room.  He says he is ready to discharge.  He agrees to close follow-up.  He says he is making excellent urine and feels very well.  He reports he is breathing very well.  He appreciates that thoracic surgery team is arranging for home oxygen per      Vital Signs:   Temp:  [97.8 °F (36.6 °C)-99.2 °F (37.3 °C)] 98.7 °F (37.1 °C)  Heart Rate:  [73-82] 82  Resp:  [16-24] 18  BP: (140-171)/(65-82) 171/81     Physical Exam:  Constitutional:Awake, alert  HENT: NCAT, mucous membranes moist, neck supple  Respiratory: Decreased cough, improving coarse sounds, no wheezing, relatively nonlabored breathing  Cardiovascular: Pulse rate is normal, palpable radial pulse bilaterally  Gastrointestinal:soft, nontender, nondistended  Musculoskeletal: Ambulating well, normal musculature for age, mild lower extremity edema, BMI 41, morbidly obese  Psychiatric: Appropriate affect, cooperative, conversational  Neurologic: No slurred speech or facial droop, follows commands  Skin: No rashes or jaundice, warm    Pertinent  and/or Most Recent Results     Results from last 7 days   Lab Units 05/26/23  0708 05/25/23  0419 05/24/23  0518 05/23/23  1630 05/23/23  0457 05/22/23  0411 05/21/23  0521 05/20/23  2030 05/20/23  1054 05/20/23  0405   WBC 10*3/mm3 7.64 7.08 7.70  --   --  14.39* 16.55* 22.24*  --  16.88*   HEMOGLOBIN g/dL 9.3* 9.9* 9.4*  --   --  10.3* 11.2* 11.9*  --  11.5*   HEMATOCRIT % " 29.1* 31.1* 29.4*  --   --  31.7* 35.7* 37.3*  --  35.6*   PLATELETS 10*3/mm3 340 400 338  --   --  315 370 428  --  341   SODIUM mmol/L 142 139 141 140 138 135* 138 140   < > 152*   POTASSIUM mmol/L 4.5 4.5 4.6 4.9 5.8* 5.4* 5.8* 5.5*   < > 5.4*   CHLORIDE mmol/L 97* 99 104 98 101 99 102 103   < > 113*   CO2 mmol/L 37.6* 34.3* 31.6* 32.6* 31.6* 30.9* 30.7* 23.0   < > 29.0   BUN mg/dL 35* 41* 43* 50* 52* 39* 30* 33*   < > 37*   CREATININE mg/dL 1.64* 1.73* 1.89* 2.33* 2.40* 2.07* 1.42* 1.57*   < > 1.76*   GLUCOSE mg/dL 143* 127* 167* 146* 152* 134* 156* 118*   < > 110*   CALCIUM mg/dL 9.1 8.9 8.5* 8.9 8.9 8.5* 9.0 8.4*   < > 9.0    < > = values in this interval not displayed.     Results from last 7 days   Lab Units 05/26/23  0708 05/23/23  0457 05/20/23  0405   BILIRUBIN mg/dL 0.2 0.2  --    ALK PHOS U/L 76 66  --    ALT (SGPT) U/L 31 17  --    AST (SGOT) U/L 26 15  --    PROTIME Seconds  --   --  14.2   INR   --   --  1.08   APTT seconds  --   --  35.8*           Invalid input(s): TG, LDLCALC, LDLREALC  Results from last 7 days   Lab Units 05/25/23  0419   PROBNP pg/mL 346.0       Brief Urine Lab Results  (Last result in the past 365 days)      Color   Clarity   Blood   Leuk Est   Nitrite   Protein   CREAT   Urine HCG        05/23/23 1216             126.0               Microbiology Results Abnormal     Procedure Component Value - Date/Time    Anaerobic Culture - Surgical Site, Pleura [[id number]]  (Normal) Collected: 05/20/23 1851    Lab Status: Final result Specimen: Surgical Site from Pleura Updated: 05/25/23 0631     Anaerobic Culture No anaerobes isolated at 5 days    Fungus Culture - Lavage, Lung, Right Lower Lobe [[id number]] Collected: 05/19/23 1546    Lab Status: Preliminary result Specimen: Lavage from Lung, Right Lower Lobe Updated: 05/24/23 1645     Fungus Culture No fungus isolated at less than 1 week    AFB Culture - Lavage, Lung, Right Lower Lobe [[id number]] Collected: 05/19/23 1546    Lab Status:  Preliminary result Specimen: Lavage from Lung, Right Lower Lobe Updated: 05/24/23 1645     AFB Culture No AFB isolated at less than 1 week     AFB Stain No acid fast bacilli seen on concentrated smear    Tissue / Bone Culture - Tissue, Pleura [[id number]] Collected: 05/20/23 1856    Lab Status: Final result Specimen: Tissue from Pleura Updated: 05/23/23 0930     Tissue Culture No growth at 3 days     Gram Stain No WBCs or organisms seen    Body Fluid Culture - Surgical Site, Pleura [[id number]] Collected: 05/20/23 1851    Lab Status: Final result Specimen: Surgical Site from Pleura Updated: 05/23/23 0928     Body Fluid Culture No growth at 3 days     Gram Stain No WBCs or organisms seen    Blood Culture - Blood, Arm, Left [[id number]]  (Normal) Collected: 05/16/23 2247    Lab Status: Final result Specimen: Blood from Arm, Left Updated: 05/21/23 2301     Blood Culture No growth at 5 days    Blood Culture - Blood, Hand, Right [[id number]]  (Normal) Collected: 05/16/23 2253    Lab Status: Final result Specimen: Blood from Hand, Right Updated: 05/21/23 2301     Blood Culture No growth at 5 days    BAL Culture, Quantitative - Lavage, Lung, Right Lower Lobe [[id number]] Collected: 05/19/23 1546    Lab Status: Final result Specimen: Lavage from Lung, Right Lower Lobe Updated: 05/21/23 0822     BAL Culture No growth     Gram Stain No organisms seen      Rare (1+) WBCs seen    Fungus Smear - Lavage, Lung, Right Lower Lobe [[id number]] Collected: 05/19/23 1546    Lab Status: Final result Specimen: Lavage from Lung, Right Lower Lobe Updated: 05/19/23 2113     Fungal Stain No fungal elements seen    S. Pneumo Ag Urine or CSF - Urine, Urine, Clean Catch [[id number]]  (Normal) Collected: 05/17/23 0452    Lab Status: Final result Specimen: Urine, Clean Catch Updated: 05/17/23 0644     Strep Pneumo Ag Negative    Legionella Antigen, Urine - Urine, Urine, Clean Catch [[id number]]  (Normal) Collected: 05/17/23 0452    Lab Status: Final  result Specimen: Urine, Clean Catch Updated: 05/17/23 0644     LEGIONELLA ANTIGEN, URINE Negative    Respiratory Panel PCR w/COVID-19(SARS-CoV-2) JERI/SONIA/MARQUITA/PAD/COR/MAD/ULISSES In-House, NP Swab in UTM/VTM, 3-4 HR TAT - Swab, Nasopharynx [[id number]]  (Normal) Collected: 05/16/23 1733    Lab Status: Final result Specimen: Swab from Nasopharynx Updated: 05/16/23 1832     ADENOVIRUS, PCR Not Detected     Coronavirus 229E Not Detected     Coronavirus HKU1 Not Detected     Coronavirus NL63 Not Detected     Coronavirus OC43 Not Detected     COVID19 Not Detected     Human Metapneumovirus Not Detected     Human Rhinovirus/Enterovirus Not Detected     Influenza A PCR Not Detected     Influenza B PCR Not Detected     Parainfluenza Virus 1 Not Detected     Parainfluenza Virus 2 Not Detected     Parainfluenza Virus 3 Not Detected     Parainfluenza Virus 4 Not Detected     RSV, PCR Not Detected     Bordetella pertussis pcr Not Detected     Bordetella parapertussis PCR Not Detected     Chlamydophila pneumoniae PCR Not Detected     Mycoplasma pneumo by PCR Not Detected    Narrative:      In the setting of a positive respiratory panel with a viral infection PLUS a negative procalcitonin without other underlying concern for bacterial infection, consider observing off antibiotics or discontinuation of antibiotics and continue supportive care. If the respiratory panel is positive for atypical bacterial infection (Bordetella pertussis, Chlamydophila pneumoniae, or Mycoplasma pneumoniae), consider antibiotic de-escalation to target atypical bacterial infection.          Imaging Results (All)     Procedure Component Value Units Date/Time    XR Chest 1 View [[id number]] Collected: 05/26/23 0658     Updated: 05/26/23 0702    Narrative:      XR CHEST 1 VW-     Clinical: Postop decortication     COMPARISON 05/25/2023     FINDINGS: No pneumothorax status post right chest tube removal. There is  been no notable change in the appearance of the  right lung. The left  lung is clear. The cardiomediastinal silhouette is stable.     CONCLUSION: Right chest tube removal, no other interval change since  05/25/2023.     This report was finalized on 5/26/2023 6:59 AM by Dr. Jed Shaffer M.D.       XR Chest 1 View [[id number]] Collected: 05/25/23 0635     Updated: 05/25/23 0642    Narrative:      XR CHEST 1 VW-     Clinical: Postop decortication     COMPARISON examination 05/24/2023     FINDINGS: Right-sided chest tube in position as before. There is small  amount of pleural thickening and/or pleural fusion along the lateral  aspect of the right lung. Mild volume loss right hemithorax similar to  the previous examination with vague infiltrate or atelectasis as before.  The cardiomediastinal silhouette is stable. The left lung is clear. No  pneumothorax.     CONCLUSION: Stable chest     This report was finalized on 5/25/2023 6:39 AM by Dr. Jed Shaffer M.D.       XR Chest 1 View [[id number]] Collected: 05/24/23 0658     Updated: 05/24/23 0703    Narrative:      XR CHEST 1 VW-     HISTORY:  Postop lung surgery, chest tube.     COMPARISON:  Chest radiograph 05/23/2023 at 5:45 AM     FINDINGS:    A single view of the chest was obtained. There is a right apical chest  tube. A second previously noted chest tube has been removed. The cardiac  silhouette is upper normal to mildly enlarged. The mediastinal and hilar  contours are not significantly changed. Right greater than left airspace  opacities and pleural effusions are not significantly changed. There is  no definite pneumothorax. There is multilevel degenerative disc disease.     This report was finalized on 5/24/2023 7:00 AM by Dr. Mahsa Paulino M.D.       XR Chest 1 View [[id number]] Collected: 05/23/23 0811     Updated: 05/23/23 1551    Narrative:      EXAMINATION: SINGLE VIEW CHEST RADIOGRAPH     HISTORY: 62-year-old male undergoing postoperative lung surgery  evaluation.     FINDINGS: A semierect AP portable  chest radiograph was obtained.  Comparison is made to a chest radiograph dated 05/22/2023. There are 2  apically oriented right-sided chest tubes that are stable in position.  Hazy opacification at the base of the right lung has developed since the  prior examination. There is a pleural-based opacification seen laterally  in the right mid and lower lung field that has developed since prior  examination. Linear opacities are seen at the base of the left lung. A  pneumothorax is not visualized. Mild interstitial opacification in both  lungs is noted.       Impression:      There are findings suggestive of interval development of  small right pleural effusion. Mild bibasilar atelectasis is noted.  Superimposed mild interstitial edema cannot be completely excluded.  Right-sided chest tubes remain stable.     This report was finalized on 5/23/2023 3:48 PM by Dr. Vega Fowler M.D.       XR Chest 1 View [[id number]] Collected: 05/22/23 0941     Updated: 05/22/23 0941    Narrative:        Patient: FABIOLA BELTRAN  Time Out: 09:40  Exam(s): XR CXR 1 VIEW     EXAM:    XR Chest, 1 View    CLINICAL HISTORY:     Reason for exam: Post Op Lung Surgery.    TECHNIQUE:    Frontal view of the chest.    COMPARISON:  5 21 23    FINDINGS:    Lungs:  Stable airspace opacities throughout the lungs, right greater   than left.    Pleural space: Suspect small bilateral pleural effusions, unchanged.    No pneumothorax.    Heart:  Stable cardiomegaly.    Mediastinum: Stable.    Bones joints:  Osseous structures stable.    Tubes, lines and devices:  Unchanged chest tubes on the right.    IMPRESSION:       Stable exam as compared to 5 21 23      Impression:          Electronically signed by Conner Linder M.D. on 05-22-23 at 0940    XR Chest 1 View [[id number]] Collected: 05/21/23 0730     Updated: 05/21/23 0730    Narrative:        Patient: FABIOLA BELTRAN  Time Out: 07:28  Exam(s): XR CXR 1 VIEW     EXAM:    XR Chest, 1 View    CLINICAL HISTORY:        Post Op Lung Surgery.    TECHNIQUE:    Frontal view of the chest.    COMPARISON:    No relevant prior studies available.    FINDINGS:    Lungs:  Bilateral airspace opacities are present.    Pleural space:  Small bilateral pleural effusions.  No pneumothorax.    Heart:  Cardiomegaly.    Mediastinum:  Unremarkable.    Bones joints: No fracture.    Other findings:  Two percutaneous drains overlie the right chest.    IMPRESSION:       1.  Bilateral airspace opacities may represent pulmonary edema and or   atypical infection.  2.  Cardiomegaly.  3.  Small bilateral pleural effusions.  4.  Two percutaneous drains overlie the right chest.      Impression:          Electronically signed by Archana Donws MD on 05-21-23 at 0728    XR Chest 1 View [[id number]] Collected: 05/20/23 2058     Updated: 05/20/23 2137    Narrative:      CHEST SINGLE VIEW     HISTORY: Postoperative lung surgery.     COMPARISON: AP chest 05/20/2023, 05/18/2023.     FINDINGS: There are postoperative changes in the right there has been  placement of 2 right-sided chest tubes. There is cardiomegaly. There are  increased interstitial markings suspicious for hydrostatic pulmonary  edema. There are also small bilateral pleural effusions and there is  associated basilar atelectasis. There is no evidence for pneumothorax.       Impression:      Postoperative changes on the right with placement of two  right chest tubes extending in the right apex. No pneumothorax. There is  cardiomegaly with increased interstitial markings suspicious for mild  hydrostatic edema and there are small bilateral pleural effusions with  basilar atelectasis.     This report was finalized on 5/20/2023 9:34 PM by Dr. Tommy Shah M.D.       XR Chest 1 View [[id number]] Collected: 05/20/23 0647     Updated: 05/20/23 0651    Narrative:      XR CHEST 1 VW-     HISTORY: Male who is 62 years-old,  preoperative evaluation     TECHNIQUE: Frontal view of the chest     COMPARISON:  5/18/2023     FINDINGS: The heart size is borderline. Mild central vascular  prominence.. Persistent right basilar atelectasis/infiltrate/effusion.  No pneumothorax. No acute osseous process.       Impression:      No significant change.     This report was finalized on 5/20/2023 6:48 AM by Dr. Eder Gallego M.D.       XR Chest 1 View [[id number]] Collected: 05/18/23 1616     Updated: 05/18/23 2125    Narrative:      EXAMINATION: SINGLE VIEW CHEST RADIOGRAPH     HISTORY: 62-year-old male undergoing follow-up evaluation of a pleural  effusions     FINDINGS: An upright AP portable chest radiograph was obtained.  Comparison is made to a chest radiograph dated 01/24/2022. There is  opacification at the base of the right lung which silhouettes the right  hemidiaphragm and obscuring is a right costophrenic angle. The left lung  is clear. The cardiomediastinal silhouette and osseous structures appear  normal.       Impression:      Right lung base pneumonia and/or atelectasis with a moderate  size right pleural effusion.     This report was finalized on 5/18/2023 9:21 PM by Dr. Vega Fowler M.D.       US Thoracentesis [[id number]] Collected: 05/17/23 1615    Specimen: Body Fluid Updated: 05/17/23 1620    Narrative:      IMAGE GUIDED THORACENTESIS     HISTORY:  empyema.     TECHNIQUE: Informed consent was obtained and documented in the patient's  chart. The patient was placed in upright position. After planning  ultrasound, the patient's right posterolateral thorax was prepped and  draped in the usual aseptic manner. 1% lidocaine was infiltrated at the  designated puncture site. A 5-Thai thoracentesis catheter was then  inserted into the pleural effusion using ultrasound guidance and a total  of 300 mL fluid was drained with specimen sent to laboratory.  Upon  completion of the procedure, the catheter was withdrawn and hemostasis  achieved by manual pressure. Sterile dressing was applied. There were no  immediate  complications. All elements of maximal sterile technique were  followed.          Impression:      Successful ultrasound guided right thoracentesis.     This report was finalized on 5/17/2023 4:16 PM by Dr. Nabil Casas M.D.       CT Chest Without Contrast Diagnostic [[id number]] Collected: 05/16/23 1853     Updated: 05/16/23 1903    Narrative:      CT CHEST WO CONTRAST DIAGNOSTIC-     INDICATIONS: Cough, short of breath, right pleural effusion     TECHNIQUE: Radiation dose reduction techniques were utilized, including  automated exposure control and exposure modulation based on body size.  Unenhanced CHEST CT     COMPARISON: None available      FINDINGS:           The heart size is normal without pericardial effusion. Increasing aorta  is borderline dilated, 3.6 cm. Enlarged right paratracheal lymph node is  noted, 1.3 cm short axis, axial image 28, could be reactive in nature or  potentially evidence of neoplasm, follow-up recommended; if further  imaging evaluation is indicated, positron emission tomography could be  considered. A right paraesophageal lymph node measures 1 cm short axis  on axial image 44. Assessment of vascular and mediastinal structures is  limited without intravenous contrast material.        Occlusion right lower lobe branch airways is seen, with limited because  of mucous plugging or lesion (bronchoscopic correlation can be  obtained), with right lower lobe atelectasis, possibility of underlying  pneumonia or lesion not excluded, clinical correlation and follow-up  advised.     The lungs also show old granulomatous disease.     Mild to moderate loculated right pleural effusion is increased from  03/08/2022.     Upper abdominal structures appear unremarkable.     Degenerative changes are seen in the spine. No acute fracture is  identified.       Impression:         Mild to moderate loculated right pleural effusion. Occlusion right lower  lobe branch airways is seen, with limited because of  mucous plugging or  lesion (bronchoscopic correlation can be obtained), with right lower  lobe atelectasis (possibility of underlying pneumonia or lesion not  excluded). Nonspecific mediastinal adenopathy. Follow-up/further  evaluation recommended as indicated.     This report was finalized on 5/16/2023 7:00 PM by Dr. Eder Gallego M.D.                     Results for orders placed during the hospital encounter of 05/16/23    Adult Transthoracic Echo Complete W/ Cont if Necessary Per Protocol    Interpretation Summary  •  Left ventricular systolic function is normal. Calculated left ventricular EF = 58.1% The left ventricular cavity is moderately dilated. Left ventricular wall thickness is consistent with mild concentric hypertrophy. All left ventricular wall segments contract normally. Left ventricular diastolic function was normal.  •  Trace mitral valve regurgitation is present.  •  Trace tricuspid valve regurgitation is present. Estimated right ventricular systolic pressure from tricuspid regurgitation is mildly elevated (35-45 mmHg). Calculated right ventricular systolic pressure from tricuspid regurgitation is 36.3 mmHg.      1. Pleural Tissue:                 A. 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               B. 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               C. No malignancy identified by routine staining.    Plan for Follow-up of Pending Labs/Results: Thoracic surgery follow-up  Pending Labs     Order Current Status    AFB Culture - Lavage, Lung, Right Lower Lobe Preliminary result    Fungus Culture - Lavage, Lung, Right Lower Lobe Preliminary result        Discharge Details        Discharge Medications      New Medications      Instructions Start Date   Acetaminophen Extra Strength 500 MG tablet  Generic drug: acetaminophen   1,000 mg, Oral, 3 Times Daily      diazePAM 2 MG tablet  Commonly known as:  VALIUM   2 mg, Oral, Every 6 Hours PRN      docusate sodium 100 MG capsule   100 mg, Oral, 2 Times Daily PRN      guaiFENesin-dextromethorphan 100-10 MG/5ML syrup  Commonly known as: ROBITUSSIN DM   10 mL, Oral, 3 Times Daily PRN      oxyCODONE 5 MG immediate release tablet  Commonly known as: ROXICODONE   5 mg, Oral, Every 4 Hours PRN      polyethylene glycol 17 g packet  Commonly known as: MIRALAX   17 g, Oral, 2 Times Daily PRN      torsemide 20 MG tablet  Commonly known as: DEMADEX   Take 2 tablets by mouth 2 (Two) Times a Day, early morning and middle afternoon         Changes to Medications      Instructions Start Date   hydrALAZINE 50 MG tablet  Commonly known as: APRESOLINE  What changed: Another medication with the same name was removed. Continue taking this medication, and follow the directions you see here.   50 mg, Oral, 3 Times Daily PRN      lamoTRIgine 100 MG tablet  Commonly known as: LaMICtal  What changed: Another medication with the same name was removed. Continue taking this medication, and follow the directions you see here.   100 mg, Oral, 2 Times Daily      NIFEdipine CC 30 MG 24 hr tablet  Commonly known as: ADALAT CC  What changed:   · medication strength  · how much to take  · when to take this   30 mg, Oral, Every 24 Hours Scheduled   Start Date: May 27, 2023        Continue These Medications      Instructions Start Date   albuterol sulfate  (90 Base) MCG/ACT inhaler  Commonly known as: PROVENTIL HFA;VENTOLIN HFA;PROAIR HFA   INHALE ONE TO TWO PUFFS BY MOUTH EVERY 4 TO 6 HOURS      albuterol sulfate  (90 Base) MCG/ACT inhaler  Commonly known as: PROVENTIL HFA;VENTOLIN HFA;PROAIR HFA   Inhale 1 puff by mouth via inhaler every 6 (six) hours as needed for Wheezing.      atorvastatin 20 MG tablet  Commonly known as: LIPITOR   20 mg, Oral, Every Night at Bedtime      budesonide-formoterol 160-4.5 MCG/ACT inhaler  Commonly known as: SYMBICORT   2 puffs, Inhalation, 2 Times Daily  PRN      buPROPion  MG 24 hr tablet  Commonly known as: Wellbutrin XL   150 mg, Oral, Every Morning      divalproex 250 MG 24 hr tablet  Commonly known as: Depakote ER   250 mg, Oral, 2 Times Daily      levothyroxine 50 MCG tablet  Commonly known as: SYNTHROID, LEVOTHROID   50 mcg, Oral, Every Early Morning, TAKE PREOP      metoprolol succinate  MG 24 hr tablet  Commonly known as: TOPROL-XL   100 mg, Oral, 2 Times Daily, TAKE PREOP      oxyCODONE-acetaminophen 5-325 MG per tablet  Commonly known as: Percocet   Take 1 tablet by mouth Every 4 (Four) to 6 (Six) Hours As Needed for pain.      potassium citrate 10 MEQ (1080 MG) CR tablet  Commonly known as: UROCIT-K   Take 1 tablet by mouth 2 (Two) Times a Day - morning and evening. Take with meals. Do not crush, chew, or split.      pramipexole 0.5 MG tablet  Commonly known as: MIRAPEX   take 1 tablet by mouth at bedtime      sildenafil 100 MG tablet  Commonly known as: VIAGRA   100 mg, Oral, Daily PRN      tamsulosin 0.4 MG capsule 24 hr capsule  Commonly known as: Flomax   0.4 mg, Oral, Every Night at Bedtime      vilazodone 40 MG tablet tablet  Commonly known as: Viibryd   40 mg, Oral, Daily         Stop These Medications    amoxicillin 875 MG tablet  Commonly known as: AMOXIL     lisinopril 40 MG tablet  Commonly known as: PRINIVIL,ZESTRIL     naltrexone 50 MG tablet  Commonly known as: DEPADE     predniSONE 10 MG tablet  Commonly known as: DELTASONE     promethazine-dextromethorphan 6.25-15 MG/5ML syrup  Commonly known as: PROMETHAZINE-DM     spironolactone 25 MG tablet  Commonly known as: ALDACTONE            No Known Allergies      Discharge Disposition:  Home or Self Care    Diet:  Hospital:  Diet Order   Procedures   • Diet: Regular/House Diet; Texture: Regular Texture (IDDSI 7); Fluid Consistency: Thin (IDDSI 0)       Activity:  Activity Instructions     Activity as Tolerated                 CODE STATUS:    Code Status and Medical Interventions:    Ordered at: 05/20/23 2254     Code Status (Patient has no pulse and is not breathing):    CPR (Attempt to Resuscitate)     Medical Interventions (Patient has pulse or is breathing):    Full Support     Release to patient:    Routine Release         Additional Instructions for the Follow-ups that You Need to Schedule     Discharge Follow-up with PCP   As directed       Currently Documented PCP:    Naveed Nunez DO    PCP Phone Number:    [phone]     Follow Up Details: Recommend primary care follow-up within 1 week for general hospital follow-up.  Please call today to schedule         Discharge Follow-up with Specified Provider: Follow-up in nephrology clinic as instructed.  Please call with any questions   As directed      To: Follow-up in nephrology clinic as instructed.  Please call with any questions         Discharge Follow-up with Specified Provider: Follow-up in thoracic surgery team as instructed.  Please call the clinic with any questions   As directed      To: Follow-up in thoracic surgery team as instructed.  Please call the clinic with any questions         XR Chest 2 View    Jun 08, 2023 (Approximate)      Exam reason: post-op            Follow-up Information     Jose Rice MD Follow up.    Specialty: Thoracic Surgery  Why: Please go to the [facility] hospital for a chest x-ray prior to your appointment  Contact information:  [address] [facility] [address]  [facility] [address]  [phone]             Naveed Nunez DO .    Specialty: Family Medicine  Why: Recommend primary care follow-up within 1 week for general hospital follow-up.  Please call today to schedule  Contact information:  [address]  [address]  [place], [facility] [address] [phone]             Davis Angel MD Follow up in 1 week(s).    Specialty: Nephrology  Contact information:  [address] [facility]  [address]  [facility] [address]  [phone]                             Dre Pearce MD  05/26/23      Time Spent on  Discharge:  I spent greater than 40 minutes on this discharge activity which included: face-to-face encounter with the patient, reviewing the data in the system, coordination of the care with the nursing staff as well as consultants, documentation, and entering orders.

## 2023-05-26 NOTE — SIGNIFICANT NOTE
2305 Placed on Room Air for over night study.     2320 Placed on NIV. @ 21%    2341 O2 turned on 28% fio2

## 2023-05-26 NOTE — PLAN OF CARE
Goal Outcome Evaluation:  Plan of Care Reviewed With: patient        Progress: improving  Outcome Evaluation: Monitor pain,labs,and vitals. VSS. BIPAP on current shift-overnight oximetry. Walking oximetry scheduled for today. Pain controlled with PRN medications per orders. Will continue to monitor.

## 2023-05-27 NOTE — OUTREACH NOTE
Prep Survey    Flowsheet Row Responses   [facility] patient discharged from? [facility]   Is LACE score < 7 ? No   Eligibility Readm Mgmt   Discharge diagnosis Acute dyspnea   Does the patient have one of the following disease processes/diagnoses(primary or secondary)? Other   Does the patient have Home health ordered? No   Is there a DME ordered? Yes   What DME was ordered? home O2 needed will use [place]   Prep survey completed? Yes          Kaci NARVAEZ - Registered Nurse

## 2023-05-30 DIAGNOSIS — J90 PLEURAL EFFUSION, RIGHT: ICD-10-CM

## 2023-05-30 LAB
CYTO UR: NORMAL
LAB AP CASE REPORT: NORMAL
LAB AP NON-GYN SPECIMEN ADEQUACY: NORMAL
PATH REPORT.ADDENDUM SPEC: NORMAL
PATH REPORT.FINAL DX SPEC: NORMAL
PATH REPORT.GROSS SPEC: NORMAL

## 2023-05-30 RX ORDER — DIAZEPAM 2 MG/1
2 TABLET ORAL EVERY 6 HOURS PRN
Qty: 20 TABLET | Refills: 0 | Status: SHIPPED | OUTPATIENT
Start: 2023-05-30

## 2023-05-30 RX ORDER — OXYCODONE HYDROCHLORIDE 5 MG/1
5 TABLET ORAL EVERY 4 HOURS PRN
Qty: 18 TABLET | Refills: 0 | Status: SHIPPED | OUTPATIENT
Start: 2023-05-30 | End: 2023-06-02

## 2023-05-31 ENCOUNTER — READMISSION MANAGEMENT (OUTPATIENT)
Dept: CALL CENTER | Facility: HOSPITAL | Age: 63
End: 2023-05-31

## 2023-05-31 NOTE — OUTREACH NOTE
Medical Week 1 Survey    Flowsheet Row Responses   [facility] patient discharged from? [place]   Does the patient have one of the following disease processes/diagnoses(primary or secondary)? Other   Week 1 attempt successful? No   Unsuccessful attempts Attempt 1          ZUHAIR PERSAUD - Registered Nurse

## 2023-06-01 ENCOUNTER — TELEPHONE (OUTPATIENT)
Dept: SURGERY | Facility: CLINIC | Age: 63
End: 2023-06-01

## 2023-06-01 NOTE — TELEPHONE ENCOUNTER
Caller: Melissa Beltran    Relationship: Emergency Contact    Best call back number: [phone]     What is the best time to reach you: ANY    Who are you requesting to speak with (clinical staff, provider,  specific staff member): PROVIDER STAFF    Do you know the name of the person who called: MELISSA BELTRAN    What was the call regarding: PT HAD HAD SHORT TERM DISABILITY FORMS FAXED TO OFFICE FROM [place] -PT WAS WONDERING IF THOSE HAD BEEN RECEIVED AND TURN AROUND ON FILLING OUT-JUST FAXED 6/1/23     Is it okay if the provider responds through MyChart: YES

## 2023-06-02 LAB
FUNGUS WND CULT: NORMAL
MYCOBACTERIUM SPEC CULT: NORMAL
NIGHT BLUE STAIN TISS: NORMAL

## 2023-06-02 NOTE — PAYOR COMM NOTE
"Fabiola Moseley \"Bobby\" (62 y.o. Male)   PLEASE SEE ATTACHED FOR DC NOTICE    REF #[id number]    THANK YOU  AMBERLY EISENBERG RN/ DEPT  [facility]   [phone]  [phone]     Date of Birth   1960    Social Security Number       Address   [address] [place] [address]    Home Phone   [phone]    MRN   [id number]       Tenriism   Baptism    Marital Status                               Admission Date   23    Admission Type   Emergency    Admitting Provider   Ramya Lomeli MD    Attending Provider       Department, Room/Bed   [facility], E565/1       Discharge Date   2023    Discharge Disposition   Home or Self Care    Discharge Destination                               Attending Provider: (none)   Allergies: No Known Allergies    Isolation: None   Infection: None   Code Status: Prior    Ht: 167.6 cm (66\")   Wt: 115 kg (254 lb 4.8 oz)    Admission Cmt: None   Principal Problem: Acute dyspnea [R06.00]                 Active Insurance as of 2023     Primary Coverage     Payor Plan Insurance Group Employer/Plan Group    [facility] BLUE CROSS [employer] EMPLOYEE [id number]     Payor Plan Address Payor Plan Phone Number Payor Plan Fax Number Effective Dates    [address] [phone]  2023 - None Entered    [place] [address]       Subscriber Name Subscriber Birth Date Member ID       FABIOLA MOSELEY 1960 [id number]                 Emergency Contacts      (Rel.) Home Phone Work Phone Mobile Phone    Rebeca Moseley (Spouse) [phone] -- [phone]            Orient: [facility] [id number]  Tax ID [id number]     Discharge Summary      Dre Pearce MD at 23 1149              [facility] Medicine Services  DISCHARGE SUMMARY    Patient Name: Fabiola Moseley  : 1960  MRN: [id number]    Date of Admission: 2023  5:16 PM  Date of Discharge: 2023  Primary Care Physician: Naveed Nunez, " "DO    Consults     Date and Time Order Name Status Description    5/23/2023  6:28 AM Inpatient Nephrology Consult Completed     5/23/2023  6:20 AM Inpatient Nephrology Consult      5/19/2023  2:12 PM Inpatient Thoracic Surgery Consult Completed     5/17/2023  8:32 AM Inpatient Infectious Diseases Consult Completed     5/16/2023  9:26 PM Inpatient Pulmonology Consult Completed     5/16/2023  6:45 PM [facility] (on-call MD unless specified) Details            Hospital Course       Active Hospital Problems    Diagnosis  POA   • **Acute dyspnea [R06.00]  Yes   • Hypoxia [R09.02]  Yes   • Acute respiratory failure with hypoxia [J96.01]  Yes   • Pleural effusion on right [J90]  Yes   • CKD (chronic kidney disease) [N18.9]  Yes   • Lymphadenopathy, mediastinal [R59.0]  Yes   • ISIAH (obstructive sleep apnea) [G47.33]  Yes   • COPD (chronic obstructive pulmonary disease) [J44.9]  Yes   • Anxiety and depression [F41.9, F32.A]  Yes   • Essential hypertension [I10]  Yes   • Hypothyroidism [E03.9]  Yes      Resolved Hospital Problems   No resolved problems to display.          Hospital Course:  Calos Moseley is a 62 y.o. male \"admitted with Acute dyspnea, right-sided chest pain and hypoxia, findings of loculated right pleural effusion and possible right lower lobe endobronchial lesion.\"    Patient was seen by pulmonology and thoracic surgery.  He underwent robotic assisted total decortication on 5/21/2023 for loculated pleural effusion.  Please see operative report for full details.    Patient did well postoperatively and has now had chest tubes removed.  He has been cleared by thoracic surgery team to discharge and follow-up in their clinic for further management.    Patient was seen by nephrology who assisted with MESHA and diuretic management.  Patient has done well diuresing for peripheral edema and renal function seems to be improving on diuretics perhaps showing some degree of cardiorenal.  I have spoken to nephrologist today who has " cleared him to discharge on torsemide twice daily which she has been doing well on in the hospital.    Due to peripheral edema calcium channel blocker was decreased.  Hydralazine has also been decreased.  Patient has had lower need for blood pressure medications while hospitalized but now blood pressure is gradually increasing.  We are going to increase his hydralazine today back to 50 mg 3 times daily and continue lower dose calcium channel blocker from now.  He will have blood pressure check at primary care provider next week for further titration of medications.       Loculated pleural effusion status post thoracentesis.  Fluid looks to be exudative, cytology negative for malignant cells.  I do not see where Gram stain or culture were ever sent.    - Bronchoscopy completed on 5/19  - Completed antibiotics  - Supplement O2 as needed.  Wean to keep sats greater than 90%  - Lidoderm patch for right-sided chest pain  -Status post right robot-assisted decortication on 5/20/2023  Thoracic surgery is arranging for home oxygen at discharge.     Peripheral edema/weight gain- reviewed echocardiogram.  Relatively unremarkable other than mild pulmonary hypertension.  No diastolic dysfunction and normal BNP.    -Patient improved with diuresis earlier in hospital stay.  - Urine studies do not suggest significant proteinuria though there is very small amount  - Also note that he is on nifedipine which can cause peripheral edema, dose decreased  -Received diuresis  -Likely some degree of hypervolemia and acute diastolic congestive heart failure with pulmonary hypertension.  Needs to work on low-salt diet and continue diuretic and medical management.     CKD relatively stable, continue to follow.    Seem to have brief transient MESHA that was likely related to cardiorenal.  Appreciate nephrology.  He seems to be back to his long-term baseline creatinine     At the time of discharge patient was told to take all medications as  "prescribed, keep all follow-up appointments, and call their doctor or return to the hospital with any worsening or concerning symptoms.    Please note that this note was made using Dragon voice recognition software         Date of procedure: 5/21/2023     Patient name: Calos Moseley  MRN: [id number]     Pre OP diagnosis:  1. Right loculated parapneumonic effusion.  2. Right trapped lung.  3. Community-acquired pneumonia.    4. Chronic obstructive pulmonary disease.  5. Essential hypertension.  6. Hyperlipidemia.  7. Morbid obesity, BMI 41.  8. Obstructive sleep apnea.  9. Chronic kidney disease.  10. Renal calculus.  11. Bipolar disease.  12. Restless leg syndrome.  13. Hypothyroidism.  14. Anxiety and depression.     Post OP diagnosis:  1. Right loculated parapneumonic effusion s/p right thoracoscopy with drainage.  2. Right trapped lung s/p right complete decortication, partial visceral and parietal pleurectomy.  3. Community-acquired pneumonia.    4. Chronic obstructive pulmonary disease.  5. Essential hypertension.  6. Hyperlipidemia.  7. Morbid obesity, BMI 41.  8. Obstructive sleep apnea.  9. Chronic kidney disease.  10. Renal calculus.  11. Bipolar disease.  12. Restless leg syndrome.  13. Hypothyroidism.  14. Anxiety and depression.     Procedure performed:   1. Flexible bronchoscopy.  2. Right robotic assisted total decortication, partial visceral and parietal pleurectomy.  3. Intercostal nerve block.    \"Mr. Moseley is doing very well POD #6, s/p right robot-assisted decortication.  All chest tubes have been removed and follow-up imaging is stable.  His oxygen has been weaned to 2-3 L.  We will order this at discharge.  He is stable for discharge from a thoracic standpoint.  Postoperative care instructions have been discussed with him the medications have been sent to the pharmacy.  He will follow-up in clinic with Dr. Rice in 2 weeks with a chest x-ray.Sosa Romero, JONATHAN, APRN\"    \"PLAN:  -Continue the " "current dose of torsemide 40 mg twice daily  -The patient could be discharged from the renal standpoint and to follow the same medication he is currently on  -I will plan to see him in my office in 1 week for follow-up.  Davis Angel MD\"    Day of Discharge     Subjective:  Patient is walking around his room.  He says he is ready to discharge.  He agrees to close follow-up.  He says he is making excellent urine and feels very well.  He reports he is breathing very well.  He appreciates that thoracic surgery team is arranging for home oxygen per      Vital Signs:   Temp:  [97.8 °F (36.6 °C)-99.2 °F (37.3 °C)] 98.7 °F (37.1 °C)  Heart Rate:  [73-82] 82  Resp:  [16-24] 18  BP: (140-171)/(65-82) 171/81     Physical Exam:  Constitutional:Awake, alert  HENT: NCAT, mucous membranes moist, neck supple  Respiratory: Decreased cough, improving coarse sounds, no wheezing, relatively nonlabored breathing  Cardiovascular: Pulse rate is normal, palpable radial pulse bilaterally  Gastrointestinal:soft, nontender, nondistended  Musculoskeletal: Ambulating well, normal musculature for age, mild lower extremity edema, BMI 41, morbidly obese  Psychiatric: Appropriate affect, cooperative, conversational  Neurologic: No slurred speech or facial droop, follows commands  Skin: No rashes or jaundice, warm    Pertinent  and/or Most Recent Results     Results from last 7 days   Lab Units 05/26/23  0708 05/25/23  0419 05/24/23  0518 05/23/23  1630 05/23/23  0457 05/22/23  0411 05/21/23  0521 05/20/23  2030 05/20/23  1054 05/20/23  0405   WBC 10*3/mm3 7.64 7.08 7.70  --   --  14.39* 16.55* 22.24*  --  16.88*   HEMOGLOBIN g/dL 9.3* 9.9* 9.4*  --   --  10.3* 11.2* 11.9*  --  11.5*   HEMATOCRIT % 29.1* 31.1* 29.4*  --   --  31.7* 35.7* 37.3*  --  35.6*   PLATELETS 10*3/mm3 340 400 338  --   --  315 370 428  --  341   SODIUM mmol/L 142 139 141 140 138 135* 138 140   < > 152*   POTASSIUM mmol/L 4.5 4.5 4.6 4.9 5.8* 5.4* 5.8* 5.5*   < > 5.4* "   CHLORIDE mmol/L 97* 99 104 98 101 99 102 103   < > 113*   CO2 mmol/L 37.6* 34.3* 31.6* 32.6* 31.6* 30.9* 30.7* 23.0   < > 29.0   BUN mg/dL 35* 41* 43* 50* 52* 39* 30* 33*   < > 37*   CREATININE mg/dL 1.64* 1.73* 1.89* 2.33* 2.40* 2.07* 1.42* 1.57*   < > 1.76*   GLUCOSE mg/dL 143* 127* 167* 146* 152* 134* 156* 118*   < > 110*   CALCIUM mg/dL 9.1 8.9 8.5* 8.9 8.9 8.5* 9.0 8.4*   < > 9.0    < > = values in this interval not displayed.     Results from last 7 days   Lab Units 05/26/23  0708 05/23/23  0457 05/20/23  0405   BILIRUBIN mg/dL 0.2 0.2  --    ALK PHOS U/L 76 66  --    ALT (SGPT) U/L 31 17  --    AST (SGOT) U/L 26 15  --    PROTIME Seconds  --   --  14.2   INR   --   --  1.08   APTT seconds  --   --  35.8*           Invalid input(s): TG, LDLCALC, LDLREALC  Results from last 7 days   Lab Units 05/25/23  0419   PROBNP pg/mL 346.0       Brief Urine Lab Results  (Last result in the past 365 days)      Color   Clarity   Blood   Leuk Est   Nitrite   Protein   CREAT   Urine HCG        05/23/23 1216             126.0               Microbiology Results Abnormal     Procedure Component Value - Date/Time    Anaerobic Culture - Surgical Site, Pleura [[id number]]  (Normal) Collected: 05/20/23 1851    Lab Status: Final result Specimen: Surgical Site from Pleura Updated: 05/25/23 0631     Anaerobic Culture No anaerobes isolated at 5 days    Fungus Culture - Lavage, Lung, Right Lower Lobe [[id number]] Collected: 05/19/23 1546    Lab Status: Preliminary result Specimen: Lavage from Lung, Right Lower Lobe Updated: 05/24/23 1645     Fungus Culture No fungus isolated at less than 1 week    AFB Culture - Lavage, Lung, Right Lower Lobe [[id number]] Collected: 05/19/23 1546    Lab Status: Preliminary result Specimen: Lavage from Lung, Right Lower Lobe Updated: 05/24/23 1645     AFB Culture No AFB isolated at less than 1 week     AFB Stain No acid fast bacilli seen on concentrated smear    Tissue / Bone Culture - Tissue, Pleura  [[id number]] Collected: 05/20/23 1856    Lab Status: Final result Specimen: Tissue from Pleura Updated: 05/23/23 0930     Tissue Culture No growth at 3 days     Gram Stain No WBCs or organisms seen    Body Fluid Culture - Surgical Site, Pleura [[id number]] Collected: 05/20/23 1851    Lab Status: Final result Specimen: Surgical Site from Pleura Updated: 05/23/23 0928     Body Fluid Culture No growth at 3 days     Gram Stain No WBCs or organisms seen    Blood Culture - Blood, Arm, Left [[id number]]  (Normal) Collected: 05/16/23 2247    Lab Status: Final result Specimen: Blood from Arm, Left Updated: 05/21/23 2301     Blood Culture No growth at 5 days    Blood Culture - Blood, Hand, Right [[id number]]  (Normal) Collected: 05/16/23 2253    Lab Status: Final result Specimen: Blood from Hand, Right Updated: 05/21/23 2301     Blood Culture No growth at 5 days    BAL Culture, Quantitative - Lavage, Lung, Right Lower Lobe [[id number]] Collected: 05/19/23 1546    Lab Status: Final result Specimen: Lavage from Lung, Right Lower Lobe Updated: 05/21/23 0822     BAL Culture No growth     Gram Stain No organisms seen      Rare (1+) WBCs seen    Fungus Smear - Lavage, Lung, Right Lower Lobe [[id number]] Collected: 05/19/23 1546    Lab Status: Final result Specimen: Lavage from Lung, Right Lower Lobe Updated: 05/19/23 2113     Fungal Stain No fungal elements seen    S. Pneumo Ag Urine or CSF - Urine, Urine, Clean Catch [[id number]]  (Normal) Collected: 05/17/23 0452    Lab Status: Final result Specimen: Urine, Clean Catch Updated: 05/17/23 0644     Strep Pneumo Ag Negative    Legionella Antigen, Urine - Urine, Urine, Clean Catch [[id number]]  (Normal) Collected: 05/17/23 0452    Lab Status: Final result Specimen: Urine, Clean Catch Updated: 05/17/23 0644     LEGIONELLA ANTIGEN, URINE Negative    Respiratory Panel PCR w/COVID-19(SARS-CoV-2) JERI/SONIA/MARQUITA/PAD/COR/MAD/ULISSES In-House, NP Swab in UTM/VTM, 3-4 HR TAT - Swab, Nasopharynx [[id number]]   (Normal) Collected: 05/16/23 1733    Lab Status: Final result Specimen: Swab from Nasopharynx Updated: 05/16/23 1832     ADENOVIRUS, PCR Not Detected     Coronavirus 229E Not Detected     Coronavirus HKU1 Not Detected     Coronavirus NL63 Not Detected     Coronavirus OC43 Not Detected     COVID19 Not Detected     Human Metapneumovirus Not Detected     Human Rhinovirus/Enterovirus Not Detected     Influenza A PCR Not Detected     Influenza B PCR Not Detected     Parainfluenza Virus 1 Not Detected     Parainfluenza Virus 2 Not Detected     Parainfluenza Virus 3 Not Detected     Parainfluenza Virus 4 Not Detected     RSV, PCR Not Detected     Bordetella pertussis pcr Not Detected     Bordetella parapertussis PCR Not Detected     Chlamydophila pneumoniae PCR Not Detected     Mycoplasma pneumo by PCR Not Detected    Narrative:      In the setting of a positive respiratory panel with a viral infection PLUS a negative procalcitonin without other underlying concern for bacterial infection, consider observing off antibiotics or discontinuation of antibiotics and continue supportive care. If the respiratory panel is positive for atypical bacterial infection (Bordetella pertussis, Chlamydophila pneumoniae, or Mycoplasma pneumoniae), consider antibiotic de-escalation to target atypical bacterial infection.          Imaging Results (All)     Procedure Component Value Units Date/Time    XR Chest 1 View [[id number]] Collected: 05/26/23 0658     Updated: 05/26/23 0702    Narrative:      XR CHEST 1 VW-     Clinical: Postop decortication     COMPARISON 05/25/2023     FINDINGS: No pneumothorax status post right chest tube removal. There is  been no notable change in the appearance of the right lung. The left  lung is clear. The cardiomediastinal silhouette is stable.     CONCLUSION: Right chest tube removal, no other interval change since  05/25/2023.     This report was finalized on 5/26/2023 6:59 AM by Dr. Jed Shaffer M.D.        XR Chest 1 View [[id number]] Collected: 05/25/23 0635     Updated: 05/25/23 0642    Narrative:      XR CHEST 1 VW-     Clinical: Postop decortication     COMPARISON examination 05/24/2023     FINDINGS: Right-sided chest tube in position as before. There is small  amount of pleural thickening and/or pleural fusion along the lateral  aspect of the right lung. Mild volume loss right hemithorax similar to  the previous examination with vague infiltrate or atelectasis as before.  The cardiomediastinal silhouette is stable. The left lung is clear. No  pneumothorax.     CONCLUSION: Stable chest     This report was finalized on 5/25/2023 6:39 AM by Dr. Jed Shaffer M.D.       XR Chest 1 View [[id number]] Collected: 05/24/23 0658     Updated: 05/24/23 0703    Narrative:      XR CHEST 1 VW-     HISTORY:  Postop lung surgery, chest tube.     COMPARISON:  Chest radiograph 05/23/2023 at 5:45 AM     FINDINGS:    A single view of the chest was obtained. There is a right apical chest  tube. A second previously noted chest tube has been removed. The cardiac  silhouette is upper normal to mildly enlarged. The mediastinal and hilar  contours are not significantly changed. Right greater than left airspace  opacities and pleural effusions are not significantly changed. There is  no definite pneumothorax. There is multilevel degenerative disc disease.     This report was finalized on 5/24/2023 7:00 AM by Dr. Mahsa Paulino M.D.       XR Chest 1 View [[id number]] Collected: 05/23/23 0811     Updated: 05/23/23 1551    Narrative:      EXAMINATION: SINGLE VIEW CHEST RADIOGRAPH     HISTORY: 62-year-old male undergoing postoperative lung surgery  evaluation.     FINDINGS: A semierect AP portable chest radiograph was obtained.  Comparison is made to a chest radiograph dated 05/22/2023. There are 2  apically oriented right-sided chest tubes that are stable in position.  Hazy opacification at the base of the right lung has developed since  the  prior examination. There is a pleural-based opacification seen laterally  in the right mid and lower lung field that has developed since prior  examination. Linear opacities are seen at the base of the left lung. A  pneumothorax is not visualized. Mild interstitial opacification in both  lungs is noted.       Impression:      There are findings suggestive of interval development of  small right pleural effusion. Mild bibasilar atelectasis is noted.  Superimposed mild interstitial edema cannot be completely excluded.  Right-sided chest tubes remain stable.     This report was finalized on 5/23/2023 3:48 PM by Dr. Vega Fowler M.D.       XR Chest 1 View [[id number]] Collected: 05/22/23 0941     Updated: 05/22/23 0941    Narrative:        Patient: FABIOLA BELTRAN  Time Out: 09:40  Exam(s): XR CXR 1 VIEW     EXAM:    XR Chest, 1 View    CLINICAL HISTORY:     Reason for exam: Post Op Lung Surgery.    TECHNIQUE:    Frontal view of the chest.    COMPARISON:  5 21 23    FINDINGS:    Lungs:  Stable airspace opacities throughout the lungs, right greater   than left.    Pleural space: Suspect small bilateral pleural effusions, unchanged.    No pneumothorax.    Heart:  Stable cardiomegaly.    Mediastinum: Stable.    Bones joints:  Osseous structures stable.    Tubes, lines and devices:  Unchanged chest tubes on the right.    IMPRESSION:       Stable exam as compared to 5 21 23      Impression:          Electronically signed by Conner Linder M.D. on 05-22-23 at 0940    XR Chest 1 View [[id number]] Collected: 05/21/23 0730     Updated: 05/21/23 0730    Narrative:        Patient: FABIOLA BELTRAN  Time Out: 07:28  Exam(s): XR CXR 1 VIEW     EXAM:    XR Chest, 1 View    CLINICAL HISTORY:       Post Op Lung Surgery.    TECHNIQUE:    Frontal view of the chest.    COMPARISON:    No relevant prior studies available.    FINDINGS:    Lungs:  Bilateral airspace opacities are present.    Pleural space:  Small bilateral pleural effusions.   No pneumothorax.    Heart:  Cardiomegaly.    Mediastinum:  Unremarkable.    Bones joints: No fracture.    Other findings:  Two percutaneous drains overlie the right chest.    IMPRESSION:       1.  Bilateral airspace opacities may represent pulmonary edema and or   atypical infection.  2.  Cardiomegaly.  3.  Small bilateral pleural effusions.  4.  Two percutaneous drains overlie the right chest.      Impression:          Electronically signed by Archana Downs MD on 05-21-23 at 0728    XR Chest 1 View [[id number]] Collected: 05/20/23 2058     Updated: 05/20/23 2137    Narrative:      CHEST SINGLE VIEW     HISTORY: Postoperative lung surgery.     COMPARISON: AP chest 05/20/2023, 05/18/2023.     FINDINGS: There are postoperative changes in the right there has been  placement of 2 right-sided chest tubes. There is cardiomegaly. There are  increased interstitial markings suspicious for hydrostatic pulmonary  edema. There are also small bilateral pleural effusions and there is  associated basilar atelectasis. There is no evidence for pneumothorax.       Impression:      Postoperative changes on the right with placement of two  right chest tubes extending in the right apex. No pneumothorax. There is  cardiomegaly with increased interstitial markings suspicious for mild  hydrostatic edema and there are small bilateral pleural effusions with  basilar atelectasis.     This report was finalized on 5/20/2023 9:34 PM by Dr. Tommy Shah M.D.       XR Chest 1 View [[id number]] Collected: 05/20/23 0647     Updated: 05/20/23 0651    Narrative:      XR CHEST 1 VW-     HISTORY: Male who is 62 years-old,  preoperative evaluation     TECHNIQUE: Frontal view of the chest     COMPARISON: 5/18/2023     FINDINGS: The heart size is borderline. Mild central vascular  prominence.. Persistent right basilar atelectasis/infiltrate/effusion.  No pneumothorax. No acute osseous process.       Impression:      No significant change.     This  report was finalized on 5/20/2023 6:48 AM by Dr. Eder Gallego M.D.       XR Chest 1 View [[id number]] Collected: 05/18/23 1616     Updated: 05/18/23 2125    Narrative:      EXAMINATION: SINGLE VIEW CHEST RADIOGRAPH     HISTORY: 62-year-old male undergoing follow-up evaluation of a pleural  effusions     FINDINGS: An upright AP portable chest radiograph was obtained.  Comparison is made to a chest radiograph dated 01/24/2022. There is  opacification at the base of the right lung which silhouettes the right  hemidiaphragm and obscuring is a right costophrenic angle. The left lung  is clear. The cardiomediastinal silhouette and osseous structures appear  normal.       Impression:      Right lung base pneumonia and/or atelectasis with a moderate  size right pleural effusion.     This report was finalized on 5/18/2023 9:21 PM by Dr. Vega Fowlre M.D.       US Thoracentesis [[id number]] Collected: 05/17/23 1615    Specimen: Body Fluid Updated: 05/17/23 1620    Narrative:      IMAGE GUIDED THORACENTESIS     HISTORY:  empyema.     TECHNIQUE: Informed consent was obtained and documented in the patient's  chart. The patient was placed in upright position. After planning  ultrasound, the patient's right posterolateral thorax was prepped and  draped in the usual aseptic manner. 1% lidocaine was infiltrated at the  designated puncture site. A 5-[place] thoracentesis catheter was then  inserted into the pleural effusion using ultrasound guidance and a total  of 300 mL fluid was drained with specimen sent to laboratory.  Upon  completion of the procedure, the catheter was withdrawn and hemostasis  achieved by manual pressure. Sterile dressing was applied. There were no  immediate complications. All elements of maximal sterile technique were  followed.          Impression:      Successful ultrasound guided right thoracentesis.     This report was finalized on 5/17/2023 4:16 PM by Dr. Nabil Casas M.D.       CT Chest  Without Contrast Diagnostic [[id number]] Collected: 05/16/23 1853     Updated: 05/16/23 1903    Narrative:      CT CHEST WO CONTRAST DIAGNOSTIC-     INDICATIONS: Cough, short of breath, right pleural effusion     TECHNIQUE: Radiation dose reduction techniques were utilized, including  automated exposure control and exposure modulation based on body size.  Unenhanced CHEST CT     COMPARISON: None available      FINDINGS:           The heart size is normal without pericardial effusion. Increasing aorta  is borderline dilated, 3.6 cm. Enlarged right paratracheal lymph node is  noted, 1.3 cm short axis, axial image 28, could be reactive in nature or  potentially evidence of neoplasm, follow-up recommended; if further  imaging evaluation is indicated, positron emission tomography could be  considered. A right paraesophageal lymph node measures 1 cm short axis  on axial image 44. Assessment of vascular and mediastinal structures is  limited without intravenous contrast material.        Occlusion right lower lobe branch airways is seen, with limited because  of mucous plugging or lesion (bronchoscopic correlation can be  obtained), with right lower lobe atelectasis, possibility of underlying  pneumonia or lesion not excluded, clinical correlation and follow-up  advised.     The lungs also show old granulomatous disease.     Mild to moderate loculated right pleural effusion is increased from  03/08/2022.     Upper abdominal structures appear unremarkable.     Degenerative changes are seen in the spine. No acute fracture is  identified.       Impression:         Mild to moderate loculated right pleural effusion. Occlusion right lower  lobe branch airways is seen, with limited because of mucous plugging or  lesion (bronchoscopic correlation can be obtained), with right lower  lobe atelectasis (possibility of underlying pneumonia or lesion not  excluded). Nonspecific mediastinal adenopathy. Follow-up/further  evaluation  recommended as indicated.     This report was finalized on 5/16/2023 7:00 PM by Dr. Eder Gallego M.D.                     Results for orders placed during the hospital encounter of 05/16/23    Adult Transthoracic Echo Complete W/ Cont if Necessary Per Protocol    Interpretation Summary  •  Left ventricular systolic function is normal. Calculated left ventricular EF = 58.1% The left ventricular cavity is moderately dilated. Left ventricular wall thickness is consistent with mild concentric hypertrophy. All left ventricular wall segments contract normally. Left ventricular diastolic function was normal.  •  Trace mitral valve regurgitation is present.  •  Trace tricuspid valve regurgitation is present. Estimated right ventricular systolic pressure from tricuspid regurgitation is mildly elevated (35-45 mmHg). Calculated right ventricular systolic pressure from tricuspid regurgitation is 36.3 mmHg.      1. Pleural Tissue:                 A. 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               B. 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               C. No malignancy identified by routine staining.    Plan for Follow-up of Pending Labs/Results: Thoracic surgery follow-up  Pending Labs     Order Current Status    AFB Culture - Lavage, Lung, Right Lower Lobe Preliminary result    Fungus Culture - Lavage, Lung, Right Lower Lobe Preliminary result        Discharge Details        Discharge Medications      New Medications      Instructions Start Date   Acetaminophen Extra Strength 500 MG tablet  Generic drug: acetaminophen   1,000 mg, Oral, 3 Times Daily      diazePAM 2 MG tablet  Commonly known as: VALIUM   2 mg, Oral, Every 6 Hours PRN      docusate sodium 100 MG capsule   100 mg, Oral, 2 Times Daily PRN      guaiFENesin-dextromethorphan 100-10 MG/5ML syrup  Commonly known as: ROBITUSSIN DM   10 mL, Oral, 3 Times Daily PRN       oxyCODONE 5 MG immediate release tablet  Commonly known as: ROXICODONE   5 mg, Oral, Every 4 Hours PRN      polyethylene glycol 17 g packet  Commonly known as: MIRALAX   17 g, Oral, 2 Times Daily PRN      torsemide 20 MG tablet  Commonly known as: DEMADEX   Take 2 tablets by mouth 2 (Two) Times a Day, early morning and middle afternoon         Changes to Medications      Instructions Start Date   hydrALAZINE 50 MG tablet  Commonly known as: APRESOLINE  What changed: Another medication with the same name was removed. Continue taking this medication, and follow the directions you see here.   50 mg, Oral, 3 Times Daily PRN      lamoTRIgine 100 MG tablet  Commonly known as: LaMICtal  What changed: Another medication with the same name was removed. Continue taking this medication, and follow the directions you see here.   100 mg, Oral, 2 Times Daily      NIFEdipine CC 30 MG 24 hr tablet  Commonly known as: ADALAT CC  What changed:   · medication strength  · how much to take  · when to take this   30 mg, Oral, Every 24 Hours Scheduled   Start Date: May 27, 2023        Continue These Medications      Instructions Start Date   albuterol sulfate  (90 Base) MCG/ACT inhaler  Commonly known as: PROVENTIL HFA;VENTOLIN HFA;PROAIR HFA   INHALE ONE TO TWO PUFFS BY MOUTH EVERY 4 TO 6 HOURS      albuterol sulfate  (90 Base) MCG/ACT inhaler  Commonly known as: PROVENTIL HFA;VENTOLIN HFA;PROAIR HFA   Inhale 1 puff by mouth via inhaler every 6 (six) hours as needed for Wheezing.      atorvastatin 20 MG tablet  Commonly known as: LIPITOR   20 mg, Oral, Every Night at Bedtime      budesonide-formoterol 160-4.5 MCG/ACT inhaler  Commonly known as: SYMBICORT   2 puffs, Inhalation, 2 Times Daily PRN      buPROPion  MG 24 hr tablet  Commonly known as: Wellbutrin XL   150 mg, Oral, Every Morning      divalproex 250 MG 24 hr tablet  Commonly known as: Depakote ER   250 mg, Oral, 2 Times Daily      levothyroxine 50 MCG  tablet  Commonly known as: SYNTHROID, LEVOTHROID   50 mcg, Oral, Every Early Morning, TAKE PREOP      metoprolol succinate  MG 24 hr tablet  Commonly known as: TOPROL-XL   100 mg, Oral, 2 Times Daily, TAKE PREOP      oxyCODONE-acetaminophen 5-325 MG per tablet  Commonly known as: Percocet   Take 1 tablet by mouth Every 4 (Four) to 6 (Six) Hours As Needed for pain.      potassium citrate 10 MEQ (1080 MG) CR tablet  Commonly known as: UROCIT-K   Take 1 tablet by mouth 2 (Two) Times a Day - morning and evening. Take with meals. Do not crush, chew, or split.      pramipexole 0.5 MG tablet  Commonly known as: MIRAPEX   take 1 tablet by mouth at bedtime      sildenafil 100 MG tablet  Commonly known as: VIAGRA   100 mg, Oral, Daily PRN      tamsulosin 0.4 MG capsule 24 hr capsule  Commonly known as: Flomax   0.4 mg, Oral, Every Night at Bedtime      vilazodone 40 MG tablet tablet  Commonly known as: Viibryd   40 mg, Oral, Daily         Stop These Medications    amoxicillin 875 MG tablet  Commonly known as: AMOXIL     lisinopril 40 MG tablet  Commonly known as: PRINIVIL,ZESTRIL     naltrexone 50 MG tablet  Commonly known as: DEPADE     predniSONE 10 MG tablet  Commonly known as: DELTASONE     promethazine-dextromethorphan 6.25-15 MG/5ML syrup  Commonly known as: PROMETHAZINE-DM     spironolactone 25 MG tablet  Commonly known as: ALDACTONE            No Known Allergies      Discharge Disposition:  Home or Self Care    Diet:  Hospital:  Diet Order   Procedures   • Diet: Regular/House Diet; Texture: Regular Texture (IDDSI 7); Fluid Consistency: Thin (IDDSI 0)       Activity:  Activity Instructions     Activity as Tolerated                 CODE STATUS:    Code Status and Medical Interventions:   Ordered at: 05/20/23 5438     Code Status (Patient has no pulse and is not breathing):    CPR (Attempt to Resuscitate)     Medical Interventions (Patient has pulse or is breathing):    Full Support     Release to patient:     Routine Release         Additional Instructions for the Follow-ups that You Need to Schedule     Discharge Follow-up with PCP   As directed       Currently Documented PCP:    Naveed Nunez DO    PCP Phone Number:    [phone]     Follow Up Details: Recommend primary care follow-up within 1 week for general hospital follow-up.  Please call today to schedule         Discharge Follow-up with Specified Provider: Follow-up in nephrology clinic as instructed.  Please call with any questions   As directed      To: Follow-up in nephrology clinic as instructed.  Please call with any questions         Discharge Follow-up with Specified Provider: Follow-up in thoracic surgery team as instructed.  Please call the clinic with any questions   As directed      To: Follow-up in thoracic surgery team as instructed.  Please call the clinic with any questions         XR Chest 2 View    Jun 08, 2023 (Approximate)      Exam reason: post-op            Follow-up Information     Jose Rice MD Follow up.    Specialty: Thoracic Surgery  Why: Please go to the [facility] hospital for a chest x-ray prior to your appointment  Contact information:  [address] [facility] [address]  [facility] [id number] [phone]             Naveed Nunez DO .    Specialty: Family Medicine  Why: Recommend primary care follow-up within 1 week for general hospital follow-up.  Please call today to schedule  Contact information:  [address]  [address]  [facility] [id number] [phone]             Davis Angel MD Follow up in 1 week(s).    Specialty: Nephrology  Contact information:  [address]  [address]  [facility] [address]  [phone]                             Dre Pearce MD  05/26/23      Time Spent on Discharge:  I spent greater than 40 minutes on this discharge activity which included: face-to-face encounter with the patient, reviewing the data in the system, coordination of the care with the nursing staff as well as  consultants, documentation, and entering orders.        Electronically signed by Dre Pearce MD at 05/26/23 9498

## 2023-06-06 ENCOUNTER — READMISSION MANAGEMENT (OUTPATIENT)
Dept: CALL CENTER | Facility: HOSPITAL | Age: 63
End: 2023-06-06
Payer: COMMERCIAL

## 2023-06-06 NOTE — OUTREACH NOTE
Medical Week 2 Survey      Flowsheet Row Responses   [facility] patient discharged from? [place]   Does the patient have one of the following disease processes/diagnoses(primary or secondary)? Other   Week 2 attempt successful? Yes   Call start time 1142   Discharge diagnosis Acute dyspnea   Call end time 1150   Person spoke with today (if not patient) and relationship wife   Meds reviewed with patient/caregiver? Yes   Is the patient having any side effects they believe may be caused by any medication additions or changes? No   Does the patient have all medications ordered at discharge? Yes   Is the patient taking all medications as directed (includes completed medication regime)? Yes   Comments regarding appointments Sees surgeon on 6/15/2023   Does the patient have a primary care provider?  Yes   Does the patient have an appointment with their PCP within 7 days of discharge? Yes   Has the patient kept scheduled appointments due by today? N/A   What DME was ordered? home O2 needed will use [employer]   Psychosocial issues? No   Did the patient receive a copy of their discharge instructions? Yes   Nursing interventions Reviewed instructions with patient   What is the patient's perception of their health status since discharge? Improving   Is the patient/caregiver able to teach back signs and symptoms related to disease process for when to call PCP? Yes   Is the patient/caregiver able to teach back signs and symptoms related to disease process for when to call 911? Yes   Is the patient/caregiver able to teach back the hierarchy of who to call/visit for symptoms/problems? PCP, Specialist, Home health nurse, Urgent Care, ED, 911 Yes   If the patient is a current smoker, are they able to teach back resources for cessation? Not a smoker   Week 2 Call Completed? Yes   Graduated Yes   Is the patient interested in additional calls from an ambulatory ?  NOTE:  applies to high risk patients requiring  additional follow-up. No   Did the patient feel the follow up calls were helpful during their recovery period? Yes   Was the number of calls appropriate? Yes            Demarcus WALTER - Registered Nurse

## 2023-06-09 ENCOUNTER — HOSPITAL ENCOUNTER (OUTPATIENT)
Facility: HOSPITAL | Age: 63
Setting detail: OBSERVATION
LOS: 1 days | Discharge: HOME | End: 2023-06-10
Attending: EMERGENCY MEDICINE | Admitting: HOSPITALIST
Payer: COMMERCIAL

## 2023-06-09 ENCOUNTER — APPOINTMENT (OUTPATIENT)
Dept: GENERAL RADIOLOGY | Facility: HOSPITAL | Age: 63
End: 2023-06-09
Payer: COMMERCIAL

## 2023-06-09 DIAGNOSIS — J90 PLEURAL EFFUSION: ICD-10-CM

## 2023-06-09 DIAGNOSIS — N18.9 CHRONIC KIDNEY DISEASE, UNSPECIFIED CKD STAGE: ICD-10-CM

## 2023-06-09 DIAGNOSIS — R60.0 PEDAL EDEMA: Primary | ICD-10-CM

## 2023-06-09 DIAGNOSIS — M62.838 MUSCLE SPASM OF BOTH LOWER LEGS: ICD-10-CM

## 2023-06-09 LAB
ALBUMIN SERPL-MCNC: 4.1 G/DL (ref 3.5–5.2)
ALBUMIN/GLOB SERPL: 1.2 G/DL
ALP SERPL-CCNC: 99 U/L (ref 39–117)
ALT SERPL W P-5'-P-CCNC: 27 U/L (ref 1–41)
ANION GAP SERPL CALCULATED.3IONS-SCNC: 12 MMOL/L (ref 5–15)
AST SERPL-CCNC: 25 U/L (ref 1–40)
BASOPHILS # BLD AUTO: 0.07 10*3/MM3 (ref 0–0.2)
BASOPHILS NFR BLD AUTO: 0.8 % (ref 0–1.5)
BILIRUB SERPL-MCNC: 0.3 MG/DL (ref 0–1.2)
BUN SERPL-MCNC: 33 MG/DL (ref 8–23)
BUN/CREAT SERPL: 17.9 (ref 7–25)
CALCIUM SPEC-SCNC: 8.9 MG/DL (ref 8.6–10.5)
CHLORIDE SERPL-SCNC: 98 MMOL/L (ref 98–107)
CO2 SERPL-SCNC: 29 MMOL/L (ref 22–29)
CREAT SERPL-MCNC: 1.84 MG/DL (ref 0.76–1.27)
DEPRECATED RDW RBC AUTO: 46.3 FL (ref 37–54)
EGFRCR SERPLBLD CKD-EPI 2021: 41.2 ML/MIN/1.73
EOSINOPHIL # BLD AUTO: 0.53 10*3/MM3 (ref 0–0.4)
EOSINOPHIL NFR BLD AUTO: 5.9 % (ref 0.3–6.2)
ERYTHROCYTE [DISTWIDTH] IN BLOOD BY AUTOMATED COUNT: 14 % (ref 12.3–15.4)
FUNGUS WND CULT: NORMAL
GLOBULIN UR ELPH-MCNC: 3.4 GM/DL
GLUCOSE SERPL-MCNC: 218 MG/DL (ref 65–99)
HCT VFR BLD AUTO: 33.8 % (ref 37.5–51)
HGB BLD-MCNC: 10.6 G/DL (ref 13–17.7)
IMM GRANULOCYTES # BLD AUTO: 0.12 10*3/MM3 (ref 0–0.05)
IMM GRANULOCYTES NFR BLD AUTO: 1.3 % (ref 0–0.5)
LYMPHOCYTES # BLD AUTO: 1.84 10*3/MM3 (ref 0.7–3.1)
LYMPHOCYTES NFR BLD AUTO: 20.6 % (ref 19.6–45.3)
MAGNESIUM SERPL-MCNC: 2 MG/DL (ref 1.6–2.4)
MCH RBC QN AUTO: 28.6 PG (ref 26.6–33)
MCHC RBC AUTO-ENTMCNC: 31.4 G/DL (ref 31.5–35.7)
MCV RBC AUTO: 91.4 FL (ref 79–97)
MONOCYTES # BLD AUTO: 0.82 10*3/MM3 (ref 0.1–0.9)
MONOCYTES NFR BLD AUTO: 9.2 % (ref 5–12)
MYCOBACTERIUM SPEC CULT: NORMAL
NEUTROPHILS NFR BLD AUTO: 5.56 10*3/MM3 (ref 1.7–7)
NEUTROPHILS NFR BLD AUTO: 62.2 % (ref 42.7–76)
NIGHT BLUE STAIN TISS: NORMAL
NRBC BLD AUTO-RTO: 0 /100 WBC (ref 0–0.2)
PLATELET # BLD AUTO: 261 10*3/MM3 (ref 140–450)
PMV BLD AUTO: 9.8 FL (ref 6–12)
POTASSIUM SERPL-SCNC: 3.9 MMOL/L (ref 3.5–5.2)
PROT SERPL-MCNC: 7.5 G/DL (ref 6–8.5)
RBC # BLD AUTO: 3.7 10*6/MM3 (ref 4.14–5.8)
SODIUM SERPL-SCNC: 139 MMOL/L (ref 136–145)
WBC NRBC COR # BLD: 8.94 10*3/MM3 (ref 3.4–10.8)

## 2023-06-09 PROCEDURE — 25010000002 DIAZEPAM PER 5 MG: Performed by: EMERGENCY MEDICINE

## 2023-06-09 PROCEDURE — 84484 ASSAY OF TROPONIN QUANT: CPT | Performed by: EMERGENCY MEDICINE

## 2023-06-09 PROCEDURE — 25010000002 FUROSEMIDE PER 20 MG: Performed by: EMERGENCY MEDICINE

## 2023-06-09 PROCEDURE — 83735 ASSAY OF MAGNESIUM: CPT | Performed by: PHYSICIAN ASSISTANT

## 2023-06-09 PROCEDURE — 99284 EMERGENCY DEPT VISIT MOD MDM: CPT

## 2023-06-09 PROCEDURE — 80053 COMPREHEN METABOLIC PANEL: CPT | Performed by: PHYSICIAN ASSISTANT

## 2023-06-09 PROCEDURE — 71045 X-RAY EXAM CHEST 1 VIEW: CPT

## 2023-06-09 PROCEDURE — 96374 THER/PROPH/DIAG INJ IV PUSH: CPT

## 2023-06-09 PROCEDURE — 96375 TX/PRO/DX INJ NEW DRUG ADDON: CPT

## 2023-06-09 PROCEDURE — 83880 ASSAY OF NATRIURETIC PEPTIDE: CPT | Performed by: EMERGENCY MEDICINE

## 2023-06-09 PROCEDURE — 85025 COMPLETE CBC W/AUTO DIFF WBC: CPT | Performed by: PHYSICIAN ASSISTANT

## 2023-06-09 RX ORDER — FUROSEMIDE 10 MG/ML
80 INJECTION INTRAMUSCULAR; INTRAVENOUS ONCE
Status: COMPLETED | OUTPATIENT
Start: 2023-06-09 | End: 2023-06-09

## 2023-06-09 RX ORDER — DIAZEPAM 5 MG/ML
10 INJECTION, SOLUTION INTRAMUSCULAR; INTRAVENOUS ONCE
Status: COMPLETED | OUTPATIENT
Start: 2023-06-09 | End: 2023-06-09

## 2023-06-09 RX ADMIN — FUROSEMIDE 80 MG: 20 INJECTION, SOLUTION INTRAMUSCULAR; INTRAVENOUS at 23:55

## 2023-06-09 RX ADMIN — DIAZEPAM 10 MG: 5 INJECTION INTRAMUSCULAR; INTRAVENOUS at 23:56

## 2023-06-10 ENCOUNTER — READMISSION MANAGEMENT (OUTPATIENT)
Dept: CALL CENTER | Facility: HOSPITAL | Age: 63
End: 2023-06-10
Payer: COMMERCIAL

## 2023-06-10 VITALS
DIASTOLIC BLOOD PRESSURE: 72 MMHG | WEIGHT: 248 LBS | HEART RATE: 68 BPM | SYSTOLIC BLOOD PRESSURE: 148 MMHG | RESPIRATION RATE: 18 BRPM | BODY MASS INDEX: 39.86 KG/M2 | HEIGHT: 66 IN | TEMPERATURE: 98.2 F | OXYGEN SATURATION: 92 %

## 2023-06-10 PROBLEM — R60.0 PEDAL EDEMA: Status: ACTIVE | Noted: 2023-06-10

## 2023-06-10 PROBLEM — N18.31 STAGE 3A CHRONIC KIDNEY DISEASE: Status: ACTIVE | Noted: 2023-06-10

## 2023-06-10 PROBLEM — D64.9 ANEMIA: Status: ACTIVE | Noted: 2023-06-10

## 2023-06-10 PROBLEM — J96.11 CHRONIC RESPIRATORY FAILURE WITH HYPOXIA: Status: ACTIVE | Noted: 2023-06-10

## 2023-06-10 LAB
ANION GAP SERPL CALCULATED.3IONS-SCNC: 8.5 MMOL/L (ref 5–15)
BUN SERPL-MCNC: 31 MG/DL (ref 8–23)
BUN/CREAT SERPL: 17.7 (ref 7–25)
CALCIUM SPEC-SCNC: 9.2 MG/DL (ref 8.6–10.5)
CHLORIDE SERPL-SCNC: 99 MMOL/L (ref 98–107)
CK SERPL-CCNC: 842 U/L (ref 20–200)
CO2 SERPL-SCNC: 33.5 MMOL/L (ref 22–29)
CREAT SERPL-MCNC: 1.75 MG/DL (ref 0.76–1.27)
DEPRECATED RDW RBC AUTO: 43.4 FL (ref 37–54)
EGFRCR SERPLBLD CKD-EPI 2021: 43.7 ML/MIN/1.73
ERYTHROCYTE [DISTWIDTH] IN BLOOD BY AUTOMATED COUNT: 13.5 % (ref 12.3–15.4)
GEN 5 2HR TROPONIN T REFLEX: 22 NG/L
GLUCOSE SERPL-MCNC: 130 MG/DL (ref 65–99)
HCT VFR BLD AUTO: 30.5 % (ref 37.5–51)
HGB BLD-MCNC: 9.9 G/DL (ref 13–17.7)
IRON 24H UR-MRATE: 46 MCG/DL (ref 59–158)
IRON SATN MFR SERPL: 15 % (ref 20–50)
MCH RBC QN AUTO: 28.8 PG (ref 26.6–33)
MCHC RBC AUTO-ENTMCNC: 32.5 G/DL (ref 31.5–35.7)
MCV RBC AUTO: 88.7 FL (ref 79–97)
NT-PROBNP SERPL-MCNC: 892 PG/ML (ref 0–900)
PLATELET # BLD AUTO: 231 10*3/MM3 (ref 140–450)
PMV BLD AUTO: 10.2 FL (ref 6–12)
POTASSIUM SERPL-SCNC: 3.5 MMOL/L (ref 3.5–5.2)
RBC # BLD AUTO: 3.44 10*6/MM3 (ref 4.14–5.8)
SODIUM SERPL-SCNC: 141 MMOL/L (ref 136–145)
TIBC SERPL-MCNC: 314 MCG/DL (ref 298–536)
TRANSFERRIN SERPL-MCNC: 211 MG/DL (ref 200–360)
TROPONIN T DELTA: 2 NG/L
TROPONIN T SERPL HS-MCNC: 20 NG/L
TSH SERPL DL<=0.05 MIU/L-ACNC: 2.52 UIU/ML (ref 0.27–4.2)
URATE SERPL-MCNC: 9.1 MG/DL (ref 3.4–7)
WBC NRBC COR # BLD: 7.63 10*3/MM3 (ref 3.4–10.8)

## 2023-06-10 PROCEDURE — 84484 ASSAY OF TROPONIN QUANT: CPT | Performed by: EMERGENCY MEDICINE

## 2023-06-10 PROCEDURE — G0378 HOSPITAL OBSERVATION PER HR: HCPCS

## 2023-06-10 PROCEDURE — 84550 ASSAY OF BLOOD/URIC ACID: CPT | Performed by: HOSPITALIST

## 2023-06-10 PROCEDURE — 84466 ASSAY OF TRANSFERRIN: CPT | Performed by: INTERNAL MEDICINE

## 2023-06-10 PROCEDURE — 96375 TX/PRO/DX INJ NEW DRUG ADDON: CPT

## 2023-06-10 PROCEDURE — 83540 ASSAY OF IRON: CPT | Performed by: INTERNAL MEDICINE

## 2023-06-10 PROCEDURE — 82550 ASSAY OF CK (CPK): CPT | Performed by: HOSPITALIST

## 2023-06-10 PROCEDURE — 36415 COLL VENOUS BLD VENIPUNCTURE: CPT | Performed by: NURSE PRACTITIONER

## 2023-06-10 PROCEDURE — 84443 ASSAY THYROID STIM HORMONE: CPT | Performed by: HOSPITALIST

## 2023-06-10 PROCEDURE — 80048 BASIC METABOLIC PNL TOTAL CA: CPT | Performed by: NURSE PRACTITIONER

## 2023-06-10 PROCEDURE — 85027 COMPLETE CBC AUTOMATED: CPT | Performed by: NURSE PRACTITIONER

## 2023-06-10 RX ORDER — ACETAMINOPHEN 325 MG/1
650 TABLET ORAL EVERY 4 HOURS PRN
Status: DISCONTINUED | OUTPATIENT
Start: 2023-06-10 | End: 2023-06-10 | Stop reason: HOSPADM

## 2023-06-10 RX ORDER — SODIUM CHLORIDE 0.9 % (FLUSH) 0.9 %
10 SYRINGE (ML) INJECTION AS NEEDED
Status: DISCONTINUED | OUTPATIENT
Start: 2023-06-10 | End: 2023-06-10 | Stop reason: HOSPADM

## 2023-06-10 RX ORDER — PRAMIPEXOLE DIHYDROCHLORIDE 0.75 MG/1
0.75 TABLET ORAL NIGHTLY
Qty: 30 TABLET | Refills: 0 | Status: SHIPPED | OUTPATIENT
Start: 2023-06-10

## 2023-06-10 RX ORDER — NITROGLYCERIN 0.4 MG/1
0.4 TABLET SUBLINGUAL
Status: DISCONTINUED | OUTPATIENT
Start: 2023-06-10 | End: 2023-06-10 | Stop reason: HOSPADM

## 2023-06-10 RX ORDER — ATORVASTATIN CALCIUM 20 MG/1
20 TABLET, FILM COATED ORAL DAILY
Status: DISCONTINUED | OUTPATIENT
Start: 2023-06-10 | End: 2023-06-10 | Stop reason: HOSPADM

## 2023-06-10 RX ORDER — BISACODYL 5 MG/1
5 TABLET, DELAYED RELEASE ORAL DAILY PRN
Status: DISCONTINUED | OUTPATIENT
Start: 2023-06-10 | End: 2023-06-10 | Stop reason: HOSPADM

## 2023-06-10 RX ORDER — CALCIUM CARBONATE 500 MG/1
2 TABLET, CHEWABLE ORAL 2 TIMES DAILY PRN
Status: DISCONTINUED | OUTPATIENT
Start: 2023-06-10 | End: 2023-06-10 | Stop reason: HOSPADM

## 2023-06-10 RX ORDER — VILAZODONE HYDROCHLORIDE 40 MG/1
40 TABLET ORAL DAILY
Status: DISCONTINUED | OUTPATIENT
Start: 2023-06-10 | End: 2023-06-10 | Stop reason: HOSPADM

## 2023-06-10 RX ORDER — POTASSIUM CHLORIDE 750 MG/1
10 TABLET, FILM COATED, EXTENDED RELEASE ORAL 2 TIMES DAILY WITH MEALS
Status: DISCONTINUED | OUTPATIENT
Start: 2023-06-10 | End: 2023-06-10 | Stop reason: HOSPADM

## 2023-06-10 RX ORDER — PRAMIPEXOLE DIHYDROCHLORIDE 0.5 MG/1
0.5 TABLET ORAL NIGHTLY
Status: DISCONTINUED | OUTPATIENT
Start: 2023-06-10 | End: 2023-06-10

## 2023-06-10 RX ORDER — AMOXICILLIN 250 MG
2 CAPSULE ORAL 2 TIMES DAILY
Status: DISCONTINUED | OUTPATIENT
Start: 2023-06-10 | End: 2023-06-10 | Stop reason: HOSPADM

## 2023-06-10 RX ORDER — BUMETANIDE 0.25 MG/ML
2 INJECTION INTRAMUSCULAR; INTRAVENOUS EVERY 12 HOURS
Status: DISCONTINUED | OUTPATIENT
Start: 2023-06-10 | End: 2023-06-10 | Stop reason: HOSPADM

## 2023-06-10 RX ORDER — PRAMIPEXOLE DIHYDROCHLORIDE 0.25 MG/1
0.12 TABLET ORAL NIGHTLY PRN
Status: DISCONTINUED | OUTPATIENT
Start: 2023-06-10 | End: 2023-06-10 | Stop reason: HOSPADM

## 2023-06-10 RX ORDER — TAMSULOSIN HYDROCHLORIDE 0.4 MG/1
0.4 CAPSULE ORAL NIGHTLY
Status: DISCONTINUED | OUTPATIENT
Start: 2023-06-10 | End: 2023-06-10 | Stop reason: HOSPADM

## 2023-06-10 RX ORDER — BISACODYL 10 MG
10 SUPPOSITORY, RECTAL RECTAL DAILY PRN
Status: DISCONTINUED | OUTPATIENT
Start: 2023-06-10 | End: 2023-06-10 | Stop reason: HOSPADM

## 2023-06-10 RX ORDER — TORSEMIDE 20 MG/1
40 TABLET ORAL 2 TIMES DAILY
Qty: 180 TABLET | Refills: 3 | Status: SHIPPED | OUTPATIENT
Start: 2023-06-10

## 2023-06-10 RX ORDER — ACETAMINOPHEN 650 MG/1
650 SUPPOSITORY RECTAL EVERY 4 HOURS PRN
Status: DISCONTINUED | OUTPATIENT
Start: 2023-06-10 | End: 2023-06-10 | Stop reason: HOSPADM

## 2023-06-10 RX ORDER — ACETAMINOPHEN 500 MG
1000 TABLET ORAL 3 TIMES DAILY
Status: DISCONTINUED | OUTPATIENT
Start: 2023-06-10 | End: 2023-06-10 | Stop reason: HOSPADM

## 2023-06-10 RX ORDER — BUPROPION HYDROCHLORIDE 150 MG/1
150 TABLET ORAL EVERY MORNING
Status: DISCONTINUED | OUTPATIENT
Start: 2023-06-10 | End: 2023-06-10 | Stop reason: HOSPADM

## 2023-06-10 RX ORDER — METOPROLOL SUCCINATE 100 MG/1
100 TABLET, EXTENDED RELEASE ORAL 2 TIMES DAILY
Status: DISCONTINUED | OUTPATIENT
Start: 2023-06-10 | End: 2023-06-10 | Stop reason: HOSPADM

## 2023-06-10 RX ORDER — ONDANSETRON 4 MG/1
4 TABLET, FILM COATED ORAL EVERY 6 HOURS PRN
Status: DISCONTINUED | OUTPATIENT
Start: 2023-06-10 | End: 2023-06-10 | Stop reason: HOSPADM

## 2023-06-10 RX ORDER — DIVALPROEX SODIUM 250 MG/1
250 TABLET, EXTENDED RELEASE ORAL 2 TIMES DAILY
Status: DISCONTINUED | OUTPATIENT
Start: 2023-06-10 | End: 2023-06-10 | Stop reason: HOSPADM

## 2023-06-10 RX ORDER — LAMOTRIGINE 100 MG/1
100 TABLET ORAL 2 TIMES DAILY
Status: DISCONTINUED | OUTPATIENT
Start: 2023-06-10 | End: 2023-06-10 | Stop reason: HOSPADM

## 2023-06-10 RX ORDER — ACETAMINOPHEN 160 MG/5ML
650 SOLUTION ORAL EVERY 4 HOURS PRN
Status: DISCONTINUED | OUTPATIENT
Start: 2023-06-10 | End: 2023-06-10 | Stop reason: HOSPADM

## 2023-06-10 RX ORDER — POLYETHYLENE GLYCOL 3350 17 G/17G
17 POWDER, FOR SOLUTION ORAL DAILY PRN
Status: DISCONTINUED | OUTPATIENT
Start: 2023-06-10 | End: 2023-06-10 | Stop reason: HOSPADM

## 2023-06-10 RX ORDER — ONDANSETRON 2 MG/ML
4 INJECTION INTRAMUSCULAR; INTRAVENOUS EVERY 6 HOURS PRN
Status: DISCONTINUED | OUTPATIENT
Start: 2023-06-10 | End: 2023-06-10 | Stop reason: HOSPADM

## 2023-06-10 RX ORDER — NIFEDIPINE 30 MG/1
30 TABLET, EXTENDED RELEASE ORAL
Status: DISCONTINUED | OUTPATIENT
Start: 2023-06-10 | End: 2023-06-10 | Stop reason: HOSPADM

## 2023-06-10 RX ORDER — SODIUM CHLORIDE 0.9 % (FLUSH) 0.9 %
10 SYRINGE (ML) INJECTION EVERY 12 HOURS SCHEDULED
Status: DISCONTINUED | OUTPATIENT
Start: 2023-06-10 | End: 2023-06-10 | Stop reason: HOSPADM

## 2023-06-10 RX ORDER — DIAZEPAM 2 MG/1
2 TABLET ORAL EVERY 6 HOURS PRN
Status: DISCONTINUED | OUTPATIENT
Start: 2023-06-10 | End: 2023-06-10 | Stop reason: HOSPADM

## 2023-06-10 RX ORDER — SODIUM CHLORIDE 9 MG/ML
40 INJECTION, SOLUTION INTRAVENOUS AS NEEDED
Status: DISCONTINUED | OUTPATIENT
Start: 2023-06-10 | End: 2023-06-10 | Stop reason: HOSPADM

## 2023-06-10 RX ORDER — LEVOTHYROXINE SODIUM 0.05 MG/1
50 TABLET ORAL EVERY MORNING
Status: DISCONTINUED | OUTPATIENT
Start: 2023-06-10 | End: 2023-06-10 | Stop reason: HOSPADM

## 2023-06-10 RX ORDER — BUDESONIDE AND FORMOTEROL FUMARATE DIHYDRATE 160; 4.5 UG/1; UG/1
2 AEROSOL RESPIRATORY (INHALATION)
Status: DISCONTINUED | OUTPATIENT
Start: 2023-06-10 | End: 2023-06-10 | Stop reason: HOSPADM

## 2023-06-10 RX ADMIN — Medication 10 ML: at 02:01

## 2023-06-10 RX ADMIN — BUMETANIDE 2 MG: 0.25 INJECTION INTRAMUSCULAR; INTRAVENOUS at 10:34

## 2023-06-10 RX ADMIN — ACETAMINOPHEN 1000 MG: 500 TABLET, FILM COATED ORAL at 10:33

## 2023-06-10 RX ADMIN — LEVOTHYROXINE SODIUM 50 MCG: 0.05 TABLET ORAL at 10:33

## 2023-06-10 RX ADMIN — POTASSIUM CHLORIDE 10 MEQ: 750 TABLET, EXTENDED RELEASE ORAL at 10:34

## 2023-06-10 RX ADMIN — ATORVASTATIN CALCIUM 20 MG: 20 TABLET, FILM COATED ORAL at 10:34

## 2023-06-10 RX ADMIN — Medication 10 ML: at 10:42

## 2023-06-10 NOTE — CONSULTS
[facility] Consult Note      Patient Name: Calos Moseley  : 1960  MRN: [id number]  Primary Care Physician:  Naveed Nunez DO  Referring Physician: No ref. provider found  Date of admission: 2023    Subjective     Reason for Consult: Chronic kidney disease and volume overload    HPI:   Calos Moseley is a 62 y.o. male with past medical history of hypertension, dyslipidemia, history of COPD, history of restless leg syndrome who was at [facility] between May 16 and May 26 for loculated parapneumonic effusion and VATS decortication.  Patient came to the hospital because of worsening lower extremity edema and restless leg syndrome.  According to the patient patient has been taken torsemide 40 mg p.o. twice a day and recently has been told to scale back to once a day from the nurse practitioner.  Patient was admitted for further management.  His creatinine today is 1.7 mg/dL.  We were consulted to help with management of kidney dysfunction and volume management    Review of Systems:   14 point review of systems is otherwise negative except for mentioned above on HPI    Personal History     Past Medical History:   Diagnosis Date    Anesthesia complication     STATES HARD TO WAKE UP AFTER PREVIOUS KIDNEY STONE SURGERY AND STATES HE CODED    Bilateral kidney stones     Bipolar affective     COVID     Disease of thyroid gland     Elevated cholesterol     Hypertension     Kidney stone on left side     Left flank pain     Mood disorder     Restless leg syndrome     Sleep apnea     cpap  bring dos    UTI (urinary tract infection) 2021    PREV FINISHED ANTIBIOTIC       Past Surgical History:   Procedure Laterality Date    ADENOIDECTOMY      APPENDECTOMY      BRONCHOSCOPY Right 2023    Procedure: BRONCHOSCOPY WITH ENDOBRONCHIAL ULTRASOUND with BAL of RLL and FNA;  Surgeon: Aniceto Bruno MD;  Location: [facility] ENDOSCOPY;  Service: Pulmonary;  Laterality: Right;   pre- RLL pneumonia, lymphadenopathy  post- same    COLONOSCOPY      COLONOSCOPY N/A 3/23/2023    Procedure: COLONOSCOPY into cecum with cold snare polypectomies;  Surgeon: Yoan Leonard MD;  Location: [facility] ENDOSCOPY;  Service: Gastroenterology;  Laterality: N/A;  polyps    CYSTOSCOPY WITH CLOT EVACUATION N/A 3/8/2022    Procedure: CYSTOSCOPY WITH CLOT EVACUATION;  Surgeon: Ihsan Lopez MD;  Location: [facility] OR;  Service: Urology;  Laterality: N/A;    CYSTOSCOPY, URETEROSCOPY, RETROGRADE PYELOGRAM, STENT INSERTION Left 3/7/2022    Procedure: CYSTOSCOPY URETEROSCOPY LASER LITHOTRIPSY and stent exchange;  Surgeon: Deuce Leija MD;  Location: [facility] OR;  Service: Urology;  Laterality: Left;    KIDNEY STONE SURGERY      NEPHROLITHOTRIPSY PERCUTANEOUS Left 2/21/2022    Procedure: NEPHROSTLITHOTOMY PERCUTANEOUS;  Surgeon: Deuce Leija MD;  Location: [facility] OR;  Service: Urology;  Laterality: Left;    NEPHROLITHOTRIPSY PERCUTANEOUS Left 2/23/2022    Procedure: LEFT FLEXIBLE NEPHROSTOMY WITH STONE EXTRACTION, LEFT NEPHROSTOMY, TUBE REMOVAL, BILATERAL URETERAL STENT PLACEMENT;  Surgeon: Deuce Leija MD;  Location: [facility] OR;  Service: Urology;  Laterality: Left;    NEPHROLITHOTRIPSY PERCUTANEOUS Right 3/7/2022    Procedure: RIGHT PERCUTANEOUS NEPHROLITHOTOMY W ACCESS;  Surgeon: Deuce Leija MD;  Location: [facility] OR;  Service: Urology;  Laterality: Right;    THORACOSCOPY WITH DECORTICATION Right 5/20/2023    Procedure: THORACOSCOPY VIDEO ASSISTED WITH DECORTICATION WITH DAVINCI ROBOT;  Surgeon: Jose Rice MD;  Location: [facility] MAIN OR;  Service: Robotics - DaVinci;  Laterality: Right;    TONSILLECTOMY         Family History: family history is not on file.    Social History:  reports that he quit smoking about 32 years ago. His smoking use included cigarettes. He has never used smokeless tobacco. He reports that he does not currently use alcohol. He  reports that he does not use drugs.    Home Medications:  Prior to Admission medications    Medication Sig Start Date End Date Taking? Authorizing Provider   acetaminophen (TYLENOL) 500 MG tablet Take 2 tablets by mouth 3 (Three) Times a Day for 30 days. 5/25/23 6/24/23 Yes Sosa Romero DNP, APRN   albuterol sulfate  (90 Base) MCG/ACT inhaler Inhale 1 puff by mouth via inhaler every 6 (six) hours as needed for Wheezing. 2/3/23  Yes    atorvastatin (LIPITOR) 20 MG tablet Take 1 tablet by mouth every night at bedtime. 2/1/23  Yes    budesonide-formoterol (SYMBICORT) 160-4.5 MCG/ACT inhaler Inhale 2 puffs 2 (Two) Times a Day As Needed.   Yes Gentry Sawyer MD   buPROPion XL (Wellbutrin XL) 150 MG 24 hr tablet Take 1 tablet by mouth Every Morning. 5/18/22  Yes    divalproex (Depakote ER) 250 MG 24 hr tablet Take 1 tablet by mouth 2 (Two) Times a Day. 11/11/22  Yes    hydrALAZINE (APRESOLINE) 50 MG tablet Take 1 tablet by mouth 3 (Three) Times a Day As Needed. 2/13/22  Yes    lamoTRIgine (LaMICtal) 100 MG tablet Take 1 tablet by mouth 2 (Two) Times a Day. 2/7/23  Yes    levothyroxine (SYNTHROID, LEVOTHROID) 50 MCG tablet Take 1 tablet by mouth Every Morning. TAKE PREOP   Yes Gentry Sawyer MD   metoprolol succinate XL (TOPROL-XL) 100 MG 24 hr tablet Take 1 tablet by mouth 2 (Two) Times a Day. TAKE PREOP   Yes Gentry Sawyer MD   NIFEdipine CC (ADALAT CC) 30 MG 24 hr tablet Take 1 tablet by mouth Daily. 5/27/23  Yes Dre Pearce MD   potassium citrate (UROCIT-K) 10 MEQ (1080 MG) CR tablet Take 1 tablet by mouth 2 (Two) Times a Day - morning and evening. Take with meals. Do not crush, chew, or split. 11/28/22  Yes    pramipexole (MIRAPEX) 0.5 MG tablet Take 1 tablet by mouth every night at bedtime. 5/31/23  Yes    pramipexole (MIRAPEX) 0.75 MG tablet Take 1 tablet by mouth Every Night. 6/10/23  Yes Calos Nunes MD   tamsulosin (Flomax) 0.4 MG capsule 24 hr capsule Take 1  capsule by mouth every night at bedtime. 2/24/23  Yes    torsemide (DEMADEX) 20 MG tablet Take 2 tablets by mouth 2 (Two) Times a Day. 6/10/23  Yes Calos Nunes MD   vilazodone (Viibryd) 40 MG tablet tablet Take 1 tablet by mouth Daily. 11/11/22  Yes    albuterol sulfate  (90 Base) MCG/ACT inhaler INHALE ONE TO TWO PUFFS BY MOUTH EVERY 4 TO 6 HOURS 11/22/22   Tereso Desai MD   buPROPion XL (WELLBUTRIN XL) 150 MG 24 hr tablet Take 1 tablet by mouth Every Morning. 5/31/23      diazePAM (VALIUM) 2 MG tablet Take 1 tablet by mouth Every 6 (Six) Hours As Needed for Muscle Spasms. 5/30/23   Vandana Gaines APRN   docusate sodium 100 MG capsule Take 1 capsule by mouth 2 (Two) Times a Day As Needed for Constipation. 5/26/23   Dre Pearce MD   guaiFENesin-dextromethorphan (ROBITUSSIN DM) 100-10 MG/5ML syrup Take 10 mL by mouth 3 (Three) Times a Day As Needed for Cough. 5/26/23   Dre Pearce MD   levothyroxine (SYNTHROID, LEVOTHROID) 50 MCG tablet Take 1 tablet by mouth Every Morning. 5/31/23      polyethylene glycol (MIRALAX) 17 g packet Take 17 g by mouth 2 (Two) Times a Day As Needed (Take second line for constipation.  Available over-the-counter.). 5/26/23   Dre Pearce MD   sildenafil (VIAGRA) 100 MG tablet Take 1 tablet by mouth Daily As Needed. 5/5/22      torsemide (DEMADEX) 20 MG tablet Take 2 tablets by mouth Daily. 5/31/23 6/10/23         Allergies:  No Known Allergies    Objective     Vitals:   Temp:  [98 °F (36.7 °C)-98.2 °F (36.8 °C)] 98.2 °F (36.8 °C)  Heart Rate:  [58-85] 68  Resp:  [18] 18  BP: (141-150)/(69-76) 148/72  Flow (L/min):  [2] 2    Intake/Output Summary (Last 24 hours) at 6/10/2023 1140  Last data filed at 6/10/2023 0751  Gross per 24 hour   Intake --   Output 1700 ml   Net -1700 ml       Physical Exam:    General Appearance: alert, oriented x 3, no acute distress   Skin: warm and dry  HEENT: oral mucosa normal, nonicteric sclera  Neck:  supple, no JVD  Lungs: CTA  Heart: RRR, normal S1 and S2  Abdomen: soft, nontender, nondistended  : no palpable bladder  Extremities: no edema, cyanosis or clubbing  Neuro: normal speech and mental status     Scheduled Meds:     acetaminophen, 1,000 mg, Oral, TID  atorvastatin, 20 mg, Oral, Daily  budesonide-formoterol, 2 puff, Inhalation, BID - RT  bumetanide, 2 mg, Intravenous, Q12H  buPROPion XL, 150 mg, Oral, QAM  divalproex, 250 mg, Oral, BID  lamoTRIgine, 100 mg, Oral, BID  levothyroxine, 50 mcg, Oral, QAM  metoprolol succinate XL, 100 mg, Oral, BID  NIFEdipine XL, 30 mg, Oral, Q24H  potassium chloride, 10 mEq, Oral, BID With Meals  pramipexole, 0.75 mg, Oral, Nightly  senna-docusate sodium, 2 tablet, Oral, BID  sodium chloride, 10 mL, Intravenous, Q12H  tamsulosin, 0.4 mg, Oral, Nightly  vilazodone, 40 mg, Oral, Daily      IV Meds:        Results Reviewed:   I have personally reviewed the results from the time of this admission to 6/10/2023 11:40 EDT     Lab Results   Component Value Date    GLUCOSE 130 (H) 06/10/2023    CALCIUM 9.2 06/10/2023     06/10/2023    K 3.5 06/10/2023    CO2 33.5 (H) 06/10/2023    CL 99 06/10/2023    BUN 31 (H) 06/10/2023    CREATININE 1.75 (H) 06/10/2023    EGFRIFNONA 42 (L) 02/23/2022    BCR 17.7 06/10/2023    ANIONGAP 8.5 06/10/2023      Lab Results   Component Value Date    MG 2.0 06/09/2023    PHOS 3.7 05/26/2023    ALBUMIN 4.1 06/09/2023           Assessment / Plan     ASSESSMENT:    CKD IIIb: Kidney function stable at baseline.  Volume is generous.  We will increase torsemide to 100 mg p.o. twice a day for 2 days and then scale down to 40 mg p.o. twice a day.  Patient was advised to follow-up his weight daily.  Patient has an appointment with Dr. Angel as an outpatient to follow-up on his kidney function as well.  Mild hypokalemia: Continue with potassium supplement 10 mEq p.o. twice a day  Mild to moderate pulmonary hypertension with history of obstructive sleep  apnea and COPD  History of hypertension  History of dyslipidemia  Restless leg syndrome    PLAN:    Increase torsemide to 100 mg p.o. twice a day for 2 days and then scale down to 40 mg p.o. twice a day.  Patient to follow-up with Dr. Angel in the office.  Lab in 1 to 2 weeks after discharge  Advised patient to bring blood pressure log and daily weight log when he comes to the office  Okay to be discharged from kidney standpoint to follow-up with nephrology in 2 weeks  Surveillance lab    Thank you for involving us in the care of Calos Moseley.  Please feel free to call with any questions.    Madina Doshi MD  06/10/23  11:40 EDT    [facility]  [phone]

## 2023-06-10 NOTE — ED TRIAGE NOTES
Had lung procedure approx 2 wks ago following pneumonia; reports bilateral leg swelling since Sunday. Hx CKD, takes torsemide, reports this leg edema is worse than his usual

## 2023-06-10 NOTE — DISCHARGE SUMMARY
Patient Name: Calos Moseley  : 1960  MRN: [id number]    Date of Admission: 2023  Date of Discharge:  6/10/2023  Primary Care Physician: Naveed Nunez DO      Chief Complaint:   Leg Swelling      Discharge Diagnoses     Active Hospital Problems    Diagnosis  POA    **Pedal edema [R60.0]  Yes    Stage 3a chronic kidney disease [N18.31]  Yes    Anemia [D64.9]  Yes    Chronic respiratory failure with hypoxia [J96.11]  Yes    Restless leg syndrome [G25.81]  Yes    Bipolar affective [F31.9]  Yes    ISIAH (obstructive sleep apnea) [G47.33]  Yes    HLD (hyperlipidemia) [E78.5]  Yes    COPD (chronic obstructive pulmonary disease) [J44.9]  Yes    Hypothyroidism [E03.9]  Yes    Essential hypertension [I10]  Yes      Resolved Hospital Problems   No resolved problems to display.        Hospital Course     61yo gentleman with h/o HLD, COPD, CKD3a, RLS, hypoT4, bipolar d/o, anemia, and HTN, who was admitted here  thru  for a right loculated parapneumonic effusion and VAT decortication, and presented to ER last night c/o worsening BLE edema and BLE cramping pain for a couple days. He has been on 2L/min by NC since recent admission but in room today he is sating 92% on RA. Says his ankles are much smaller this AM. Voiding well. No LUTS. Denies any N/V/D/abd pain/F/C/NS/SOA/CP/palp/HA/vis changes. He feels like his BLE pains are identical to the pains he has at night due to his chronic RLS. Used to be on Requip but says it was stopped a long time ago and he's not sure why. Review of discharge last month shows he is taking Mirapex nightly. He is eager to go home and wonders why he needed to be admitted.     Pulm is okay with dc home--they are fine with current appearance of CXR and recommend he keep is appointment with Dr. Rice (Thoracic Surg) on   Use O2 as needed, but here he is not requiring any supplemental O2  Renal function at baseline, d/w Dr. Doshi who is okay with him going home today  on Torsemide 100mg BID for 2 days and then decreasing dose to 40mg BID until he follows up with Dr. Angel in a couple weeks  Both Dr. Doshi and I have d/w pt and wife this dosing instruction in great detail and wife has written down  His leg pain sounds more c/w RLS, have increased his dose of Mirapex and instructed him to f/u with PCP regarding this  He needs to f/u with PCP regarding his anemia as well--Hgb stable around baseline of 10 here, may be contributing to his RLS    Day of Discharge     Subjective:  Much improved today. No SOA. BLE pain is resolved. BLE edema much improved after IV Bumex here. Very eager to go home if okay with Renal.    ROS  No N/V/D/abd pain/F/C/NS/SOA/CP/palp/HA/vis changes/LUTS/confusion.    Physical Exam:  Temp:  [98 °F (36.7 °C)-98.2 °F (36.8 °C)] 98.2 °F (36.8 °C)  Heart Rate:  [58-85] 68  Resp:  [18] 18  BP: (141-150)/(69-76) 148/72  Body mass index is 40.03 kg/m².  Physical Exam  Vitals and nursing note reviewed. Wife present at bedside.  Constitutional:       General: He is not in acute distress.     Appearance: He is not ill-appearing, toxic-appearing or diaphoretic.   Cardiovascular:      Rate and Rhythm: Normal rate and regular rhythm.      Pulses: Normal pulses.   Pulmonary:      Effort: Pulmonary effort is normal. No respiratory distress.      Breath sounds: Rales (faint on right) present. No wheezing.      Comments: Decreased BS right base  Abdominal:      General: Bowel sounds are normal. There is no distension.      Palpations: Abdomen is soft.      Tenderness: There is no abdominal tenderness.   Musculoskeletal:         General: Swelling (2+ in BLEs) present. No deformity. Normal range of motion.      Cervical back: Neck supple. No rigidity.   Lymphadenopathy:      Cervical: No cervical adenopathy.   Skin:     General: Skin is warm and dry.      Capillary Refill: Capillary refill takes less than 2 seconds.      Coloration: Skin is not jaundiced.   Neurological:       General: No focal deficit present.      Mental Status: He is alert and oriented to person, place, and time. Mental status is at baseline.      Cranial Nerves: No cranial nerve deficit.      Motor: No weakness.      Coordination: Coordination normal.      Gait: Gait normal.   Psychiatric:         Mood and Affect: Mood normal.         Behavior: Behavior normal.         Thought Content: Thought content normal.      Comments: Very pleasant      Consultants     Consult Orders (all) (From admission, onward)       Start     Ordered    06/10/23 0854  Inpatient Pulmonology Consult  Once        Specialty:  Pulmonary Disease  Provider:  Jacobo Hernandez MD    06/10/23 0854    06/10/23 0854  Inpatient Nephrology Consult  Once        Specialty:  Nephrology  Provider:  Davis Angel MD    06/10/23 0854    06/10/23 0052  [facility] (on-call MD unless specified) Details  Once        Specialty:  Hospitalist  Provider:  (Not yet assigned)    06/10/23 0051                  Procedures     * Surgery not found *      Imaging Results (All)       Procedure Component Value Units Date/Time    XR Chest 1 View [[id number]] Collected: 06/09/23 2050     Updated: 06/09/23 2055    Narrative:      XR CHEST 1 VW-     Clinical: Leg swelling with shortness of breath     COMPARISON 05/26/2023     FINDINGS: Cardiac enlargement similar to the previous examination. No  pneumothorax. Stable mediastinum. Persistent, not significantly changed,  right-sided pulmonary infiltrates. There is blunting of the right  costophrenic angle, suggesting perhaps a small right-sided pleural  effusion. The left lung is clear. No pulmonary edema. No new area of  airspace disease has developed. The remainder is unremarkable.     CONCLUSION: Persistent infiltrates seen throughout the right lung  similar to 05/26/2023. Small right-sided pleural effusion may be  present. The left lung is clear, cardiac enlargement as before.     This report was finalized on 6/9/2023  8:52 PM by Dr. Jed Shaffer M.D.               Results for orders placed during the hospital encounter of 05/16/23    Adult Transthoracic Echo Complete W/ Cont if Necessary Per Protocol    Interpretation Summary    Left ventricular systolic function is normal. Calculated left ventricular EF = 58.1% The left ventricular cavity is moderately dilated. Left ventricular wall thickness is consistent with mild concentric hypertrophy. All left ventricular wall segments contract normally. Left ventricular diastolic function was normal.    Trace mitral valve regurgitation is present.    Trace tricuspid valve regurgitation is present. Estimated right ventricular systolic pressure from tricuspid regurgitation is mildly elevated (35-45 mmHg). Calculated right ventricular systolic pressure from tricuspid regurgitation is 36.3 mmHg.    Pertinent Labs     Results from last 7 days   Lab Units 06/10/23  0511 06/09/23  2033   WBC 10*3/mm3 7.63 8.94   HEMOGLOBIN g/dL 9.9* 10.6*   PLATELETS 10*3/mm3 231 261     Results from last 7 days   Lab Units 06/10/23  0511 06/09/23  2033   SODIUM mmol/L 141 139   POTASSIUM mmol/L 3.5 3.9   CHLORIDE mmol/L 99 98   CO2 mmol/L 33.5* 29.0   BUN mg/dL 31* 33*   CREATININE mg/dL 1.75* 1.84*   GLUCOSE mg/dL 130* 218*   EGFR mL/min/1.73 43.7* 41.2*     Results from last 7 days   Lab Units 06/09/23 2033   ALBUMIN g/dL 4.1   BILIRUBIN mg/dL 0.3   ALK PHOS U/L 99   AST (SGOT) U/L 25   ALT (SGPT) U/L 27     Results from last 7 days   Lab Units 06/10/23  0511 06/09/23  2033   CALCIUM mg/dL 9.2 8.9   ALBUMIN g/dL  --  4.1   MAGNESIUM mg/dL  --  2.0       Results from last 7 days   Lab Units 06/10/23  0511 06/09/23  2033   CK TOTAL U/L 842*  --    HSTROP T ng/L 22* 20*   PROBNP pg/mL  --  892.0     Results from last 7 days   Lab Units 06/10/23  0511   URIC ACID mg/dL 9.1*         Invalid input(s): LDLCALC          Test Results Pending at Discharge       Discharge Details        Discharge Medications         Changes to Medications        Instructions Start Date   Acetaminophen Extra Strength 500 MG tablet  Generic drug: acetaminophen  What changed:   when to take this  reasons to take this   1,000 mg, Oral, 3 Times Daily      buPROPion  MG 24 hr tablet  Commonly known as: Wellbutrin XL  What changed: Another medication with the same name was removed. Continue taking this medication, and follow the directions you see here.   150 mg, Oral, Every Morning      levothyroxine 50 MCG tablet  Commonly known as: SYNTHROID, LEVOTHROID  What changed: Another medication with the same name was removed. Continue taking this medication, and follow the directions you see here.   50 mcg, Oral, Every Morning      pramipexole 0.75 MG tablet  Commonly known as: MIRAPEX  What changed:   medication strength  how much to take  when to take this   0.75 mg, Oral, Nightly      torsemide 20 MG tablet  Commonly known as: DEMADEX  What changed: when to take this   40 mg, Oral, 2 Times Daily             Continue These Medications        Instructions Start Date   albuterol sulfate  (90 Base) MCG/ACT inhaler  Commonly known as: PROVENTIL HFA;VENTOLIN HFA;PROAIR HFA   INHALE ONE TO TWO PUFFS BY MOUTH EVERY 4 TO 6 HOURS      albuterol sulfate  (90 Base) MCG/ACT inhaler  Commonly known as: PROVENTIL HFA;VENTOLIN HFA;PROAIR HFA   Inhale 1 puff by mouth via inhaler every 6 (six) hours as needed for Wheezing.      atorvastatin 20 MG tablet  Commonly known as: LIPITOR   20 mg, Oral, Every Night at Bedtime      budesonide-formoterol 160-4.5 MCG/ACT inhaler  Commonly known as: SYMBICORT   2 puffs, Inhalation, 2 Times Daily PRN      diazePAM 2 MG tablet  Commonly known as: VALIUM   2 mg, Oral, Every 6 Hours PRN      divalproex 250 MG 24 hr tablet  Commonly known as: Depakote ER   250 mg, Oral, 2 Times Daily      hydrALAZINE 50 MG tablet  Commonly known as: APRESOLINE   50 mg, Oral, 3 Times Daily PRN      lamoTRIgine 100 MG  tablet  Commonly known as: LaMICtal   100 mg, Oral, 2 Times Daily      metoprolol succinate  MG 24 hr tablet  Commonly known as: TOPROL-XL   100 mg, Oral, 2 Times Daily, TAKE PREOP      NIFEdipine CC 30 MG 24 hr tablet  Commonly known as: ADALAT CC   30 mg, Oral, Every 24 Hours Scheduled      potassium citrate 10 MEQ (1080 MG) CR tablet  Commonly known as: UROCIT-K   Take 1 tablet by mouth 2 (Two) Times a Day - morning and evening. Take with meals. Do not crush, chew, or split.      sildenafil 100 MG tablet  Commonly known as: VIAGRA   100 mg, Oral, Daily PRN      tamsulosin 0.4 MG capsule 24 hr capsule  Commonly known as: Flomax   0.4 mg, Oral, Every Night at Bedtime      vilazodone 40 MG tablet tablet  Commonly known as: Viibryd   40 mg, Oral, Daily             Stop These Medications      docusate sodium 100 MG capsule  Commonly known as: COLACE     guaiFENesin-dextromethorphan 100-10 MG/5ML syrup  Commonly known as: ROBITUSSIN DM     polyethylene glycol 17 g packet  Commonly known as: MIRALAX              No Known Allergies    Discharge Disposition:  Home or Self Care      Discharge Diet:  Diet Order   Procedures    Diet: Cardiac Diets; Healthy Heart (2-3 Na+); Texture: Regular Texture (IDDSI 7); Fluid Consistency: Thin (IDDSI 0)       Discharge Activity:   as tolerated    CODE STATUS:    Code Status and Medical Interventions:   Ordered at: 06/10/23 0154     Code Status (Patient has no pulse and is not breathing):    CPR (Attempt to Resuscitate)     Medical Interventions (Patient has pulse or is breathing):    Full Support       Future Appointments   Date Time Provider Department Center   6/15/2023 11:00 AM Jose Rice MD [facility]     Additional Instructions for the Follow-ups that You Need to Schedule       Discharge Follow-up with PCP   As directed       Currently Documented PCP:    Naveed Nunez DO    PCP Phone Number:    [phone]     Follow Up Details: Dr. Nunez (PCP) in 1 week          Discharge Follow-up with Specified Provider: Dr. Rice (TSS) on June 15th   As directed      To: Dr. Rice (TSS) on June 15th         Discharge Follow-up with Specified Provider: Dr. Angel (Renal); 1 Week   As directed      To: Dr. Angel (Renal)    Follow Up: 1 Week                Follow-up Information       Naveed Nunez DO .    Specialty: Family Medicine  Why: Dr. Nunez (PCP) in 1 week  Contact information:  [address] [place] [address]  [phone]                             Additional Instructions for the Follow-ups that You Need to Schedule       Discharge Follow-up with PCP   As directed       Currently Documented PCP:    Naveed Nunez DO    PCP Phone Number:    [phone]     Follow Up Details: Dr. Nunez (PCP) in 1 week         Discharge Follow-up with Specified Provider: Dr. Rice (TSS) on June 15th   As directed      To: Dr. Rice (TSS) on June 15th         Discharge Follow-up with Specified Provider: Dr. Angel (Renal); 1 Week   As directed      To: Dr. Angel (Renal)    Follow Up: 1 Week             Time Spent on Discharge:  Greater than 30 minutes      Calos Nunes MD  [facility]  06/10/23  11:34 EDT

## 2023-06-10 NOTE — ED PROVIDER NOTES
EMERGENCY DEPARTMENT ENCOUNTER    Room Number:  16/16  Date seen:  6/10/2023  PCP: Naveed Nunez DO  Historian: Patient/spouse      HPI:  Chief Complaint: Lower extremity edema/pain  A complete HPI/ROS/PMH/PSH/SH/FH are unobtainable due to: None  Context: Calos Moseley is a 62 y.o. male who presents to the ED c/o bilateral lower extremity edema as well as associated pain that has been present now for the last couple of days.  He does report that the pain is like a spasm and is getting worse by the day.  He was recently admitted to the hospital for pneumonia as well as a pleural effusion requiring drainage.  He has been on torsemide originally 4 times daily but has now been decreased to once daily.  He has had increased swelling of his lower extremities since that point.  He currently does complain of some intermittent shortness of breath as well and is on 2 L of oxygen at all times since his recent discharge.  He denies chest pain, fever/chills, nausea/vomiting, or abdominal pain.            PAST MEDICAL HISTORY  Active Ambulatory Problems     Diagnosis Date Noted    HLD (hyperlipidemia) 01/07/2022    Obesity, Class III, BMI 40-49.9 (morbid obesity) 01/07/2022    Bilateral kidney stones 01/07/2022    COPD (chronic obstructive pulmonary disease) 01/07/2022    Left flank pain 02/14/2022    Anxiety and depression 01/29/2018    Hypothyroidism 01/29/2018    ISIAH (obstructive sleep apnea) 01/26/2022    Essential hypertension 01/29/2018    Calculus, renal 02/21/2022    Restless leg syndrome     Bipolar affective     Calculus of ureter 03/07/2022    Gross hematuria 02/25/2022    Encounter for screening for malignant neoplasm of colon 01/31/2023    Acute dyspnea 05/16/2023    Pleural effusion on right 05/16/2023    CKD (chronic kidney disease) 05/16/2023    Acute respiratory failure with hypoxia 05/17/2023    Lymphadenopathy, mediastinal 05/16/2023    Hypoxia 05/26/2023     Resolved Ambulatory Problems     Diagnosis  Date Noted    SBO (small bowel obstruction) (HCC) 11/03/2017    MESHA (acute kidney injury) 01/07/2022    Acute cystitis with hematuria 01/07/2022    Hyperglycemia 01/07/2022    HTN (hypertension) 01/07/2022    Acute pyelonephritis 01/07/2022    Hematuria 01/07/2022    Acute renal failure (ARF) 02/14/2022    Cervical spondylosis 09/11/2018    History of renal stone 01/29/2018    Hypokalemia 01/29/2018    Prediabetes 09/06/2019    Right ureteral stone 01/04/2018    Hyperlipidemia 01/29/2018    Obesity 12/01/2021     Past Medical History:   Diagnosis Date    Anesthesia complication     COVID 2022    Disease of thyroid gland     Elevated cholesterol     Hypertension     Kidney stone on left side     Mood disorder     Sleep apnea     UTI (urinary tract infection) 12/2021         PAST SURGICAL HISTORY  Past Surgical History:   Procedure Laterality Date    ADENOIDECTOMY      APPENDECTOMY      BRONCHOSCOPY Right 5/19/2023    Procedure: BRONCHOSCOPY WITH ENDOBRONCHIAL ULTRASOUND with BAL of RLL and FNA;  Surgeon: Aniceto Bruno MD;  Location: [facility] ENDOSCOPY;  Service: Pulmonary;  Laterality: Right;  pre- RLL pneumonia, lymphadenopathy  post- same    COLONOSCOPY      COLONOSCOPY N/A 3/23/2023    Procedure: COLONOSCOPY into cecum with cold snare polypectomies;  Surgeon: Yoan Leonard MD;  Location: [facility] ENDOSCOPY;  Service: Gastroenterology;  Laterality: N/A;  polyps    CYSTOSCOPY WITH CLOT EVACUATION N/A 3/8/2022    Procedure: CYSTOSCOPY WITH CLOT EVACUATION;  Surgeon: Ihsan Lopez MD;  Location: [facility] OR;  Service: Urology;  Laterality: N/A;    CYSTOSCOPY, URETEROSCOPY, RETROGRADE PYELOGRAM, STENT INSERTION Left 3/7/2022    Procedure: CYSTOSCOPY URETEROSCOPY LASER LITHOTRIPSY and stent exchange;  Surgeon: Deuce Leija MD;  Location: [facility] MAIN OR;  Service: Urology;  Laterality: Left;    KIDNEY STONE SURGERY      NEPHROLITHOTRIPSY PERCUTANEOUS Left 2/21/2022    Procedure:  NEPHROSTLITHOTOMY PERCUTANEOUS;  Surgeon: Deuce Leija MD;  Location: [facility] MAIN OR;  Service: Urology;  Laterality: Left;    NEPHROLITHOTRIPSY PERCUTANEOUS Left 2022    Procedure: LEFT FLEXIBLE NEPHROSTOMY WITH STONE EXTRACTION, LEFT NEPHROSTOMY, TUBE REMOVAL, BILATERAL URETERAL STENT PLACEMENT;  Surgeon: Deuce Leija MD;  Location: [facility] MAIN OR;  Service: Urology;  Laterality: Left;    NEPHROLITHOTRIPSY PERCUTANEOUS Right 3/7/2022    Procedure: RIGHT PERCUTANEOUS NEPHROLITHOTOMY W ACCESS;  Surgeon: Deuce Leija MD;  Location: [facility] MAIN OR;  Service: Urology;  Laterality: Right;    THORACOSCOPY WITH DECORTICATION Right 2023    Procedure: THORACOSCOPY VIDEO ASSISTED WITH DECORTICATION WITH DAVINCI ROBOT;  Surgeon: Jose Rice MD;  Location: [facility] MAIN OR;  Service: Robotics - DaVinci;  Laterality: Right;    TONSILLECTOMY           FAMILY HISTORY  Family History   Problem Relation Age of Onset    Malig Hyperthermia Neg Hx          SOCIAL HISTORY  Social History     Socioeconomic History    Marital status:    Tobacco Use    Smoking status: Former     Types: Cigarettes     Quit date: 1990     Years since quittin.4    Smokeless tobacco: Never   Vaping Use    Vaping Use: Never used   Substance and Sexual Activity    Alcohol use: Not Currently    Drug use: No    Sexual activity: Defer         ALLERGIES  Patient has no known allergies.        REVIEW OF SYSTEMS  Review of Systems   Constitutional:  Negative for activity change, appetite change and fever.   HENT:  Negative for congestion and sore throat.    Eyes: Negative.    Respiratory:  Negative for cough and shortness of breath.    Cardiovascular:  Positive for leg swelling. Negative for chest pain.   Gastrointestinal:  Negative for abdominal pain, diarrhea and vomiting.   Endocrine: Negative.    Genitourinary:  Negative for decreased urine volume and dysuria.   Musculoskeletal:  Negative for neck pain.         Lower extremity pain   Skin:  Negative for rash and wound.   Allergic/Immunologic: Negative.    Neurological:  Negative for weakness, numbness and headaches.   Hematological: Negative.    Psychiatric/Behavioral: Negative.     All other systems reviewed and are negative.         PHYSICAL EXAM  ED Triage Vitals   Temp Heart Rate Resp BP SpO2   06/09/23 1931 06/09/23 1931 06/09/23 2020 06/09/23 2021 06/09/23 1931   98 °F (36.7 °C) 85 18 145/72 95 %      Temp src Heart Rate Source Patient Position BP Location FiO2 (%)   06/09/23 1931 -- 06/09/23 2021 06/09/23 2021 --   Tympanic  Sitting Left arm        Physical Exam  Constitutional:       General: He is not in acute distress.     Appearance: Normal appearance. He is not ill-appearing or toxic-appearing.   HENT:      Head: Normocephalic and atraumatic.   Eyes:      Extraocular Movements: Extraocular movements intact.      Pupils: Pupils are equal, round, and reactive to light.   Cardiovascular:      Rate and Rhythm: Normal rate and regular rhythm.      Heart sounds: No murmur heard.    No friction rub. No gallop.   Pulmonary:      Effort: Pulmonary effort is normal.      Breath sounds: Normal breath sounds.   Abdominal:      General: Abdomen is flat. There is no distension.      Palpations: Abdomen is soft.      Tenderness: There is no abdominal tenderness.   Musculoskeletal:         General: No swelling or tenderness. Normal range of motion.      Cervical back: Normal range of motion and neck supple.      Right lower leg: Edema present.      Left lower leg: Edema present.   Skin:     General: Skin is warm and dry.   Neurological:      General: No focal deficit present.      Mental Status: He is alert and oriented to person, place, and time.      Sensory: No sensory deficit.      Motor: No weakness.   Psychiatric:         Mood and Affect: Mood normal.         Behavior: Behavior normal.       Vital signs and nursing notes reviewed.          LAB RESULTS  Recent Results  (from the past 24 hour(s))   Comprehensive Metabolic Panel    Collection Time: 06/09/23  8:33 PM    Specimen: Blood   Result Value Ref Range    Glucose 218 (H) 65 - 99 mg/dL    BUN 33 (H) 8 - 23 mg/dL    Creatinine 1.84 (H) 0.76 - 1.27 mg/dL    Sodium 139 136 - 145 mmol/L    Potassium 3.9 3.5 - 5.2 mmol/L    Chloride 98 98 - 107 mmol/L    CO2 29.0 22.0 - 29.0 mmol/L    Calcium 8.9 8.6 - 10.5 mg/dL    Total Protein 7.5 6.0 - 8.5 g/dL    Albumin 4.1 3.5 - 5.2 g/dL    ALT (SGPT) 27 1 - 41 U/L    AST (SGOT) 25 1 - 40 U/L    Alkaline Phosphatase 99 39 - 117 U/L    Total Bilirubin 0.3 0.0 - 1.2 mg/dL    Globulin 3.4 gm/dL    A/G Ratio 1.2 g/dL    BUN/Creatinine Ratio 17.9 7.0 - 25.0    Anion Gap 12.0 5.0 - 15.0 mmol/L    eGFR 41.2 (L) >60.0 mL/min/1.73   Magnesium    Collection Time: 06/09/23  8:33 PM    Specimen: Blood   Result Value Ref Range    Magnesium 2.0 1.6 - 2.4 mg/dL   CBC Auto Differential    Collection Time: 06/09/23  8:33 PM    Specimen: Blood   Result Value Ref Range    WBC 8.94 3.40 - 10.80 10*3/mm3    RBC 3.70 (L) 4.14 - 5.80 10*6/mm3    Hemoglobin 10.6 (L) 13.0 - 17.7 g/dL    Hematocrit 33.8 (L) 37.5 - 51.0 %    MCV 91.4 79.0 - 97.0 fL    MCH 28.6 26.6 - 33.0 pg    MCHC 31.4 (L) 31.5 - 35.7 g/dL    RDW 14.0 12.3 - 15.4 %    RDW-SD 46.3 37.0 - 54.0 fl    MPV 9.8 6.0 - 12.0 fL    Platelets 261 140 - 450 10*3/mm3    Neutrophil % 62.2 42.7 - 76.0 %    Lymphocyte % 20.6 19.6 - 45.3 %    Monocyte % 9.2 5.0 - 12.0 %    Eosinophil % 5.9 0.3 - 6.2 %    Basophil % 0.8 0.0 - 1.5 %    Immature Grans % 1.3 (H) 0.0 - 0.5 %    Neutrophils, Absolute 5.56 1.70 - 7.00 10*3/mm3    Lymphocytes, Absolute 1.84 0.70 - 3.10 10*3/mm3    Monocytes, Absolute 0.82 0.10 - 0.90 10*3/mm3    Eosinophils, Absolute 0.53 (H) 0.00 - 0.40 10*3/mm3    Basophils, Absolute 0.07 0.00 - 0.20 10*3/mm3    Immature Grans, Absolute 0.12 (H) 0.00 - 0.05 10*3/mm3    nRBC 0.0 0.0 - 0.2 /100 WBC   BNP    Collection Time: 06/09/23  8:33 PM    Specimen:  Blood   Result Value Ref Range    proBNP 892.0 0.0 - 900.0 pg/mL   High Sensitivity Troponin T    Collection Time: 06/09/23  8:33 PM    Specimen: Blood   Result Value Ref Range    HS Troponin T 20 (H) <15 ng/L       Ordered the above labs and reviewed the results.        RADIOLOGY  XR Chest 1 View    Result Date: 6/9/2023  XR CHEST 1 VW-  Clinical: Leg swelling with shortness of breath  COMPARISON 05/26/2023  FINDINGS: Cardiac enlargement similar to the previous examination. No pneumothorax. Stable mediastinum. Persistent, not significantly changed, right-sided pulmonary infiltrates. There is blunting of the right costophrenic angle, suggesting perhaps a small right-sided pleural effusion. The left lung is clear. No pulmonary edema. No new area of airspace disease has developed. The remainder is unremarkable.  CONCLUSION: Persistent infiltrates seen throughout the right lung similar to 05/26/2023. Small right-sided pleural effusion may be present. The left lung is clear, cardiac enlargement as before.  This report was finalized on 6/9/2023 8:52 PM by Dr. Jed Shaffer M.D.       Ordered the above noted radiological studies. Reviewed by me in PACS.            PROCEDURES  Procedures            MEDICATIONS GIVEN IN ER  Medications   furosemide (LASIX) injection 80 mg (80 mg Intravenous Given 6/9/23 0812)   diazePAM (VALIUM) injection 10 mg (10 mg Intravenous Given 6/9/23 8764)                   MEDICAL DECISION MAKING, PROGRESS, and CONSULTS    All labs have been independently reviewed by me.  All radiology studies have been reviewed by me and I have also reviewed the radiology report.   EKG's independently viewed and interpreted by me.  Discussion below represents my analysis of pertinent findings related to patient's condition, differential diagnosis, treatment plan and final disposition.      Additional sources:  - Discussed/ obtained information from independent historians: History obtained from the patient as  well as the patient's spouse at bedside    - External (non-ED) record review: Upon medical records review, the patient was last seen and evaluated in the outpatient office of his nephrologist on 5/31/2023 secondary to an acute kidney injury with stage III chronic kidney disease.    - Chronic or social conditions impacting care: CKD, recent admission for pneumonia/pleural effusion    - Shared decision making: Admission decision based on shared conversations have between myself as well as the patient at bedside    Case discussed with SALVADOR Quiñones for [facility], who will admit the patient today for Dr. Mullins.        Orders placed during this visit:  Orders Placed This Encounter   Procedures    XR Chest 1 View    Comprehensive Metabolic Panel    Magnesium    CBC Auto Differential    BNP    High Sensitivity Troponin T    High Sensitivity Troponin T 2Hr    [facility] (on-call MD unless specified) Details    Inpatient Admission    CBC & Differential               Differential diagnosis includes but is not limited to:    Differential diagnosis includes but is not limited to pneumonia, sepsis, CHF with pulmonary edema, dependent lower extremity edema, cellulitis, acute anemia, acute renal failure, or electrolyte disturbance.      Independent interpretation of labs, radiology studies, and discussions with consultants:    Chest x-ray was independently interpreted by myself with my interpretation as infiltrates throughout the right lung with a small effusion.  No evidence of pulmonary edema.      ED Course as of 06/10/23 0146   Sat Brody 10, 2023   0054 On reevaluation, the patient is resting comfortably with improvement in symptoms so far.  He has been given IV Lasix as well as IV Valium for diuretics and spasms respectively.  I informed the patient that given his condition as well as worsening lower extremity edema, we would admit him to the hospital today for further management and treatment.  All questions have been answered.  [BM]   0142 The patient's presentation, work-up, as well as diagnosis and treatment plan were discussed at length with SALVADOR Quiñones for [facility].  She agrees to admit the patient to the hospital today for Dr. Mullins. [BM]      ED Course User Index  [BM] Rodrigo Benito MD             DIAGNOSIS  Final diagnoses:   Pedal edema   Muscle spasm of both lower legs   Chronic kidney disease, unspecified CKD stage   Pleural effusion         DISPOSITION  ADMISSION    Discussed treatment plan and reason for admission with pt/family and admitting physician.  Pt/family voiced understanding of the plan for admission for further testing/treatment as needed.               Latest Documented Vital Signs:  As of 01:46 EDT  BP- 147/76 HR- 63 Temp- 98 °F (36.7 °C) (Tympanic) O2 sat- 95%              --    Please note that portions of this were completed with a voice recognition program.       Note Disclaimer: At [facility], we believe that sharing information builds trust and better relationships. You are receiving this note because you are receiving care at [facility] or recently visited. It is possible you will see health information before a provider has talked with you about it. This kind of information can be easy to misunderstand. To help you fully understand what it means for your health, we urge you to discuss this note with your provider.             Rodrigo Benito MD  06/10/23 0146

## 2023-06-10 NOTE — PLAN OF CARE
Goal Outcome Evaluation: patient being discharged to home today.  PIV removed and dc instructions reviewed with the patient; understanding verbalized.

## 2023-06-10 NOTE — ED PROVIDER NOTES
TRIAGE PROVIDER NOTE    I personally performed a brief face-to-face medical evaluation of the patient upon their arrival to the emergency department.  This exam was performed in an effort to decrease the time from intake to seeing a provider and to decrease delays in care.  The history of present illness is condensed.  Other providers may see the patient as well if deemed necessary after this evaluation.    HPI: Calos Moseley is a 62 y.o. male with a PMH significant for HTN, HLD, CKD, COPD, sleep apnea and hypothyroidism who presents to the ED c/o leg cramps since Sunday with increased swelling.  Denies any chest pain, shortness of breath, fever or chills.  Patient states that he was recently admitted for pleural effusion with chest tube and acute kidney injury, had several of his medicines including his diuretics changed and has had worsening cramps, pain, and lower extremity edema since Sunday.      FOCUSED PHYSICAL EXAM:  ED Triage Vitals   Temp Heart Rate Resp BP SpO2   06/09/23 1931 06/09/23 1931 06/09/23 2020 06/09/23 2021 06/09/23 1931   98 °F (36.7 °C) 85 18 145/72 95 %      Temp src Heart Rate Source Patient Position BP Location FiO2 (%)   06/09/23 1931 -- 06/09/23 2021 06/09/23 2021 --   Tympanic  Sitting Left arm      General: No acute distress  Lungs: Clear to auscultation bilaterally, no wheezes  Heart: Regular rate and rhythm, no murmur  Abdomen: Soft, nontender, nondistended  Extremities: No gross injury or deformity, 4+ pitting edema BLE      ASSESSMENT/PLAN:  We will check patient's electrolytes, basic labs, and a chest x-ray for any recurrence of his pleural effusion.    No orders of the defined types were placed in this encounter.       Provider Attestation:  I personally reviewed the past medical history, past surgical history, social history, family history, current medications, and allergies as they appear in the chart.    I, Calos Sterling PA-C, evaluated the patient briefly at triage and  performed an evaluation.  The contents of this note to this point have been provided by myself.            Calos Sterling, PA  06/10/23 0549

## 2023-06-10 NOTE — CONSULTS
[facility]    Reason for consults: abnormal CXR    HPI:  Mr. Moseley is a 63yo male with recent VATS decortication for loculated R parapneumonic effusion.  He was discharged . He returned home and has been doing well from a pulmonary standpoint, he was discharged home with oxygen and he is actually on room air at the moment with 93% oxygen saturations talking in complete sentences and quite comfortable. He says he has been working hard at home ambulating using IS and he has some wheezing and cough but overall feels he is doing well. He had sudden onset a few days back of severe leg discoomfort feels like he needs to pace to relieve the discomfort. Says this has happened before and he tried some medications for this in the past too. He has had some associted le edema as well.     Past Medical History:   Diagnosis Date    Anesthesia complication     STATES HARD TO WAKE UP AFTER PREVIOUS KIDNEY STONE SURGERY AND STATES HE CODED    Bilateral kidney stones     Bipolar affective     COVID     Disease of thyroid gland     Elevated cholesterol     Hypertension     Kidney stone on left side     Left flank pain     Mood disorder     Restless leg syndrome     Sleep apnea     cpap  bring dos    UTI (urinary tract infection) 2021    PREV FINISHED ANTIBIOTIC     Social History     Socioeconomic History    Marital status:    Tobacco Use    Smoking status: Former     Types: Cigarettes     Quit date: 1990     Years since quittin.4    Smokeless tobacco: Never   Vaping Use    Vaping Use: Never used   Substance and Sexual Activity    Alcohol use: Not Currently    Drug use: No    Sexual activity: Defer     Family History   Problem Relation Age of Onset    Malig Hyperthermia Neg Hx      MEDS: reviewed as per chart  ALL: nkda  ROS: le edema otherwise 12 point negative except as in HPI    Vital Sign Min/Max for last 24 hours  Temp  Min: 98 °F (36.7 °C)  Max: 98.2 °F (36.8 °C)   BP  Min: 141/72   Max: 150/74   Pulse  Min: 58  Max: 85   Resp  Min: 18  Max: 18   SpO2  Min: 92 %  Max: 96 %   Flow (L/min)  Min: 2  Max: 2   Weight  Min: 112 kg (248 lb)  Max: 112 kg (248 lb)     GEN:  appears ill, obese, A&O x3  HEENT: PERRL, EOMI, no icterus, mmm, no JVD, trachea midline, neck supple  CHEST: fair ae bilat, + right sided wheezes, no crackles, no use of accessory muscles  CV: RRR, no m/g/r  ABD: soft, nt, nd +bs, no hepatosplenomegaly  EXT: no c/c/ non pitting edema le bilaterally  SKIN: no rashes, no xanthomas, nl turgor, warm, dry   LYMPH: no palpable cervical or supraclavicular lymphadenopathy  NEURO: CN 2-12 intact and symmetric bilaterally  PSYCH: nl affect, nl orientation, nl judgment, nl mood  MSK: no kyphoscoliosis, 5/5 strength ue and le bilaterally    Labs: 6/10: reviewed:  Bun 31  Cr 1.75  Bicarb 33  Wbc 7.6  Hgb 9.9  Plts 231    CXR: 6/9: reviewed: improving mild post operative changes    A/P:  Abnormal CXR -- looks to be appropriately improving, can continue follow up as before  RLS -- will check serum iron level.  Will give some prn mirapex  CKD  Anemia    No objection to d/c follow up as before. Follow up with pcp re: iron studies

## 2023-06-10 NOTE — H&P
[facility]   HISTORY AND PHYSICAL    Patient Name: Calos Moseley  : 1960  MRN: [id number]  Primary Care Physician:  Naveed Nunez DO  Date of admission: 2023    Subjective   Subjective     Chief Complaint: swelling and pain in BLEs    History of Present Illness  63yo gentleman with h/o HLD, COPD, CKD3a, RLS, hypoT4, bipolar d/o, anemia, and HTN, who was admitted here  thru  for a right loculated parapneumonic effusion and VAT decortication, and presented to ER last night c/o worsening BLE edema and BLE cramping pain for a couple days. He has been on 2L/min by NC since recent admission but in room today he is sating 92% on RA. Says his ankles are much smaller this AM. Voiding well. No LUTS. Denies any N/V/D/abd pain/F/C/NS/SOA/CP/palp/HA/vis changes. He feels like his BLE pains are identical to the pains he has at night due to his chronic RLS. Used to be on Requip but says it was stopped a long time ago and he's not sure why. Review of discharge last month shows he is  taking Mirapex nightly. He is eager to go home and wonders why he needed to be admitted.    Review of Systems   Constitutional:  Negative for appetite change, chills, diaphoresis, fatigue and fever.   HENT:  Negative for congestion, nosebleeds, rhinorrhea, sore throat, trouble swallowing and voice change.    Eyes:  Negative for pain and visual disturbance.   Respiratory:  Negative for cough, chest tightness and shortness of breath.    Cardiovascular:  Positive for leg swelling. Negative for chest pain and palpitations.   Gastrointestinal:  Negative for abdominal pain, diarrhea, nausea and vomiting.   Endocrine: Negative for cold intolerance and heat intolerance.   Genitourinary:  Negative for decreased urine volume, difficulty urinating, dysuria and hematuria.   Musculoskeletal:  Negative for back pain and neck pain.        Sharp pain in BLEs   Skin:  Negative for color change, pallor and rash.   Allergic/Immunologic:  Negative for environmental allergies and food allergies.   Neurological:  Negative for tremors, seizures, syncope, facial asymmetry, speech difficulty and headaches.   Hematological:  Negative for adenopathy. Does not bruise/bleed easily.   Psychiatric/Behavioral:  Negative for behavioral problems, confusion and hallucinations.       Personal History     Past Medical History:   Diagnosis Date    Anesthesia complication     STATES HARD TO WAKE UP AFTER PREVIOUS KIDNEY STONE SURGERY AND STATES HE CODED    Bilateral kidney stones     Bipolar affective     COVID 2022    Disease of thyroid gland     Elevated cholesterol     Hypertension     Kidney stone on left side     Left flank pain     Mood disorder     Restless leg syndrome     Sleep apnea     cpap  bring dos    UTI (urinary tract infection) 12/2021    PREV FINISHED ANTIBIOTIC       Past Surgical History:   Procedure Laterality Date    ADENOIDECTOMY      APPENDECTOMY      BRONCHOSCOPY Right 5/19/2023    Procedure: BRONCHOSCOPY WITH ENDOBRONCHIAL ULTRASOUND with BAL of RLL and FNA;  Surgeon: Aniceto Bruno MD;  Location: [facility] ENDOSCOPY;  Service: Pulmonary;  Laterality: Right;  pre- RLL pneumonia, lymphadenopathy  post- same    COLONOSCOPY      COLONOSCOPY N/A 3/23/2023    Procedure: COLONOSCOPY into cecum with cold snare polypectomies;  Surgeon: Yoan Leonard MD;  Location: [facility] ENDOSCOPY;  Service: Gastroenterology;  Laterality: N/A;  polyps    CYSTOSCOPY WITH CLOT EVACUATION N/A 3/8/2022    Procedure: CYSTOSCOPY WITH CLOT EVACUATION;  Surgeon: Ihsan Lopez MD;  Location[facility];  Service: Urology;  Laterality: N/A;    CYSTOSCOPY, URETEROSCOPY, RETROGRADE PYELOGRAM, STENT INSERTION Left 3/7/2022    Procedure: CYSTOSCOPY URETEROSCOPY LASER LITHOTRIPSY and stent exchange;  Surgeon: Deuce Leija MD;  Location: [facility] OR;  Service: Urology;  Laterality: Left;    KIDNEY STONE SURGERY      NEPHROLITHOTRIPSY PERCUTANEOUS  Left 2/21/2022    Procedure: NEPHROSTLITHOTOMY PERCUTANEOUS;  Surgeon: Deuce Leija MD;  Location: [facility] MAIN OR;  Service: Urology;  Laterality: Left;    NEPHROLITHOTRIPSY PERCUTANEOUS Left 2/23/2022    Procedure: LEFT FLEXIBLE NEPHROSTOMY WITH STONE EXTRACTION, LEFT NEPHROSTOMY, TUBE REMOVAL, BILATERAL URETERAL STENT PLACEMENT;  Surgeon: Deuce Leija MD;  Location: [facility] MAIN OR;  Service: Urology;  Laterality: Left;    NEPHROLITHOTRIPSY PERCUTANEOUS Right 3/7/2022    Procedure: RIGHT PERCUTANEOUS NEPHROLITHOTOMY W ACCESS;  Surgeon: Deuce Leija MD;  Location: [facility] MAIN OR;  Service: Urology;  Laterality: Right;    THORACOSCOPY WITH DECORTICATION Right 5/20/2023    Procedure: THORACOSCOPY VIDEO ASSISTED WITH DECORTICATION WITH DAVINCI ROBOT;  Surgeon: Jose Rice MD;  Location: [facility] MAIN OR;  Service: Robotics - DaVinci;  Laterality: Right;    TONSILLECTOMY         Family History: family history is not on file. Otherwise pertinent FHx was reviewed and not pertinent to current issue.    Social History:  reports that he quit smoking about 32 years ago. His smoking use included cigarettes. He has never used smokeless tobacco. He reports that he does not currently use alcohol. He reports that he does not use drugs.    Home Medications:  NIFEdipine CC, acetaminophen, albuterol sulfate HFA, atorvastatin, buPROPion XL, budesonide-formoterol, diazePAM, divalproex, docusate sodium, guaiFENesin-dextromethorphan, hydrALAZINE, lamoTRIgine, levothyroxine, metoprolol succinate XL, polyethylene glycol, potassium citrate, pramipexole, sildenafil, tamsulosin, torsemide, and vilazodone    Allergies:  No Known Allergies    Objective    Objective     Vitals:   Temp:  [98 °F (36.7 °C)-98.2 °F (36.8 °C)] 98.2 °F (36.8 °C)  Heart Rate:  [58-85] 68  Resp:  [18] 18  BP: (141-150)/(69-76) 148/72  Flow (L/min):  [2] 2    Physical Exam  Vitals and nursing note reviewed.   Constitutional:       General: He is  not in acute distress.     Appearance: He is not ill-appearing, toxic-appearing or diaphoretic.   HENT:      Head: Normocephalic.      Nose: Nose normal.      Mouth/Throat:      Mouth: Mucous membranes are moist.      Pharynx: Oropharynx is clear.   Eyes:      General: No scleral icterus.        Right eye: No discharge.         Left eye: No discharge.      Extraocular Movements: Extraocular movements intact.      Conjunctiva/sclera: Conjunctivae normal.      Pupils: Pupils are equal, round, and reactive to light.   Cardiovascular:      Rate and Rhythm: Normal rate and regular rhythm.      Pulses: Normal pulses.   Pulmonary:      Effort: Pulmonary effort is normal. No respiratory distress.      Breath sounds: Rales (faint on right) present. No wheezing.      Comments: Decreased BS right base  Abdominal:      General: Bowel sounds are normal. There is no distension.      Palpations: Abdomen is soft.      Tenderness: There is no abdominal tenderness.   Musculoskeletal:         General: Swelling (2+ in BLEs) present. No deformity. Normal range of motion.      Cervical back: Neck supple. No rigidity.   Lymphadenopathy:      Cervical: No cervical adenopathy.   Skin:     General: Skin is warm and dry.      Capillary Refill: Capillary refill takes less than 2 seconds.      Coloration: Skin is not jaundiced.   Neurological:      General: No focal deficit present.      Mental Status: He is alert and oriented to person, place, and time. Mental status is at baseline.      Cranial Nerves: No cranial nerve deficit.      Motor: No weakness.      Coordination: Coordination normal.      Gait: Gait normal.   Psychiatric:         Mood and Affect: Mood normal.         Behavior: Behavior normal.         Thought Content: Thought content normal.      Comments: Very pleasant       Result Review    Result Review:  I have personally reviewed the results from the time of this admission to 6/10/2023 10:28 EDT and agree with these  findings:  [x]  Laboratory list / accordion  []  Microbiology  [x]  Radiology  [x]  EKG/Telemetry   []  Cardiology/Vascular   []  Pathology  [x]  Old records  []  Other:  Most notable findings include: CXR showed persistent infiltrates throughout right lung similar to 5/26 study and small right pleural effusion  , Trop 22, Cr 1.84>1.75, Lytes fine, LFTs wnl, TSH wnl, CBC fine--Hgb at baseline of around 10      Assessment & Plan   Assessment / Plan     Brief Patient Summary:  Calos Moseley is a 62 y.o. male who presents with worsening swelling in BLEs as well as BLE pains.    Active Hospital Problems:  Active Hospital Problems    Diagnosis     **Pedal edema     Stage 3a chronic kidney disease     Anemia     Chronic respiratory failure with hypoxia     Restless leg syndrome     Bipolar affective     ISIAH (obstructive sleep apnea)     HLD (hyperlipidemia)     COPD (chronic obstructive pulmonary disease)     Hypothyroidism     Essential hypertension      Plan:   Continue IV Bumex, Echo okay last admission  Ask his nephrologist to see here for guidance w/r/t diuresis  His BLE pains sound more c/w RLS, will increase dose of nightly Mirapex and can f/u with PCP regarding this  Hgb is low but stable, needs to f/u with PCP regarding this  Asked Pulm to see and they are fine with current appearance of CXR, he can continue to wean O2 as able and keep scheduled f/u appts with Dr. Rice (Thoracic Surg) on 6/15  O2 sats are 94% on RA currently and he is OOB shaving at sink  Renal function at baseline  Home meds continued  Will d/w Renal today--may be able to go home this afternoon if okay with them  Further orders to follow as suggested by evolving hospital course     D/w pt     DVT prophylaxis:  Mechanical DVT prophylaxis orders are present.    CODE STATUS:    Code Status (Patient has no pulse and is not breathing): CPR (Attempt to Resuscitate)  Medical Interventions (Patient has pulse or is breathing): Full  Support    Admission Status:  I believe this patient meets observation status.    Calos Nunes MD

## 2023-06-10 NOTE — PLAN OF CARE
Goal Outcome Evaluation:  Plan of Care Reviewed With: patient        Progress: no change  Outcome Evaluation: Patient admitted this shift and vitals stable. Patient reports some pain. Patient alert and oriented, Patient asleep since arriving to floor. Will continue to monitor.

## 2023-06-10 NOTE — ED NOTES
.Nursing report ED to floor  Calos Moseley  62 y.o.  male    HPI :   Chief Complaint   Patient presents with    Leg Swelling       Admitting doctor:   Dre Mullins MD    Admitting diagnosis:   The primary encounter diagnosis was Pedal edema. Diagnoses of Muscle spasm of both lower legs, Chronic kidney disease, unspecified CKD stage, and Pleural effusion were also pertinent to this visit.    Code status:   Current Code Status       Date Active Code Status Order ID Comments User Context       Prior            Allergies:   Patient has no known allergies.    Isolation:   No active isolations    Intake and Output  No intake or output data in the 24 hours ending 06/10/23 0152    Weight:       06/09/23 2020   Weight: 112 kg (248 lb)       Most recent vitals:   Vitals:    06/09/23 2320 06/10/23 0002 06/10/23 0003 06/10/23 0006   BP:  147/76     BP Location:       Patient Position:       Pulse: 73 63 62 63   Resp:       Temp:       TempSrc:       SpO2: 93% 93% 94% 95%   Weight:       Height:           Active LDAs/IV Access:   Lines, Drains & Airways       Active LDAs       Name Placement date Placement time Site Days    Peripheral IV 06/09/23 2355 Right Antecubital 06/09/23 2355  Antecubital  less than 1                    Labs (abnormal labs have a star):   Labs Reviewed   COMPREHENSIVE METABOLIC PANEL - Abnormal; Notable for the following components:       Result Value    Glucose 218 (*)     BUN 33 (*)     Creatinine 1.84 (*)     eGFR 41.2 (*)     All other components within normal limits    Narrative:     GFR Normal >60  Chronic Kidney Disease <60  Kidney Failure <15     CBC WITH AUTO DIFFERENTIAL - Abnormal; Notable for the following components:    RBC 3.70 (*)     Hemoglobin 10.6 (*)     Hematocrit 33.8 (*)     MCHC 31.4 (*)     Immature Grans % 1.3 (*)     Eosinophils, Absolute 0.53 (*)     Immature Grans, Absolute 0.12 (*)     All other components within normal limits   TROPONIN - Abnormal; Notable for the  following components:    HS Troponin T 20 (*)     All other components within normal limits    Narrative:     High Sensitive Troponin T Reference Range:  <10.0 ng/L- Negative Female for AMI  <15.0 ng/L- Negative Male for AMI  >=10 - Abnormal Female indicating possible myocardial injury.  >=15 - Abnormal Male indicating possible myocardial injury.   Clinicians would have to utilize clinical acumen, EKG, Troponin, and serial changes to determine if it is an Acute Myocardial Infarction or myocardial injury due to an underlying chronic condition.        MAGNESIUM - Normal   BNP (IN-HOUSE) - Normal    Narrative:     Among patients with dyspnea, NT-proBNP is highly sensitive for the detection of acute congestive heart failure. In addition NT-proBNP of <300 pg/ml effectively rules out acute congestive heart failure with 99% negative predictive value.    Results may be falsely decreased if patient taking Biotin.     HIGH SENSITIVITIY TROPONIN T 2HR   CBC AND DIFFERENTIAL    Narrative:     The following orders were created for panel order CBC & Differential.  Procedure                               Abnormality         Status                     ---------                               -----------         ------                     CBC Auto Differential[[id number]]        Abnormal            Final result                 Please view results for these tests on the individual orders.       EKG:   No orders to display       Meds given in ED:   Medications   furosemide (LASIX) injection 80 mg (80 mg Intravenous Given 23 0955)   diazePAM (VALIUM) injection 10 mg (10 mg Intravenous Given 23 0669)       Imaging results:  No radiology results for the last day    Ambulatory status:   - up with assistance    Social issues:   Social History     Socioeconomic History    Marital status:    Tobacco Use    Smoking status: Former     Types: Cigarettes     Quit date: 1990     Years since quittin.4    Smokeless tobacco:  Never   Vaping Use    Vaping Use: Never used   Substance and Sexual Activity    Alcohol use: Not Currently    Drug use: No    Sexual activity: Defer       NIH Stroke Scale:         Sarah Moe RN  06/10/23 01:52 EDT

## 2023-06-11 NOTE — OUTREACH NOTE
Prep Survey      Flowsheet Row Responses   [facility] discharged from? [facility]   Is LACE score < 7 ? No   Eligibility Readm Mgmt   Discharge diagnosis Pedal edema   Does the patient have one of the following disease processes/diagnoses(primary or secondary)? Other   Does the patient have Home health ordered? No   Is there a DME ordered? No   Prep survey completed? Yes            MACY LARSON - Registered Nurse

## 2023-06-15 ENCOUNTER — READMISSION MANAGEMENT (OUTPATIENT)
Dept: CALL CENTER | Facility: HOSPITAL | Age: 63
End: 2023-06-15
Payer: COMMERCIAL

## 2023-06-15 ENCOUNTER — HOSPITAL ENCOUNTER (OUTPATIENT)
Dept: GENERAL RADIOLOGY | Facility: HOSPITAL | Age: 63
Discharge: HOME | End: 2023-06-15
Admitting: NURSE PRACTITIONER
Payer: COMMERCIAL

## 2023-06-15 DIAGNOSIS — J90 PLEURAL EFFUSION, RIGHT: ICD-10-CM

## 2023-06-15 PROCEDURE — 71046 X-RAY EXAM CHEST 2 VIEWS: CPT

## 2023-06-15 NOTE — OUTREACH NOTE
Medical Week 1 Survey      Flowsheet Row Responses   [facility] patient discharged from? [place]   Does the patient have one of the following disease processes/diagnoses(primary or secondary)? Other   Week 1 attempt successful? No   Unsuccessful attempts Attempt 2            MAYCO Paredes Registered Nurse

## 2023-06-16 LAB
FUNGUS WND CULT: NORMAL
MYCOBACTERIUM SPEC CULT: NORMAL
NIGHT BLUE STAIN TISS: NORMAL

## 2023-06-23 LAB
MYCOBACTERIUM SPEC CULT: NORMAL
NIGHT BLUE STAIN TISS: NORMAL

## 2023-06-29 NOTE — PROGRESS NOTES
Name: Calos Moesley ADMIT: 2023   : 1960  PCP: Naveed Nunez DO    MRN: [id number] LOS: 0 days   AGE/SEX: 62 y.o. male  ROOM: [facility]     Subjective   Subjective   Patient complains of right-sided chest pain and flank pain as well as right shoulder pain.  Has been present for several days now. Still having some shortness of breath but feels better with the O2 on.    Reports that his weight is approximately 7 to 10 pounds over his usual baseline.  Previously had only peripheral edema after being on his feet for a while but has stayed persistently swollen over the last week or so.  Complains of increased abdominal girth    Review of Systems     Objective   Objective   Vital Signs  Temp:  [97.9 °F (36.6 °C)-99.1 °F (37.3 °C)] 97.9 °F (36.6 °C)  Heart Rate:  [68-94] 69  Resp:  [14-20] 20  BP: (135-178)/(66-77) 155/73  SpO2:  [89 %-95 %] 95 %  on  Flow (L/min):  [3-4.5] 4.5;   Device (Oxygen Therapy): nasal cannula  Body mass index is 40.67 kg/m².  Physical Exam  Vitals reviewed.   Constitutional:       General: He is not in acute distress.     Appearance: He is obese. He is ill-appearing.   Pulmonary:      Effort: Pulmonary effort is normal.      Breath sounds: Wheezing and rhonchi present.   Abdominal:      General: Bowel sounds are normal. There is no distension.      Palpations: Abdomen is soft.      Tenderness: There is no abdominal tenderness.   Musculoskeletal:      Right lower leg: Edema present.      Left lower leg: Edema present.   Skin:     General: Skin is warm and dry.      Findings: No rash.   Neurological:      Mental Status: He is alert and oriented to person, place, and time.   Psychiatric:         Mood and Affect: Mood normal.         Behavior: Behavior normal.       Results Review     I reviewed the patient's new clinical results.  Results from last 7 days   Lab Units 23  0433 23  1730   WBC 10*3/mm3 9.18 10.09   HEMOGLOBIN g/dL 13.0 14.1   PLATELETS 10*3/mm3 265 273  Infusion complete, pt began vomiting with 10min left of her infusion. Pt states she has GI issues and this is not abnormal for her. Pt was feeling better upon discharge. Aware of next appt.     Results from last 7 days   Lab Units 05/17/23  0433 05/16/23  1730   SODIUM mmol/L 139 137   POTASSIUM mmol/L 4.8 4.6   CHLORIDE mmol/L 102 100   CO2 mmol/L 24.5 27.0   BUN mg/dL 32* 33*   CREATININE mg/dL 1.60* 1.66*   GLUCOSE mg/dL 129* 132*   EGFR mL/min/1.73 48.7* 46.6*     Results from last 7 days   Lab Units 05/17/23  0433 05/16/23  1730   ALBUMIN g/dL 3.3* 4.0   BILIRUBIN mg/dL 0.6 0.7   ALK PHOS U/L 75 89   AST (SGOT) U/L 17 19   ALT (SGPT) U/L 24 34     Results from last 7 days   Lab Units 05/17/23  0433 05/16/23  1730   CALCIUM mg/dL 9.0 9.5   ALBUMIN g/dL 3.3* 4.0     Results from last 7 days   Lab Units 05/16/23  1730   PROCALCITONIN ng/mL 0.98*   LACTATE mmol/L 1.3     No results found for: HGBA1C, POCGLU    CT Chest Without Contrast Diagnostic    Result Date: 5/16/2023   Mild to moderate loculated right pleural effusion. Occlusion right lower lobe branch airways is seen, with limited because of mucous plugging or lesion (bronchoscopic correlation can be obtained), with right lower lobe atelectasis (possibility of underlying pneumonia or lesion not excluded). Nonspecific mediastinal adenopathy. Follow-up/further evaluation recommended as indicated.  This report was finalized on 5/16/2023 7:00 PM by Dr. Eder Gallego M.D.      I have personally reviewed all medications:  Scheduled Medications  atorvastatin, 20 mg, Oral, Nightly  budesonide-formoterol, 2 puff, Inhalation, BID - RT  buPROPion XL, 150 mg, Oral, QAM  cefTRIAXone, 1 g, Intravenous, Q24H  divalproex, 250 mg, Oral, BID  hydrALAZINE, 50 mg, Oral, Q8H  lamoTRIgine, 100 mg, Oral, BID  levothyroxine, 50 mcg, Oral, Q AM  lisinopril, 40 mg, Oral, Daily  metoprolol succinate XL, 100 mg, Oral, BID  naltrexone, 50 mg, Oral, QAM  NIFEdipine XL, 90 mg, Oral, Q24H  pramipexole, 0.5 mg, Oral, Nightly  senna-docusate sodium, 2 tablet, Oral, BID  sodium chloride, 10 mL, Intravenous, Q12H  tamsulosin, 0.4 mg, Oral, Nightly  vilazodone, 40 mg, Oral,  Daily    Infusions  hold, 1 each    Diet  NPO Diet NPO Type: Sips with Meds    I have personally reviewed:  [x]  Laboratory   [x]  Microbiology   [x]  Radiology   [x]  EKG/Telemetry  [x]  Cardiology/Vascular   []  Pathology    []  Records       Assessment/Plan     Active Hospital Problems    Diagnosis  POA   • **Acute dyspnea [R06.00]  Yes   • Acute respiratory failure with hypoxia [J96.01]  Yes   • Pleural effusion on right [J90]  Yes   • CKD (chronic kidney disease) [N18.9]  Yes   • ISIAH (obstructive sleep apnea) [G47.33]  Yes   • COPD (chronic obstructive pulmonary disease) [J44.9]  Yes   • Anxiety and depression [F41.9, F32.A]  Yes   • Essential hypertension [I10]  Yes   • Hypothyroidism [E03.9]  Yes      Resolved Hospital Problems   No resolved problems to display.       62 y.o. male admitted with Acute dyspnea, right-sided chest pain and hypoxia, findings of loculated right pleural effusion and possible right lower lobe endobronchial lesion.    Interesting presentation, unsure of etiology for above.  Labs and presentation do not really suggest sepsis or empyema but obviously need thoracentesis to evaluate further.  - Follow-up cultures and fluid studies from pleural fluid  - Antibiotics initiated by pulmonology.  ID to follow  - Supplement O2 as needed.  Wean to keep sats greater than 90%  - Likely will need bronchoscopy as well during hospitalization    Peripheral edema/weight gain-we will check echocardiogram.  BNP is normal but can be misleading in obese patients.  Patient reports that he is up about 10 pounds or so from usual baseline.  No prior echo in system  - Also note that he is on nifedipine which can cause peripheral edema  - Check urine studies including urine protein/creatinine.  Serum albumin 3.3    CKD stable and at usual baseline.  He does not have MESHA    History of hypothyroidism, on replacement.  Will check TSH    HTN, apparently refractory based on his extensive medication regimen.    · SCDs  for DVT prophylaxis.  Consider pharmacologic prophylaxis after procedures are done  · Dispo TBD    Tristen Perez MD  [facility]  05/17/23  12:07 EDT

## 2023-06-30 LAB
MYCOBACTERIUM SPEC CULT: NORMAL
NIGHT BLUE STAIN TISS: NORMAL

## 2023-07-17 RX ORDER — PRAMIPEXOLE DIHYDROCHLORIDE 0.75 MG/1
0.75 TABLET ORAL NIGHTLY
Qty: 30 TABLET | Refills: 0 | Status: CANCELLED | OUTPATIENT
Start: 2023-07-17

## 2023-07-17 RX ORDER — TORSEMIDE 20 MG/1
40 TABLET ORAL 2 TIMES DAILY
Qty: 180 TABLET | Refills: 3 | Status: CANCELLED | OUTPATIENT
Start: 2023-07-17

## 2023-10-18 ENCOUNTER — TRANSCRIBE ORDERS (OUTPATIENT)
Dept: ADMINISTRATIVE | Facility: HOSPITAL | Age: 63
End: 2023-10-18
Payer: COMMERCIAL

## 2023-10-18 DIAGNOSIS — J86.9 EMPYEMA: Primary | ICD-10-CM

## 2023-11-22 ENCOUNTER — OFFICE VISIT (OUTPATIENT)
Dept: INTERNAL MEDICINE | Facility: CLINIC | Age: 63
End: 2023-11-22
Payer: COMMERCIAL

## 2023-11-22 VITALS
WEIGHT: 257 LBS | SYSTOLIC BLOOD PRESSURE: 130 MMHG | BODY MASS INDEX: 41.3 KG/M2 | TEMPERATURE: 97.9 F | RESPIRATION RATE: 18 BRPM | HEART RATE: 65 BPM | HEIGHT: 66 IN | OXYGEN SATURATION: 93 % | DIASTOLIC BLOOD PRESSURE: 60 MMHG

## 2023-11-22 DIAGNOSIS — I10 ESSENTIAL HYPERTENSION: Chronic | ICD-10-CM

## 2023-11-22 DIAGNOSIS — G25.81 RESTLESS LEG SYNDROME: ICD-10-CM

## 2023-11-22 DIAGNOSIS — J44.9 CHRONIC OBSTRUCTIVE PULMONARY DISEASE, UNSPECIFIED COPD TYPE: Chronic | ICD-10-CM

## 2023-11-22 DIAGNOSIS — J44.1 COPD WITH EXACERBATION: ICD-10-CM

## 2023-11-22 DIAGNOSIS — Z00.00 HEALTHCARE MAINTENANCE: ICD-10-CM

## 2023-11-22 DIAGNOSIS — E66.01 OBESITY, CLASS III, BMI 40-49.9 (MORBID OBESITY): Chronic | ICD-10-CM

## 2023-11-22 DIAGNOSIS — E78.5 HYPERLIPIDEMIA, UNSPECIFIED HYPERLIPIDEMIA TYPE: Chronic | ICD-10-CM

## 2023-11-22 DIAGNOSIS — F32.A ANXIETY AND DEPRESSION: Chronic | ICD-10-CM

## 2023-11-22 DIAGNOSIS — R73.09 ELEVATED GLUCOSE: ICD-10-CM

## 2023-11-22 DIAGNOSIS — F41.9 ANXIETY AND DEPRESSION: Chronic | ICD-10-CM

## 2023-11-22 DIAGNOSIS — Z76.89 ENCOUNTER TO ESTABLISH CARE: Primary | ICD-10-CM

## 2023-11-22 RX ORDER — NALTREXONE HYDROCHLORIDE 50 MG/1
50 TABLET, FILM COATED ORAL EVERY MORNING
Qty: 30 TABLET | Refills: 2 | Status: CANCELLED | OUTPATIENT
Start: 2023-11-22

## 2023-11-22 RX ORDER — PRAMIPEXOLE DIHYDROCHLORIDE 0.75 MG/1
0.75 TABLET ORAL NIGHTLY
Qty: 30 TABLET | Refills: 0 | Status: SHIPPED | OUTPATIENT
Start: 2023-11-22

## 2023-11-22 NOTE — PROGRESS NOTES
"Chief Complaint  Establish Care (Needs closer pcp)  Healthcare maintenance     Subjective        Calos Moseley presents to [facility]  History of Present Illness    Patient is a pleasant 63 year old male who is new to me and new to the office.     He is here to establish care and for a healthcare maintenance visit.   He is here with his wife and he gives me verbal consent to speak about his treatment plan today.     PCP was in [place] and he wants to establish care.     Sees pulmonary specialist at this point due to history of COPD.     Lung Surgery in 05/2023 Infection Gut through esophagus into the lining around lungs. He is doing well at this time.     He is a [employer] employee.     Needing medication refills and referral to psychiatry for his history of anxiety/depression.     Objective   Vital Signs:  /60 (BP Location: Right arm, Patient Position: Sitting, Cuff Size: Large Adult)   Pulse 65   Temp 97.9 °F (36.6 °C)   Resp 18   Ht 167.6 cm (65.98\")   Wt 117 kg (257 lb)   SpO2 93%   BMI 41.50 kg/m²   Estimated body mass index is 41.5 kg/m² as calculated from the following:    Height as of this encounter: 167.6 cm (65.98\").    Weight as of this encounter: 117 kg (257 lb).       Class 3 Severe Obesity (BMI >=40). Obesity-related health conditions include the following: hypertension and dyslipidemias. Obesity is worsening. BMI is is above average; BMI management plan is completed. We discussed portion control, increasing exercise, joining a fitness center or start home based exercise program, Weight Watchers or other Commercial based weight reduction program, and an jennifer-based approach such as [employer] Pal or Lose It.      Physical Exam  Vitals and nursing note reviewed.   Constitutional:       Appearance: Normal appearance. He is obese.   HENT:      Head: Normocephalic.      Nose: Nose normal.      Mouth/Throat:      Mouth: Mucous membranes are moist.   Cardiovascular:    "   Rate and Rhythm: Normal rate and regular rhythm.      Pulses: Normal pulses.      Heart sounds: Normal heart sounds.      Comments: No peripheral edema noted  Pulmonary:      Effort: Pulmonary effort is normal. No respiratory distress.      Breath sounds: Normal breath sounds. No stridor. No wheezing, rhonchi or rales.      Comments: Denies SOB  Chest:      Chest wall: No tenderness.   Musculoskeletal:         General: Normal range of motion.   Skin:     General: Skin is warm.      Capillary Refill: Capillary refill takes less than 2 seconds.   Neurological:      Mental Status: He is alert and oriented to person, place, and time.        Result Review :    Common labs          6/9/2023    20:33 6/10/2023    05:11 11/22/2023    15:18   Common Labs   Glucose 218  130     BUN 33  31     Creatinine 1.84  1.75     Sodium 139  141     Potassium 3.9  3.5     Chloride 98  99     Calcium 8.9  9.2     Albumin 4.1      Total Bilirubin 0.3      Alkaline Phosphatase 99      AST (SGOT) 25      ALT (SGPT) 27      WBC 8.94  7.63     Hemoglobin 10.6  9.9     Hematocrit 33.8  30.5     Platelets 261  231     Hemoglobin A1C   6.6    Uric Acid  9.1                    Assessment and Plan   Diagnoses and all orders for this visit:    1. Encounter to establish care (Primary)    2. Hyperlipidemia, unspecified hyperlipidemia type    3. Essential hypertension    4. Obesity, Class III, BMI 40-49.9 (morbid obesity)    5. Chronic obstructive pulmonary disease, unspecified COPD type  Assessment & Plan:  Chronic/Stable  COPD is improving with treatment.  Patient to keep COPD diary.  Discussed monitoring symptoms and use of quick-relief medications and contacting us early in the course of exacerbations.  Continue current medications.  Patient is requesting a nebulizer unit for his nebulizer treatments that was ordered per pulmonology. Will send in at this time and he will continue follow up with the specialist.         Orders:  -     Home  Nebulizer Accessories    6. Restless leg syndrome  Assessment & Plan:  Chronic/Stable  Patient is asking for a refill on his Mirapex 0.75 mg's at bedtime.   Will send in at this time.     Orders:  -     Ambulatory Referral to Psychiatry    7. Elevated glucose  -     Hemoglobin A1c    8. COPD with exacerbation  -     Home Nebulizer Accessories    9. Healthcare maintenance    10. Anxiety and depression    Other orders  -     pramipexole (MIRAPEX) 0.75 MG tablet; Take 1 tablet by mouth Every Night.  Dispense: 30 tablet; Refill: 0    Patient will stop at pharmacy and take medications as directed.   Referral placed for psychiatry.   Nebulizer sent in per patient request.   We will do labs today and contact him with those results.          I spent 30 minutes caring for Calos on this date of service. This time includes time spent by me in the following activities:preparing for the visit, reviewing tests, obtaining and/or reviewing a separately obtained history, performing a medically appropriate examination and/or evaluation , counseling and educating the patient/family/caregiver, ordering medications, tests, or procedures, referring and communicating with other health care professionals , documenting information in the medical record, independently interpreting results and communicating that information with the patient/family/caregiver, and care coordination  Follow Up   Return in about 9 weeks (around 1/24/2024) for Annual physical.  Patient was given instructions and counseling regarding his condition or for health maintenance advice. Please see specific information pulled into the AVS if appropriate.

## 2023-11-23 LAB — HBA1C MFR BLD: 6.6 % (ref 4.8–5.6)

## 2023-11-27 ENCOUNTER — PATIENT ROUNDING (BHMG ONLY) (OUTPATIENT)
Dept: INTERNAL MEDICINE | Facility: CLINIC | Age: 63
End: 2023-11-27
Payer: COMMERCIAL

## 2023-11-27 NOTE — PROGRESS NOTES
November 27, 2023    Hello, may I speak with Calos Moseley?    My name is TRESA      I am  with [facility]  [address]  [place] [address] [phone].    Before we get started may I verify your date of birth? 1960    I am calling to officially welcome you to our practice and ask about your recent visit. Is this a good time to talk? yes    Tell me about your visit with us. What things went well?  YES       We're always looking for ways to make our patients' experiences even better. Do you have recommendations on ways we may improve?  no    Overall were you satisfied with your first visit to our practice? yes       I appreciate you taking the time to speak with me today. Is there anything else I can do for you? no      Thank you, and have a great day.

## 2023-11-29 ENCOUNTER — TELEPHONE (OUTPATIENT)
Dept: INTERNAL MEDICINE | Facility: CLINIC | Age: 63
End: 2023-11-29
Payer: COMMERCIAL

## 2023-11-29 NOTE — TELEPHONE ENCOUNTER
[facility] staff attempted to follow warm transfer process and was unsuccessful     Caller: Rebeca Moseley    Relationship to patient: Emergency Contact    Best call back number: [phone]     Patient is needing: RETURNING A MISSED CALL FROM [facility]. PLEASE CALL BACK.

## 2023-11-29 NOTE — ASSESSMENT & PLAN NOTE
Chronic/Stable  Patient is asking for a refill on his Mirapex 0.75 mg's at bedtime.   Will send in at this time.

## 2023-11-29 NOTE — ASSESSMENT & PLAN NOTE
Chronic/Stable  COPD is improving with treatment.  Patient to keep COPD diary.  Discussed monitoring symptoms and use of quick-relief medications and contacting us early in the course of exacerbations.  Continue current medications.  Patient is requesting a nebulizer unit for his nebulizer treatments that was ordered per pulmonology. Will send in at this time and he will continue follow up with the specialist.

## 2023-11-30 NOTE — PROGRESS NOTES
Hemoglobin A1c    Ambulatory Referral to Psychiatry Pending Review    Home Nebulizer Accessories

## 2023-12-01 NOTE — PROGRESS NOTES
I ordered nebulizer machine at [facility].   His pulmonary doctor gave him the medication.

## 2023-12-05 ENCOUNTER — PATIENT MESSAGE (OUTPATIENT)
Dept: INTERNAL MEDICINE | Facility: CLINIC | Age: 63
End: 2023-12-05
Payer: COMMERCIAL

## 2023-12-06 RX ORDER — PRAMIPEXOLE DIHYDROCHLORIDE 0.75 MG/1
0.75 TABLET ORAL NIGHTLY
Qty: 30 TABLET | Refills: 0 | Status: CANCELLED | OUTPATIENT
Start: 2023-12-06

## 2023-12-06 RX ORDER — BUPROPION HYDROCHLORIDE 150 MG/1
150 TABLET ORAL EVERY MORNING
Qty: 90 TABLET | Refills: 0 | Status: CANCELLED | OUTPATIENT
Start: 2023-12-06 | End: 2024-03-05

## 2023-12-06 RX ORDER — BUPROPION HYDROCHLORIDE 150 MG/1
150 TABLET ORAL EVERY MORNING
Qty: 90 TABLET | Refills: 1 | Status: SHIPPED | OUTPATIENT
Start: 2023-12-06

## 2023-12-06 NOTE — TELEPHONE ENCOUNTER
From: Calos Moseley  To: Jailene Estrada  Sent: 12/5/2023 4:18 PM EST  Subject: Prescription Refill        Hello,  Can you renew the following prescription for me until I can get into to see a new doctor? The scheduling group is working on trying to get one for me.    buPROPion  MG 24 hr tablet  Commonly known as: WELLBUTRIN XL    Thank you very much for your help.   Calos Moseley (Jack)

## 2023-12-08 ENCOUNTER — TELEMEDICINE (OUTPATIENT)
Dept: INTERNAL MEDICINE | Facility: CLINIC | Age: 63
End: 2023-12-08
Payer: COMMERCIAL

## 2023-12-08 DIAGNOSIS — Z76.0 MEDICATION REFILL: ICD-10-CM

## 2023-12-08 DIAGNOSIS — R73.03 PREDIABETES: Primary | ICD-10-CM

## 2023-12-08 DIAGNOSIS — Z71.3 NUTRITIONAL COUNSELING: ICD-10-CM

## 2023-12-08 DIAGNOSIS — J41.1 MUCOPURULENT CHRONIC BRONCHITIS: Primary | Chronic | ICD-10-CM

## 2023-12-08 DIAGNOSIS — E66.01 OBESITY, CLASS III, BMI 40-49.9 (MORBID OBESITY): Chronic | ICD-10-CM

## 2023-12-08 RX ORDER — PRAMIPEXOLE DIHYDROCHLORIDE 0.75 MG/1
0.75 TABLET ORAL NIGHTLY
Qty: 30 TABLET | Refills: 2 | Status: SHIPPED | OUTPATIENT
Start: 2023-12-08

## 2023-12-08 NOTE — ASSESSMENT & PLAN NOTE
Chronic/ Unstable   6.6   Patient has been provided nutritional counseling on today's office visit and he knows to decrease sugar and carbohydrate intake, increase exercise, increase water intake, and return to intermittent fasting and eating more protein and less carbohydrates at this time.  We will recheck in 3 months time.  Patient is aware and is in agreements with treatment plan.

## 2023-12-08 NOTE — PROGRESS NOTES
"Chief Complaint  Abnormal labs     Subjective        Calos Moseley presents to [facility]  History of Present Illness    You have chosen to receive care through a telehealth visit.  Do you consent to use a video/audio connection for your medical care today? Yes    Patient is currently at his home in [place] and I am currently on campus in [place] doing a telehealth conference today.    We are doing a telehealth conference today to speak about his chronic health conditions and abnormal laboratory results that we received on 11-22 2023.    Patient is also needing a medication refill of his restless leg medication at this time.    Also requesting a prescription for a nebulizer unit for his albuterol treatments ordered per pulmonary specialist.    Nutritional counseling also provided on today's office visit.    Objective   Vital Signs:  There were no vitals taken for this visit.  Estimated body mass index is 42.77 kg/m² as calculated from the following:    Height as of 12/4/23: 165.1 cm (65\").    Weight as of 11/22/23: 117 kg (257 lb).       Physical Exam  Constitutional:       Appearance: Normal appearance.   Pulmonary:      Comments: Denies shortness of breath  Neurological:      Mental Status: He is alert and oriented to person, place, and time.   Psychiatric:         Behavior: Behavior normal.        Result Review :    Common labs          6/9/2023    20:33 6/10/2023    05:11 11/22/2023    15:18   Common Labs   Glucose 218  130     BUN 33  31     Creatinine 1.84  1.75     Sodium 139  141     Potassium 3.9  3.5     Chloride 98  99     Calcium 8.9  9.2     Albumin 4.1      Total Bilirubin 0.3      Alkaline Phosphatase 99      AST (SGOT) 25      ALT (SGPT) 27      WBC 8.94  7.63     Hemoglobin 10.6  9.9     Hematocrit 33.8  30.5     Platelets 261  231     Hemoglobin A1C   6.6    Uric Acid  9.1                    Assessment and Plan   Diagnoses and all orders for " this visit:    1. Prediabetes (Primary)  Assessment & Plan:  Chronic/ Unstable   6.6   Patient has been provided nutritional counseling on today's office visit and he knows to decrease sugar and carbohydrate intake, increase exercise, increase water intake, and return to intermittent fasting and eating more protein and less carbohydrates at this time.  We will recheck in 3 months time.  Patient is aware and is in agreements with treatment plan.        2. Obesity, Class III, BMI 40-49.9 (morbid obesity)    3. Nutritional counseling    4. Medication refill    Other orders  -     pramipexole (MIRAPEX) 0.75 MG tablet; Take 1 tablet by mouth Every Night.  Dispense: 30 tablet; Refill: 2      We will see patient in 3 months for his annual physical exam.  Patient agrees with treatment plan at this time.     I spent 35 minutes caring for Calos on this date of service. This time includes time spent by me in the following activities:preparing for the visit, reviewing tests, obtaining and/or reviewing a separately obtained history, performing a medically appropriate examination and/or evaluation , counseling and educating the patient/family/caregiver, ordering medications, tests, or procedures, referring and communicating with other health care professionals , documenting information in the medical record, independently interpreting results and communicating that information with the patient/family/caregiver, and care coordination  Follow Up   Return in about 3 months (around 3/8/2024) for Annual physical.  Patient was given instructions and counseling regarding his condition or for health maintenance advice. Please see specific information pulled into the AVS if appropriate.

## 2024-01-02 RX ORDER — METOPROLOL SUCCINATE 100 MG/1
100 TABLET, EXTENDED RELEASE ORAL DAILY
Qty: 90 TABLET | Refills: 0 | Status: SHIPPED | OUTPATIENT
Start: 2024-01-02 | End: 2024-04-02

## 2024-01-11 RX ORDER — METOPROLOL SUCCINATE 100 MG/1
100 TABLET, EXTENDED RELEASE ORAL DAILY
Qty: 90 TABLET | Refills: 0 | OUTPATIENT
Start: 2024-01-11 | End: 2024-04-10

## 2024-01-13 RX ORDER — METOPROLOL SUCCINATE 100 MG/1
100 TABLET, EXTENDED RELEASE ORAL DAILY
Qty: 90 TABLET | Refills: 0 | Status: CANCELLED | OUTPATIENT
Start: 2024-01-13 | End: 2024-04-12

## 2024-01-15 RX ORDER — METOPROLOL SUCCINATE 100 MG/1
100 TABLET, EXTENDED RELEASE ORAL DAILY
Qty: 90 TABLET | Refills: 0 | OUTPATIENT
Start: 2024-01-15 | End: 2024-04-14

## 2024-02-26 RX ORDER — PRAMIPEXOLE DIHYDROCHLORIDE 0.75 MG/1
0.75 TABLET ORAL NIGHTLY
Qty: 30 TABLET | Refills: 2 | OUTPATIENT
Start: 2024-02-26

## 2024-02-27 RX ORDER — PRAMIPEXOLE DIHYDROCHLORIDE 0.75 MG/1
0.75 TABLET ORAL NIGHTLY
Qty: 30 TABLET | Refills: 2 | OUTPATIENT
Start: 2024-02-27

## 2024-03-04 ENCOUNTER — PATIENT MESSAGE (OUTPATIENT)
Dept: INTERNAL MEDICINE | Facility: CLINIC | Age: 64
End: 2024-03-04
Payer: COMMERCIAL

## 2024-03-04 RX ORDER — PRAMIPEXOLE DIHYDROCHLORIDE 0.75 MG/1
0.75 TABLET ORAL NIGHTLY
Qty: 30 TABLET | Refills: 2 | Status: CANCELLED | OUTPATIENT
Start: 2024-03-04

## 2024-03-04 RX ORDER — PRAMIPEXOLE DIHYDROCHLORIDE 0.75 MG/1
0.75 TABLET ORAL NIGHTLY
Qty: 30 TABLET | Refills: 2 | OUTPATIENT
Start: 2024-03-04

## 2024-03-04 NOTE — TELEPHONE ENCOUNTER
From: Calos Moseley  To: Jailene Estrada  Sent: 3/4/2024 8:03 AM EST  Subject: Medication Renewal Request    Good Morning!  Can you please send a renewal prescription request for the following medication? It is out of refills and having a lot of restless leg pain.    pramipexole 0.75 MG tablet  Commonly known as: MIRAPEX    Thank you.  Calos Moseley

## 2024-03-05 RX ORDER — PRAMIPEXOLE DIHYDROCHLORIDE 0.75 MG/1
0.75 TABLET ORAL NIGHTLY
Qty: 30 TABLET | Refills: 0 | Status: SHIPPED | OUTPATIENT
Start: 2024-03-05 | End: 2024-03-05 | Stop reason: SDUPTHER

## 2024-03-05 RX ORDER — PRAMIPEXOLE DIHYDROCHLORIDE 0.75 MG/1
0.75 TABLET ORAL NIGHTLY
Qty: 30 TABLET | Refills: 2 | Status: SHIPPED | OUTPATIENT
Start: 2024-03-05

## 2024-03-05 RX ORDER — PRAMIPEXOLE DIHYDROCHLORIDE 0.75 MG/1
0.75 TABLET ORAL NIGHTLY
Qty: 30 TABLET | Refills: 2 | OUTPATIENT
Start: 2024-03-05

## 2024-03-05 NOTE — TELEPHONE ENCOUNTER
Rx Refill Note  Requested Prescriptions     Pending Prescriptions Disp Refills    pramipexole (MIRAPEX) 0.75 MG tablet 30 tablet 0     Sig: Take 1 tablet by mouth Every Night.      Last office visit with prescribing clinician: 11/22/2023   Last telemedicine visit with prescribing clinician: 12/8/2023   Next office visit with prescribing clinician: Visit date not found                         Would you like a call back once the refill request has been completed: [] Yes [] No    If the office needs to give you a call back, can they leave a voicemail: [] Yes [] No    Jovita Welch MA  03/05/24, 08:19 EST

## 2024-03-10 NOTE — THERAPY EVALUATION
Acute Care - Occupational Therapy Initial Evaluation  [facility]     Patient Name: Calos Moseley  : 1960  MRN: [id number]  Today's Date: 2023             Admit Date: 2023       ICD-10-CM ICD-9-CM   1. Acute dyspnea  R06.00 786.09   2. Hypoxia  R09.02 799.02   3. Pleural effusion, right  J90 511.9   4. Acute respiratory failure with hypoxia  J96.01 518.81   5. Lymphadenopathy, mediastinal  R59.0 785.6     Patient Active Problem List   Diagnosis   • HLD (hyperlipidemia)   • Obesity, Class III, BMI 40-49.9 (morbid obesity)   • Bilateral kidney stones   • COPD (chronic obstructive pulmonary disease)   • Left flank pain   • Anxiety and depression   • Hypothyroidism   • ISIAH (obstructive sleep apnea)   • Essential hypertension   • Calculus, renal   • Restless leg syndrome   • Bipolar affective   • Calculus of ureter   • Gross hematuria   • Encounter for screening for malignant neoplasm of colon   • Acute dyspnea   • Pleural effusion on right   • CKD (chronic kidney disease)   • Acute respiratory failure with hypoxia   • Lymphadenopathy, mediastinal     Past Medical History:   Diagnosis Date   • Anesthesia complication     STATES HARD TO WAKE UP AFTER PREVIOUS KIDNEY STONE SURGERY AND STATES HE CODED   • Bilateral kidney stones    • Bipolar affective    • COVID    • Disease of thyroid gland    • Elevated cholesterol    • Hypertension    • Kidney stone on left side    • Left flank pain    • Mood disorder    • Restless leg syndrome    • Sleep apnea     cpap  bring dos   • UTI (urinary tract infection) 2021    PREV FINISHED ANTIBIOTIC     Past Surgical History:   Procedure Laterality Date   • ADENOIDECTOMY     • APPENDECTOMY     • COLONOSCOPY     • COLONOSCOPY N/A 3/23/2023    Procedure: COLONOSCOPY into cecum with cold snare polypectomies;  Surgeon: Yoan Leonard MD;  Location: [facility] ENDOSCOPY;  Service: Gastroenterology;  Laterality: N/A;  polyps   • CYSTOSCOPY WITH CLOT EVACUATION N/A  3/8/2022    Procedure: CYSTOSCOPY WITH CLOT EVACUATION;  Surgeon: Ihsan Lopez MD;  Location: [facility] MAIN OR;  Service: Urology;  Laterality: N/A;   • CYSTOSCOPY, URETEROSCOPY, RETROGRADE PYELOGRAM, STENT INSERTION Left 3/7/2022    Procedure: CYSTOSCOPY URETEROSCOPY LASER LITHOTRIPSY and stent exchange;  Surgeon: Deuce Leija MD;  Location: [facility] MAIN OR;  Service: Urology;  Laterality: Left;   • KIDNEY STONE SURGERY     • NEPHROLITHOTRIPSY PERCUTANEOUS Left 2/21/2022    Procedure: NEPHROSTLITHOTOMY PERCUTANEOUS;  Surgeon: Deuce Leija MD;  Location: [facility] MAIN OR;  Service: Urology;  Laterality: Left;   • NEPHROLITHOTRIPSY PERCUTANEOUS Left 2/23/2022    Procedure: LEFT FLEXIBLE NEPHROSTOMY WITH STONE EXTRACTION, LEFT NEPHROSTOMY, TUBE REMOVAL, BILATERAL URETERAL STENT PLACEMENT;  Surgeon: Deuce Leija MD;  Location: [facility] MAIN OR;  Service: Urology;  Laterality: Left;   • NEPHROLITHOTRIPSY PERCUTANEOUS Right 3/7/2022    Procedure: RIGHT PERCUTANEOUS NEPHROLITHOTOMY W ACCESS;  Surgeon: Deuce Leija MD;  Location: [facility] MAIN OR;  Service: Urology;  Laterality: Right;   • TONSILLECTOMY           OT ASSESSMENT FLOWSHEET (last 12 hours)     OT Evaluation and Treatment     Row Name 05/21/23 1224                   OT Time and Intention    Subjective Information complains of;dyspnea  -SG        Document Type evaluation  -SG        Mode of Treatment individual therapy;occupational therapy  -SG        Patient Effort good  -SG        Symptoms Noted During/After Treatment none  -SG           General Information    Patient Profile Reviewed yes  -SG        Patient/Family/Caregiver Comments/Observations Pt supine in bed  -SG        Prior Level of Function independent:;ADL's  -SG        Existing Precautions/Restrictions fall;oxygen therapy device and L/min  A line, 2 chest tubes  -SG        Barriers to Rehab medically complex  -SG           Living Environment    Current Living Arrangements  home  -SG        People in Home spouse  -SG           Pain Assessment    Pretreatment Pain Rating 0/10 - no pain  -SG           Cognition    Affect/Mental Status (Cognition) WNL  -SG        Orientation Status (Cognition) oriented x 3  -SG        Follows Commands (Cognition) WNL  -SG           Range of Motion Comprehensive    Comment, General Range of Motion BUE WFL  -SG           Strength Comprehensive (MMT)    Comment, General Manual Muscle Testing (MMT) Assessment UE 3+/5  -SG           Bed Mobility    Bed Mobility supine-sit  -SG        Supine-Sit [place] (Bed Mobility) minimum assist (75% patient effort);verbal cues  -SG           Functional Mobility    Functional Mobility- Ind. Level minimum assist (75% patient effort);verbal cues required  -SG        Functional Mobility- Comment Takes steps from bed to chair HHA  -SG           Transfer Assessment/Treatment    Transfers sit-stand transfer;stand-sit transfer  -SG           Sit-Stand Transfer    Sit-Stand [place] (Transfers) minimum assist (75% patient effort);verbal cues  -SG           Stand-Sit Transfer    Stand-Sit [place] (Transfers) minimum assist (75% patient effort);verbal cues  -SG           Safety Issues, Functional Mobility    Safety Issues Affecting Function (Mobility) impulsivity  -SG        Comment, Safety Issues/Impairments (Mobility) Multiple lines and tubes  -SG           Balance    Balance Assessment sitting static balance  -SG        Static Sitting Balance contact guard  -SG           Wound 05/20/23 1624 Right flank Incision    Wound - Properties Group Placement Date: 05/20/23  -AR Placement Time: 1624  -AR Present on Hospital Admission: N  -AR Side: Right  -AR Location: flank  -AR Primary Wound Type: Incision  -AR    Retired Wound - Properties Group Placement Date: 05/20/23  -AR Placement Time: 1624 -AR Present on Hospital Admission: N  -AR Side: Right  -AR Location: flank  -AR Primary Wound Type: Incision  -AR    Retired Wound  - Properties Group Date first assessed: 05/20/23  -AR Time first assessed: 1624  -AR Present on Hospital Admission: N  -AR Side: Right  -AR Location: flank  -AR Primary Wound Type: Incision  -AR       Plan of Care Review    Plan of Care Reviewed With patient  -SG        Outcome Evaluation Pt seen for OT re-eval this date s/p VAT on 5/20/23. Normally indep at baseline. This date pt requires min A for bed mobility and transfers to chair min A. Pt with overall decreased endurance and strength, normally independent at baseline. Pt will benefit from OT while inpt, will follow for best d/c plans pending progress.  -SG           Vital Signs    Intra SpO2 (%) 86  -SG        O2 Delivery Intra Treatment hi-flow  -SG        Post SpO2 (%) 97  -SG        O2 Delivery Post Treatment hi-flow  -SG           Positioning and Restraints    Pre-Treatment Position in bed  -SG        Post Treatment Position chair  -SG        In Chair sitting;call light within reach;encouraged to call for assist;notified nsg  -SG           Therapy Assessment/Plan (OT)    Rehab Potential (OT) good, to achieve stated therapy goals  -SG        Criteria for Skilled Therapeutic Interventions Met (OT) yes;skilled treatment is necessary  -SG        Therapy Frequency (OT) 5 times/wk  -SG        Problem List (OT) problems related to;mobility;strength;postural control  -SG        Planned Therapy Interventions (OT) activity tolerance training;adaptive equipment training;transfer/mobility retraining;strengthening exercise;ROM/therapeutic exercise;patient/caregiver education/training;functional balance retraining  -SG           Therapy Plan Review/Discharge Plan (OT)    Therapy Plan Review (OT) evaluation/treatment results reviewed;care plan/treatment goals reviewed;patient  -SG        Anticipated Discharge Disposition (OT) inpatient rehabilitation facility  -SG           OT Goals    Transfer Goal Selection (OT) transfer, OT goal 1  -SG        Toileting Goal Selection  (OT) toileting, OT goal 1  -SG        Grooming Goal Selection (OT) grooming, OT goal 1  -SG        Endurance Goal Selection (OT) endurance, OT goal 1  -SG           Transfer Goal 1 (OT)    Activity/Assistive Device (Transfer Goal 1, OT) sit-to-stand/stand-to-sit;toilet  -SG        [place] Level/Cues Needed (Transfer Goal 1, OT) standby assist  -SG        Time Frame (Transfer Goal 1, OT) short term goal (STG);2 weeks  -SG        Progress/Outcome (Transfer Goal 1, OT) goal ongoing  -SG           Toileting Goal 1 (OT)    Activity/Device (Toileting Goal 1, OT) toileting skills, all  -SG        [place] Level/Cues Needed (Toileting Goal 1, OT) standby assist  -SG        Time Frame (Toileting Goal 1, OT) short term goal (STG);2 weeks  -SG        Progress/Outcome (Toileting Goal 1, OT) goal ongoing  -SG           Grooming Goal 1 (OT)    Activity/Device (Grooming Goal 1, OT) grooming skills, all  -SG        [place] (Grooming Goal 1, OT) standby assist  Standing sinkside  -SG        Time Frame (Grooming Goal 1, OT) short term goal (STG);2 weeks  -SG        Progress/Outcome (Grooming Goal 1, OT) goal ongoing  -SG            Endurance Goal 1 (OT)    Activity Level (Endurance Goal 1, OT) to increase;endurance 2 fair +  -SG        Time Frame (Endurance Goal 1, OT) short term goal (STG);2 weeks  -SG        Progress/Outcome (Endurance Goal 1, OT) goal ongoing  -SG              User Key  (r) = Recorded By, (t) = Taken By, (c) = Cosigned By    Initials Name Effective Dates     Ginger Mathur OTR 06/16/21 -     Gosia Kelly RN 08/16/21 -                        OT Recommendation and Plan  Planned Therapy Interventions (OT): activity tolerance training, adaptive equipment training, transfer/mobility retraining, strengthening exercise, ROM/therapeutic exercise, patient/caregiver education/training, functional balance retraining  Therapy Frequency (OT): 5 times/wk  Plan of Care Review  Plan of Care Reviewed With:  patient  Outcome Evaluation: Pt seen for OT re-eval this date s/p VAT on 5/20/23. Normally indep at baseline. This date pt requires min A for bed mobility and transfers to chair min A. Pt with overall decreased endurance and strength, normally independent at baseline. Pt will benefit from OT while inpt, will follow for best d/c plans pending progress.  Plan of Care Reviewed With: patient  Outcome Evaluation: Pt seen for OT re-eval this date s/p VAT on 5/20/23. Normally indep at baseline. This date pt requires min A for bed mobility and transfers to chair min A. Pt with overall decreased endurance and strength, normally independent at baseline. Pt will benefit from OT while inpt, will follow for best d/c plans pending progress.     Outcome Measures     Row Name 05/21/23 1200             How much help from another is currently needed...    Putting on and taking off regular lower body clothing? 2  -SG      Bathing (including washing, rinsing, and drying) 2  -SG      Toileting (which includes using toilet bed pan or urinal) 2  -SG      Putting on and taking off regular upper body clothing 2  -SG      Taking care of personal grooming (such as brushing teeth) 2  -SG      Eating meals 3  -SG      AM-PAC 6 Clicks Score (OT) 13  -SG         Functional Assessment    Outcome Measure Options AM-PAC 6 Clicks Daily Activity (OT)  -            User Key  (r) = Recorded By, (t) = Taken By, (c) = Cosigned By    Initials Name Provider Type    Ginger Merida OTR Occupational Therapist                Time Calculation:    Time Calculation- OT     Row Name 05/21/23 1228             Time Calculation- OT    OT Start Time 0930  -      OT Stop Time 0950  -      OT Time Calculation (min) 20 min  -      Total Timed Code Minutes- OT 15 minute(s)  -SG      OT Received On 05/21/23  -      OT - Next Appointment 05/22/23  -      OT Goal Re-Cert Due Date 06/04/23  -         Timed Charges    99756 - OT Therapeutic Activity Minutes  15  -SG         Untimed Charges    OT Eval/Re-eval Minutes 5  -SG         Total Minutes    Timed Charges Total Minutes 15  -SG      Untimed Charges Total Minutes 5  -SG       Total Minutes 20  -SG            User Key  (r) = Recorded By, (t) = Taken By, (c) = Cosigned By    Initials Name Provider Type    Ginger Merida OTR Occupational Therapist              Therapy Charges for Today     Code Description Service Date Service Provider Modifiers Qty    46757883827  OT THERAPEUTIC ACT EA 15 MIN 5/21/2023 Ginger Mathur OTR GO 1    49185026270 HC OT EVAL MOD COMPLEXITY 2 5/21/2023 Ginger Mathur OTR GO 1               SHELLIE Singh  5/21/2023   MD

## 2024-03-15 ENCOUNTER — OFFICE VISIT (OUTPATIENT)
Dept: INTERNAL MEDICINE | Facility: CLINIC | Age: 64
End: 2024-03-15
Payer: COMMERCIAL

## 2024-03-15 VITALS
SYSTOLIC BLOOD PRESSURE: 150 MMHG | BODY MASS INDEX: 43.99 KG/M2 | WEIGHT: 264 LBS | OXYGEN SATURATION: 95 % | DIASTOLIC BLOOD PRESSURE: 80 MMHG | HEIGHT: 65 IN | RESPIRATION RATE: 18 BRPM | TEMPERATURE: 96.4 F | HEART RATE: 61 BPM

## 2024-03-15 DIAGNOSIS — Z00.00 HEALTHCARE MAINTENANCE: ICD-10-CM

## 2024-03-15 DIAGNOSIS — G25.81 RESTLESS LEG SYNDROME: ICD-10-CM

## 2024-03-15 DIAGNOSIS — R73.09 ELEVATED GLUCOSE: ICD-10-CM

## 2024-03-15 DIAGNOSIS — M54.32 SCIATICA OF LEFT SIDE: Primary | ICD-10-CM

## 2024-03-15 DIAGNOSIS — R73.03 PREDIABETES: ICD-10-CM

## 2024-03-15 RX ORDER — CYCLOBENZAPRINE HCL 10 MG
10 TABLET ORAL 2 TIMES DAILY PRN
Qty: 28 TABLET | Refills: 0 | Status: SHIPPED | OUTPATIENT
Start: 2024-03-15 | End: 2024-03-29

## 2024-03-15 RX ORDER — PREDNISONE 20 MG/1
40 TABLET ORAL DAILY
Qty: 10 TABLET | Refills: 0 | Status: SHIPPED | OUTPATIENT
Start: 2024-03-15 | End: 2024-03-20

## 2024-03-15 RX ORDER — PRAMIPEXOLE DIHYDROCHLORIDE 0.75 MG/1
0.75 TABLET ORAL NIGHTLY
Qty: 30 TABLET | Refills: 2 | Status: SHIPPED | OUTPATIENT
Start: 2024-03-15

## 2024-03-15 RX ORDER — ATORVASTATIN CALCIUM 20 MG/1
20 TABLET, FILM COATED ORAL
Qty: 90 TABLET | Refills: 3 | Status: SHIPPED | OUTPATIENT
Start: 2024-03-15

## 2024-03-15 NOTE — PROGRESS NOTES
"Chief Complaint  left leg pain (Sciatic pain 1 week)    Subjective        Calos Moseley presents to [facility]  History of Present Illness    Patient is a pleasant 63-year-old male who have seen in the office previously.  We are doing a healthcare maintenance visit today an acute visit regarding his history of sciatic pain that goes down left leg.  This has been ongoing x 1 week due to overworking.  He has been taking over-the-counter Tylenol and/or ibuprofen as needed.  Without any relief.  Denies any saddle anesthesia or loss of bowel or bladder at this time.        Objective   Vital Signs:  /80   Pulse 61   Temp 96.4 °F (35.8 °C)   Resp 18   Ht 165.1 cm (65\")   Wt 120 kg (264 lb)   SpO2 95%   BMI 43.93 kg/m²   Estimated body mass index is 43.93 kg/m² as calculated from the following:    Height as of this encounter: 165.1 cm (65\").    Weight as of this encounter: 120 kg (264 lb).              Physical Exam  Vitals and nursing note reviewed.   Constitutional:       Appearance: Normal appearance. He is obese.   HENT:      Head: Normocephalic.      Nose: Nose normal.      Mouth/Throat:      Mouth: Mucous membranes are moist.   Eyes:      Pupils: Pupils are equal, round, and reactive to light.   Cardiovascular:      Rate and Rhythm: Normal rate and regular rhythm.      Pulses: Normal pulses.      Heart sounds: Normal heart sounds.      Comments: No peripheral edema  Pulmonary:      Effort: Pulmonary effort is normal. No respiratory distress.      Breath sounds: Normal breath sounds. No stridor. No wheezing, rhonchi or rales.      Comments: Denies shortness of breath  Chest:      Chest wall: No tenderness.   Musculoskeletal:         General: Tenderness present.      Comments: Low back pain and SI area down left leg was 1 week   Skin:     General: Skin is warm.      Capillary Refill: Capillary refill takes less than 2 seconds.   Neurological:      Mental Status: He is alert and " oriented to person, place, and time.   Psychiatric:         Behavior: Behavior normal.        Result Review :      Common labs          6/10/2023    05:11 11/22/2023    15:18 3/15/2024    14:55   Common Labs   Glucose 130   190    BUN 31   36    Creatinine 1.75   1.87    Sodium 141   140    Potassium 3.5   3.5    Chloride 99   98    Calcium 9.2   9.5    Total Protein   7.0    Albumin   4.6    Total Bilirubin   0.9    Alkaline Phosphatase   110    AST (SGOT)   34    ALT (SGPT)   46    WBC 7.63   7.4    Hemoglobin 9.9   15.2    Hematocrit 30.5   46.4    Platelets 231   157    Hemoglobin A1C  6.6  6.4    Uric Acid 9.1                     Assessment and Plan     Diagnoses and all orders for this visit:    1. Sciatica of left side (Primary)  -     CBC & Differential  -     Comprehensive Metabolic Panel  -     Hemoglobin A1c    2. Prediabetes  -     CBC & Differential  -     Comprehensive Metabolic Panel  -     Hemoglobin A1c    3. Healthcare maintenance  -     CBC & Differential  -     Comprehensive Metabolic Panel  -     Hemoglobin A1c    4. Elevated glucose  -     CBC & Differential  -     Comprehensive Metabolic Panel  -     Hemoglobin A1c    5. Restless leg syndrome  -     CBC & Differential  -     Comprehensive Metabolic Panel  -     Hemoglobin A1c    Other orders  -     predniSONE (DELTASONE) 20 MG tablet; Take 2 tablets by mouth Daily for 5 days.  Dispense: 10 tablet; Refill: 0  -     cyclobenzaprine (FLEXERIL) 10 MG tablet; Take 1 tablet by mouth 2 (Two) Times a Day As Needed for Muscle Spasms for up to 14 days.  Dispense: 28 tablet; Refill: 0  -     atorvastatin (LIPITOR) 20 MG tablet; Take 1 tablet by mouth every night at bedtime.  Dispense: 90 tablet; Refill: 3  -     pramipexole (MIRAPEX) 0.75 MG tablet; Take 1 tablet by mouth Every Night.  Dispense: 30 tablet; Refill: 2      Patient will stop at the pharmacy and  his prescriptions and take as directed.  We will contact patient with his laboratory  results when they return.  Patient knows he needs more of a ketogenic diet at this time related to his elevated glucose readings.  If he develops any worsening symptoms of low back pain he will contact the office immediately.  Patient agrees with treatment plan at this time     I spent 30 minutes caring for Calos on this date of service. This time includes time spent by me in the following activities:preparing for the visit, reviewing tests, obtaining and/or reviewing a separately obtained history, performing a medically appropriate examination and/or evaluation , counseling and educating the patient/family/caregiver, ordering medications, tests, or procedures, referring and communicating with other health care professionals , documenting information in the medical record, independently interpreting results and communicating that information with the patient/family/caregiver, and care coordination  Follow Up     Return in about 4 weeks (around 4/12/2024) for Recheck.  Patient was given instructions and counseling regarding his condition or for health maintenance advice. Please see specific information pulled into the AVS if appropriate.

## 2024-03-16 LAB
ALBUMIN SERPL-MCNC: 4.6 G/DL (ref 3.9–4.9)
ALBUMIN/GLOB SERPL: 1.9 {RATIO} (ref 1.2–2.2)
ALP SERPL-CCNC: 110 IU/L (ref 44–121)
ALT SERPL-CCNC: 46 IU/L (ref 0–44)
AST SERPL-CCNC: 34 IU/L (ref 0–40)
BASOPHILS # BLD AUTO: 0 X10E3/UL (ref 0–0.2)
BASOPHILS NFR BLD AUTO: 1 %
BILIRUB SERPL-MCNC: 0.9 MG/DL (ref 0–1.2)
BUN SERPL-MCNC: 36 MG/DL (ref 8–27)
BUN/CREAT SERPL: 19 (ref 10–24)
CALCIUM SERPL-MCNC: 9.5 MG/DL (ref 8.6–10.2)
CHLORIDE SERPL-SCNC: 98 MMOL/L (ref 96–106)
CO2 SERPL-SCNC: 29 MMOL/L (ref 20–29)
CREAT SERPL-MCNC: 1.87 MG/DL (ref 0.76–1.27)
EGFRCR SERPLBLD CKD-EPI 2021: 40 ML/MIN/1.73
EOSINOPHIL # BLD AUTO: 0.2 X10E3/UL (ref 0–0.4)
EOSINOPHIL NFR BLD AUTO: 2 %
ERYTHROCYTE [DISTWIDTH] IN BLOOD BY AUTOMATED COUNT: 13.8 % (ref 11.6–15.4)
GLOBULIN SER CALC-MCNC: 2.4 G/DL (ref 1.5–4.5)
GLUCOSE SERPL-MCNC: 190 MG/DL (ref 70–99)
HBA1C MFR BLD: 6.4 % (ref 4.8–5.6)
HCT VFR BLD AUTO: 46.4 % (ref 37.5–51)
HGB BLD-MCNC: 15.2 G/DL (ref 13–17.7)
IMM GRANULOCYTES # BLD AUTO: 0.1 X10E3/UL (ref 0–0.1)
IMM GRANULOCYTES NFR BLD AUTO: 1 %
LYMPHOCYTES # BLD AUTO: 1.5 X10E3/UL (ref 0.7–3.1)
LYMPHOCYTES NFR BLD AUTO: 21 %
MCH RBC QN AUTO: 28.6 PG (ref 26.6–33)
MCHC RBC AUTO-ENTMCNC: 32.8 G/DL (ref 31.5–35.7)
MCV RBC AUTO: 87 FL (ref 79–97)
MONOCYTES # BLD AUTO: 0.7 X10E3/UL (ref 0.1–0.9)
MONOCYTES NFR BLD AUTO: 10 %
NEUTROPHILS # BLD AUTO: 4.9 X10E3/UL (ref 1.4–7)
NEUTROPHILS NFR BLD AUTO: 65 %
PLATELET # BLD AUTO: 157 X10E3/UL (ref 150–450)
POTASSIUM SERPL-SCNC: 3.5 MMOL/L (ref 3.5–5.2)
PROT SERPL-MCNC: 7 G/DL (ref 6–8.5)
RBC # BLD AUTO: 5.31 X10E6/UL (ref 4.14–5.8)
SODIUM SERPL-SCNC: 140 MMOL/L (ref 134–144)
WBC # BLD AUTO: 7.4 X10E3/UL (ref 3.4–10.8)

## 2024-03-19 RX ORDER — METOPROLOL SUCCINATE 100 MG/1
100 TABLET, EXTENDED RELEASE ORAL DAILY
Qty: 90 TABLET | Refills: 1 | Status: SHIPPED | OUTPATIENT
Start: 2024-03-19

## 2024-03-20 ENCOUNTER — TELEMEDICINE (OUTPATIENT)
Dept: INTERNAL MEDICINE | Facility: CLINIC | Age: 64
End: 2024-03-20
Payer: COMMERCIAL

## 2024-03-20 DIAGNOSIS — E66.01 OBESITY, CLASS III, BMI 40-49.9 (MORBID OBESITY): Chronic | ICD-10-CM

## 2024-03-20 DIAGNOSIS — R73.09 ELEVATED GLUCOSE: ICD-10-CM

## 2024-03-20 DIAGNOSIS — Z71.3 NUTRITIONAL COUNSELING: Primary | ICD-10-CM

## 2024-03-20 DIAGNOSIS — R73.03 PREDIABETES: ICD-10-CM

## 2024-03-20 NOTE — ASSESSMENT & PLAN NOTE
Chronic/ Unstable   6.4   Patient has been provided nutritional counseling on today's office visit and he knows to decrease sugar and carbohydrate intake, increase exercise, increase water intake, and return to intermittent fasting and eating more protein and less carbohydrates at this time.  We will recheck in 3 months time.  Patient is aware and is in agreements with treatment plan.

## 2024-03-20 NOTE — PROGRESS NOTES
"Chief Complaint  Follow-up (lab)  DM    Subjective        Calos Moseley presents to [facility]  History of Present Illness    You have chosen to receive care through a telehealth visit.  Do you consent to use a video/audio connection for your medical care today? Yes    Patient is currently at his home in [place], [place] and I am on campus in [place], [place] and we are doing a telehealth conference today.     Follow up on abnormal lab results/chronic health conditions.     Sciatic Back pain (Chronic) (prednisone). Needs referral to Orthopedic surgery at this time.      Objective   Vital Signs:  There were no vitals taken for this visit.  Estimated body mass index is 43.93 kg/m² as calculated from the following:    Height as of 3/15/24: 165.1 cm (65\").    Weight as of 3/15/24: 120 kg (264 lb).       Class 3 Severe Obesity (BMI >=40). Obesity-related health conditions include the following: hypertension, diabetes mellitus, and osteoarthritis. Obesity is worsening. BMI is is above average; BMI management plan is completed. We discussed portion control, increasing exercise, joining a fitness center or start home based exercise program, Weight Watchers or other Commercial based weight reduction program, and an jennifer-based approach such as Tap[employer] Pal or Lose It.       Physical Exam  Constitutional:       Appearance: Normal appearance. He is obese.   Pulmonary:      Comments: Denies SOB  Musculoskeletal:      Comments: Chronic low back pain with sciatica   Neurological:      Mental Status: He is alert and oriented to person, place, and time.   Psychiatric:         Behavior: Behavior normal.        Result Review :      Common labs          6/10/2023    05:11 11/22/2023    15:18 3/15/2024    14:55   Common Labs   Glucose 130   190    BUN 31   36    Creatinine 1.75   1.87    Sodium 141   140    Potassium 3.5   3.5    Chloride 99   98    Calcium 9.2   9.5    Total Protein   7.0    Albumin   4.6  "   Total Bilirubin   0.9    Alkaline Phosphatase   110    AST (SGOT)   34    ALT (SGPT)   46    WBC 7.63   7.4    Hemoglobin 9.9   15.2    Hematocrit 30.5   46.4    Platelets 231   157    Hemoglobin A1C  6.6  6.4    Uric Acid 9.1                     Assessment and Plan     Diagnoses and all orders for this visit:    1. Nutritional counseling (Primary)    2. Elevated glucose    3. Prediabetes  Assessment & Plan:  Chronic/ Unstable   6.4   Patient has been provided nutritional counseling on today's office visit and he knows to decrease sugar and carbohydrate intake, increase exercise, increase water intake, and return to intermittent fasting and eating more protein and less carbohydrates at this time.  We will recheck in 3 months time.  Patient is aware and is in agreements with treatment plan.      4. Obesity, Class III, BMI 40-49.9 (morbid obesity)      Patient denies diabetes medication at this time.  We will do a recheck in 1 month.  He knows to do more of a ketogenic diet at this time, increasing exercise, and water intake.         I spent 25 minutes caring for Calos on this date of service. This time includes time spent by me in the following activities:preparing for the visit, reviewing tests, obtaining and/or reviewing a separately obtained history, performing a medically appropriate examination and/or evaluation , counseling and educating the patient/family/caregiver, ordering medications, tests, or procedures, referring and communicating with other health care professionals , documenting information in the medical record, independently interpreting results and communicating that information with the patient/family/caregiver, and care coordination  Follow Up     Return in about 4 weeks (around 4/17/2024) for Annual physical.  Patient was given instructions and counseling regarding his condition or for health maintenance advice. Please see specific information pulled into the AVS if appropriate.

## 2024-03-28 ENCOUNTER — TRANSCRIBE ORDERS (OUTPATIENT)
Dept: ADMINISTRATIVE | Facility: HOSPITAL | Age: 64
End: 2024-03-28
Payer: COMMERCIAL

## 2024-03-28 DIAGNOSIS — M54.50 LOW BACK PAIN, UNSPECIFIED BACK PAIN LATERALITY, UNSPECIFIED CHRONICITY, UNSPECIFIED WHETHER SCIATICA PRESENT: Primary | ICD-10-CM

## 2024-03-31 ENCOUNTER — APPOINTMENT (OUTPATIENT)
Dept: GENERAL RADIOLOGY | Facility: HOSPITAL | Age: 64
End: 2024-03-31
Payer: COMMERCIAL

## 2024-03-31 ENCOUNTER — HOSPITAL ENCOUNTER (OUTPATIENT)
Facility: HOSPITAL | Age: 64
Setting detail: OBSERVATION
Discharge: HOME | End: 2024-04-01
Attending: EMERGENCY MEDICINE | Admitting: EMERGENCY MEDICINE
Payer: COMMERCIAL

## 2024-03-31 DIAGNOSIS — R73.9 HYPERGLYCEMIA: ICD-10-CM

## 2024-03-31 DIAGNOSIS — I10 HYPERTENSION NOT AT GOAL: ICD-10-CM

## 2024-03-31 DIAGNOSIS — M54.16 LUMBAR BACK PAIN WITH RADICULOPATHY AFFECTING LEFT LOWER EXTREMITY: ICD-10-CM

## 2024-03-31 DIAGNOSIS — R52 PAIN: ICD-10-CM

## 2024-03-31 DIAGNOSIS — R60.0 BILATERAL LOWER EXTREMITY EDEMA: ICD-10-CM

## 2024-03-31 DIAGNOSIS — M54.16 LUMBAR RADICULOPATHY, ACUTE: Primary | ICD-10-CM

## 2024-03-31 DIAGNOSIS — N18.9 CHRONIC KIDNEY DISEASE, UNSPECIFIED CKD STAGE: ICD-10-CM

## 2024-03-31 LAB
BASOPHILS # BLD AUTO: 0.05 10*3/MM3 (ref 0–0.2)
BASOPHILS NFR BLD AUTO: 0.8 % (ref 0–1.5)
DEPRECATED RDW RBC AUTO: 44.4 FL (ref 37–54)
EOSINOPHIL # BLD AUTO: 0.16 10*3/MM3 (ref 0–0.4)
EOSINOPHIL NFR BLD AUTO: 2.5 % (ref 0.3–6.2)
ERYTHROCYTE [DISTWIDTH] IN BLOOD BY AUTOMATED COUNT: 13.8 % (ref 12.3–15.4)
HCT VFR BLD AUTO: 43.2 % (ref 37.5–51)
HGB BLD-MCNC: 13.9 G/DL (ref 13–17.7)
IMM GRANULOCYTES # BLD AUTO: 0.08 10*3/MM3 (ref 0–0.05)
IMM GRANULOCYTES NFR BLD AUTO: 1.3 % (ref 0–0.5)
LYMPHOCYTES # BLD AUTO: 1.59 10*3/MM3 (ref 0.7–3.1)
LYMPHOCYTES NFR BLD AUTO: 25 % (ref 19.6–45.3)
MCH RBC QN AUTO: 28.6 PG (ref 26.6–33)
MCHC RBC AUTO-ENTMCNC: 32.2 G/DL (ref 31.5–35.7)
MCV RBC AUTO: 88.9 FL (ref 79–97)
MONOCYTES # BLD AUTO: 0.58 10*3/MM3 (ref 0.1–0.9)
MONOCYTES NFR BLD AUTO: 9.1 % (ref 5–12)
NEUTROPHILS NFR BLD AUTO: 3.9 10*3/MM3 (ref 1.7–7)
NEUTROPHILS NFR BLD AUTO: 61.3 % (ref 42.7–76)
NRBC BLD AUTO-RTO: 0 /100 WBC (ref 0–0.2)
PLATELET # BLD AUTO: 151 10*3/MM3 (ref 140–450)
PMV BLD AUTO: 11.4 FL (ref 6–12)
RBC # BLD AUTO: 4.86 10*6/MM3 (ref 4.14–5.8)
WBC NRBC COR # BLD AUTO: 6.36 10*3/MM3 (ref 3.4–10.8)

## 2024-03-31 PROCEDURE — 99285 EMERGENCY DEPT VISIT HI MDM: CPT

## 2024-03-31 PROCEDURE — 73590 X-RAY EXAM OF LOWER LEG: CPT

## 2024-03-31 PROCEDURE — 36415 COLL VENOUS BLD VENIPUNCTURE: CPT

## 2024-03-31 PROCEDURE — 25010000002 MORPHINE PER 10 MG: Performed by: EMERGENCY MEDICINE

## 2024-03-31 PROCEDURE — 80053 COMPREHEN METABOLIC PANEL: CPT | Performed by: EMERGENCY MEDICINE

## 2024-03-31 PROCEDURE — 85025 COMPLETE CBC W/AUTO DIFF WBC: CPT | Performed by: EMERGENCY MEDICINE

## 2024-03-31 PROCEDURE — 96374 THER/PROPH/DIAG INJ IV PUSH: CPT

## 2024-03-31 PROCEDURE — 25010000002 METHYLPREDNISOLONE PER 125 MG: Performed by: EMERGENCY MEDICINE

## 2024-03-31 PROCEDURE — 83036 HEMOGLOBIN GLYCOSYLATED A1C: CPT | Performed by: EMERGENCY MEDICINE

## 2024-03-31 PROCEDURE — 85730 THROMBOPLASTIN TIME PARTIAL: CPT | Performed by: EMERGENCY MEDICINE

## 2024-03-31 PROCEDURE — 85610 PROTHROMBIN TIME: CPT | Performed by: EMERGENCY MEDICINE

## 2024-03-31 PROCEDURE — 96375 TX/PRO/DX INJ NEW DRUG ADDON: CPT

## 2024-03-31 RX ORDER — METHYLPREDNISOLONE SODIUM SUCCINATE 125 MG/2ML
80 INJECTION, POWDER, LYOPHILIZED, FOR SOLUTION INTRAMUSCULAR; INTRAVENOUS ONCE
Status: COMPLETED | OUTPATIENT
Start: 2024-03-31 | End: 2024-03-31

## 2024-03-31 RX ORDER — SODIUM CHLORIDE 0.9 % (FLUSH) 0.9 %
10 SYRINGE (ML) INJECTION AS NEEDED
Status: DISCONTINUED | OUTPATIENT
Start: 2024-03-31 | End: 2024-04-01 | Stop reason: HOSPADM

## 2024-03-31 RX ORDER — MORPHINE SULFATE 2 MG/ML
4 INJECTION, SOLUTION INTRAMUSCULAR; INTRAVENOUS ONCE
Status: COMPLETED | OUTPATIENT
Start: 2024-03-31 | End: 2024-03-31

## 2024-03-31 RX ADMIN — MORPHINE SULFATE 4 MG: 2 INJECTION, SOLUTION INTRAMUSCULAR; INTRAVENOUS at 23:26

## 2024-03-31 RX ADMIN — METHYLPREDNISOLONE SODIUM SUCCINATE 80 MG: 125 INJECTION, POWDER, FOR SOLUTION INTRAMUSCULAR; INTRAVENOUS at 23:25

## 2024-04-01 ENCOUNTER — APPOINTMENT (OUTPATIENT)
Dept: PAIN MEDICINE | Facility: HOSPITAL | Age: 64
End: 2024-04-01
Payer: COMMERCIAL

## 2024-04-01 ENCOUNTER — APPOINTMENT (OUTPATIENT)
Dept: GENERAL RADIOLOGY | Facility: HOSPITAL | Age: 64
End: 2024-04-01
Payer: COMMERCIAL

## 2024-04-01 ENCOUNTER — APPOINTMENT (OUTPATIENT)
Dept: MRI IMAGING | Facility: HOSPITAL | Age: 64
End: 2024-04-01
Payer: COMMERCIAL

## 2024-04-01 ENCOUNTER — ANESTHESIA EVENT (OUTPATIENT)
Dept: PAIN MEDICINE | Facility: HOSPITAL | Age: 64
End: 2024-04-01
Payer: COMMERCIAL

## 2024-04-01 ENCOUNTER — READMISSION MANAGEMENT (OUTPATIENT)
Dept: CALL CENTER | Facility: HOSPITAL | Age: 64
End: 2024-04-01
Payer: COMMERCIAL

## 2024-04-01 ENCOUNTER — HOSPITAL ENCOUNTER (OUTPATIENT)
Dept: CT IMAGING | Facility: HOSPITAL | Age: 64
Discharge: HOME | End: 2024-04-01
Payer: COMMERCIAL

## 2024-04-01 ENCOUNTER — ANESTHESIA (OUTPATIENT)
Dept: PAIN MEDICINE | Facility: HOSPITAL | Age: 64
End: 2024-04-01
Payer: COMMERCIAL

## 2024-04-01 VITALS
OXYGEN SATURATION: 91 % | DIASTOLIC BLOOD PRESSURE: 92 MMHG | TEMPERATURE: 97.5 F | HEART RATE: 70 BPM | HEIGHT: 65 IN | SYSTOLIC BLOOD PRESSURE: 173 MMHG | BODY MASS INDEX: 45.14 KG/M2 | WEIGHT: 270.95 LBS | RESPIRATION RATE: 17 BRPM

## 2024-04-01 PROBLEM — M54.16 LUMBAR BACK PAIN WITH RADICULOPATHY AFFECTING LEFT LOWER EXTREMITY: Status: ACTIVE | Noted: 2024-04-01

## 2024-04-01 LAB
ALBUMIN SERPL-MCNC: 4.3 G/DL (ref 3.5–5.2)
ALBUMIN/GLOB SERPL: 1.9 G/DL
ALP SERPL-CCNC: 89 U/L (ref 39–117)
ALT SERPL W P-5'-P-CCNC: 61 U/L (ref 1–41)
ANION GAP SERPL CALCULATED.3IONS-SCNC: 10 MMOL/L (ref 5–15)
ANION GAP SERPL CALCULATED.3IONS-SCNC: 15 MMOL/L (ref 5–15)
APTT PPP: 30.7 SECONDS (ref 22.7–35.4)
AST SERPL-CCNC: 39 U/L (ref 1–40)
BILIRUB SERPL-MCNC: 0.4 MG/DL (ref 0–1.2)
BUN SERPL-MCNC: 37 MG/DL (ref 8–23)
BUN SERPL-MCNC: 41 MG/DL (ref 8–23)
BUN/CREAT SERPL: 16.7 (ref 7–25)
BUN/CREAT SERPL: 19.1 (ref 7–25)
CALCIUM SPEC-SCNC: 8.9 MG/DL (ref 8.6–10.5)
CALCIUM SPEC-SCNC: 9.3 MG/DL (ref 8.6–10.5)
CHLORIDE SERPL-SCNC: 100 MMOL/L (ref 98–107)
CHLORIDE SERPL-SCNC: 96 MMOL/L (ref 98–107)
CO2 SERPL-SCNC: 31 MMOL/L (ref 22–29)
CO2 SERPL-SCNC: 32 MMOL/L (ref 22–29)
CREAT SERPL-MCNC: 2.15 MG/DL (ref 0.76–1.27)
CREAT SERPL-MCNC: 2.21 MG/DL (ref 0.76–1.27)
DEPRECATED RDW RBC AUTO: 45 FL (ref 37–54)
EGFRCR SERPLBLD CKD-EPI 2021: 32.9 ML/MIN/1.73
EGFRCR SERPLBLD CKD-EPI 2021: 34 ML/MIN/1.73
ERYTHROCYTE [DISTWIDTH] IN BLOOD BY AUTOMATED COUNT: 13.7 % (ref 12.3–15.4)
GLOBULIN UR ELPH-MCNC: 2.3 GM/DL
GLUCOSE BLDC GLUCOMTR-MCNC: 207 MG/DL (ref 70–130)
GLUCOSE BLDC GLUCOMTR-MCNC: 220 MG/DL (ref 70–130)
GLUCOSE BLDC GLUCOMTR-MCNC: 250 MG/DL (ref 70–130)
GLUCOSE SERPL-MCNC: 263 MG/DL (ref 65–99)
GLUCOSE SERPL-MCNC: 281 MG/DL (ref 65–99)
HBA1C MFR BLD: 6.6 % (ref 4.8–5.6)
HCT VFR BLD AUTO: 44.5 % (ref 37.5–51)
HGB BLD-MCNC: 14.4 G/DL (ref 13–17.7)
INR PPP: 0.91 (ref 0.9–1.1)
MCH RBC QN AUTO: 29 PG (ref 26.6–33)
MCHC RBC AUTO-ENTMCNC: 32.4 G/DL (ref 31.5–35.7)
MCV RBC AUTO: 89.5 FL (ref 79–97)
PLATELET # BLD AUTO: 143 10*3/MM3 (ref 140–450)
PMV BLD AUTO: 11.3 FL (ref 6–12)
POTASSIUM SERPL-SCNC: 3.4 MMOL/L (ref 3.5–5.2)
POTASSIUM SERPL-SCNC: 3.9 MMOL/L (ref 3.5–5.2)
PROT SERPL-MCNC: 6.6 G/DL (ref 6–8.5)
PROTHROMBIN TIME: 12.5 SECONDS (ref 11.7–14.2)
RBC # BLD AUTO: 4.97 10*6/MM3 (ref 4.14–5.8)
SODIUM SERPL-SCNC: 142 MMOL/L (ref 136–145)
SODIUM SERPL-SCNC: 142 MMOL/L (ref 136–145)
WBC NRBC COR # BLD AUTO: 5.98 10*3/MM3 (ref 3.4–10.8)

## 2024-04-01 PROCEDURE — 25510000001 IOPAMIDOL 41 % SOLUTION: Performed by: ANESTHESIOLOGY

## 2024-04-01 PROCEDURE — 25010000002 BUPIVACAINE (PF) 0.25 % SOLUTION: Performed by: ANESTHESIOLOGY

## 2024-04-01 PROCEDURE — 80048 BASIC METABOLIC PNL TOTAL CA: CPT | Performed by: STUDENT IN AN ORGANIZED HEALTH CARE EDUCATION/TRAINING PROGRAM

## 2024-04-01 PROCEDURE — 63710000001 INSULIN REGULAR HUMAN PER 5 UNITS: Performed by: STUDENT IN AN ORGANIZED HEALTH CARE EDUCATION/TRAINING PROGRAM

## 2024-04-01 PROCEDURE — 96376 TX/PRO/DX INJ SAME DRUG ADON: CPT

## 2024-04-01 PROCEDURE — 72148 MRI LUMBAR SPINE W/O DYE: CPT

## 2024-04-01 PROCEDURE — G0378 HOSPITAL OBSERVATION PER HR: HCPCS

## 2024-04-01 PROCEDURE — 77003 FLUOROGUIDE FOR SPINE INJECT: CPT

## 2024-04-01 PROCEDURE — 85027 COMPLETE CBC AUTOMATED: CPT | Performed by: STUDENT IN AN ORGANIZED HEALTH CARE EDUCATION/TRAINING PROGRAM

## 2024-04-01 PROCEDURE — 82948 REAGENT STRIP/BLOOD GLUCOSE: CPT

## 2024-04-01 PROCEDURE — 25010000002 LIDOCAINE 1 % SOLUTION: Performed by: ANESTHESIOLOGY

## 2024-04-01 PROCEDURE — 25010000002 FENTANYL CITRATE (PF) 50 MCG/ML SOLUTION: Performed by: ANESTHESIOLOGY

## 2024-04-01 PROCEDURE — 25010000002 METHYLPREDNISOLONE PER 125 MG: Performed by: EMERGENCY MEDICINE

## 2024-04-01 PROCEDURE — 99221 1ST HOSP IP/OBS SF/LOW 40: CPT

## 2024-04-01 PROCEDURE — 25010000002 DEXAMETHASONE SODIUM PHOSPHATE 10 MG/ML SOLUTION: Performed by: ANESTHESIOLOGY

## 2024-04-01 RX ORDER — DEXAMETHASONE SODIUM PHOSPHATE 10 MG/ML
10 INJECTION INTRAMUSCULAR; INTRAVENOUS ONCE
Status: COMPLETED | OUTPATIENT
Start: 2024-04-01 | End: 2024-04-01

## 2024-04-01 RX ORDER — SODIUM CHLORIDE 0.9 % (FLUSH) 0.9 %
10 SYRINGE (ML) INJECTION AS NEEDED
Status: DISCONTINUED | OUTPATIENT
Start: 2024-04-01 | End: 2024-04-01 | Stop reason: HOSPADM

## 2024-04-01 RX ORDER — BUPIVACAINE HYDROCHLORIDE 2.5 MG/ML
10 INJECTION, SOLUTION EPIDURAL; INFILTRATION; INTRACAUDAL ONCE
Status: COMPLETED | OUTPATIENT
Start: 2024-04-01 | End: 2024-04-01

## 2024-04-01 RX ORDER — POTASSIUM CHLORIDE 750 MG/1
40 TABLET, FILM COATED, EXTENDED RELEASE ORAL ONCE
Status: COMPLETED | OUTPATIENT
Start: 2024-04-01 | End: 2024-04-01

## 2024-04-01 RX ORDER — ACETAMINOPHEN 325 MG/1
650 TABLET ORAL EVERY 4 HOURS PRN
Status: DISCONTINUED | OUTPATIENT
Start: 2024-04-01 | End: 2024-04-01 | Stop reason: HOSPADM

## 2024-04-01 RX ORDER — NITROGLYCERIN 0.4 MG/1
0.4 TABLET SUBLINGUAL
Status: DISCONTINUED | OUTPATIENT
Start: 2024-04-01 | End: 2024-04-01 | Stop reason: HOSPADM

## 2024-04-01 RX ORDER — TORSEMIDE 20 MG/1
80 TABLET ORAL
Status: DISCONTINUED | OUTPATIENT
Start: 2024-04-01 | End: 2024-04-01 | Stop reason: HOSPADM

## 2024-04-01 RX ORDER — MIDAZOLAM HYDROCHLORIDE 1 MG/ML
2 INJECTION INTRAMUSCULAR; INTRAVENOUS ONCE
Status: DISCONTINUED | OUTPATIENT
Start: 2024-04-01 | End: 2024-04-01 | Stop reason: HOSPADM

## 2024-04-01 RX ORDER — METHOCARBAMOL 750 MG/1
750 TABLET, FILM COATED ORAL 4 TIMES DAILY
Status: DISCONTINUED | OUTPATIENT
Start: 2024-04-01 | End: 2024-04-01 | Stop reason: HOSPADM

## 2024-04-01 RX ORDER — CARVEDILOL 25 MG/1
25 TABLET ORAL EVERY 12 HOURS SCHEDULED
Status: DISCONTINUED | OUTPATIENT
Start: 2024-04-01 | End: 2024-04-01 | Stop reason: HOSPADM

## 2024-04-01 RX ORDER — ONDANSETRON 4 MG/1
4 TABLET, ORALLY DISINTEGRATING ORAL EVERY 6 HOURS PRN
Status: DISCONTINUED | OUTPATIENT
Start: 2024-04-01 | End: 2024-04-01 | Stop reason: HOSPADM

## 2024-04-01 RX ORDER — ONDANSETRON 2 MG/ML
4 INJECTION INTRAMUSCULAR; INTRAVENOUS EVERY 6 HOURS PRN
Status: DISCONTINUED | OUTPATIENT
Start: 2024-04-01 | End: 2024-04-01 | Stop reason: HOSPADM

## 2024-04-01 RX ORDER — FENTANYL CITRATE 50 UG/ML
100 INJECTION, SOLUTION INTRAMUSCULAR; INTRAVENOUS ONCE
Status: COMPLETED | OUTPATIENT
Start: 2024-04-01 | End: 2024-04-01

## 2024-04-01 RX ORDER — IOPAMIDOL 408 MG/ML
2 INJECTION, SOLUTION INTRATHECAL
Status: COMPLETED | OUTPATIENT
Start: 2024-04-01 | End: 2024-04-01

## 2024-04-01 RX ORDER — LIDOCAINE HYDROCHLORIDE 10 MG/ML
1 INJECTION, SOLUTION INFILTRATION; PERINEURAL ONCE
Status: COMPLETED | OUTPATIENT
Start: 2024-04-01 | End: 2024-04-01

## 2024-04-01 RX ORDER — DEXTROSE MONOHYDRATE 25 G/50ML
25 INJECTION, SOLUTION INTRAVENOUS
Status: DISCONTINUED | OUTPATIENT
Start: 2024-04-01 | End: 2024-04-01 | Stop reason: HOSPADM

## 2024-04-01 RX ORDER — IBUPROFEN 600 MG/1
1 TABLET ORAL
Status: DISCONTINUED | OUTPATIENT
Start: 2024-04-01 | End: 2024-04-01 | Stop reason: HOSPADM

## 2024-04-01 RX ORDER — SODIUM CHLORIDE 9 MG/ML
40 INJECTION, SOLUTION INTRAVENOUS AS NEEDED
Status: DISCONTINUED | OUTPATIENT
Start: 2024-04-01 | End: 2024-04-01 | Stop reason: HOSPADM

## 2024-04-01 RX ORDER — CARVEDILOL 25 MG/1
25 TABLET ORAL EVERY 12 HOURS SCHEDULED
Qty: 60 TABLET | Refills: 0 | Status: SHIPPED | OUTPATIENT
Start: 2024-04-01

## 2024-04-01 RX ORDER — POTASSIUM CHLORIDE 750 MG/1
20 TABLET, FILM COATED, EXTENDED RELEASE ORAL DAILY
Status: DISCONTINUED | OUTPATIENT
Start: 2024-04-01 | End: 2024-04-01 | Stop reason: HOSPADM

## 2024-04-01 RX ORDER — METHOCARBAMOL 750 MG/1
750 TABLET, FILM COATED ORAL 4 TIMES DAILY
Qty: 30 TABLET | Refills: 0 | Status: SHIPPED | OUTPATIENT
Start: 2024-04-01

## 2024-04-01 RX ORDER — LIDOCAINE 4 G/G
1 PATCH TOPICAL
Status: DISCONTINUED | OUTPATIENT
Start: 2024-04-01 | End: 2024-04-01 | Stop reason: HOSPADM

## 2024-04-01 RX ORDER — HYDROCODONE BITARTRATE AND ACETAMINOPHEN 5; 325 MG/1; MG/1
1 TABLET ORAL EVERY 6 HOURS PRN
Status: DISCONTINUED | OUTPATIENT
Start: 2024-04-01 | End: 2024-04-01 | Stop reason: HOSPADM

## 2024-04-01 RX ORDER — SODIUM CHLORIDE 0.9 % (FLUSH) 0.9 %
10 SYRINGE (ML) INJECTION EVERY 12 HOURS SCHEDULED
Status: DISCONTINUED | OUTPATIENT
Start: 2024-04-01 | End: 2024-04-01 | Stop reason: HOSPADM

## 2024-04-01 RX ORDER — LISINOPRIL 10 MG/1
10 TABLET ORAL
Status: DISCONTINUED | OUTPATIENT
Start: 2024-04-01 | End: 2024-04-01 | Stop reason: HOSPADM

## 2024-04-01 RX ORDER — METHYLPREDNISOLONE SODIUM SUCCINATE 125 MG/2ML
60 INJECTION, POWDER, LYOPHILIZED, FOR SOLUTION INTRAMUSCULAR; INTRAVENOUS EVERY 8 HOURS
Status: DISCONTINUED | OUTPATIENT
Start: 2024-04-01 | End: 2024-04-01 | Stop reason: HOSPADM

## 2024-04-01 RX ORDER — NICOTINE POLACRILEX 4 MG
15 LOZENGE BUCCAL
Status: DISCONTINUED | OUTPATIENT
Start: 2024-04-01 | End: 2024-04-01 | Stop reason: HOSPADM

## 2024-04-01 RX ORDER — HYDRALAZINE HYDROCHLORIDE 50 MG/1
100 TABLET, FILM COATED ORAL EVERY 8 HOURS SCHEDULED
Status: DISCONTINUED | OUTPATIENT
Start: 2024-04-01 | End: 2024-04-01 | Stop reason: HOSPADM

## 2024-04-01 RX ORDER — MORPHINE SULFATE 2 MG/ML
2 INJECTION, SOLUTION INTRAMUSCULAR; INTRAVENOUS EVERY 4 HOURS PRN
Status: DISCONTINUED | OUTPATIENT
Start: 2024-04-01 | End: 2024-04-01 | Stop reason: HOSPADM

## 2024-04-01 RX ORDER — LISINOPRIL 10 MG/1
10 TABLET ORAL
Qty: 30 TABLET | Refills: 0 | Status: SHIPPED | OUTPATIENT
Start: 2024-04-02

## 2024-04-01 RX ADMIN — METHYLPREDNISOLONE SODIUM SUCCINATE 60 MG: 125 INJECTION, POWDER, FOR SOLUTION INTRAMUSCULAR; INTRAVENOUS at 09:29

## 2024-04-01 RX ADMIN — INSULIN HUMAN 6 UNITS: 100 INJECTION, SOLUTION PARENTERAL at 05:32

## 2024-04-01 RX ADMIN — HYDRALAZINE HYDROCHLORIDE 100 MG: 50 TABLET ORAL at 14:26

## 2024-04-01 RX ADMIN — LIDOCAINE 1 PATCH: 4 PATCH TOPICAL at 12:05

## 2024-04-01 RX ADMIN — TORSEMIDE 80 MG: 20 TABLET ORAL at 12:27

## 2024-04-01 RX ADMIN — CARVEDILOL 25 MG: 25 TABLET, FILM COATED ORAL at 09:29

## 2024-04-01 RX ADMIN — INSULIN HUMAN 4 UNITS: 100 INJECTION, SOLUTION PARENTERAL at 12:29

## 2024-04-01 RX ADMIN — HYDROCODONE BITARTRATE AND ACETAMINOPHEN 1 TABLET: 5; 325 TABLET ORAL at 04:29

## 2024-04-01 RX ADMIN — FENTANYL CITRATE 50 MCG: 50 INJECTION, SOLUTION INTRAMUSCULAR; INTRAVENOUS at 10:43

## 2024-04-01 RX ADMIN — IOPAMIDOL 10 ML: 408 INJECTION, SOLUTION INTRATHECAL at 10:43

## 2024-04-01 RX ADMIN — LIDOCAINE HYDROCHLORIDE 1 ML: 10 INJECTION, SOLUTION INFILTRATION; PERINEURAL at 10:40

## 2024-04-01 RX ADMIN — DEXAMETHASONE SODIUM PHOSPHATE 10 MG: 10 INJECTION INTRAMUSCULAR; INTRAVENOUS at 10:43

## 2024-04-01 RX ADMIN — Medication 10 ML: at 09:31

## 2024-04-01 RX ADMIN — POTASSIUM CHLORIDE 20 MEQ: 750 TABLET, EXTENDED RELEASE ORAL at 09:29

## 2024-04-01 RX ADMIN — Medication 10 ML: at 01:52

## 2024-04-01 RX ADMIN — INSULIN HUMAN 4 UNITS: 100 INJECTION, SOLUTION PARENTERAL at 01:50

## 2024-04-01 RX ADMIN — METHOCARBAMOL TABLETS 750 MG: 750 TABLET, COATED ORAL at 14:25

## 2024-04-01 RX ADMIN — LISINOPRIL 10 MG: 10 TABLET ORAL at 09:28

## 2024-04-01 RX ADMIN — BUPIVACAINE HYDROCHLORIDE 10 ML: 2.5 INJECTION, SOLUTION EPIDURAL; INFILTRATION; INTRACAUDAL; PERINEURAL at 10:43

## 2024-04-01 RX ADMIN — POTASSIUM CHLORIDE 40 MEQ: 750 TABLET, EXTENDED RELEASE ORAL at 01:50

## 2024-04-01 RX ADMIN — METHOCARBAMOL TABLETS 750 MG: 750 TABLET, COATED ORAL at 09:29

## 2024-04-01 NOTE — H&P
[facility]   HISTORY AND PHYSICAL    Patient Name: Calos Moseley  : 1960  MRN: [id number]  Primary Care Physician:  Jailene Estrada APRN  Date of admission: 3/31/2024    Subjective   Subjective     Chief Complaint:   Chief Complaint   Patient presents with    Leg Swelling         HPI:    Calos Moseley is a 63 y.o. male with a history of CKD, hypertension, diet-controlled diabetes, hypothyroidism, mood disorder, and sleep apnea who presents to [facility] ER with low back and left leg pain.  Patient reports about 3 weeks ago he was having worsening restless legs at night as he had ran out of his Mirapex and had not slept well for several days.  He states he was getting out of his truck onto his left leg when he suddenly started to have pain in the low back that went down the left leg.  He states since then the pain has been getting worse so much so that he is having difficulty walking on the leg.  He states most of his pain is in the lower left buttock and travels down his thigh into his calf.  He reports some on and off numbness and tingling down the leg but denies any numbness and tingling around the groin area.  Denies any urinary or fecal incontinence.  He reports he was given oral steroids and muscle relaxers that provided him with no relief.  He denies previous back injuries.  He reports some chronic leg swelling that they believe is due to the nifedipine he is taking and was supposed to see his nephrologist last week to discuss discontinuing it however missed the appointment.  He states he has been sitting up in a recliner due to his back and leg pain which is made his lower leg swelling worse.  He denies that he sorbic pain or nausea.  Denies fevers or chills.  Denies chest pain shortness of breath.    Review of Systems   All systems were reviewed and negative except for: What is mentioned above in the HPI.    Personal History     Past Medical History:   Diagnosis Date    Anesthesia  complication     STATES HARD TO WAKE UP AFTER PREVIOUS KIDNEY STONE SURGERY AND STATES HE CODED    Bilateral kidney stones     Bipolar affective     COVID 2022    Disease of thyroid gland     Elevated cholesterol     Hypertension     Kidney stone on left side     Left flank pain     Mood disorder     Restless leg syndrome     Sleep apnea     cpap  bring dos    UTI (urinary tract infection) 12/2021    PREV FINISHED ANTIBIOTIC       Past Surgical History:   Procedure Laterality Date    ADENOIDECTOMY      APPENDECTOMY      BRONCHOSCOPY Right 5/19/2023    Procedure: BRONCHOSCOPY WITH ENDOBRONCHIAL ULTRASOUND with BAL of RLL and FNA;  Surgeon: Aniceto Bruno MD;  Location: [facility] ENDOSCOPY;  Service: Pulmonary;  Laterality: Right;  pre- RLL pneumonia, lymphadenopathy  post- same    COLONOSCOPY      COLONOSCOPY N/A 3/23/2023    Procedure: COLONOSCOPY into cecum with cold snare polypectomies;  Surgeon: Yoan Leonard MD;  Location: [facility] ENDOSCOPY;  Service: Gastroenterology;  Laterality: N/A;  polyps    CYSTOSCOPY WITH CLOT EVACUATION N/A 3/8/2022    Procedure: CYSTOSCOPY WITH CLOT EVACUATION;  Surgeon: Ihsan Lopez MD;  Location: [facility];  Service: Urology;  Laterality: N/A;    CYSTOSCOPY, URETEROSCOPY, RETROGRADE PYELOGRAM, STENT INSERTION Left 3/7/2022    Procedure: CYSTOSCOPY URETEROSCOPY LASER LITHOTRIPSY and stent exchange;  Surgeon: Deuce Leija MD;  Location: [facility];  Service: Urology;  Laterality: Left;    KIDNEY STONE SURGERY      NEPHROLITHOTRIPSY PERCUTANEOUS Left 2/21/2022    Procedure: NEPHROSTLITHOTOMY PERCUTANEOUS;  Surgeon: Deuce Leija MD;  Location: [facility] OR;  Service: Urology;  Laterality: Left;    NEPHROLITHOTRIPSY PERCUTANEOUS Left 2/23/2022    Procedure: LEFT FLEXIBLE NEPHROSTOMY WITH STONE EXTRACTION, LEFT NEPHROSTOMY, TUBE REMOVAL, BILATERAL URETERAL STENT PLACEMENT;  Surgeon: Deuce Leija MD;  Location: [facility] OR;  Service:  Urology;  Laterality: Left;    NEPHROLITHOTRIPSY PERCUTANEOUS Right 3/7/2022    Procedure: RIGHT PERCUTANEOUS NEPHROLITHOTOMY W ACCESS;  Surgeon: Deuce Leija MD;  Location: [facility] MAIN OR;  Service: Urology;  Laterality: Right;    THORACOSCOPY WITH DECORTICATION Right 5/20/2023    Procedure: THORACOSCOPY VIDEO ASSISTED WITH DECORTICATION WITH DAVINCI ROBOT;  Surgeon: Jose Rice MD;  Location: [facility] MAIN OR;  Service: Robotics - DaVinci;  Laterality: Right;    TONSILLECTOMY         Family History: family history includes COPD in his father; Diabetes in his father; Emphysema in his father; Lung cancer in his mother. Otherwise pertinent FHx was reviewed and not pertinent to current issue.    Social History:  reports that he quit smoking about 33 years ago. His smoking use included cigarettes. He has never used smokeless tobacco. He reports that he does not currently use alcohol. He reports that he does not use drugs.    Home Medications:  Fluticasone-Umeclidin-Vilant, NIFEdipine XL, albuterol sulfate HFA, atorvastatin, buPROPion XL, hydrALAZINE, isosorbide mononitrate, lamoTRIgine, levothyroxine, metoprolol succinate XL, naltrexone, potassium citrate, pramipexole, tamsulosin, torsemide, and vilazodone    Allergies:  No Known Allergies    Objective   Objective     Vitals:   Temp:  [97.9 °F (36.6 °C)] 97.9 °F (36.6 °C)  Heart Rate:  [68-93] 69  Resp:  [18] 18  BP: (154-167)/(76-83) 163/83  Physical Exam    Constitutional: 63-year-old male in no acute distress on room air   Eyes: PERRLA, sclerae anicteric, no conjunctival injection   HENT: NCAT, mucous membranes moist   Neck: Supple, no thyromegaly, no lymphadenopathy, trachea midline   Respiratory: Clear to auscultation bilaterally, nonlabored respirations    Cardiovascular: RRR, no murmurs, rubs, or gallops, palpable pedal pulses bilaterally   Gastrointestinal: Positive bowel sounds, soft, nontender, nondistended   Musculoskeletal: No point spinal  tenderness, no tenderness palpation in the left hip, some mild tenderness in the left lumbar region, pain worse with movement and when bearing weight on the left leg.  Patient with 2+ pitting edema bilaterally, 2+ DP PT pulses bilaterally   Psychiatric: Appropriate affect, cooperative   Neurologic: Oriented x 3, strength symmetric in all extremities, Cranial Nerves grossly intact to confrontation, speech clear   Skin: No rashes     Result Review    Result Review:  I have personally reviewed the results from the time of this admission to 4/1/2024 01:18 EDT and agree with these findings:  [x]  Laboratory list / accordion  []  Microbiology  [x]  Radiology  []  EKG/Telemetry   []  Cardiology/Vascular   []  Pathology  [x]  Old records  []  Other:  Most notable findings include:     XR Tibia Fibula 2 View Left    Result Date: 4/1/2024  2 VIEWS LEFT TIBIA AND FIBULA  HISTORY: Left lower extremity pain  COMPARISON: None available.  FINDINGS: No acute fracture or subluxation of the left lower extremity is seen. No aggressive osseous abnormalities are identified. Radiopaque density projects over the distal femur on the lateral view, of uncertain clinical significance. It may be external to the patient. Patient does appear to have some left lower extremity edema. This is more significant laterally.      No acute osseous findings.  This report was finalized on 4/1/2024 12:07 AM by Dr. Breann Johns M.D on Workstation: [id number]           Assessment & Plan   Assessment / Plan     Brief Patient Summary:  Calos Moseley is a 63 y.o. male who is being admitted the observation unit for further evaluation of lumbar back pain with left radiculopathy.  Failed outpatient therapy with oral steroids and muscle relaxers.  Plain films of the left tib-fib showed no acute osseous findings.  Lab work is notable for an elevated creatinine 2.21, glucose at 281, potassium 3.4.  Patient's A1c noted at 6.6.  We will continue the patient on IV  steroids, and multimodal analgesics with plan for an MRI of the lumbar spine.  Consultations to neurosurgery and nephrology placed.    Active Hospital Problems:  Active Hospital Problems    Diagnosis     **Lumbar back pain with radiculopathy affecting left lower extremity      Plan:     Lumbar back pain with left radiculopathy  -Plain films of the left tib-fib show no acute osseous findings  -MRI of the lumbar spine ordered  -IV steroids  -Multimodal analgesics  -Neurosurgery consulted  -PT consulted    Acute on chronic renal insufficiency  Bilateral lower extremity edema  -Creatinine 2.21, baseline appears around 1.8  -Patient follows with Dr. Street, had an appointment last week that he missed to discuss his medications to address his lower leg swelling  -Patient reports he is currently on torsemide to help with lower leg swelling but has had any improvement since starting it  -Consult nephrology with increased creatinine  -Avoid nephrotoxic medications when appropriate  -Trend with a.m. labs    Hypertension  -Continue Imdur, metoprolol, hydralazine  -Continue nifedipine at this time, will defer to nephrology for any changes to home medications  -Monitor vital signs every 4 hours    Diet controlled type 2 diabetes  -A1c 6.6  -Patient's blood sugar elevated at 281, likely from recent steroid use  -Sliding scale insulin Accu-Cheks with meals and at bedtime    COPD  Sleep apnea  -No acute exacerbation  -Nocturnal O2 as needed  -Albuterol as needed  -Continue home inhalers    Hypothyroidism  -Continue levothyroxine    Mood disorder  -No acute issues  -Continue Lamictal, Viibryd    DVT prophylaxis:  Mechanical DVT prophylaxis orders are present.        CODE STATUS:    Code Status (Patient has no pulse and is not breathing): CPR (Attempt to Resuscitate)  Medical Interventions (Patient has pulse or is breathing): Full Support    Admission Status:  I believe this patient meets observation status.    79 minutes have been  spent by [facility] providers in the care of this patient while under observation status.      Appropriate PPE worn during patient encounter.  Hand hygeine performed before and after seeing the patient.      Electronically signed by Sabra Dean PA-C, 04/01/24, 1:18 AM EDT.

## 2024-04-01 NOTE — DISCHARGE SUMMARY
ED OBSERVATION PROGRESS/DISCHARGE SUMMARY    Date of Admission: 3/31/2024   LOS: 0 days   PCP: Jailene Estrada APRN    Final Diagnosis lumbar pain, MESHA on CKD      Subjective     Hospital Outcome: Mr. Ribeiro was admitted for management of lumbar pain with radiculopathy along with an acute kidney injury on CKD.  Neurosurgery evaluated patient and recommended epidural steroid injection.  Nephrology also advised discontinue the patient's nifedipine, changing metoprolol to carvedilol and resuming his torsemide.    ROS:  General: no fevers, chills  Respiratory: no cough, dyspnea  Cardiovascular: no chest pain, palpitations  Abdomen: No abdominal pain, nausea, vomiting, or diarrhea  Neurologic: No focal weakness    Objective   Physical Exam:  I have reviewed the vital signs.  Temp:  [97.5 °F (36.4 °C)-97.9 °F (36.6 °C)] 97.7 °F (36.5 °C)  Heart Rate:  [68-93] 76  Resp:  [16-18] 16  BP: (152-175)/(76-90) 155/81  General Appearance:    Alert, cooperative, no distress  Head:    Normocephalic, atraumatic  Eyes:    Sclerae anicteric  Neck:   Supple, no mass  Lungs: Clear to auscultation bilaterally, respirations unlabored  Heart: Regular rate and rhythm, S1 and S2 normal, no murmur, rub or gallop  Abdomen:  Soft, non-tender, bowel sounds active, nondistended  Extremities: No clubbing, cyanosis, or edema to lower extremities  Pulses:  2+ and symmetric in distal lower extremities  Skin: No rashes   Neurologic: Oriented x3, Normal strength to extremities    Results Review:    I have reviewed the labs, radiology results and diagnostic studies.    Results from last 7 days   Lab Units 04/01/24  0432   WBC 10*3/mm3 5.98   HEMOGLOBIN g/dL 14.4   HEMATOCRIT % 44.5   PLATELETS 10*3/mm3 143     Results from last 7 days   Lab Units 04/01/24  0432 03/31/24  2322   SODIUM mmol/L 142 142   POTASSIUM mmol/L 3.9 3.4*   CHLORIDE mmol/L 100 96*   CO2 mmol/L 32.0* 31.0*   BUN mg/dL 41* 37*   CREATININE mg/dL 2.15* 2.21*   CALCIUM mg/dL 9.3 8.9    BILIRUBIN mg/dL  --  0.4   ALK PHOS U/L  --  89   ALT (SGPT) U/L  --  61*   AST (SGOT) U/L  --  39   GLUCOSE mg/dL 263* 281*     Imaging Results (Last 24 Hours)       Procedure Component Value Units Date/Time    MRI Lumbar Spine Without Contrast [[id number]] Collected: 04/01/24 0243     Updated: 04/01/24 0243    Narrative:        Patient: FABIOLA BELTRAN  Time Out: 02:43  Exam(s): MRI L SPINE Without Contrast     EXAM:    MR Lumbar Spine Without Intravenous Contrast    CLINICAL HISTORY:     Reason for exam: left lumbar radiculopathy.    TECHNIQUE:    Magnetic resonance images of the lumbar spine without intravenous   contrast in multiple planes.    COMPARISON:    No relevant prior studies available.    FINDINGS:    Vertebrae:  No acute fracture.  Minimal listhesis L2-S1.    Interspaces:  Unremarkable.    Spinal cord:  No abnormal signal at the conus.    Soft tissues: Edema around the right L5-S1 facet joint.     DISCS SPINAL CANAL NEURAL FORAMINA:    Congenital spinal canal narrowing with epidural lipomatosis.    L1-L2: Unremarkable  L2-3: Mild disc bulge results in mild spinal canal stenosis and bilateral   foraminal stenosis.  L3-4: Mild disc bulge results in mild to moderate spinal canal stenosis   and bilateral foraminal stenosis.  L4-5: Mild disc bulge and moderate facet arthrosis results in moderate   spinal canal stenosis and moderate bilateral foraminal stenosis.  L5-S1: Mild disc bulge.  No significant central canal stenosis.  Severe   facet arthrosis with increased fluid within the right facet joint.    Severe left and moderate right foraminal stenosis.    IMPRESSION:         1. Congenital spinal canal narrowing with epidural lipomatosis.  This   resulted in exaggerated spinal canal stenosis at multiple levels.    2.  Foraminal stenosis at multiple levels most severe on the left at L5-  S1.    3.  Facet arthrosis with increased fluid within the joint in the right L5-  S1 joint, correlate with facet  synovitis.      Impression:          Electronically signed by Allan La MD on 04-01-24 at 0243    XR Tibia Fibula 2 View Left [[id number]] Collected: 04/01/24 0005     Updated: 04/01/24 0010    Narrative:      2 VIEWS LEFT TIBIA AND FIBULA     HISTORY: Left lower extremity pain     COMPARISON: None available.     FINDINGS:  No acute fracture or subluxation of the left lower extremity is seen. No  aggressive osseous abnormalities are identified. Radiopaque density  projects over the distal femur on the lateral view, of uncertain  clinical significance. It may be external to the patient. Patient does  appear to have some left lower extremity edema. This is more significant  laterally.       Impression:      No acute osseous findings.     This report was finalized on 4/1/2024 12:07 AM by Dr. Breann Johns M.D on Workstation: [id number]               I have reviewed the medications.  ---------------------------------------------------------------------------------------------  Assessment & Plan   Assessment/Problem List    Lumbar back pain with radiculopathy affecting left lower extremity      Plan:    Lumbar back pain with left radiculopathy  -Plain films of the left tib-fib show no acute osseous findings  -MRI of the lumbar spine shows congenital spinal canal narrowing with epidural lipomatosis, foraminal stenosis at multiple levels most severe on the left at L5-S1, facet arthrosis with increased fluid within the joint and the right L5-S1 joint.  -Patient received epidural steroid injection with significant improvement of his symptoms.  -He will follow-up with neurosurgery in 2 months.     Acute on chronic renal insufficiency  Bilateral lower extremity edema  -Creatinine down to 2.15 this morning.  -Patient follows with Dr. Street, had an appointment last week that he missed to discuss his medications to address his lower leg swelling  -Patient reports he is currently on torsemide to help with lower leg  swelling but has had any improvement since starting it  -Nephrology advised resuming patient's torsemide, discontinuing nifedipine and starting lisinopril.  They changed his metoprolol to carvedilol.    Hypertension  -Discontinue nifedipine.   -Start lisinopril.  -Stop metoprolol and start carvedilol.     Diet controlled type 2 diabetes  -A1c 6.6  -Patient's blood sugar elevated at 281, likely from recent steroid use.   -I have placed an ambulatory order for diabetes educator.     Hypothyroidism  -Continue levothyroxine     Mood disorder  -Continue Lamictal, Viimaximilianod    Disposition: Home    Follow-up after Discharge: Follow-up with your primary care provider in 1 to 2 weeks.  Follow-up with neurosurgery in 2 months.  Follow-up with nephrology in 2 weeks.    This note will serve as a discharge summary    SALVADOR Doll 04/01/24 10:27 EDT    I have worn appropriate PPE during this patient encounter, sanitized my hands both with entering and exiting patient's room.      35 minutes has been spent by [facility] providers in the care of this patient while under observation status

## 2024-04-01 NOTE — OUTREACH NOTE
Prep Survey      Flowsheet Row Responses   [facility] patient discharged from? [place]   Is LACE score < 7 ? Yes   Eligibility [facility]   Date of Admission 03/31/24   Date of Discharge 04/01/24   Discharge Disposition Home or Self Care   Discharge diagnosis Lumbar back pain with radiculopathy affecting left lower extremity   Does the patient have one of the following disease processes/diagnoses(primary or secondary)? Other   Does the patient have Home health ordered? No   Is there a DME ordered? No   Prep survey completed? Yes            Nesha ANNA - Registered Nurse

## 2024-04-01 NOTE — ANESTHESIA PROCEDURE NOTES
Transforaminal epidural, left, lumbar 5 sacral 1    Pre-sedation assessment completed: 4/1/2024 10:34 AM    Patient reassessed immediately prior to procedure    Patient location during procedure: pain clinic  Start Time: 4/1/2024 10:34 AM  Stop Time: 4/1/2024 10:46 AM  Indication:at surgeon's request and procedure for pain  Performed By  Anesthesiologist: Calos Darnell MD  Preanesthetic Checklist  Completed: patient identified, IV checked, site marked, risks and benefits discussed, surgical consent, monitors and equipment checked, pre-op evaluation and timeout performed  Additional Notes  Dx : Post-Op Diagnosis Codes:     * Lumbar radiculopathy [M54.16]    Sedation start:              1036                         End:    1046  Prep:  Pt Position:prone  Sterile Tech:cap, gloves, mask and sterile barrier  Prep:chlorhexidine gluconate and isopropyl alcohol  Monitoring:blood pressure monitoring, continuous pulse oximetry and EKG  Procedure:Sedation: yes     Guidance: fluoroscopy and pa and lat  Location:lumbar  Interspace: transforaminal epidural, left, lumbar 5 sacral 1.  Needle Type:Other (blunt tip epimed needle 22 G)  Needle Gauge:22 G  Aspiration:negative  Test Dose:negative  Medications:  Isovue:1mL  Comments:Dexamethasone 10 mg  Lidocaine 1% - 1cc  Post Assessment:  Pt Tolerance:patient tolerated the procedure well with no apparent complications  Complications:no

## 2024-04-01 NOTE — CONSULTS
"[facility] NEUROSURGERY CONSULT NOTE    Patient name: Calos Moseley  Referring Provider: Sabra Dean PA-C   Reason for Consultation: Low back pain with left radiculopathy    Patient Care Team:  Jailene Estrada APRN as PCP - General (Family Medicine)  Naveed Nunez DO as PCP - Family Medicine  Chris Martinez Jr., MD as Consulting Physician (Pulmonary Disease)  Ihsan Lopez MD as Consulting Physician (Urology)  Dimas Jackson MD (Inactive) as Consulting Physician (Nephrology)    Chief complaint: Low back pain with left-sided radiculopathy    Subjective .     History of present illness:    Patient is a 63 y.o.  male with CKD, hypertension, diabetes, hypothyroidism, mood disorder, ISIAH who presents to ED 3/31/2024 for low back pain with left-sided radiculopathy that he is experienced for 3 weeks.  He states the pain started when he got out of his truck a few weeks ago.  Eventually the pain became so bad where he had to go to the ER.  He describes the pain along the backside of his left leg that radiates down to his left ankle that is described as \"burning or hot\".  He denies any issues with send anesthesia, bowel or bladder incontinence/retention, as well as denying lower extremity numbness, tingling and weakness.    Review of Systems  Review of Systems   Gastrointestinal:         Denies bowel issues   Genitourinary:  Negative for enuresis.   Musculoskeletal:  Positive for back pain and gait problem (Due to pain).   Neurological:  Negative for weakness and numbness.       History  PAST MEDICAL HISTORY  Past Medical History:   Diagnosis Date    Anesthesia complication     STATES HARD TO WAKE UP AFTER PREVIOUS KIDNEY STONE SURGERY AND STATES HE CODED    Bilateral kidney stones     Bipolar affective     COVID 2022    Disease of thyroid gland     Elevated cholesterol     Hypertension     Kidney stone on left side     Left flank pain     Mood disorder     Restless leg syndrome     Sleep apnea     " cpap  bring dos    UTI (urinary tract infection) 12/2021    PREV FINISHED ANTIBIOTIC       PAST SURGICAL HISTORY  Past Surgical History:   Procedure Laterality Date    ADENOIDECTOMY      APPENDECTOMY      BRONCHOSCOPY Right 05/19/2023    Procedure: BRONCHOSCOPY WITH ENDOBRONCHIAL ULTRASOUND with BAL of RLL and FNA;  Surgeon: Aniceto Bruno MD;  Location: [facility] ENDOSCOPY;  Service: Pulmonary;  Laterality: Right;  pre- RLL pneumonia, lymphadenopathy  post- same    COLONOSCOPY      COLONOSCOPY N/A 03/23/2023    Procedure: COLONOSCOPY into cecum with cold snare polypectomies;  Surgeon: Yoan Leonard MD;  Location: [facility] ENDOSCOPY;  Service: Gastroenterology;  Laterality: N/A;  polyps    CYSTOSCOPY WITH CLOT EVACUATION N/A 03/08/2022    Procedure: CYSTOSCOPY WITH CLOT EVACUATION;  Surgeon: Ihsan Lopez MD;  Location: [facility] OR;  Service: Urology;  Laterality: N/A;    CYSTOSCOPY, URETEROSCOPY, RETROGRADE PYELOGRAM, STENT INSERTION Left 03/07/2022    Procedure: CYSTOSCOPY URETEROSCOPY LASER LITHOTRIPSY and stent exchange;  Surgeon: Deuce Leija MD;  Location: [facility] OR;  Service: Urology;  Laterality: Left;    EPIDURAL BLOCK      KIDNEY STONE SURGERY      NEPHROLITHOTRIPSY PERCUTANEOUS Left 02/21/2022    Procedure: NEPHROSTLITHOTOMY PERCUTANEOUS;  Surgeon: Deuce Leija MD;  Location: [facility] OR;  Service: Urology;  Laterality: Left;    NEPHROLITHOTRIPSY PERCUTANEOUS Left 02/23/2022    Procedure: LEFT FLEXIBLE NEPHROSTOMY WITH STONE EXTRACTION, LEFT NEPHROSTOMY, TUBE REMOVAL, BILATERAL URETERAL STENT PLACEMENT;  Surgeon: Deuce Leija MD;  Location: [facility] MAIN OR;  Service: Urology;  Laterality: Left;    NEPHROLITHOTRIPSY PERCUTANEOUS Right 03/07/2022    Procedure: RIGHT PERCUTANEOUS NEPHROLITHOTOMY W ACCESS;  Surgeon: Deuce Leija MD;  Location: [facility] MAIN OR;  Service: Urology;  Laterality: Right;    THORACOSCOPY WITH DECORTICATION Right 05/20/2023     Procedure: THORACOSCOPY VIDEO ASSISTED WITH DECORTICATION WITH DAVINCI ROBOT;  Surgeon: Jose Rice MD;  Location: [facility] MAIN OR;  Service: Robotics - DaVinci;  Laterality: Right;    TONSILLECTOMY         FAMILY HISTORY  Family History   Problem Relation Age of Onset    Lung cancer Mother     COPD Father     Emphysema Father     Diabetes Father     Malig Hyperthermia Neg Hx        SOCIAL HISTORY  Social History     Tobacco Use    Smoking status: Former     Current packs/day: 0.00     Types: Cigarettes     Quit date: 1990     Years since quittin.2    Smokeless tobacco: Never   Vaping Use    Vaping status: Never Used   Substance Use Topics    Alcohol use: Not Currently    Drug use: No         Allergies:  Patient has no known allergies.    MEDICATIONS:  Medications Prior to Admission   Medication Sig Dispense Refill Last Dose    atorvastatin (LIPITOR) 20 MG tablet Take 1 tablet by mouth every night at bedtime. 90 tablet 3 3/31/2024    buPROPion XL (WELLBUTRIN XL) 150 MG 24 hr tablet Take 1 tablet by mouth Daily. 90 tablet 1 3/31/2024    Fluticasone-Umeclidin-Vilant (Trelegy Ellipta) 100-62.5-25 MCG/ACT inhaler Inhale 1 puff Daily. 60 each 11 3/31/2024    hydrALAZINE (APRESOLINE) 50 MG tablet Take 2 tablets by mouth 3 times a day. 90 tablet 3 3/31/2024    isosorbide mononitrate (IMDUR) 30 MG 24 hr tablet Take 1 tablet by mouth Daily.Do not crush or chew. 30 tablet 11 3/31/2024    lamoTRIgine (LaMICtal) 100 MG tablet Take 1 tablet by mouth 2 (Two) Times a Day - morning and evening 180 tablet 1 3/31/2024    levothyroxine (SYNTHROID, LEVOTHROID) 50 MCG tablet Take 1 tablet by mouth Every Morning. 30 tablet 0 3/31/2024    metoprolol succinate XL (TOPROL-XL) 100 MG 24 hr tablet Take 1 tablet by mouth Daily. 90 tablet 1 3/31/2024    NIFEdipine XL (PROCARDIA XL) 30 MG 24 hr tablet Take 1 tablet by mouth Daily. Do not crush, chew, or split. 30 tablet 11 3/31/2024    pramipexole (MIRAPEX) 0.75 MG tablet Take 1  tablet by mouth Every Night. 30 tablet 2 3/31/2024    tamsulosin (Flomax) 0.4 MG capsule 24 hr capsule Take 1 capsule by mouth every night at bedtime. 90 capsule 3 3/31/2024    torsemide (DEMADEX) 20 MG tablet Take 4 tablets by mouth 2 (Two) Times a Day. 720 tablet 3 3/31/2024    vilazodone (Viibryd) 40 MG tablet tablet Take 1 tablet by mouth Daily. 90 tablet 3 3/31/2024       Objective     Results Review:  LABS:    Admission on 03/31/2024   Component Date Value Ref Range Status    Glucose 03/31/2024 281 (H)  65 - 99 mg/dL Final    BUN 03/31/2024 37 (H)  8 - 23 mg/dL Final    Creatinine 03/31/2024 2.21 (H)  0.76 - 1.27 mg/dL Final    Sodium 03/31/2024 142  136 - 145 mmol/L Final    Potassium 03/31/2024 3.4 (L)  3.5 - 5.2 mmol/L Final    Specimen hemolyzed.  Result may be falsely elevated.    Chloride 03/31/2024 96 (L)  98 - 107 mmol/L Final    CO2 03/31/2024 31.0 (H)  22.0 - 29.0 mmol/L Final    Calcium 03/31/2024 8.9  8.6 - 10.5 mg/dL Final    Total Protein 03/31/2024 6.6  6.0 - 8.5 g/dL Final    Albumin 03/31/2024 4.3  3.5 - 5.2 g/dL Final    ALT (SGPT) 03/31/2024 61 (H)  1 - 41 U/L Final    Specimen hemolyzed.  Result may  be falsely elevated.    AST (SGOT) 03/31/2024 39  1 - 40 U/L Final    Specimen hemolyzed.  Result may be falsely elevated.    Alkaline Phosphatase 03/31/2024 89  39 - 117 U/L Final    Total Bilirubin 03/31/2024 0.4  0.0 - 1.2 mg/dL Final    Globulin 03/31/2024 2.3  gm/dL Final    A/G Ratio 03/31/2024 1.9  g/dL Final    BUN/Creatinine Ratio 03/31/2024 16.7  7.0 - 25.0 Final    Anion Gap 03/31/2024 15.0  5.0 - 15.0 mmol/L Final    eGFR 03/31/2024 32.9 (L)  >60.0 mL/min/1.73 Final    Protime 03/31/2024 12.5  11.7 - 14.2 Seconds Final    INR 03/31/2024 0.91  0.90 - 1.10 Final    PTT 03/31/2024 30.7  22.7 - 35.4 seconds Final    WBC 03/31/2024 6.36  3.40 - 10.80 10*3/mm3 Final    RBC 03/31/2024 4.86  4.14 - 5.80 10*6/mm3 Final    Hemoglobin 03/31/2024 13.9  13.0 - 17.7 g/dL Final    Hematocrit  03/31/2024 43.2  37.5 - 51.0 % Final    MCV 03/31/2024 88.9  79.0 - 97.0 fL Final    MCH 03/31/2024 28.6  26.6 - 33.0 pg Final    MCHC 03/31/2024 32.2  31.5 - 35.7 g/dL Final    RDW 03/31/2024 13.8  12.3 - 15.4 % Final    RDW-SD 03/31/2024 44.4  37.0 - 54.0 fl Final    MPV 03/31/2024 11.4  6.0 - 12.0 fL Final    Platelets 03/31/2024 151  140 - 450 10*3/mm3 Final    Neutrophil % 03/31/2024 61.3  42.7 - 76.0 % Final    Lymphocyte % 03/31/2024 25.0  19.6 - 45.3 % Final    Monocyte % 03/31/2024 9.1  5.0 - 12.0 % Final    Eosinophil % 03/31/2024 2.5  0.3 - 6.2 % Final    Basophil % 03/31/2024 0.8  0.0 - 1.5 % Final    Immature Grans % 03/31/2024 1.3 (H)  0.0 - 0.5 % Final    Neutrophils, Absolute 03/31/2024 3.90  1.70 - 7.00 10*3/mm3 Final    Lymphocytes, Absolute 03/31/2024 1.59  0.70 - 3.10 10*3/mm3 Final    Monocytes, Absolute 03/31/2024 0.58  0.10 - 0.90 10*3/mm3 Final    Eosinophils, Absolute 03/31/2024 0.16  0.00 - 0.40 10*3/mm3 Final    Basophils, Absolute 03/31/2024 0.05  0.00 - 0.20 10*3/mm3 Final    Immature Grans, Absolute 03/31/2024 0.08 (H)  0.00 - 0.05 10*3/mm3 Final    nRBC 03/31/2024 0.0  0.0 - 0.2 /100 WBC Final    Hemoglobin A1C 03/31/2024 6.60 (H)  4.80 - 5.60 % Final    WBC 04/01/2024 5.98  3.40 - 10.80 10*3/mm3 Final    RBC 04/01/2024 4.97  4.14 - 5.80 10*6/mm3 Final    Hemoglobin 04/01/2024 14.4  13.0 - 17.7 g/dL Final    Hematocrit 04/01/2024 44.5  37.5 - 51.0 % Final    MCV 04/01/2024 89.5  79.0 - 97.0 fL Final    MCH 04/01/2024 29.0  26.6 - 33.0 pg Final    MCHC 04/01/2024 32.4  31.5 - 35.7 g/dL Final    RDW 04/01/2024 13.7  12.3 - 15.4 % Final    RDW-SD 04/01/2024 45.0  37.0 - 54.0 fl Final    MPV 04/01/2024 11.3  6.0 - 12.0 fL Final    Platelets 04/01/2024 143  140 - 450 10*3/mm3 Final    Glucose 04/01/2024 263 (H)  65 - 99 mg/dL Final    BUN 04/01/2024 41 (H)  8 - 23 mg/dL Final    Creatinine 04/01/2024 2.15 (H)  0.76 - 1.27 mg/dL Final    Sodium 04/01/2024 142  136 - 145 mmol/L Final     Potassium 04/01/2024 3.9  3.5 - 5.2 mmol/L Final    Slight hemolysis detected by analyzer. Result may be falsely elevated.    Chloride 04/01/2024 100  98 - 107 mmol/L Final    CO2 04/01/2024 32.0 (H)  22.0 - 29.0 mmol/L Final    Calcium 04/01/2024 9.3  8.6 - 10.5 mg/dL Final    BUN/Creatinine Ratio 04/01/2024 19.1  7.0 - 25.0 Final    Anion Gap 04/01/2024 10.0  5.0 - 15.0 mmol/L Final    eGFR 04/01/2024 34.0 (L)  >60.0 mL/min/1.73 Final    Glucose 04/01/2024 220 (H)  70 - 130 mg/dL Final    Glucose 04/01/2024 250 (H)  70 - 130 mg/dL Final       DIAGNOSTICS:  MRI lumbar spine without contrast 4/1/2024:  L1-2 is unremarkable.  L2-3 shows mild spinal stenosis and bilateral foraminal stenosis secondary to disc bulging.  L3-4 shows mild disc bulging with mild to moderate central canal stenosis and bilateral foraminal stenosis  L4-5 shows moderate facet arthropathy which causes moderate spinal canal stenosis and moderate bilateral foraminal stenosis.  L5-S1 no significant central canal stenosis however there is mild disc bulging and facet arthrosis with increased fluid within the right facet joint.  There is also severe left and moderate right foraminal stenosis.  There is congenital spinal canal narrowing secondary to epidural lipomatosis which causes increased canal stenosis at multiple levels.    Results Review:   I reviewed the patient's new clinical results.  I personally viewed and interpreted the patient's labs, imaging study, medications and chart    Vital Signs   Temp:  [97.5 °F (36.4 °C)-97.9 °F (36.6 °C)] 97.7 °F (36.5 °C)  Heart Rate:  [64-93] 70  Resp:  [16-18] 16  BP: (152-176)/(76-92) 173/92    Physical Exam:  Physical Exam  Constitutional:       General: He is not in acute distress.     Appearance: Normal appearance. He is not ill-appearing, toxic-appearing or diaphoretic.   HENT:      Head: Normocephalic.      Nose: Nose normal.      Mouth/Throat:      Mouth: Mucous membranes are moist.      Pharynx:  Oropharynx is clear.   Eyes:      Extraocular Movements: Extraocular movements intact.      Conjunctiva/sclera: Conjunctivae normal.   Cardiovascular:      Rate and Rhythm: Normal rate.   Pulmonary:      Effort: Pulmonary effort is normal.   Musculoskeletal:         General: Normal range of motion.      Cervical back: Normal range of motion.      Lumbar back: Negative right straight leg raise test and negative left straight leg raise test.   Skin:     General: Skin is warm.   Neurological:      Mental Status: He is alert and oriented to person, place, and time.      Gait: Gait is intact.      Deep Tendon Reflexes:      Reflex Scores:       Patellar reflexes are 2+ on the right side and 2+ on the left side.       Achilles reflexes are 2+ on the right side and 2+ on the left side.  Psychiatric:         Mood and Affect: Mood normal.         Speech: Speech normal.         Behavior: Behavior normal.         Thought Content: Thought content normal.         Judgment: Judgment normal.       Neurologic Exam     Mental Status   Oriented to person, place, and time.   Attention: normal. Concentration: normal.   Speech: speech is normal   Level of consciousness: alert  Knowledge: consistent with education.     Motor Exam   Overall muscle tone: normal    Strength   Right iliopsoas: 5/5  Left iliopsoas: 5/5  Right quadriceps: 5/5  Left quadriceps: 5/5  Right hamstrin/5  Left hamstrin/5  Right anterior tibial: 5/5  Left anterior tibial: 5/5  Right posterior tibial: 5/5  Left posterior tibial: 5/5  Right gastroc: 5/5  Left gastroc: 5/5Does have some very mildly decreased strength in the left foot with plantarflexion/dorsiflexion at 5 -/5.     Sensory Exam   Light touch normal.     Gait, Coordination, and Reflexes     Gait  Gait: normal    Reflexes   Right patellar: 2+  Left patellar: 2+  Right achilles: 2+  Left achilles: 2+  Right ankle clonus: absent  Left ankle clonus: absent      Assessment & Plan       Lumbar back pain  "with radiculopathy affecting left lower extremity    63-year-old male with approximately 3-week history of low back pain with left-sided radiculopathy.  He states the pain began when he was getting out of his truck.  Muscle relaxers and steroids have given very little relief.  MRI shows severe neuroforaminal stenosis to the left at L5-S1 which appears to be consistent with his radiculopathy.  He denies subjective weakness but I do  some very mild weakness on exam with plantar and dorsiflexion of the left foot.  I discussed risk and benefits of an epidural steroid injection and he was agreeable to proceed.  Neurosurgery will check back once complete.    If he feels better after injection today and symptoms are improved I think it is fine for him to discharge home.  We will get him set up for follow-up with neurosurgery in 2 months.  He should call if he develops any worsening back or leg pain, numbness, tingling or weakness.  He should also notify us if he develops any bowel or bladder incontinence/retention or saddle anesthesia.    PLAN:     Pain management for PADMINI today    I discussed the patient's findings and my recommendations with patient, family, and Dr. Spangler.    During patient visit, I utilized appropriate personal protective equipment including mask and gloves.  Mask used was standard procedure mask. Appropriate PPE was worn during the entire visit.  Hand hygiene was completed before and after.     Rory Andino, APRN  04/01/24  11:21 EDT    \"Dictated utilizing Dragon dictation\".    "

## 2024-04-01 NOTE — CONSULTS
[facility] Consult Note      Patient Name: Calos Moseley  : 1960  MRN: [id number]  Primary Care Physician:  Jailene Estrada APRN  Referring Physician: Feliciano Lopez MD  Date of admission: 3/31/2024    Subjective     Reason for Consult:  CKD3B    HPI:   Calos Moseley is a 63 y.o. male with CKD Stage 3B (BL Cr 1.8 to 2), HTN, cystine kidney stones, bipolar disorder who presented with lower back pain and left radiculopathy, worse despite recent steroid & muscle relaxant use.  Cr up slightly 2.2 yesterday (vs 1.9 on 3/15), but is stable 2.1 today.  Sees Dr Angel in our office, last visit in DEC, when nifedipine started for uncontrolled BP.  This has caused some worsening swelling.  He takes sizable dose of torsemide 80mg BID.  Also on toprol  mg, hydralazine 100 TID.  MRI lumbar spine with noted.  BP is elevated 150s to 170s systolic thus far.  Denies n/v or diarrhea or dysuria.      Review of Systems:   14 point review of systems is otherwise negative except for mentioned above on HPI    Personal History     Past Medical History:   Diagnosis Date    Anesthesia complication     STATES HARD TO WAKE UP AFTER PREVIOUS KIDNEY STONE SURGERY AND STATES HE CODED    Bilateral kidney stones     Bipolar affective     COVID     Disease of thyroid gland     Elevated cholesterol     Hypertension     Kidney stone on left side     Left flank pain     Mood disorder     Restless leg syndrome     Sleep apnea     cpap  bring dos    UTI (urinary tract infection) 2021    PREV FINISHED ANTIBIOTIC       Past Surgical History:   Procedure Laterality Date    ADENOIDECTOMY      APPENDECTOMY      BRONCHOSCOPY Right 2023    Procedure: BRONCHOSCOPY WITH ENDOBRONCHIAL ULTRASOUND with BAL of RLL and FNA;  Surgeon: Aniceto Bruno MD;  Location: [facility] ENDOSCOPY;  Service: Pulmonary;  Laterality: Right;  pre- RLL pneumonia, lymphadenopathy  post- same    COLONOSCOPY      COLONOSCOPY N/A  3/23/2023    Procedure: COLONOSCOPY into cecum with cold snare polypectomies;  Surgeon: Yoan Leonard MD;  Location: [facility] ENDOSCOPY;  Service: Gastroenterology;  Laterality: N/A;  polyps    CYSTOSCOPY WITH CLOT EVACUATION N/A 3/8/2022    Procedure: CYSTOSCOPY WITH CLOT EVACUATION;  Surgeon: Ihsan Lopez MD;  Location: [facility] OR;  Service: Urology;  Laterality: N/A;    CYSTOSCOPY, URETEROSCOPY, RETROGRADE PYELOGRAM, STENT INSERTION Left 3/7/2022    Procedure: CYSTOSCOPY URETEROSCOPY LASER LITHOTRIPSY and stent exchange;  Surgeon: Deuce Leija MD;  Location: [facility] OR;  Service: Urology;  Laterality: Left;    KIDNEY STONE SURGERY      NEPHROLITHOTRIPSY PERCUTANEOUS Left 2/21/2022    Procedure: NEPHROSTLITHOTOMY PERCUTANEOUS;  Surgeon: Deuce Leija MD;  Location: [facility] OR;  Service: Urology;  Laterality: Left;    NEPHROLITHOTRIPSY PERCUTANEOUS Left 2/23/2022    Procedure: LEFT FLEXIBLE NEPHROSTOMY WITH STONE EXTRACTION, LEFT NEPHROSTOMY, TUBE REMOVAL, BILATERAL URETERAL STENT PLACEMENT;  Surgeon: Deuce Leija MD;  Location: [facility] OR;  Service: Urology;  Laterality: Left;    NEPHROLITHOTRIPSY PERCUTANEOUS Right 3/7/2022    Procedure: RIGHT PERCUTANEOUS NEPHROLITHOTOMY W ACCESS;  Surgeon: Deuce Leija MD;  Location: [facility] OR;  Service: Urology;  Laterality: Right;    THORACOSCOPY WITH DECORTICATION Right 5/20/2023    Procedure: THORACOSCOPY VIDEO ASSISTED WITH DECORTICATION WITH DAVINCI ROBOT;  Surgeon: Jose Rice MD;  Location: [facility] MAIN OR;  Service: Robotics - DaVinci;  Laterality: Right;    TONSILLECTOMY         Family History: family history includes COPD in his father; Diabetes in his father; Emphysema in his father; Lung cancer in his mother.    Social History:  reports that he quit smoking about 33 years ago. His smoking use included cigarettes. He has never used smokeless tobacco. He reports that he does not currently use alcohol. He  reports that he does not use drugs.    Home Medications:  Prior to Admission medications    Medication Sig Start Date End Date Taking? Authorizing Provider   atorvastatin (LIPITOR) 20 MG tablet Take 1 tablet by mouth every night at bedtime. 3/15/24  Yes Jailene Estrada APRN   buPROPion XL (WELLBUTRIN XL) 150 MG 24 hr tablet Take 1 tablet by mouth Daily. 1/22/24  Yes    Fluticasone-Umeclidin-Vilant (Trelegy Ellipta) 100-62.5-25 MCG/ACT inhaler Inhale 1 puff Daily. 11/17/23  Yes    hydrALAZINE (APRESOLINE) 50 MG tablet Take 2 tablets by mouth 3 times a day. 1/23/24  Yes    isosorbide mononitrate (IMDUR) 30 MG 24 hr tablet Take 1 tablet by mouth Daily.Do not crush or chew. 2/20/24  Yes    lamoTRIgine (LaMICtal) 100 MG tablet Take 1 tablet by mouth 2 (Two) Times a Day - morning and evening 1/22/24  Yes    levothyroxine (SYNTHROID, LEVOTHROID) 50 MCG tablet Take 1 tablet by mouth Every Morning. 7/31/23  Yes    metoprolol succinate XL (TOPROL-XL) 100 MG 24 hr tablet Take 1 tablet by mouth Daily. 3/19/24  Yes Jailene Estrada APRN   NIFEdipine XL (PROCARDIA XL) 30 MG 24 hr tablet Take 1 tablet by mouth Daily. Do not crush, chew, or split. 12/13/23  Yes    potassium citrate (UROCIT-K) 10 MEQ (1080 MG) CR tablet Take 2 tablets by mouth in the morning and 2 tablets in the evening. Take with meals. Do not crush, chew, or split. 7/5/23  Yes    pramipexole (MIRAPEX) 0.75 MG tablet Take 1 tablet by mouth Every Night. 3/15/24  Yes Jailene Estrada APRN   tamsulosin (Flomax) 0.4 MG capsule 24 hr capsule Take 1 capsule by mouth every night at bedtime. 2/24/23  Yes    torsemide (DEMADEX) 20 MG tablet Take 4 tablets by mouth 2 (Two) Times a Day. 8/9/23  Yes    vilazodone (Viibryd) 40 MG tablet tablet Take 1 tablet by mouth Daily. 11/11/22  Yes    albuterol sulfate  (90 Base) MCG/ACT inhaler Inhale 1 puff by mouth via inhaler every 6 (six) hours as needed for Wheezing.  Patient not taking: Reported on 3/15/2024 2/3/23 4/1/24      naltrexone (DEPADE) 50 MG tablet Take 1 tablet by mouth Every Morning.  Patient not taking: Reported on 3/15/2024 7/31/23 4/1/24         Allergies:  No Known Allergies    Objective     Vitals:   Temp:  [97.5 °F (36.4 °C)-97.9 °F (36.6 °C)] 97.5 °F (36.4 °C)  Heart Rate:  [68-93] 78  Resp:  [18] 18  BP: (152-175)/(76-90) 175/90  Flow (L/min):  [2] 2  No intake or output data in the 24 hours ending 04/01/24 0807    Physical Exam:    General Appearance: alert, comfortable WM on RA  Skin: warm and dry  HEENT: oral mucosa normal, nonicteric sclera  Neck: supple, no JVD  Lungs: CTA bilat no rales  Heart: RRR, normal S1 and S2  Abdomen: soft, nontender, nondistended  : no palpable bladder  Extremities: 2+ BLE edema, no cyanosis or clubbing  Neuro: normal speech and mental status     Scheduled Meds:     insulin regular, 2-9 Units, Subcutaneous, Q6H  Lidocaine, 1 patch, Transdermal, Q24H  methocarbamol, 750 mg, Oral, 4x Daily  methylPREDNISolone sodium succinate, 60 mg, Intravenous, Q8H  sodium chloride, 10 mL, Intravenous, Q12H      IV Meds:        Results Reviewed:   I have personally reviewed the results from the time of this admission to 4/1/2024 08:07 EDT     Lab Results   Component Value Date    GLUCOSE 263 (H) 04/01/2024    CALCIUM 9.3 04/01/2024     04/01/2024    K 3.9 04/01/2024    CO2 32.0 (H) 04/01/2024     04/01/2024    BUN 41 (H) 04/01/2024    CREATININE 2.15 (H) 04/01/2024    EGFRIFNONA 42 (L) 02/23/2022    BCR 19.1 04/01/2024    ANIONGAP 10.0 04/01/2024      Lab Results   Component Value Date    MG 2.0 06/09/2023    PHOS 3.7 05/26/2023    ALBUMIN 4.3 03/31/2024           Assessment / Plan     ASSESSMENT:  CKD stage 3B - Cr 2.2 -> 2.1, close to BL 1.8 to 2 range and will fluctuate in relation to high dose diuretic use.  Sees Dr Angel.  Related to HTN & recurrent nephrolithiasis   HTN - uncontrolled.  CCB is worsening edema.  Will switch this to ACEi, tolerated in past.  Change toprol XL to  coreg.  On max hydralazine  LBP + radiculopathy - MRI noted; NSEG to see  Chronic BLE edema - worse with CCB.  On torsemide 80 BID  Hx recurrent nephrolithiasis with cystine stones, on K citrate, needs refill of this  DM2, diet controlled, but BG high 263 from recent steroid use  Mood disorder, on lamictal, viibryd    PLAN:  Resume torsemide 80 BID  DC nifdipine, start ACE  Change metop to coreg   Note plan for NSEG eval    Thank you for involving us in the care of Calos Moseley.  Please feel free to call with any questions.    Shravan Carl MD  04/01/24  08:07 EDT    [facility]  [phone]

## 2024-04-01 NOTE — ED NOTES
Nursing report ED to floor  Calos Moseley  63 y.o.  male    HPI :   Chief Complaint   Patient presents with    Leg Swelling       Admitting doctor:   Jailene Watson MD    Admitting diagnosis:   The primary encounter diagnosis was Lumbar radiculopathy, acute. Diagnoses of Bilateral lower extremity edema, Hypertension not at goal, Hyperglycemia, and Chronic kidney disease, unspecified CKD stage were also pertinent to this visit.    Code status:   Current Code Status       Date Active Code Status Order ID Comments User Context       Prior            Allergies:   Patient has no known allergies.    Isolation:   No active isolations    Intake and Output  No intake or output data in the 24 hours ending 04/01/24 0042    Weight:       03/31/24 2239   Weight: 118 kg (260 lb)       Most recent vitals:   Vitals:    03/31/24 2331 03/31/24 2347 03/31/24 2348 04/01/24 0001   BP: 154/76   159/76   Pulse: 80 70 68 69   Resp:       Temp:       TempSrc:       SpO2:  94% 94% 91%   Weight:       Height:           Active LDAs/IV Access:   Lines, Drains & Airways       Active LDAs       Name Placement date Placement time Site Days    Peripheral IV Anterior;Distal;Right;Upper Arm --  --  Arm  --                    Labs (abnormal labs have a star):   Labs Reviewed   COMPREHENSIVE METABOLIC PANEL - Abnormal; Notable for the following components:       Result Value    Glucose 281 (*)     BUN 37 (*)     Creatinine 2.21 (*)     Potassium 3.4 (*)     Chloride 96 (*)     CO2 31.0 (*)     ALT (SGPT) 61 (*)     eGFR 32.9 (*)     All other components within normal limits    Narrative:     GFR Normal >60  Chronic Kidney Disease <60  Kidney Failure <15     CBC WITH AUTO DIFFERENTIAL - Abnormal; Notable for the following components:    Immature Grans % 1.3 (*)     Immature Grans, Absolute 0.08 (*)     All other components within normal limits   PROTIME-INR - Normal   APTT - Normal   HEMOGLOBIN A1C   CBC AND DIFFERENTIAL    Narrative:     The following  orders were created for panel order CBC & Differential.  Procedure                               Abnormality         Status                     ---------                               -----------         ------                     CBC Auto Differential[[id number]]        Abnormal            Final result                 Please view results for these tests on the individual orders.       EKG:   No orders to display       Meds given in ED:   Medications   sodium chloride 0.9 % flush 10 mL (has no administration in time range)   morphine injection 4 mg (4 mg Intravenous Given 3/31/24 2326)   methylPREDNISolone sodium succinate (SOLU-Medrol) injection 80 mg (80 mg Intravenous Given 3/31/24 2325)       Imaging results:  XR Tibia Fibula 2 View Left    Result Date: 2024  No acute osseous findings.  This report was finalized on 2024 12:07 AM by Dr. Breann Johns M.D on Workstation: [id number]       Ambulatory status:   - ambulatory    Social issues:   Social History     Socioeconomic History    Marital status:    Tobacco Use    Smoking status: Former     Current packs/day: 0.00     Types: Cigarettes     Quit date: 1990     Years since quittin.2    Smokeless tobacco: Never   Vaping Use    Vaping status: Never Used   Substance and Sexual Activity    Alcohol use: Not Currently    Drug use: No    Sexual activity: Defer       NIH Stroke Scale:       Pato Morfin RN  24 00:42 EDT

## 2024-04-01 NOTE — H&P
INTERVAL HISTORY:    The patient returns for his first transforaminal epidural, left, lumbar 5 sacral 1, today.  They have received N/A% improvement since their last injection with a pain level of 4/10 at its worst recently.    Conservative measures tried in the interim. Daily activities are still affected by the pain.    Radiology reports and/or previous notes have been reviewed and are consistent with their diagnosis of Post-Op Diagnosis Codes:     * Lumbar radiculopathy [M54.16]    Alert and oriented  MP - 2  Lungs - clear  CV - rrr    Diagnosis:  Post-Op Diagnosis Codes:     * Lumbar radiculopathy [M54.16]      Plan: transforaminal epidural, left, lumbar 5 sacral 1  under fluoroscopic guidance      Target : transforaminal epidural, left, lumbar 5 sacral 1     I have encouraged them to continue:  1.  Physical therapy exercises at home as prescribed by physical therapy or from the pain clinic handout (given to the patient).  Continuation of these exercises every day, or multiple times per week, even when the patient has good pain relief, was stressed to the patient as a preventative measure to decrease the frequency and severity of future pain episodes.  2.  Continue pain medicines as already prescribed.  If patient not currently taking any, it is recommended to begin Acetaminophen 1000 mg po q 8 hours.  If other medicines containing Acetaminophen are currently prescribed, maintain daily dose at 3000mg.    3.  If they can tolerate NSAIDS, it is recommended to take Ibuprofen 600 mg po q 6 hours for 7 days during pain exacerbations.   Alternatively, they may substitute an NSAID of their choice (e.g. Aleve)  4.  Heat and ice to the affected area as tolerated for pain control.  It was discussed that heating pads can cause burns.  5.  Low impact exercise such as walking or water exercise was recommended to maintain overall health and aid in weight control.   6.  Follow up as needed for subsequent injections.  7.   Patient was counseled to abstain from tobacco products.    Time : 23   min

## 2024-04-01 NOTE — PLAN OF CARE
Goal Outcome Evaluation:  Plan of Care Reviewed With: patient, spouse           Outcome Evaluation: pt admitted for lower back pain, edema. seen by Nephrology, resume home blood pressure meds and diuretic. Epidural completed with pain relief.  pt ambulated around unit independently. pt deing discharged home      Problem: Adult Inpatient Plan of Care  Goal: Plan of Care Review  Outcome: Met  Flowsheets (Taken 4/1/2024 0135)  Plan of Care Reviewed With:   patient   spouse  Outcome Evaluation: pt admitted for lower back pain, edema. seen by Nephrology, resume home blood pressure meds and diuretic. Epidural completed with pain relief.  pt ambulated around unit independently. pt deing discharged home  Goal: Patient-Specific Goal (Individualized)  Outcome: Met  Goal: Absence of Hospital-Acquired Illness or Injury  Outcome: Met  Goal: Optimal Comfort and Wellbeing  Outcome: Met  Goal: Readiness for Transition of Care  Outcome: Met     Problem: COPD (Chronic Obstructive Pulmonary Disease) Comorbidity  Goal: Maintenance of COPD Symptom Control  Outcome: Met     Problem: Diabetes Comorbidity  Goal: Blood Glucose Level Within Targeted Range  Outcome: Met     Problem: Hypertension Comorbidity  Goal: Blood Pressure in Desired Range  Outcome: Met     Problem: Obstructive Sleep Apnea Risk or Actual Comorbidity Management  Goal: Unobstructed Breathing During Sleep  Outcome: Met

## 2024-04-01 NOTE — ED PROVIDER NOTES
EMERGENCY DEPARTMENT ENCOUNTER  Room Number:  18/18  PCP: Jailene Estrada APRN  Independent Historians: Patient      HPI:  Chief Complaint: Left lower extremity pain    A complete HPI/ROS/PMH/PSH/SH/FH are unobtainable due to: None    Chronic or social conditions impacting patient care (Social Determinants of Health): None      Context: Calos Moseley is a 63 y.o. male with a medical history of hyperlipidemia, hypertension and CKD who presents to the ED c/o acute left lower extremity pain worsening over 2 to 3 weeks.  Pain is worse with certain movements, walking and with lying flat.  Patient has been sleeping in a recliner with his feet down for the last 2 weeks.  He is also on nifedipine which she reports causes his legs to swell.  Patient was referred by his PCP to Dr. Gennaro Ken who recommended MRI lumbar spine but is not scheduled for sometime and he is miserable and unable to sleep at this time.  No chest pain, shortness of breath, fevers, dyspnea, abdominal pain nausea or vomiting reported.      Review of prior external notes (non-ED) -and- Review of prior external test results outside of this encounter:  PCP office note 3/15/2024 reviewed: Patient seen for sciatica left side and is also followed for prediabetes and restless legs.      PAST MEDICAL HISTORY  Active Ambulatory Problems     Diagnosis Date Noted    HLD (hyperlipidemia) 01/07/2022    Obesity, Class III, BMI 40-49.9 (morbid obesity) 01/07/2022    Bilateral kidney stones 01/07/2022    COPD (chronic obstructive pulmonary disease) 01/07/2022    Left flank pain 02/14/2022    Anxiety and depression 01/29/2018    Hypothyroidism 01/29/2018    ISIAH (obstructive sleep apnea) 01/26/2022    Prediabetes 09/06/2019    Essential hypertension 01/29/2018    Calculus, renal 02/21/2022    Restless leg syndrome     Bipolar affective     Calculus of ureter 03/07/2022    Gross hematuria 02/25/2022    Encounter for screening for malignant neoplasm of colon 01/31/2023     Acute dyspnea 05/16/2023    Pleural effusion on right 05/16/2023    CKD (chronic kidney disease) 05/16/2023    Acute respiratory failure with hypoxia 05/17/2023    Lymphadenopathy, mediastinal 05/16/2023    Hypoxia 05/26/2023    Pedal edema 06/10/2023    Stage 3a chronic kidney disease 06/10/2023    Anemia 06/10/2023    Chronic respiratory failure with hypoxia 06/10/2023     Resolved Ambulatory Problems     Diagnosis Date Noted    SBO (small bowel obstruction) 11/03/2017    MESHA (acute kidney injury) 01/07/2022    Acute cystitis with hematuria 01/07/2022    Hyperglycemia 01/07/2022    HTN (hypertension) 01/07/2022    Acute pyelonephritis 01/07/2022    Hematuria 01/07/2022    Acute renal failure (ARF) 02/14/2022    Cervical spondylosis 09/11/2018    History of renal stone 01/29/2018    Hypokalemia 01/29/2018    Right ureteral stone 01/04/2018    Hyperlipidemia 01/29/2018    Obesity 12/01/2021     Past Medical History:   Diagnosis Date    Anesthesia complication     COVID 2022    Disease of thyroid gland     Elevated cholesterol     Hypertension     Kidney stone on left side     Mood disorder     Sleep apnea     UTI (urinary tract infection) 12/2021         PAST SURGICAL HISTORY  Past Surgical History:   Procedure Laterality Date    ADENOIDECTOMY      APPENDECTOMY      BRONCHOSCOPY Right 5/19/2023    Procedure: BRONCHOSCOPY WITH ENDOBRONCHIAL ULTRASOUND with BAL of RLL and FNA;  Surgeon: Aniceto Bruno MD;  Location: [facility] ENDOSCOPY;  Service: Pulmonary;  Laterality: Right;  pre- RLL pneumonia, lymphadenopathy  post- same    COLONOSCOPY      COLONOSCOPY N/A 3/23/2023    Procedure: COLONOSCOPY into cecum with cold snare polypectomies;  Surgeon: Yoan Leonard MD;  Location: [facility] ENDOSCOPY;  Service: Gastroenterology;  Laterality: N/A;  polyps    CYSTOSCOPY WITH CLOT EVACUATION N/A 3/8/2022    Procedure: CYSTOSCOPY WITH CLOT EVACUATION;  Surgeon: Ihsan Lopez MD;  Location: [facility] OR;   Service: Urology;  Laterality: N/A;    CYSTOSCOPY, URETEROSCOPY, RETROGRADE PYELOGRAM, STENT INSERTION Left 3/7/2022    Procedure: CYSTOSCOPY URETEROSCOPY LASER LITHOTRIPSY and stent exchange;  Surgeon: Deuce Leija MD;  Location: [facility] MAIN OR;  Service: Urology;  Laterality: Left;    KIDNEY STONE SURGERY      NEPHROLITHOTRIPSY PERCUTANEOUS Left 2022    Procedure: NEPHROSTLITHOTOMY PERCUTANEOUS;  Surgeon: Deuce Leija MD;  Location: [facility] MAIN OR;  Service: Urology;  Laterality: Left;    NEPHROLITHOTRIPSY PERCUTANEOUS Left 2022    Procedure: LEFT FLEXIBLE NEPHROSTOMY WITH STONE EXTRACTION, LEFT NEPHROSTOMY, TUBE REMOVAL, BILATERAL URETERAL STENT PLACEMENT;  Surgeon: Deuce Leija MD;  Location: [facility] OR;  Service: Urology;  Laterality: Left;    NEPHROLITHOTRIPSY PERCUTANEOUS Right 3/7/2022    Procedure: RIGHT PERCUTANEOUS NEPHROLITHOTOMY W ACCESS;  Surgeon: Deuce Leija MD;  Location: [facility];  Service: Urology;  Laterality: Right;    THORACOSCOPY WITH DECORTICATION Right 2023    Procedure: THORACOSCOPY VIDEO ASSISTED WITH DECORTICATION WITH DAVINCI ROBOT;  Surgeon: Jose Rice MD;  Location: [facility] OR;  Service: Robotics - DaVinci;  Laterality: Right;    TONSILLECTOMY           FAMILY HISTORY  Family History   Problem Relation Age of Onset    Lung cancer Mother     COPD Father     Emphysema Father     Diabetes Father     Malig Hyperthermia Neg Hx          SOCIAL HISTORY  Social History     Socioeconomic History    Marital status:    Tobacco Use    Smoking status: Former     Current packs/day: 0.00     Types: Cigarettes     Quit date: 1990     Years since quittin.2    Smokeless tobacco: Never   Vaping Use    Vaping status: Never Used   Substance and Sexual Activity    Alcohol use: Not Currently    Drug use: No    Sexual activity: Defer         ALLERGIES  Patient has no known allergies.      REVIEW OF SYSTEMS  Review of  Systems  Included in HPI  All systems reviewed and negative except for those discussed in HPI.      PHYSICAL EXAM    I have reviewed the triage vital signs and nursing notes.    ED Triage Vitals   Temp Heart Rate Resp BP SpO2   03/31/24 2239 03/31/24 2239 03/31/24 2239 03/31/24 2255 03/31/24 2239   97.9 °F (36.6 °C) 93 18 167/79 91 %      Temp src Heart Rate Source Patient Position BP Location FiO2 (%)   03/31/24 2239 -- -- -- --   Tympanic           Physical Exam    Physical Exam   Constitutional: No distress.  Nontoxic but uncomfortable.  HENT:  Head: Normocephalic and atraumatic.   Oropharynx: Mucous membranes are moist.   Eyes: . No scleral icterus. No conjunctival pallor.  Neck: Normal range of motion. Neck supple.   Cardiovascular: Pink warm and well perfused throughout.    Pulmonary/Chest: No respiratory distress.  No tachypnea or increased work of breathing appreciated.    Abdominal: Soft. There is no tenderness. There is no rebound and no guarding.   Musculoskeletal: Moves all extremities equally.  2+ pitting edema bilateral shins.  No calf tenderness.  No midline tenderness with palpation of the lumbar spine though with bending and certain movements patient has shooting pain down his left lower extremity.  He is able to stand on his right leg independently but has significant discomfort with attempts at standing on his left leg.  No foot drop appreciated.  Sensation to light touch is grossly intact bilateral lower extremities.  Neurological: Alert and oriented.  No acute focal deficit appreciated.  Skin: Skin is pink, warm, and dry.   Psychiatric: Mood and affect normal.   Nursing note and vitals reviewed.             LAB RESULTS  Recent Results (from the past 24 hour(s))   Comprehensive Metabolic Panel    Collection Time: 03/31/24 11:22 PM    Specimen: Blood   Result Value Ref Range    Glucose 281 (H) 65 - 99 mg/dL    BUN 37 (H) 8 - 23 mg/dL    Creatinine 2.21 (H) 0.76 - 1.27 mg/dL    Sodium 142 136 - 145  mmol/L    Potassium 3.4 (L) 3.5 - 5.2 mmol/L    Chloride 96 (L) 98 - 107 mmol/L    CO2 31.0 (H) 22.0 - 29.0 mmol/L    Calcium 8.9 8.6 - 10.5 mg/dL    Total Protein 6.6 6.0 - 8.5 g/dL    Albumin 4.3 3.5 - 5.2 g/dL    ALT (SGPT) 61 (H) 1 - 41 U/L    AST (SGOT) 39 1 - 40 U/L    Alkaline Phosphatase 89 39 - 117 U/L    Total Bilirubin 0.4 0.0 - 1.2 mg/dL    Globulin 2.3 gm/dL    A/G Ratio 1.9 g/dL    BUN/Creatinine Ratio 16.7 7.0 - 25.0    Anion Gap 15.0 5.0 - 15.0 mmol/L    eGFR 32.9 (L) >60.0 mL/min/1.73   Protime-INR    Collection Time: 03/31/24 11:22 PM    Specimen: Blood   Result Value Ref Range    Protime 12.5 11.7 - 14.2 Seconds    INR 0.91 0.90 - 1.10   aPTT    Collection Time: 03/31/24 11:22 PM    Specimen: Blood   Result Value Ref Range    PTT 30.7 22.7 - 35.4 seconds   CBC Auto Differential    Collection Time: 03/31/24 11:22 PM    Specimen: Blood   Result Value Ref Range    WBC 6.36 3.40 - 10.80 10*3/mm3    RBC 4.86 4.14 - 5.80 10*6/mm3    Hemoglobin 13.9 13.0 - 17.7 g/dL    Hematocrit 43.2 37.5 - 51.0 %    MCV 88.9 79.0 - 97.0 fL    MCH 28.6 26.6 - 33.0 pg    MCHC 32.2 31.5 - 35.7 g/dL    RDW 13.8 12.3 - 15.4 %    RDW-SD 44.4 37.0 - 54.0 fl    MPV 11.4 6.0 - 12.0 fL    Platelets 151 140 - 450 10*3/mm3    Neutrophil % 61.3 42.7 - 76.0 %    Lymphocyte % 25.0 19.6 - 45.3 %    Monocyte % 9.1 5.0 - 12.0 %    Eosinophil % 2.5 0.3 - 6.2 %    Basophil % 0.8 0.0 - 1.5 %    Immature Grans % 1.3 (H) 0.0 - 0.5 %    Neutrophils, Absolute 3.90 1.70 - 7.00 10*3/mm3    Lymphocytes, Absolute 1.59 0.70 - 3.10 10*3/mm3    Monocytes, Absolute 0.58 0.10 - 0.90 10*3/mm3    Eosinophils, Absolute 0.16 0.00 - 0.40 10*3/mm3    Basophils, Absolute 0.05 0.00 - 0.20 10*3/mm3    Immature Grans, Absolute 0.08 (H) 0.00 - 0.05 10*3/mm3    nRBC 0.0 0.0 - 0.2 /100 WBC         RADIOLOGY  XR Tibia Fibula 2 View Left    Result Date: 4/1/2024  2 VIEWS LEFT TIBIA AND FIBULA  HISTORY: Left lower extremity pain  COMPARISON: None available.  FINDINGS:  No acute fracture or subluxation of the left lower extremity is seen. No aggressive osseous abnormalities are identified. Radiopaque density projects over the distal femur on the lateral view, of uncertain clinical significance. It may be external to the patient. Patient does appear to have some left lower extremity edema. This is more significant laterally.      No acute osseous findings.  This report was finalized on 4/1/2024 12:07 AM by Dr. Breann Johns M.D on Workstation: [id number]         MEDICATIONS GIVEN IN ER  Medications   sodium chloride 0.9 % flush 10 mL (has no administration in time range)   morphine injection 4 mg (4 mg Intravenous Given 3/31/24 1375)   methylPREDNISolone sodium succinate (SOLU-Medrol) injection 80 mg (80 mg Intravenous Given 3/31/24 3850)         ORDERS PLACED DURING THIS VISIT:  Orders Placed This Encounter   Procedures    XR Tibia Fibula 2 View Left    Comprehensive Metabolic Panel    Protime-INR    aPTT    CBC Auto Differential    Hemoglobin A1c    Monitor Blood Pressure    Continuous Pulse Oximetry    Insert Peripheral IV    CBC & Differential         OUTPATIENT MEDICATION MANAGEMENT:  Current Facility-Administered Medications Ordered in Epic   Medication Dose Route Frequency Provider Last Rate Last Admin    sodium chloride 0.9 % flush 10 mL  10 mL Intravenous PRN Feliciano Lopez MD         Current Outpatient Medications Ordered in Epic   Medication Sig Dispense Refill    albuterol sulfate  (90 Base) MCG/ACT inhaler Inhale 1 puff by mouth via inhaler every 6 (six) hours as needed for Wheezing. (Patient not taking: Reported on 3/15/2024) 8.5 g 11    atorvastatin (LIPITOR) 20 MG tablet Take 1 tablet by mouth every night at bedtime. 90 tablet 3    buPROPion XL (WELLBUTRIN XL) 150 MG 24 hr tablet Take 1 tablet by mouth Daily. 90 tablet 1    Fluticasone-Umeclidin-Vilant (Trelegy Ellipta) 100-62.5-25 MCG/ACT inhaler Inhale 1 puff Daily. 60 each 11    hydrALAZINE  (APRESOLINE) 50 MG tablet Take 2 tablets by mouth 3 times a day. 90 tablet 3    isosorbide mononitrate (IMDUR) 30 MG 24 hr tablet Take 1 tablet by mouth Daily.Do not crush or chew. 30 tablet 11    lamoTRIgine (LaMICtal) 100 MG tablet Take 1 tablet by mouth 2 (Two) Times a Day - morning and evening 180 tablet 1    levothyroxine (SYNTHROID, LEVOTHROID) 50 MCG tablet Take 1 tablet by mouth Every Morning. 30 tablet 0    metoprolol succinate XL (TOPROL-XL) 100 MG 24 hr tablet Take 1 tablet by mouth Daily. 90 tablet 1    naltrexone (DEPADE) 50 MG tablet Take 1 tablet by mouth Every Morning. (Patient not taking: Reported on 3/15/2024) 30 tablet 2    NIFEdipine XL (PROCARDIA XL) 30 MG 24 hr tablet Take 1 tablet by mouth Daily. Do not crush, chew, or split. 30 tablet 11    potassium citrate (UROCIT-K) 10 MEQ (1080 MG) CR tablet Take 2 tablets by mouth in the morning and 2 tablets in the evening. Take with meals. Do not crush, chew, or split. 360 tablet 3    pramipexole (MIRAPEX) 0.75 MG tablet Take 1 tablet by mouth Every Night. 30 tablet 2    tamsulosin (Flomax) 0.4 MG capsule 24 hr capsule Take 1 capsule by mouth every night at bedtime. 90 capsule 3    torsemide (DEMADEX) 20 MG tablet Take 4 tablets by mouth 2 (Two) Times a Day. 720 tablet 3    vilazodone (Viibryd) 40 MG tablet tablet Take 1 tablet by mouth Daily. 90 tablet 3         PROCEDURES  Procedures            PROGRESS, DATA ANALYSIS, CONSULTS, AND MEDICAL DECISION MAKING  All labs have been independently interpreted by me.  All radiology studies have been reviewed by me. All EKG's have been independently viewed and interpreted by me.  Discussion below represents my analysis of pertinent findings related to patient's condition, differential diagnosis, treatment plan and final disposition.    Differential diagnosis:   My differential diagnosis for back pain includes but is not limited to:  Musculoskeletal strain, contusion, retroperitoneal hematoma, disc protrusion,  vertebral fracture, transverse process fracture, rib fracture, facet syndrome, sacroiliac joint strain, sciatica, renal injury, splenic injury, pancreatic injury, osteoarthritis, lumbar spondylosis, spinal stenosis, ankylosing spondylitis, sacroiliac joint inflammation, pancreatitis, perforated peptic ulcer, diverticulitis, endometriosis, chronic PID, epidural abscess, osteomyelitis, retroperitoneal abscess, pyelonephritis, pneumonia, subphrenic abscess, tuberculosis, neurofibroma, meningioma, multiple myeloma, lymphoma, metastatic cancer, primary cancer, AAA, aortic dissection, spinal ischemia, referred pain, ureterolithiasis      Clinical Scores:                  ED Course as of 04/01/24 0036   Mon Apr 01, 2024   0005 RADIOLOGY      Study: Left tib-fib  Findings: No fracture or bony lesion appreciated  I independently viewed and interpreted these images contemporaneously with treatment.    [RS]   0006 WBC: 6.36 [RS]   0011 Hemoglobin: 13.9 [RS]   0036 Recommend admission for further evaluation and treatment.  Patient agreeable. [RS]   0036 CONSULT        Provider: RHINA Mota MA    Discussion: Reviewed patient history, ED presentation and evaluation.  Agreeable to accept patient for admission.    Agreeable c treatment and planned disposition.         [RS]      ED Course User Index  [RS] Feliciano Lopez MD         Prescription drug monitoring program review:     AS OF 00:36 EDT VITALS:    BP - 159/76  HR - 69  TEMP - 97.9 °F (36.6 °C) (Tympanic)  O2 SATS - 91%    COMPLEXITY OF CARE  The patient requires admission.      DIAGNOSIS  Final diagnoses:   Lumbar radiculopathy, acute   Bilateral lower extremity edema   Hypertension not at goal   Hyperglycemia   Chronic kidney disease, unspecified CKD stage         DISPOSITION  ED Disposition       ED Disposition   Decision to Admit    Condition   --    Comment   --                  ADMISSION    Discussed treatment plan and reason for admission with pt/family and  admitting physician.  Pt/family voiced understanding of the plan for admission for further testing/treatment as needed.       Please note that portions of this document were completed with a voice recognition program.    Note Disclaimer: At [facility], we believe that sharing information builds trust and better relationships. You are receiving this note because you recently visited [facility]. It is possible you will see health information before a provider has talked with you about it. This kind of information can be easy to misunderstand. To help you fully understand what it means for your health, we urge you to discuss this note with your provider.         Feliciano Lopez MD  04/01/24 0037

## 2024-04-01 NOTE — SIGNIFICANT NOTE
04/01/24 1149   OTHER   Discipline physical therapist   Therapy Assessment/Plan (PT)   Criteria for Skilled Interventions Met (PT) no;does not meet criteria for skilled intervention;no problems identified which require skilled intervention  (Up ad moy, no need to see after epidural, pt and family in agreement. Rn aware. Not appropriate for inpatient PT.)

## 2024-04-01 NOTE — PLAN OF CARE
Problem: Adult Inpatient Plan of Care  Goal: Plan of Care Review  Outcome: Ongoing, Progressing  Flowsheets (Taken 4/1/2024 0338)  Progress: no change  Plan of Care Reviewed With: patient  Outcome Evaluation: Patient admitted with lumbar back pain with radiculopathy affecting the left lower extremitiy. Increased keke lower extremities for 2-3 weeks. Creat 2.21. MRI show spinal stenosis and increased fluid thru L5-S1. Morpine and Norco for pain control. Patient will see Neurosurgery and Nephrology in the AM  Goal: Patient-Specific Goal (Individualized)  Outcome: Ongoing, Progressing  Goal: Absence of Hospital-Acquired Illness or Injury  Outcome: Ongoing, Progressing  Goal: Optimal Comfort and Wellbeing  Outcome: Ongoing, Progressing  Goal: Readiness for Transition of Care  Outcome: Ongoing, Progressing     Problem: COPD (Chronic Obstructive Pulmonary Disease) Comorbidity  Goal: Maintenance of COPD Symptom Control  Outcome: Ongoing, Progressing     Problem: Diabetes Comorbidity  Goal: Blood Glucose Level Within Targeted Range  Outcome: Ongoing, Progressing     Problem: Hypertension Comorbidity  Goal: Blood Pressure in Desired Range  Outcome: Ongoing, Progressing     Problem: Obstructive Sleep Apnea Risk or Actual Comorbidity Management  Goal: Unobstructed Breathing During Sleep  Outcome: Ongoing, Progressing   Goal Outcome Evaluation:  Plan of Care Reviewed With: patient        Progress: no change  Outcome Evaluation: Patient admitted with lumbar back pain with radiculopathy affecting the left lower extremitiy. Increased keke lower extremities for 2-3 weeks. Creat 2.21. MRI show spinal stenosis and increased fluid thru L5-S1. Morpine and Norco for pain control. Patient will see Neurosurgery and Nephrology in the AM

## 2024-04-02 ENCOUNTER — NURSE TRIAGE (OUTPATIENT)
Dept: CALL CENTER | Facility: HOSPITAL | Age: 64
End: 2024-04-02
Payer: COMMERCIAL

## 2024-04-02 ENCOUNTER — TELEPHONE (OUTPATIENT)
Dept: INTERNAL MEDICINE | Facility: CLINIC | Age: 64
End: 2024-04-02

## 2024-04-02 ENCOUNTER — TRANSITIONAL CARE MANAGEMENT TELEPHONE ENCOUNTER (OUTPATIENT)
Dept: CALL CENTER | Facility: HOSPITAL | Age: 64
End: 2024-04-02
Payer: COMMERCIAL

## 2024-04-02 ENCOUNTER — TELEPHONE (OUTPATIENT)
Dept: NEUROSURGERY | Facility: CLINIC | Age: 64
End: 2024-04-02
Payer: COMMERCIAL

## 2024-04-02 DIAGNOSIS — M54.16 LUMBAR BACK PAIN WITH RADICULOPATHY AFFECTING LEFT LOWER EXTREMITY: Primary | ICD-10-CM

## 2024-04-02 RX ORDER — METHYLPREDNISOLONE 4 MG/1
TABLET ORAL
Qty: 21 TABLET | Refills: 0 | Status: SHIPPED | OUTPATIENT
Start: 2024-04-02

## 2024-04-02 RX ORDER — HYDROCODONE BITARTRATE AND ACETAMINOPHEN 7.5; 325 MG/1; MG/1
1 TABLET ORAL EVERY 8 HOURS PRN
Qty: 10 TABLET | Refills: 0 | Status: SHIPPED | OUTPATIENT
Start: 2024-04-02 | End: 2024-04-05

## 2024-04-02 NOTE — TELEPHONE ENCOUNTER
Caller: Rebeca Moseely    Relationship to patient: Emergency Contact    Best call back number:      Patient is needing: WIFE STATES THAT PATIENT WENT TO [facility], [place], operated by [facility] ER ON 3/31 AND DIAGNOSED WITH A PINCHED BULGING DISC.   HE WAS GIVEN AN EPIDURAL, DISCHARGED TO HOME, BUT NO MEDS WERE PROVIDED TO HIM.    TODAY, THE PATIENT CALLED THE NEUROSURGEON, DR. AMAYA, ABOUT THE PAIN AND HE WAS PRESCRIBED A MUSCLE RELAXER AND STEROID.   PATIENT WAS ADVISED IF HE NEEDED PAIN MEDICATION, HE WOULD NEED TO REQUEST THAT THROUGH HIS PCP.  WIFE STATES TODAY THE PATIENT IS HAVING A LOT OF PAIN AND IS REQUESTING A PAIN MED TO BE CALLED IN FOR HIM.      PLEASE ADVISE IF THIS WILL BE DONE TODAY AS DR. AMAYA ALSO SAID IF PCP WILL NOT PRESCRIBE IT, THE PATIENT WILL HAVE TO RETURN TO THE E.R.

## 2024-04-02 NOTE — TELEPHONE ENCOUNTER
"S/W  PATIENT AND INFORMED PER ANGELA FRANCO,    \"I just sent a medrol dosepak to his pharmacy. I'm not sure there is much I could offer him in the office. If he feels he needs more pain medication I would have him reach out to his primary doctor. However, if his symptoms are that severe, he could represent to ER.\"    PATIENT EXPRESSED UNDERSTANDING    ________________________________________      Patient called stating that he is still having severe pain post injection, and he contacted pain mgmt and they said that he needs to rotate tylenol and advil, but he is unable to take advil due to his kidney issues. Would you like him to be scheduled for an appointment here in the office, and if so with whom?  "

## 2024-04-02 NOTE — OUTREACH NOTE
Call Center TCM Note      Flowsheet Row Responses   [facility], [place] patient discharged from? [place]   Does the patient have one of the following disease processes/diagnoses(primary or secondary)? Other   TCM attempt successful? No   Unsuccessful attempts Attempt 1   Call Status Left message            Aspen Evans RN    4/2/2024, 15:09 EDT

## 2024-04-02 NOTE — OUTREACH NOTE
Call Center TCM Note      Flowsheet Row Responses   [facility] patient discharged from? [place]   Does the patient have one of the following disease processes/diagnoses(primary or secondary)? Other   TCM attempt successful? Yes   Call start time 1631   Call end time 1641   Discharge diagnosis Lumbar back pain with radiculopathy affecting left lower extremity   Person spoke with today (if not patient) and relationship pt   Meds reviewed with patient/caregiver? Yes   Is the patient having any side effects they believe may be caused by any medication additions or changes? No   Does the patient have all medications ordered at discharge? Yes   Is the patient taking all medications as directed (includes completed medication regime)? Yes   Does the patient have an appointment with their PCP within 7-14 days of discharge? No   Nursing Interventions Routed TCM call to PCP office   Psychosocial issues? No   Did the patient receive a copy of their discharge instructions? Yes   Nursing interventions Reviewed instructions with patient   What is the patient's perception of their health status since discharge? Improving   Is the patient/caregiver able to teach back signs and symptoms related to disease process for when to call PCP? Yes   Is the patient/caregiver able to teach back signs and symptoms related to disease process for when to call 911? Yes   Is the patient/caregiver able to teach back the hierarchy of who to call/visit for symptoms/problems? PCP, Specialist, Home health nurse, Urgent Care, ED, 911 Yes   If the patient is a current smoker, are they able to teach back resources for cessation? Not a smoker   TCM call completed? Yes   Wrap up additional comments Spouse states pt's pain has worsened since hospital dc/Epidural injection. Pt shares pain medication/steriods were called in to help with pain. Spouse advised to take pt back to the ER as pt is not able to put weight on left leg r/t worsened pain/left calf  pain.   Call end time 1641   Would this patient benefit from a Referral to [facility] Social Work? No   Is the patient interested in additional calls from an ambulatory ? No            Aspen Evans RN    4/2/2024, 16:51 EDT

## 2024-04-02 NOTE — TELEPHONE ENCOUNTER
Informed spouse of this she wanted to let us the ER recommended him to switch to an internist with his complicated health concerns. But they do love you.

## 2024-04-02 NOTE — TELEPHONE ENCOUNTER
"Reason for Disposition   Patient sounds very sick or weak to the triager    Additional Information   Negative: Looks like a broken bone or dislocated joint (e.g., crooked or deformed)   Negative: Sounds like a life-threatening emergency to the triager   Negative: Followed a leg injury   Negative: Leg swelling is main symptom   Negative: Back pain radiating (shooting) into leg(s)   Negative: Knee pain is main symptom   Negative: Ankle pain is main symptom   Negative: Pregnant   Negative: Postpartum (from 0 to 6 weeks after delivery)   Negative: Chest pain   Negative: Difficulty breathing   Negative: Entire foot is cool or blue in comparison to other side   Negative: Unable to walk   Negative: [1] Red area or streak AND [2] fever   Negative: [1] Swollen joint AND [2] fever   Negative: [1] Cast on leg or ankle AND [2] now increased pain    Answer Assessment - Initial Assessment Questions  1. ONSET: \"When did the pain start?\"       Few days ago  2. LOCATION: \"Where is the pain located?\"       Left leg  3. PAIN: \"How bad is the pain?\"    (Scale 1-10; or mild, moderate, severe)    -  MILD (1-3): doesn't interfere with normal activities     -  MODERATE (4-7): interferes with normal activities (e.g., work or school) or awakens from sleep, limping     -  SEVERE (8-10): excruciating pain, unable to do any normal activities, unable to walk      severe  4. WORK OR EXERCISE: \"Has there been any recent work or exercise that involved this part of the body?\"       denies  5. CAUSE: \"What do you think is causing the leg pain?\"      Unknown; states he had an epidural at pain management yesterday  6. OTHER SYMPTOMS: \"Do you have any other symptoms?\" (e.g., chest pain, back pain, breathing difficulty, swelling, rash, fever, numbness, weakness)      denies  7. PREGNANCY: \"Is there any chance you are pregnant?\" \"When was your last menstrual period?\"      na    Protocols used: Leg Pain-ADULT-AH    "

## 2024-04-03 ENCOUNTER — HOSPITAL ENCOUNTER (EMERGENCY)
Facility: HOSPITAL | Age: 64
Discharge: HOME | End: 2024-04-03
Attending: EMERGENCY MEDICINE | Admitting: EMERGENCY MEDICINE
Payer: COMMERCIAL

## 2024-04-03 ENCOUNTER — HOSPITAL ENCOUNTER (OUTPATIENT)
Dept: MRI IMAGING | Facility: HOSPITAL | Age: 64
Discharge: HOME | End: 2024-04-03
Payer: COMMERCIAL

## 2024-04-03 ENCOUNTER — APPOINTMENT (OUTPATIENT)
Dept: CARDIOLOGY | Facility: HOSPITAL | Age: 64
End: 2024-04-03
Payer: COMMERCIAL

## 2024-04-03 VITALS
BODY MASS INDEX: 43.32 KG/M2 | SYSTOLIC BLOOD PRESSURE: 159 MMHG | DIASTOLIC BLOOD PRESSURE: 72 MMHG | RESPIRATION RATE: 18 BRPM | HEART RATE: 72 BPM | TEMPERATURE: 96.7 F | OXYGEN SATURATION: 94 % | HEIGHT: 65 IN | WEIGHT: 260 LBS

## 2024-04-03 DIAGNOSIS — M79.605 LEFT LEG PAIN: Primary | ICD-10-CM

## 2024-04-03 DIAGNOSIS — M54.16 LUMBAR BACK PAIN WITH RADICULOPATHY AFFECTING LEFT LOWER EXTREMITY: Primary | ICD-10-CM

## 2024-04-03 DIAGNOSIS — M48.061 SPINAL STENOSIS OF LUMBAR REGION, UNSPECIFIED WHETHER NEUROGENIC CLAUDICATION PRESENT: ICD-10-CM

## 2024-04-03 LAB
BH CV LOWER VASCULAR LEFT COMMON FEMORAL AUGMENT: NORMAL
BH CV LOWER VASCULAR LEFT COMMON FEMORAL COMPETENT: NORMAL
BH CV LOWER VASCULAR LEFT COMMON FEMORAL COMPRESS: NORMAL
BH CV LOWER VASCULAR LEFT COMMON FEMORAL PHASIC: NORMAL
BH CV LOWER VASCULAR LEFT COMMON FEMORAL SPONT: NORMAL
BH CV LOWER VASCULAR LEFT DISTAL FEMORAL COMPRESS: NORMAL
BH CV LOWER VASCULAR LEFT GASTRONEMIUS COMPRESS: NORMAL
BH CV LOWER VASCULAR LEFT GREATER SAPH AK COMPRESS: NORMAL
BH CV LOWER VASCULAR LEFT GREATER SAPH BK COMPRESS: NORMAL
BH CV LOWER VASCULAR LEFT LESSER SAPH COMPRESS: NORMAL
BH CV LOWER VASCULAR LEFT MID FEMORAL AUGMENT: NORMAL
BH CV LOWER VASCULAR LEFT MID FEMORAL COMPETENT: NORMAL
BH CV LOWER VASCULAR LEFT MID FEMORAL COMPRESS: NORMAL
BH CV LOWER VASCULAR LEFT MID FEMORAL PHASIC: NORMAL
BH CV LOWER VASCULAR LEFT MID FEMORAL SPONT: NORMAL
BH CV LOWER VASCULAR LEFT PERONEAL COMPRESS: NORMAL
BH CV LOWER VASCULAR LEFT POPLITEAL AUGMENT: NORMAL
BH CV LOWER VASCULAR LEFT POPLITEAL COMPETENT: NORMAL
BH CV LOWER VASCULAR LEFT POPLITEAL COMPRESS: NORMAL
BH CV LOWER VASCULAR LEFT POPLITEAL PHASIC: NORMAL
BH CV LOWER VASCULAR LEFT POPLITEAL SPONT: NORMAL
BH CV LOWER VASCULAR LEFT POSTERIOR TIBIAL COMPRESS: NORMAL
BH CV LOWER VASCULAR LEFT PROFUNDA FEMORAL COMPRESS: NORMAL
BH CV LOWER VASCULAR LEFT PROXIMAL FEMORAL COMPRESS: NORMAL
BH CV LOWER VASCULAR LEFT SAPHENOFEMORAL JUNCTION COMPRESS: NORMAL
BH CV LOWER VASCULAR RIGHT COMMON FEMORAL AUGMENT: NORMAL
BH CV LOWER VASCULAR RIGHT COMMON FEMORAL COMPETENT: NORMAL
BH CV LOWER VASCULAR RIGHT COMMON FEMORAL COMPRESS: NORMAL
BH CV LOWER VASCULAR RIGHT COMMON FEMORAL PHASIC: NORMAL
BH CV LOWER VASCULAR RIGHT COMMON FEMORAL SPONT: NORMAL
BH CV VAS PRELIMINARY FINDINGS SCRIPTING: 1

## 2024-04-03 PROCEDURE — 93971 EXTREMITY STUDY: CPT | Performed by: SURGERY

## 2024-04-03 PROCEDURE — 99284 EMERGENCY DEPT VISIT MOD MDM: CPT

## 2024-04-03 PROCEDURE — 99283 EMERGENCY DEPT VISIT LOW MDM: CPT | Performed by: NURSE PRACTITIONER

## 2024-04-03 PROCEDURE — 93971 EXTREMITY STUDY: CPT

## 2024-04-03 RX ORDER — GABAPENTIN 100 MG/1
CAPSULE ORAL
Qty: 180 CAPSULE | Refills: 1 | Status: SHIPPED | OUTPATIENT
Start: 2024-04-03

## 2024-04-03 RX ORDER — GABAPENTIN 100 MG/1
200 CAPSULE ORAL 3 TIMES DAILY
Status: DISCONTINUED | OUTPATIENT
Start: 2024-04-03 | End: 2024-04-03 | Stop reason: HOSPADM

## 2024-04-03 RX ADMIN — GABAPENTIN 200 MG: 100 CAPSULE ORAL at 16:14

## 2024-04-03 NOTE — ED TRIAGE NOTES
Pt to ED from home due to Left ankle, calf and hip pain. Pt stated this is ongoing pain x 4 weeks. L ankle and L hip pain (epidural done Monday with no relief).  Increase pain with walking.     Pt stated minimum relief after epidural. PT was prescribed pain medications and steroids yesterday.     Pt denies issue with bowl and bladder function.

## 2024-04-03 NOTE — ED PROVIDER NOTES
EMERGENCY DEPARTMENT ENCOUNTER    Room Number:  35/35  PCP: Jailene Estrada APRN    HPI:  Chief Complaint: Left leg pain  A complete HPI/ROS/PMH/PSH/SH/FH are unobtainable due to: None  Context: Calos Moseley is a 63 y.o. male who presents to the ED c/o acute left leg pain.  He states that this began about 4 weeks ago when he got out of his truck landed onto his heel and felt pain shoot up his leg to the low back/hip on the left side.  Pain has been constant.  He had epidural injection 2 days ago.  He states that he is expecting to be of the walk out of here after getting epidural but was still having pain.  He states that overall the pain is improved but he states that his mobility has worsened.  No fever.  No change in bowel or bladder.  He states that a nurse called to check in on him and suggested that his pain in his left leg could be due to a blood clot so he and his wife are also concerned about the possibility.        PAST MEDICAL HISTORY  Active Ambulatory Problems     Diagnosis Date Noted    HLD (hyperlipidemia) 01/07/2022    Obesity, Class III, BMI 40-49.9 (morbid obesity) 01/07/2022    Bilateral kidney stones 01/07/2022    COPD (chronic obstructive pulmonary disease) 01/07/2022    Left flank pain 02/14/2022    Anxiety and depression 01/29/2018    Hypothyroidism 01/29/2018    ISIAH (obstructive sleep apnea) 01/26/2022    Prediabetes 09/06/2019    Essential hypertension 01/29/2018    Calculus, renal 02/21/2022    Restless leg syndrome     Bipolar affective     Calculus of ureter 03/07/2022    Gross hematuria 02/25/2022    Encounter for screening for malignant neoplasm of colon 01/31/2023    Acute dyspnea 05/16/2023    Pleural effusion on right 05/16/2023    CKD (chronic kidney disease) 05/16/2023    Acute respiratory failure with hypoxia 05/17/2023    Lymphadenopathy, mediastinal 05/16/2023    Hypoxia 05/26/2023    Pedal edema 06/10/2023    Stage 3a chronic kidney disease 06/10/2023    Anemia 06/10/2023     Chronic respiratory failure with hypoxia 06/10/2023    Lumbar back pain with radiculopathy affecting left lower extremity 04/01/2024     Resolved Ambulatory Problems     Diagnosis Date Noted    SBO (small bowel obstruction) 11/03/2017    MESHA (acute kidney injury) 01/07/2022    Acute cystitis with hematuria 01/07/2022    Hyperglycemia 01/07/2022    HTN (hypertension) 01/07/2022    Acute pyelonephritis 01/07/2022    Hematuria 01/07/2022    Acute renal failure (ARF) 02/14/2022    Cervical spondylosis 09/11/2018    History of renal stone 01/29/2018    Hypokalemia 01/29/2018    Right ureteral stone 01/04/2018    Hyperlipidemia 01/29/2018    Obesity 12/01/2021     Past Medical History:   Diagnosis Date    Anesthesia complication     COVID 2022    Disease of thyroid gland     Elevated cholesterol     Hypertension     Kidney stone on left side     Mood disorder     Sleep apnea     UTI (urinary tract infection) 12/2021         PAST SURGICAL HISTORY  Past Surgical History:   Procedure Laterality Date    ADENOIDECTOMY      APPENDECTOMY      BRONCHOSCOPY Right 05/19/2023    Procedure: BRONCHOSCOPY WITH ENDOBRONCHIAL ULTRASOUND with BAL of RLL and FNA;  Surgeon: Aniceto Bruno MD;  Location: [facility] ENDOSCOPY;  Service: Pulmonary;  Laterality: Right;  pre- RLL pneumonia, lymphadenopathy  post- same    COLONOSCOPY      COLONOSCOPY N/A 03/23/2023    Procedure: COLONOSCOPY into cecum with cold snare polypectomies;  Surgeon: Yoan Leonard MD;  Location: [facility] ENDOSCOPY;  Service: Gastroenterology;  Laterality: N/A;  polyps    CYSTOSCOPY WITH CLOT EVACUATION N/A 03/08/2022    Procedure: CYSTOSCOPY WITH CLOT EVACUATION;  Surgeon: Ihsan Lopez MD;  Location: [facility] OR;  Service: Urology;  Laterality: N/A;    CYSTOSCOPY, URETEROSCOPY, RETROGRADE PYELOGRAM, STENT INSERTION Left 03/07/2022    Procedure: CYSTOSCOPY URETEROSCOPY LASER LITHOTRIPSY and stent exchange;  Surgeon: Deuce Leija MD;  Location:  [facility] MAIN OR;  Service: Urology;  Laterality: Left;    EPIDURAL BLOCK      KIDNEY STONE SURGERY      NEPHROLITHOTRIPSY PERCUTANEOUS Left 2022    Procedure: NEPHROSTLITHOTOMY PERCUTANEOUS;  Surgeon: Deuce Leija MD;  Location: [facility] MAIN OR;  Service: Urology;  Laterality: Left;    NEPHROLITHOTRIPSY PERCUTANEOUS Left 2022    Procedure: LEFT FLEXIBLE NEPHROSTOMY WITH STONE EXTRACTION, LEFT NEPHROSTOMY, TUBE REMOVAL, BILATERAL URETERAL STENT PLACEMENT;  Surgeon: Deuce Leija MD;  Location: [facility] MAIN OR;  Service: Urology;  Laterality: Left;    NEPHROLITHOTRIPSY PERCUTANEOUS Right 2022    Procedure: RIGHT PERCUTANEOUS NEPHROLITHOTOMY W ACCESS;  Surgeon: Deuce Leija MD;  Location: [facility] MAIN OR;  Service: Urology;  Laterality: Right;    THORACOSCOPY WITH DECORTICATION Right 2023    Procedure: THORACOSCOPY VIDEO ASSISTED WITH DECORTICATION WITH DAVINCI ROBOT;  Surgeon: Jose Rice MD;  Location: [facility] OR;  Service: Robotics - DaVinci;  Laterality: Right;    TONSILLECTOMY           FAMILY HISTORY  Family History   Problem Relation Age of Onset    Lung cancer Mother     COPD Father     Emphysema Father     Diabetes Father     Malig Hyperthermia Neg Hx          SOCIAL HISTORY  Social History     Socioeconomic History    Marital status:    Tobacco Use    Smoking status: Former     Current packs/day: 0.00     Types: Cigarettes     Quit date: 1990     Years since quittin.2    Smokeless tobacco: Never   Vaping Use    Vaping status: Never Used   Substance and Sexual Activity    Alcohol use: Not Currently    Drug use: No    Sexual activity: Defer         ALLERGIES  Patient has no known allergies.        REVIEW OF SYSTEMS  Review of Systems     Included in HPI  All systems reviewed and negative except for those discussed in HPI.       PHYSICAL EXAM  ED Triage Vitals   Temp Heart Rate Resp BP SpO2   24 1323 24 1323 24 1323 24 1332  04/03/24 1323   96.7 °F (35.9 °C) 80 18 159/72 93 %      Temp src Heart Rate Source Patient Position BP Location FiO2 (%)   04/03/24 1323 04/03/24 1323 -- -- --   Tympanic Monitor          Physical Exam      GENERAL: no acute distress  HENT: nares patent  EYES: no scleral icterus  CV: regular rhythm, normal rate  RESPIRATORY: normal effort  ABDOMEN: soft  MUSCULOSKELETAL: no deformity, 1+ edema to the legs bilaterally  NEURO: alert, moves all extremities with 5/5 strength of the legs with hip flexion as well as knee extension/flexion and dorsiflexion/plantarflexion, follows commands, antalgic gait  PSYCH:  calm, cooperative  SKIN: warm, dry    Vital signs and nursing notes reviewed.          LAB RESULTS  Recent Results (from the past 24 hour(s))   Duplex Venous Lower Extremity - LEFT    Collection Time: 04/03/24  3:32 PM   Result Value Ref Range    Right Common Femoral Spont Y     Right Common Femoral Competent Y     Right Common Femoral Phasic Y     Right Common Femoral Compress C     Right Common Femoral Augment Y     Left Common Femoral Spont Y     Left Common Femoral Competent Y     Left Common Femoral Phasic Y     Left Common Femoral Compress C     Left Common Femoral Augment Y     Left Saphenofemoral Junction Compress C     Left Profunda Femoral Compress C     Left Proximal Femoral Compress C     Left Mid Femoral Spont Y     Left Mid Femoral Competent Y     Left Mid Femoral Phasic Y     Left Mid Femoral Compress C     Left Mid Femoral Augment Y     Left Distal Femoral Compress C     Left Popliteal Spont Y     Left Popliteal Competent Y     Left Popliteal Phasic Y     Left Popliteal Compress C     Left Popliteal Augment Y     Left Posterior Tibial Compress C     Left Peroneal Compress C     Left Gastronemius Compress C     Left Greater Saph AK Compress C     Left Greater Saph BK Compress C     Left Lesser Saph Compress C     BH CV VAS PRELIMINARY FINDINGS SCRIPTING 1.0        Ordered the above labs and reviewed  the results.        RADIOLOGY  Duplex Venous Lower Extremity - LEFT    Result Date: 4/3/2024    Normal left lower extremity venous duplex scan.      Ordered the above noted radiological studies. Reviewed by me in PACS.        MEDICATIONS GIVEN IN ER  Medications   gabapentin (NEURONTIN) capsule 200 mg (200 mg Oral Given 4/3/24 1614)         ORDERS PLACED DURING THIS VISIT:  Orders Placed This Encounter   Procedures    Neurosurgery (on-call MD unless specified)         OUTPATIENT MEDICATION MANAGEMENT:  Current Facility-Administered Medications Ordered in Epic   Medication Dose Route Frequency Provider Last Rate Last Admin    gabapentin (NEURONTIN) capsule 200 mg  200 mg Oral TID Karyn Daniels, APRN   200 mg at 04/03/24 1614     Current Outpatient Medications Ordered in Epic   Medication Sig Dispense Refill    atorvastatin (LIPITOR) 20 MG tablet Take 1 tablet by mouth every night at bedtime. 90 tablet 3    buPROPion XL (WELLBUTRIN XL) 150 MG 24 hr tablet Take 1 tablet by mouth Daily. 90 tablet 1    carvedilol (COREG) 25 MG tablet Take 1 tablet by mouth Every 12 (Twelve) Hours. 60 tablet 0    Fluticasone-Umeclidin-Vilant (Trelegy Ellipta) 100-62.5-25 MCG/ACT inhaler Inhale 1 puff Daily. 60 each 11    gabapentin (NEURONTIN) 100 MG capsule Take 2 tablet p.o. nightly x 3 days, take 2 tablet p.o. a.m. and p.m. x 3 days, take 2 tablet p.o. 3 times daily and continue this dose upon refills 180 capsule 1    hydrALAZINE (APRESOLINE) 50 MG tablet Take 2 tablets by mouth 3 times a day. 90 tablet 3    HYDROcodone-acetaminophen (NORCO) 7.5-325 MG per tablet Take 1 tablet by mouth Every 8 (Eight) Hours As Needed for Moderate Pain for up to 3 days. 10 tablet 0    isosorbide mononitrate (IMDUR) 30 MG 24 hr tablet Take 1 tablet by mouth Daily.Do not crush or chew. 30 tablet 11    lamoTRIgine (LaMICtal) 100 MG tablet Take 1 tablet by mouth 2 (Two) Times a Day - morning and evening 180 tablet 1    levothyroxine (SYNTHROID,  LEVOTHROID) 50 MCG tablet Take 1 tablet by mouth Every Morning. 30 tablet 0    lisinopril (PRINIVIL,ZESTRIL) 10 MG tablet Take 1 tablet by mouth Daily. 30 tablet 0    methocarbamol (ROBAXIN) 750 MG tablet Take 1 tablet by mouth 4 (Four) Times a Day. 30 tablet 0    methylPREDNISolone (MEDROL) 4 MG dose pack Take as directed on package instructions. 21 tablet 0    potassium citrate (UROCIT-K) 10 MEQ (1080 MG) CR tablet Take 2 tablets (20 mEq total) by mouth in the morning and 2 tablets (20 mEq total) in the evening. Take with meals. Do not crush, chew, or split.. 360 tablet 3    pramipexole (MIRAPEX) 0.75 MG tablet Take 1 tablet by mouth Every Night. 30 tablet 2    tamsulosin (Flomax) 0.4 MG capsule 24 hr capsule Take 1 capsule by mouth every night at bedtime. 90 capsule 3    torsemide (DEMADEX) 20 MG tablet Take 4 tablets by mouth 2 (Two) Times a Day. 720 tablet 3    vilazodone (Viibryd) 40 MG tablet tablet Take 1 tablet by mouth Daily. 90 tablet 3       PROCEDURES  Procedures          MEDICAL DECISION MAKING, PROGRESS, and CONSULTS    Discussion below represents my analysis of pertinent findings related to patient's condition, differential diagnosis, treatment plan and final disposition.            Differential diagnosis:    DVT, sciatica, IT band syndrome             Independent interpretation of labs, radiology studies, and discussions with consultants:  ED Course as of 04/03/24 1642   Wed Apr 03, 2024   4349 I discussed the case with Kaci Daniels, neurosurgery.  She can this evaluate the patient.  Plan for duplex of the left leg and then discharged home with gabapentin assuming the ultrasound is negative for DVT. [TD]   1541 I discussed ultrasound results with vascular technician.  Ultrasound is negative for DVT. [TD]      ED Course User Index  [TD] Gerardo Mcnamara II, MD               DIAGNOSIS  Final diagnoses:   Left leg pain   Spinal stenosis of lumbar region, unspecified whether neurogenic claudication  present         DISPOSITION  DISCHARGE    FOLLOW-UP  Christos Spangler MD  [address]  [place] [id number]  [phone]    Call in 5 day(s)  call with update on Monday 4/8         Medication List        New Prescriptions      gabapentin 100 MG capsule  Commonly known as: NEURONTIN  Take 2 tablet p.o. nightly x 3 days, take 2 tablet p.o. a.m. and p.m. x 3 days, take 2 tablet p.o. 3 times daily and continue this dose upon refills               Where to Get Your Medications        These medications were sent to [facility] [address] [place] [id number] [facility] - [phone]  - [phone]   [address] [facility] [address]      Phone: [phone]   gabapentin 100 MG capsule             Latest Documented Vital Signs:  As of 16:42 EDT  BP- 159/72 HR- 72 Temp- 96.7 °F (35.9 °C) (Tympanic) O2 sat- 94%      --    Please note that portions of this were completed with a voice recognition program.       Note Disclaimer: At [facility], we believe that sharing information builds trust and better relationships. You are receiving this note because you are receiving care at [facility] or recently visited. It is possible you will see health information before a provider has talked with you about it. This kind of information can be easy to misunderstand. To help you fully understand what it means for your health, we urge you to discuss this note with your provider.         Gerardo Mcnamara II, MD  04/03/24 0980

## 2024-04-03 NOTE — CONSULTS
[facility] NEUROSURGERY CONSULT NOTE    Patient name: Calos Moseley  Referring Provider: Dr. Mcnamara  Reason for Consultation: LLE pain    Patient Care Team:  Jailene Estrada APRN as PCP - General (Family Medicine)  Naveed Nunez DO as PCP - Family Medicine  Chris Martinez Jr., MD as Consulting Physician (Pulmonary Disease)  Ihsan Lopez MD as Consulting Physician (Urology)  Dimas Jackson MD (Inactive) as Consulting Physician (Nephrology)    Chief complaint: LLE pain    Subjective .     History of present illness:    Patient is a 63 y.o.  male seen 2 days ago with left lower extremity pain.  Had MRI which showed severe left lateral recess and foraminal stenosis at L5/S1.  He had transforaminal lumbar epidural injection and had some immediate relief and was discharged home.  Within 12 hours his pain recurred and has been quite significant over the last 24 hours.  He has not really been able to walk much.  He denies any weakness bowel or bladder issues.  He began a Medrol Dosepak yesterday evening.  His PCP called in some pain medication.  He has tried muscle relaxant and lidocaine patch without relief.  His pain is mainly in the left lateral calf ankle area and is burning in nature.  Sometimes has some tingling.  He has noticed some pain in his left lateral and posterior hip yesterday and today.  He was concerned after conversation with another medical provider that he could have a blood clot so came to the ER for evaluation.  He has some chronic swelling in his legs but does not notice 1 side being worse than another.    Remote smoking history.  No cancer history.  No prior spine surgery.  Cannot take NSAIDs due to renal disease.    Review of Systems  Review of Systems   Cardiovascular:  Positive for leg swelling.   Gastrointestinal:  Negative for constipation.   Genitourinary:  Negative for difficulty urinating and enuresis.   Musculoskeletal:  Positive for back pain and gait problem.    Neurological:  Positive for numbness. Negative for weakness.       History  PAST MEDICAL HISTORY  Past Medical History:   Diagnosis Date    Anesthesia complication     STATES HARD TO WAKE UP AFTER PREVIOUS KIDNEY STONE SURGERY AND STATES HE CODED    Bilateral kidney stones     Bipolar affective     COVID 2022    Disease of thyroid gland     Elevated cholesterol     Hypertension     Kidney stone on left side     Left flank pain     Mood disorder     Restless leg syndrome     Sleep apnea     cpap  bring dos    UTI (urinary tract infection) 12/2021    PREV FINISHED ANTIBIOTIC       PAST SURGICAL HISTORY  Past Surgical History:   Procedure Laterality Date    ADENOIDECTOMY      APPENDECTOMY      BRONCHOSCOPY Right 05/19/2023    Procedure: BRONCHOSCOPY WITH ENDOBRONCHIAL ULTRASOUND with BAL of RLL and FNA;  Surgeon: Aniceto Bruno MD;  Location: [facility] ENDOSCOPY;  Service: Pulmonary;  Laterality: Right;  pre- RLL pneumonia, lymphadenopathy  post- same    COLONOSCOPY      COLONOSCOPY N/A 03/23/2023    Procedure: COLONOSCOPY into cecum with cold snare polypectomies;  Surgeon: Yoan Leonard MD;  Location: [facility] ENDOSCOPY;  Service: Gastroenterology;  Laterality: N/A;  polyps    CYSTOSCOPY WITH CLOT EVACUATION N/A 03/08/2022    Procedure: CYSTOSCOPY WITH CLOT EVACUATION;  Surgeon: Ihsan Lopez MD;  Location: [facility] OR;  Service: Urology;  Laterality: N/A;    CYSTOSCOPY, URETEROSCOPY, RETROGRADE PYELOGRAM, STENT INSERTION Left 03/07/2022    Procedure: CYSTOSCOPY URETEROSCOPY LASER LITHOTRIPSY and stent exchange;  Surgeon: Deuce Leija MD;  Location: [facility] OR;  Service: Urology;  Laterality: Left;    EPIDURAL BLOCK      KIDNEY STONE SURGERY      NEPHROLITHOTRIPSY PERCUTANEOUS Left 02/21/2022    Procedure: NEPHROSTLITHOTOMY PERCUTANEOUS;  Surgeon: Deuce Leija MD;  Location: [facility] OR;  Service: Urology;  Laterality: Left;    NEPHROLITHOTRIPSY PERCUTANEOUS Left  2022    Procedure: LEFT FLEXIBLE NEPHROSTOMY WITH STONE EXTRACTION, LEFT NEPHROSTOMY, TUBE REMOVAL, BILATERAL URETERAL STENT PLACEMENT;  Surgeon: Deuce Leija MD;  Location: [facility] MAIN OR;  Service: Urology;  Laterality: Left;    NEPHROLITHOTRIPSY PERCUTANEOUS Right 2022    Procedure: RIGHT PERCUTANEOUS NEPHROLITHOTOMY W ACCESS;  Surgeon: Deuce Leija MD;  Location: [facility] MAIN OR;  Service: Urology;  Laterality: Right;    THORACOSCOPY WITH DECORTICATION Right 2023    Procedure: THORACOSCOPY VIDEO ASSISTED WITH DECORTICATION WITH DAVINCI ROBOT;  Surgeon: Jose Rice MD;  Location: [facility] MAIN OR;  Service: Robotics - DaVinci;  Laterality: Right;    TONSILLECTOMY         FAMILY HISTORY  Family History   Problem Relation Age of Onset    Lung cancer Mother     COPD Father     Emphysema Father     Diabetes Father     Malig Hyperthermia Neg Hx        SOCIAL HISTORY  Social History     Tobacco Use    Smoking status: Former     Current packs/day: 0.00     Types: Cigarettes     Quit date: 1990     Years since quittin.2    Smokeless tobacco: Never   Vaping Use    Vaping status: Never Used   Substance Use Topics    Alcohol use: Not Currently    Drug use: No       full time job doing working for environmental services at [employer]  Lives with wife    Allergies:  Patient has no known allergies.    MEDICATIONS:    Current Facility-Administered Medications:     gabapentin (NEURONTIN) capsule 200 mg, 200 mg, Oral, TID, Karyn Daniels, APRN    Current Outpatient Medications:     atorvastatin (LIPITOR) 20 MG tablet, Take 1 tablet by mouth every night at bedtime., Disp: 90 tablet, Rfl: 3    buPROPion XL (WELLBUTRIN XL) 150 MG 24 hr tablet, Take 1 tablet by mouth Daily., Disp: 90 tablet, Rfl: 1    carvedilol (COREG) 25 MG tablet, Take 1 tablet by mouth Every 12 (Twelve) Hours., Disp: 60 tablet, Rfl: 0    Fluticasone-Umeclidin-Vilant (Trelegy Ellipta) 100-62.5-25 MCG/ACT inhaler,  Inhale 1 puff Daily., Disp: 60 each, Rfl: 11    hydrALAZINE (APRESOLINE) 50 MG tablet, Take 2 tablets by mouth 3 times a day., Disp: 90 tablet, Rfl: 3    HYDROcodone-acetaminophen (NORCO) 7.5-325 MG per tablet, Take 1 tablet by mouth Every 8 (Eight) Hours As Needed for Moderate Pain for up to 3 days., Disp: 10 tablet, Rfl: 0    isosorbide mononitrate (IMDUR) 30 MG 24 hr tablet, Take 1 tablet by mouth Daily.Do not crush or chew., Disp: 30 tablet, Rfl: 11    lamoTRIgine (LaMICtal) 100 MG tablet, Take 1 tablet by mouth 2 (Two) Times a Day - morning and evening, Disp: 180 tablet, Rfl: 1    levothyroxine (SYNTHROID, LEVOTHROID) 50 MCG tablet, Take 1 tablet by mouth Every Morning., Disp: 30 tablet, Rfl: 0    lisinopril (PRINIVIL,ZESTRIL) 10 MG tablet, Take 1 tablet by mouth Daily., Disp: 30 tablet, Rfl: 0    methocarbamol (ROBAXIN) 750 MG tablet, Take 1 tablet by mouth 4 (Four) Times a Day., Disp: 30 tablet, Rfl: 0    methylPREDNISolone (MEDROL) 4 MG dose pack, Take as directed on package instructions., Disp: 21 tablet, Rfl: 0    potassium citrate (UROCIT-K) 10 MEQ (1080 MG) CR tablet, Take 2 tablets (20 mEq total) by mouth in the morning and 2 tablets (20 mEq total) in the evening. Take with meals. Do not crush, chew, or split.., Disp: 360 tablet, Rfl: 3    pramipexole (MIRAPEX) 0.75 MG tablet, Take 1 tablet by mouth Every Night., Disp: 30 tablet, Rfl: 2    tamsulosin (Flomax) 0.4 MG capsule 24 hr capsule, Take 1 capsule by mouth every night at bedtime., Disp: 90 capsule, Rfl: 3    torsemide (DEMADEX) 20 MG tablet, Take 4 tablets by mouth 2 (Two) Times a Day., Disp: 720 tablet, Rfl: 3    vilazodone (Viibryd) 40 MG tablet tablet, Take 1 tablet by mouth Daily., Disp: 90 tablet, Rfl: 3      Objective     Results Review:  LABS:  Results from last 7 days   Lab Units 04/01/24  0432 03/31/24  2322   WBC 10*3/mm3 5.98 6.36   HEMOGLOBIN g/dL 14.4 13.9   HEMATOCRIT % 44.5 43.2   PLATELETS 10*3/mm3 143 151     Results from last 7  days   Lab Units 04/01/24  0432 03/31/24  2322   SODIUM mmol/L 142 142   POTASSIUM mmol/L 3.9 3.4*   CHLORIDE mmol/L 100 96*   CO2 mmol/L 32.0* 31.0*   BUN mg/dL 41* 37*   CREATININE mg/dL 2.15* 2.21*   CALCIUM mg/dL 9.3 8.9   BILIRUBIN mg/dL  --  0.4   ALK PHOS U/L  --  89   ALT (SGPT) U/L  --  61*   AST (SGOT) U/L  --  39   GLUCOSE mg/dL 263* 281*         DIAGNOSTICS:  No new imaging, but MRI lumbar spine reviewed and shows multilevel degenerative changes with disc bulging, facet arthropathy.  This results in mild to moderate canal stenosis at several levels.  At left L5/S1 there appears to be a disc extrusion that projects renally and results in severe lateral recess and severe neuroforaminal narrowing.    Results Review:   I reviewed the patient's new clinical results.  I personally viewed and interpreted the patient's lumbar spine.  Also discussed with Dr. Gary    Vital Signs   Temp:  [96.7 °F (35.9 °C)] 96.7 °F (35.9 °C)  Heart Rate:  [60-86] 72  Resp:  [18] 18  BP: (159)/(72) 159/72    Physical Exam:  Physical Exam  Vitals reviewed.   Constitutional:       Appearance: Normal appearance.      Comments: Body mass index is 43.27 kg/m². Short stature     Pulmonary:      Effort: Pulmonary effort is normal.   Musculoskeletal:      Lumbar back: Positive left straight leg raise test (Mildly positive). Negative right straight leg raise test.      Right lower leg: Edema present.      Left lower leg: Edema present.      Comments: Negative Homans.  No calf tenderness on the left   Skin:     General: Skin is warm and dry.   Neurological:      General: No focal deficit present.      Mental Status: He is alert.   Psychiatric:         Mood and Affect: Mood normal.         Thought Content: Thought content normal.       Neurologic Exam     Mental Status   Level of consciousness: alert  Knowledge: good.   Normal comprehension.     Motor Exam   Muscle bulk: normal  Overall muscle tone: normal  5/5 bilateral lower extremity  except left EHL 4/5     Sensory Exam   Right leg light touch: normal  Left leg light touch: normal    Gait, Coordination, and Reflexes     Gait  Gait: (Antalgic favoring left)    Reflexes   Right ankle clonus: absent  Left ankle clonus: absent  Able to heel and toe walk bilaterally       Assessment & Plan       Left leg pain    Lumbar foraminal stenosis, left L5/S1    Patient with acute left leg pain that began about 4 weeks ago but has progressively worsened.  He had lumbar epidural injection 2 days ago and had some immediate relief but that has recurred.  He was given Medrol Dosepak yesterday but has not seen much benefit.  He was most concerned because he spoke to another care provider who suggested that he get an ultrasound to rule out a blood clot.  He does have some swelling in his legs but he states this is chronic and due to blood pressure medication.  He has no focal calf tenderness or Homans.  He has pain in the distribution of the L5 nerve root.  He has some very mild left EHL weakness otherwise intact.  No evidence of foot drop with walking.  He likely has some sacroiliitis on the left as well related to his gait changes.  I suspect he had some brief relief with the epidural because of the lidocaine given which wore off after short period of time.  I think it is a little premature to have much benefit from the epidural steroid.  As he overall is just here for pain if his ultrasound is negative, I think he could discharge home and complete the Medrol Dosepak he has as well as initiate some gabapentin for the nerve related pain.  He and his wife are both in agreement with this.  Advised him to give us a call on Monday if his symptoms have not improved and we will determine next steps which could mean myelogram versus considering surgery.  I did advise that if he has pain is unrelenting or he develops weakness or bowel or bladder difficulties, to return to the ER.    PLAN:   Continue MDP  Gabapentin 200 mg  "TID- in escalating dose  Call neurosurgery on Monday with update on condition    I discussed the patient's findings and my recommendations with patient, family, and Dr. Mcnamara and Dr. Spangler    During patient visit, I utilized appropriate personal protective equipment including gloves. Appropriate PPE was worn during the entire visit.  Hand hygiene was completed before and after.     Karyn Daniels, APRN  04/03/24  15:11 EDT    \"Dictated utilizing Dragon dictation\".      "

## 2024-04-05 ENCOUNTER — TELEPHONE (OUTPATIENT)
Dept: NEUROSURGERY | Facility: CLINIC | Age: 64
End: 2024-04-05
Payer: COMMERCIAL

## 2024-04-05 NOTE — TELEPHONE ENCOUNTER
----- Message from SALVADOR Phillips sent at 4/1/2024 11:23 AM EDT -----  Can we please set up a 2-month follow-up with [facility].    Diagnosis: Lumbar stenosis status post hospitalization for epidural steroid injection

## 2024-04-08 ENCOUNTER — TELEPHONE (OUTPATIENT)
Dept: NEUROSURGERY | Facility: CLINIC | Age: 64
End: 2024-04-08
Payer: COMMERCIAL

## 2024-04-08 NOTE — TELEPHONE ENCOUNTER
"Patient states he isn't sure he can wait two months. Describes at the end of the day he is in pain, affects his sleeps.    States pain is burnings from ankle up to butt and hip, though it skips knee and thigh. This is in his left leg. Patient states doing no stretches but has been \"eating tylenol\", also continuing steroid and gabapentin.     Please advise.  "

## 2024-04-08 NOTE — TELEPHONE ENCOUNTER
I had a telephone encounter with Mr. Moseley at approximately 9:39 AM on 4/8/2024.  He had called our office today to update us on his status.  He states that he does fairly well during the day but towards the end of the day he has more low back pain and radiculopathy down the legs which makes it difficult to sleep.  The pain also sometimes affects his gait.  He denies any weakness at this time.  There is no report of any new bladder or bowel issues.  He continues to take Tylenol and gabapentin as prescribed.  I let him know that I will update Dr. Spangler on his status and get back with him soon to update him on further plan of care.

## 2024-04-12 ENCOUNTER — OFFICE VISIT (OUTPATIENT)
Dept: NEUROSURGERY | Facility: CLINIC | Age: 64
End: 2024-04-12
Payer: COMMERCIAL

## 2024-04-12 VITALS
HEART RATE: 59 BPM | WEIGHT: 266.4 LBS | RESPIRATION RATE: 16 BRPM | BODY MASS INDEX: 44.39 KG/M2 | HEIGHT: 65 IN | DIASTOLIC BLOOD PRESSURE: 68 MMHG | SYSTOLIC BLOOD PRESSURE: 144 MMHG | OXYGEN SATURATION: 93 %

## 2024-04-12 DIAGNOSIS — M21.372 LEFT FOOT DROP: Primary | ICD-10-CM

## 2024-04-12 DIAGNOSIS — M54.16 LUMBAR RADICULOPATHY: ICD-10-CM

## 2024-04-12 RX ORDER — ACETAMINOPHEN 325 MG/1
2 TABLET ORAL EVERY 12 HOURS
COMMUNITY

## 2024-04-12 NOTE — PROGRESS NOTES
Patient ID: Calos Moseley is a 63 y.o. male is here today for a hospital follow-up for left-sided lumbar radiculopathy.    Imaging: Last MRI of the lumbar spine performed on 04/01/2024    Subjective     The patient is here in regards to   Chief Complaint   Patient presents with    Back Pain    Leg Pain       History of Present Illness  Calos did not get any relief from his epidural steroid injection aside from immediately after from the local anesthetic.  He went home next day and had recurrence of his symptoms.  He also has a slight left-sided foot drop which impedes his gait slightly although he is able to walk and ambulate normally.  Over the past 30 years, he struggled with weight loss.  He is a non-smoker.  He describes a sharp shooting pain down his left buttocks and over his lateral aspect of the thigh.  This has become debilitating for him.      While in the room and during my examination of the patient I wore a mask and eye protection.  I washed my hands before and after this patient encounter.  The patient was also wearing a mask.    The following portions of the patient's history were reviewed and updated as appropriate: allergies, current medications, past family history, past medical history, past social history, past surgical history and problem list.    Review of Systems   Constitutional:  Negative for fever.   HENT:  Negative for congestion and tinnitus.    Eyes:  Negative for visual disturbance.   Respiratory:  Negative for chest tightness and shortness of breath.    Cardiovascular:  Negative for chest pain.   Gastrointestinal:  Negative for diarrhea, nausea and vomiting.   Endocrine: Negative for cold intolerance and heat intolerance.   Genitourinary:  Negative for difficulty urinating.   Musculoskeletal:  Positive for back pain and gait problem. Negative for neck pain and neck stiffness.   Skin:  Negative for rash.   Allergic/Immunologic: Negative for food allergies.   Neurological:  Positive for  numbness (left foot). Negative for dizziness, weakness, light-headedness and headaches.   Hematological:  Bruises/bleeds easily.   Psychiatric/Behavioral:  Negative for confusion and decreased concentration.         Past Medical History:   Diagnosis Date    Anesthesia complication     STATES HARD TO WAKE UP AFTER PREVIOUS KIDNEY STONE SURGERY AND STATES HE CODED    Bilateral kidney stones     Bipolar affective     COVID     Disease of thyroid gland     Elevated cholesterol     Hypertension     Kidney stone on left side     Left flank pain     Mood disorder     Restless leg syndrome     Sleep apnea     cpap  bring dos    UTI (urinary tract infection) 2021    PREV FINISHED ANTIBIOTIC       No Known Allergies    Family History   Problem Relation Age of Onset    Lung cancer Mother     COPD Father     Emphysema Father     Diabetes Father     Malig Hyperthermia Neg Hx        Social History     Socioeconomic History    Marital status:    Tobacco Use    Smoking status: Former     Current packs/day: 0.00     Types: Cigarettes     Quit date: 1990     Years since quittin.3    Smokeless tobacco: Never   Vaping Use    Vaping status: Never Used   Substance and Sexual Activity    Alcohol use: Not Currently    Drug use: No    Sexual activity: Defer       Past Surgical History:   Procedure Laterality Date    ADENOIDECTOMY      APPENDECTOMY      BRONCHOSCOPY Right 2023    Procedure: BRONCHOSCOPY WITH ENDOBRONCHIAL ULTRASOUND with BAL of RLL and FNA;  Surgeon: Aniceto Bruno MD;  Location: [facility] ENDOSCOPY;  Service: Pulmonary;  Laterality: Right;  pre- RLL pneumonia, lymphadenopathy  post- same    COLONOSCOPY      COLONOSCOPY N/A 2023    Procedure: COLONOSCOPY into cecum with cold snare polypectomies;  Surgeon: Yoan Leonard MD;  Location: [facility] ENDOSCOPY;  Service: Gastroenterology;  Laterality: N/A;  polyps    CYSTOSCOPY WITH CLOT EVACUATION N/A 2022    Procedure:  CYSTOSCOPY WITH CLOT EVACUATION;  Surgeon: Ihsan Lopez MD;  Location: [facility] OR;  Service: Urology;  Laterality: N/A;    CYSTOSCOPY, URETEROSCOPY, RETROGRADE PYELOGRAM, STENT INSERTION Left 03/07/2022    Procedure: CYSTOSCOPY URETEROSCOPY LASER LITHOTRIPSY and stent exchange;  Surgeon: Deuce Leija MD;  Location: [facility] OR;  Service: Urology;  Laterality: Left;    EPIDURAL BLOCK      KIDNEY STONE SURGERY      NEPHROLITHOTRIPSY PERCUTANEOUS Left 02/21/2022    Procedure: NEPHROSTLITHOTOMY PERCUTANEOUS;  Surgeon: Deuce Leija MD;  Location: [facility] OR;  Service: Urology;  Laterality: Left;    NEPHROLITHOTRIPSY PERCUTANEOUS Left 02/23/2022    Procedure: LEFT FLEXIBLE NEPHROSTOMY WITH STONE EXTRACTION, LEFT NEPHROSTOMY, TUBE REMOVAL, BILATERAL URETERAL STENT PLACEMENT;  Surgeon: Deuce Leija MD;  Location: [facility] OR;  Service: Urology;  Laterality: Left;    NEPHROLITHOTRIPSY PERCUTANEOUS Right 03/07/2022    Procedure: RIGHT PERCUTANEOUS NEPHROLITHOTOMY W ACCESS;  Surgeon: Deuce Leija MD;  Location: [facility] OR;  Service: Urology;  Laterality: Right;    THORACOSCOPY WITH DECORTICATION Right 05/20/2023    Procedure: THORACOSCOPY VIDEO ASSISTED WITH DECORTICATION WITH DAVINCI ROBOT;  Surgeon: Jose Rice MD;  Location: [facility];  Service: Robotics - DaVinci;  Laterality: Right;    TONSILLECTOMY           Objective     Vitals:    04/12/24 1244   BP: 144/68   Pulse: 59   Resp: 16   SpO2: 93%     Body mass index is 44.33 kg/m².    Physical Exam  Constitutional:       Appearance: Normal appearance.   HENT:      Head: Normocephalic and atraumatic.   Eyes:      Extraocular Movements: Extraocular movements intact.      Conjunctiva/sclera: Conjunctivae normal.      Pupils: Pupils are equal, round, and reactive to light.   Cardiovascular:      Rate and Rhythm: Normal rate and regular rhythm.      Pulses: Normal pulses.   Pulmonary:      Breath sounds: Normal breath  sounds.   Abdominal:      Palpations: Abdomen is soft.   Musculoskeletal:         General: Normal range of motion.      Cervical back: Normal range of motion and neck supple.   Skin:     General: Skin is warm and dry.   Neurological:      Mental Status: He is alert and oriented to person, place, and time.      Cranial Nerves: Cranial nerves 2-12 are intact.      Motor: Motor function is intact. No weakness or atrophy.      Coordination: Coordination is intact. Romberg sign negative. Romberg Test normal.      Gait: Gait is intact. Gait normal.      Deep Tendon Reflexes: Reflexes are normal and symmetric.      Reflex Scores:       Tricep reflexes are 2+ on the right side and 2+ on the left side.       Bicep reflexes are 2+ on the right side and 2+ on the left side.       Brachioradialis reflexes are 2+ on the right side and 2+ on the left side.       Patellar reflexes are 2+ on the right side and 2+ on the left side.       Achilles reflexes are 2+ on the right side and 2+ on the left side.  Psychiatric:         Speech: Speech normal.         Neurologic Exam     Mental Status   Oriented to person, place, and time.   Attention: normal. Concentration: normal.   Speech: speech is normal   Level of consciousness: alert    Cranial Nerves   Cranial nerves II through XII intact.     CN III, IV, VI   Pupils are equal, round, and reactive to light.    Motor Exam   Muscle bulk: normal  Overall muscle tone: normal    Strength   Strength 5/5 except as noted.   Left anterior tibial: 4/5    Sensory Exam   Light touch normal.     Gait, Coordination, and Reflexes     Gait  Gait: normal    Coordination   Romberg: negative    Reflexes   Reflexes 2+ except as noted.   Right brachioradialis: 2+  Left brachioradialis: 2+  Right biceps: 2+  Left biceps: 2+  Right triceps: 2+  Left triceps: 2+  Right patellar: 2+  Left patellar: 2+  Right achilles: 2+  Left achilles: 2+      Assessment & Plan   Independent Review of Radiographic Studies:       I personally reviewed the images from the following studies.    MR: MRI of the lumbar spine wo contrast was reviewed and shows degenerative disease at L2-3, L3-4, L4-5 with epidural lipomatosis causing moderate to severe stenosis at all of these levels.  There is also broad-based disc bulge at L5-S1 causing severe left-sided foraminal stenosis and moderate to severe right-sided foraminal stenosis.    Assessment/Plan: He is likely symptomatic from severe left-sided foraminal stenosis.  I think he benefit from a foraminotomy and possible discectomy.  We discussed the risk and benefits and alternatives of surgery including CSF leak, injury to nerve roots, need for further surgery down the line.  He understands and is willing to proceed with surgery.    Medical Decision Making:      L5-S1 left-sided foraminotomies         Diagnoses and all orders for this visit:    1. Left foot drop (Primary)  -     Case Request; Standing  -     CBC & Differential; Future  -     Comprehensive Metabolic Panel; Future  -     aPTT; Future  -     Protime-INR; Future  -     Type & Screen; Future  -     ceFAZolin (ANCEF) 2,000 mg in sodium chloride 0.9 % 100 mL IVPB  -     Case Request    2. Lumbar radiculopathy  -     Case Request; Standing  -     CBC & Differential; Future  -     Comprehensive Metabolic Panel; Future  -     aPTT; Future  -     Protime-INR; Future  -     Type & Screen; Future  -     ceFAZolin (ANCEF) 2,000 mg in sodium chloride 0.9 % 100 mL IVPB  -     Case Request    Other orders  -     Outpatient In A Bed; Standing  -     Follow Anesthesia Guidelines / Protocol; Future  -     Follow Anesthesia Guidelines / Protocol; Standing  -     Verify / Perform Chlorhexidine Skin Prep; Standing  -     Provide Patient With Instructions on NPO Status; Future  -     Provide Chlorhexidine Skin Prep Wipes and Instructions; Future             Patient Instructions/Recommendations:    Call with any questions or concerns      Christos Spangler  MD  04/12/24  13:26 EDT

## 2024-04-15 ENCOUNTER — HOSPITAL ENCOUNTER (OUTPATIENT)
Dept: CT IMAGING | Facility: HOSPITAL | Age: 64
Discharge: HOME | End: 2024-04-15
Admitting: INTERNAL MEDICINE
Payer: COMMERCIAL

## 2024-04-15 DIAGNOSIS — J86.9 EMPYEMA: ICD-10-CM

## 2024-04-15 PROCEDURE — 71250 CT THORAX DX C-: CPT

## 2024-04-25 NOTE — H&P (VIEW-ONLY)
Patient ID: Calos Moseley is a 63 y.o. male is here today for follow-up to discuss the upcoming surgery for left foot drop and lumbar radiculopathy.    Subjective     The patient is here in regards to   Chief Complaint   Patient presents with    Back Pain       History of Present Illness  Calos is here for his preoperative appointment.  He continues to have left-sided leg pain which he feels is getting worse and is keeping him up at night.  He is not able to get comfortable.  He is taking gabapentin and feels that that is helpful but has not been able to eliminate the pain.      While in the room and during my examination of the patient I wore a mask and eye protection.  I washed my hands before and after this patient encounter.  The patient was also wearing a mask.    The following portions of the patient's history were reviewed and updated as appropriate: allergies, current medications, past family history, past medical history, past social history, past surgical history and problem list.    Review of Systems   Constitutional:  Negative for fever.   Musculoskeletal:  Positive for back pain and gait problem.   Neurological:  Positive for numbness (feet). Negative for dizziness, weakness, light-headedness and headaches.        Past Medical History:   Diagnosis Date    Anesthesia complication     STATES HARD TO WAKE UP AFTER PREVIOUS KIDNEY STONE SURGERY AND STATES HE CODED    Bilateral kidney stones     Bipolar affective     COVID 2022    Disease of thyroid gland     Elevated cholesterol     Hypertension     Kidney stone on left side     Left flank pain     Mood disorder     Restless leg syndrome     Sleep apnea     cpap  bring dos    UTI (urinary tract infection) 12/2021    PREV FINISHED ANTIBIOTIC       No Known Allergies    Family History   Problem Relation Age of Onset    Lung cancer Mother     COPD Father     Emphysema Father     Diabetes Father     Malig Hyperthermia Neg Hx        Social History      Socioeconomic History    Marital status:    Tobacco Use    Smoking status: Former     Current packs/day: 0.00     Types: Cigarettes     Quit date: 1990     Years since quittin.3    Smokeless tobacco: Never   Vaping Use    Vaping status: Never Used   Substance and Sexual Activity    Alcohol use: Not Currently    Drug use: No    Sexual activity: Defer       Past Surgical History:   Procedure Laterality Date    ADENOIDECTOMY      APPENDECTOMY      BRONCHOSCOPY Right 2023    Procedure: BRONCHOSCOPY WITH ENDOBRONCHIAL ULTRASOUND with BAL of RLL and FNA;  Surgeon: Aniceto Bruno MD;  Location: [facility] ENDOSCOPY;  Service: Pulmonary;  Laterality: Right;  pre- RLL pneumonia, lymphadenopathy  post- same    COLONOSCOPY      COLONOSCOPY N/A 2023    Procedure: COLONOSCOPY into cecum with cold snare polypectomies;  Surgeon: Yoan Leonard MD;  Location: [facility] ENDOSCOPY;  Service: Gastroenterology;  Laterality: N/A;  polyps    CYSTOSCOPY WITH CLOT EVACUATION N/A 2022    Procedure: CYSTOSCOPY WITH CLOT EVACUATION;  Surgeon: Ihsan Lopez MD;  Location: [facility] OR;  Service: Urology;  Laterality: N/A;    CYSTOSCOPY, URETEROSCOPY, RETROGRADE PYELOGRAM, STENT INSERTION Left 2022    Procedure: CYSTOSCOPY URETEROSCOPY LASER LITHOTRIPSY and stent exchange;  Surgeon: Deuce Leija MD;  Location: [facility] OR;  Service: Urology;  Laterality: Left;    EPIDURAL BLOCK      KIDNEY STONE SURGERY      NEPHROLITHOTRIPSY PERCUTANEOUS Left 2022    Procedure: NEPHROSTLITHOTOMY PERCUTANEOUS;  Surgeon: Deuce Leija MD;  Location: [facility] MAIN OR;  Service: Urology;  Laterality: Left;    NEPHROLITHOTRIPSY PERCUTANEOUS Left 2022    Procedure: LEFT FLEXIBLE NEPHROSTOMY WITH STONE EXTRACTION, LEFT NEPHROSTOMY, TUBE REMOVAL, BILATERAL URETERAL STENT PLACEMENT;  Surgeon: Deuce Leija MD;  Location: [facility] MAIN OR;  Service: Urology;  Laterality:  Left;    NEPHROLITHOTRIPSY PERCUTANEOUS Right 03/07/2022    Procedure: RIGHT PERCUTANEOUS NEPHROLITHOTOMY W ACCESS;  Surgeon: Deuce Leija MD;  Location: [facility] MAIN OR;  Service: Urology;  Laterality: Right;    THORACOSCOPY WITH DECORTICATION Right 05/20/2023    Procedure: THORACOSCOPY VIDEO ASSISTED WITH DECORTICATION WITH DAVINCI ROBOT;  Surgeon: Jose Rice MD;  Location: [facility] MAIN OR;  Service: Robotics - DaVinci;  Laterality: Right;    TONSILLECTOMY           Objective     Vitals:    04/26/24 0920   BP: 128/68   Pulse: 81   Resp: 16   SpO2: 94%     Body mass index is 44.39 kg/m².    Physical Exam  Constitutional:       Appearance: Normal appearance.   HENT:      Head: Normocephalic and atraumatic.   Eyes:      Extraocular Movements: Extraocular movements intact.      Conjunctiva/sclera: Conjunctivae normal.      Pupils: Pupils are equal, round, and reactive to light.   Cardiovascular:      Rate and Rhythm: Normal rate and regular rhythm.      Pulses: Normal pulses.   Pulmonary:      Breath sounds: Normal breath sounds.   Abdominal:      Palpations: Abdomen is soft.   Musculoskeletal:         General: Normal range of motion.      Cervical back: Normal range of motion and neck supple.   Skin:     General: Skin is warm and dry.   Neurological:      Mental Status: He is alert and oriented to person, place, and time.      Cranial Nerves: Cranial nerves 2-12 are intact.      Motor: Motor function is intact. No weakness or atrophy.      Coordination: Coordination is intact. Romberg sign negative. Romberg Test normal.      Gait: Gait is intact. Gait normal.      Deep Tendon Reflexes: Reflexes are normal and symmetric.      Reflex Scores:       Tricep reflexes are 2+ on the right side and 2+ on the left side.       Bicep reflexes are 2+ on the right side and 2+ on the left side.       Brachioradialis reflexes are 2+ on the right side and 2+ on the left side.       Patellar reflexes are 2+ on the right side  and 2+ on the left side.       Achilles reflexes are 2+ on the right side and 2+ on the left side.  Psychiatric:         Speech: Speech normal.         Neurologic Exam     Mental Status   Oriented to person, place, and time.   Attention: normal. Concentration: normal.   Speech: speech is normal   Level of consciousness: alert    Cranial Nerves   Cranial nerves II through XII intact.     CN III, IV, VI   Pupils are equal, round, and reactive to light.    Motor Exam   Muscle bulk: normal  Overall muscle tone: normal    Strength   Strength 5/5 except as noted.     Sensory Exam   Light touch normal.     Gait, Coordination, and Reflexes     Gait  Gait: normal    Coordination   Romberg: negative    Reflexes   Reflexes 2+ except as noted.   Right brachioradialis: 2+  Left brachioradialis: 2+  Right biceps: 2+  Left biceps: 2+  Right triceps: 2+  Left triceps: 2+  Right patellar: 2+  Left patellar: 2+  Right achilles: 2+  Left achilles: 2+      Assessment & Plan   Independent Review of Radiographic Studies:      I personally reviewed the images from the following studies.    No new neuroimaging.    Assessment/Plan: We discussed the risks and benefits and alternatives of surgery including CSF leak, injury to nerve roots, need for further surgery down the line.  He understands and is willing to proceed with surgery.    Medical Decision Making:      L5-S1 left-sided discectomy and foraminotomy         Diagnoses and all orders for this visit:    1. Lumbar foraminal stenosis, left L5/S1 (Primary)             Patient Instructions/Recommendations:    Call with any questions or concerns      Christos Spangler MD  04/26/24  09:57 EDT

## 2024-04-25 NOTE — PROGRESS NOTES
Anatomy US today- , POSTERIOR PLACENTA COMPLETE PREVIA, EFW 33rd%, all anatomy WNL, male, cervix 4.1 cm  STRICT pelvic rest due to placenta previa; discussed in detail with patient.  Did have mild spotting post intercourse a couple of weeks ago.  Will need follow up US for growth and placental location at 32 wk.  Reports + FM.  Feeling better nausea wise.  Working on ped care.  Return to clinic in 4 wk for routine OB care.     Patient ID: Calos Moseley is a 63 y.o. male is here today for follow-up to discuss the upcoming surgery for left foot drop and lumbar radiculopathy.    Subjective     The patient is here in regards to   Chief Complaint   Patient presents with    Back Pain       History of Present Illness  Calos is here for his preoperative appointment.  He continues to have left-sided leg pain which he feels is getting worse and is keeping him up at night.  He is not able to get comfortable.  He is taking gabapentin and feels that that is helpful but has not been able to eliminate the pain.      While in the room and during my examination of the patient I wore a mask and eye protection.  I washed my hands before and after this patient encounter.  The patient was also wearing a mask.    The following portions of the patient's history were reviewed and updated as appropriate: allergies, current medications, past family history, past medical history, past social history, past surgical history and problem list.    Review of Systems   Constitutional:  Negative for fever.   Musculoskeletal:  Positive for back pain and gait problem.   Neurological:  Positive for numbness (feet). Negative for dizziness, weakness, light-headedness and headaches.        Past Medical History:   Diagnosis Date    Anesthesia complication     STATES HARD TO WAKE UP AFTER PREVIOUS KIDNEY STONE SURGERY AND STATES HE CODED    Bilateral kidney stones     Bipolar affective     COVID 2022    Disease of thyroid gland     Elevated cholesterol     Hypertension     Kidney stone on left side     Left flank pain     Mood disorder     Restless leg syndrome     Sleep apnea     cpap  bring dos    UTI (urinary tract infection) 12/2021    PREV FINISHED ANTIBIOTIC       No Known Allergies    Family History   Problem Relation Age of Onset    Lung cancer Mother     COPD Father     Emphysema Father     Diabetes Father     Malig Hyperthermia Neg Hx        Social History      Socioeconomic History    Marital status:    Tobacco Use    Smoking status: Former     Current packs/day: 0.00     Types: Cigarettes     Quit date: 1990     Years since quittin.3    Smokeless tobacco: Never   Vaping Use    Vaping status: Never Used   Substance and Sexual Activity    Alcohol use: Not Currently    Drug use: No    Sexual activity: Defer       Past Surgical History:   Procedure Laterality Date    ADENOIDECTOMY      APPENDECTOMY      BRONCHOSCOPY Right 2023    Procedure: BRONCHOSCOPY WITH ENDOBRONCHIAL ULTRASOUND with BAL of RLL and FNA;  Surgeon: Aniceto Bruno MD;  Location: [facility] ENDOSCOPY;  Service: Pulmonary;  Laterality: Right;  pre- RLL pneumonia, lymphadenopathy  post- same    COLONOSCOPY      COLONOSCOPY N/A 2023    Procedure: COLONOSCOPY into cecum with cold snare polypectomies;  Surgeon: Yoan Leonard MD;  Location: [facility] ENDOSCOPY;  Service: Gastroenterology;  Laterality: N/A;  polyps    CYSTOSCOPY WITH CLOT EVACUATION N/A 2022    Procedure: CYSTOSCOPY WITH CLOT EVACUATION;  Surgeon: Ihsan Lopez MD;  Location: [facility] OR;  Service: Urology;  Laterality: N/A;    CYSTOSCOPY, URETEROSCOPY, RETROGRADE PYELOGRAM, STENT INSERTION Left 2022    Procedure: CYSTOSCOPY URETEROSCOPY LASER LITHOTRIPSY and stent exchange;  Surgeon: Deuce Leija MD;  Location: [facility] OR;  Service: Urology;  Laterality: Left;    EPIDURAL BLOCK      KIDNEY STONE SURGERY      NEPHROLITHOTRIPSY PERCUTANEOUS Left 2022    Procedure: NEPHROSTLITHOTOMY PERCUTANEOUS;  Surgeon: Deuce Leija MD;  Location: [facility] MAIN OR;  Service: Urology;  Laterality: Left;    NEPHROLITHOTRIPSY PERCUTANEOUS Left 2022    Procedure: LEFT FLEXIBLE NEPHROSTOMY WITH STONE EXTRACTION, LEFT NEPHROSTOMY, TUBE REMOVAL, BILATERAL URETERAL STENT PLACEMENT;  Surgeon: Deuce Leija MD;  Location: [facility] MAIN OR;  Service: Urology;  Laterality:  Left;    NEPHROLITHOTRIPSY PERCUTANEOUS Right 03/07/2022    Procedure: RIGHT PERCUTANEOUS NEPHROLITHOTOMY W ACCESS;  Surgeon: Deuce Leija MD;  Location: [facility] MAIN OR;  Service: Urology;  Laterality: Right;    THORACOSCOPY WITH DECORTICATION Right 05/20/2023    Procedure: THORACOSCOPY VIDEO ASSISTED WITH DECORTICATION WITH DAVINCI ROBOT;  Surgeon: Jose Rice MD;  Location: [facility] MAIN OR;  Service: Robotics - DaVinci;  Laterality: Right;    TONSILLECTOMY           Objective     Vitals:    04/26/24 0920   BP: 128/68   Pulse: 81   Resp: 16   SpO2: 94%     Body mass index is 44.39 kg/m².    Physical Exam  Constitutional:       Appearance: Normal appearance.   HENT:      Head: Normocephalic and atraumatic.   Eyes:      Extraocular Movements: Extraocular movements intact.      Conjunctiva/sclera: Conjunctivae normal.      Pupils: Pupils are equal, round, and reactive to light.   Cardiovascular:      Rate and Rhythm: Normal rate and regular rhythm.      Pulses: Normal pulses.   Pulmonary:      Breath sounds: Normal breath sounds.   Abdominal:      Palpations: Abdomen is soft.   Musculoskeletal:         General: Normal range of motion.      Cervical back: Normal range of motion and neck supple.   Skin:     General: Skin is warm and dry.   Neurological:      Mental Status: He is alert and oriented to person, place, and time.      Cranial Nerves: Cranial nerves 2-12 are intact.      Motor: Motor function is intact. No weakness or atrophy.      Coordination: Coordination is intact. Romberg sign negative. Romberg Test normal.      Gait: Gait is intact. Gait normal.      Deep Tendon Reflexes: Reflexes are normal and symmetric.      Reflex Scores:       Tricep reflexes are 2+ on the right side and 2+ on the left side.       Bicep reflexes are 2+ on the right side and 2+ on the left side.       Brachioradialis reflexes are 2+ on the right side and 2+ on the left side.       Patellar reflexes are 2+ on the right side  and 2+ on the left side.       Achilles reflexes are 2+ on the right side and 2+ on the left side.  Psychiatric:         Speech: Speech normal.         Neurologic Exam     Mental Status   Oriented to person, place, and time.   Attention: normal. Concentration: normal.   Speech: speech is normal   Level of consciousness: alert    Cranial Nerves   Cranial nerves II through XII intact.     CN III, IV, VI   Pupils are equal, round, and reactive to light.    Motor Exam   Muscle bulk: normal  Overall muscle tone: normal    Strength   Strength 5/5 except as noted.     Sensory Exam   Light touch normal.     Gait, Coordination, and Reflexes     Gait  Gait: normal    Coordination   Romberg: negative    Reflexes   Reflexes 2+ except as noted.   Right brachioradialis: 2+  Left brachioradialis: 2+  Right biceps: 2+  Left biceps: 2+  Right triceps: 2+  Left triceps: 2+  Right patellar: 2+  Left patellar: 2+  Right achilles: 2+  Left achilles: 2+      Assessment & Plan   Independent Review of Radiographic Studies:      I personally reviewed the images from the following studies.    No new neuroimaging.    Assessment/Plan: We discussed the risks and benefits and alternatives of surgery including CSF leak, injury to nerve roots, need for further surgery down the line.  He understands and is willing to proceed with surgery.    Medical Decision Making:      L5-S1 left-sided discectomy and foraminotomy         Diagnoses and all orders for this visit:    1. Lumbar foraminal stenosis, left L5/S1 (Primary)             Patient Instructions/Recommendations:    Call with any questions or concerns      Christos Spangler MD  04/26/24  09:57 EDT

## 2024-04-26 ENCOUNTER — TELEPHONE (OUTPATIENT)
Dept: NEUROSURGERY | Facility: CLINIC | Age: 64
End: 2024-04-26
Payer: COMMERCIAL

## 2024-04-26 ENCOUNTER — OFFICE VISIT (OUTPATIENT)
Dept: NEUROSURGERY | Facility: CLINIC | Age: 64
End: 2024-04-26
Payer: COMMERCIAL

## 2024-04-26 VITALS
RESPIRATION RATE: 16 BRPM | BODY MASS INDEX: 44.44 KG/M2 | HEIGHT: 65 IN | WEIGHT: 266.76 LBS | DIASTOLIC BLOOD PRESSURE: 68 MMHG | SYSTOLIC BLOOD PRESSURE: 128 MMHG | OXYGEN SATURATION: 94 % | HEART RATE: 81 BPM

## 2024-04-26 DIAGNOSIS — M48.061 LUMBAR FORAMINAL STENOSIS: Primary | ICD-10-CM

## 2024-04-26 DIAGNOSIS — M54.16 LUMBAR BACK PAIN WITH RADICULOPATHY AFFECTING LEFT LOWER EXTREMITY: ICD-10-CM

## 2024-04-26 RX ORDER — POTASSIUM CHLORIDE 750 MG/1
10 CAPSULE, EXTENDED RELEASE ORAL 2 TIMES DAILY
COMMUNITY

## 2024-04-26 RX ORDER — GABAPENTIN 100 MG/1
CAPSULE ORAL
Qty: 180 CAPSULE | Refills: 1 | Status: CANCELLED | OUTPATIENT
Start: 2024-04-26

## 2024-04-29 RX ORDER — PRAMIPEXOLE DIHYDROCHLORIDE 0.75 MG/1
0.75 TABLET ORAL NIGHTLY
Qty: 30 TABLET | Refills: 2 | OUTPATIENT
Start: 2024-04-29

## 2024-04-29 NOTE — TELEPHONE ENCOUNTER
Patient's spouse called to check on refill, informed her pharmacy will refill 05/01/2024. Told to call pharmacy on that day and to reach out if any problems

## 2024-04-29 NOTE — TELEPHONE ENCOUNTER
Spoke with pharmacy who states that the patient has a refill of gabapentin of file and script was filled last on 4/3/24 so it can be refilled as early as 5/1/24

## 2024-04-30 ENCOUNTER — PATIENT ROUNDING (BHMG ONLY) (OUTPATIENT)
Dept: INTERNAL MEDICINE | Facility: CLINIC | Age: 64
End: 2024-04-30
Payer: COMMERCIAL

## 2024-04-30 ENCOUNTER — OFFICE VISIT (OUTPATIENT)
Dept: INTERNAL MEDICINE | Facility: CLINIC | Age: 64
End: 2024-04-30
Payer: COMMERCIAL

## 2024-04-30 VITALS
BODY MASS INDEX: 44.18 KG/M2 | WEIGHT: 265.2 LBS | HEIGHT: 65 IN | HEART RATE: 62 BPM | SYSTOLIC BLOOD PRESSURE: 128 MMHG | TEMPERATURE: 97.5 F | OXYGEN SATURATION: 95 % | DIASTOLIC BLOOD PRESSURE: 72 MMHG

## 2024-04-30 DIAGNOSIS — I10 ESSENTIAL HYPERTENSION: ICD-10-CM

## 2024-04-30 DIAGNOSIS — N18.32 STAGE 3B CHRONIC KIDNEY DISEASE: ICD-10-CM

## 2024-04-30 DIAGNOSIS — I25.84 CORONARY ARTERY CALCIFICATION: ICD-10-CM

## 2024-04-30 DIAGNOSIS — E11.22 TYPE 2 DIABETES MELLITUS WITH STAGE 3B CHRONIC KIDNEY DISEASE, WITHOUT LONG-TERM CURRENT USE OF INSULIN: Primary | ICD-10-CM

## 2024-04-30 DIAGNOSIS — E78.5 HYPERLIPIDEMIA, UNSPECIFIED HYPERLIPIDEMIA TYPE: ICD-10-CM

## 2024-04-30 DIAGNOSIS — N18.32 TYPE 2 DIABETES MELLITUS WITH STAGE 3B CHRONIC KIDNEY DISEASE, WITHOUT LONG-TERM CURRENT USE OF INSULIN: Primary | ICD-10-CM

## 2024-04-30 DIAGNOSIS — M54.16 LUMBAR BACK PAIN WITH RADICULOPATHY AFFECTING LEFT LOWER EXTREMITY: ICD-10-CM

## 2024-04-30 DIAGNOSIS — I25.10 CORONARY ARTERY CALCIFICATION: ICD-10-CM

## 2024-04-30 DIAGNOSIS — I50.32 CHRONIC HEART FAILURE WITH PRESERVED EJECTION FRACTION: ICD-10-CM

## 2024-04-30 DIAGNOSIS — F31.9 BIPOLAR AFFECTIVE DISORDER, REMISSION STATUS UNSPECIFIED: ICD-10-CM

## 2024-04-30 PROCEDURE — 99215 OFFICE O/P EST HI 40 MIN: CPT | Performed by: STUDENT IN AN ORGANIZED HEALTH CARE EDUCATION/TRAINING PROGRAM

## 2024-04-30 NOTE — PROGRESS NOTES
"Chief Complaint  Establish Care and Diabetes    Subjective        Calos Moseley presents to [facility]  History of Present Illness    Mr. Moseley presents to clinic today as a new patient to establish care for management of chronic medical conditions    He was previously following with [facility] here within [facility] however is transferring care here    He has no specific acute complaints or concerns today however reports of significant worse functional status and overall physical health over the past year.  In 2023 he developed what appears to be an empyema of unknown cause and reports that since then his health has slowly declined    His primary issue currently is back pain with radiculopathy in which he has an upcoming surgery scheduled with neurosurgery.  He also is established with nephrology for CKD that is presumed to be from hypertension and was recently started on lisinopril as well as hydralazine    Additionally he was given steroids for back pain and his A1c was noted to be in the diabetes range.  He has never been on any diabetes medications    Review of Systems   Constitutional:  Negative for fever.   Respiratory:  Positive for shortness of breath.    Cardiovascular:  Positive for leg swelling. Negative for chest pain.   Endocrine: Negative for polydipsia and polyuria.   Musculoskeletal:  Positive for back pain and gait problem.   Neurological:  Positive for weakness and numbness. Negative for syncope.        Objective   Vital Signs:  /72   Pulse 62   Temp 97.5 °F (36.4 °C) (Temporal)   Ht 165.1 cm (65\")   Wt 120 kg (265 lb 3.2 oz)   SpO2 95%   BMI 44.13 kg/m²   Estimated body mass index is 44.13 kg/m² as calculated from the following:    Height as of this encounter: 165.1 cm (65\").    Weight as of this encounter: 120 kg (265 lb 3.2 oz).               Physical Exam  Vitals reviewed.   Constitutional:       General: He is not in acute distress.     Appearance: Normal " appearance. He is obese. He is not toxic-appearing.   HENT:      Head: Normocephalic and atraumatic.   Eyes:      General: No scleral icterus.     Conjunctiva/sclera: Conjunctivae normal.   Pulmonary:      Effort: Pulmonary effort is normal.   Musculoskeletal:      Right lower leg: Edema present.      Left lower leg: Edema present.   Skin:     General: Skin is warm and dry.   Neurological:      Mental Status: He is alert and oriented to person, place, and time.      Gait: Gait abnormal.   Psychiatric:         Mood and Affect: Mood normal.         Behavior: Behavior normal.        Result Review :                   Assessment and Plan   Diagnoses and all orders for this visit:    1. Type 2 diabetes mellitus with stage 3b chronic kidney disease, without long-term current use of insulin (Primary)  -     empagliflozin (Jardiance) 10 MG tablet tablet; Take 1 tablet by mouth Every Morning Before Breakfast.  Dispense: 30 tablet; Refill: 3  -     Hemoglobin A1c; Future  -     Lipid Panel With / Chol / HDL Ratio; Future  -     Comprehensive Metabolic Panel; Future    2. Chronic heart failure with preserved ejection fraction  -     empagliflozin (Jardiance) 10 MG tablet tablet; Take 1 tablet by mouth Every Morning Before Breakfast.  Dispense: 30 tablet; Refill: 3    3. Lumbar back pain with radiculopathy affecting left lower extremity    4. Stage 3b chronic kidney disease  -     empagliflozin (Jardiance) 10 MG tablet tablet; Take 1 tablet by mouth Every Morning Before Breakfast.  Dispense: 30 tablet; Refill: 3    5. Hyperlipidemia, unspecified hyperlipidemia type  -     Lipid Panel With / Chol / HDL Ratio; Future    6. Essential hypertension  -     Comprehensive Metabolic Panel; Future    7. Bipolar affective disorder, remission status unspecified    8. Coronary artery calcification      Extensively reviewed patient previous PCP OV, recent hospitalization, labs obtained, and imaging as well as nephrology OV in 4/2024      Pradip is a very pleasant 63-year-old male who presents to clinic today for management of chronic medical conditions    He is currently being seen and evaluated by multiple specialists, including neurosurgery with upcoming surgical intervention for lumbar stenosis with foraminotomy and discectomy as well as nephrology for CKD    I would like to start Jardiance in the setting of recently diagnosed diabetes, known CKD3b, A1c noted to be 6.6% in March.  It also appears he has some degree of HFpEF with echo in 2023 noting some impaired relaxation.  I advised him to discuss this with Dr. Angel his nephrologist as his kidney function appears to be stable for this however his most recent eGFR was <30 but this looks not to be his baseline.  We will recheck his A1c in about 2 months and I will see him shortly thereafter.    His hypertension is chronic and appears to be improving, he reports home readings more recently had been improved with systolics in the 130s and diastolics in the 80s.  At this point we will continue his current regimen of lisinopril, hydralazine, and carvedilol.  Of note he was previously on nifedipine however this was discontinued after peripheral edema.    He recently had CT chest that noted extensive coronary artery calcifications.  He does report of some chronic shortness of breath however it does not sound to be worsening than his baseline.  We will continue to monitor this and he may benefit from either stress test or further evaluation to prevent progression of atherosclerotic disease       I spent 55 minutes caring for Calos on this date of service. This time includes time spent by me in the following activities:preparing for the visit, reviewing tests, obtaining and/or reviewing a separately obtained history, performing a medically appropriate examination and/or evaluation , counseling and educating the patient/family/caregiver, ordering medications, tests, or procedures, documenting information  in the medical record, and independently interpreting results and communicating that information with the patient/family/caregiver  Follow Up   Return in about 10 weeks (around 7/9/2024).  Patient was given instructions and counseling regarding his condition or for health maintenance advice. Please see specific information pulled into the AVS if appropriate.

## 2024-04-30 NOTE — PROGRESS NOTES
April 30, 2024    Hello, may I speak with Calos Moseley?    My name is Jamilah      I am  with [facility]  [address]  [place] [address].    Before we get started may I verify your date of birth? 1960    I am calling to officially welcome you to our practice and ask about your recent visit. Is this a good time to talk? yes    Tell me about your visit with us. What things went well?  great visit         We're always looking for ways to make our patients' experiences even better. Do you have recommendations on ways we may improve?  no    Overall were you satisfied with your first visit to our practice? yes       I appreciate you taking the time to speak with me today. Is there anything else I can do for you? no      Thank you, and have a great day.

## 2024-05-06 ENCOUNTER — TELEPHONE (OUTPATIENT)
Dept: NEUROSURGERY | Facility: CLINIC | Age: 64
End: 2024-05-06
Payer: COMMERCIAL

## 2024-05-06 ENCOUNTER — PRE-ADMISSION TESTING (OUTPATIENT)
Dept: PREADMISSION TESTING | Facility: HOSPITAL | Age: 64
End: 2024-05-06
Payer: COMMERCIAL

## 2024-05-06 VITALS
WEIGHT: 267 LBS | SYSTOLIC BLOOD PRESSURE: 183 MMHG | HEART RATE: 90 BPM | TEMPERATURE: 98.1 F | DIASTOLIC BLOOD PRESSURE: 84 MMHG | OXYGEN SATURATION: 94 % | RESPIRATION RATE: 16 BRPM | HEIGHT: 66 IN | BODY MASS INDEX: 42.91 KG/M2

## 2024-05-06 DIAGNOSIS — M21.372 LEFT FOOT DROP: ICD-10-CM

## 2024-05-06 DIAGNOSIS — M54.16 LUMBAR RADICULOPATHY: ICD-10-CM

## 2024-05-06 LAB
ABO GROUP BLD: NORMAL
ALBUMIN SERPL-MCNC: 4.4 G/DL (ref 3.5–5.2)
ALBUMIN/GLOB SERPL: 1.8 G/DL
ALP SERPL-CCNC: 93 U/L (ref 39–117)
ALT SERPL W P-5'-P-CCNC: 36 U/L (ref 1–41)
ANION GAP SERPL CALCULATED.3IONS-SCNC: 11.2 MMOL/L (ref 5–15)
APTT PPP: 32.4 SECONDS (ref 22.7–35.4)
AST SERPL-CCNC: 28 U/L (ref 1–40)
BASOPHILS # BLD AUTO: 0.05 10*3/MM3 (ref 0–0.2)
BASOPHILS NFR BLD AUTO: 0.8 % (ref 0–1.5)
BILIRUB SERPL-MCNC: 0.8 MG/DL (ref 0–1.2)
BLD GP AB SCN SERPL QL: NEGATIVE
BUN SERPL-MCNC: 31 MG/DL (ref 8–23)
BUN/CREAT SERPL: 14.6 (ref 7–25)
CALCIUM SPEC-SCNC: 9.5 MG/DL (ref 8.6–10.5)
CHLORIDE SERPL-SCNC: 98 MMOL/L (ref 98–107)
CO2 SERPL-SCNC: 31.8 MMOL/L (ref 22–29)
CREAT SERPL-MCNC: 2.12 MG/DL (ref 0.76–1.27)
DEPRECATED RDW RBC AUTO: 45.5 FL (ref 37–54)
EGFRCR SERPLBLD CKD-EPI 2021: 34.5 ML/MIN/1.73
EOSINOPHIL # BLD AUTO: 0.14 10*3/MM3 (ref 0–0.4)
EOSINOPHIL NFR BLD AUTO: 2.2 % (ref 0.3–6.2)
ERYTHROCYTE [DISTWIDTH] IN BLOOD BY AUTOMATED COUNT: 13.7 % (ref 12.3–15.4)
GLOBULIN UR ELPH-MCNC: 2.5 GM/DL
GLUCOSE SERPL-MCNC: 168 MG/DL (ref 65–99)
HCT VFR BLD AUTO: 43.3 % (ref 37.5–51)
HGB BLD-MCNC: 14.2 G/DL (ref 13–17.7)
IMM GRANULOCYTES # BLD AUTO: 0.06 10*3/MM3 (ref 0–0.05)
IMM GRANULOCYTES NFR BLD AUTO: 0.9 % (ref 0–0.5)
INR PPP: 1.06 (ref 0.9–1.1)
LYMPHOCYTES # BLD AUTO: 1.2 10*3/MM3 (ref 0.7–3.1)
LYMPHOCYTES NFR BLD AUTO: 18.5 % (ref 19.6–45.3)
MCH RBC QN AUTO: 29.5 PG (ref 26.6–33)
MCHC RBC AUTO-ENTMCNC: 32.8 G/DL (ref 31.5–35.7)
MCV RBC AUTO: 89.8 FL (ref 79–97)
MONOCYTES # BLD AUTO: 0.66 10*3/MM3 (ref 0.1–0.9)
MONOCYTES NFR BLD AUTO: 10.2 % (ref 5–12)
NEUTROPHILS NFR BLD AUTO: 4.37 10*3/MM3 (ref 1.7–7)
NEUTROPHILS NFR BLD AUTO: 67.4 % (ref 42.7–76)
NRBC BLD AUTO-RTO: 0 /100 WBC (ref 0–0.2)
PLATELET # BLD AUTO: 161 10*3/MM3 (ref 140–450)
PMV BLD AUTO: 11.4 FL (ref 6–12)
POTASSIUM SERPL-SCNC: 3 MMOL/L (ref 3.5–5.2)
PROT SERPL-MCNC: 6.9 G/DL (ref 6–8.5)
PROTHROMBIN TIME: 14 SECONDS (ref 11.7–14.2)
QT INTERVAL: 381 MS
QTC INTERVAL: 437 MS
RBC # BLD AUTO: 4.82 10*6/MM3 (ref 4.14–5.8)
RH BLD: NEGATIVE
SODIUM SERPL-SCNC: 141 MMOL/L (ref 136–145)
T&S EXPIRATION DATE: NORMAL
WBC NRBC COR # BLD AUTO: 6.48 10*3/MM3 (ref 3.4–10.8)

## 2024-05-06 PROCEDURE — 85610 PROTHROMBIN TIME: CPT

## 2024-05-06 PROCEDURE — 85025 COMPLETE CBC W/AUTO DIFF WBC: CPT

## 2024-05-06 PROCEDURE — 86850 RBC ANTIBODY SCREEN: CPT

## 2024-05-06 PROCEDURE — 80053 COMPREHEN METABOLIC PANEL: CPT

## 2024-05-06 PROCEDURE — 86900 BLOOD TYPING SEROLOGIC ABO: CPT

## 2024-05-06 PROCEDURE — 36415 COLL VENOUS BLD VENIPUNCTURE: CPT

## 2024-05-06 PROCEDURE — 85730 THROMBOPLASTIN TIME PARTIAL: CPT

## 2024-05-06 PROCEDURE — 86901 BLOOD TYPING SEROLOGIC RH(D): CPT

## 2024-05-06 PROCEDURE — 93005 ELECTROCARDIOGRAM TRACING: CPT

## 2024-05-06 RX ORDER — POTASSIUM CITRATE 10 MEQ/1
20 TABLET, EXTENDED RELEASE ORAL 2 TIMES DAILY
COMMUNITY

## 2024-05-07 RX ORDER — LISINOPRIL 10 MG/1
10 TABLET ORAL
Qty: 30 TABLET | Refills: 0 | Status: SHIPPED | OUTPATIENT
Start: 2024-05-07

## 2024-05-07 RX ORDER — CARVEDILOL 25 MG/1
25 TABLET ORAL EVERY 12 HOURS SCHEDULED
Qty: 60 TABLET | Refills: 0 | Status: SHIPPED | OUTPATIENT
Start: 2024-05-07

## 2024-05-10 ENCOUNTER — TRANSCRIBE ORDERS (OUTPATIENT)
Dept: ADMINISTRATIVE | Facility: HOSPITAL | Age: 64
End: 2024-05-10
Payer: COMMERCIAL

## 2024-05-10 DIAGNOSIS — J84.9 ILD (INTERSTITIAL LUNG DISEASE): Primary | ICD-10-CM

## 2024-05-12 ENCOUNTER — ANESTHESIA EVENT (OUTPATIENT)
Dept: PERIOP | Facility: HOSPITAL | Age: 64
End: 2024-05-12
Payer: COMMERCIAL

## 2024-05-13 ENCOUNTER — APPOINTMENT (OUTPATIENT)
Dept: GENERAL RADIOLOGY | Facility: HOSPITAL | Age: 64
End: 2024-05-13
Payer: COMMERCIAL

## 2024-05-13 ENCOUNTER — ANESTHESIA (OUTPATIENT)
Dept: PERIOP | Facility: HOSPITAL | Age: 64
End: 2024-05-13
Payer: COMMERCIAL

## 2024-05-13 ENCOUNTER — HOSPITAL ENCOUNTER (OUTPATIENT)
Facility: HOSPITAL | Age: 64
Discharge: HOME | End: 2024-05-13
Attending: NEUROLOGICAL SURGERY | Admitting: NEUROLOGICAL SURGERY
Payer: COMMERCIAL

## 2024-05-13 VITALS
HEART RATE: 65 BPM | WEIGHT: 266.76 LBS | BODY MASS INDEX: 42.87 KG/M2 | DIASTOLIC BLOOD PRESSURE: 92 MMHG | SYSTOLIC BLOOD PRESSURE: 159 MMHG | HEIGHT: 66 IN | TEMPERATURE: 98.2 F | RESPIRATION RATE: 16 BRPM | OXYGEN SATURATION: 93 %

## 2024-05-13 DIAGNOSIS — M79.605 LEFT LEG PAIN: ICD-10-CM

## 2024-05-13 DIAGNOSIS — M48.061 LUMBAR FORAMINAL STENOSIS: Primary | ICD-10-CM

## 2024-05-13 DIAGNOSIS — M54.16 LUMBAR RADICULOPATHY: ICD-10-CM

## 2024-05-13 DIAGNOSIS — M21.372 LEFT FOOT DROP: ICD-10-CM

## 2024-05-13 PROCEDURE — 25010000002 ONDANSETRON PER 1 MG: Performed by: NURSE ANESTHETIST, CERTIFIED REGISTERED

## 2024-05-13 PROCEDURE — C1713 ANCHOR/SCREW BN/BN,TIS/BN: HCPCS | Performed by: NEUROLOGICAL SURGERY

## 2024-05-13 PROCEDURE — 25810000003 LACTATED RINGERS PER 1000 ML: Performed by: ANESTHESIOLOGY

## 2024-05-13 PROCEDURE — 25010000002 PROPOFOL 200 MG/20ML EMULSION: Performed by: NURSE ANESTHETIST, CERTIFIED REGISTERED

## 2024-05-13 PROCEDURE — 25010000002 DEXAMETHASONE SODIUM PHOSPHATE 20 MG/5ML SOLUTION: Performed by: NURSE ANESTHETIST, CERTIFIED REGISTERED

## 2024-05-13 PROCEDURE — 25010000002 SUGAMMADEX 200 MG/2ML SOLUTION: Performed by: NURSE ANESTHETIST, CERTIFIED REGISTERED

## 2024-05-13 PROCEDURE — 63047 LAM FACETEC & FORAMOT LUMBAR: CPT | Performed by: NEUROLOGICAL SURGERY

## 2024-05-13 PROCEDURE — 63047 LAM FACETEC & FORAMOT LUMBAR: CPT

## 2024-05-13 PROCEDURE — 76000 FLUOROSCOPY <1 HR PHYS/QHP: CPT

## 2024-05-13 PROCEDURE — 25010000002 CEFAZOLIN 3 G RECONSTITUTED SOLUTION 1 EACH VIAL: Performed by: NEUROLOGICAL SURGERY

## 2024-05-13 PROCEDURE — 25010000002 METHYLPREDNISOLONE PER 80 MG: Performed by: NEUROLOGICAL SURGERY

## 2024-05-13 DEVICE — WAX,BONE,NATURAL
Type: IMPLANTABLE DEVICE | Site: SPINE LUMBAR | Status: FUNCTIONAL
Brand: MEDLINE INDUSTRIES

## 2024-05-13 DEVICE — FLOSEAL WITH RECOTHROM - 5ML
Type: IMPLANTABLE DEVICE | Site: SPINE LUMBAR | Status: FUNCTIONAL
Brand: FLOSEAL HEMOSTATIC MATRIX

## 2024-05-13 RX ORDER — PROMETHAZINE HYDROCHLORIDE 25 MG/1
25 SUPPOSITORY RECTAL ONCE AS NEEDED
Status: DISCONTINUED | OUTPATIENT
Start: 2024-05-13 | End: 2024-05-13 | Stop reason: HOSPADM

## 2024-05-13 RX ORDER — DOCUSATE SODIUM 250 MG
250 CAPSULE ORAL 2 TIMES DAILY
Qty: 60 CAPSULE | Refills: 2 | Status: SHIPPED | OUTPATIENT
Start: 2024-05-13

## 2024-05-13 RX ORDER — SODIUM CHLORIDE 0.9 % (FLUSH) 0.9 %
3-10 SYRINGE (ML) INJECTION AS NEEDED
Status: DISCONTINUED | OUTPATIENT
Start: 2024-05-13 | End: 2024-05-13 | Stop reason: HOSPADM

## 2024-05-13 RX ORDER — HYDRALAZINE HYDROCHLORIDE 20 MG/ML
5 INJECTION INTRAMUSCULAR; INTRAVENOUS
Status: DISCONTINUED | OUTPATIENT
Start: 2024-05-13 | End: 2024-05-13 | Stop reason: HOSPADM

## 2024-05-13 RX ORDER — LABETALOL HYDROCHLORIDE 5 MG/ML
5 INJECTION, SOLUTION INTRAVENOUS
Status: DISCONTINUED | OUTPATIENT
Start: 2024-05-13 | End: 2024-05-13 | Stop reason: HOSPADM

## 2024-05-13 RX ORDER — GABAPENTIN 300 MG/1
600 CAPSULE ORAL ONCE
Status: COMPLETED | OUTPATIENT
Start: 2024-05-13 | End: 2024-05-13

## 2024-05-13 RX ORDER — DIPHENHYDRAMINE HYDROCHLORIDE 50 MG/ML
12.5 INJECTION INTRAMUSCULAR; INTRAVENOUS
Status: DISCONTINUED | OUTPATIENT
Start: 2024-05-13 | End: 2024-05-13 | Stop reason: HOSPADM

## 2024-05-13 RX ORDER — SODIUM CHLORIDE, SODIUM LACTATE, POTASSIUM CHLORIDE, CALCIUM CHLORIDE 600; 310; 30; 20 MG/100ML; MG/100ML; MG/100ML; MG/100ML
9 INJECTION, SOLUTION INTRAVENOUS CONTINUOUS
Status: DISCONTINUED | OUTPATIENT
Start: 2024-05-13 | End: 2024-05-13 | Stop reason: HOSPADM

## 2024-05-13 RX ORDER — ACETAMINOPHEN 500 MG
1000 TABLET ORAL ONCE
Status: COMPLETED | OUTPATIENT
Start: 2024-05-13 | End: 2024-05-13

## 2024-05-13 RX ORDER — IPRATROPIUM BROMIDE AND ALBUTEROL SULFATE 2.5; .5 MG/3ML; MG/3ML
3 SOLUTION RESPIRATORY (INHALATION) ONCE AS NEEDED
Status: DISCONTINUED | OUTPATIENT
Start: 2024-05-13 | End: 2024-05-13 | Stop reason: HOSPADM

## 2024-05-13 RX ORDER — FAMOTIDINE 10 MG/ML
20 INJECTION, SOLUTION INTRAVENOUS ONCE
Status: COMPLETED | OUTPATIENT
Start: 2024-05-13 | End: 2024-05-13

## 2024-05-13 RX ORDER — MIDAZOLAM HYDROCHLORIDE 1 MG/ML
1 INJECTION INTRAMUSCULAR; INTRAVENOUS
Status: DISCONTINUED | OUTPATIENT
Start: 2024-05-13 | End: 2024-05-13 | Stop reason: HOSPADM

## 2024-05-13 RX ORDER — DROPERIDOL 2.5 MG/ML
0.62 INJECTION, SOLUTION INTRAMUSCULAR; INTRAVENOUS
Status: DISCONTINUED | OUTPATIENT
Start: 2024-05-13 | End: 2024-05-13 | Stop reason: HOSPADM

## 2024-05-13 RX ORDER — FENTANYL CITRATE 50 UG/ML
50 INJECTION, SOLUTION INTRAMUSCULAR; INTRAVENOUS
Status: DISCONTINUED | OUTPATIENT
Start: 2024-05-13 | End: 2024-05-13 | Stop reason: HOSPADM

## 2024-05-13 RX ORDER — OXYCODONE AND ACETAMINOPHEN 7.5; 325 MG/1; MG/1
1 TABLET ORAL EVERY 4 HOURS PRN
Status: DISCONTINUED | OUTPATIENT
Start: 2024-05-13 | End: 2024-05-13 | Stop reason: HOSPADM

## 2024-05-13 RX ORDER — LIDOCAINE HYDROCHLORIDE 20 MG/ML
INJECTION, SOLUTION INFILTRATION; PERINEURAL AS NEEDED
Status: DISCONTINUED | OUTPATIENT
Start: 2024-05-13 | End: 2024-05-13 | Stop reason: SURG

## 2024-05-13 RX ORDER — HYDROCODONE BITARTRATE AND ACETAMINOPHEN 5; 325 MG/1; MG/1
1 TABLET ORAL ONCE AS NEEDED
Status: COMPLETED | OUTPATIENT
Start: 2024-05-13 | End: 2024-05-13

## 2024-05-13 RX ORDER — ONDANSETRON 2 MG/ML
INJECTION INTRAMUSCULAR; INTRAVENOUS AS NEEDED
Status: DISCONTINUED | OUTPATIENT
Start: 2024-05-13 | End: 2024-05-13 | Stop reason: SURG

## 2024-05-13 RX ORDER — EPHEDRINE SULFATE 50 MG/ML
5 INJECTION, SOLUTION INTRAVENOUS ONCE AS NEEDED
Status: DISCONTINUED | OUTPATIENT
Start: 2024-05-13 | End: 2024-05-13 | Stop reason: HOSPADM

## 2024-05-13 RX ORDER — PROPOFOL 10 MG/ML
INJECTION, EMULSION INTRAVENOUS AS NEEDED
Status: DISCONTINUED | OUTPATIENT
Start: 2024-05-13 | End: 2024-05-13 | Stop reason: SURG

## 2024-05-13 RX ORDER — PROMETHAZINE HYDROCHLORIDE 25 MG/1
25 TABLET ORAL ONCE AS NEEDED
Status: DISCONTINUED | OUTPATIENT
Start: 2024-05-13 | End: 2024-05-13 | Stop reason: HOSPADM

## 2024-05-13 RX ORDER — FLUMAZENIL 0.1 MG/ML
0.2 INJECTION INTRAVENOUS AS NEEDED
Status: DISCONTINUED | OUTPATIENT
Start: 2024-05-13 | End: 2024-05-13 | Stop reason: HOSPADM

## 2024-05-13 RX ORDER — ONDANSETRON 2 MG/ML
4 INJECTION INTRAMUSCULAR; INTRAVENOUS ONCE AS NEEDED
Status: DISCONTINUED | OUTPATIENT
Start: 2024-05-13 | End: 2024-05-13 | Stop reason: HOSPADM

## 2024-05-13 RX ORDER — MAGNESIUM HYDROXIDE 1200 MG/15ML
LIQUID ORAL AS NEEDED
Status: DISCONTINUED | OUTPATIENT
Start: 2024-05-13 | End: 2024-05-13 | Stop reason: HOSPADM

## 2024-05-13 RX ORDER — HYDROMORPHONE HYDROCHLORIDE 1 MG/ML
0.5 INJECTION, SOLUTION INTRAMUSCULAR; INTRAVENOUS; SUBCUTANEOUS
Status: DISCONTINUED | OUTPATIENT
Start: 2024-05-13 | End: 2024-05-13 | Stop reason: HOSPADM

## 2024-05-13 RX ORDER — METHYLPREDNISOLONE ACETATE 80 MG/ML
INJECTION, SUSPENSION INTRA-ARTICULAR; INTRALESIONAL; INTRAMUSCULAR; SOFT TISSUE AS NEEDED
Status: DISCONTINUED | OUTPATIENT
Start: 2024-05-13 | End: 2024-05-13 | Stop reason: HOSPADM

## 2024-05-13 RX ORDER — NALOXONE HCL 0.4 MG/ML
0.2 VIAL (ML) INJECTION AS NEEDED
Status: DISCONTINUED | OUTPATIENT
Start: 2024-05-13 | End: 2024-05-13 | Stop reason: HOSPADM

## 2024-05-13 RX ORDER — SODIUM CHLORIDE 0.9 % (FLUSH) 0.9 %
3 SYRINGE (ML) INJECTION EVERY 12 HOURS SCHEDULED
Status: DISCONTINUED | OUTPATIENT
Start: 2024-05-13 | End: 2024-05-13 | Stop reason: HOSPADM

## 2024-05-13 RX ORDER — DEXAMETHASONE SODIUM PHOSPHATE 4 MG/ML
INJECTION, SOLUTION INTRA-ARTICULAR; INTRALESIONAL; INTRAMUSCULAR; INTRAVENOUS; SOFT TISSUE AS NEEDED
Status: DISCONTINUED | OUTPATIENT
Start: 2024-05-13 | End: 2024-05-13 | Stop reason: SURG

## 2024-05-13 RX ORDER — ROCURONIUM BROMIDE 10 MG/ML
INJECTION, SOLUTION INTRAVENOUS AS NEEDED
Status: DISCONTINUED | OUTPATIENT
Start: 2024-05-13 | End: 2024-05-13 | Stop reason: SURG

## 2024-05-13 RX ORDER — BUPIVACAINE HYDROCHLORIDE AND EPINEPHRINE 5; 5 MG/ML; UG/ML
INJECTION, SOLUTION PERINEURAL AS NEEDED
Status: DISCONTINUED | OUTPATIENT
Start: 2024-05-13 | End: 2024-05-13 | Stop reason: HOSPADM

## 2024-05-13 RX ORDER — HYDROCODONE BITARTRATE AND ACETAMINOPHEN 5; 325 MG/1; MG/1
1 TABLET ORAL EVERY 6 HOURS PRN
Qty: 20 TABLET | Refills: 0 | Status: SHIPPED | OUTPATIENT
Start: 2024-05-13

## 2024-05-13 RX ADMIN — HYDROCODONE BITARTRATE AND ACETAMINOPHEN 1 TABLET: 5; 325 TABLET ORAL at 16:43

## 2024-05-13 RX ADMIN — GABAPENTIN 600 MG: 300 CAPSULE ORAL at 11:08

## 2024-05-13 RX ADMIN — DEXAMETHASONE SODIUM PHOSPHATE 10 MG: 4 INJECTION, SOLUTION INTRAMUSCULAR; INTRAVENOUS at 14:08

## 2024-05-13 RX ADMIN — LIDOCAINE HYDROCHLORIDE 100 MG: 20 INJECTION, SOLUTION INFILTRATION; PERINEURAL at 14:01

## 2024-05-13 RX ADMIN — SODIUM CHLORIDE 3000 MG: 900 INJECTION INTRAVENOUS at 13:50

## 2024-05-13 RX ADMIN — FAMOTIDINE 20 MG: 10 INJECTION INTRAVENOUS at 11:10

## 2024-05-13 RX ADMIN — ACETAMINOPHEN 1000 MG: 500 TABLET ORAL at 11:08

## 2024-05-13 RX ADMIN — SUGAMMADEX 200 MG: 100 INJECTION, SOLUTION INTRAVENOUS at 15:36

## 2024-05-13 RX ADMIN — SODIUM CHLORIDE, POTASSIUM CHLORIDE, SODIUM LACTATE AND CALCIUM CHLORIDE 9 ML/HR: 600; 310; 30; 20 INJECTION, SOLUTION INTRAVENOUS at 11:10

## 2024-05-13 RX ADMIN — ONDANSETRON 4 MG: 2 INJECTION INTRAMUSCULAR; INTRAVENOUS at 15:27

## 2024-05-13 RX ADMIN — SODIUM CHLORIDE, POTASSIUM CHLORIDE, SODIUM LACTATE AND CALCIUM CHLORIDE: 600; 310; 30; 20 INJECTION, SOLUTION INTRAVENOUS at 14:57

## 2024-05-13 RX ADMIN — PROPOFOL 250 MG: 10 INJECTION, EMULSION INTRAVENOUS at 14:01

## 2024-05-13 RX ADMIN — ROCURONIUM BROMIDE 50 MG: 10 INJECTION, SOLUTION INTRAVENOUS at 14:03

## 2024-05-13 NOTE — ANESTHESIA PREPROCEDURE EVALUATION
Anesthesia Evaluation     history of anesthetic complications (says he coded with prior anesthetic ~15-20yrs ago--multiple anesthetics here without issue):  prolonged sedation  NPO Solid Status: > 8 hours  NPO Liquid Status: > 2 hours           Airway   Mallampati: III  Neck ROM: full  no difficulty expected  Dental - normal exam     Pulmonary - normal exam   (+) pleural effusion, a smoker Former, COPD,shortness of breath, sleep apnea on CPAP  (-) asthma    ROS comment: Acute respiratory failure with hypoxia    PE comment: nonlabored  Cardiovascular - normal exam  Exercise tolerance: good (4-7 METS)    Rhythm: regular  Rate: normal    (+) hypertension, DALY, hyperlipidemia  (-) valvular problems/murmurs, past MI, CAD, dysrhythmias, angina      Neuro/Psych  (+) psychiatric history Anxiety, Depression and Bipolar  (-) seizures, TIA, CVA  GI/Hepatic/Renal/Endo    (+) morbid obesity, renal disease (stage 4 CKD)- stones, thyroid problem hypothyroidism  (-) GERD, liver diseaseDiabetes: preDM.    Musculoskeletal     (+) back pain, chronic pain  Abdominal    Substance History      OB/GYN          Other                      Anesthesia Plan    ASA 3     general     intravenous induction     Anesthetic plan, risks, benefits, and alternatives have been provided, discussed and informed consent has been obtained with: patient.    CODE STATUS:

## 2024-05-13 NOTE — INTERVAL H&P NOTE
H&P reviewed. The patient was examined and there are no changes to the H&P.      We discussed the risks and benefits and alternatives of surgery including CSF leak, injury to nerve roots, need for further surgery down the line.  He understood and was willing to proceed with surgery.

## 2024-05-13 NOTE — ANESTHESIA PROCEDURE NOTES
Airway  Urgency: elective    Date/Time: 5/13/2024 2:06 PM  Difficult airway    General Information and Staff    Patient location during procedure: OR  Anesthesiologist: Nick Dominguez MD  CRNA/CAA: Deuce Cordova CRNA    Indications and Patient Condition  Indications for airway management: airway protection    Preoxygenated: yes  MILS maintained throughout  Mask difficulty assessment: 2 - vent by mask + OA or adjuvant +/- NMBA    Final Airway Details  Final airway type: endotracheal airway      Successful airway: ETT  Cuffed: yes   Successful intubation technique: direct laryngoscopy  Facilitating devices/methods: intubating stylet, Bougie and anterior pressure/BURP  Endotracheal tube insertion site: oral  Blade: Jillian  Blade size: 3  ETT size (mm): 7.5  Cormack-Lehane Classification: grade III - view of epiglottis only  Placement verified by: chest auscultation and capnometry   Measured from: gums  ETT/EBT to gums (cm): 24  Number of attempts at approach: 2  Assessment: lips, teeth, and gum same as pre-op and atraumatic intubation

## 2024-05-13 NOTE — OP NOTE
Lumbar Microscopic foraminotomies and laminectomy procedure Note    Calos Moseley  5/13/2024  [id number]    SURGEON  Christos Spangler MD    Assistant:  Virgen Dumont CSA was present throughout the entire surgery to provide intraoperative suction, retraction, suturing, and irrigation to promote visualization by the operative surgeon and assist with opening and closure.  The skilled assistance was necessary for the success of this case.  Our practice does not have a relationship with neurosurgical residents.    Indication: Calos Moseley is a 63 y.o. male who presents with lateral recess and foraminal stenosis at the left L5-S1 level    Pre-op Diagnosis:   Lumbar radiculopathy and foraminal stenosis, L5-S1 on the Left    Post-op Diagnosis:     Lumbar radiculopathy and foraminal stenosis, L5-S1 on the Left    Procedure Performed:  Lumbar Microscopic laminectomy and foraminotomies, L5-S1 on the Left    Spinal Surgery Levels Completed:1 Level    Laminotomy (Hemilaminectomy), with decompression of nerve root, including partial facetectomy, foraminotomy, L5-S1 on the Left.  Intraoperative Microdissection      Anesthesia: General    Staff:   Circulator: Rody Rodriguez RN  Scrub Person: Marce Eng  Assistant: Virgen Dumont CSA    Estimated Blood Loss: minimal    Specimens:                * No orders in the log *    Findings: Severe stenosis    Complications: None apparent    Narrative Description:     Positioning:  After operative consent, the patient was taken to the operating room in stable condition and placed under general endotracheal intubation. Once adequate anesthesia was administered the patient was placed in the prone position on a Alexis table. After adequate prepping and draping, needle localization of the L5-S1 level was confirmed and the skin was infiltrated with local anesthesia.    Approach:  The skin incision was taken down to the superficial fascia which was then incised with the Bovie  electrocautery. The introducer of the Metrx tubular retractor system was used to localize the left lamina at the appropriate level which was confirmed with C-arm. The incision was progressively dilated with the Metrx dilators up to 6 cm in length. An 18 mm diameter tapered tube was placed down the incision and at this point the C-arm was used to confirm the laterality and position of the tube.    Operating Microscope: The operating microscope was draped using sterile technique and brought into the field and the rest of the operation was performed under operative magnification with microdissection technique and micro-illumination with the aid of the operating microscope.    Lumbar decompression:  The laminotomy was performed at L5 on the left with a [employer] drill. The ligamentum flavum was resected with a Kerrison ronguer. The traversing nerve root was now decompressed and the microscope was now used for microdissection. Gentle palpation with a nerve hook along the exiting nerve root confirmed impingement and hypertrophied ligamentum flavum was removed from the left. There was some bony stenosis on the left at this level and the foraminotomy was completed with thorough decompression of the neural foramen. The wound was irrigated and the Valsalva maneuver did not express CSF. The Lira ball probe passed freely along the traversing and exiting nerve roots. Hemostasis was complete with Flow-Seal and bipolar electrocautery.    Closure:  The fascia was now closed with 0 Vicryl suture. The superficial subcutaneous tissues closed with 3-0 Vicryl suture and the skin with 3-0 nylon suture in a running fashion. Dermabond was then applied to the surface of the incision. The patient arose from anesthesia in stable condition and was transported to postop recovery.    Christos Spangler MD     Date: 5/13/2024  Time: 15:37 EDT

## 2024-05-13 NOTE — ANESTHESIA POSTPROCEDURE EVALUATION
"Patient: Calos Moseley    Procedure Summary       Date: 05/13/24 Room / Location: [facility] OR [address] [place] [address] MAIN OR    Anesthesia Start: 1355 Anesthesia Stop: 1556    Procedure: LEFT LUMBAR FIVE TO SACRAL ONE FORAMINOTOMY, POSSIBLE DISCECTOMY MICROSCOPIC (Left: Spine Lumbar) Diagnosis:       Left foot drop      Lumbar radiculopathy      (Left foot drop [M21.372])      (Lumbar radiculopathy [M54.16])    Surgeons: Christos Spangler MD Provider: Nick Dominguez MD    Anesthesia Type: general ASA Status: 3            Anesthesia Type: general    Vitals  Vitals Value Taken Time   /89 05/13/24 1630   Temp 36.8 °C (98.2 °F) 05/13/24 1551   Pulse 69 05/13/24 1651   Resp 16 05/13/24 1630   SpO2 95 % 05/13/24 1651   Vitals shown include unfiled device data.        Post Anesthesia Care and Evaluation    Pain management: adequate    Airway patency: patent  Anesthetic complications: No anesthetic complications    Cardiovascular status: acceptable  Respiratory status: acceptable  Hydration status: acceptable    Comments: /89   Pulse 78   Temp 36.8 °C (98.2 °F) (Oral)   Resp 16   Ht 167 cm (65.75\")   Wt 121 kg (266 lb 12.1 oz)   SpO2 97%   BMI 43.39 kg/m²       "

## 2024-05-13 NOTE — DISCHARGE INSTRUCTIONS
[facility] Neurological Surgery  [address] [facility], [address]  [place] [address]  Phone:  [phone]  Fax:  [phone]    Dr. Christos Spangler MD            Lumbar Minimally Invasive Surgery Post-Operative Instructions        Your incision may begin to itch a few days after surgery, this is a normal part of the healing process.    You may resume normal diet as you tolerate    Walking is permitted, but you are encouraged to limit activity.  Do not lift anything heavier than 5 pounds.  Excessive movement and stress on the spine after a discectomy and result in a re-herniated disk.  You may drive once you are no longer requiring narcotic pain medications.    Refrain from any sexual activity until after your first evaluation at the office.     Back pain after surgery may worsen 2 to 3 days following surgery.  It should smooth out after the third day.  Continue the pain medication and muscle relaxers as prescribed.  You may also need to take a stool softener like Senokot-S if you develop constipation.    You may shower and pat the incision dry, but do not soak your wound in water such as a swimming pool, hot tub or lake.   Avoid direct sunlight to the wound for at least three (3) months.  You may apply ice to the surgical site for 15 to 20 minutes each hour while awake for the first few days following surgery.  Simply put the ice in a plastic bag in place a towel between the bag of ice and your skin.    You will leave the hospital with prescriptions for pain medicine and a muscle relaxer.  These medications must be taken as prescribed only.  A hospital policy prevents us from prescribing more than 6 days worth of pain medications, so please call into the office for refills if needed.  Please allow for 72 hours to refill the pain medication and note that it may not be possible to refill pain medications over the weekend, so please contact our office prior to the weekend. After your first week post surgery, If tolerable, please  wean yourself from the narcotic pain medications slowly but it is important that you do not stop taking the narcotics all at once.    A small amount of bleeding from the incision during the first few days is not unusual.       You should have a post-operative visit approximately 3 weeks after surgery.  If you do not have a scheduled appointment, call the office at [phone] to schedule one.  We will discuss return to work at your first post-operative visit, but you can expect to be off of work for an average of 6 weeks.     Notify the office if there are any problems, such as:    Excessive back or leg pain   If you lose control of urine or bowel movements  Persistent temperature of 101.5° F (38.6° C) or greater that is not relieved by Tylenol.  Excessive bleeding, redness, heat, leakage of fluid, swelling or pus around the incision   If you develop difficulty breathing, have chest pain or shortness of breath, call 911.  If you develop swelling in the calf or leg  Your pain is not controlled with medication  You seem to be getting worse rather than better    Thank you.

## 2024-05-14 RX ORDER — CARVEDILOL 25 MG/1
25 TABLET ORAL EVERY 12 HOURS SCHEDULED
Qty: 180 TABLET | Refills: 0 | Status: SHIPPED | OUTPATIENT
Start: 2024-05-14

## 2024-05-20 ENCOUNTER — TELEPHONE (OUTPATIENT)
Dept: NEUROSURGERY | Facility: CLINIC | Age: 64
End: 2024-05-20
Payer: COMMERCIAL

## 2024-05-20 NOTE — TELEPHONE ENCOUNTER
05/20/24 PT CALLED TO ASK ABOUT PAIN HE IS HAVING AT HIS ANKLE. HE WOULD LIKE TO HAVE SOMEONE ADVISE HIM.  PT HAD SURGERY WITH DR. KEYS ON 05/13/24  PLEASE CALL PT AT: [phone]

## 2024-05-21 RX ORDER — GABAPENTIN 300 MG/1
300 CAPSULE ORAL 3 TIMES DAILY
Qty: 90 CAPSULE | Refills: 2 | Status: SHIPPED | OUTPATIENT
Start: 2024-05-21

## 2024-05-21 NOTE — TELEPHONE ENCOUNTER
Is let the patient know that that is typical for his nerves waking up after decompression and should go away eventually but the gabapentin will be helpful and I can refill that medication for him

## 2024-05-21 NOTE — TELEPHONE ENCOUNTER
Called patient however spoke with the wife (Rebeca) due to Calos was currently not at home and she stated patient is having significant pain in bilateral ankles and burning sensation that he previous was not having after surgery. Claos has not run a fever at all, and his skin is a normal temperature to touch.    Rebeca did let me know that he is icing both ankles and around the the incision site; then let me know he is applying ice pack directly to his skin.  I advised his wife that he should not be doing that.  He is having some redness that has just started around his incision and on his hips.  She thought it because this redness just started that this may have come from the ice being directly applied to his ice for several days. Again I let her know that he should not be applying ice directly to ice skin without a barrier.    Please advise and thank you

## 2024-05-21 NOTE — TELEPHONE ENCOUNTER
Called and talk to Rebeca  (Calos's wife) to let her know per Dr. Spangler that the the nerves are waking back up from the decompression.  And that Dr. Spangler also refilled his prescription for gabapentin.

## 2024-05-28 RX ORDER — VILAZODONE HYDROCHLORIDE 40 MG/1
40 TABLET ORAL DAILY
Qty: 90 TABLET | Refills: 3 | Status: SHIPPED | OUTPATIENT
Start: 2024-05-28

## 2024-05-28 NOTE — PROGRESS NOTES
Patient ID: Calos Moseley is a 63 y.o. male is an established patient with left foot drop and lumbar radiculopathy who has previously undergone left lumbar five to sacral one foraminotomy on 05/13/2024.    Subjective:     History of Present Illness  Calos had immediate relief after his surgery and about 6 days after surgery he started having some post decompression radiculitis that started out slowly with a few twinges in his legs gradually has resulted in burning sensation in his thighs and outer legs and recently his buttocks.  He has been walking which helps make that more comfortable but he has difficulty sleeping at night due to the abnormal sensations.  He is run out of his gabapentin.  He still has a little bit of SI joint inflammation that was present before surgery that has improved somewhat but is still present.      Review of Systems   Constitutional:  Negative for fever.   Musculoskeletal:  Positive for back pain (and leg pain) and gait problem.   Neurological:  Positive for weakness and numbness (and burning). Negative for dizziness, light-headedness and headaches.        While in the room and during my examination of the patient I wore a mask and eye protection.  I washed my hands before and after this patient encounter.  The patient was also wearing a mask.    The following portions of the patient's history were reviewed and updated as appropriate: allergies, current medications, past family history, past medical history, past social history, past surgical history and problem list.     Objective:    Vitals:    05/29/24 0909   BP: 154/72   Pulse: 73   Resp: 16   Temp: 97.2 °F (36.2 °C)   SpO2: 93%     Body mass index is 42.09 kg/m².    Physical Exam  Constitutional:       Appearance: Normal appearance.   HENT:      Head: Normocephalic and atraumatic.   Eyes:      Extraocular Movements: Extraocular movements intact.      Conjunctiva/sclera: Conjunctivae normal.      Pupils: Pupils are equal, round, and  reactive to light.   Cardiovascular:      Rate and Rhythm: Normal rate and regular rhythm.      Pulses: Normal pulses.   Pulmonary:      Breath sounds: Normal breath sounds.   Abdominal:      Palpations: Abdomen is soft.   Musculoskeletal:         General: Normal range of motion.      Cervical back: Normal range of motion and neck supple.      Comments: Bilateral SI joint inflammation characterized by:    -Pelvic Distraction test   -Lateral Iliac compression test   -FABERS (Jhonathan's test)   -Ganslen's Test     Skin:     General: Skin is warm and dry.   Neurological:      Mental Status: He is alert and oriented to person, place, and time.      Cranial Nerves: Cranial nerves 2-12 are intact.      Motor: Motor function is intact. No weakness or atrophy.      Coordination: Coordination is intact. Romberg sign negative. Romberg Test normal.      Gait: Gait is intact. Gait normal.      Deep Tendon Reflexes: Reflexes are normal and symmetric.      Reflex Scores:       Tricep reflexes are 2+ on the right side and 2+ on the left side.       Bicep reflexes are 2+ on the right side and 2+ on the left side.       Brachioradialis reflexes are 2+ on the right side and 2+ on the left side.       Patellar reflexes are 2+ on the right side and 2+ on the left side.       Achilles reflexes are 2+ on the right side and 2+ on the left side.  Psychiatric:         Speech: Speech normal.       Neurologic Exam     Mental Status   Oriented to person, place, and time.   Attention: normal. Concentration: normal.   Speech: speech is normal   Level of consciousness: alert    Cranial Nerves   Cranial nerves II through XII intact.     CN III, IV, VI   Pupils are equal, round, and reactive to light.    Motor Exam   Muscle bulk: normal  Overall muscle tone: normal    Strength   Strength 5/5 except as noted.     Sensory Exam   Light touch normal.     Gait, Coordination, and Reflexes     Gait  Gait: normal    Coordination   Romberg:  negative    Reflexes   Reflexes 2+ except as noted.   Right brachioradialis: 2+  Left brachioradialis: 2+  Right biceps: 2+  Left biceps: 2+  Right triceps: 2+  Left triceps: 2+  Right patellar: 2+  Left patellar: 2+  Right achilles: 2+  Left achilles: 2+       Results: No new neuroimaging    Assessment/Plan: He has some post decompression radiculitis which will resolve spontaneously eventually but in the meantime I think he will benefit from gabapentin.  He has a little bit of residual SI joint joint inflammation worse on the right than the left I think he will benefit from SI joint injections.  I would like to see him back in 3 months and he can gradually ramp up his activity over the next 4 weeks.       Diagnoses and all orders for this visit:    1. Left foot drop (Primary)    2. Lumbar back pain with radiculopathy affecting left lower extremity  -     gabapentin (NEURONTIN) 300 MG capsule; Take 1 capsule by mouth 3 (Three) Times a Day.  Dispense: 90 capsule; Refill: 2    3. Lumbar radiculopathy    4. SI (sacroiliac) joint inflammation  -     SI Joint Injection                Christos Spangler MD  05/29/24  09:39 EDT

## 2024-05-29 ENCOUNTER — OFFICE VISIT (OUTPATIENT)
Dept: NEUROSURGERY | Facility: CLINIC | Age: 64
End: 2024-05-29
Payer: COMMERCIAL

## 2024-05-29 VITALS
HEART RATE: 73 BPM | RESPIRATION RATE: 16 BRPM | WEIGHT: 266.76 LBS | DIASTOLIC BLOOD PRESSURE: 72 MMHG | BODY MASS INDEX: 41.87 KG/M2 | OXYGEN SATURATION: 93 % | HEIGHT: 67 IN | SYSTOLIC BLOOD PRESSURE: 154 MMHG | TEMPERATURE: 97.2 F

## 2024-05-29 DIAGNOSIS — M46.1 SI (SACROILIAC) JOINT INFLAMMATION: ICD-10-CM

## 2024-05-29 DIAGNOSIS — M21.372 LEFT FOOT DROP: Primary | ICD-10-CM

## 2024-05-29 DIAGNOSIS — M54.16 LUMBAR RADICULOPATHY: ICD-10-CM

## 2024-05-29 DIAGNOSIS — M54.16 LUMBAR BACK PAIN WITH RADICULOPATHY AFFECTING LEFT LOWER EXTREMITY: ICD-10-CM

## 2024-05-29 PROCEDURE — 99024 POSTOP FOLLOW-UP VISIT: CPT | Performed by: NEUROLOGICAL SURGERY

## 2024-05-29 RX ORDER — GABAPENTIN 300 MG/1
300 CAPSULE ORAL 3 TIMES DAILY
Qty: 90 CAPSULE | Refills: 2 | Status: SHIPPED | OUTPATIENT
Start: 2024-05-29

## 2024-05-29 RX ORDER — PRAMIPEXOLE DIHYDROCHLORIDE 0.75 MG/1
0.75 TABLET ORAL NIGHTLY
Qty: 30 TABLET | Refills: 2 | Status: SHIPPED | OUTPATIENT
Start: 2024-05-29

## 2024-06-01 RX ORDER — LISINOPRIL 10 MG/1
10 TABLET ORAL
Qty: 30 TABLET | Refills: 0 | Status: CANCELLED | OUTPATIENT
Start: 2024-06-01

## 2024-06-03 RX ORDER — LISINOPRIL 10 MG/1
10 TABLET ORAL
Qty: 30 TABLET | Refills: 0 | Status: SHIPPED | OUTPATIENT
Start: 2024-06-03

## 2024-06-03 RX ORDER — POTASSIUM CHLORIDE 750 MG/1
10 CAPSULE, EXTENDED RELEASE ORAL DAILY
Qty: 30 CAPSULE | Refills: 2 | Status: SHIPPED | OUTPATIENT
Start: 2024-06-03

## 2024-06-07 ENCOUNTER — ANESTHESIA EVENT (OUTPATIENT)
Dept: PAIN MEDICINE | Facility: HOSPITAL | Age: 64
End: 2024-06-07
Payer: COMMERCIAL

## 2024-06-07 ENCOUNTER — ANESTHESIA (OUTPATIENT)
Dept: PAIN MEDICINE | Facility: HOSPITAL | Age: 64
End: 2024-06-07
Payer: COMMERCIAL

## 2024-06-07 ENCOUNTER — HOSPITAL ENCOUNTER (OUTPATIENT)
Dept: PAIN MEDICINE | Facility: HOSPITAL | Age: 64
Discharge: HOME | End: 2024-06-07
Payer: COMMERCIAL

## 2024-06-07 ENCOUNTER — HOSPITAL ENCOUNTER (OUTPATIENT)
Dept: GENERAL RADIOLOGY | Facility: HOSPITAL | Age: 64
Discharge: HOME | End: 2024-06-07
Payer: COMMERCIAL

## 2024-06-07 VITALS
HEIGHT: 66 IN | BODY MASS INDEX: 42.75 KG/M2 | TEMPERATURE: 97.8 F | OXYGEN SATURATION: 94 % | RESPIRATION RATE: 16 BRPM | DIASTOLIC BLOOD PRESSURE: 71 MMHG | SYSTOLIC BLOOD PRESSURE: 134 MMHG | HEART RATE: 66 BPM | WEIGHT: 266 LBS

## 2024-06-07 DIAGNOSIS — M46.1 SACROILIITIS: Primary | ICD-10-CM

## 2024-06-07 DIAGNOSIS — R52 PAIN: ICD-10-CM

## 2024-06-07 PROCEDURE — 25010000002 BUPIVACAINE (PF) 0.25 % SOLUTION: Performed by: ANESTHESIOLOGY

## 2024-06-07 PROCEDURE — 25010000002 METHYLPREDNISOLONE PER 80 MG: Performed by: ANESTHESIOLOGY

## 2024-06-07 RX ORDER — LIDOCAINE HYDROCHLORIDE 10 MG/ML
1 INJECTION, SOLUTION INFILTRATION; PERINEURAL ONCE
Status: DISCONTINUED | OUTPATIENT
Start: 2024-06-07 | End: 2024-06-08 | Stop reason: HOSPADM

## 2024-06-07 RX ORDER — FENTANYL CITRATE 50 UG/ML
100 INJECTION, SOLUTION INTRAMUSCULAR; INTRAVENOUS ONCE
Status: DISCONTINUED | OUTPATIENT
Start: 2024-06-07 | End: 2024-06-08 | Stop reason: HOSPADM

## 2024-06-07 RX ORDER — METHYLPREDNISOLONE ACETATE 80 MG/ML
80 INJECTION, SUSPENSION INTRA-ARTICULAR; INTRALESIONAL; INTRAMUSCULAR; SOFT TISSUE ONCE
Status: COMPLETED | OUTPATIENT
Start: 2024-06-07 | End: 2024-06-07

## 2024-06-07 RX ORDER — MIDAZOLAM HYDROCHLORIDE 1 MG/ML
2 INJECTION INTRAMUSCULAR; INTRAVENOUS ONCE
Status: DISCONTINUED | OUTPATIENT
Start: 2024-06-07 | End: 2024-06-08 | Stop reason: HOSPADM

## 2024-06-07 RX ORDER — BUPIVACAINE HYDROCHLORIDE 2.5 MG/ML
10 INJECTION, SOLUTION EPIDURAL; INFILTRATION; INTRACAUDAL ONCE
Status: COMPLETED | OUTPATIENT
Start: 2024-06-07 | End: 2024-06-07

## 2024-06-07 RX ADMIN — METHYLPREDNISOLONE ACETATE 80 MG: 80 INJECTION, SUSPENSION INTRA-ARTICULAR; INTRALESIONAL; INTRAMUSCULAR; SOFT TISSUE at 08:25

## 2024-06-07 RX ADMIN — BUPIVACAINE HYDROCHLORIDE 10 ML: 2.5 INJECTION, SOLUTION EPIDURAL; INFILTRATION; INTRACAUDAL at 08:25

## 2024-06-07 NOTE — H&P
"  [facility]    History and Physical    Patient Name: Calos Moseley  :  1960  MRN:  [id number]  Date of Admission: 2024    Subjective     Patient is a 63 y.o. male presents with chief complaint of acute, intermittent, severe, 7/10, due to a\" pinched nerveand a disc\", burning, cramping  low back, hips: bilateral, and leg: bilateral pain.  Onset of symptoms was gradual starting 3 months ago.  Symptoms are associated/aggravated by nothing in particular. Symptoms improve with pain medication    The following portions of the patients history were reviewed and updated as appropriate: current medications, allergies, past medical history, past surgical history, past family history, past social history, and problem list                Objective     Past Medical History:   Past Medical History:   Diagnosis Date    Anesthesia complication     STATES HARD TO WAKE UP AFTER PREVIOUS KIDNEY STONE SURGERY AND STATES HE CODED    Bipolar affective     Chronic back pain     COPD (chronic obstructive pulmonary disease)     ? FROM MD NOTES    COVID     CRF (chronic renal failure), stage 4 (severe)     WILL CHECK LABS AGAIN    Elevated cholesterol     History of kidney stones     History of pneumonia 2023    \"TRAPPED LUNG\" PLUERAL EFFUSION. RIGHT LUNG. S/P DECORTIFICATION RIGHT LUNG    History of UTI     Hypertension     Hypothyroidism     Lumbar disc disease     Numbness and tingling     Restless leg syndrome     Shortness of breath     Sleep apnea     cpap  bring dos    Swelling of both lower extremities      Past Surgical History:   Past Surgical History:   Procedure Laterality Date    ADENOIDECTOMY      APPENDECTOMY      BRONCHOSCOPY Right 2023    Procedure: BRONCHOSCOPY WITH ENDOBRONCHIAL ULTRASOUND with BAL of RLL and FNA;  Surgeon: Aniceto Bruno MD;  Location: [facility] ENDOSCOPY;  Service: Pulmonary;  Laterality: Right;  pre- RLL pneumonia, lymphadenopathy  post- same    COLONOSCOPY      " COLONOSCOPY N/A 03/23/2023    Procedure: COLONOSCOPY into cecum with cold snare polypectomies;  Surgeon: Yoan Leonard MD;  Location: [facility] ENDOSCOPY;  Service: Gastroenterology;  Laterality: N/A;  polyps    CYSTOSCOPY WITH CLOT EVACUATION N/A 03/08/2022    Procedure: CYSTOSCOPY WITH CLOT EVACUATION;  Surgeon: Ihsan Lopez MD;  Location: [facility] MAIN OR;  Service: Urology;  Laterality: N/A;    CYSTOSCOPY, URETEROSCOPY, RETROGRADE PYELOGRAM, STENT INSERTION Left 03/07/2022    Procedure: CYSTOSCOPY URETEROSCOPY LASER LITHOTRIPSY and stent exchange;  Surgeon: Deuce Leija MD;  Location: [facility] MAIN OR;  Service: Urology;  Laterality: Left;    EPIDURAL BLOCK      LUMBAR DISCECTOMY Left 5/13/2024    Procedure: LEFT LUMBAR FIVE TO SACRAL ONE FORAMINOTOMY, POSSIBLE DISCECTOMY MICROSCOPIC;  Surgeon: Christos Spangler MD;  Location: [facility] OR;  Service: Neurosurgery;  Laterality: Left;    NEPHROLITHOTRIPSY PERCUTANEOUS Left 02/21/2022    Procedure: NEPHROSTLITHOTOMY PERCUTANEOUS;  Surgeon: Deuce Leija MD;  Location: [facility] OR;  Service: Urology;  Laterality: Left;    NEPHROLITHOTRIPSY PERCUTANEOUS Left 02/23/2022    Procedure: LEFT FLEXIBLE NEPHROSTOMY WITH STONE EXTRACTION, LEFT NEPHROSTOMY, TUBE REMOVAL, BILATERAL URETERAL STENT PLACEMENT;  Surgeon: Deuce Leija MD;  Location: [facility] OR;  Service: Urology;  Laterality: Left;    NEPHROLITHOTRIPSY PERCUTANEOUS Right 03/07/2022    Procedure: RIGHT PERCUTANEOUS NEPHROLITHOTOMY W ACCESS;  Surgeon: Deuce Leija MD;  Location: [facility] OR;  Service: Urology;  Laterality: Right;    THORACOSCOPY WITH DECORTICATION Right 05/20/2023    Procedure: THORACOSCOPY VIDEO ASSISTED WITH DECORTICATION WITH DAVINCI ROBOT;  Surgeon: Jose Rice MD;  Location: [facility] MAIN OR;  Service: Robotics - DaVinci;  Laterality: Right;    TONSILLECTOMY       Family History:   Family History   Problem Relation Age of Onset    Lung cancer Mother   "   COPD Father     Emphysema Father     Diabetes Father     Malig Hyperthermia Neg Hx      Social History:   Social History     Socioeconomic History    Marital status:    Tobacco Use    Smoking status: Former     Current packs/day: 0.00     Types: Cigarettes     Quit date: 1990     Years since quittin.4     Passive exposure: Past    Smokeless tobacco: Never    Tobacco comments:     SMOKED 2 PPD FOR 8 YEARS. QUIT 35 YEARS AGO   Vaping Use    Vaping status: Never Used   Substance and Sexual Activity    Alcohol use: Yes     Comment: ON OCC    Drug use: No    Sexual activity: Defer       Vital Signs Range for the last 24 hours  Temperature: Temp:  [36.6 °C (97.8 °F)] 36.6 °C (97.8 °F)   Temp Source:     BP: BP: (145)/(73) 145/73   Pulse: Heart Rate:  [67] 67   Respirations: Resp:  [16] 16   SPO2: SpO2:  [93 %] 93 %   O2 Amount (l/min):     O2 Devices Device (Oxygen Therapy): room air   Weight: Weight:  [121 kg (266 lb)] 121 kg (266 lb)     Flowsheet Rows      Flowsheet Row First Filed Value   Admission Height 167.6 cm (66\") Documented at 2024 0750   Admission Weight 121 kg (266 lb) Documented at 2024 0750            --------------------------------------------------------------------------------    Current Outpatient Medications   Medication Sig Dispense Refill    acetaminophen (TYLENOL) 325 MG tablet Take 2 tablets by mouth Every 12 (Twelve) Hours.      atorvastatin (LIPITOR) 20 MG tablet Take 1 tablet by mouth every night at bedtime. 90 tablet 3    buPROPion XL (WELLBUTRIN XL) 150 MG 24 hr tablet Take 1 tablet by mouth Daily. 90 tablet 1    carvedilol (COREG) 25 MG tablet Take 1 tablet by mouth Every 12 (Twelve) Hours. 180 tablet 0    docusate sodium (COLACE) 250 MG capsule Take 1 capsule by mouth 2 (Two) Times a Day. 60 capsule 2    empagliflozin (Jardiance) 10 MG tablet tablet Take 1 tablet by mouth Every Morning Before Breakfast. (Patient taking differently: Take 1 tablet by mouth " Every Morning Before Breakfast. HAS NOT YET STARTED) 30 tablet 3    Fluticasone-Umeclidin-Vilant (Trelegy Ellipta) 100-62.5-25 MCG/ACT inhaler Inhale 1 puff Daily. 60 each 11    gabapentin (NEURONTIN) 300 MG capsule Take 1 capsule by mouth 3 (Three) Times a Day. 90 capsule 2    hydrALAZINE (APRESOLINE) 50 MG tablet Take 2 tablets (100 mg total) by mouth in the morning and 2 tablets (100 mg total) at noon and 2 tablets (100 mg total) in the evening. 90 tablet 3    isosorbide mononitrate (IMDUR) 30 MG 24 hr tablet Take 1 tablet by mouth Daily.Do not crush or chew. 30 tablet 11    lamoTRIgine (LaMICtal) 100 MG tablet Take 1 tablet by mouth 2 (Two) Times a Day - morning and evening 180 tablet 1    levothyroxine (SYNTHROID, LEVOTHROID) 50 MCG tablet Take 1 tablet by mouth Every Morning. 30 tablet 0    lisinopril (PRINIVIL,ZESTRIL) 10 MG tablet Take 1 tablet by mouth Daily. 30 tablet 0    metOLazone (ZAROXOLYN) 2.5 MG tablet Take 1 tablet by mouth 3 (Three) Times a Week on Monday, Wednesday, Friday 3 tablet 0    potassium chloride (MICRO-K) 10 MEQ CR capsule Take 1 capsule by mouth Daily. 30 capsule 2    potassium chloride 10 MEQ CR tablet Take 1 tablet by mouth Daily. 90 tablet 3    potassium citrate (UROCIT-K) 10 MEQ (1080 MG) CR tablet Take 2 tablets (20 mEq total) by mouth in the morning and 2 tablets (20 mEq total) in the evening. Take with meals. Do not crush, chew, or split.. 360 tablet 3    pramipexole (MIRAPEX) 0.75 MG tablet Take 1 tablet by mouth Every Night. 30 tablet 2    tamsulosin (Flomax) 0.4 MG capsule 24 hr capsule Take 1 capsule by mouth every night at bedtime. 90 capsule 3    torsemide (DEMADEX) 20 MG tablet Take 4 tablets by mouth 2 (Two) Times a Day. 720 tablet 3    vilazodone (Viibryd) 40 MG tablet tablet Take 1 tablet by mouth Daily. 90 tablet 3     Current Facility-Administered Medications   Medication Dose Route Frequency Provider Last Rate Last Admin    bupivacaine (PF) (MARCAINE) 0.25 %  injection 10 mL  10 mL Infiltration Once Calos Darnell MD        fentaNYL citrate (PF) (SUBLIMAZE) injection 100 mcg  100 mcg Intravenous Once Calos Darnell MD        lidocaine (XYLOCAINE) 1 % injection 1 mL  1 mL Intradermal Once Calos Darnell MD        methylPREDNISolone acetate (DEPO-medrol) injection 80 mg  80 mg Epidural Once Calos Darnell MD        midazolam (VERSED) injection 2 mg  2 mg Intravenous Once Calos Darnell MD           --------------------------------------------------------------------------------  Assessment & Plan      Anesthesia Evaluation           Pain impairs ability to perform ADLs: Sleeping, Bathing, Exercise/Activity and Working  Modalities previously tried to control pain with limited effectiveness within the last 4-6 weeks: Ice, Prescription medications and OTC medications     Airway   Mallampati: III  Dental      Pulmonary    (+) COPD,sleep apnea  Cardiovascular     (+) hypertension      Neuro/Psych  (+) psychiatric history  GI/Hepatic/Renal/Endo    (+) morbid obesity, renal disease-, thyroid problem     Musculoskeletal     Abdominal    Substance History      OB/GYN          Other            Phys Exam Other: Patient has a  positive Fabere's test on both the right and the left.  He has a positive Gaenslen's test on the both right and left.  And he has a positive compression and distraction test on both right and left.             Diagnosis and Plan    Treatment Plan  ASA 3      Procedures: Nerve block type: sacroiliac joint steroid injection, right and left., With fluoroscopy,      Anesthetic plan and risks discussed with patient.          Diagnosis     * Sacroiliitis [M46.1]     * Lumbar radiculopathy [M54.16]          Target: sacroiliac joint steroid injection, right and left     Time:  23 min

## 2024-06-07 NOTE — DISCHARGE INSTRUCTIONS
After your SI joint injection:  -Please schedule your second SI joint injection at the time of your first SI joint injection for 2 weeks later  -Alternate 1 tablet of extra strength Tylenol and 2 tablets of Aleve (or any other NSAID) in a scheduled manner every 4 hours for at least 2 weeks after the injection  -Continue to use ice packs as much as tolerable and if you can tolerate an ice bath at least up to the waist  -The patient can also consider taking an ice bath by submerging the buttocks area in 60 degree water for 30 minutes/day for 5 days consecutively.   -Try adding ice gradually to decrease the shock of the ice bath   -Try putting a robe in the dryer for 35 minutes so that the patient can have a warm robe to wear after the ice bath  -After 2 injections 2 weeks apart, if your symptoms have not fully resolved we can consider an SI joint fusionAfter your SI joint injection:  -Please schedule your second SI joint injection at the time of your first SI joint injection for 2 weeks later  -Alternate 1 tablet of extra strength Tylenol and 2 tablets of Aleve (or any other NSAID) in a scheduled manner every 4 hours for at least 2 weeks after the injection  -Continue to use ice packs as much as tolerable and if you can tolerate an ice bath at least up to the waist  -The patient can also consider taking an ice bath by submerging the buttocks area in 60 degree water for 30 minutes/day for 5 days consecutively.   -Try adding ice gradually to decrease the shock of the ice bath   -Try putting a robe in the dryer for 35 minutes so that the patient can have a warm robe to wear after the ice bath  -After 2 injections 2 weeks apart, if your symptoms have not fully resolved we can consider an SI joint fusion

## 2024-06-07 NOTE — ANESTHESIA PROCEDURE NOTES
Sacroiliac joint steroid injection, right and left    Pre-sedation assessment completed: 6/7/2024 8:15 AM    Patient reassessed immediately prior to procedure    Patient location during procedure: Pain Clinic  Start time: 6/7/2024 8:15 AM  Stop time: 6/7/2024 8:29 AM    Reason for block: procedure for pain  Performed by  Anesthesiologist: Calos Darnell MD  Preanesthetic Checklist  Completed: patient identified, IV checked, site marked, risks and benefits discussed, surgical consent, monitors and equipment checked, pre-op evaluation and timeout performed  Prep:  Patient position: prone  Sterile barriers:cap, gloves, mask and sterile barrier  Prep: ChloraPrep  Patient monitoring: blood pressure monitoring, continuous pulse oximetry and EKG  Procedure:  Sedation:no  Guidance:fluoroscopy  Contrast Medium:no  Location:Bilateral  Needle type: short bevel needle.  Needle Gauge:22 G  Aspiration:negative  Medications:  Depomedrol:80  Comment:0.25 % bup 4cc    Post Assessment  Injection Assessment: negative  Patient Tolerance:comfortable throughout block  Complications:no  Additional Notes  Dx : Post-Op Diagnosis Codes:     * Sacroiliitis [M46.1]     * Lumbar radiculopathy [M54.16]      Sedation start:                                         End:

## 2024-06-24 ENCOUNTER — TRANSCRIBE ORDERS (OUTPATIENT)
Dept: ADMINISTRATIVE | Facility: HOSPITAL | Age: 64
End: 2024-06-24
Payer: COMMERCIAL

## 2024-06-24 DIAGNOSIS — I50.1 LEFT VENTRICULAR FAILURE: Primary | ICD-10-CM

## 2024-06-26 ENCOUNTER — APPOINTMENT (OUTPATIENT)
Dept: GENERAL RADIOLOGY | Facility: HOSPITAL | Age: 64
End: 2024-06-26
Payer: COMMERCIAL

## 2024-06-26 ENCOUNTER — APPOINTMENT (OUTPATIENT)
Dept: CARDIOLOGY | Facility: HOSPITAL | Age: 64
End: 2024-06-26
Payer: COMMERCIAL

## 2024-06-26 ENCOUNTER — HOSPITAL ENCOUNTER (EMERGENCY)
Facility: HOSPITAL | Age: 64
Discharge: HOME | End: 2024-06-26
Attending: EMERGENCY MEDICINE
Payer: COMMERCIAL

## 2024-06-26 ENCOUNTER — TELEPHONE (OUTPATIENT)
Dept: NEUROSURGERY | Facility: CLINIC | Age: 64
End: 2024-06-26
Payer: COMMERCIAL

## 2024-06-26 VITALS
OXYGEN SATURATION: 94 % | SYSTOLIC BLOOD PRESSURE: 171 MMHG | TEMPERATURE: 98 F | HEART RATE: 67 BPM | RESPIRATION RATE: 20 BRPM | DIASTOLIC BLOOD PRESSURE: 75 MMHG

## 2024-06-26 DIAGNOSIS — M25.571 ACUTE RIGHT ANKLE PAIN: ICD-10-CM

## 2024-06-26 DIAGNOSIS — I82.461 ACUTE DEEP VEIN THROMBOSIS (DVT) OF CALF MUSCLE VEIN OF RIGHT LOWER EXTREMITY: Primary | ICD-10-CM

## 2024-06-26 DIAGNOSIS — E79.0 HYPERURICEMIA: ICD-10-CM

## 2024-06-26 DIAGNOSIS — N18.9 CHRONIC KIDNEY DISEASE, UNSPECIFIED CKD STAGE: ICD-10-CM

## 2024-06-26 DIAGNOSIS — E87.6 HYPOKALEMIA: ICD-10-CM

## 2024-06-26 DIAGNOSIS — I82.4Y3 ACUTE DEEP VEIN THROMBOSIS (DVT) OF PROXIMAL VEIN OF BOTH LOWER EXTREMITIES: Primary | ICD-10-CM

## 2024-06-26 LAB
ALBUMIN SERPL-MCNC: 4.5 G/DL (ref 3.5–5.2)
ALBUMIN/GLOB SERPL: 1.7 G/DL
ALP SERPL-CCNC: 87 U/L (ref 39–117)
ALT SERPL W P-5'-P-CCNC: 44 U/L (ref 1–41)
ANION GAP SERPL CALCULATED.3IONS-SCNC: 12 MMOL/L (ref 5–15)
AST SERPL-CCNC: 31 U/L (ref 1–40)
BASOPHILS # BLD AUTO: 0.03 10*3/MM3 (ref 0–0.2)
BASOPHILS NFR BLD AUTO: 0.4 % (ref 0–1.5)
BH CV LOW VAS RIGHT GASTRONEMIUS VESSEL: 1
BH CV LOWER VASCULAR LEFT COMMON FEMORAL AUGMENT: NORMAL
BH CV LOWER VASCULAR LEFT COMMON FEMORAL COMPETENT: NORMAL
BH CV LOWER VASCULAR LEFT COMMON FEMORAL COMPRESS: NORMAL
BH CV LOWER VASCULAR LEFT COMMON FEMORAL PHASIC: NORMAL
BH CV LOWER VASCULAR LEFT COMMON FEMORAL SPONT: NORMAL
BH CV LOWER VASCULAR LEFT DISTAL FEMORAL COMPRESS: NORMAL
BH CV LOWER VASCULAR LEFT GASTRONEMIUS COMPRESS: NORMAL
BH CV LOWER VASCULAR LEFT GREATER SAPH AK COMPRESS: NORMAL
BH CV LOWER VASCULAR LEFT GREATER SAPH BK COMPRESS: NORMAL
BH CV LOWER VASCULAR LEFT LESSER SAPH COMPRESS: NORMAL
BH CV LOWER VASCULAR LEFT MID FEMORAL AUGMENT: NORMAL
BH CV LOWER VASCULAR LEFT MID FEMORAL COMPETENT: NORMAL
BH CV LOWER VASCULAR LEFT MID FEMORAL COMPRESS: NORMAL
BH CV LOWER VASCULAR LEFT MID FEMORAL PHASIC: NORMAL
BH CV LOWER VASCULAR LEFT MID FEMORAL SPONT: NORMAL
BH CV LOWER VASCULAR LEFT PERONEAL COMPRESS: NORMAL
BH CV LOWER VASCULAR LEFT POPLITEAL AUGMENT: NORMAL
BH CV LOWER VASCULAR LEFT POPLITEAL COMPETENT: NORMAL
BH CV LOWER VASCULAR LEFT POPLITEAL COMPRESS: NORMAL
BH CV LOWER VASCULAR LEFT POPLITEAL PHASIC: NORMAL
BH CV LOWER VASCULAR LEFT POPLITEAL SPONT: NORMAL
BH CV LOWER VASCULAR LEFT POSTERIOR TIBIAL COMPRESS: NORMAL
BH CV LOWER VASCULAR LEFT PROFUNDA FEMORAL COMPRESS: NORMAL
BH CV LOWER VASCULAR LEFT PROXIMAL FEMORAL COMPRESS: NORMAL
BH CV LOWER VASCULAR LEFT SAPHENOFEMORAL JUNCTION COMPRESS: NORMAL
BH CV LOWER VASCULAR RIGHT COMMON FEMORAL AUGMENT: NORMAL
BH CV LOWER VASCULAR RIGHT COMMON FEMORAL COMPETENT: NORMAL
BH CV LOWER VASCULAR RIGHT COMMON FEMORAL COMPRESS: NORMAL
BH CV LOWER VASCULAR RIGHT COMMON FEMORAL PHASIC: NORMAL
BH CV LOWER VASCULAR RIGHT COMMON FEMORAL SPONT: NORMAL
BH CV LOWER VASCULAR RIGHT DISTAL FEMORAL COMPRESS: NORMAL
BH CV LOWER VASCULAR RIGHT GASTRONEMIUS COMPRESS: NORMAL
BH CV LOWER VASCULAR RIGHT GASTRONEMIUS THROMBUS: NORMAL
BH CV LOWER VASCULAR RIGHT GREATER SAPH AK COMPRESS: NORMAL
BH CV LOWER VASCULAR RIGHT GREATER SAPH BK COMPRESS: NORMAL
BH CV LOWER VASCULAR RIGHT LESSER SAPH COMPRESS: NORMAL
BH CV LOWER VASCULAR RIGHT MID FEMORAL AUGMENT: NORMAL
BH CV LOWER VASCULAR RIGHT MID FEMORAL COMPETENT: NORMAL
BH CV LOWER VASCULAR RIGHT MID FEMORAL COMPRESS: NORMAL
BH CV LOWER VASCULAR RIGHT MID FEMORAL PHASIC: NORMAL
BH CV LOWER VASCULAR RIGHT MID FEMORAL SPONT: NORMAL
BH CV LOWER VASCULAR RIGHT PERONEAL COMPRESS: NORMAL
BH CV LOWER VASCULAR RIGHT POPLITEAL AUGMENT: NORMAL
BH CV LOWER VASCULAR RIGHT POPLITEAL COMPETENT: NORMAL
BH CV LOWER VASCULAR RIGHT POPLITEAL COMPRESS: NORMAL
BH CV LOWER VASCULAR RIGHT POPLITEAL PHASIC: NORMAL
BH CV LOWER VASCULAR RIGHT POPLITEAL SPONT: NORMAL
BH CV LOWER VASCULAR RIGHT POSTERIOR TIBIAL COMPRESS: NORMAL
BH CV LOWER VASCULAR RIGHT PROFUNDA FEMORAL COMPRESS: NORMAL
BH CV LOWER VASCULAR RIGHT PROXIMAL FEMORAL COMPRESS: NORMAL
BH CV LOWER VASCULAR RIGHT SAPHENOFEMORAL JUNCTION COMPRESS: NORMAL
BH CV VAS PRELIMINARY FINDINGS SCRIPTING: 1
BILIRUB SERPL-MCNC: 1.1 MG/DL (ref 0–1.2)
BUN SERPL-MCNC: 48 MG/DL (ref 8–23)
BUN/CREAT SERPL: 21.2 (ref 7–25)
CALCIUM SPEC-SCNC: 9.4 MG/DL (ref 8.6–10.5)
CHLORIDE SERPL-SCNC: 95 MMOL/L (ref 98–107)
CO2 SERPL-SCNC: 35 MMOL/L (ref 22–29)
CREAT SERPL-MCNC: 2.26 MG/DL (ref 0.76–1.27)
DEPRECATED RDW RBC AUTO: 46.2 FL (ref 37–54)
EGFRCR SERPLBLD CKD-EPI 2021: 31.8 ML/MIN/1.73
EOSINOPHIL # BLD AUTO: 0.14 10*3/MM3 (ref 0–0.4)
EOSINOPHIL NFR BLD AUTO: 2 % (ref 0.3–6.2)
ERYTHROCYTE [DISTWIDTH] IN BLOOD BY AUTOMATED COUNT: 13.8 % (ref 12.3–15.4)
GLOBULIN UR ELPH-MCNC: 2.6 GM/DL
GLUCOSE SERPL-MCNC: 149 MG/DL (ref 65–99)
HCT VFR BLD AUTO: 44.6 % (ref 37.5–51)
HGB BLD-MCNC: 14.4 G/DL (ref 13–17.7)
IMM GRANULOCYTES # BLD AUTO: 0.03 10*3/MM3 (ref 0–0.05)
IMM GRANULOCYTES NFR BLD AUTO: 0.4 % (ref 0–0.5)
LYMPHOCYTES # BLD AUTO: 1.29 10*3/MM3 (ref 0.7–3.1)
LYMPHOCYTES NFR BLD AUTO: 18.7 % (ref 19.6–45.3)
MCH RBC QN AUTO: 29.4 PG (ref 26.6–33)
MCHC RBC AUTO-ENTMCNC: 32.3 G/DL (ref 31.5–35.7)
MCV RBC AUTO: 91.2 FL (ref 79–97)
MONOCYTES # BLD AUTO: 0.79 10*3/MM3 (ref 0.1–0.9)
MONOCYTES NFR BLD AUTO: 11.5 % (ref 5–12)
NEUTROPHILS NFR BLD AUTO: 4.61 10*3/MM3 (ref 1.7–7)
NEUTROPHILS NFR BLD AUTO: 67 % (ref 42.7–76)
NRBC BLD AUTO-RTO: 0 /100 WBC (ref 0–0.2)
PLATELET # BLD AUTO: 147 10*3/MM3 (ref 140–450)
PMV BLD AUTO: 10.8 FL (ref 6–12)
POTASSIUM SERPL-SCNC: 2.9 MMOL/L (ref 3.5–5.2)
PROT SERPL-MCNC: 7.1 G/DL (ref 6–8.5)
RBC # BLD AUTO: 4.89 10*6/MM3 (ref 4.14–5.8)
SODIUM SERPL-SCNC: 142 MMOL/L (ref 136–145)
URATE SERPL-MCNC: 15.3 MG/DL (ref 3.4–7)
WBC NRBC COR # BLD AUTO: 6.89 10*3/MM3 (ref 3.4–10.8)

## 2024-06-26 PROCEDURE — 93970 EXTREMITY STUDY: CPT | Performed by: SURGERY

## 2024-06-26 PROCEDURE — 80053 COMPREHEN METABOLIC PANEL: CPT | Performed by: PHYSICIAN ASSISTANT

## 2024-06-26 PROCEDURE — 99284 EMERGENCY DEPT VISIT MOD MDM: CPT

## 2024-06-26 PROCEDURE — 25010000002 MORPHINE PER 10 MG: Performed by: EMERGENCY MEDICINE

## 2024-06-26 PROCEDURE — 93970 EXTREMITY STUDY: CPT

## 2024-06-26 PROCEDURE — 96374 THER/PROPH/DIAG INJ IV PUSH: CPT

## 2024-06-26 PROCEDURE — 84550 ASSAY OF BLOOD/URIC ACID: CPT | Performed by: PHYSICIAN ASSISTANT

## 2024-06-26 PROCEDURE — 73610 X-RAY EXAM OF ANKLE: CPT

## 2024-06-26 PROCEDURE — 85025 COMPLETE CBC W/AUTO DIFF WBC: CPT | Performed by: PHYSICIAN ASSISTANT

## 2024-06-26 RX ORDER — MORPHINE SULFATE 2 MG/ML
4 INJECTION, SOLUTION INTRAMUSCULAR; INTRAVENOUS ONCE
Status: COMPLETED | OUTPATIENT
Start: 2024-06-26 | End: 2024-06-26

## 2024-06-26 RX ORDER — HYDROCODONE BITARTRATE AND ACETAMINOPHEN 7.5; 325 MG/1; MG/1
1 TABLET ORAL ONCE
Status: COMPLETED | OUTPATIENT
Start: 2024-06-26 | End: 2024-06-26

## 2024-06-26 RX ORDER — POTASSIUM CHLORIDE 750 MG/1
40 TABLET, FILM COATED, EXTENDED RELEASE ORAL ONCE
Status: COMPLETED | OUTPATIENT
Start: 2024-06-26 | End: 2024-06-26

## 2024-06-26 RX ORDER — HYDROCODONE BITARTRATE AND ACETAMINOPHEN 5; 325 MG/1; MG/1
1-2 TABLET ORAL EVERY 6 HOURS PRN
Qty: 20 TABLET | Refills: 0 | Status: SHIPPED | OUTPATIENT
Start: 2024-06-26

## 2024-06-26 RX ADMIN — MORPHINE SULFATE 4 MG: 2 INJECTION, SOLUTION INTRAMUSCULAR; INTRAVENOUS at 17:36

## 2024-06-26 RX ADMIN — POTASSIUM CHLORIDE 40 MEQ: 750 TABLET, EXTENDED RELEASE ORAL at 19:18

## 2024-06-26 RX ADMIN — HYDROCODONE BITARTRATE AND ACETAMINOPHEN 1 TABLET: 7.5; 325 TABLET ORAL at 19:42

## 2024-06-26 NOTE — ED PROVIDER NOTES
MD ATTESTATION NOTE     SHARED VISIT: This visit was performed by BOTH a physician and an APC. The substantive portion of the medical decision making was performed by this attesting physician who made or approved the management plan and takes responsibility for patient management. All studies in the APC note (if performed) were independently interpreted by me.  The VINITA and I have discussed this patient's history, physical exam, and treatment plan. I have reviewed the documentation and personally had a face to face interaction with the patient. I affirm the documentation and agree with the treatment and plan. I provided a substantive portion of the care of the patient.  I personally performed the physical exam in its entirety, and below are my findings.      Brief HPI: Patient complains of right ankle pain for the past 3 weeks.  Pain has increased over the past few days.  He denies recent injury.  He had a lumbar discectomy last month.  Denies new numbness/tingling/weakness in his extremities, loss of bowel/bladder control, saddle anesthesia, chest pain, or shortness of breath.    PHYSICAL EXAM  ED Triage Vitals   Temp Heart Rate Resp BP SpO2   06/26/24 1625 06/26/24 1625 06/26/24 1625 06/26/24 1631 06/26/24 1625   98 °F (36.7 °C) 67 20 152/86 94 %      Temp src Heart Rate Source Patient Position BP Location FiO2 (%)   06/26/24 1625 -- -- -- --   Tympanic             GENERAL: Awake, alert, oriented x 3.  Nontoxic-appearing elderly male.  No acute distress  HENT: nares patent  EYES: no scleral icterus  CV: regular rhythm, normal rate, equal pedal pulses bilaterally  RESPIRATORY: normal effort, clear to auscultation bilaterally  ABDOMEN: soft  MUSCULOSKELETAL: There is bilateral edema in both ankles.  There is diffuse tenderness of the right ankle and right anterior lower leg.  No calf tenderness.  No overlying cellulitis.  NEURO: alert, moves all extremities, follows commands  PSYCH:  calm, cooperative  SKIN: warm,  dry    Vital signs and nursing notes reviewed.        Plan: Obtain labs, right ankle x-rays, and Doppler ultrasound of both legs.    Right ankle x-rays personally interpreted by me.  My personal interpretation is: No fracture.  No dislocation.  No bony erosion.    MDM: Patient presented to the ED complaining of right lower leg and ankle pain.  He had back surgery about a month ago.  Right ankle x-rays were unremarkable.  Doppler ultrasound showed acute DVT in the right calf.  Creatinine and uric acid were elevated but stable.  Patient will be discharged on Eliquis.  He will also be given a short course of pain medication.    ED Course as of 06/26/24 2151 Wed Jun 26, 2024   1807 BUN(!): 48 [WH]   1808 Creatinine(!): 2.26  BUN 31 and creatinine 2.12 one month ago [WH]   1808 WBC: 6.89 [WH]   1808 Hemoglobin: 14.4 [WH]   1808 Uric Acid(!): 15.3  13.8 six days ago [WH]   1820 Uric Acid(!): 15.3 [KA]   1820 My independent interpretation of the right ankle x-rays is no acute fracture [KA]   1909 I discussed the patient with vascular , bilateral lower extremity Doppler is positive for DVT in the right calf [KA]   1938 I reassessed the patient, counseled him on all of his lab and imaging results.  He states he has a known elevated uric acid, his nephrologist is following it, recommended follow-up with them as it is more elevated than usual.  Also hypokalemic today, previous potassium was 3.0.  He is on multiple diuretics, potassium recently increased.  Given him an oral dose here, recommended he take an extra dose the next 2 days and recheck with his PCP.  Creatinine is at baseline.  I discussed the patient with charleen, clinical pharmacist who will he has meds to beds to provide the patient with an Eliquis starter pack and counseled him on the medication.  I encouraged the patient to return for any chest pain shortness of breath or any concerns. [KA]      ED Course User Index  [KA] Briana Strong PA-C  [WH]  Cameron Montes MD Holland, William D, MD  06/26/24 1942       Cameron Montes MD  06/26/24 2618

## 2024-06-26 NOTE — TELEPHONE ENCOUNTER
Patients wife called and stated that his pain has become worse so she is going to take him to the ER. Just an FYI.

## 2024-06-26 NOTE — DISCHARGE INSTRUCTIONS
You should have your potassium rechecked within 3 to 5 days.  Take an extra dose of potassium tomorrow and the next day    Discussed your low potassium and high uric acid with your kidney doctor    If you develop chest pain or shortness of breath you should return to the ER

## 2024-06-26 NOTE — TELEPHONE ENCOUNTER
Patient's wife called stating that patient her  has pain, swelling, and redness in his R leg, [facility]

## 2024-06-26 NOTE — ED PROVIDER NOTES
EMERGENCY DEPARTMENT ENCOUNTER  Room Number:  43/43  PCP: Sam Garrison MD  Independent Historians: Patient and Family      HPI:  Chief Complaint: had concerns including Ankle Pain (Right ankle pain and swelling).       A complete HPI/ROS/PMH/PSH/SH/FH are unobtainable due to: None    Chronic or social conditions impacting patient care (Social Determinants of Health): None      Context: Calos Moseley is a 64 y.o. male with a medical history of hyperlipidemia, COPD, hypertension, CKD, lumbar radiculopathy who presents to the ED c/o acute right ankle pain.  He states that started 3 weeks ago and is constant and over the last couple days has gotten significantly worse making it quite difficult to walk.  He denies any injury to the area.  He has chronic bilateral lower extremity edema, it is no worse than usual.  He also notes he had a discectomy with Dr. Spangler of neurosurgery on May 14.  He is still having back pain from his surgery, it is no worse but is not improving as quickly as he would have expected.  No saddle paresthesias lower extremity weakness or bowel or bladder dysfunction.  He denies any chest pain or shortness of breath.  He discussed his symptoms with his neurosurgery office today and they ordered an ultrasound to rule out a blood clot however the patient's ankle pain became worse and he now presents to the ER for more urgent evaluation.  No history of gout.      Review of prior external notes (non-ED) -and- Review of prior external test results outside of this encounter:  On 5/13/2024 patient underwent lumbar microscopic foraminotomies and laminectomy at L5-S1 due to lateral recess and foraminal stenosis at that level        PAST MEDICAL HISTORY  Active Ambulatory Problems     Diagnosis Date Noted    HLD (hyperlipidemia) 01/07/2022    Obesity, Class III, BMI 40-49.9 (morbid obesity) 01/07/2022    Bilateral kidney stones 01/07/2022    COPD (chronic obstructive pulmonary disease) 01/07/2022    Left  flank pain 02/14/2022    Anxiety and depression 01/29/2018    Hypothyroidism 01/29/2018    ISIAH (obstructive sleep apnea) 01/26/2022    Prediabetes 09/06/2019    Essential hypertension 01/29/2018    Calculus, renal 02/21/2022    Restless leg syndrome     Bipolar affective     Calculus of ureter 03/07/2022    Gross hematuria 02/25/2022    Encounter for screening for malignant neoplasm of colon 01/31/2023    Acute dyspnea 05/16/2023    Pleural effusion on right 05/16/2023    CKD (chronic kidney disease) 05/16/2023    Acute respiratory failure with hypoxia 05/17/2023    Lymphadenopathy, mediastinal 05/16/2023    Hypoxia 05/26/2023    Pedal edema 06/10/2023    Stage 3a chronic kidney disease 06/10/2023    Anemia 06/10/2023    Chronic respiratory failure with hypoxia 06/10/2023    Lumbar radiculopathy 04/01/2024    Left leg pain 04/03/2024    Lumbar foraminal stenosis, left L5/S1 04/03/2024    Left foot drop 04/12/2024     Resolved Ambulatory Problems     Diagnosis Date Noted    SBO (small bowel obstruction) 11/03/2017    MESHA (acute kidney injury) 01/07/2022    Acute cystitis with hematuria 01/07/2022    Hyperglycemia 01/07/2022    HTN (hypertension) 01/07/2022    Acute pyelonephritis 01/07/2022    Hematuria 01/07/2022    Acute renal failure (ARF) 02/14/2022    Cervical spondylosis 09/11/2018    History of renal stone 01/29/2018    Hypokalemia 01/29/2018    Right ureteral stone 01/04/2018    Hyperlipidemia 01/29/2018    Obesity 12/01/2021     Past Medical History:   Diagnosis Date    Anesthesia complication     Chronic back pain     COVID 2022    CRF (chronic renal failure), stage 4 (severe)     Elevated cholesterol     History of kidney stones     History of pneumonia 05/2023    History of UTI     Hypertension     Lumbar disc disease     Numbness and tingling     Shortness of breath     Sleep apnea     Swelling of both lower extremities          PAST SURGICAL HISTORY  Past Surgical History:   Procedure Laterality Date     ADENOIDECTOMY      APPENDECTOMY      BRONCHOSCOPY Right 05/19/2023    Procedure: BRONCHOSCOPY WITH ENDOBRONCHIAL ULTRASOUND with BAL of RLL and FNA;  Surgeon: Aniceto Bruno MD;  Location: [facility] ENDOSCOPY;  Service: Pulmonary;  Laterality: Right;  pre- RLL pneumonia, lymphadenopathy  post- same    COLONOSCOPY      COLONOSCOPY N/A 03/23/2023    Procedure: COLONOSCOPY into cecum with cold snare polypectomies;  Surgeon: Yoan Leonard MD;  Location: [facility] ENDOSCOPY;  Service: Gastroenterology;  Laterality: N/A;  polyps    CYSTOSCOPY WITH CLOT EVACUATION N/A 03/08/2022    Procedure: CYSTOSCOPY WITH CLOT EVACUATION;  Surgeon: Ihsan Lopez MD;  Location: [facility] MAIN OR;  Service: Urology;  Laterality: N/A;    CYSTOSCOPY, URETEROSCOPY, RETROGRADE PYELOGRAM, STENT INSERTION Left 03/07/2022    Procedure: CYSTOSCOPY URETEROSCOPY LASER LITHOTRIPSY and stent exchange;  Surgeon: Deuce Leija MD;  Location: [facility] MAIN OR;  Service: Urology;  Laterality: Left;    EPIDURAL BLOCK      LUMBAR DISCECTOMY Left 5/13/2024    Procedure: LEFT LUMBAR FIVE TO SACRAL ONE FORAMINOTOMY, POSSIBLE DISCECTOMY MICROSCOPIC;  Surgeon: Christos Spangler MD;  Location: [facility] MAIN OR;  Service: Neurosurgery;  Laterality: Left;    NEPHROLITHOTRIPSY PERCUTANEOUS Left 02/21/2022    Procedure: NEPHROSTLITHOTOMY PERCUTANEOUS;  Surgeon: Deuce Leija MD;  Location: [facility] MAIN OR;  Service: Urology;  Laterality: Left;    NEPHROLITHOTRIPSY PERCUTANEOUS Left 02/23/2022    Procedure: LEFT FLEXIBLE NEPHROSTOMY WITH STONE EXTRACTION, LEFT NEPHROSTOMY, TUBE REMOVAL, BILATERAL URETERAL STENT PLACEMENT;  Surgeon: Deuce Leija MD;  Location: [facility] MAIN OR;  Service: Urology;  Laterality: Left;    NEPHROLITHOTRIPSY PERCUTANEOUS Right 03/07/2022    Procedure: RIGHT PERCUTANEOUS NEPHROLITHOTOMY W ACCESS;  Surgeon: Deuce Leija MD;  Location: [facility] MAIN OR;  Service: Urology;  Laterality: Right;    THORACOSCOPY WITH  DECORTICATION Right 2023    Procedure: THORACOSCOPY VIDEO ASSISTED WITH DECORTICATION WITH DAVINCI ROBOT;  Surgeon: Jose Rice MD;  Location: [facility];  Service: Robotics - DaVinci;  Laterality: Right;    TONSILLECTOMY           FAMILY HISTORY  Family History   Problem Relation Age of Onset    Lung cancer Mother     COPD Father     Emphysema Father     Diabetes Father     Malig Hyperthermia Neg Hx          SOCIAL HISTORY  Social History     Socioeconomic History    Marital status:    Tobacco Use    Smoking status: Former     Current packs/day: 0.00     Types: Cigarettes     Quit date: 1990     Years since quittin.5     Passive exposure: Past    Smokeless tobacco: Never    Tobacco comments:     SMOKED 2 PPD FOR 8 YEARS. QUIT 35 YEARS AGO   Vaping Use    Vaping status: Never Used   Substance and Sexual Activity    Alcohol use: Yes     Comment: ON OCC    Drug use: No    Sexual activity: Defer         ALLERGIES  Patient has no known allergies.      REVIEW OF SYSTEMS  Review of Systems  Included in HPI  All systems reviewed and negative except for those discussed in HPI.      PHYSICAL EXAM    I have reviewed the triage vital signs and nursing notes.    ED Triage Vitals   Temp Heart Rate Resp BP SpO2   24 1625 24 1625 24 1625 24 1631 24 1625   98 °F (36.7 °C) 67 20 152/86 94 %      Temp src Heart Rate Source Patient Position BP Location FiO2 (%)   24 1625 -- -- -- --   Tympanic           Physical Exam  GENERAL: alert, no acute distress  SKIN: Warm, dry  HENT: Normocephalic, atraumatic  EYES: no scleral icterus  CV: regular rhythm, regular rate  RESPIRATORY: normal effort, lungs clear  ABDOMEN: nondistended  MUSCULOSKELETAL: no deformity.  1+ pitting bilateral ankle edema.  Patient has diffuse tenderness to the right ankle and the anterior right shin.  No calf tenderness bilaterally.  DP and PT pulses 2+, cap refill brisk and sensation intact distally.   Lumbar spine surgical incision is well-approximated and appears healed.  NEURO: alert, moves all extremities, follows commands            LAB RESULTS  Recent Results (from the past 24 hour(s))   Comprehensive Metabolic Panel    Collection Time: 06/26/24  5:27 PM    Specimen: Blood   Result Value Ref Range    Glucose 149 (H) 65 - 99 mg/dL    BUN 48 (H) 8 - 23 mg/dL    Creatinine 2.26 (H) 0.76 - 1.27 mg/dL    Sodium 142 136 - 145 mmol/L    Potassium 2.9 (L) 3.5 - 5.2 mmol/L    Chloride 95 (L) 98 - 107 mmol/L    CO2 35.0 (H) 22.0 - 29.0 mmol/L    Calcium 9.4 8.6 - 10.5 mg/dL    Total Protein 7.1 6.0 - 8.5 g/dL    Albumin 4.5 3.5 - 5.2 g/dL    ALT (SGPT) 44 (H) 1 - 41 U/L    AST (SGOT) 31 1 - 40 U/L    Alkaline Phosphatase 87 39 - 117 U/L    Total Bilirubin 1.1 0.0 - 1.2 mg/dL    Globulin 2.6 gm/dL    A/G Ratio 1.7 g/dL    BUN/Creatinine Ratio 21.2 7.0 - 25.0    Anion Gap 12.0 5.0 - 15.0 mmol/L    eGFR 31.8 (L) >60.0 mL/min/1.73   Uric Acid    Collection Time: 06/26/24  5:27 PM    Specimen: Blood   Result Value Ref Range    Uric Acid 15.3 (H) 3.4 - 7.0 mg/dL   CBC Auto Differential    Collection Time: 06/26/24  5:27 PM    Specimen: Blood   Result Value Ref Range    WBC 6.89 3.40 - 10.80 10*3/mm3    RBC 4.89 4.14 - 5.80 10*6/mm3    Hemoglobin 14.4 13.0 - 17.7 g/dL    Hematocrit 44.6 37.5 - 51.0 %    MCV 91.2 79.0 - 97.0 fL    MCH 29.4 26.6 - 33.0 pg    MCHC 32.3 31.5 - 35.7 g/dL    RDW 13.8 12.3 - 15.4 %    RDW-SD 46.2 37.0 - 54.0 fl    MPV 10.8 6.0 - 12.0 fL    Platelets 147 140 - 450 10*3/mm3    Neutrophil % 67.0 42.7 - 76.0 %    Lymphocyte % 18.7 (L) 19.6 - 45.3 %    Monocyte % 11.5 5.0 - 12.0 %    Eosinophil % 2.0 0.3 - 6.2 %    Basophil % 0.4 0.0 - 1.5 %    Immature Grans % 0.4 0.0 - 0.5 %    Neutrophils, Absolute 4.61 1.70 - 7.00 10*3/mm3    Lymphocytes, Absolute 1.29 0.70 - 3.10 10*3/mm3    Monocytes, Absolute 0.79 0.10 - 0.90 10*3/mm3    Eosinophils, Absolute 0.14 0.00 - 0.40 10*3/mm3    Basophils, Absolute 0.03  0.00 - 0.20 10*3/mm3    Immature Grans, Absolute 0.03 0.00 - 0.05 10*3/mm3    nRBC 0.0 0.0 - 0.2 /100 WBC   Duplex Venous Lower Extremity - Bilateral    Collection Time: 06/26/24  6:53 PM   Result Value Ref Range    Right Gastronemius Vessel 1.0     Right Common Femoral Spont Y     Right Common Femoral Competent Y     Right Common Femoral Phasic Y     Right Common Femoral Compress C     Right Common Femoral Augment Y     Right Saphenofemoral Junction Compress C     Right Profunda Femoral Compress C     Right Proximal Femoral Compress C     Right Mid Femoral Spont D     Right Mid Femoral Competent Y     Right Mid Femoral Phasic D     Right Mid Femoral Compress C     Right Mid Femoral Augment Y     Right Distal Femoral Compress C     Right Popliteal Spont Y     Right Popliteal Competent Y     Right Popliteal Phasic Y     Right Popliteal Compress C     Right Popliteal Augment Y     Right Posterior Tibial Compress C     Right Peroneal Compress C     Right Gastronemius Compress P     Right Gastronemius Thrombus A     Right Greater Saph AK Compress C     Right Greater Saph BK Compress C     Right Lesser Saph Compress C     Left Common Femoral Spont Y     Left Common Femoral Competent Y     Left Common Femoral Phasic Y     Left Common Femoral Compress C     Left Common Femoral Augment Y     Left Saphenofemoral Junction Compress C     Left Profunda Femoral Compress C     Left Proximal Femoral Compress C     Left Mid Femoral Spont Y     Left Mid Femoral Competent Y     Left Mid Femoral Phasic Y     Left Mid Femoral Compress C     Left Mid Femoral Augment Y     Left Distal Femoral Compress C     Left Popliteal Spont N     Left Popliteal Competent Y     Left Popliteal Phasic N     Left Popliteal Compress C     Left Popliteal Augment Y     Left Posterior Tibial Compress C     Left Peroneal Compress C     Left Gastronemius Compress C     Left Greater Saph AK Compress C     Left Greater Saph BK Compress C     Left Lesser Saph  Compress C      CV VAS PRELIMINARY FINDINGS SCRIPTING 1.0          RADIOLOGY  Duplex Venous Lower Extremity - Bilateral    Result Date: 6/26/2024    Acute right lower extremity deep vein thrombosis noted in the gastrocnemius.   All other veins appeared normal bilaterally.     XR Ankle 3+ View Right    Result Date: 6/26/2024  XR ANKLE 3+ VW RIGHT-  INDICATIONS: Pain, no injury.  TECHNIQUE: 4 VIEWS OF THE RIGHT ANKLE  COMPARISON: None available  FINDINGS:  No acute fracture, erosion, or dislocation is identified. Ankle mortise appears preserved. Soft tissue swelling is present around the ankle. Arterial calcifications are present. Follow-up/further evaluation can be obtained as indications persist.       As described.    This report was finalized on 6/26/2024 6:29 PM by Dr. Eder Gallego M.D on Workstation: [id number]         MEDICATIONS GIVEN IN ER  Medications   morphine injection 4 mg (4 mg Intravenous Given 6/26/24 1736)   potassium chloride (K-DUR,KLOR-CON) ER tablet 40 mEq (40 mEq Oral Given 6/26/24 1918)   HYDROcodone-acetaminophen (NORCO) 7.5-325 MG per tablet 1 tablet (1 tablet Oral Given 6/26/24 1942)         ORDERS PLACED DURING THIS VISIT:  Orders Placed This Encounter   Procedures    XR Ankle 3+ View Right    Comprehensive Metabolic Panel    Uric Acid    CBC Auto Differential    CBC & Differential         OUTPATIENT MEDICATION MANAGEMENT:  No current Epic-ordered facility-administered medications on file.     Current Outpatient Medications Ordered in Epic   Medication Sig Dispense Refill    acetaminophen (TYLENOL) 325 MG tablet Take 2 tablets by mouth Every 12 (Twelve) Hours.      Apixaban Starter Pack tablet therapy pack Take two 5 mg tablets by mouth every 12 hours for 7 days. Followed by one 5 mg tablet every 12 hours. 74 tablet 0    atorvastatin (LIPITOR) 20 MG tablet Take 1 tablet by mouth every night at bedtime. 90 tablet 3    buPROPion XL (WELLBUTRIN XL) 150 MG 24 hr tablet Take 1 tablet by  mouth Daily. 90 tablet 1    carvedilol (COREG) 25 MG tablet Take 1 tablet by mouth Every 12 (Twelve) Hours. 180 tablet 0    docusate sodium (COLACE) 250 MG capsule Take 1 capsule by mouth 2 (Two) Times a Day. 60 capsule 2    empagliflozin (Jardiance) 10 MG tablet tablet Take 1 tablet by mouth Every Morning Before Breakfast. (Patient taking differently: Take 1 tablet by mouth Every Morning Before Breakfast. HAS NOT YET STARTED) 30 tablet 3    Fluticasone-Umeclidin-Vilant (Trelegy Ellipta) 100-62.5-25 MCG/ACT inhaler Inhale 1 puff Daily. 60 each 11    gabapentin (NEURONTIN) 300 MG capsule Take 1 capsule by mouth 3 (Three) Times a Day. 90 capsule 2    hydrALAZINE (APRESOLINE) 50 MG tablet Take 2 tablets (100 mg total) by mouth in the morning and 2 tablets (100 mg total) at noon and 2 tablets (100 mg total) in the evening. 90 tablet 3    HYDROcodone-acetaminophen (NORCO) 5-325 MG per tablet Take 1-2 tablets by mouth Every 6 (Six) Hours As Needed for Moderate Pain. 20 tablet 0    isosorbide mononitrate (IMDUR) 30 MG 24 hr tablet Take 1 tablet by mouth Daily.Do not crush or chew. 30 tablet 11    lamoTRIgine (LaMICtal) 100 MG tablet Take 1 tablet by mouth 2 (Two) Times a Day - morning and evening 180 tablet 1    levothyroxine (SYNTHROID, LEVOTHROID) 50 MCG tablet Take 1 tablet by mouth Every Morning. 30 tablet 0    lisinopril (PRINIVIL,ZESTRIL) 10 MG tablet Take 1 tablet by mouth Daily. 30 tablet 0    metOLazone (ZAROXOLYN) 2.5 MG tablet Take 1 tablet by mouth 1 (One) Time Per Week. 4 tablet 2    potassium chloride (MICRO-K) 10 MEQ CR capsule Take 1 capsule by mouth Daily. 30 capsule 2    potassium chloride 10 MEQ CR tablet Take 1 tablet by mouth Daily. 90 tablet 3    potassium citrate (UROCIT-K) 10 MEQ (1080 MG) CR tablet Take 2 tablets (20 mEq total) by mouth in the morning and 2 tablets (20 mEq total) in the evening. Take with meals. Do not crush, chew, or split.. 360 tablet 3    pramipexole (MIRAPEX) 0.75 MG tablet  Take 1 tablet by mouth Every Night. 30 tablet 2    tamsulosin (Flomax) 0.4 MG capsule 24 hr capsule Take 1 capsule by mouth every night at bedtime. 90 capsule 3    torsemide (DEMADEX) 20 MG tablet Take 4 tablets by mouth 2 (Two) Times a Day. 720 tablet 3    vilazodone (Viibryd) 40 MG tablet tablet Take 1 tablet by mouth Daily. 90 tablet 3         PROCEDURES  Procedures            PROGRESS, DATA ANALYSIS, CONSULTS, AND MEDICAL DECISION MAKING  All labs have been independently interpreted by me.  All radiology studies have been reviewed by me. All EKG's have been independently viewed and interpreted by me.  Discussion below represents my analysis of pertinent findings related to patient's condition, differential diagnosis, treatment plan and final disposition.    DIFFERENTIAL    Differential diagnosis includes but is not limited to DVT, gout, sprain, osteoarthritis    Clinical Scores:                  ED Course as of 06/26/24 2011 Wed Jun 26, 2024   1807 BUN(!): 48 [WH]   1808 Creatinine(!): 2.26  BUN 31 and creatinine 2.12 one month ago []   1808 WBC: 6.89 [WH]   1808 Hemoglobin: 14.4 [WH]   1808 Uric Acid(!): 15.3  13.8 six days ago [WH]   1820 Uric Acid(!): 15.3 [KA]   1820 My independent interpretation of the right ankle x-rays is no acute fracture [KA]   1909 I discussed the patient with vascular , bilateral lower extremity Doppler is positive for DVT in the right calf [KA]   1938 I reassessed the patient, counseled him on all of his lab and imaging results.  He states he has a known elevated uric acid, his nephrologist is following it, recommended follow-up with them as it is more elevated than usual.  Also hypokalemic today, previous potassium was 3.0.  He is on multiple diuretics, potassium recently increased.  Given him an oral dose here, recommended he take an extra dose the next 2 days and recheck with his PCP.  Creatinine is at baseline.  I discussed the patient with [facility], clinical  pharmacist who will he has meds to beds to provide the patient with an Eliquis starter pack and counseled him on the medication.  I encouraged the patient to return for any chest pain shortness of breath or any concerns. [KA]      ED Course User Index  [KA] Briana Strong PA-C  [WH] Cameron Montes MD             AS OF 20:11 EDT VITALS:    BP - 171/75  HR - 67  TEMP - 98 °F (36.7 °C) (Tympanic)  O2 SATS - 94%    COMPLEXITY OF CARE  Admission was considered but after careful review of the patient's presentation, physical examination, diagnostic results, and response to treatment the patient may be safely discharged with outpatient follow-up.      DIAGNOSIS  Final diagnoses:   Acute deep vein thrombosis (DVT) of calf muscle vein of right lower extremity   Hypokalemia   Chronic kidney disease, unspecified CKD stage   Hyperuricemia   Acute right ankle pain         DISPOSITION  ED Disposition       ED Disposition   Discharge    Condition   Good    Comment   --                  FOLLOW UP  Sam Garrison MD  [address]  [facility] [id number] [phone]    In 2 days      [facility] EMERGENCY DEPARTMENT  [address] [facility] [id number] [phone]    If symptoms worsen or any concerns        Prescribed Medications     Medication List        New Prescriptions      Eliquis DVT/PE Starter Pack tablet therapy pack  Generic drug: Apixaban Starter Pack  Take two 5 mg tablets by mouth every 12 hours for 7 days. Followed by one 5 mg tablet every 12 hours.     HYDROcodone-acetaminophen 5-325 MG per tablet  Commonly known as: NORCO  Take 1-2 tablets by mouth Every 6 (Six) Hours As Needed for Moderate Pain.            Changed      Jardiance 10 MG tablet tablet  Generic drug: empagliflozin  Take 1 tablet by mouth Every Morning Before Breakfast.  What changed: additional instructions               Where to Get Your Medications        These medications were sent to [facility]  Pharmacy - [address] [address]      Hours: Monday to Friday 7 AM to 6 PM, Saturday & Sunday 8 AM to 4:30 PM (Closed 12 PM to 12:30 PM) Phone: [phone]   Tre DVT/PE Starter Pack tablet therapy pack  HYDROcodone-acetaminophen 5-325 MG per tablet                   Please note that portions of this document were completed with a voice recognition program.    Note Disclaimer: At [facility], we believe that sharing information builds trust and better relationships. You are receiving this note because you recently visited [facility]. It is possible you will see health information before a provider has talked with you about it. This kind of information can be easy to misunderstand. To help you fully understand what it means for your health, we urge you to discuss this note with your provider.         Briana Strong PA-C  06/26/24 2012

## 2024-06-27 ENCOUNTER — TELEPHONE (OUTPATIENT)
Dept: NEUROSURGERY | Facility: CLINIC | Age: 64
End: 2024-06-27
Payer: COMMERCIAL

## 2024-06-27 NOTE — TELEPHONE ENCOUNTER
----- Message from Christos Spangler sent at 6/26/2024  1:02 PM EDT -----  Regarding: FW: Right foot pain and swelling   Contact: [phone]  I ordered a DVT scan for him.  Please help him get scheduled and we will follow-up on the scan results      ----- Message -----  From: Nisreen Worthy MA  Sent: 6/26/2024  12:36 PM EDT  To: Christos CROWDER MD  Subject: FW: Right foot pain and swelling                 He had a  left lumbar five to sacral one foraminotomy on 05/13/2024.  ----- Message -----  From: Calos Moseley  Sent: 6/26/2024  12:30 PM EDT  To: [facility]  Subject: Right foot pain and swelling                     Attached is the right foot. Although his left foot and ankle is swollen too.   Thank you.

## 2024-06-27 NOTE — TELEPHONE ENCOUNTER
Patient went to the ER yesterday 06/26/2024. Patient was treated for the following:    Acute deep vein thrombosis (DVT) of calf muscle vein of right lower extremity   Hypokalemia   Chronic kidney disease, unspecified CKD stage   Hyperuricemia   Acute right ankle pain

## 2024-06-27 NOTE — PROGRESS NOTES
[facility] Clinical Pharmacy Services: Pharmacy Education - Direct Oral Anticoagulant - Eliquis    Calos Moseley has been ordered Eliquis for acute lower extremity DVT.     Counseling points included the following:  Eliquis's indication, patient's need for the medication, and dosing/frequency of this medication.  Enforced the importance of taking their medication as instructed every day and the reason why the medication is dosed that way.  Explained possible side effects of anticoagulation therapy, including increased risk of bleeding, and s/sx of bleeding. Also talked about ways to control bleeding for minor cuts and scrapes.  Emphasized the importance of going to the emergency room if any of the following occur: Falling and hitting your head; noticing bright red blood in urine or dark/tarry stools; vomiting up blood or vomit has a coffee-ground like texture; coughing up blood.  Discussed the importance of informing any physician or dentist that they have been started on a DOAC, in case they need to be taken off for a procedure.  Discussed all important drug interactions, including over-the-counter medications and supplements.  Instructed the patient not to begin or discontinue any medications without informing his/her physician/pharmacist.     I also explained to the patient that this medication may have a high copay associated with it and to let the provider know if it is unaffordable.     Patient expressed understanding and had no further questions.      Marcela Khna, PharmD, [facility]  Emergency Medicine Clinical Pharmacist   Phone: [phone]

## 2024-07-01 ENCOUNTER — HOSPITAL ENCOUNTER (OUTPATIENT)
Dept: CARDIOLOGY | Facility: HOSPITAL | Age: 64
Discharge: HOME | End: 2024-07-01
Admitting: NURSE PRACTITIONER
Payer: COMMERCIAL

## 2024-07-01 VITALS
WEIGHT: 266 LBS | HEIGHT: 66 IN | BODY MASS INDEX: 42.75 KG/M2 | DIASTOLIC BLOOD PRESSURE: 80 MMHG | HEART RATE: 70 BPM | SYSTOLIC BLOOD PRESSURE: 152 MMHG

## 2024-07-01 DIAGNOSIS — I50.1 LEFT VENTRICULAR FAILURE: ICD-10-CM

## 2024-07-01 LAB
AORTIC DIMENSIONLESS INDEX: 0.7 (DI)
ASCENDING AORTA: 3.1 CM
BH CV ECHO MEAS - ACS: 2.14 CM
BH CV ECHO MEAS - AO MAX PG: 9.1 MMHG
BH CV ECHO MEAS - AO MEAN PG: 5.1 MMHG
BH CV ECHO MEAS - AO ROOT DIAM: 3.4 CM
BH CV ECHO MEAS - AO V2 MAX: 150.8 CM/SEC
BH CV ECHO MEAS - AO V2 VTI: 29.8 CM
BH CV ECHO MEAS - AVA(I,D): 2.09 CM2
BH CV ECHO MEAS - EDV(CUBED): 123.7 ML
BH CV ECHO MEAS - EDV(MOD-SP2): 66 ML
BH CV ECHO MEAS - EDV(MOD-SP4): 71 ML
BH CV ECHO MEAS - EF(MOD-BP): 59.7 %
BH CV ECHO MEAS - EF(MOD-SP2): 54.5 %
BH CV ECHO MEAS - EF(MOD-SP4): 63.4 %
BH CV ECHO MEAS - ESV(MOD-SP2): 30 ML
BH CV ECHO MEAS - ESV(MOD-SP4): 26 ML
BH CV ECHO MEAS - FS: 25.4 %
BH CV ECHO MEAS - IVS/LVPW: 1.06 CM
BH CV ECHO MEAS - IVSD: 0.77 CM
BH CV ECHO MEAS - LAT PEAK E' VEL: 7.9 CM/SEC
BH CV ECHO MEAS - LV DIASTOLIC VOL/BSA (35-75): 31.4 CM2
BH CV ECHO MEAS - LV MASS(C)D: 124.3 GRAMS
BH CV ECHO MEAS - LV MAX PG: 3.3 MMHG
BH CV ECHO MEAS - LV MEAN PG: 1.71 MMHG
BH CV ECHO MEAS - LV SYSTOLIC VOL/BSA (12-30): 11.5 CM2
BH CV ECHO MEAS - LV V1 MAX: 90.8 CM/SEC
BH CV ECHO MEAS - LV V1 VTI: 20.2 CM
BH CV ECHO MEAS - LVIDD: 5 CM
BH CV ECHO MEAS - LVIDS: 3.7 CM
BH CV ECHO MEAS - LVOT AREA: 3.1 CM2
BH CV ECHO MEAS - LVOT DIAM: 1.98 CM
BH CV ECHO MEAS - LVPWD: 0.73 CM
BH CV ECHO MEAS - MED PEAK E' VEL: 9.9 CM/SEC
BH CV ECHO MEAS - MR MAX PG: 33.1 MMHG
BH CV ECHO MEAS - MR MAX VEL: 287.7 CM/SEC
BH CV ECHO MEAS - MV A DUR: 0.13 SEC
BH CV ECHO MEAS - MV A MAX VEL: 100.6 CM/SEC
BH CV ECHO MEAS - MV DEC SLOPE: 352.3 CM/SEC2
BH CV ECHO MEAS - MV DEC TIME: 0.17 SEC
BH CV ECHO MEAS - MV E MAX VEL: 74.4 CM/SEC
BH CV ECHO MEAS - MV E/A: 0.74
BH CV ECHO MEAS - MV MAX PG: 3.8 MMHG
BH CV ECHO MEAS - MV MEAN PG: 1.59 MMHG
BH CV ECHO MEAS - MV P1/2T: 72.5 MSEC
BH CV ECHO MEAS - MV V2 VTI: 31.4 CM
BH CV ECHO MEAS - MVA(P1/2T): 3 CM2
BH CV ECHO MEAS - MVA(VTI): 1.99 CM2
BH CV ECHO MEAS - PA V2 MAX: 112.2 CM/SEC
BH CV ECHO MEAS - PULM A REVS DUR: 0.13 SEC
BH CV ECHO MEAS - PULM A REVS VEL: 34.9 CM/SEC
BH CV ECHO MEAS - PULM DIAS VEL: 29.4 CM/SEC
BH CV ECHO MEAS - PULM S/D: 1.02
BH CV ECHO MEAS - PULM SYS VEL: 29.8 CM/SEC
BH CV ECHO MEAS - QP/QS: 0.79
BH CV ECHO MEAS - RAP SYSTOLE: 3 MMHG
BH CV ECHO MEAS - RV MAX PG: 1.52 MMHG
BH CV ECHO MEAS - RV V1 MAX: 61.7 CM/SEC
BH CV ECHO MEAS - RV V1 VTI: 12.8 CM
BH CV ECHO MEAS - RVOT DIAM: 2.21 CM
BH CV ECHO MEAS - RVSP: 17 MMHG
BH CV ECHO MEAS - SV(LVOT): 62.3 ML
BH CV ECHO MEAS - SV(MOD-SP2): 36 ML
BH CV ECHO MEAS - SV(MOD-SP4): 45 ML
BH CV ECHO MEAS - SV(RVOT): 49.3 ML
BH CV ECHO MEAS - SVI(LVOT): 27.6 ML/M2
BH CV ECHO MEAS - SVI(MOD-SP2): 15.9 ML/M2
BH CV ECHO MEAS - SVI(MOD-SP4): 19.9 ML/M2
BH CV ECHO MEAS - TR MAX PG: 14.2 MMHG
BH CV ECHO MEAS - TR MAX VEL: 188.2 CM/SEC
BH CV ECHO MEASUREMENTS AVERAGE E/E' RATIO: 8.36
BH CV XLRA - RV BASE: 4 CM
BH CV XLRA - RV LENGTH: 7.8 CM
BH CV XLRA - RV MID: 2.6 CM
BH CV XLRA - TDI S': 9.6 CM/SEC
LEFT ATRIUM VOLUME INDEX: 25.3 ML/M2
SINUS: 3.1 CM
STJ: 2.5 CM

## 2024-07-01 PROCEDURE — 93306 TTE W/DOPPLER COMPLETE: CPT

## 2024-07-01 PROCEDURE — 93306 TTE W/DOPPLER COMPLETE: CPT | Performed by: INTERNAL MEDICINE

## 2024-07-02 ENCOUNTER — LAB (OUTPATIENT)
Facility: HOSPITAL | Age: 64
End: 2024-07-02
Payer: COMMERCIAL

## 2024-07-02 PROCEDURE — 83036 HEMOGLOBIN GLYCOSYLATED A1C: CPT | Performed by: STUDENT IN AN ORGANIZED HEALTH CARE EDUCATION/TRAINING PROGRAM

## 2024-07-02 PROCEDURE — 80053 COMPREHEN METABOLIC PANEL: CPT | Performed by: STUDENT IN AN ORGANIZED HEALTH CARE EDUCATION/TRAINING PROGRAM

## 2024-07-02 PROCEDURE — 80061 LIPID PANEL: CPT | Performed by: STUDENT IN AN ORGANIZED HEALTH CARE EDUCATION/TRAINING PROGRAM

## 2024-07-08 ENCOUNTER — OFFICE VISIT (OUTPATIENT)
Dept: INTERNAL MEDICINE | Facility: CLINIC | Age: 64
End: 2024-07-08
Payer: COMMERCIAL

## 2024-07-08 VITALS
HEART RATE: 73 BPM | BODY MASS INDEX: 42.36 KG/M2 | SYSTOLIC BLOOD PRESSURE: 138 MMHG | DIASTOLIC BLOOD PRESSURE: 88 MMHG | OXYGEN SATURATION: 96 % | TEMPERATURE: 96.4 F | HEIGHT: 66 IN | WEIGHT: 263.6 LBS

## 2024-07-08 DIAGNOSIS — I82.4Y1 ACUTE DEEP VEIN THROMBOSIS (DVT) OF PROXIMAL VEIN OF RIGHT LOWER EXTREMITY: ICD-10-CM

## 2024-07-08 DIAGNOSIS — M13.0 POLYARTHRITIS: ICD-10-CM

## 2024-07-08 DIAGNOSIS — N18.31 STAGE 3A CHRONIC KIDNEY DISEASE: ICD-10-CM

## 2024-07-08 DIAGNOSIS — M1A.9XX0 CHRONIC GOUT WITHOUT TOPHUS, UNSPECIFIED CAUSE, UNSPECIFIED SITE: Primary | ICD-10-CM

## 2024-07-08 RX ORDER — PREDNISONE 20 MG/1
20 TABLET ORAL DAILY
Qty: 5 TABLET | Refills: 0 | Status: SHIPPED | OUTPATIENT
Start: 2024-07-08 | End: 2024-07-12

## 2024-07-08 NOTE — PROGRESS NOTES
"Chief Complaint  Diabetes, Hypertension, Gout, and dvt    Subjective        Calos Moseley presents to [facility]  History of Present Illness    Mr. Moseley presents to clinic today for follow-up of multiple conditions    Since last visit he had spinal surgery and subsequently was found to have an acute DVT in his right leg.  He was started on Eliquis and has been compliant and taking regularly.    He continues to have significant pain that is predominantly in his lower extremities including his legs as well as his hip and lower back.  He reports difficulty falling asleep and was told that he also has a component of gout however has not started taking allopurinol and only took prednisone but he has recently completed this.      Review of Systems   Constitutional:  Positive for fatigue.   Respiratory:  Negative for shortness of breath.    Cardiovascular:  Positive for leg swelling. Negative for chest pain.   Musculoskeletal:  Positive for arthralgias, back pain, gait problem and myalgias.        Objective   Vital Signs:  /88   Pulse 73   Temp 96.4 °F (35.8 °C) (Temporal)   Ht 167.6 cm (66\")   Wt 120 kg (263 lb 9.6 oz)   SpO2 96%   BMI 42.55 kg/m²   Estimated body mass index is 42.55 kg/m² as calculated from the following:    Height as of this encounter: 167.6 cm (66\").    Weight as of this encounter: 120 kg (263 lb 9.6 oz).               Physical Exam  Vitals reviewed.   Constitutional:       General: He is in acute distress.      Appearance: Normal appearance. He is not toxic-appearing.   HENT:      Head: Normocephalic and atraumatic.   Eyes:      General: No scleral icterus.     Conjunctiva/sclera: Conjunctivae normal.   Musculoskeletal:      Right lower leg: Edema present.      Left lower leg: Edema present.   Skin:     General: Skin is warm and dry.   Neurological:      Mental Status: He is alert and oriented to person, place, and time.   Psychiatric:         Mood and " Affect: Mood normal.         Behavior: Behavior normal.      Result Review :                   Assessment and Plan   Diagnoses and all orders for this visit:    1. Chronic gout without tophus, unspecified cause, unspecified site (Primary)  -     predniSONE (DELTASONE) 20 MG tablet; Take 1 tablet by mouth Daily.  Dispense: 5 tablet; Refill: 0  -     Basic Metabolic Panel; Future  -     Uric Acid; Future    2. Stage 3a chronic kidney disease  -     Basic Metabolic Panel; Future  -     CK; Future    3. Acute deep vein thrombosis (DVT) of proximal vein of right lower extremity    4. Polyarthritis  -     CK; Future      Reviewed neurosurgery, nephrology, ED documentation, labs obtained since 4/2024, duplex and TTE    Unfortunately he continues to have severe polyarticular pain that I suspect is untreated and severe gout, uric acid was noted to be >15 for past month. He reported improvement with prednisone but no other medications have provided relief, was given colchicine by ED despite [id number]. I would like to treat with restart prednisone while he initates allopurinol to prevent worsening this flare. I will see him back in about 5 days to see how he is doing, and plan to repeat labs day prior.     We will likely need to keep a close eye on his uric acid for the next 3-6mo and continue some ppx medication while uptitrating allopurinol to lower uric acid, goal ideally would be at least <9, optimal of being <6.     He also developed what seems to be a provoked DVT after recent spinal surgery, and was started on eliquis about two weeks ago. He overall is doing well with eliquis and denies any bleeding. Will need to continue for at minimum 3-6mo    We will not making any T2Dm medication adjustments given other acute issues, it is slightly above goal at 7.5% but suspect this will improve after we stop steroids, but are limited due to his CKD for colchicine or NSAIDS    Will have him RTC in about 4 days, labs one day prior.           I spent 58 minutes caring for Calos on this date of service. This time includes time spent by me in the following activities:preparing for the visit, reviewing tests, obtaining and/or reviewing a separately obtained history, performing a medically appropriate examination and/or evaluation , ordering medications, tests, or procedures, referring and communicating with other health care professionals , documenting information in the medical record, and independently interpreting results and communicating that information with the patient/family/caregiver  Follow Up   Return in about 4 days (around 7/12/2024).  Patient was given instructions and counseling regarding his condition or for health maintenance advice. Please see specific information pulled into the AVS if appropriate.

## 2024-07-11 ENCOUNTER — LAB (OUTPATIENT)
Facility: HOSPITAL | Age: 64
End: 2024-07-11
Payer: COMMERCIAL

## 2024-07-11 PROCEDURE — 80048 BASIC METABOLIC PNL TOTAL CA: CPT | Performed by: STUDENT IN AN ORGANIZED HEALTH CARE EDUCATION/TRAINING PROGRAM

## 2024-07-11 PROCEDURE — 82550 ASSAY OF CK (CPK): CPT | Performed by: STUDENT IN AN ORGANIZED HEALTH CARE EDUCATION/TRAINING PROGRAM

## 2024-07-11 PROCEDURE — 84550 ASSAY OF BLOOD/URIC ACID: CPT | Performed by: STUDENT IN AN ORGANIZED HEALTH CARE EDUCATION/TRAINING PROGRAM

## 2024-07-12 ENCOUNTER — OFFICE VISIT (OUTPATIENT)
Dept: INTERNAL MEDICINE | Facility: CLINIC | Age: 64
End: 2024-07-12
Payer: COMMERCIAL

## 2024-07-12 VITALS
HEART RATE: 72 BPM | DIASTOLIC BLOOD PRESSURE: 80 MMHG | HEIGHT: 66 IN | WEIGHT: 256 LBS | BODY MASS INDEX: 41.14 KG/M2 | OXYGEN SATURATION: 92 % | SYSTOLIC BLOOD PRESSURE: 126 MMHG | TEMPERATURE: 97.4 F

## 2024-07-12 DIAGNOSIS — N18.32 TYPE 2 DIABETES MELLITUS WITH STAGE 3B CHRONIC KIDNEY DISEASE, WITHOUT LONG-TERM CURRENT USE OF INSULIN: ICD-10-CM

## 2024-07-12 DIAGNOSIS — E11.22 TYPE 2 DIABETES MELLITUS WITH STAGE 3B CHRONIC KIDNEY DISEASE, WITHOUT LONG-TERM CURRENT USE OF INSULIN: ICD-10-CM

## 2024-07-12 DIAGNOSIS — N18.31 STAGE 3A CHRONIC KIDNEY DISEASE: ICD-10-CM

## 2024-07-12 DIAGNOSIS — M1A.9XX0 CHRONIC GOUT WITHOUT TOPHUS, UNSPECIFIED CAUSE, UNSPECIFIED SITE: Primary | ICD-10-CM

## 2024-07-12 PROCEDURE — 99214 OFFICE O/P EST MOD 30 MIN: CPT | Performed by: STUDENT IN AN ORGANIZED HEALTH CARE EDUCATION/TRAINING PROGRAM

## 2024-07-12 RX ORDER — PREDNISONE 5 MG/1
5 TABLET ORAL DAILY
Qty: 30 TABLET | Refills: 0 | Status: SHIPPED | OUTPATIENT
Start: 2024-07-12

## 2024-07-12 RX ORDER — COLCHICINE 0.6 MG/1
0.6 TABLET ORAL DAILY
Qty: 30 TABLET | Refills: 0 | Status: CANCELLED | OUTPATIENT
Start: 2024-07-12

## 2024-07-12 RX ORDER — PREDNISONE 5 MG/1
5 TABLET ORAL DAILY
Qty: 30 TABLET | Refills: 0 | Status: SHIPPED | OUTPATIENT
Start: 2024-07-12 | End: 2024-07-12

## 2024-07-12 RX ORDER — ALLOPURINOL 100 MG/1
TABLET ORAL
Qty: 35 TABLET | Refills: 0 | Status: CANCELLED | OUTPATIENT
Start: 2024-07-12 | End: 2024-08-09

## 2024-07-12 NOTE — PROGRESS NOTES
"Chief Complaint  Gout    Subjective        Calos Moseley presents to [facility]  History of Present Illness    Mr. Moseley presents to clinic today for follow-up    At last visit we obtain labs for gout and started him on prednisone taper as well as allopurinol.  He reports he has significant improvement in his symptoms and almost feels completely back to normal.  Today was his last day of prednisone        Review of Systems   Constitutional:  Negative for fever.   Cardiovascular:  Positive for leg swelling.   Musculoskeletal:  Negative for arthralgias, gait problem and myalgias.        Objective   Vital Signs:  /80   Pulse 72   Temp 97.4 °F (36.3 °C) (Temporal)   Ht 167.6 cm (66\")   Wt 116 kg (256 lb)   SpO2 92%   BMI 41.32 kg/m²   Estimated body mass index is 41.32 kg/m² as calculated from the following:    Height as of this encounter: 167.6 cm (66\").    Weight as of this encounter: 116 kg (256 lb).               Physical Exam  Vitals reviewed.   Constitutional:       General: He is not in acute distress.     Appearance: Normal appearance. He is not toxic-appearing.   HENT:      Head: Normocephalic and atraumatic.   Eyes:      General: No scleral icterus.     Conjunctiva/sclera: Conjunctivae normal.   Skin:     General: Skin is warm and dry.   Neurological:      Mental Status: He is alert and oriented to person, place, and time.   Psychiatric:         Mood and Affect: Mood normal.         Behavior: Behavior normal.        Result Review :                   Assessment and Plan   Diagnoses and all orders for this visit:    1. Chronic gout without tophus, unspecified cause, unspecified site (Primary)  -     Uric acid; Future  -     Renal Function Panel; Future  -     Discontinue: predniSONE (DELTASONE) 5 MG tablet; Take 1 tablet by mouth Daily.  Dispense: 30 tablet; Refill: 0  -     predniSONE (DELTASONE) 5 MG tablet; Take 1 tablet by mouth Daily.  Dispense: 30 tablet; Refill: " 0    2. Stage 3a chronic kidney disease  -     Renal Function Panel; Future    3. Type 2 diabetes mellitus with stage 3b chronic kidney disease, without long-term current use of insulin      Gout has improved symptomatically with prednisone taper however still not at goal with uric acid >12.    Unfortunately management is complicated due to his worsening kidney disease and hyperglycemia.  I am hesitant to trial colchicine for prophylaxis given his kidney disease as well as initiation of allopurinol.  Given his kidney function is slightly worse on labs obtained yesterday, we will try very low-dose prednisone while continuing allopurinol.  Due to this, we will likely need to initiate medication for type 2 diabetes which I suspect is partially medication induced.     Additionally regarding his type 2 diabetes, I have provided him with a month sample of Rybelsus to see how he tolerates this.  His nephrologist has not started Jardiance due to his kidney function however has an appointment in about 2 weeks    Regarding his CKD he has follow-up with nephrology at the end of this month.  His labs appear to suggest some degree of contraction alkalosis and I advised him to reduce his torsemide to 40 mg twice daily from 80 mg twice daily and to discuss this with nephrology.    He was also noted to have an elevated CK, however am unclear of the significance of this.  It was worse 1 year ago. We will keep an eye on this, low suspicion of polymyositis or drug induced myopathy.     RTC in 5w, will have him repeat uric acid and kidney function prior to this appt          Follow Up   Return in about 5 weeks (around 8/16/2024).  Patient was given instructions and counseling regarding his condition or for health maintenance advice. Please see specific information pulled into the AVS if appropriate.

## 2024-07-16 RX ORDER — PRAMIPEXOLE DIHYDROCHLORIDE 0.75 MG/1
0.75 TABLET ORAL NIGHTLY
Qty: 30 TABLET | Refills: 2 | Status: SHIPPED | OUTPATIENT
Start: 2024-07-16

## 2024-07-17 RX ORDER — LAMOTRIGINE 100 MG/1
100 TABLET ORAL 2 TIMES DAILY
Qty: 180 TABLET | Refills: 1 | OUTPATIENT
Start: 2024-07-17

## 2024-07-29 ENCOUNTER — TELEPHONE (OUTPATIENT)
Dept: INTERNAL MEDICINE | Facility: CLINIC | Age: 64
End: 2024-07-29
Payer: COMMERCIAL

## 2024-07-29 DIAGNOSIS — I82.4Y1 ACUTE DEEP VEIN THROMBOSIS (DVT) OF PROXIMAL VEIN OF RIGHT LOWER EXTREMITY: Primary | ICD-10-CM

## 2024-07-29 NOTE — TELEPHONE ENCOUNTER
Caller: Rebeca Moseley    Relationship: Emergency Contact    Best call back number: [phone]     What medication are you requesting: ELIQUIS (DIDN'T KNOW MG)      Have you had these symptoms before:    [x] Yes  [] No    Have you been treated for these symptoms before:   [x] Yes  [] No    If a prescription is needed, what is your preferred pharmacy and phone number: [facility]     Additional notes:  PATIENT IS OUT OF MEDICATION. IT WAS GIVEN IN THE HOSPITAL AND THEY DIDN'T REALIZE DIDN'T HAVE REFILLS.

## 2024-08-05 ENCOUNTER — HOSPITAL ENCOUNTER (OUTPATIENT)
Dept: CT IMAGING | Facility: HOSPITAL | Age: 64
Discharge: HOME | End: 2024-08-05
Admitting: INTERNAL MEDICINE
Payer: COMMERCIAL

## 2024-08-05 DIAGNOSIS — J84.9 ILD (INTERSTITIAL LUNG DISEASE): ICD-10-CM

## 2024-08-05 PROCEDURE — 71250 CT THORAX DX C-: CPT

## 2024-08-14 ENCOUNTER — LAB (OUTPATIENT)
Facility: HOSPITAL | Age: 64
End: 2024-08-14
Payer: COMMERCIAL

## 2024-08-14 DIAGNOSIS — M1A.9XX0 CHRONIC GOUT WITHOUT TOPHUS, UNSPECIFIED CAUSE, UNSPECIFIED SITE: ICD-10-CM

## 2024-08-14 DIAGNOSIS — N18.31 STAGE 3A CHRONIC KIDNEY DISEASE: ICD-10-CM

## 2024-08-14 LAB
ALBUMIN SERPL-MCNC: 4.6 G/DL (ref 3.5–5.2)
ANION GAP SERPL CALCULATED.3IONS-SCNC: 18 MMOL/L (ref 5–15)
BUN SERPL-MCNC: 55 MG/DL (ref 8–23)
BUN/CREAT SERPL: 23 (ref 7–25)
CALCIUM SPEC-SCNC: 9.5 MG/DL (ref 8.6–10.5)
CHLORIDE SERPL-SCNC: 89 MMOL/L (ref 98–107)
CO2 SERPL-SCNC: 31 MMOL/L (ref 22–29)
CREAT SERPL-MCNC: 2.39 MG/DL (ref 0.76–1.27)
EGFRCR SERPLBLD CKD-EPI 2021: 29.7 ML/MIN/1.73
GLUCOSE SERPL-MCNC: 218 MG/DL (ref 65–99)
PHOSPHATE SERPL-MCNC: 2.9 MG/DL (ref 2.5–4.5)
POTASSIUM SERPL-SCNC: 3.2 MMOL/L (ref 3.5–5.2)
SODIUM SERPL-SCNC: 138 MMOL/L (ref 136–145)

## 2024-08-14 PROCEDURE — 36415 COLL VENOUS BLD VENIPUNCTURE: CPT

## 2024-08-14 PROCEDURE — 84550 ASSAY OF BLOOD/URIC ACID: CPT | Performed by: STUDENT IN AN ORGANIZED HEALTH CARE EDUCATION/TRAINING PROGRAM

## 2024-08-14 PROCEDURE — 80069 RENAL FUNCTION PANEL: CPT

## 2024-08-16 ENCOUNTER — LAB (OUTPATIENT)
Facility: HOSPITAL | Age: 64
End: 2024-08-16
Payer: COMMERCIAL

## 2024-08-16 ENCOUNTER — TRANSCRIBE ORDERS (OUTPATIENT)
Dept: ADMINISTRATIVE | Facility: HOSPITAL | Age: 64
End: 2024-08-16
Payer: COMMERCIAL

## 2024-08-16 ENCOUNTER — OFFICE VISIT (OUTPATIENT)
Dept: INTERNAL MEDICINE | Facility: CLINIC | Age: 64
End: 2024-08-16
Payer: COMMERCIAL

## 2024-08-16 VITALS
DIASTOLIC BLOOD PRESSURE: 68 MMHG | SYSTOLIC BLOOD PRESSURE: 128 MMHG | OXYGEN SATURATION: 98 % | BODY MASS INDEX: 41.08 KG/M2 | WEIGHT: 255.6 LBS | HEART RATE: 73 BPM | HEIGHT: 66 IN | TEMPERATURE: 97.8 F

## 2024-08-16 DIAGNOSIS — E87.6 HYPOPOTASSEMIA: ICD-10-CM

## 2024-08-16 DIAGNOSIS — N18.32 CHRONIC KIDNEY DISEASE (CKD) STAGE G3B/A1, MODERATELY DECREASED GLOMERULAR FILTRATION RATE (GFR) BETWEEN 30-44 ML/MIN/1.73 SQUARE METER AND ALBUMINURIA CREATININE RATIO LESS THAN 30 MG/G (CMS/H*: ICD-10-CM

## 2024-08-16 DIAGNOSIS — M13.0 POLYARTHRITIS: ICD-10-CM

## 2024-08-16 DIAGNOSIS — N18.32 CHRONIC KIDNEY DISEASE (CKD) STAGE G3B/A1, MODERATELY DECREASED GLOMERULAR FILTRATION RATE (GFR) BETWEEN 30-44 ML/MIN/1.73 SQUARE METER AND ALBUMINURIA CREATININE RATIO LESS THAN 30 MG/G (CMS/H*: Primary | ICD-10-CM

## 2024-08-16 DIAGNOSIS — N18.32 STAGE 3B CHRONIC KIDNEY DISEASE: ICD-10-CM

## 2024-08-16 DIAGNOSIS — R60.9 EDEMA, UNSPECIFIED TYPE: ICD-10-CM

## 2024-08-16 DIAGNOSIS — R74.8 ELEVATED CK: ICD-10-CM

## 2024-08-16 DIAGNOSIS — D50.9 IRON DEFICIENCY ANEMIA, UNSPECIFIED IRON DEFICIENCY ANEMIA TYPE: ICD-10-CM

## 2024-08-16 DIAGNOSIS — M1A.9XX0 CHRONIC GOUT WITHOUT TOPHUS, UNSPECIFIED CAUSE, UNSPECIFIED SITE: Primary | ICD-10-CM

## 2024-08-16 DIAGNOSIS — I12.9 HYPERTENSIVE NEPHROPATHY: ICD-10-CM

## 2024-08-16 DIAGNOSIS — G25.81 RESTLESS LEG SYNDROME: ICD-10-CM

## 2024-08-16 DIAGNOSIS — I51.9 HEART DISEASE, UNSPECIFIED: ICD-10-CM

## 2024-08-16 DIAGNOSIS — N20.0 URIC ACID NEPHROLITHIASIS: ICD-10-CM

## 2024-08-16 NOTE — PROGRESS NOTES
"Chief Complaint  Gout    Subjective        Calos Moseley presents to [facility]  History of Present Illness    Mr. Moseley presents to clinic today for follow-up    Overall he is doing well but continues to have significant pain in his lower back.  He does feels like his restless leg syndrome is slightly worsening as he has been having to take his Mirapex more often.        Objective   Vital Signs:  /68   Pulse 73   Temp 97.8 °F (36.6 °C) (Temporal)   Ht 167.6 cm (66\")   Wt 116 kg (255 lb 9.6 oz)   SpO2 98%   BMI 41.25 kg/m²   Estimated body mass index is 41.25 kg/m² as calculated from the following:    Height as of this encounter: 167.6 cm (66\").    Weight as of this encounter: 116 kg (255 lb 9.6 oz).            Physical Exam  Vitals reviewed.   Constitutional:       General: He is not in acute distress.     Appearance: Normal appearance. He is obese. He is not toxic-appearing.   HENT:      Head: Normocephalic and atraumatic.   Eyes:      General: No scleral icterus.     Conjunctiva/sclera: Conjunctivae normal.   Musculoskeletal:      Right lower leg: Edema present.      Left lower leg: Edema present.   Skin:     General: Skin is warm and dry.   Neurological:      Mental Status: He is alert and oriented to person, place, and time. Mental status is at baseline.   Psychiatric:         Mood and Affect: Mood normal.         Behavior: Behavior normal.        Result Review :                Assessment and Plan   Diagnoses and all orders for this visit:    1. Chronic gout without tophus, unspecified cause, unspecified site (Primary)  -     Ambulatory Referral to Rheumatology    2. Restless leg syndrome    3. Polyarthritis  -     Rheumatoid Factor; Future  -     Cyclic Citrul Peptide Antibody, IgG / IgA; Future  -     Sedimentation Rate; Future  -     C-reactive Protein; Future  -     CK; Future  -     RAJAN by IFA, Reflex 9-biomarkers profile; Future  -     Ambulatory Referral to " Rheumatology    4. Elevated CK  -     Sedimentation Rate; Future  -     C-reactive Protein; Future  -     CK; Future    5. Stage 3b chronic kidney disease      I would like to obtain further labs to workup his significant and persistent diffuse pain that is nonlocalized.  Will plan to obtain rheumatological workup and will repeat CK as he has had a persistently elevated CK over the past 2 months.  Will also plan to rheumatology given his chronic gout that is difficult to manage given his known CKD as well as persistently elevated uric acid.    I did provide him with samples of Ozempic given recent type 2 diabetes diagnosis, as a suspect he will have significant benefit and weight loss with other comorbidities         Follow Up   Return in about 2 months (around 10/16/2024) for Annual physical.  Patient was given instructions and counseling regarding his condition or for health maintenance advice. Please see specific information pulled into the AVS if appropriate.

## 2024-08-19 DIAGNOSIS — M1A.9XX0 CHRONIC GOUT WITHOUT TOPHUS, UNSPECIFIED CAUSE, UNSPECIFIED SITE: ICD-10-CM

## 2024-08-21 RX ORDER — CARVEDILOL 25 MG/1
25 TABLET ORAL EVERY 12 HOURS SCHEDULED
Qty: 180 TABLET | Refills: 0 | Status: SHIPPED | OUTPATIENT
Start: 2024-08-21

## 2024-08-21 RX ORDER — PREDNISONE 5 MG/1
5 TABLET ORAL DAILY
Qty: 30 TABLET | Refills: 0 | OUTPATIENT
Start: 2024-08-21

## 2024-08-21 NOTE — TELEPHONE ENCOUNTER
I tried calling patient, but I actually would like him to hold off on taking resuming the steroid as I am unsure if his symptoms are gout related and I think the risks of steroids outweigh the benefits.

## 2024-08-22 ENCOUNTER — TELEPHONE (OUTPATIENT)
Dept: INTERNAL MEDICINE | Facility: CLINIC | Age: 64
End: 2024-08-22
Payer: COMMERCIAL

## 2024-08-22 NOTE — TELEPHONE ENCOUNTER
Patient's wife is calling stating she received a call from Russel yesterday regarding patient's 6 week trial for Ozempic, however the patient also takes Jardiance and not sure if he should be taking them both, please call Sharon (wife) [phone].

## 2024-08-27 NOTE — PROGRESS NOTES
"Patient ID: Calos Moseley is a 64 y.o. male is here today for follow-up after having an SI joint injection on 06-07-24.    Subjective     The patient is here in regards to   Chief Complaint   Patient presents with    Back Pain    Leg Pain    Follow-up       History of Present Illness  Calos is doing very well initially after surgery especially after he recovered from his post compression radiculitis.  But now he is developed significant back pain when walking.  He has return of his left L5 radiculopathy symptoms although his foot drop is not very pronounced at the moment.  When he starts walking initially does not have any issues but as the day goes on as he walks more he feels more tired and has more symptoms in his left leg.      While in the room and during my examination of the patient I wore a mask and eye protection.  I washed my hands before and after this patient encounter.  The patient was also wearing a mask.    The following portions of the patient's history were reviewed and updated as appropriate: allergies, current medications, past family history, past medical history, past social history, past surgical history and problem list.    Review of Systems   Constitutional:  Negative for fever.   Musculoskeletal:  Positive for back pain (and left leg pain) and gait problem.   Neurological:  Positive for weakness and numbness. Negative for dizziness, light-headedness and headaches.        Past Medical History:   Diagnosis Date    Anesthesia complication     STATES HARD TO WAKE UP AFTER PREVIOUS KIDNEY STONE SURGERY AND STATES HE CODED    Bipolar affective     Chronic back pain     COPD (chronic obstructive pulmonary disease)     ? FROM MD NOTES    COVID 2022    CRF (chronic renal failure), stage 4 (severe)     WILL CHECK LABS AGAIN    Elevated cholesterol     History of kidney stones     History of pneumonia 05/2023    \"TRAPPED LUNG\" PLUERAL EFFUSION. RIGHT LUNG. S/P DECORTIFICATION RIGHT LUNG    History of UTI "     Hypertension     Hypothyroidism     Lumbar disc disease     Numbness and tingling     Restless leg syndrome     Shortness of breath     Sleep apnea     cpap  bring dos    Swelling of both lower extremities        No Known Allergies    Family History   Problem Relation Age of Onset    Lung cancer Mother     COPD Father     Emphysema Father     Diabetes Father     Malig Hyperthermia Neg Hx        Social History     Socioeconomic History    Marital status:    Tobacco Use    Smoking status: Former     Current packs/day: 0.00     Types: Cigarettes     Quit date: 1990     Years since quittin.6     Passive exposure: Past    Smokeless tobacco: Never    Tobacco comments:     SMOKED 2 PPD FOR 8 YEARS. QUIT 35 YEARS AGO   Vaping Use    Vaping status: Never Used   Substance and Sexual Activity    Alcohol use: Yes     Comment: ON OCC    Drug use: No    Sexual activity: Defer       Past Surgical History:   Procedure Laterality Date    ADENOIDECTOMY      APPENDECTOMY      BRONCHOSCOPY Right 2023    Procedure: BRONCHOSCOPY WITH ENDOBRONCHIAL ULTRASOUND with BAL of RLL and FNA;  Surgeon: Aniceto Bruno MD;  Location: [facility] ENDOSCOPY;  Service: Pulmonary;  Laterality: Right;  pre- RLL pneumonia, lymphadenopathy  post- same    COLONOSCOPY      COLONOSCOPY N/A 2023    Procedure: COLONOSCOPY into cecum with cold snare polypectomies;  Surgeon: Yoan Leonard MD;  Location: [facility] ENDOSCOPY;  Service: Gastroenterology;  Laterality: N/A;  polyps    CYSTOSCOPY WITH CLOT EVACUATION N/A 2022    Procedure: CYSTOSCOPY WITH CLOT EVACUATION;  Surgeon: Ihsan Lopez MD;  Location: [facility] OR;  Service: Urology;  Laterality: N/A;    CYSTOSCOPY, URETEROSCOPY, RETROGRADE PYELOGRAM, STENT INSERTION Left 2022    Procedure: CYSTOSCOPY URETEROSCOPY LASER LITHOTRIPSY and stent exchange;  Surgeon: Deuce Leija MD;  Location: [facility] OR;  Service: Urology;  Laterality:  Left;    EPIDURAL BLOCK      LUMBAR DISCECTOMY Left 5/13/2024    Procedure: LEFT LUMBAR FIVE TO SACRAL ONE FORAMINOTOMY, POSSIBLE DISCECTOMY MICROSCOPIC;  Surgeon: Christos Spangler MD;  Location: [facility] MAIN OR;  Service: Neurosurgery;  Laterality: Left;    NEPHROLITHOTRIPSY PERCUTANEOUS Left 02/21/2022    Procedure: NEPHROSTLITHOTOMY PERCUTANEOUS;  Surgeon: Deuce Leija MD;  Location: [facility] MAIN OR;  Service: Urology;  Laterality: Left;    NEPHROLITHOTRIPSY PERCUTANEOUS Left 02/23/2022    Procedure: LEFT FLEXIBLE NEPHROSTOMY WITH STONE EXTRACTION, LEFT NEPHROSTOMY, TUBE REMOVAL, BILATERAL URETERAL STENT PLACEMENT;  Surgeon: Deuce Leija MD;  Location: [facility] OR;  Service: Urology;  Laterality: Left;    NEPHROLITHOTRIPSY PERCUTANEOUS Right 03/07/2022    Procedure: RIGHT PERCUTANEOUS NEPHROLITHOTOMY W ACCESS;  Surgeon: Deuce Leija MD;  Location: [facility] OR;  Service: Urology;  Laterality: Right;    THORACOSCOPY WITH DECORTICATION Right 05/20/2023    Procedure: THORACOSCOPY VIDEO ASSISTED WITH DECORTICATION WITH DAVINCI ROBOT;  Surgeon: Jose Rice MD;  Location: [facility];  Service: Robotics - DaVinci;  Laterality: Right;    TONSILLECTOMY           Objective     Vitals:    08/29/24 1007   BP: 142/82   Pulse: 72   Resp: 16   SpO2: 98%     Body mass index is 39.75 kg/m².    Physical Exam  Constitutional:       Appearance: Normal appearance.   HENT:      Head: Normocephalic and atraumatic.   Eyes:      Extraocular Movements: Extraocular movements intact.      Conjunctiva/sclera: Conjunctivae normal.      Pupils: Pupils are equal, round, and reactive to light.   Cardiovascular:      Rate and Rhythm: Normal rate and regular rhythm.      Pulses: Normal pulses.   Pulmonary:      Breath sounds: Normal breath sounds.   Abdominal:      Palpations: Abdomen is soft.   Musculoskeletal:         General: Normal range of motion.      Cervical back: Normal range of motion and neck supple.    Skin:     General: Skin is warm and dry.   Neurological:      Mental Status: He is alert and oriented to person, place, and time.      Cranial Nerves: Cranial nerves 2-12 are intact.      Motor: Motor function is intact. No weakness or atrophy.      Coordination: Coordination is intact. Romberg sign negative. Romberg Test normal.      Gait: Gait is intact. Gait normal.      Deep Tendon Reflexes: Reflexes are normal and symmetric.      Reflex Scores:       Tricep reflexes are 2+ on the right side and 2+ on the left side.       Bicep reflexes are 2+ on the right side and 2+ on the left side.       Brachioradialis reflexes are 2+ on the right side and 2+ on the left side.       Patellar reflexes are 2+ on the right side and 2+ on the left side.       Achilles reflexes are 2+ on the right side and 2+ on the left side.  Psychiatric:         Speech: Speech normal.         Neurologic Exam     Mental Status   Oriented to person, place, and time.   Attention: normal. Concentration: normal.   Speech: speech is normal   Level of consciousness: alert    Cranial Nerves   Cranial nerves II through XII intact.     CN III, IV, VI   Pupils are equal, round, and reactive to light.    Motor Exam   Muscle bulk: normal  Overall muscle tone: normal    Strength   Strength 5/5 except as noted.     Sensory Exam   Light touch normal.     Gait, Coordination, and Reflexes     Gait  Gait: normal    Coordination   Romberg: negative    Reflexes   Reflexes 2+ except as noted.   Right brachioradialis: 2+  Left brachioradialis: 2+  Right biceps: 2+  Left biceps: 2+  Right triceps: 2+  Left triceps: 2+  Right patellar: 2+  Left patellar: 2+  Right achilles: 2+  Left achilles: 2+      Assessment & Plan   Independent Review of Radiographic Studies:      I personally reviewed the images from the following studies.    No new neuroimaging.    Assessment/Plan: I think that we will need to reimage his spine with a flexion-extension x-ray as well as a  repeat MRI to see if there is any residual nerve compression over his L5 nerve.  This could either be coming from a disc herniation or an L4-5 level or potentially misalignment of his L5-S1.    Medical Decision Making:      MRI of the lumbar spine  X-ray flexion-extension         Diagnoses and all orders for this visit:    1. Lumbar radiculopathy (Primary)  -     MRI Lumbar Spine Without Contrast; Future  -     XR spine cervical complete w flex ext; Future    2. Lumbar foraminal stenosis, left L5/S1  -     MRI Lumbar Spine Without Contrast; Future  -     XR spine cervical complete w flex ext; Future             Patient Instructions/Recommendations:    Follow up after MRI completed      Christos Spangler MD  08/29/24  10:27 EDT

## 2024-08-29 ENCOUNTER — OFFICE VISIT (OUTPATIENT)
Dept: NEUROSURGERY | Facility: CLINIC | Age: 64
End: 2024-08-29
Payer: COMMERCIAL

## 2024-08-29 VITALS
SYSTOLIC BLOOD PRESSURE: 142 MMHG | WEIGHT: 246.3 LBS | RESPIRATION RATE: 16 BRPM | HEIGHT: 66 IN | HEART RATE: 72 BPM | DIASTOLIC BLOOD PRESSURE: 82 MMHG | BODY MASS INDEX: 39.58 KG/M2 | OXYGEN SATURATION: 98 %

## 2024-08-29 DIAGNOSIS — M48.061 LUMBAR FORAMINAL STENOSIS: ICD-10-CM

## 2024-08-29 DIAGNOSIS — M54.16 LUMBAR RADICULOPATHY: Primary | ICD-10-CM

## 2024-08-29 PROCEDURE — 99024 POSTOP FOLLOW-UP VISIT: CPT | Performed by: NEUROLOGICAL SURGERY

## 2024-09-11 ENCOUNTER — OFFICE VISIT (OUTPATIENT)
Dept: INTERNAL MEDICINE | Facility: CLINIC | Age: 64
End: 2024-09-11
Payer: COMMERCIAL

## 2024-09-11 VITALS
SYSTOLIC BLOOD PRESSURE: 124 MMHG | OXYGEN SATURATION: 94 % | TEMPERATURE: 97.6 F | WEIGHT: 241.8 LBS | BODY MASS INDEX: 38.86 KG/M2 | HEIGHT: 66 IN | HEART RATE: 68 BPM | DIASTOLIC BLOOD PRESSURE: 88 MMHG

## 2024-09-11 DIAGNOSIS — N18.32 TYPE 2 DIABETES MELLITUS WITH STAGE 3B CHRONIC KIDNEY DISEASE, WITHOUT LONG-TERM CURRENT USE OF INSULIN: Primary | ICD-10-CM

## 2024-09-11 DIAGNOSIS — E66.9 CLASS 2 OBESITY WITH BODY MASS INDEX (BMI) OF 39.0 TO 39.9 IN ADULT, UNSPECIFIED OBESITY TYPE, UNSPECIFIED WHETHER SERIOUS COMORBIDITY PRESENT: ICD-10-CM

## 2024-09-11 DIAGNOSIS — E11.22 TYPE 2 DIABETES MELLITUS WITH STAGE 3B CHRONIC KIDNEY DISEASE, WITHOUT LONG-TERM CURRENT USE OF INSULIN: Primary | ICD-10-CM

## 2024-09-11 PROCEDURE — 99213 OFFICE O/P EST LOW 20 MIN: CPT | Performed by: STUDENT IN AN ORGANIZED HEALTH CARE EDUCATION/TRAINING PROGRAM

## 2024-09-11 NOTE — PROGRESS NOTES
"Chief Complaint  Diabetes    Subjective        Calos Moseley presents to [facility] PR[facility]  History of Present Illness    Mr. Moseley presents to clinic today for follow-up    Since last visit he has been doing great with the Ozempic and has lost over 10 pounds.  He reports increased energy as well as being able to exercise.  He is continue to have back pain and he has upcoming MRI    Objective   Vital Signs:  /88   Pulse 68   Temp 97.6 °F (36.4 °C) (Temporal)   Ht 167.6 cm (66\")   Wt 110 kg (241 lb 12.8 oz)   SpO2 94%   BMI 39.03 kg/m²   Estimated body mass index is 39.03 kg/m² as calculated from the following:    Height as of this encounter: 167.6 cm (66\").    Weight as of this encounter: 110 kg (241 lb 12.8 oz).            Physical Exam  Vitals reviewed.   Constitutional:       General: He is not in acute distress.     Appearance: Normal appearance. He is obese. He is not toxic-appearing.   HENT:      Head: Normocephalic and atraumatic.   Eyes:      General: No scleral icterus.     Conjunctiva/sclera: Conjunctivae normal.   Skin:     General: Skin is warm and dry.   Neurological:      Mental Status: He is alert and oriented to person, place, and time.   Psychiatric:         Mood and Affect: Mood normal.         Behavior: Behavior normal.        Result Review :                Assessment and Plan   Diagnoses and all orders for this visit:    1. Type 2 diabetes mellitus with stage 3b chronic kidney disease, without long-term current use of insulin (Primary)  -     Semaglutide,0.25 or 0.5MG/DOS, (OZEMPIC) 2 MG/3ML solution pen-injector; Inject 0.5 mg under the skin into the appropriate area as directed 1 (One) Time Per Week.  Dispense: 3 mL; Refill: 0    2. Class 2 obesity with body mass index (BMI) of 39.0 to 39.9 in adult, unspecified obesity type, unspecified whether serious comorbidity present      Type 2 diabetes is chronic and improving with Ozempic, unable to do metformin due " to kidney disease.  Will give refill of Ozempic and will stay at 0.5 mg for now    Return to clinic as previously scheduled         Follow Up   No follow-ups on file.  Patient was given instructions and counseling regarding his condition or for health maintenance advice. Please see specific information pulled into the AVS if appropriate.

## 2024-09-17 ENCOUNTER — TELEPHONE (OUTPATIENT)
Dept: INTERNAL MEDICINE | Facility: CLINIC | Age: 64
End: 2024-09-17
Payer: COMMERCIAL

## 2024-09-17 NOTE — TELEPHONE ENCOUNTER
Caller: Rebeca Moseley    Relationship: Emergency Contact    Best call back number: [phone]     What form or medical record are you requesting: PRIOR AUTHORIZATION    Who is requesting this form or medical record from you: INSURANCE    Timeframe paperwork needed: ASAP    Additional notes: PRIOR AUTHORIZATION NEEDED FOR OZEMPIC THROUGH THE [facility]

## 2024-09-20 ENCOUNTER — HOSPITAL ENCOUNTER (OUTPATIENT)
Dept: MRI IMAGING | Facility: HOSPITAL | Age: 64
Discharge: HOME | End: 2024-09-20
Admitting: NEUROLOGICAL SURGERY
Payer: COMMERCIAL

## 2024-09-20 DIAGNOSIS — M54.16 LUMBAR RADICULOPATHY: ICD-10-CM

## 2024-09-20 DIAGNOSIS — M48.061 LUMBAR FORAMINAL STENOSIS: ICD-10-CM

## 2024-09-20 PROCEDURE — 72148 MRI LUMBAR SPINE W/O DYE: CPT

## 2024-09-24 ENCOUNTER — TELEPHONE (OUTPATIENT)
Dept: INTERNAL MEDICINE | Facility: CLINIC | Age: 64
End: 2024-09-24
Payer: COMMERCIAL

## 2024-09-24 NOTE — TELEPHONE ENCOUNTER
Caller: Calos Moseley    Relationship: Self    Best call back number:[phone]     What is the best time to reach you: ANYTIME    Who are you requesting to speak with (clinical staff, provider,  specific staff member): CLINICAL    What was the call regarding: PA FOR OZEMPIC

## 2024-10-06 RX ORDER — TAMSULOSIN HYDROCHLORIDE 0.4 MG/1
0.4 CAPSULE ORAL
Qty: 30 CAPSULE | Refills: 1 | Status: SHIPPED | OUTPATIENT
Start: 2024-10-06

## 2024-10-09 RX ORDER — PRAMIPEXOLE DIHYDROCHLORIDE 0.75 MG/1
0.75 TABLET ORAL NIGHTLY
Qty: 30 TABLET | Refills: 2 | Status: SHIPPED | OUTPATIENT
Start: 2024-10-09

## 2024-10-17 NOTE — PROGRESS NOTES
"Patient ID: Calos Moseley is a 64 y.o. male is here today for follow-up for lumbar radiculopathy and lumbar foraminal stenosis.    Imaging: MRI of the lumbar completed on 09/20/2024    Subjective     The patient is here in regards to   Chief Complaint   Patient presents with    Back Pain    Follow-up       History of Present Illness  Is making some gradual improvement.  His back pain has resolved.  He still does have some leg pain and gets tired when walking for long periods of time towards the end of the day but he is making progress.  He seems to have a foot drop.  He got no benefit from his SI joint injection, however he is not sure if he was having a flareup of gout during that time.  He is feeling better now.      While in the room and during my examination of the patient I wore a mask and eye protection.  I washed my hands before and after this patient encounter.  The patient was also wearing a mask.    The following portions of the patient's history were reviewed and updated as appropriate: allergies, current medications, past family history, past medical history, past social history, past surgical history and problem list.    Review of Systems   Constitutional:  Negative for fever.   Musculoskeletal:  Positive for back pain and gait problem.   Neurological:  Positive for numbness. Negative for dizziness, weakness, light-headedness and headaches.        Past Medical History:   Diagnosis Date    Anesthesia complication     STATES HARD TO WAKE UP AFTER PREVIOUS KIDNEY STONE SURGERY AND STATES HE CODED    Bipolar affective     Chronic back pain     COPD (chronic obstructive pulmonary disease)     ? FROM MD NOTES    COVID 2022    CRF (chronic renal failure), stage 4 (severe)     WILL CHECK LABS AGAIN    Elevated cholesterol     History of kidney stones     History of pneumonia 05/2023    \"TRAPPED LUNG\" PLUERAL EFFUSION. RIGHT LUNG. S/P DECORTIFICATION RIGHT LUNG    History of UTI     Hypertension     " Hypothyroidism     Lumbar disc disease     Numbness and tingling     Restless leg syndrome     Shortness of breath     Sleep apnea     cpap  bring dos    Swelling of both lower extremities        No Known Allergies    Family History   Problem Relation Age of Onset    Lung cancer Mother     COPD Father     Emphysema Father     Diabetes Father     Malig Hyperthermia Neg Hx        Social History     Socioeconomic History    Marital status:    Tobacco Use    Smoking status: Former     Current packs/day: 0.00     Types: Cigarettes     Quit date: 1990     Years since quittin.8     Passive exposure: Past    Smokeless tobacco: Never    Tobacco comments:     SMOKED 2 PPD FOR 8 YEARS. QUIT 35 YEARS AGO   Vaping Use    Vaping status: Never Used   Substance and Sexual Activity    Alcohol use: Yes     Comment: ON OCC    Drug use: No    Sexual activity: Defer       Past Surgical History:   Procedure Laterality Date    ADENOIDECTOMY      APPENDECTOMY      BRONCHOSCOPY Right 2023    Procedure: BRONCHOSCOPY WITH ENDOBRONCHIAL ULTRASOUND with BAL of RLL and FNA;  Surgeon: Aniceto Bruno MD;  Location: [facility] ENDOSCOPY;  Service: Pulmonary;  Laterality: Right;  pre- RLL pneumonia, lymphadenopathy  post- same    COLONOSCOPY      COLONOSCOPY N/A 2023    Procedure: COLONOSCOPY into cecum with cold snare polypectomies;  Surgeon: Yoan Leonard MD;  Location: [facility] ENDOSCOPY;  Service: Gastroenterology;  Laterality: N/A;  polyps    CYSTOSCOPY WITH CLOT EVACUATION N/A 2022    Procedure: CYSTOSCOPY WITH CLOT EVACUATION;  Surgeon: Ihsan Lopez MD;  Location: [facility] OR;  Service: Urology;  Laterality: N/A;    CYSTOSCOPY, URETEROSCOPY, RETROGRADE PYELOGRAM, STENT INSERTION Left 2022    Procedure: CYSTOSCOPY URETEROSCOPY LASER LITHOTRIPSY and stent exchange;  Surgeon: Deuce Leija MD;  Location: [facility] OR;  Service: Urology;  Laterality: Left;    EPIDURAL BLOCK       LUMBAR DISCECTOMY Left 5/13/2024    Procedure: LEFT LUMBAR FIVE TO SACRAL ONE FORAMINOTOMY, POSSIBLE DISCECTOMY MICROSCOPIC;  Surgeon: Christos Spangler MD;  Location: [facility] OR;  Service: Neurosurgery;  Laterality: Left;    NEPHROLITHOTRIPSY PERCUTANEOUS Left 02/21/2022    Procedure: NEPHROSTLITHOTOMY PERCUTANEOUS;  Surgeon: Deuce Leija MD;  Location: [facility] MAIN OR;  Service: Urology;  Laterality: Left;    NEPHROLITHOTRIPSY PERCUTANEOUS Left 02/23/2022    Procedure: LEFT FLEXIBLE NEPHROSTOMY WITH STONE EXTRACTION, LEFT NEPHROSTOMY, TUBE REMOVAL, BILATERAL URETERAL STENT PLACEMENT;  Surgeon: Deuce Leija MD;  Location: [facility] OR;  Service: Urology;  Laterality: Left;    NEPHROLITHOTRIPSY PERCUTANEOUS Right 03/07/2022    Procedure: RIGHT PERCUTANEOUS NEPHROLITHOTOMY W ACCESS;  Surgeon: Deuce Leija MD;  Location: [facility] OR;  Service: Urology;  Laterality: Right;    THORACOSCOPY WITH DECORTICATION Right 05/20/2023    Procedure: THORACOSCOPY VIDEO ASSISTED WITH DECORTICATION WITH DAVINCI ROBOT;  Surgeon: Jose Rice MD;  Location: [facility] OR;  Service: Robotics - DaVinci;  Laterality: Right;    TONSILLECTOMY           Objective     Vitals:    10/18/24 0929   BP: 132/64   Pulse: 66   Resp: 16   Temp: 97.1 °F (36.2 °C)   SpO2: 94%     Body mass index is 39.14 kg/m².    Physical Exam  Constitutional:       General: He is awake.      Appearance: Normal appearance.   HENT:      Head: Normocephalic and atraumatic.   Eyes:      General: Lids are normal.      Extraocular Movements: Extraocular movements intact.      Conjunctiva/sclera: Conjunctivae normal.      Pupils: Pupils are equal, round, and reactive to light.   Cardiovascular:      Rate and Rhythm: Normal rate and regular rhythm.      Pulses: Normal pulses.   Pulmonary:      Breath sounds: Normal breath sounds.   Abdominal:      Palpations: Abdomen is soft.   Musculoskeletal:         General: Normal range of motion.       Cervical back: Normal range of motion and neck supple.   Skin:     General: Skin is warm and dry.   Neurological:      Mental Status: He is alert and oriented to person, place, and time.      Motor: Motor function is intact. No weakness or atrophy.      Coordination: Coordination is intact. Romberg sign negative.      Gait: Gait is intact. Gait normal.      Deep Tendon Reflexes: Reflexes are normal and symmetric.      Reflex Scores:       Tricep reflexes are 2+ on the right side and 2+ on the left side.       Bicep reflexes are 2+ on the right side and 2+ on the left side.       Brachioradialis reflexes are 2+ on the right side and 2+ on the left side.       Patellar reflexes are 2+ on the right side and 2+ on the left side.       Achilles reflexes are 2+ on the right side and 2+ on the left side.        Neurological Exam  Mental Status  Awake and alert. Oriented to person, place and time. Oriented to person, place, and time.    Cranial Nerves  CN II: Visual acuity is normal. Visual fields full to confrontation.  CN III, IV, VI: Extraocular movements intact bilaterally. Normal lids and orbits bilaterally. Pupils equal round and reactive to light bilaterally.  CN V: Facial sensation is normal.  CN VII: Full and symmetric facial movement.  CN IX, X: Palate elevates symmetrically. Normal gag reflex.  CN XI: Shoulder shrug strength is normal.  CN XII: Tongue midline without atrophy or fasciculations.    Motor                                               Right                     Left  Deltoid                                   5                          5   Biceps                                   5                          5   Iliopsoas                               5                          5   Quadriceps                           5                          5   Hamstring                             5                          5   Gastrocnemius                     5                           5   Anterior tibialis                       5                          5    Sensory  Sensation is intact to light touch, pinprick, vibration and proprioception in all four extremities.    Reflexes  Deep tendon reflexes are 2+ and symmetric in all four extremities.                                            Right                      Left  Brachioradialis                    2+                         2+  Biceps                                 2+                         2+  Triceps                                2+                         2+  Patellar                                2+                         2+  Achilles                                2+                         2+    Coordination    Finger-to-nose, rapid alternating movements and heel-to-shin normal bilaterally without dysmetria.    Gait   Normal gait. Normal gait. Romberg is absent.      Assessment & Plan   Independent Review of Radiographic Studies:      I personally reviewed the images from the following studies.    FINDINGS:    MR: MRI of the lumbar spine wo contrast was reviewed and shows interval decompression of the left L5-S1 foramen which has some granulation tissue but there is more room for the exiting L5 nerve root is apparent with CSF signal around the nerve.  Unfortunately, there is also increased Modic endplate changes at L5-S1 with degenerative disc disease it is more advanced at that level.  There is also increasing stenosis at L3-4 and L4-5 with some contribution of posterior epidural lipomatosis.    Assessment/Plan: He does have some gradual progression of degenerative disease at L3-4, L4-5 and L5-S1.  It is possible that we may have accelerated his degenerative disease at L5-S1 although we did not attempt a discectomy at this level and only a foraminotomies.  His facet joints appear to be intact.  However he is working on weight loss and lifestyle modifications I think that will help his back and leg pain in the long run.  In the meantime, I think he will  benefit from an epidural steroid injection.    Medical Decision Making:      Epidural steroid injection         Diagnoses and all orders for this visit:    1. Lumbar radiculopathy (Primary)  -     Epidural Block             Patient Instructions/Recommendations:    Follow-up in 3 months      Christos Spangler MD  10/18/24  10:01 EDT

## 2024-10-18 ENCOUNTER — OFFICE VISIT (OUTPATIENT)
Dept: NEUROSURGERY | Facility: CLINIC | Age: 64
End: 2024-10-18
Payer: COMMERCIAL

## 2024-10-18 VITALS
SYSTOLIC BLOOD PRESSURE: 132 MMHG | DIASTOLIC BLOOD PRESSURE: 64 MMHG | RESPIRATION RATE: 16 BRPM | WEIGHT: 242.51 LBS | TEMPERATURE: 97.1 F | OXYGEN SATURATION: 94 % | HEIGHT: 66 IN | BODY MASS INDEX: 38.97 KG/M2 | HEART RATE: 66 BPM

## 2024-10-18 DIAGNOSIS — M54.16 LUMBAR RADICULOPATHY: Primary | ICD-10-CM

## 2024-10-30 ENCOUNTER — OFFICE VISIT (OUTPATIENT)
Dept: INTERNAL MEDICINE | Facility: CLINIC | Age: 64
End: 2024-10-30
Payer: COMMERCIAL

## 2024-10-30 ENCOUNTER — LAB (OUTPATIENT)
Facility: HOSPITAL | Age: 64
End: 2024-10-30
Payer: COMMERCIAL

## 2024-10-30 VITALS
HEIGHT: 66 IN | SYSTOLIC BLOOD PRESSURE: 124 MMHG | HEART RATE: 68 BPM | WEIGHT: 230.6 LBS | BODY MASS INDEX: 37.06 KG/M2 | DIASTOLIC BLOOD PRESSURE: 78 MMHG | OXYGEN SATURATION: 94 % | TEMPERATURE: 97.6 F

## 2024-10-30 DIAGNOSIS — Z00.00 ANNUAL PHYSICAL EXAM: Primary | ICD-10-CM

## 2024-10-30 DIAGNOSIS — N18.32 TYPE 2 DIABETES MELLITUS WITH STAGE 3B CHRONIC KIDNEY DISEASE, WITHOUT LONG-TERM CURRENT USE OF INSULIN: ICD-10-CM

## 2024-10-30 DIAGNOSIS — E03.9 HYPOTHYROIDISM, UNSPECIFIED TYPE: ICD-10-CM

## 2024-10-30 DIAGNOSIS — Z23 NEED FOR INFLUENZA VACCINATION: ICD-10-CM

## 2024-10-30 DIAGNOSIS — G25.81 RESTLESS LEG SYNDROME: ICD-10-CM

## 2024-10-30 DIAGNOSIS — I10 ESSENTIAL HYPERTENSION: ICD-10-CM

## 2024-10-30 DIAGNOSIS — M13.0 POLYARTHRITIS: ICD-10-CM

## 2024-10-30 DIAGNOSIS — E11.22 TYPE 2 DIABETES MELLITUS WITH STAGE 3B CHRONIC KIDNEY DISEASE, WITHOUT LONG-TERM CURRENT USE OF INSULIN: ICD-10-CM

## 2024-10-30 DIAGNOSIS — N18.32 STAGE 3B CHRONIC KIDNEY DISEASE: ICD-10-CM

## 2024-10-30 LAB
ALBUMIN SERPL-MCNC: 4.4 G/DL (ref 3.5–5.2)
ALBUMIN/GLOB SERPL: 1.5 G/DL
ALP SERPL-CCNC: 93 U/L (ref 39–117)
ALT SERPL W P-5'-P-CCNC: 47 U/L (ref 1–41)
ANION GAP SERPL CALCULATED.3IONS-SCNC: 13.1 MMOL/L (ref 5–15)
AST SERPL-CCNC: 48 U/L (ref 1–40)
BILIRUB SERPL-MCNC: 0.8 MG/DL (ref 0–1.2)
BUN SERPL-MCNC: 45 MG/DL (ref 8–23)
BUN/CREAT SERPL: 19.4 (ref 7–25)
CALCIUM SPEC-SCNC: 9.7 MG/DL (ref 8.6–10.5)
CHLORIDE SERPL-SCNC: 88 MMOL/L (ref 98–107)
CO2 SERPL-SCNC: 34.9 MMOL/L (ref 22–29)
CREAT SERPL-MCNC: 2.32 MG/DL (ref 0.76–1.27)
EGFRCR SERPLBLD CKD-EPI 2021: 30.8 ML/MIN/1.73
GLOBULIN UR ELPH-MCNC: 3 GM/DL
GLUCOSE SERPL-MCNC: 141 MG/DL (ref 65–99)
HBA1C MFR BLD: 6.6 % (ref 4.8–5.6)
POTASSIUM SERPL-SCNC: 3 MMOL/L (ref 3.5–5.2)
PROT SERPL-MCNC: 7.4 G/DL (ref 6–8.5)
SODIUM SERPL-SCNC: 136 MMOL/L (ref 136–145)
T4 FREE SERPL-MCNC: 1.38 NG/DL (ref 0.92–1.68)
TSH SERPL DL<=0.05 MIU/L-ACNC: 2.04 UIU/ML (ref 0.27–4.2)

## 2024-10-30 PROCEDURE — 82550 ASSAY OF CK (CPK): CPT | Performed by: STUDENT IN AN ORGANIZED HEALTH CARE EDUCATION/TRAINING PROGRAM

## 2024-10-30 PROCEDURE — 86140 C-REACTIVE PROTEIN: CPT | Performed by: STUDENT IN AN ORGANIZED HEALTH CARE EDUCATION/TRAINING PROGRAM

## 2024-10-30 PROCEDURE — 84439 ASSAY OF FREE THYROXINE: CPT | Performed by: STUDENT IN AN ORGANIZED HEALTH CARE EDUCATION/TRAINING PROGRAM

## 2024-10-30 PROCEDURE — 90656 IIV3 VACC NO PRSV 0.5 ML IM: CPT | Performed by: STUDENT IN AN ORGANIZED HEALTH CARE EDUCATION/TRAINING PROGRAM

## 2024-10-30 PROCEDURE — 80053 COMPREHEN METABOLIC PANEL: CPT | Performed by: STUDENT IN AN ORGANIZED HEALTH CARE EDUCATION/TRAINING PROGRAM

## 2024-10-30 PROCEDURE — 83036 HEMOGLOBIN GLYCOSYLATED A1C: CPT | Performed by: STUDENT IN AN ORGANIZED HEALTH CARE EDUCATION/TRAINING PROGRAM

## 2024-10-30 PROCEDURE — 86431 RHEUMATOID FACTOR QUANT: CPT | Performed by: STUDENT IN AN ORGANIZED HEALTH CARE EDUCATION/TRAINING PROGRAM

## 2024-10-30 PROCEDURE — 85652 RBC SED RATE AUTOMATED: CPT | Performed by: STUDENT IN AN ORGANIZED HEALTH CARE EDUCATION/TRAINING PROGRAM

## 2024-10-30 PROCEDURE — 84443 ASSAY THYROID STIM HORMONE: CPT | Performed by: STUDENT IN AN ORGANIZED HEALTH CARE EDUCATION/TRAINING PROGRAM

## 2024-10-30 PROCEDURE — 90471 IMMUNIZATION ADMIN: CPT | Performed by: STUDENT IN AN ORGANIZED HEALTH CARE EDUCATION/TRAINING PROGRAM

## 2024-10-30 PROCEDURE — 99396 PREV VISIT EST AGE 40-64: CPT | Performed by: STUDENT IN AN ORGANIZED HEALTH CARE EDUCATION/TRAINING PROGRAM

## 2024-10-30 PROCEDURE — 86200 CCP ANTIBODY: CPT | Performed by: STUDENT IN AN ORGANIZED HEALTH CARE EDUCATION/TRAINING PROGRAM

## 2024-10-30 PROCEDURE — 86038 ANTINUCLEAR ANTIBODIES: CPT | Performed by: STUDENT IN AN ORGANIZED HEALTH CARE EDUCATION/TRAINING PROGRAM

## 2024-10-30 RX ORDER — PRAMIPEXOLE DIHYDROCHLORIDE 0.75 MG/1
0.75 TABLET ORAL NIGHTLY
Qty: 30 TABLET | Refills: 2 | Status: SHIPPED | OUTPATIENT
Start: 2024-10-30

## 2024-10-30 NOTE — PROGRESS NOTES
"Chief Complaint  Annual Exam    Subjective        Calos Moseley presents to [facility]  History of Present Illness    Presents to clinic today for annual physical    Since last visit, he reports he has continued to lose weight and has felt overall significantly improved.  Unfortunately his wife is recently just diagnosed with multiple myeloma, he states this has been difficult as it is extremely unexpected.  He does report of a good support system and treatment has started    He otherwise is up-to-date on current cancer screenings, he is eating healthier and trying to exercise as able but is limited due to his back pain    Review of Systems   Constitutional:  Negative for fatigue, fever and unexpected weight change.   HENT:  Negative for congestion and rhinorrhea.    Eyes:  Negative for photophobia and visual disturbance.   Respiratory:  Negative for chest tightness and shortness of breath.    Cardiovascular:  Negative for chest pain and palpitations.   Gastrointestinal:  Negative for abdominal pain, diarrhea and vomiting.   Endocrine: Negative for polydipsia and polyuria.   Genitourinary:  Negative for difficulty urinating, frequency and hematuria.   Musculoskeletal:  Positive for arthralgias and back pain. Negative for gait problem and joint swelling.   Skin:  Negative for color change and rash.   Neurological:  Negative for seizures, syncope and headaches.   Psychiatric/Behavioral:  Positive for sleep disturbance. Negative for self-injury and suicidal ideas. The patient is not nervous/anxious.          Objective   Vital Signs:  /78   Pulse 68   Temp 97.6 °F (36.4 °C) (Temporal)   Ht 167.6 cm (66\")   Wt 105 kg (230 lb 9.6 oz)   SpO2 94%   BMI 37.22 kg/m²   Estimated body mass index is 37.22 kg/m² as calculated from the following:    Height as of this encounter: 167.6 cm (66\").    Weight as of this encounter: 105 kg (230 lb 9.6 oz).            Physical Exam  Vitals " reviewed.   Constitutional:       General: He is not in acute distress.     Appearance: Normal appearance. He is obese.   HENT:      Head: Normocephalic and atraumatic.      Right Ear: External ear normal.      Left Ear: External ear normal.      Nose: Nose normal. No rhinorrhea.   Eyes:      General:         Right eye: No discharge.         Left eye: No discharge.      Extraocular Movements: Extraocular movements intact.      Conjunctiva/sclera: Conjunctivae normal.   Cardiovascular:      Rate and Rhythm: Normal rate and regular rhythm.      Heart sounds: Normal heart sounds.   Pulmonary:      Effort: Pulmonary effort is normal. No respiratory distress.      Breath sounds: Normal breath sounds. No wheezing.   Abdominal:      General: Abdomen is flat. There is no distension.      Palpations: Abdomen is soft.      Tenderness: There is no abdominal tenderness. There is no guarding.      Hernia: A hernia is present.   Musculoskeletal:         General: No swelling or deformity. Normal range of motion.      Cervical back: Normal range of motion and neck supple.   Lymphadenopathy:      Cervical: No cervical adenopathy.   Skin:     General: Skin is warm and dry.   Neurological:      General: No focal deficit present.      Mental Status: He is alert and oriented to person, place, and time. Mental status is at baseline.   Psychiatric:         Mood and Affect: Mood normal.         Behavior: Behavior normal.        Result Review :                Assessment and Plan   Diagnoses and all orders for this visit:    1. Annual physical exam (Primary)    2. Type 2 diabetes mellitus with stage 3b chronic kidney disease, without long-term current use of insulin  -     Hemoglobin A1c  -     Comprehensive Metabolic Panel    3. Polyarthritis    4. Stage 3b chronic kidney disease    5. Essential hypertension    6. Hypothyroidism, unspecified type  -     TSH+Free T4    7. Restless leg syndrome  -     pramipexole (MIRAPEX) 0.75 MG tablet;  Take 1 tablet by mouth Every Night.  Dispense: 30 tablet; Refill: 2    8. Need for influenza vaccination  -     Fluzone >6mos      Immunizations: due for flu, shingrix. Will give flu today, can give shingrix at upcoming appt  Cancer screening: UTD  Metabolic Labs: UTD  Recommend maintaining a healthy lifestyle, rich in whole grains, fruits and vegetables. Limit high saturated fats and processed sugars. Maintain an active lifestyle with goal of 150 min of moderate aerobic exercise per week      Has been tolerating ozempic well and has been losing weight. Continues to monitor his diet with eating less and trying to reduce calories by 30%, additionally exercising as able given back pain    We will plan to follow up in 3mo to see how he is doing, continue f/u with nephrology for CKD. Labs today          Follow Up   Return in about 3 months (around 1/30/2025).  Patient was given instructions and counseling regarding his condition or for health maintenance advice. Please see specific information pulled into the AVS if appropriate.

## 2024-10-31 DIAGNOSIS — R74.8 ELEVATED CK: Primary | ICD-10-CM

## 2024-11-04 DIAGNOSIS — M13.0 POLYARTHRITIS: Primary | ICD-10-CM

## 2024-11-04 DIAGNOSIS — R74.8 ELEVATED CK: ICD-10-CM

## 2024-11-04 DIAGNOSIS — J84.9 ILD (INTERSTITIAL LUNG DISEASE): ICD-10-CM

## 2024-11-04 DIAGNOSIS — M33.20 POLYMYOSITIS: ICD-10-CM

## 2024-11-05 DIAGNOSIS — I82.4Y1 ACUTE DEEP VEIN THROMBOSIS (DVT) OF PROXIMAL VEIN OF RIGHT LOWER EXTREMITY: ICD-10-CM

## 2024-11-06 ENCOUNTER — OFFICE VISIT (OUTPATIENT)
Age: 64
End: 2024-11-06
Payer: COMMERCIAL

## 2024-11-06 VITALS
OXYGEN SATURATION: 98 % | HEART RATE: 101 BPM | WEIGHT: 233 LBS | DIASTOLIC BLOOD PRESSURE: 70 MMHG | HEIGHT: 66 IN | BODY MASS INDEX: 37.45 KG/M2 | TEMPERATURE: 98.1 F | SYSTOLIC BLOOD PRESSURE: 120 MMHG

## 2024-11-06 DIAGNOSIS — R91.8 INTERSTITIAL LUNG ABNORMALITY PRESENT ON IMAGING STUDY: ICD-10-CM

## 2024-11-06 DIAGNOSIS — J96.10 CHRONIC RESPIRATORY FAILURE, UNSPECIFIED WHETHER WITH HYPOXIA OR HYPERCAPNIA: ICD-10-CM

## 2024-11-06 DIAGNOSIS — M1A.0710 CHRONIC GOUT OF RIGHT FOOT, UNSPECIFIED CAUSE: Primary | ICD-10-CM

## 2024-11-06 DIAGNOSIS — R74.8 ELEVATED CREATINE KINASE LEVEL: ICD-10-CM

## 2024-11-06 DIAGNOSIS — M25.50 POLYARTHRALGIA: ICD-10-CM

## 2024-11-06 DIAGNOSIS — R76.8 POSITIVE ANA (ANTINUCLEAR ANTIBODY): ICD-10-CM

## 2024-11-06 RX ORDER — PREDNISONE 10 MG/1
10 TABLET ORAL DAILY
Qty: 14 TABLET | Refills: 0 | Status: SHIPPED | OUTPATIENT
Start: 2024-11-06 | End: 2024-11-21

## 2024-11-06 RX ORDER — TRIAMCINOLONE ACETONIDE 40 MG/ML
60 INJECTION, SUSPENSION INTRA-ARTICULAR; INTRAMUSCULAR ONCE
Status: SHIPPED | OUTPATIENT
Start: 2024-11-06

## 2024-11-06 NOTE — PROGRESS NOTES
2024    Patient Name: Calos Moseley : 1960 Medical Record: [id number]    PCP: Sam Garrison MD  Referring provider: Sam Garrison    REASON FOR CONSULTATION  Concerns for myositis and ILD    HISTORY OF PRESENT ILLNESS    Calos Moseley is a 64 y.o. male with past medical history of HLD, obesity, kidney stones, copd, anxiety and depression, hypothyroidism,ISIAH, RLS, CKD, ARF, hyperuricemia (12.5--->peak 16.7)); left HF who was referred by his primary care provider for concerns of possible myositis.    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  He has a history of gout since 2024. Shortly after his discectomy in May 2024, he was admitted for an acute right ankle pain and was found to have an elevated uric acid levels (15) and was then diagnosed with gout.   Record shows that he was prescribed colchicine and allopurinol but patient does not recall taking the colchicine.    He is here today because his labs showed persistent elevated creatinine kinase ranging from 500 to the 800s.  He was told to discontinue his statin in  due to this elevated lab levels.   Historically, he has had lung issues with diagnosis of right lung empyema in  that required chest tube placement for drainage and subsequent chest CTs later on have shown scarring and peripheral areas of fibrosis.  He was never called an ILD.  He has had an echocardiogram in the past that did not show elevated elevated pulm hypertension.  There is no documented PFTs on file.  He is currently on guideline directed medical treatment for left heart failure that is currently compensated.  His medications include diuretics (metolazone, hydrochlorothiazide, torsemide).  He has chronic kidney disease with an elevated but stable creatinine.  He had recent labs on 10/30/2024 which showed an elevated RAJAN (1 in 320) but  his subserologies (ROSEMARY panel-Enma 1, SSA SSB, dsDNA,  SCL 70, anticentromere antibodies, anti-Lopez and RNP antibodies) and negative.  His CRP (1.26) was elevated but the ESR is normal when adjusted for age.  Rheumatoid factor and CCP were within normal.  His lung/respiratory status has not progressed and is not requiring oxygen.    He denies any muscle weakness or myalgia, fatigue, rash or recent acute exacerbation of shortness of breath.  He does not have trouble climbing stairs, reaching out of the car, getting up from a sitting position.  He does not have any dysphagia and dysphonia. No Raynaud's phenomena.  He does not have Oral/genital ulcer, uveitis,    Patient does not have family history of any Rheumatological or autoimmune condition    Pertinent related risk or associated factor:medication, positive RAJAN, CKD, HLD, History of Pneumonia with empyema. Kidney stones     His main complaints today is his active right foot flare that begun a day ago.    Current Outpatient Medications:     acetaminophen (TYLENOL) 325 MG tablet, Take 2 tablets by mouth Every 12 (Twelve) Hours., Disp: , Rfl:     apixaban (Eliquis) 5 MG tablet tablet, Take 1 tablet by mouth 2 (Two) Times a Day., Disp: 60 tablet, Rfl: 2    buPROPion XL (WELLBUTRIN XL) 150 MG 24 hr tablet, Take 1 tablet by mouth Daily., Disp: 90 tablet, Rfl: 1    carvedilol (COREG) 25 MG tablet, Take 1 tablet by mouth Every 12 (Twelve) Hours., Disp: 180 tablet, Rfl: 0    DULoxetine (CYMBALTA) 20 MG capsule, Take 1 capsule by mouth Every 12 (Twelve) Hours., Disp: 180 capsule, Rfl: 0    empagliflozin (Jardiance) 10 MG tablet tablet, Take 1 tablet by mouth Every Morning., Disp: 90 tablet, Rfl: 3    Fluticasone-Umeclidin-Vilant (Trelegy Ellipta) 100-62.5-25 MCG/ACT inhaler, Inhale 1 puff Daily., Disp: 60 each, Rfl: 11    hydrALAZINE (APRESOLINE) 50 MG tablet, Take 2 tablets by mouth 3 times a day., Disp: 90 tablet, Rfl: 3    isosorbide mononitrate (IMDUR) 30 MG 24 hr tablet, Take 2 tablets by mouth Daily. Do not crush or  chew., Disp: 60 tablet, Rfl: 11    lamoTRIgine  MG tablet sustained-release 24 hour, Take 1 tablet by mouth daily with 50mg tablet, Disp: 90 tablet, Rfl: 0    levothyroxine (SYNTHROID, LEVOTHROID) 50 MCG tablet, Take 1 tablet by mouth daily in the morning on an empty stomach, Disp: 30 tablet, Rfl: 0    metOLazone (ZAROXOLYN) 2.5 MG tablet, Take 1 tablet by mouth 1 (One) Time Per Week., Disp: 4 tablet, Rfl: 2    potassium chloride (MICRO-K) 10 MEQ CR capsule, Take 1 capsule by mouth Daily., Disp: 30 capsule, Rfl: 2    potassium citrate (UROCIT-K) 10 MEQ (1080 MG) CR tablet, Take 2 tablets (20 mEq total) by mouth in the morning and 2 tablets (20 mEq total) in the evening. Take with meals. Do not crush, chew, or split.., Disp: 360 tablet, Rfl: 3    pramipexole (MIRAPEX) 0.75 MG tablet, Take 1 tablet by mouth Every Night., Disp: 30 tablet, Rfl: 2    Semaglutide,0.25 or 0.5MG/DOS, (OZEMPIC) 2 MG/3ML solution pen-injector, Inject 0.5 mg under the skin into the appropriate area as directed 1 (One) Time Per Week., Disp: 3 mL, Rfl: 0    tamsulosin (Flomax) 0.4 MG capsule 24 hr capsule, Take 1 capsule by mouth every night at bedtime., Disp: 90 capsule, Rfl: 3    torsemide (DEMADEX) 20 MG tablet, Take 4 tablets by mouth 4 (Four) Times a Day - morning, noon, evening, and before bedtime, Disp: 1440 tablet, Rfl: 3    vilazodone (Viibryd) 40 MG tablet tablet, Take 1 tablet by mouth Daily., Disp: 90 tablet, Rfl: 3    allopurinol (ZYLOPRIM) 100 MG tablet, Take 1 tablet by mouth Daily. (Patient not taking: Reported on 11/6/2024), Disp: 30 tablet, Rfl: 11    atorvastatin (LIPITOR) 20 MG tablet, Take 1 tablet by mouth every night at bedtime. (Patient not taking: Reported on 11/6/2024), Disp: 90 tablet, Rfl: 3    colchicine 0.6 MG tablet, Take 2 tablets today. Take one tablet tomorrow. Then start allopurinol (Patient not taking: Reported on 11/6/2024), Disp: 3 tablet, Rfl: 0    docusate sodium (COLACE) 250 MG capsule, Take 1  capsule by mouth 2 (Two) Times a Day. (Patient not taking: Reported on 11/6/2024), Disp: 60 capsule, Rfl: 2    gabapentin (NEURONTIN) 300 MG capsule, Take 1 capsule by mouth 3 (Three) Times a Day. (Patient not taking: Reported on 11/6/2024), Disp: 90 capsule, Rfl: 2    HYDROcodone-acetaminophen (NORCO) 5-325 MG per tablet, Take 1-2 tablets by mouth Every 6 (Six) Hours As Needed for Moderate Pain. (Patient not taking: Reported on 11/6/2024), Disp: 20 tablet, Rfl: 0    lamoTRIgine (LaMICtal) 100 MG tablet, Take 1 tablet by mouth Every 12 (Twelve) Hours. (Patient not taking: Reported on 11/6/2024), Disp: 180 tablet, Rfl: 0    lamoTRIgine ER 50 MG tablet sustained-release 24 hour, Take 1 tablet by mouth daily with 100mg tablet (Patient not taking: Reported on 11/6/2024), Disp: 90 tablet, Rfl: 0    levothyroxine (SYNTHROID, LEVOTHROID) 50 MCG tablet, Take 1 tablet by mouth daily in the morning on an empty stomach (Patient not taking: Reported on 11/6/2024), Disp: 30 tablet, Rfl: 0    lisinopril (PRINIVIL,ZESTRIL) 10 MG tablet, Take 1 tablet by mouth Daily. (Patient not taking: Reported on 11/6/2024), Disp: 30 tablet, Rfl: 0    pramipexole (MIRAPEX) 0.75 MG tablet, Take 1 tablet by mouth Every Night., Disp: 30 tablet, Rfl: 2    predniSONE (DELTASONE) 10 MG tablet, Take 1 tablet by mouth Daily for 14 days., Disp: 14 tablet, Rfl: 0    tamsulosin (FLOMAX) 0.4 MG capsule 24 hr capsule, Take 1 capsule by mouth Every Morning., Disp: 30 capsule, Rfl: 1    torsemide (DEMADEX) 20 MG tablet, Take 4 tablets by mouth 2 (Two) Times a Day - morning and evening, Disp: 720 tablet, Rfl: 3    vilazodone (VIIBRYD) 40 MG tablet tablet, Take 1 tablet by mouth daily with food, Disp: 90 tablet, Rfl: 0    Current Facility-Administered Medications:     triamcinolone acetonide (KENALOG-40) injection 60 mg, 60 mg, Intramuscular, Once, Zain Rodriguez MD     There are no discontinued medications.     Past Medical History:   Diagnosis Date     "Anesthesia complication     STATES HARD TO WAKE UP AFTER PREVIOUS KIDNEY STONE SURGERY AND STATES HE CODED    Bipolar affective     Chronic back pain     COPD (chronic obstructive pulmonary disease)     ? FROM MD NOTES    COVID     CRF (chronic renal failure), stage 4 (severe)     WILL CHECK LABS AGAIN    Elevated cholesterol     History of kidney stones     History of pneumonia 2023    \"TRAPPED LUNG\" PLUERAL EFFUSION. RIGHT LUNG. S/P DECORTIFICATION RIGHT LUNG    History of UTI     Hypertension     Hypothyroidism     Lumbar disc disease     Numbness and tingling     Restless leg syndrome     Shortness of breath     Sleep apnea     cpap  bring dos    Swelling of both lower extremities      Social History     Socioeconomic History    Marital status:    Tobacco Use    Smoking status: Former     Current packs/day: 0.00     Types: Cigarettes     Quit date: 1990     Years since quittin.8     Passive exposure: Past    Smokeless tobacco: Never    Tobacco comments:     SMOKED 2 PPD FOR 8 YEARS. QUIT 35 YEARS AGO   Vaping Use    Vaping status: Never Used   Substance and Sexual Activity    Alcohol use: Yes     Comment: ON OCC    Drug use: No    Sexual activity: Defer     Family History   Problem Relation Age of Onset    Lung cancer Mother     COPD Father     Emphysema Father     Diabetes Father     Malig Hyperthermia Neg Hx      Past Surgical History:   Procedure Laterality Date    ADENOIDECTOMY      APPENDECTOMY      BRONCHOSCOPY Right 2023    Procedure: BRONCHOSCOPY WITH ENDOBRONCHIAL ULTRASOUND with BAL of RLL and FNA;  Surgeon: Aniceto Bruno MD;  Location: [facility] ENDOSCOPY;  Service: Pulmonary;  Laterality: Right;  pre- RLL pneumonia, lymphadenopathy  post- same    COLONOSCOPY      COLONOSCOPY N/A 2023    Procedure: COLONOSCOPY into cecum with cold snare polypectomies;  Surgeon: Yoan Leonard MD;  Location: [facility] ENDOSCOPY;  Service: Gastroenterology;  Laterality: " "N/A;  polyps    CYSTOSCOPY WITH CLOT EVACUATION N/A 03/08/2022    Procedure: CYSTOSCOPY WITH CLOT EVACUATION;  Surgeon: Ihsan Lopez MD;  Location: [facility] MAIN OR;  Service: Urology;  Laterality: N/A;    CYSTOSCOPY, URETEROSCOPY, RETROGRADE PYELOGRAM, STENT INSERTION Left 03/07/2022    Procedure: CYSTOSCOPY URETEROSCOPY LASER LITHOTRIPSY and stent exchange;  Surgeon: Deuce Leija MD;  Location: [facility] MAIN OR;  Service: Urology;  Laterality: Left;    EPIDURAL BLOCK      LUMBAR DISCECTOMY Left 5/13/2024    Procedure: LEFT LUMBAR FIVE TO SACRAL ONE FORAMINOTOMY, POSSIBLE DISCECTOMY MICROSCOPIC;  Surgeon: Christos Spangler MD;  Location: [facility] OR;  Service: Neurosurgery;  Laterality: Left;    NEPHROLITHOTRIPSY PERCUTANEOUS Left 02/21/2022    Procedure: NEPHROSTLITHOTOMY PERCUTANEOUS;  Surgeon: Deuce Leija MD;  Location: [facility] OR;  Service: Urology;  Laterality: Left;    NEPHROLITHOTRIPSY PERCUTANEOUS Left 02/23/2022    Procedure: LEFT FLEXIBLE NEPHROSTOMY WITH STONE EXTRACTION, LEFT NEPHROSTOMY, TUBE REMOVAL, BILATERAL URETERAL STENT PLACEMENT;  Surgeon: Deuce Leija MD;  Location: [facility] OR;  Service: Urology;  Laterality: Left;    NEPHROLITHOTRIPSY PERCUTANEOUS Right 03/07/2022    Procedure: RIGHT PERCUTANEOUS NEPHROLITHOTOMY W ACCESS;  Surgeon: Deuce Leija MD;  Location: [facility];  Service: Urology;  Laterality: Right;    THORACOSCOPY WITH DECORTICATION Right 05/20/2023    Procedure: THORACOSCOPY VIDEO ASSISTED WITH DECORTICATION WITH DAVINCI ROBOT;  Surgeon: Jose Rice MD;  Location: [facility];  Service: Robotics - DaVinci;  Laterality: Right;    TONSILLECTOMY       ALLERGIES  No Known Allergies    PHYSICAL EXAM  Vitals:    11/06/24 1359   BP: 120/70   BP Location: Left arm   Patient Position: Sitting   Cuff Size: Adult   Pulse: 101   Temp: 98.1 °F (36.7 °C)   TempSrc: Oral   SpO2: 98%   Weight: 106 kg (233 lb)   Height: 167.6 cm (65.98\")     GENERAL: " The patient appeared to be in distress whenever he moves his right LE. AXOX3.    SKIN: Bilateral upper extremity pain areas of easy bruising.  No other lesions otherwise.  He has scabs on his anterior trunk and posterior trunk reminiscent of cystic acne.  His face appears flushed.    HEENT: Head was atraumatic and normocephalic. The patient was anicteric. Pupils were equally reactive to light. Ears: There were no lesions. Nose: No lesions were noted. Throat: There was no thrush, no exudate, no erythema.  NECK: Supple. There was no JVD. No bruit was heard over the carotids.  HEART: S1 and S2, regular, no murmurs, rubs, or gallops  LUNGS: Air entry was good, slightly reduced air entry on the right compared to left but no obvious wheezes, rhonchi or crackles.     ABDOMEN: Obese, soft and nontender. Bowel sounds were present. No organomegaly.  EXT: No clubbing, cyanosis, edema.    NEUROLOGICAL: There was no focal deficit. Cranial nerves II through XII were intact.    Muscle strength  Neck flexor/extensor: 5/5  Shoulder abductors: 5/5; Biceps: 5/5; Triceps: 5/5  Finger flexors: 5/5;  Interosseous muscles: 5/5  Hip flexors: 5/5;    Knee extensors: 5/5 ; Knee flexors: 5/5    Dorsiflexors: 5/5;  Plantarflexors: 5/5      PSYCHIATRIC: no homicidal/ suicidal ideations.    Musculoskeletal:  Gait: Ambulates without assistance, normal gait  Spine: Full ROM  Upper Extremities  Shoulder: Normal. Full ROM, no crepitus  Elbow: Normal. Full ROM, no synovitis, no deformity  Wrist: Normal. Full ROM, no synovitis, no deformity, no ulnar deviation  Fingers: Normal. No sclerodactyly, no active raynaud's, no ulcers  MCPs: Normal. no synovitis, no deformity  PIPs: Normal. no synovitis, no deformity  DIPs: Normal. no synovitis, no deformity  Nails: Normal. No pitting, no telangiectasias.    Lower Extremities  Hip: Normal. Full ROM, no tenderness to palpation  Knee: Normal. Full ROM, no erythema/swelling/laxity/crepitus.  Ankle: Swollen right  ankle with involvement of his midfoot.  It is warm and erythematous.  But range of motion is possible and joint.  Left ankle and foot is normal  MTPs: Normal. No swelling or erythema.  No MTP tenderness bilaterally      LABS AND IMAGING ll laboratory data was reviewed and relevant values are noted as below.    Autoimmune Labs    Pertinent labs:  10/30/24:   RAJAN- 1:320, homogenous pattern , dsdna- negative; ROSEMARY panel neg   RF, CCP- Neg ; esr 21 (wnl), CRP (1.26), CK- 866 (533-->842-1 year ago)  TSH wnl; HBA1C 6.60;   CMP: bun/cr-45/2.32 -baseline; low k ; LFTs- alt-47, ast-48, alp-wnl  Uric acid (12.3 -->peak 16)  Protein/Cr ratio- 92 (in 6/14/24)     Historical (2023): TSH wnl; BNP- 892     Pertinent imaging:  MRI LS spine - see report  HRCT- 5/10/24:  FINDINGS:  1. There is band-like scarring in the subpleural region of the posterior  right lung base with no associated bronchiolectasis.  At the subpleural region of the left lower lobe, there are small regions  of subpleural groundglass density, but no subpleural reticulation or  bronchiolectasis.     There is no honeycombing. There is no significant air trapping. There is  no significant emphysematous change.     2. There are no new pulmonary opacities and there are no pleural or  pericardial effusions. There is no lymphadenopathy within the chest.     3. There are many coronary artery calcifications.     4. There is at least a moderate degree of fatty infiltration of the  liver, stable.     CT chest - 4/19/24  FINDINGS:  1. There are subpleural groundglass opacities at the lower lobes and to  a lesser degree at the lingula and right middle lobe. These may  represent atypical infectious infiltrates. Please correlate clinically  and follow-up.     2. There is mild pleural parenchymal thickening at the posterior aspect  of the right lower lobe and mild scarring at the anterior lateral aspect  of the right lower lobe.     3. There are no pleural or pericardial  effusions. There is no  lymphadenopathy within the chest. Fairly extensive coronary artery  calcifications are noted. There is no new abnormality at the visualized  upper abdomen.     Echo:7/1/24: see full report    Left ventricular systolic function is normal. Calculated left ventricular EF = 59.7%    Left ventricular diastolic function is consistent with (grade I) impaired relaxation.    No significant valvular abnormalities  RVSP -17    ASSESSMENT AND PLAN    Calos Moseley is a 64 y.o. male who is being seen for evaluation for concerns of myositis and possible ILD in the setting of positive RAJAN, elevated creatinine kinase in the 800s and history of pneumonia with empyema.  Subsequent chest imaging mentioned stable interstitial lung abnormality.  There is no documented PFT in his records and his echo in July 2024 showed no evidence of pulmonary hypertension.   His evaluation today is unrevealing for symptoms of myalgia, and muscle weakness, recent rashes, or skin changes in the hands.  He has chronic low back pain and occasional intermittent joint pains but denies Raynaud's, hand swelling, prolonged a.m. stiffness.  He claims stable respiratory status and has not had any trouble climbing stairs, getting out of seated position or reaching above his head.    A good portion of today's encounter was also spent evaluating his acute gout flare.He limped into the clinic and on exam he had swollen, erythematous painful right ankle joint.  Patient declined an offer for bedside arthrocentesis and intra-articular steroids.  He prefers an intramuscular steroid injection.      Regarding his gout: This is currently not well-controlled.  His last uric acid was 12, he has had recurrent recent flares, and while he has been on the 100 mg of allopurinol daily, he states he is never taken colchicine.  He was previously on prednisone but his primary care discontinued this due to his uncontrolled and persistently elevated blood  sugar.    Regarding evaluation for concerns of myositis and possible ILD, there is no obvious muscle weakness, Raynaud's, despite the blood persistent elevated CK.  He is no longer on statins for the last 4 months.  We are not particularly certain about the myositis diagnosis at this point, but this could not be ruled out as he can still have amyopathic myositis.    Regarding his interstitial lung changes on imaging and the history of pneumonia, we wonder about a diagnosis of Interstitial Pneumonia With Autoimmune features (IPAF).  He will need to meet 2 out of 3 of clinical, serologic and morphologic  criteria to have this diagnosis.       Meanwhile we will obtain additional blood work as shown below.  We will discuss about obtaining imaging, PFTs and a pulmonary consult during our next visit    He received a one-time intramuscular steroid injection ,60 mg triamcinolone, we will have him continue on 10 mg prednisone daily and plan to discontinue this at his follow-up visit.  He has been counseled about monitoring his blood sugar and taking appropriate step to maintain good control while on prednisone.    Will have him increase his allopurinol dose to 150 mg daily for 2 weeks and plan to increase to 200 mg after if no untoward reaction  We have not ruled out starting him on low-dose and alternate day dosing of colchicine, while being mindful of his poor renal function.  If he can tolerate this, it would allow us to wean off the steroids because this might also have greater effect on his blood sugar and other comorbidities.    We plan to see patient again in 2 weeks to continue this evaluation.      Diagnoses and all orders for this visit:    1. Chronic gout of right foot, unspecified cause (Primary)  -     Lactate Dehydrogenase  -     Aldolase  -     Comprehensive Metabolic Panel  -     Protein / Creatinine Ratio, Urine - Urine, Clean Catch  -     Urinalysis With Microscopic - Urine, Clean Catch  -     Anti-HMGCR Ab  (RDL)  -     C3 Complement  -     C4 Complement  -     ANCA Panel    2. Elevated creatine kinase level  -     Lactate Dehydrogenase  -     Aldolase  -     Comprehensive Metabolic Panel  -     Protein / Creatinine Ratio, Urine - Urine, Clean Catch  -     Urinalysis With Microscopic - Urine, Clean Catch  -     Anti-HMGCR Ab (RDL)  -     C3 Complement  -     C4 Complement  -     ANCA Panel  -     Myositis Panel III Plus    3. Positive RAJAN (antinuclear antibody)  -     Lactate Dehydrogenase  -     Aldolase  -     Comprehensive Metabolic Panel  -     Protein / Creatinine Ratio, Urine - Urine, Clean Catch  -     Urinalysis With Microscopic - Urine, Clean Catch  -     Anti-HMGCR Ab (RDL)  -     C3 Complement  -     C4 Complement  -     ANCA Panel  -     Myositis Panel III Plus    4. Interstitial lung abnormality present on imaging study        -Will order additional serologic labs.  Will plan to evaluate further to imaging and possible pulmonology referral  -     Myositis Panel III Plus    5. Polyarthralgia  -He mainly has low back pain and intermittent pain in his joint.  He does not have any myalgia.     -Will obtain myositis Panel III to evaluate his elevated CK    6. Chronic respiratory failure, unspecified whether with hypoxia or hypercapnia  This appears to be stable.  No intervention on this visit.  Will plan to do a PFTs later.  Other orders  -     predniSONE (DELTASONE) 10 MG tablet; Take 1 tablet by mouth Daily for 14 days.  Dispense: 14 tablet; Refill: 0  -     triamcinolone acetonide (KENALOG-40) injection 60 mg         I spent 85 minutes caring for Calos on this date of service. This time includes time spent by me in the following activities:preparing for the visit, reviewing tests, obtaining and/or reviewing a separately obtained history, performing a medically appropriate examination and/or evaluation , counseling and educating the patient/family/caregiver, ordering medications, tests, or procedures,  documenting information in the medical record, and independently interpreting results and communicating that information with the patient/family/caregiver

## 2024-11-07 NOTE — PATIENT INSTRUCTIONS
It was very nice to see you today.     You are referred by your primary care provider because of elevated muscle enzymes and concern for autoimmune related muscle disease  You presented with right foot pain consistent with a gout flare.    We initiated management for your acute gout flare, gave an intramuscular steroid injection and prescribed oral 10 mg daily to help manage acute gout  You are advised to increase your allopurinol dose from 100 mg to 150 mg daily.  You will be on the latter dose for 2 weeks and then increase to 200 mg daily.  Will plan to taper you off the steroids during the next visit in 2 weeks.     Your evaluation shows you have good strength, you do not have clinical exacerbation of your respiratory disease    We ordered some blood work which you plan to do soon and will continue to evaluate you for your elevated creatinine kinase    Here are the best ways to reach my office: please send messages through the Patient Portal (preferred) or call our office at [phone]      Dr. Jennifer Weeks

## 2024-11-08 ENCOUNTER — PATIENT ROUNDING (BHMG ONLY) (OUTPATIENT)
Age: 64
End: 2024-11-08
Payer: COMMERCIAL

## 2024-11-08 DIAGNOSIS — G25.81 RESTLESS LEG SYNDROME: ICD-10-CM

## 2024-11-08 RX ORDER — PRAMIPEXOLE DIHYDROCHLORIDE 0.75 MG/1
0.75 TABLET ORAL NIGHTLY
Qty: 30 TABLET | Refills: 2 | Status: SHIPPED | OUTPATIENT
Start: 2024-11-08

## 2024-11-08 NOTE — PROGRESS NOTES
November 8, 2024    Hello, may I speak with Calos Moseley?    My name is Jennie Crane     I am  with [id number]  [facility]  [address]  [facility] [address] [phone].    Before we get started may I verify your date of birth? 1960    I am calling to officially welcome you to our practice and ask about your recent visit. Is this a good time to talk? yes    Tell me about your visit with us. What things went well?  it was great! Doctor is very thorough, he gave me a shot for my gout, and I am feeling so much better. Looking forward to working with him.       We're always looking for ways to make our patients' experiences even better. Do you have recommendations on ways we may improve?  no    Overall were you satisfied with your first visit to our practice? yes       I appreciate you taking the time to speak with me today. Is there anything else I can do for you? no      Thank you, and have a great day.

## 2024-11-08 NOTE — TELEPHONE ENCOUNTER
"LOST MEDICATION        Caller: Calos Moseley \"BETHANIE\"    Relationship: Self    Best call back number: [phone]     What was the call regarding: PATIENT HAS LOST THE MEDICATION AND NEEDING ANOTHER REFILL. HE HAS LOOKED FOR 3 DAYS. PLEASE ADVISE       pramipexole (MIRAPEX) 0.75 MG tablet     LOST MEDICATION  "

## 2024-11-11 DIAGNOSIS — E11.22 TYPE 2 DIABETES MELLITUS WITH STAGE 3B CHRONIC KIDNEY DISEASE, WITHOUT LONG-TERM CURRENT USE OF INSULIN: ICD-10-CM

## 2024-11-11 DIAGNOSIS — N18.32 TYPE 2 DIABETES MELLITUS WITH STAGE 3B CHRONIC KIDNEY DISEASE, WITHOUT LONG-TERM CURRENT USE OF INSULIN: ICD-10-CM

## 2024-11-11 RX ORDER — SEMAGLUTIDE 0.68 MG/ML
0.5 INJECTION, SOLUTION SUBCUTANEOUS WEEKLY
Qty: 3 ML | Refills: 0 | Status: SHIPPED | OUTPATIENT
Start: 2024-11-11

## 2024-11-20 ENCOUNTER — TELEPHONE (OUTPATIENT)
Age: 64
End: 2024-11-20
Payer: COMMERCIAL

## 2024-11-20 NOTE — TELEPHONE ENCOUNTER
Dr Rodriguez ordered labs for patient on 11/6/2024 to be performed prior to his follow up visit. Unfortunately in pur system it does not show that labs have been drawn. I called the patient to inquire if he had them drawn outside of [facility] so I can get them faxed to us prior to his visit. I left a message for him to return my call.

## 2024-11-21 ENCOUNTER — LAB (OUTPATIENT)
Facility: HOSPITAL | Age: 64
End: 2024-11-21
Payer: COMMERCIAL

## 2024-11-21 ENCOUNTER — TELEPHONE (OUTPATIENT)
Age: 64
End: 2024-11-21
Payer: COMMERCIAL

## 2024-11-21 DIAGNOSIS — M1A.0710 CHRONIC GOUT OF RIGHT FOOT, UNSPECIFIED CAUSE: Primary | ICD-10-CM

## 2024-11-21 LAB
ALBUMIN SERPL-MCNC: 3.4 G/DL (ref 3.5–5.2)
ALBUMIN/GLOB SERPL: 1.1 G/DL
ALP SERPL-CCNC: 77 U/L (ref 39–117)
ALT SERPL W P-5'-P-CCNC: 65 U/L (ref 1–41)
ANION GAP SERPL CALCULATED.3IONS-SCNC: 11 MMOL/L (ref 5–15)
AST SERPL-CCNC: 42 U/L (ref 1–40)
BILIRUB SERPL-MCNC: 0.6 MG/DL (ref 0–1.2)
BUN SERPL-MCNC: 32 MG/DL (ref 8–23)
BUN/CREAT SERPL: 18 (ref 7–25)
C3 SERPL-MCNC: 127 MG/DL (ref 82–167)
C4 SERPL-MCNC: 21 MG/DL (ref 14–44)
CALCIUM SPEC-SCNC: 9.4 MG/DL (ref 8.6–10.5)
CHLORIDE SERPL-SCNC: 99 MMOL/L (ref 98–107)
CO2 SERPL-SCNC: 29 MMOL/L (ref 22–29)
CREAT SERPL-MCNC: 1.78 MG/DL (ref 0.76–1.27)
EGFRCR SERPLBLD CKD-EPI 2021: 42.3 ML/MIN/1.73
GLOBULIN UR ELPH-MCNC: 3.2 GM/DL
GLUCOSE SERPL-MCNC: 154 MG/DL (ref 65–99)
LDH SERPL-CCNC: 333 U/L (ref 135–225)
POTASSIUM SERPL-SCNC: 3.3 MMOL/L (ref 3.5–5.2)
PROT SERPL-MCNC: 6.6 G/DL (ref 6–8.5)
SODIUM SERPL-SCNC: 139 MMOL/L (ref 136–145)
WHOLE BLOOD HOLD SPECIMEN: NORMAL

## 2024-11-21 PROCEDURE — 83615 LACTATE (LD) (LDH) ENZYME: CPT | Performed by: STUDENT IN AN ORGANIZED HEALTH CARE EDUCATION/TRAINING PROGRAM

## 2024-11-21 PROCEDURE — 83516 IMMUNOASSAY NONANTIBODY: CPT | Performed by: STUDENT IN AN ORGANIZED HEALTH CARE EDUCATION/TRAINING PROGRAM

## 2024-11-21 PROCEDURE — 82085 ASSAY OF ALDOLASE: CPT | Performed by: STUDENT IN AN ORGANIZED HEALTH CARE EDUCATION/TRAINING PROGRAM

## 2024-11-21 PROCEDURE — 82550 ASSAY OF CK (CPK): CPT | Performed by: STUDENT IN AN ORGANIZED HEALTH CARE EDUCATION/TRAINING PROGRAM

## 2024-11-21 PROCEDURE — 86235 NUCLEAR ANTIGEN ANTIBODY: CPT | Performed by: STUDENT IN AN ORGANIZED HEALTH CARE EDUCATION/TRAINING PROGRAM

## 2024-11-21 PROCEDURE — 86160 COMPLEMENT ANTIGEN: CPT | Performed by: STUDENT IN AN ORGANIZED HEALTH CARE EDUCATION/TRAINING PROGRAM

## 2024-11-21 PROCEDURE — 86037 ANCA TITER EACH ANTIBODY: CPT | Performed by: STUDENT IN AN ORGANIZED HEALTH CARE EDUCATION/TRAINING PROGRAM

## 2024-11-21 PROCEDURE — 80053 COMPREHEN METABOLIC PANEL: CPT | Performed by: STUDENT IN AN ORGANIZED HEALTH CARE EDUCATION/TRAINING PROGRAM

## 2024-11-21 NOTE — TELEPHONE ENCOUNTER
I left a message regarding lab work ordered on 11/6/2024 that has not been completed for his upcoming appointment on 11/25/2024. I stated that if he had labs drawn outside of [facility] [place], [facility] to please call and let me know when and where so I can retrieve the results. I also stated for him to call me if we need to reschedule his follow up to give him time to have his labs drawn.

## 2024-11-22 ENCOUNTER — LAB (OUTPATIENT)
Facility: HOSPITAL | Age: 64
End: 2024-11-22
Payer: COMMERCIAL

## 2024-11-22 LAB
ALDOLASE SERPL-CCNC: 9.7 U/L (ref 3.3–10.3)
BACTERIA UR QL AUTO: NORMAL /HPF
BILIRUB UR QL STRIP: NEGATIVE
CLARITY UR: CLEAR
COLOR UR: YELLOW
CREAT UR-MCNC: 100.3 MG/DL
GLUCOSE UR STRIP-MCNC: ABNORMAL MG/DL
HGB UR QL STRIP.AUTO: NEGATIVE
HYALINE CASTS UR QL AUTO: NORMAL /LPF
KETONES UR QL STRIP: NEGATIVE
LEUKOCYTE ESTERASE UR QL STRIP.AUTO: NEGATIVE
NITRITE UR QL STRIP: NEGATIVE
PH UR STRIP.AUTO: 7 [PH] (ref 5–8)
PROT ?TM UR-MCNC: 34.8 MG/DL
PROT UR QL STRIP: ABNORMAL
PROT/CREAT UR: 347 MG/G CREA (ref 0–200)
RBC # UR STRIP: NORMAL /HPF
REF LAB TEST METHOD: NORMAL
SP GR UR STRIP: 1.02 (ref 1–1.03)
SQUAMOUS #/AREA URNS HPF: NORMAL /HPF
UROBILINOGEN UR QL STRIP: ABNORMAL
WBC # UR STRIP: NORMAL /HPF

## 2024-11-22 PROCEDURE — 82570 ASSAY OF URINE CREATININE: CPT | Performed by: STUDENT IN AN ORGANIZED HEALTH CARE EDUCATION/TRAINING PROGRAM

## 2024-11-22 PROCEDURE — 84156 ASSAY OF PROTEIN URINE: CPT | Performed by: STUDENT IN AN ORGANIZED HEALTH CARE EDUCATION/TRAINING PROGRAM

## 2024-11-25 ENCOUNTER — OFFICE VISIT (OUTPATIENT)
Age: 64
End: 2024-11-25
Payer: COMMERCIAL

## 2024-11-25 VITALS
BODY MASS INDEX: 37.16 KG/M2 | TEMPERATURE: 98.1 F | SYSTOLIC BLOOD PRESSURE: 130 MMHG | HEIGHT: 66 IN | WEIGHT: 231.2 LBS | OXYGEN SATURATION: 98 % | DIASTOLIC BLOOD PRESSURE: 72 MMHG | HEART RATE: 85 BPM

## 2024-11-25 DIAGNOSIS — N18.31 STAGE 3A CHRONIC KIDNEY DISEASE: ICD-10-CM

## 2024-11-25 DIAGNOSIS — R91.8 INTERSTITIAL LUNG ABNORMALITY PRESENT ON IMAGING STUDY: ICD-10-CM

## 2024-11-25 DIAGNOSIS — M1A.0710 CHRONIC GOUT OF RIGHT FOOT, UNSPECIFIED CAUSE: Primary | ICD-10-CM

## 2024-11-25 DIAGNOSIS — R74.8 ELEVATED CREATINE KINASE LEVEL: ICD-10-CM

## 2024-11-25 DIAGNOSIS — R76.8 POSITIVE ANA (ANTINUCLEAR ANTIBODY): ICD-10-CM

## 2024-11-25 LAB
C-ANCA TITR SER IF: NORMAL TITER
MYELOPEROXIDASE AB SER IA-ACNC: 0.3 UNITS (ref 0–0.9)
P-ANCA ATYPICAL TITR SER IF: NORMAL TITER
P-ANCA TITR SER IF: NORMAL TITER
PROTEINASE3 AB SER IA-ACNC: <0.2 UNITS (ref 0–0.9)

## 2024-11-25 PROCEDURE — 99214 OFFICE O/P EST MOD 30 MIN: CPT | Performed by: STUDENT IN AN ORGANIZED HEALTH CARE EDUCATION/TRAINING PROGRAM

## 2024-11-25 RX ORDER — PREDNISONE 10 MG/1
10 TABLET ORAL DAILY
Qty: 14 TABLET | Refills: 1 | Status: SHIPPED | OUTPATIENT
Start: 2024-11-25

## 2024-11-25 NOTE — PROGRESS NOTES
2024  Patient Name: Calos Moseley : 1960 Medical Record: [id number]    HISTORY OF PRESENT ILLNESS  Calos Moseley returns for follow-up today.  I have reviewed his treatment regimen and recent labs.  He is currently tolerating his medication.  Since last being seen, pt feels much better with regards to his gout and denies any flare in the interval.  He received a one-time intramuscular shot of triamcinolone 60 mg during last visit and continued on 10 mg daily p.o. prednisone.  Interval event:    Denies any ER visit.  He denies shortness of breath, muscle weakness, fevers or cough.  He also increased his allopurinol to 200 mg per instructions.    Interval labs shows myositis panel still pending.  Protein/creatinine ratio is elevated at 347, CK elevation of 648 (decreased from 4 weeks ago), LDH slightly elevated.  His ANCA panel, complements are within normal.  Creatinine improved to 1.78 (from 2.32), transaminases still slightly elevated.      Rheumatological history:  Gout: Currently on allopurinol 200 mg daily to date, with low-dose prednisone 10 mg daily.  He has positive RAJAN (1: 160), normal complements, negative ANCA, elevated CK, LDH, transaminases.  Myositis panel pending.  No evidence of weakness.  He has abnormal chest CT with mild interstitial changes and scarring from prior empyema.        PHYSICAL EXAM  Gen: Well-developed, well-nourished adult in no apparent distress. Pleasant and cooperative. Alert and oriented.   HEENT: EOMI, MMM  Chest: Normal work of breathing.  Extremities: Warm, 2+ distal pulses, no cyanosis, clubbing, or edema.  Neuro: Good comprehension/cognition. Gait normal.  Skin: No alopecia, nail change (including no nail pitting), rashes, bruising, petechiae, sclerodactyly, digital pits, telangiectasias, tophi, appreciable calcinosis, or nodules.    Comprehensive Musculoskeletal Examination:  - Shoulders, elbows, wrists, hands/fingers, knees,   -Right ankles and foot with no  current swelling, erythema or warmth-resolved from previous gout flare.  Left is within normal.     - Lumbosacral spine and hips without limited ROM. Negative NHAN.       LABS AND IMAGING    Lab on 11/21/2024   Component Date Value Ref Range Status   Results Encounter on 11/07/2024   Component Date Value Ref Range Status    Creatine Kinase 11/21/2024 684 (H)  20 - 200 U/L Final   Office Visit on 11/06/2024   Component Date Value Ref Range Status    LDH 11/21/2024 333 (H)  135 - 225 U/L Final    Aldolase 11/21/2024 9.7  3.3 - 10.3 U/L Final    Glucose 11/21/2024 154 (H)  65 - 99 mg/dL Final    BUN 11/21/2024 32 (H)  8 - 23 mg/dL Final    Creatinine 11/21/2024 1.78 (H)  0.76 - 1.27 mg/dL Final    Albumin 11/21/2024 3.4 (L)  3.5 - 5.2 g/dL Final    ALT (SGPT) 11/21/2024 65 (H)  1 - 41 U/L Final    AST (SGOT) 11/21/2024 42 (H)  1 - 40 U/L Final    Alkaline Phosphatase 11/21/2024 77  39 - 117 U/L Final    Creatinine, Urine 11/22/2024 100.3  mg/dL Final    Total Protein, Urine 11/22/2024 34.8  mg/dL Final    C3 Complement 11/21/2024 127.0  82.0 - 167.0 mg/dl Final    C4 Complement 11/21/2024 21.0  14.0 - 44.0 mg/dl Final   Office Visit on 10/30/2024   Component Date Value Ref Range Status    Hemoglobin A1C 10/30/2024 6.60 (H)  4.80 - 5.60 % Final    Albumin 10/30/2024 4.4  3.5 - 5.2 g/dL Final    ALT (SGPT) 10/30/2024 47 (H)  1 - 41 U/L Final    AST (SGOT) 10/30/2024 48 (H)  1 - 40 U/L Final    TSH 10/30/2024 2.040  0.270 - 4.200 uIU/mL Final    Free T4 10/30/2024 1.38  0.92 - 1.68 ng/dL Final   Results Encounter on 10/14/2024   Component Date Value Ref Range Status    Rheumatoid Factor Quantitative 10/30/2024 <10.0  0.0 - 14.0 IU/mL Final    CCP Antibodies IgG/IgA 10/30/2024 6  0 - 19 units Final    Sed Rate 10/30/2024 21 (H)  0 - 20 mm/hr Final    C-Reactive Protein 10/30/2024 1.26 (H)  0.00 - 0.50 mg/dL Final    Creatine Kinase 10/30/2024 866 (H)  20 - 200 U/L Final    RAJAN 10/30/2024 Positive (A)   Final    Please  note 10/30/2024 Comment   Final    Homogeneous Pattern 10/30/2024 1:320 (H)   Final    Note: (Reference) 10/30/2024 Comment   Final    Anti-DNA (DS) Ab Qn 10/30/2024 2  0 - 9 IU/mL Final    RNP Antibodies 10/30/2024 <0.2  0.0 - 0.9 AI Final    Lopez Antibodies 10/30/2024 <0.2  0.0 - 0.9 AI Final    Antiscleroderma-70 Antibodies 10/30/2024 <0.2  0.0 - 0.9 AI Final    Sjogren's Anti-SS-A 10/30/2024 <0.2  0.0 - 0.9 AI Final    Sjogren's Anti-SS-B 10/30/2024 <0.2  0.0 - 0.9 AI Final    Antichromatin Antibodies 10/30/2024 <0.2  0.0 - 0.9 AI Final    SOFIA-1 IgG 10/30/2024 <0.2  0.0 - 0.9 AI Final    Anti-Centromere B Antibodies 10/30/2024 <0.2  0.0 - 0.9 AI Final    See below: 10/30/2024 Comment   Final   Results Encounter on 08/12/2024   Component Date Value Ref Range Status    Uric Acid 08/14/2024 12.5 (H)  3.4 - 7.0 mg/dL Final   Results Encounter on 07/11/2024   Component Date Value Ref Range Status    BUN 07/11/2024 54 (H)  8 - 23 mg/dL Final    Creatinine 07/11/2024 2.55 (H)  0.76 - 1.27 mg/dL Final    eGFR 07/11/2024 27.5 (L)  >60.0 mL/min/1.73 Final    Uric Acid 07/11/2024 12.3 (H)  3.4 - 7.0 mg/dL Final    Creatine Kinase 07/11/2024 533 (H)  20 - 200 U/L Final   Results Encounter on 07/01/2024   Component Date Value Ref Range Status    Hemoglobin A1C 07/02/2024 7.50 (H)  4.80 - 5.60 % Final    Total Cholesterol 07/02/2024 159  0 - 200 mg/dL Final    Triglycerides 07/02/2024 136  0 - 150 mg/dL Final    HDL Cholesterol 07/02/2024 58  40 - 60 mg/dL Final    LDL Cholesterol  07/02/2024 77  0 - 100 mg/dL Final    VLDL Cholesterol 07/02/2024 24  5 - 40 mg/dL Final    Chol/HDL Ratio 07/02/2024 2.74   Final   Hospital Outpatient Visit on 07/01/2024   Component Date Value Ref Range Status    EF(MOD-bp) 07/01/2024 59.7  % Final    RVSP(TR) 07/01/2024 17  mmHg Final    RAP systole 07/01/2024 3  mmHg Final    Ascending aorta 07/01/2024 3.1  cm Final   There may be more visits with results that are not included.    ]    ASSESSMENT AND PLAN  Calos Moseley is a 64 y.o. male who is being seen for follow-up gout with predominant right ankle and foot involvement, and ongoing evaluation for elevated for concerns of myositis and possible ILD in the setting of positive RAJAN, elevated creatinine kinase and history of pneumonia with empyema.         Regarding his gout: This is much improved with no interval flare.  Patient is currently managed with allopurinol 200 mg daily and low-dose prednisone 10 mg daily. His last uric acid was 12.  Will plan to increase his allopurinol to 250 mg daily.  He will continue on low-dose prednisone 10 mg daily with close blood glucose monitoring.     Regarding evaluation for concerns of myositis and possible ILD, there is no obvious muscle weakness, Raynaud's, despite the blood persistent elevated CK.  There has not been any interval changes and patient remains active  Myositis panel is pending and though he is CK, LDH, liver enzymes remains elevated he had a negative aldolase.    Regarding his interstitial lung changes on imaging and the history of pneumonia, no clinical progression have been noticed in the interval.  I will obtain a pulmonary function test to assess his lung volumes and DLCO.     We plan to see patient again in 2 weeks to continue this evaluation.      Diagnoses and all orders for this visit:    1. Chronic gout of right foot, unspecified cause (Primary)  -     Uric Acid; Future    2. Interstitial lung abnormality present on imaging study  -     Spirometry with Diffusion Capacity & Lung Volumes; Future    3. Stage 3a chronic kidney disease  -     Uric Acid; Future  -     Comprehensive Metabolic Panel; Future  -     CBC & Differential; Future  -     Urinalysis With Microscopic If Indicated (No Culture) - Urine, Clean Catch; Future  -     Protein / Creatinine Ratio, Urine - Urine, Clean Catch; Future    4. Elevated creatine kinase level  -     Comprehensive Metabolic Panel; Future  -      Urinalysis With Microscopic If Indicated (No Culture) - Urine, Clean Catch; Future  -     Protein / Creatinine Ratio, Urine - Urine, Clean Catch; Future  -     CK; Future    5. Positive RAJAN (antinuclear antibody)  -     Comprehensive Metabolic Panel; Future  -     CBC & Differential; Future  -     Urinalysis With Microscopic If Indicated (No Culture) - Urine, Clean Catch; Future  -     Protein / Creatinine Ratio, Urine - Urine, Clean Catch; Future    Other orders  -     predniSONE (DELTASONE) 10 MG tablet; Take 1 tablet by mouth Daily.  Dispense: 14 tablet; Refill: 1        I spent 30 minutes caring for Calos on this date of service. This time includes time spent by me in the following activities:preparing for the visit, reviewing tests, obtaining and/or reviewing a separately obtained history, performing a medically appropriate examination and/or evaluation , counseling and educating the patient/family/caregiver, ordering medications, tests, or procedures, documenting information in the medical record, and independently interpreting results and communicating that information with the patient/family/caregiver

## 2024-11-28 LAB — HMGCR AB SERPL IA-ACNC: <20 UNITS

## 2024-11-28 NOTE — PATIENT INSTRUCTIONS
Your gout is doing much better today.  You do not have any complaints of new onset shortness of breath or muscle weakness.  Please have your labs done prior to your next visit.  Please increase your allopurinol to 250 mg (2.5 pills) daily  You can continue on prednisone 10 mg daily and continue to monitor your blood glucose levels.  Continue to avoid diets and drinks that will trigger your gout.  I have ordered a request for pulmonary function test to evaluate your lungs, you will be contacted for this.  Will see you again in 2 weeks.

## 2024-12-02 ENCOUNTER — TELEPHONE (OUTPATIENT)
Age: 64
End: 2024-12-02
Payer: COMMERCIAL

## 2024-12-02 NOTE — TELEPHONE ENCOUNTER
I spoke with Darron from [facility] regarding a referral for patient today. Referral has been sent.

## 2024-12-09 ENCOUNTER — LAB (OUTPATIENT)
Dept: LAB | Facility: HOSPITAL | Age: 64
End: 2024-12-09
Payer: COMMERCIAL

## 2024-12-09 ENCOUNTER — TELEPHONE (OUTPATIENT)
Age: 64
End: 2024-12-09
Payer: COMMERCIAL

## 2024-12-09 DIAGNOSIS — R74.8 ELEVATED CREATINE KINASE LEVEL: ICD-10-CM

## 2024-12-09 DIAGNOSIS — M1A.0710 CHRONIC GOUT OF RIGHT FOOT, UNSPECIFIED CAUSE: ICD-10-CM

## 2024-12-09 DIAGNOSIS — R76.8 POSITIVE ANA (ANTINUCLEAR ANTIBODY): ICD-10-CM

## 2024-12-09 DIAGNOSIS — N18.31 STAGE 3A CHRONIC KIDNEY DISEASE: ICD-10-CM

## 2024-12-09 PROCEDURE — 80053 COMPREHEN METABOLIC PANEL: CPT

## 2024-12-09 PROCEDURE — 36415 COLL VENOUS BLD VENIPUNCTURE: CPT

## 2024-12-09 PROCEDURE — 81001 URINALYSIS AUTO W/SCOPE: CPT

## 2024-12-09 PROCEDURE — 84156 ASSAY OF PROTEIN URINE: CPT

## 2024-12-09 PROCEDURE — 82570 ASSAY OF URINE CREATININE: CPT

## 2024-12-09 PROCEDURE — 85025 COMPLETE CBC W/AUTO DIFF WBC: CPT

## 2024-12-09 PROCEDURE — 82550 ASSAY OF CK (CPK): CPT

## 2024-12-09 PROCEDURE — 84550 ASSAY OF BLOOD/URIC ACID: CPT

## 2024-12-09 RX ORDER — CARVEDILOL 25 MG/1
25 TABLET ORAL EVERY 12 HOURS SCHEDULED
Qty: 180 TABLET | Refills: 0 | Status: SHIPPED | OUTPATIENT
Start: 2024-12-09

## 2024-12-09 NOTE — TELEPHONE ENCOUNTER
Patient wife called for clarification on whether her  should have his labs drawn prior to visit tomorrow. I made er aw re that he should. Patient was agreeable and understood.

## 2024-12-10 ENCOUNTER — TELEPHONE (OUTPATIENT)
Age: 64
End: 2024-12-10

## 2024-12-10 ENCOUNTER — OFFICE VISIT (OUTPATIENT)
Age: 64
End: 2024-12-10
Payer: COMMERCIAL

## 2024-12-10 VITALS
SYSTOLIC BLOOD PRESSURE: 120 MMHG | WEIGHT: 226.2 LBS | DIASTOLIC BLOOD PRESSURE: 70 MMHG | HEIGHT: 66 IN | OXYGEN SATURATION: 97 % | HEART RATE: 94 BPM | TEMPERATURE: 97.9 F | BODY MASS INDEX: 36.35 KG/M2

## 2024-12-10 DIAGNOSIS — M1A.0710 CHRONIC GOUT OF RIGHT FOOT, UNSPECIFIED CAUSE: Primary | ICD-10-CM

## 2024-12-10 DIAGNOSIS — R91.8 INTERSTITIAL LUNG ABNORMALITY PRESENT ON IMAGING STUDY: ICD-10-CM

## 2024-12-10 DIAGNOSIS — R74.8 ELEVATED CREATINE KINASE LEVEL: ICD-10-CM

## 2024-12-10 DIAGNOSIS — N18.31 STAGE 3A CHRONIC KIDNEY DISEASE: ICD-10-CM

## 2024-12-10 LAB
ALBUMIN SERPL-MCNC: 4 G/DL (ref 3.5–5.2)
ALBUMIN/GLOB SERPL: 1.3 G/DL
ALP SERPL-CCNC: 83 U/L (ref 39–117)
ALT SERPL W P-5'-P-CCNC: 53 U/L (ref 1–41)
ANION GAP SERPL CALCULATED.3IONS-SCNC: 13.5 MMOL/L (ref 5–15)
AST SERPL-CCNC: 42 U/L (ref 1–40)
BACTERIA UR QL AUTO: ABNORMAL /HPF
BASOPHILS # BLD AUTO: 0.04 10*3/MM3 (ref 0–0.2)
BASOPHILS NFR BLD AUTO: 0.4 % (ref 0–1.5)
BILIRUB SERPL-MCNC: 0.6 MG/DL (ref 0–1.2)
BILIRUB UR QL STRIP: NEGATIVE
BUN SERPL-MCNC: 31 MG/DL (ref 8–23)
BUN/CREAT SERPL: 15.1 (ref 7–25)
CALCIUM SPEC-SCNC: 9.5 MG/DL (ref 8.6–10.5)
CHLORIDE SERPL-SCNC: 92 MMOL/L (ref 98–107)
CK SERPL-CCNC: 596 U/L (ref 20–200)
CLARITY UR: CLEAR
CO2 SERPL-SCNC: 35.5 MMOL/L (ref 22–29)
COLOR UR: YELLOW
CREAT SERPL-MCNC: 2.05 MG/DL (ref 0.76–1.27)
CREAT UR-MCNC: 41.3 MG/DL
DEPRECATED RDW RBC AUTO: 41.6 FL (ref 37–54)
EGFRCR SERPLBLD CKD-EPI 2021: 35.7 ML/MIN/1.73
EOSINOPHIL # BLD AUTO: 0.07 10*3/MM3 (ref 0–0.4)
EOSINOPHIL NFR BLD AUTO: 0.7 % (ref 0.3–6.2)
ERYTHROCYTE [DISTWIDTH] IN BLOOD BY AUTOMATED COUNT: 13.7 % (ref 12.3–15.4)
GLOBULIN UR ELPH-MCNC: 3 GM/DL
GLUCOSE SERPL-MCNC: 52 MG/DL (ref 65–99)
GLUCOSE UR STRIP-MCNC: ABNORMAL MG/DL
HCT VFR BLD AUTO: 47.8 % (ref 37.5–51)
HGB BLD-MCNC: 16 G/DL (ref 13–17.7)
HGB UR QL STRIP.AUTO: ABNORMAL
HYALINE CASTS UR QL AUTO: ABNORMAL /LPF
IMM GRANULOCYTES # BLD AUTO: 0.14 10*3/MM3 (ref 0–0.05)
IMM GRANULOCYTES NFR BLD AUTO: 1.5 % (ref 0–0.5)
KETONES UR QL STRIP: NEGATIVE
LEUKOCYTE ESTERASE UR QL STRIP.AUTO: NEGATIVE
LYMPHOCYTES # BLD AUTO: 1.82 10*3/MM3 (ref 0.7–3.1)
LYMPHOCYTES NFR BLD AUTO: 19.2 % (ref 19.6–45.3)
MCH RBC QN AUTO: 28.2 PG (ref 26.6–33)
MCHC RBC AUTO-ENTMCNC: 33.5 G/DL (ref 31.5–35.7)
MCV RBC AUTO: 84.2 FL (ref 79–97)
MONOCYTES # BLD AUTO: 0.87 10*3/MM3 (ref 0.1–0.9)
MONOCYTES NFR BLD AUTO: 9.2 % (ref 5–12)
NEUTROPHILS NFR BLD AUTO: 6.56 10*3/MM3 (ref 1.7–7)
NEUTROPHILS NFR BLD AUTO: 69 % (ref 42.7–76)
NITRITE UR QL STRIP: NEGATIVE
NRBC BLD AUTO-RTO: 0 /100 WBC (ref 0–0.2)
PH UR STRIP.AUTO: 7.5 [PH] (ref 5–8)
PLATELET # BLD AUTO: 224 10*3/MM3 (ref 140–450)
PMV BLD AUTO: 10.5 FL (ref 6–12)
POTASSIUM SERPL-SCNC: 3.5 MMOL/L (ref 3.5–5.2)
PROT ?TM UR-MCNC: 11.6 MG/DL
PROT SERPL-MCNC: 7 G/DL (ref 6–8.5)
PROT UR QL STRIP: NEGATIVE
PROT/CREAT UR: 280.9 MG/G CREA (ref 0–200)
RBC # BLD AUTO: 5.68 10*6/MM3 (ref 4.14–5.8)
RBC # UR STRIP: ABNORMAL /HPF
REF LAB TEST METHOD: ABNORMAL
SODIUM SERPL-SCNC: 141 MMOL/L (ref 136–145)
SP GR UR STRIP: 1.01 (ref 1–1.03)
SQUAMOUS #/AREA URNS HPF: ABNORMAL /HPF
URATE SERPL-MCNC: 8.8 MG/DL (ref 3.4–7)
UROBILINOGEN UR QL STRIP: ABNORMAL
WBC # UR STRIP: ABNORMAL /HPF
WBC NRBC COR # BLD AUTO: 9.5 10*3/MM3 (ref 3.4–10.8)

## 2024-12-10 RX ORDER — ALLOPURINOL 300 MG/1
300 TABLET ORAL DAILY
Qty: 90 TABLET | Refills: 3 | Status: SHIPPED | OUTPATIENT
Start: 2024-12-10

## 2024-12-10 RX ORDER — PREDNISONE 5 MG/1
5 TABLET ORAL DAILY
Qty: 45 TABLET | Refills: 0 | Status: SHIPPED | OUTPATIENT
Start: 2024-12-10 | End: 2025-01-26

## 2024-12-10 NOTE — TELEPHONE ENCOUNTER
I spoke with Jailene from [facility] and re faxed the referral. Referral was previously faxed on 12/2/2024 and documented.

## 2024-12-10 NOTE — PROGRESS NOTES
RHEUMATOLOGY FOLLOW UP VISIT  12/10/2024  Patient Name: Calos Moseley : 1960 Medical Record: [id number]     History of Present Illness  The patient is a 64-year-old male who presents for follow-up today.    He reports no recent exacerbations of his gout condition. He has been adhering to his medication regimen, which includes prednisone 10 mg and allopurinol 250 mg daily without any adverse effects.      In the interval:  He has not experienced any further swelling in his right ankle since the last visit.    His respiratory function remains stable, with no reported coughing or other issues. He reports no chest pain or shortness of breath and maintains an active lifestyle.      He continues to have  elevated muscle enzymes though less and magnitude than previously, and his protein creatinine ratio improved.    Results  Interval laboratory Studies  CK levels: 866 (1 month ago), 684 (2 weeks ago), 596 (yesterday).   Protein creatinine ratio: 242, previously 347.  Urinalysis: Glucose is 250, trace blood in urine,    Uric acid level is improved to 8.8.  Liver function test (AST and ALT) still elevated.     Myositis panel still pending.  Patient still awaiting to schedule for his PFTs       Rheumatological history:  Gout: Currently on allopurinol 250 mg daily to date, with low-dose prednisone 10 mg daily.  He has positive RAJAN (1: 160), normal complements, negative ANCA, elevated CK, LDH, transaminases.  Myositis panel pending.  No evidence of weakness.  He has abnormal chest CT with mild interstitial changes and scarring from prior empyema.  Pulmonary function test ordered, pending completion    PHYSICAL EXAM  Physical Exam  Gen: Well-developed, well-nourished adult in no apparent distress. Pleasant and cooperative. Alert and oriented.   HEENT: EOMI, MMM  Chest: Normal work of breathing.  Extremities: Warm, 2+ distal pulses, no cyanosis, clubbing, or edema.  Neuro: Good comprehension/cognition. Gait  normal.  Skin: No alopecia, nail change (including no nail pitting), rashes, bruising, petechiae, sclerodactyly, digital pits, telangiectasias, tophi, appreciable calcinosis, or nodules.    Comprehensive Musculoskeletal Examination:  - Shoulders, elbows, wrists, hands/fingers, knees,   -No swelling or warmth in the right ankle. No significant edema in the lower extremity. No musculoskeletal issues observed.   - Lumbosacral spine and hips without limited ROM. Negative NHAN       Assessment & Plan  Calos Moseley is a 64 y.o. male who is being seen for follow-up gout with predominant right ankle and foot involvement, and ongoing evaluation for concerns of myositis and possible ILD in the setting of positive RAJAN, elevated creatinine kinase and history of pneumonia with empyema.     Regarding his gout.  His uric acid levels have decreased from 16 to 8.8, correlating with a reduction in gout flares. The goal is to achieve a uric acid level of 5.   The dosage of prednisone will be reduced to 5 mg, and he will continue this regimen until the next visit in 6 to 8 weeks. The allopurinol dosage will be increased to 300 mg, and he will start taking this new dosage tomorrow.      Regarding his interstitial lung changes on imaging and the history of pneumonia:  His breathing is stable with no current issues such as coughing, chest pain, or shortness of breath.    Pulmonary function tests (PFTs) have been ordered  and is pending to establish a baseline and monitor for any changes.    His protein creatinine ratio has decreased from 347 to 280.9, indicating some improvement. The urinalysis shows trace blood but no protein . His creatinine levels fluctuate between 1.7 and 2.05, suggesting a stable baseline for his chronic kidney disease. Continued monitoring of kidney function is necessary.    His CK levels have decreased from 866 to 596 over the past month. Elevated CK levels could be due to various factors, including alcohol  consumption, liver disease, or myositis. He is not currently on statins, which can also elevate CK levels. Continued monitoring of CK levels is recommended.    Follow-up  The patient will follow up in 6 weeks.    Diagnoses and all orders for this visit:    1. Chronic gout of right foot, unspecified cause (Primary)  -     CBC & Differential; Future  -     Comprehensive Metabolic Panel; Future  -     Uric Acid; Future  -     CK; Future  -     Urinalysis With Microscopic If Indicated (No Culture) - Urine, Clean Catch; Future  -     Protein / Creatinine Ratio, Urine - Urine, Clean Catch; Future    2. Elevated creatine kinase level  -     CBC & Differential; Future  -     Comprehensive Metabolic Panel; Future  -     Uric Acid; Future  -     CK; Future  -     Urinalysis With Microscopic If Indicated (No Culture) - Urine, Clean Catch; Future  -     Protein / Creatinine Ratio, Urine - Urine, Clean Catch; Future    3. Interstitial lung abnormality present on imaging study    4. Stage 3a chronic kidney disease    Other orders  -     allopurinol (ZYLOPRIM) 300 MG tablet; Take 1 tablet by mouth Daily.  Dispense: 90 tablet; Refill: 3  -     predniSONE (DELTASONE) 5 MG tablet; Take 1 tablet by mouth Daily for 45 days.  Dispense: 45 tablet; Refill: 0      Return in about 6 weeks (around 1/21/2025).      I spent 30 minutes caring for Calos on this date of service. This time includes time spent by me in the following activities:preparing for the visit, reviewing tests, obtaining and/or reviewing a separately obtained history, performing a medically appropriate examination and/or evaluation , counseling and educating the patient/family/caregiver, ordering medications, tests, or procedures, documenting information in the medical record, and independently interpreting results and communicating that information with the patient/family/caregiver

## 2024-12-13 PROBLEM — M1A.0710 CHRONIC GOUT OF RIGHT FOOT: Status: ACTIVE | Noted: 2024-12-13

## 2024-12-13 PROBLEM — R74.8 ELEVATED CREATINE KINASE LEVEL: Status: ACTIVE | Noted: 2024-12-13

## 2024-12-13 PROBLEM — R91.8: Status: ACTIVE | Noted: 2024-12-13

## 2024-12-13 LAB
EJ AB SER QL: NEGATIVE
ENA JO1 AB SER IA-ACNC: <20 UNITS
ENA PM/SCL AB SER-ACNC: <20 UNITS
ENA SS-A 52KD IGG SER IA-ACNC: <20 UNITS
FIBRILLARIN AB SER QL: NEGATIVE
KU AB SER QL: NEGATIVE
MDA5 AB SER LINE BLOT-ACNC: <20 UNITS
MI2 AB SER QL: NEGATIVE
MJ AB SER LINE BLOT-ACNC: <20 UNITS
OJ AB SER QL: NEGATIVE
PL12 AB SER QL: NEGATIVE
PL7 AB SER QL: NEGATIVE
SAE1 IGG SER QL LINE BLOT: <20 UNITS
SRP AB SERPL QL: NEGATIVE
TIF1-GAMMA AB SER IA-ACNC: <20 UNITS
U1 SNRNP AB SER IA-ACNC: <20 UNITS
U2 SNRNP AB SER QL: NEGATIVE

## 2024-12-13 NOTE — PATIENT INSTRUCTIONS
Thank you for your follow up  You have not had a gout or change in your general condition since last visit  Your uric acid is trending down nicely  We will reduce prednisone dose to 5 mg daily and increase Allopurinol dose to 300mg dly  Please have your labs done prior to your follow up visit in 6 weeks

## 2024-12-16 ENCOUNTER — TELEPHONE (OUTPATIENT)
Age: 64
End: 2024-12-16
Payer: COMMERCIAL

## 2024-12-16 ENCOUNTER — ANESTHESIA (OUTPATIENT)
Dept: PAIN MEDICINE | Facility: HOSPITAL | Age: 64
End: 2024-12-16
Payer: COMMERCIAL

## 2024-12-16 ENCOUNTER — HOSPITAL ENCOUNTER (OUTPATIENT)
Dept: PAIN MEDICINE | Facility: HOSPITAL | Age: 64
Discharge: HOME | End: 2024-12-16
Payer: COMMERCIAL

## 2024-12-16 ENCOUNTER — ANESTHESIA EVENT (OUTPATIENT)
Dept: PAIN MEDICINE | Facility: HOSPITAL | Age: 64
End: 2024-12-16
Payer: COMMERCIAL

## 2024-12-16 ENCOUNTER — HOSPITAL ENCOUNTER (OUTPATIENT)
Dept: GENERAL RADIOLOGY | Facility: HOSPITAL | Age: 64
Discharge: HOME | End: 2024-12-16
Payer: COMMERCIAL

## 2024-12-16 VITALS
SYSTOLIC BLOOD PRESSURE: 140 MMHG | TEMPERATURE: 98.2 F | OXYGEN SATURATION: 94 % | RESPIRATION RATE: 17 BRPM | HEART RATE: 80 BPM | BODY MASS INDEX: 36.16 KG/M2 | HEIGHT: 66 IN | DIASTOLIC BLOOD PRESSURE: 71 MMHG | WEIGHT: 225 LBS

## 2024-12-16 DIAGNOSIS — M48.061 LUMBAR FORAMINAL STENOSIS: Primary | ICD-10-CM

## 2024-12-16 DIAGNOSIS — R52 PAIN: ICD-10-CM

## 2024-12-16 LAB — GLUCOSE BLDC GLUCOMTR-MCNC: 215 MG/DL (ref 70–130)

## 2024-12-16 PROCEDURE — 77003 FLUOROGUIDE FOR SPINE INJECT: CPT

## 2024-12-16 PROCEDURE — 82948 REAGENT STRIP/BLOOD GLUCOSE: CPT

## 2024-12-16 RX ORDER — FENTANYL CITRATE 50 UG/ML
50 INJECTION, SOLUTION INTRAMUSCULAR; INTRAVENOUS ONCE
Status: DISCONTINUED | OUTPATIENT
Start: 2024-12-16 | End: 2024-12-17 | Stop reason: HOSPADM

## 2024-12-16 RX ORDER — METHYLPREDNISOLONE ACETATE 80 MG/ML
80 INJECTION, SUSPENSION INTRA-ARTICULAR; INTRALESIONAL; INTRAMUSCULAR; SOFT TISSUE ONCE
Status: DISCONTINUED | OUTPATIENT
Start: 2024-12-16 | End: 2024-12-17 | Stop reason: HOSPADM

## 2024-12-16 RX ORDER — IOPAMIDOL 408 MG/ML
10 INJECTION, SOLUTION INTRATHECAL
Status: DISCONTINUED | OUTPATIENT
Start: 2024-12-16 | End: 2024-12-17 | Stop reason: HOSPADM

## 2024-12-16 RX ORDER — MIDAZOLAM HYDROCHLORIDE 1 MG/ML
1 INJECTION, SOLUTION INTRAMUSCULAR; INTRAVENOUS ONCE AS NEEDED
Status: DISCONTINUED | OUTPATIENT
Start: 2024-12-16 | End: 2024-12-17 | Stop reason: HOSPADM

## 2024-12-16 RX ORDER — LIDOCAINE HYDROCHLORIDE 10 MG/ML
1 INJECTION, SOLUTION INFILTRATION; PERINEURAL ONCE
Status: DISCONTINUED | OUTPATIENT
Start: 2024-12-16 | End: 2024-12-17 | Stop reason: HOSPADM

## 2024-12-16 NOTE — ANESTHESIA PROCEDURE NOTES
PAIN Epidural block    Pre-sedation assessment completed: 12/16/2024 10:40 AM    Patient reassessed immediately prior to procedure    Patient location during procedure: pain clinic  Start Time: 12/16/2024 10:40 AM  Stop Time: 12/16/2024 11:00 AM  Indication:procedure for pain  Performed By  Anesthesiologist: Steffany Guerrero MD  Preanesthetic Checklist  Completed: patient identified, IV checked, site marked, risks and benefits discussed, surgical consent, monitors and equipment checked, pre-op evaluation and timeout performed  Additional Notes  Fluoro used.    Prep:  Pt Position:prone  Sterile Tech:cap, gloves, mask and sterile barrier  Prep:chlorhexidine gluconate and isopropyl alcohol  Monitoring:blood pressure monitoring, continuous pulse oximetry and EKG  Procedure:Sedation: no     Approach:midline  Guidance: fluoroscopy  Location:lumbar  Level:L5-S1  Needle Type:Tuohy  Needle Gauge:20  Aspiration:negative  Medications:  Preservative Free Saline:3mL  Isovue:0mL  Comments:No dye d/t CRI  Dx: lumbar radiculopathyDepomedrol:80  Post Assessment:  Dressing:occlusive dressing applied  Pt Tolerance:patient tolerated the procedure well with no apparent complications  Complications:no

## 2024-12-16 NOTE — H&P
[facility]    History and Physical    Patient Name: Calos Moseley  :  1960  MRN:  [id number]  Date of Admission: 2024    Subjective     Patient is a 64 y.o. male presents with chief complaint of chronic, moderate low back, hips: bilateral, and leg: bilateral pain.  Onset of symptoms was gradual starting 9 months ago.  Symptoms are associated/aggravated by nothing in particular. Symptoms improve with pain medication.  Did not get improvement from SI injection.  Has been seen by dr. Spangler & referred for LESI which he presents for today.      Lumbar mri . For full report plz see chart.    IMPRESSION:     In the interval since the prior study dated 2024, the patient has  undergone a left laminectomy, left partial facetectomy, and left  foraminotomy procedure at the L5-S1 level. The left L5-S1 neural foramen  is difficult to evaluate without IV contrast as granulation tissue  cannot be accurately distinguished from bulging or protruding disc  material. There is T1 signal abnormality within the left L5-S1 neural  foramen that is isointense to the nerve root. I suspect that the  abnormality within the left L5-S1 neural foramen is due to granulation  tissue as opposed to disc material, although a small amount of disc  material within the left L5-S1 neural foramen cannot be excluded.  Degenerative disc changes at L5-S1 have progressed and there is new  degenerative endplate marrow edema.     There is a moderate degree of canal stenosis at the L4-L5 level which is  secondary to bulging disc material, mild ligamentum flavum thickening,  mild-to-moderate facet hypertrophic change, and prominent dorsal  epidural fat. The degree of canal narrowing at the L4-L5 level appears  mildly more prominent when compared to the prior study. Again noted is a  mild-to-moderate degree of canal stenosis at the L3-L4 level secondary  to bulging disc material as well as prominent epidural fat.     The remaining stable  "multilevel foraminal stenotic changes are again  appreciated with the most prominent foraminal narrowing noted within the  right L5-S1 neural foramen where there is a moderate degree of foraminal  stenosis. There is also mild-to-moderate bilateral L4-L5, mild bilateral  L3-L4, and mild bilateral L2-L3 foraminal stenosis.     This report was finalized on 9/20/2024 9:03 AM by Dr. Harmeet Kingston M.D  on Workstation: [id number]    The following portions of the patients history were reviewed and updated as appropriate: current medications, allergies, past medical history, past surgical history, past family history, past social history, and problem list                Objective     Past Medical History:   Past Medical History:   Diagnosis Date   • Anesthesia complication     STATES HARD TO WAKE UP AFTER PREVIOUS KIDNEY STONE SURGERY AND STATES HE CODED   • Bipolar affective    • Chronic back pain    • COPD (chronic obstructive pulmonary disease)     ? FROM MD NOTES   • COVID 2022   • CRF (chronic renal failure), stage 4 (severe)     WILL CHECK LABS AGAIN   • Elevated cholesterol    • History of kidney stones    • History of pneumonia 05/2023    \"TRAPPED LUNG\" PLUERAL EFFUSION. RIGHT LUNG. S/P DECORTIFICATION RIGHT LUNG   • History of UTI    • Hypertension    • Hypothyroidism    • Lumbar disc disease    • Numbness and tingling    • Restless leg syndrome    • Shortness of breath    • Sleep apnea     cpap  bring dos   • Swelling of both lower extremities      Past Surgical History:   Past Surgical History:   Procedure Laterality Date   • ADENOIDECTOMY     • APPENDECTOMY     • BRONCHOSCOPY Right 05/19/2023    Procedure: BRONCHOSCOPY WITH ENDOBRONCHIAL ULTRASOUND with BAL of RLL and FNA;  Surgeon: Aniceto Bruno MD;  Location: [facility] ENDOSCOPY;  Service: Pulmonary;  Laterality: Right;  pre- RLL pneumonia, lymphadenopathy  post- same   • COLONOSCOPY     • COLONOSCOPY N/A 03/23/2023    Procedure: COLONOSCOPY into " cecum with cold snare polypectomies;  Surgeon: Yoan Leonard MD;  Location: [facility] ENDOSCOPY;  Service: Gastroenterology;  Laterality: N/A;  polyps   • CYSTOSCOPY WITH CLOT EVACUATION N/A 03/08/2022    Procedure: CYSTOSCOPY WITH CLOT EVACUATION;  Surgeon: Ihsan Lopez MD;  Location: [facility] MAIN OR;  Service: Urology;  Laterality: N/A;   • CYSTOSCOPY, URETEROSCOPY, RETROGRADE PYELOGRAM, STENT INSERTION Left 03/07/2022    Procedure: CYSTOSCOPY URETEROSCOPY LASER LITHOTRIPSY and stent exchange;  Surgeon: Deuce Leija MD;  Location: [facility] OR;  Service: Urology;  Laterality: Left;   • EPIDURAL BLOCK     • LUMBAR DISCECTOMY Left 5/13/2024    Procedure: LEFT LUMBAR FIVE TO SACRAL ONE FORAMINOTOMY, POSSIBLE DISCECTOMY MICROSCOPIC;  Surgeon: Christos Spangler MD;  Location: [facility] MAIN OR;  Service: Neurosurgery;  Laterality: Left;   • NEPHROLITHOTRIPSY PERCUTANEOUS Left 02/21/2022    Procedure: NEPHROSTLITHOTOMY PERCUTANEOUS;  Surgeon: Deuce Leija MD;  Location: [facility] OR;  Service: Urology;  Laterality: Left;   • NEPHROLITHOTRIPSY PERCUTANEOUS Left 02/23/2022    Procedure: LEFT FLEXIBLE NEPHROSTOMY WITH STONE EXTRACTION, LEFT NEPHROSTOMY, TUBE REMOVAL, BILATERAL URETERAL STENT PLACEMENT;  Surgeon: Deuce Leija MD;  Location: [facility] OR;  Service: Urology;  Laterality: Left;   • NEPHROLITHOTRIPSY PERCUTANEOUS Right 03/07/2022    Procedure: RIGHT PERCUTANEOUS NEPHROLITHOTOMY W ACCESS;  Surgeon: Deuce Leija MD;  Location: [facility] OR;  Service: Urology;  Laterality: Right;   • THORACOSCOPY WITH DECORTICATION Right 05/20/2023    Procedure: THORACOSCOPY VIDEO ASSISTED WITH DECORTICATION WITH DAVINCI ROBOT;  Surgeon: Jose Rice MD;  Location: [facility] MAIN OR;  Service: Robotics - DaVinci;  Laterality: Right;   • TONSILLECTOMY       Family History:   Family History   Problem Relation Age of Onset   • Lung cancer Mother    • COPD Father    • Emphysema Father    •  Diabetes Father    • Malig Hyperthermia Neg Hx      Social History:   Social History     Socioeconomic History   • Marital status:    Tobacco Use   • Smoking status: Former     Current packs/day: 0.00     Types: Cigarettes     Quit date: 1990     Years since quittin.9     Passive exposure: Past   • Smokeless tobacco: Never   • Tobacco comments:     SMOKED 2 PPD FOR 8 YEARS. QUIT 35 YEARS AGO   Vaping Use   • Vaping status: Never Used   Substance and Sexual Activity   • Alcohol use: Yes     Comment: ON OCC   • Drug use: No   • Sexual activity: Defer       Vital Signs Range for the last 24 hours  Temperature:     Temp Source:     BP:     Pulse:     Respirations:     SPO2:     O2 Amount (l/min):     O2 Devices     Weight:           --------------------------------------------------------------------------------    Current Outpatient Medications   Medication Sig Dispense Refill   • acetaminophen (TYLENOL) 325 MG tablet Take 2 tablets by mouth Every 12 (Twelve) Hours.     • allopurinol (ZYLOPRIM) 300 MG tablet Take 1 tablet by mouth Daily. 90 tablet 3   • apixaban (Eliquis) 5 MG tablet tablet Take 1 tablet by mouth 2 (Two) Times a Day. 60 tablet 2   • atorvastatin (LIPITOR) 20 MG tablet Take 1 tablet by mouth every night at bedtime. (Patient not taking: Reported on 2024) 90 tablet 3   • buPROPion XL (WELLBUTRIN XL) 150 MG 24 hr tablet Take 1 tablet by mouth Daily. 90 tablet 1   • carvedilol (COREG) 25 MG tablet Take 1 tablet by mouth Every 12 (Twelve) Hours. 180 tablet 0   • docusate sodium (COLACE) 250 MG capsule Take 1 capsule by mouth 2 (Two) Times a Day. (Patient not taking: Reported on 2024) 60 capsule 2   • DULoxetine (CYMBALTA) 20 MG capsule Take 1 capsule by mouth Every 12 (Twelve) Hours. 180 capsule 0   • empagliflozin (Jardiance) 10 MG tablet tablet Take 1 tablet by mouth Every Morning. 90 tablet 3   • Fluticasone-Umeclidin-Vilant (Trelegy Ellipta) 100-62.5-25 MCG/ACT inhaler Inhale  1 puff Daily. 60 each 11   • gabapentin (NEURONTIN) 300 MG capsule Take 1 capsule by mouth 3 (Three) Times a Day. (Patient not taking: Reported on 11/6/2024) 90 capsule 2   • hydrALAZINE (APRESOLINE) 50 MG tablet Take 2 tablets by mouth 3 times a day. 90 tablet 3   • HYDROcodone-acetaminophen (NORCO) 5-325 MG per tablet Take 1-2 tablets by mouth Every 6 (Six) Hours As Needed for Moderate Pain. 20 tablet 0   • isosorbide mononitrate (IMDUR) 30 MG 24 hr tablet Take 2 tablets by mouth Daily. Do not crush or chew. 60 tablet 11   • lamoTRIgine (LaMICtal) 100 MG tablet Take 1 tablet by mouth Every 12 (Twelve) Hours. 180 tablet 0   • lamoTRIgine  MG tablet sustained-release 24 hour Take 1 tablet by mouth daily with 50mg tablet 90 tablet 0   • lamoTRIgine ER 50 MG tablet sustained-release 24 hour Take 1 tablet by mouth daily with 100mg tablet 90 tablet 0   • levothyroxine (SYNTHROID, LEVOTHROID) 50 MCG tablet Take 1 tablet by mouth daily in the morning on an empty stomach 30 tablet 0   • levothyroxine (SYNTHROID, LEVOTHROID) 50 MCG tablet Take 1 tablet by mouth daily in the morning on an empty stomach 30 tablet 0   • lisinopril (PRINIVIL,ZESTRIL) 10 MG tablet Take 1 tablet by mouth Daily. (Patient not taking: Reported on 12/10/2024) 30 tablet 0   • metOLazone (ZAROXOLYN) 2.5 MG tablet Take 1 tablet by mouth 1 (One) Time Per Week. 4 tablet 2   • potassium chloride (MICRO-K) 10 MEQ CR capsule Take 1 capsule by mouth Daily. 30 capsule 2   • potassium citrate (UROCIT-K) 10 MEQ (1080 MG) CR tablet Take 2 tablets (20 mEq total) by mouth in the morning and 2 tablets (20 mEq total) in the evening. Take with meals. Do not crush, chew, or split.. 360 tablet 3   • pramipexole (MIRAPEX) 0.75 MG tablet Take 1 tablet by mouth Every Night. 30 tablet 2   • pramipexole (MIRAPEX) 0.75 MG tablet Take 1 tablet by mouth Every Night. 30 tablet 2   • predniSONE (DELTASONE) 5 MG tablet Take 1 tablet by mouth Daily for 45 days. 45 tablet 0    • Semaglutide,0.25 or 0.5MG/DOS, (Ozempic, 0.25 or 0.5 MG/DOSE,) 2 MG/3ML solution pen-injector Inject 0.5 mg under the skin into the appropriate area as directed 1 (One) Time Per Week. 3 mL 0   • tamsulosin (Flomax) 0.4 MG capsule 24 hr capsule Take 1 capsule by mouth every night at bedtime. 90 capsule 3   • tamsulosin (FLOMAX) 0.4 MG capsule 24 hr capsule Take 1 capsule by mouth Every Morning. 30 capsule 1   • torsemide (DEMADEX) 20 MG tablet Take 4 tablets by mouth 4 (Four) Times a Day - morning, noon, evening, and before bedtime 1440 tablet 3   • torsemide (DEMADEX) 20 MG tablet Take 4 tablets by mouth 2 (Two) Times a Day - morning and evening 720 tablet 3   • vilazodone (Viibryd) 40 MG tablet tablet Take 1 tablet by mouth Daily. 90 tablet 3   • vilazodone (VIIBRYD) 40 MG tablet tablet Take 1 tablet by mouth daily with food 90 tablet 0     Current Facility-Administered Medications   Medication Dose Route Frequency Provider Last Rate Last Admin   • fentaNYL citrate (PF) (SUBLIMAZE) injection 50 mcg  50 mcg Intravenous Once Steffany Guerrero MD       • iopamidol (ISOVUE-M 200) injection 41%  10 mL Epidural Once in imaging Steffany Guerrero MD       • lidocaine (XYLOCAINE) 1 % injection 1 mL  1 mL Intradermal Once Steffany Guerrero MD       • methylPREDNISolone acetate (DEPO-medrol) injection 80 mg  80 mg Epidural Once Steffany Guerrero MD       • midazolam (VERSED) injection 1 mg  1 mg Intravenous Once PRN Steffany Guerrero MD           --------------------------------------------------------------------------------  Assessment & Plan      Anesthesia Evaluation     Patient summary reviewed and Nursing notes reviewed                Airway   Mallampati: II  Dental      Pulmonary    (+) pleural effusion, COPD,shortness of breath, sleep apnea  Cardiovascular     (+) hypertension, hyperlipidemia      Neuro/Psych  (+) numbness, psychiatric history Anxiety, Depression  and Bipolar  GI/Hepatic/Renal/Endo    (+) obesity, morbid obesity, renal disease- CRI, thyroid problem hypothyroidism    Musculoskeletal         PE comment: L foot drop  Abdominal    Substance History - negative use     OB/GYN negative ob/gyn ROS         Other   arthritis,            Diagnosis and Plan    Treatment Plan  ASA 3      Procedures: Lumbar Epidural Steroid Injection(LESI), With fluoroscopy,      Anesthetic plan and risks discussed with patient.      Diagnosis     * Lumbar radiculopathy [M54.16]

## 2024-12-16 NOTE — DISCHARGE INSTRUCTIONS
EPIDURAL STEROID INJECTION          An epidural steroid injection is a shot of steroid medicine and numbing medicine that is given into the space between the spinal cord and the bones of the back (epidural space).  The injection helps relieve pain by an irritated or swollen nerve root.    TELL YOUR HEALTH CARE PROVIDER ABOUT:  Any allergies you have  All medicines you are taking including any over the counter medicines  Any blood disorders you have  Any surgeries you have had  Any medical conditions you have  Whether you are pregnant or may be pregnant    WHAT ARE THE RISK?  Generally, this is a safe procedure. However,problems may occur, including  Headache  Bleeding  Infection  Allergic Reaction  Nerve Damage    WHAT CAN I EXPECT AFTER THE PROCEDURE?    INJECTION SITE  Remove the Band-Aid/s after 24 hours  Check your injection site every day for signs of infection.  Check for:             Redness             Bleeding (small amt is normal)             Warmth             Pus or bad odor  Some numbness may be experienced for several hours following the procedure.  Avoid using heat on the injection site for 24 hours. You may use ice intermittently if needed by placing a         towel between your skin and the ice bag and using the ice for 20 minutes 2-3 times a day.  Do not take baths, swim or use a hot tub for 24 hours.    ACTIVITY  No strenuous activity for 24 hours then return to normal activity as tolerated.  If your leg is numb, no driving until full sensation and strength has returned.    GENERAL INSTRUCTIONS:  The injection site may feel numb, use ice with caution if numbness is present and no heat for 24 hours or until numbness is gone.   If you have numbness or weakness in your arm or leg, use those areas with caution until normal sensation returns.  It is not uncommon to notice an increase in discomfort or a change in the location of discomfort for 3-4 days after the procedure.  If discomfort is noticed  at the injection site, ice may be            applied to that area for 20 min 2-3 times a day.  Take the pain medicine your physician has prescribed or over the counter pain relievers as long as you do not have any contraindications.  If you are a diabetic, monitor your blood sugar closely.  The steroids used in your procedure may increase your blood sugar level up to 36 hours after the injection.  If your blood sugar is greater than 250, call the physician that helps you monitor your blood sugar.  Keep all follow-up visits as scheduled by your health care provider. This is important.    CONTACT OUR OFFICE IF:  You have any of these signs of infection            -Redness, swelling, or warmth around your injection site.            -Fluid or blood coming from your injection site (small amt of blood is normal)            -Pus or a bad odor from your injection site            -A fever  You develop a severe headache or a stiff neck  You lose control of your bladder or bowel movements      PAIN MANAGEMENT CENTER HOURS   Monday-Friday 7:30 am. - 4:00 pm.  For any problem related to your procedure we can be reached at [phone].  If you experience an emergency with your procedure, call [phone] or go to the emergency room.      What is [employer]?  [employer] is a fitness and wellness program offered at no additional cost to seniors 65+ on eligible Medicare plans that helps you get active, get fit, and connect with others.  The program is designed for all levels and abilities and provides access to online and in-person classes, over 15,000 fitness locations, and health & wellness discounts.  Before starting any exercise program, consult with your primary care provider.  For additional information and to check eligibility:  [email or web address]                      EPIDURAL STEROID INJECTION          An epidural steroid injection is a shot of steroid medicine and numbing medicine that is given into the  space between the spinal cord and the bones of the back (epidural space).  The injection helps relieve pain by an irritated or swollen nerve root.    TELL YOUR HEALTH CARE PROVIDER ABOUT:  Any allergies you have  All medicines you are taking including any over the counter medicines  Any blood disorders you have  Any surgeries you have had  Any medical conditions you have  Whether you are pregnant or may be pregnant    WHAT ARE THE RISK?  Generally, this is a safe procedure. However,problems may occur, including  Headache  Bleeding  Infection  Allergic Reaction  Nerve Damage    WHAT CAN I EXPECT AFTER THE PROCEDURE?    INJECTION SITE  Remove the Band-Aid/s after 24 hours  Check your injection site every day for signs of infection.  Check for:             Redness             Bleeding (small amt is normal)             Warmth             Pus or bad odor  Some numbness may be experienced for several hours following the procedure.  Avoid using heat on the injection site for 24 hours. You may use ice intermittently if needed by placing a         towel between your skin and the ice bag and using the ice for 20 minutes 2-3 times a day.  Do not take baths, swim or use a hot tub for 24 hours.    ACTIVITY  No strenuous activity for 24 hours then return to normal activity as tolerated.  If your leg is numb, no driving until full sensation and strength has returned.    GENERAL INSTRUCTIONS:  The injection site may feel numb, use ice with caution if numbness is present and no heat for 24 hours or until numbness is gone.   If you have numbness or weakness in your arm or leg, use those areas with caution until normal sensation returns.  It is not uncommon to notice an increase in discomfort or a change in the location of discomfort for 3-4 days after the procedure.  If discomfort is noticed at the injection site, ice may be            applied to that area for 20 min 2-3 times a day.  Take the pain medicine your physician has  prescribed or over the counter pain relievers as long as you do not have any contraindications.  If you are a diabetic, monitor your blood sugar closely.  The steroids used in your procedure may increase your blood sugar level up to 36 hours after the injection.  If your blood sugar is greater than 250, call the physician that helps you monitor your blood sugar.  Keep all follow-up visits as scheduled by your health care provider. This is important.    CONTACT OUR OFFICE IF:  You have any of these signs of infection            -Redness, swelling, or warmth around your injection site.            -Fluid or blood coming from your injection site (small amt of blood is normal)            -Pus or a bad odor from your injection site            -A fever  You develop a severe headache or a stiff neck  You lose control of your bladder or bowel movements      PAIN MANAGEMENT CENTER HOURS   Monday-Friday 7:30 am. - 4:00 pm.  For any problem related to your procedure we can be reached at [phone].  If you experience an emergency with your procedure, call [phone] or go to the emergency room.      What is [employer]?  [employer] is a fitness and wellness program offered at no additional cost to seniors 65+ on eligible Medicare plans that helps you get active, get fit, and connect with others.  The program is designed for all levels and abilities and provides access to online and in-person classes, over 15,000 fitness locations, and health & wellness discounts.  Before starting any exercise program, consult with your primary care provider.  For additional information and to check eligibility:  [email or web address]

## 2024-12-17 DIAGNOSIS — E11.22 TYPE 2 DIABETES MELLITUS WITH STAGE 3B CHRONIC KIDNEY DISEASE, WITHOUT LONG-TERM CURRENT USE OF INSULIN: ICD-10-CM

## 2024-12-17 DIAGNOSIS — N18.32 TYPE 2 DIABETES MELLITUS WITH STAGE 3B CHRONIC KIDNEY DISEASE, WITHOUT LONG-TERM CURRENT USE OF INSULIN: ICD-10-CM

## 2024-12-17 RX ORDER — SEMAGLUTIDE 0.68 MG/ML
0.5 INJECTION, SOLUTION SUBCUTANEOUS WEEKLY
Qty: 3 ML | Refills: 0 | Status: SHIPPED | OUTPATIENT
Start: 2024-12-17

## 2024-12-18 ENCOUNTER — TELEPHONE (OUTPATIENT)
Age: 64
End: 2024-12-18
Payer: COMMERCIAL

## 2024-12-21 DIAGNOSIS — G25.81 RESTLESS LEG SYNDROME: ICD-10-CM

## 2024-12-23 RX ORDER — PRAMIPEXOLE DIHYDROCHLORIDE 0.75 MG/1
0.75 TABLET ORAL NIGHTLY
Qty: 30 TABLET | Refills: 2 | Status: SHIPPED | OUTPATIENT
Start: 2024-12-23

## 2024-12-23 RX ORDER — POTASSIUM CHLORIDE 750 MG/1
10 CAPSULE, EXTENDED RELEASE ORAL DAILY
Qty: 30 CAPSULE | Refills: 2 | Status: SHIPPED | OUTPATIENT
Start: 2024-12-23

## 2025-01-16 NOTE — PROGRESS NOTES
"Patient ID: Calos Moseley is a 64 y.o. male is here today for follow-up for lumbar radiculopathy after getting an epidural completed on 12/16/2024.    Subjective     The patient is here in regards to   Chief Complaint   Patient presents with    Back Pain    Follow-up       History of Present Illness  Alexis has been taking care of his wife due to her cancer diagnosis but he has been struggling somewhat due to his back pain and restless leg syndrome.  He has minimal overt signs of lumbar radiculopathy but that he does have complaints consistent with neurogenic claudication.  Working on weight loss and is lost some weight and continues to work on staying active and plans to lose at least 30 more pounds by the end of the year      While in the room and during my examination of the patient I wore a mask and eye protection.  I washed my hands before and after this patient encounter.  The patient was also wearing a mask.    The following portions of the patient's history were reviewed and updated as appropriate: allergies, current medications, past family history, past medical history, past social history, past surgical history and problem list.    Review of Systems   Constitutional:  Negative for fever.   Musculoskeletal:  Positive for back pain and gait problem.   Neurological:  Positive for numbness. Negative for dizziness, weakness, light-headedness and headaches.        Past Medical History:   Diagnosis Date    Anesthesia complication     STATES HARD TO WAKE UP AFTER PREVIOUS KIDNEY STONE SURGERY AND STATES HE CODED    Bipolar affective     Chronic back pain     COPD (chronic obstructive pulmonary disease)     ? FROM MD NOTES    COVID 2022    CRF (chronic renal failure), stage 4 (severe)     WILL CHECK LABS AGAIN    Elevated cholesterol     History of kidney stones     History of pneumonia 05/2023    \"TRAPPED LUNG\" PLUERAL EFFUSION. RIGHT LUNG. S/P DECORTIFICATION RIGHT LUNG    History of UTI     Hypertension     " Hypothyroidism     Lumbar disc disease     Numbness and tingling     Restless leg syndrome     Shortness of breath     Sleep apnea     cpap  bring dos    Swelling of both lower extremities        No Known Allergies    Family History   Problem Relation Age of Onset    Lung cancer Mother     COPD Father     Emphysema Father     Diabetes Father     Malig Hyperthermia Neg Hx        Social History     Socioeconomic History    Marital status:    Tobacco Use    Smoking status: Former     Current packs/day: 0.00     Types: Cigarettes     Quit date: 1990     Years since quittin.0     Passive exposure: Past    Smokeless tobacco: Never    Tobacco comments:     SMOKED 2 PPD FOR 8 YEARS. QUIT 35 YEARS AGO   Vaping Use    Vaping status: Never Used   Substance and Sexual Activity    Alcohol use: Yes     Comment: ON OCC    Drug use: No    Sexual activity: Defer       Past Surgical History:   Procedure Laterality Date    ADENOIDECTOMY      APPENDECTOMY      BRONCHOSCOPY Right 2023    Procedure: BRONCHOSCOPY WITH ENDOBRONCHIAL ULTRASOUND with BAL of RLL and FNA;  Surgeon: Aniceto Bruno MD;  Location: [facility] ENDOSCOPY;  Service: Pulmonary;  Laterality: Right;  pre- RLL pneumonia, lymphadenopathy  post- same    COLONOSCOPY      COLONOSCOPY N/A 2023    Procedure: COLONOSCOPY into cecum with cold snare polypectomies;  Surgeon: Yoan Leonard MD;  Location: [facility] ENDOSCOPY;  Service: Gastroenterology;  Laterality: N/A;  polyps    CYSTOSCOPY WITH CLOT EVACUATION N/A 2022    Procedure: CYSTOSCOPY WITH CLOT EVACUATION;  Surgeon: Ihsan Lopez MD;  Location: [facility] OR;  Service: Urology;  Laterality: N/A;    CYSTOSCOPY, URETEROSCOPY, RETROGRADE PYELOGRAM, STENT INSERTION Left 2022    Procedure: CYSTOSCOPY URETEROSCOPY LASER LITHOTRIPSY and stent exchange;  Surgeon: Deuce Leija MD;  Location: [facility] OR;  Service: Urology;  Laterality: Left;    EPIDURAL BLOCK       LUMBAR DISCECTOMY Left 5/13/2024    Procedure: LEFT LUMBAR FIVE TO SACRAL ONE FORAMINOTOMY, POSSIBLE DISCECTOMY MICROSCOPIC;  Surgeon: Christos Spangler MD;  Location: [facility] OR;  Service: Neurosurgery;  Laterality: Left;    NEPHROLITHOTRIPSY PERCUTANEOUS Left 02/21/2022    Procedure: NEPHROSTLITHOTOMY PERCUTANEOUS;  Surgeon: Deuce Leija MD;  Location: [facility] MAIN OR;  Service: Urology;  Laterality: Left;    NEPHROLITHOTRIPSY PERCUTANEOUS Left 02/23/2022    Procedure: LEFT FLEXIBLE NEPHROSTOMY WITH STONE EXTRACTION, LEFT NEPHROSTOMY, TUBE REMOVAL, BILATERAL URETERAL STENT PLACEMENT;  Surgeon: Deuce Leija MD;  Location: [facility] OR;  Service: Urology;  Laterality: Left;    NEPHROLITHOTRIPSY PERCUTANEOUS Right 03/07/2022    Procedure: RIGHT PERCUTANEOUS NEPHROLITHOTOMY W ACCESS;  Surgeon: Deuce Leija MD;  Location: [facility] OR;  Service: Urology;  Laterality: Right;    THORACOSCOPY WITH DECORTICATION Right 05/20/2023    Procedure: THORACOSCOPY VIDEO ASSISTED WITH DECORTICATION WITH DAVINCI ROBOT;  Surgeon: Jose Rice MD;  Location: [facility] OR;  Service: Robotics - DaVinci;  Laterality: Right;    TONSILLECTOMY           Objective     Vitals:    01/17/25 1005   BP: 138/68   Pulse: 71   Resp: 16   Temp: 97.1 °F (36.2 °C)   SpO2: 93%     Body mass index is 36.8 kg/m².    Physical Exam  Constitutional:       General: He is awake.      Appearance: Normal appearance.   HENT:      Head: Normocephalic and atraumatic.   Eyes:      General: Lids are normal.      Extraocular Movements: Extraocular movements intact.      Conjunctiva/sclera: Conjunctivae normal.      Pupils: Pupils are equal, round, and reactive to light.   Cardiovascular:      Rate and Rhythm: Normal rate and regular rhythm.      Pulses: Normal pulses.   Pulmonary:      Breath sounds: Normal breath sounds.   Abdominal:      Palpations: Abdomen is soft.   Musculoskeletal:         General: Normal range of motion.       Cervical back: Normal range of motion and neck supple.   Skin:     General: Skin is warm and dry.   Neurological:      Mental Status: He is alert and oriented to person, place, and time.      Motor: Motor function is intact. No weakness or atrophy.      Coordination: Coordination is intact. Romberg sign negative.      Gait: Gait is intact. Gait normal.      Deep Tendon Reflexes: Reflexes are normal and symmetric.      Reflex Scores:       Tricep reflexes are 2+ on the right side and 2+ on the left side.       Bicep reflexes are 2+ on the right side and 2+ on the left side.       Brachioradialis reflexes are 2+ on the right side and 2+ on the left side.       Patellar reflexes are 2+ on the right side and 2+ on the left side.       Achilles reflexes are 2+ on the right side and 2+ on the left side.        Neurological Exam  Mental Status  Awake and alert. Oriented to person, place and time. Oriented to person, place, and time.    Cranial Nerves  CN II: Visual acuity is normal. Visual fields full to confrontation.  CN III, IV, VI: Extraocular movements intact bilaterally. Normal lids and orbits bilaterally. Pupils equal round and reactive to light bilaterally.  CN V: Facial sensation is normal.  CN VII: Full and symmetric facial movement.  CN IX, X: Palate elevates symmetrically. Normal gag reflex.  CN XI: Shoulder shrug strength is normal.  CN XII: Tongue midline without atrophy or fasciculations.    Motor                                               Right                     Left  Deltoid                                   5                          5   Biceps                                   5                          5   Iliopsoas                               5                          5   Quadriceps                           5                          5   Hamstring                             5                          5   Gastrocnemius                     5                           5   Anterior tibialis                       5                          5    Sensory  Sensation is intact to light touch, pinprick, vibration and proprioception in all four extremities.    Reflexes  Deep tendon reflexes are 2+ and symmetric in all four extremities.                                            Right                      Left  Brachioradialis                    2+                         2+  Biceps                                 2+                         2+  Triceps                                2+                         2+  Patellar                                2+                         2+  Achilles                                2+                         2+    Coordination    Finger-to-nose, rapid alternating movements and heel-to-shin normal bilaterally without dysmetria.    Gait   Normal gait. Normal gait. Romberg is absent.      Assessment & Plan   Independent Review of Radiographic Studies:      I personally reviewed the images from the following studies.    FINDINGS:    No new neuroimaging.    Assessment/Plan: Based on his previous MRI, he may have some additional micro instability at the L5-S1 level although his facet joints look relatively healthy throughout.  He does have some worsening stenosis mostly secondary to posterior ligamentous hypertrophy at the L3-4 and L4-5 level now compared to his preop MRI.  I do think that weight loss will be very helpful for his symptoms and also for helpful for him to do well after surgery.  Based on his most recent MRI, I would recommend an L3-4, L4-5 hemilaminotomy and decompression alone with perhaps a right-sided hemilaminotomy and evacuation of epidural lipomatosis on the right side well.  This can be done at any time once he feels that his symptoms are not well-controlled anymore but the more weight he loses the better in regards to his overall long-term benefit and recovery.    Medical Decision Making:    Continue with epidural steroid injections weight loss          Diagnoses and all orders for this visit:    1. Lumbar radiculopathy (Primary)             Patient Instructions/Recommendations:    Follow-up in 3 months      Christos Spangler MD  01/17/25  11:11 EST

## 2025-01-17 ENCOUNTER — OFFICE VISIT (OUTPATIENT)
Dept: NEUROSURGERY | Facility: CLINIC | Age: 65
End: 2025-01-17
Payer: COMMERCIAL

## 2025-01-17 VITALS
HEART RATE: 71 BPM | OXYGEN SATURATION: 93 % | SYSTOLIC BLOOD PRESSURE: 138 MMHG | WEIGHT: 228 LBS | TEMPERATURE: 97.1 F | RESPIRATION RATE: 16 BRPM | DIASTOLIC BLOOD PRESSURE: 68 MMHG | BODY MASS INDEX: 36.64 KG/M2 | HEIGHT: 66 IN

## 2025-01-17 DIAGNOSIS — M54.16 LUMBAR RADICULOPATHY: Primary | ICD-10-CM

## 2025-01-21 DIAGNOSIS — N18.32 TYPE 2 DIABETES MELLITUS WITH STAGE 3B CHRONIC KIDNEY DISEASE, WITHOUT LONG-TERM CURRENT USE OF INSULIN: ICD-10-CM

## 2025-01-21 DIAGNOSIS — E11.22 TYPE 2 DIABETES MELLITUS WITH STAGE 3B CHRONIC KIDNEY DISEASE, WITHOUT LONG-TERM CURRENT USE OF INSULIN: ICD-10-CM

## 2025-01-21 RX ORDER — SEMAGLUTIDE 0.68 MG/ML
0.5 INJECTION, SOLUTION SUBCUTANEOUS WEEKLY
Qty: 3 ML | Refills: 0 | Status: CANCELLED | OUTPATIENT
Start: 2025-01-21

## 2025-01-22 DIAGNOSIS — N18.32 TYPE 2 DIABETES MELLITUS WITH STAGE 3B CHRONIC KIDNEY DISEASE, WITHOUT LONG-TERM CURRENT USE OF INSULIN: ICD-10-CM

## 2025-01-22 DIAGNOSIS — E11.22 TYPE 2 DIABETES MELLITUS WITH STAGE 3B CHRONIC KIDNEY DISEASE, WITHOUT LONG-TERM CURRENT USE OF INSULIN: ICD-10-CM

## 2025-01-22 RX ORDER — SEMAGLUTIDE 0.68 MG/ML
0.5 INJECTION, SOLUTION SUBCUTANEOUS WEEKLY
Qty: 3 ML | Refills: 0 | Status: SHIPPED | OUTPATIENT
Start: 2025-01-22

## 2025-01-23 ENCOUNTER — TELEPHONE (OUTPATIENT)
Age: 65
End: 2025-01-23
Payer: COMMERCIAL

## 2025-01-23 NOTE — TELEPHONE ENCOUNTER
I reminded patient to have labs performed prior to appointment on 1/30/2025. Patient was agreeable and understood.

## 2025-01-29 ENCOUNTER — TELEPHONE (OUTPATIENT)
Age: 65
End: 2025-01-29
Payer: COMMERCIAL

## 2025-01-29 NOTE — TELEPHONE ENCOUNTER
I left patient message reminding him to have his labs performed prior to his appointment on 1/30/2025.

## 2025-02-03 ENCOUNTER — LAB (OUTPATIENT)
Facility: HOSPITAL | Age: 65
End: 2025-02-03
Payer: COMMERCIAL

## 2025-02-03 DIAGNOSIS — R74.8 ELEVATED CREATINE KINASE LEVEL: ICD-10-CM

## 2025-02-03 DIAGNOSIS — M1A.0710 CHRONIC GOUT OF RIGHT FOOT, UNSPECIFIED CAUSE: ICD-10-CM

## 2025-02-03 LAB
ALBUMIN SERPL-MCNC: 3.8 G/DL (ref 3.5–5.2)
ALBUMIN/GLOB SERPL: 1.2 G/DL
ALP SERPL-CCNC: 99 U/L (ref 39–117)
ALT SERPL W P-5'-P-CCNC: 73 U/L (ref 1–41)
ANION GAP SERPL CALCULATED.3IONS-SCNC: 13.3 MMOL/L (ref 5–15)
AST SERPL-CCNC: 49 U/L (ref 1–40)
BACTERIA UR QL AUTO: NORMAL /HPF
BASOPHILS # BLD AUTO: 0.05 10*3/MM3 (ref 0–0.2)
BASOPHILS NFR BLD AUTO: 0.6 % (ref 0–1.5)
BILIRUB SERPL-MCNC: 0.8 MG/DL (ref 0–1.2)
BILIRUB UR QL STRIP: NEGATIVE
BUN SERPL-MCNC: 28 MG/DL (ref 8–23)
BUN/CREAT SERPL: 17.4 (ref 7–25)
CALCIUM SPEC-SCNC: 9.3 MG/DL (ref 8.6–10.5)
CHLORIDE SERPL-SCNC: 95 MMOL/L (ref 98–107)
CK SERPL-CCNC: 825 U/L (ref 20–200)
CLARITY UR: CLEAR
CO2 SERPL-SCNC: 28.7 MMOL/L (ref 22–29)
COLOR UR: YELLOW
CREAT SERPL-MCNC: 1.61 MG/DL (ref 0.76–1.27)
CREAT UR-MCNC: 76.2 MG/DL
DEPRECATED RDW RBC AUTO: 45.8 FL (ref 37–54)
EGFRCR SERPLBLD CKD-EPI 2021: 47.8 ML/MIN/1.73
EOSINOPHIL # BLD AUTO: 0.11 10*3/MM3 (ref 0–0.4)
EOSINOPHIL NFR BLD AUTO: 1.3 % (ref 0.3–6.2)
ERYTHROCYTE [DISTWIDTH] IN BLOOD BY AUTOMATED COUNT: 14.9 % (ref 12.3–15.4)
GLOBULIN UR ELPH-MCNC: 3.3 GM/DL
GLUCOSE SERPL-MCNC: 194 MG/DL (ref 65–99)
GLUCOSE UR STRIP-MCNC: NEGATIVE MG/DL
HCT VFR BLD AUTO: 48.8 % (ref 37.5–51)
HGB BLD-MCNC: 16.6 G/DL (ref 13–17.7)
HGB UR QL STRIP.AUTO: NEGATIVE
HOLD SPECIMEN: NORMAL
HYALINE CASTS UR QL AUTO: NORMAL /LPF
IMM GRANULOCYTES # BLD AUTO: 0.06 10*3/MM3 (ref 0–0.05)
IMM GRANULOCYTES NFR BLD AUTO: 0.7 % (ref 0–0.5)
KETONES UR QL STRIP: NEGATIVE
LEUKOCYTE ESTERASE UR QL STRIP.AUTO: NEGATIVE
LYMPHOCYTES # BLD AUTO: 1.43 10*3/MM3 (ref 0.7–3.1)
LYMPHOCYTES NFR BLD AUTO: 17 % (ref 19.6–45.3)
MCH RBC QN AUTO: 28.9 PG (ref 26.6–33)
MCHC RBC AUTO-ENTMCNC: 34 G/DL (ref 31.5–35.7)
MCV RBC AUTO: 85 FL (ref 79–97)
MONOCYTES # BLD AUTO: 0.7 10*3/MM3 (ref 0.1–0.9)
MONOCYTES NFR BLD AUTO: 8.3 % (ref 5–12)
NEUTROPHILS NFR BLD AUTO: 6.04 10*3/MM3 (ref 1.7–7)
NEUTROPHILS NFR BLD AUTO: 72.1 % (ref 42.7–76)
NITRITE UR QL STRIP: NEGATIVE
NRBC BLD AUTO-RTO: 0 /100 WBC (ref 0–0.2)
PH UR STRIP.AUTO: 7 [PH] (ref 5–8)
PLATELET # BLD AUTO: 160 10*3/MM3 (ref 140–450)
PMV BLD AUTO: 10.9 FL (ref 6–12)
POTASSIUM SERPL-SCNC: 3.3 MMOL/L (ref 3.5–5.2)
PROT ?TM UR-MCNC: 40.8 MG/DL
PROT SERPL-MCNC: 7.1 G/DL (ref 6–8.5)
PROT UR QL STRIP: ABNORMAL
PROT/CREAT UR: 535.4 MG/G CREA (ref 0–200)
RBC # BLD AUTO: 5.74 10*6/MM3 (ref 4.14–5.8)
RBC # UR STRIP: NORMAL /HPF
REF LAB TEST METHOD: NORMAL
SODIUM SERPL-SCNC: 137 MMOL/L (ref 136–145)
SP GR UR STRIP: 1.01 (ref 1–1.03)
SQUAMOUS #/AREA URNS HPF: NORMAL /HPF
URATE SERPL-MCNC: 8.5 MG/DL (ref 3.4–7)
UROBILINOGEN UR QL STRIP: ABNORMAL
WBC # UR STRIP: NORMAL /HPF
WBC NRBC COR # BLD AUTO: 8.39 10*3/MM3 (ref 3.4–10.8)

## 2025-02-03 PROCEDURE — 36415 COLL VENOUS BLD VENIPUNCTURE: CPT

## 2025-02-03 PROCEDURE — 84550 ASSAY OF BLOOD/URIC ACID: CPT

## 2025-02-03 PROCEDURE — 82550 ASSAY OF CK (CPK): CPT

## 2025-02-03 PROCEDURE — 80053 COMPREHEN METABOLIC PANEL: CPT

## 2025-02-03 PROCEDURE — 81001 URINALYSIS AUTO W/SCOPE: CPT

## 2025-02-03 PROCEDURE — 85025 COMPLETE CBC W/AUTO DIFF WBC: CPT

## 2025-02-03 PROCEDURE — 82570 ASSAY OF URINE CREATININE: CPT

## 2025-02-03 PROCEDURE — 84156 ASSAY OF PROTEIN URINE: CPT

## 2025-02-04 ENCOUNTER — OFFICE VISIT (OUTPATIENT)
Age: 65
End: 2025-02-04
Payer: COMMERCIAL

## 2025-02-04 ENCOUNTER — OFFICE VISIT (OUTPATIENT)
Dept: INTERNAL MEDICINE | Facility: CLINIC | Age: 65
End: 2025-02-04
Payer: COMMERCIAL

## 2025-02-04 VITALS
TEMPERATURE: 97.8 F | WEIGHT: 235 LBS | BODY MASS INDEX: 37.77 KG/M2 | HEIGHT: 66 IN | DIASTOLIC BLOOD PRESSURE: 86 MMHG | SYSTOLIC BLOOD PRESSURE: 152 MMHG

## 2025-02-04 VITALS
DIASTOLIC BLOOD PRESSURE: 70 MMHG | HEIGHT: 66 IN | WEIGHT: 237.4 LBS | SYSTOLIC BLOOD PRESSURE: 128 MMHG | OXYGEN SATURATION: 99 % | BODY MASS INDEX: 38.15 KG/M2 | TEMPERATURE: 98 F | HEART RATE: 90 BPM

## 2025-02-04 DIAGNOSIS — E11.22 TYPE 2 DIABETES MELLITUS WITH STAGE 3B CHRONIC KIDNEY DISEASE, WITHOUT LONG-TERM CURRENT USE OF INSULIN: Primary | ICD-10-CM

## 2025-02-04 DIAGNOSIS — E66.812 CLASS 2 OBESITY WITHOUT SERIOUS COMORBIDITY WITH BODY MASS INDEX (BMI) OF 37.0 TO 37.9 IN ADULT, UNSPECIFIED OBESITY TYPE: ICD-10-CM

## 2025-02-04 DIAGNOSIS — M1A.0710 CHRONIC GOUT OF RIGHT FOOT, UNSPECIFIED CAUSE: Primary | ICD-10-CM

## 2025-02-04 DIAGNOSIS — I10 ESSENTIAL HYPERTENSION: ICD-10-CM

## 2025-02-04 DIAGNOSIS — R74.8 ELEVATED CREATINE KINASE LEVEL: ICD-10-CM

## 2025-02-04 DIAGNOSIS — R74.01 TRANSAMINITIS: ICD-10-CM

## 2025-02-04 DIAGNOSIS — N18.31 STAGE 3A CHRONIC KIDNEY DISEASE: ICD-10-CM

## 2025-02-04 DIAGNOSIS — N18.32 TYPE 2 DIABETES MELLITUS WITH STAGE 3B CHRONIC KIDNEY DISEASE, WITHOUT LONG-TERM CURRENT USE OF INSULIN: Primary | ICD-10-CM

## 2025-02-04 PROCEDURE — 99214 OFFICE O/P EST MOD 30 MIN: CPT | Performed by: STUDENT IN AN ORGANIZED HEALTH CARE EDUCATION/TRAINING PROGRAM

## 2025-02-04 RX ORDER — SEMAGLUTIDE 1.34 MG/ML
1 INJECTION, SOLUTION SUBCUTANEOUS WEEKLY
Qty: 3 ML | Refills: 2 | Status: SHIPPED | OUTPATIENT
Start: 2025-02-04

## 2025-02-04 RX ORDER — PRAMIPEXOLE DIHYDROCHLORIDE 0.75 MG/1
0.75 TABLET ORAL NIGHTLY
Qty: 30 TABLET | Refills: 2 | Status: SHIPPED | OUTPATIENT
Start: 2025-02-04

## 2025-02-04 NOTE — PROGRESS NOTES
Patient will be here for follow-up visit today.  No need to call patient    Mr. Moseley.  Thank you for the labs.  Here is a summary of your results:  Your protein creatinine ratio is trending up-535.4 (from 280.9 a month ago).  You will need to follow with a nephrologist to optimize your renal function.  Uric acid level-8.5 slightly reduced from last time (8.8)  Yet your chemistries shows slight improvement in your creatinine, and no significant change in your elevated liver enzymes.  CK remains elevated-825 (increased from 596, from last month)  Urinalysis confirms 2+ protein in the urine, consistent with a increased protein creatinine ratio.  No red cell casts seen in the urine.  Cell counts is stable-no evidence of white count, hemoglobin or platelet abnormality  Will discuss your labs during your follow-up visit

## 2025-02-04 NOTE — PROGRESS NOTES
"Chief Complaint  Diabetes    Subjective        Calos Moseley presents to [facility]  History of Present Illness      Presents to clinic today for follow-up    Unfortunately he has had significant life stressors since last visit, his wife is currently undergoing treatment for blood cancer and has upcoming stem cell treatment.    He continues to have severe back pain and is following with neurosurgery.    His blood sugar has been relatively well-controlled he is doing well on Ozempic however has plateaued with weight loss    Objective   Vital Signs:  /86 (BP Location: Left arm, Patient Position: Sitting)   Temp 97.8 °F (36.6 °C)   Ht 167.6 cm (65.98\")   Wt 107 kg (235 lb)   BMI 37.95 kg/m²   Estimated body mass index is 37.95 kg/m² as calculated from the following:    Height as of this encounter: 167.6 cm (65.98\").    Weight as of this encounter: 107 kg (235 lb).            Physical Exam  Vitals reviewed.   Constitutional:       General: He is not in acute distress.     Appearance: Normal appearance. He is obese. He is not toxic-appearing.   HENT:      Head: Normocephalic and atraumatic.   Eyes:      General: No scleral icterus.     Conjunctiva/sclera: Conjunctivae normal.   Skin:     General: Skin is warm and dry.   Neurological:      Mental Status: He is alert and oriented to person, place, and time.      Gait: Gait abnormal.   Psychiatric:         Mood and Affect: Mood normal.         Behavior: Behavior normal.        Result Review :                Assessment and Plan   Diagnoses and all orders for this visit:    1. Type 2 diabetes mellitus with stage 3b chronic kidney disease, without long-term current use of insulin (Primary)  -     Semaglutide, 1 MG/DOSE, (Ozempic, 1 MG/DOSE,) 4 MG/3ML solution pen-injector; Inject 1 mg under the skin into the appropriate area as directed 1 (One) Time Per Week.  Dispense: 3 mL; Refill: 2  -     Hemoglobin A1c; Future    2. Essential " hypertension    3. Transaminitis  -     US Liver; Future      Reviewed neurosurgery, rheumatology OV, labs dating back since last OV with me    I will plan to increase Ozempic to 1 mg to assist with weight loss, which I think will significantly benefit his chronic back pain.  Will plan to repeat A1c with his upcoming labs with rheumatology    His blood pressure continues to remain elevated that I think is more so secondary to underlying stress and anxiety.  For now we will make no medication changes however I would like him to bring all his medications to next visit due to concern for polypharmacy    He also continues to have transaminitis, I suspect this is all fatty liver however we will plan to obtain ultrasound for further evaluation.    Return to clinic in 3 months or sooner as needed, labs prior             Follow Up   Return in about 3 months (around 5/4/2025).  Patient was given instructions and counseling regarding his condition or for health maintenance advice. Please see specific information pulled into the AVS if appropriate.

## 2025-02-04 NOTE — PROGRESS NOTES
RHEUMATOLOGY FOLLOW UP VISIT  2025  Patient Name: Calos Moseley : 1960 Medical Record: [id number]  History of Present Illness  The patient is a 64-year-old male who presents for follow-up.    Interval events:  He reports no recent exacerbations of his gout condition.   He has been experiencing significant stress over the past two weeks, which has led to some inconsistencies in his medication regimen.   He acknowledges that he has not been adhering to his prescribed allopurinol dosage of 300 mg daily.   His pulm function remains stable, with no reported coughing or other issues. He reports no chest pain or shortness of breath and maintains an active lifestyle. His muscle strength  remains intact.     Results  2/3/25  Protein creatinine ratio is trending up-535.4 (from 280.9 a month ago).     Uric acid level-8.5 slightly reduced from last time (8.8)   chemistries shows slight improvement in  creatinine, and no significant change in  elevated liver enzymes.  CK remains elevated-825 (increased from 596, from last month)  Urinalysis confirms 2+ protein in the urine, consistent with a increased protein creatinine ratio.  No red cell casts seen in the urine.  Cell counts is stable-no evidence of white count, hemoglobin or platelet abnormality       Rheumatology History:  Gout: Currently on allopurinol 250 mg daily to date, with low-dose prednisone 10 mg daily.  He has positive RAJAN (1: 160), normal complements, negative ANCA, elevated CK, LDH, transaminases.    Myositis panel is negative.  No evidence of weakness.  He has abnormal chest CT with mild interstitial changes and scarring from prior empyema.    24: PFT-   Spirometry- FVC 60%; FEV1 (57%), FEV1/FVC (94%) -->mixed restriction and obstruction  DLCO- 81%--> wnl  Lung volumes- TLC (68%), SVC (54%), RV (99%)--> moderate restriction     Physical Exam  Gen: Well-developed, well-nourished adult in no apparent distress. Pleasant and cooperative. Alert and  oriented.   HEENT: EOMI, MMM  Chest: Normal work of breathing.  Extremities: Warm, 2+ distal pulses, no cyanosis, clubbing, or edema.  Neuro: Good comprehension/cognition. Gait normal.  Skin: No alopecia, nail change (including no nail pitting), rashes, bruising, petechiae, sclerodactyly, digital pits, telangiectasias, tophi, appreciable calcinosis, or nodules.    Comprehensive Musculoskeletal Examination:  - Shoulders, elbows, wrists, hands/fingers, knees, ankles, feet/toes: No deformity, erythema, warmth, swelling, effusion, tenderness, or limited ROM.  - Lumbosacral spine and hips without limited ROM. Negative NHAN.    Assessment & Plan  Calos Moseley is a 64 y.o. male who is being seen for follow-up gout with predominant right ankle and foot involvement.    He also has a positive RAJAN, elevated creatinine kinase and history of pneumonia with empyema.      Regarding his gout.  His uric acid levels have decreased from 16--> 8.8-->, correlating with a reduction in gout flares. The goal is to achieve a uric acid level of 5.   He had a lot of stress in the interval, caring for a sick wife. He admits not adhering much to his meds, but fortunately he did not experience a gout flare.  I will not increase his allopurinol dosage this visit until things settle down for patient when we can predict medication use.       Regarding his interstitial lung changes on imaging and the history of pneumonia:  His breathing is stable with no current issues such as coughing, chest pain, or shortness of breath.    Pulmonary function tests (PFTs) have been ordered and is pending to establish a baseline and monitor for any changes.    Regarding his elevated CK, transaminase and renal function.   It seems unlikely that his has a autoimmune myositis with an associated pulm involvement.  His myositis panel is negative and recent pulmonary function test on 12/16/2024 did not show evidence of abnormal DLCO.  His spirometry showed a mixed  restrictive and obstructive pattern.  Clinically patient has no complaints or limitations of breathing or performance.  His CK and elevated transaminases may be related to his metabolic condition and renal dysfunction...  Interval labs show renewed elevation of CK and protein creatinine ratio.    He will continue to follow with primary care and other specialties for these conditions     Follow-up  The patient will follow up in 3 months.  Will have patient perform labs prior to the visit       Diagnoses and all orders for this visit:    1. Chronic gout of right foot, unspecified cause (Primary)  -     Uric Acid; Future  -     CBC & Differential; Future  -     Comprehensive Metabolic Panel; Future  -     Protein / Creatinine Ratio, Urine - Urine, Clean Catch; Future  -     Urinalysis With Microscopic If Indicated (No Culture) - Urine, Clean Catch; Future    2. Elevated creatine kinase level  -     Uric Acid; Future  -     CBC & Differential; Future  -     Comprehensive Metabolic Panel; Future  -     Protein / Creatinine Ratio, Urine - Urine, Clean Catch; Future  -     Urinalysis With Microscopic If Indicated (No Culture) - Urine, Clean Catch; Future    3. Stage 3a chronic kidney disease  -     Uric Acid; Future  -     CBC & Differential; Future  -     Comprehensive Metabolic Panel; Future  -     Protein / Creatinine Ratio, Urine - Urine, Clean Catch; Future  -     Urinalysis With Microscopic If Indicated (No Culture) - Urine, Clean Catch; Future       Return in about 3 months (around 5/4/2025) for Obtain labs prior to next visit, Today.      I spent 30 minutes caring for Calos on this date of service. This time includes time spent by me in the following activities:preparing for the visit, reviewing tests, obtaining and/or reviewing a separately obtained history, performing a medically appropriate examination and/or evaluation , counseling and educating the patient/family/caregiver, ordering medications, tests, or  procedures, documenting information in the medical record, and independently interpreting results and communicating that information with the patient/family/caregiver

## 2025-02-07 NOTE — PATIENT INSTRUCTIONS
Seen for follow-up today.  Acknowledge social stressors lately which may be interfering with your care    No change in allopurinol dose for now - continue 300 mg plus dietary measures  Continue other follow-up for your comorbidities  Will order labs to be done in 6 weeks

## 2025-03-07 RX ORDER — CARVEDILOL 25 MG/1
25 TABLET ORAL EVERY 12 HOURS SCHEDULED
Qty: 180 TABLET | Refills: 0 | Status: SHIPPED | OUTPATIENT
Start: 2025-03-07

## 2025-03-17 DIAGNOSIS — E03.9 HYPOTHYROIDISM, UNSPECIFIED TYPE: Primary | ICD-10-CM

## 2025-03-17 RX ORDER — LEVOTHYROXINE SODIUM 50 UG/1
50 TABLET ORAL EVERY MORNING
Qty: 90 TABLET | Refills: 1 | Status: SHIPPED | OUTPATIENT
Start: 2025-03-17

## 2025-03-18 ENCOUNTER — LAB (OUTPATIENT)
Facility: HOSPITAL | Age: 65
End: 2025-03-18
Payer: COMMERCIAL

## 2025-03-18 ENCOUNTER — TRANSCRIBE ORDERS (OUTPATIENT)
Dept: ADMINISTRATIVE | Facility: HOSPITAL | Age: 65
End: 2025-03-18
Payer: COMMERCIAL

## 2025-03-18 DIAGNOSIS — E87.6 HYPOPOTASSEMIA: ICD-10-CM

## 2025-03-18 DIAGNOSIS — D50.9 IRON DEFICIENCY ANEMIA, UNSPECIFIED IRON DEFICIENCY ANEMIA TYPE: ICD-10-CM

## 2025-03-18 DIAGNOSIS — R60.9 EDEMA, UNSPECIFIED TYPE: ICD-10-CM

## 2025-03-18 DIAGNOSIS — N18.31 STAGE 3A CHRONIC KIDNEY DISEASE: ICD-10-CM

## 2025-03-18 DIAGNOSIS — M1A.0710 CHRONIC GOUT OF RIGHT FOOT, UNSPECIFIED CAUSE: ICD-10-CM

## 2025-03-18 DIAGNOSIS — I51.9 HEART DISEASE, UNSPECIFIED: ICD-10-CM

## 2025-03-18 DIAGNOSIS — I12.9 HYPERTENSIVE NEPHROPATHY: ICD-10-CM

## 2025-03-18 DIAGNOSIS — R74.8 ELEVATED CREATINE KINASE LEVEL: ICD-10-CM

## 2025-03-18 DIAGNOSIS — N18.32 CHRONIC KIDNEY DISEASE (CKD) STAGE G3B/A1, MODERATELY DECREASED GLOMERULAR FILTRATION RATE (GFR) BETWEEN 30-44 ML/MIN/1.73 SQUARE METER AND ALBUMINURIA CREATININE RATIO LESS THAN 30 MG/G (CMS/H*: ICD-10-CM

## 2025-03-18 DIAGNOSIS — N20.0 URIC ACID NEPHROLITHIASIS: ICD-10-CM

## 2025-03-18 DIAGNOSIS — N18.32 CHRONIC KIDNEY DISEASE (CKD) STAGE G3B/A1, MODERATELY DECREASED GLOMERULAR FILTRATION RATE (GFR) BETWEEN 30-44 ML/MIN/1.73 SQUARE METER AND ALBUMINURIA CREATININE RATIO LESS THAN 30 MG/G (CMS/H*: Primary | ICD-10-CM

## 2025-03-18 LAB
ALBUMIN SERPL-MCNC: 4.5 G/DL (ref 3.5–5.2)
ALBUMIN UR-MCNC: 4.3 MG/DL
ALBUMIN/GLOB SERPL: 1.3 G/DL
ALP SERPL-CCNC: 117 U/L (ref 39–117)
ALT SERPL W P-5'-P-CCNC: 40 U/L (ref 1–41)
ANION GAP SERPL CALCULATED.3IONS-SCNC: 11 MMOL/L (ref 5–15)
AST SERPL-CCNC: 31 U/L (ref 1–40)
BASOPHILS # BLD AUTO: 0.04 10*3/MM3 (ref 0–0.2)
BASOPHILS NFR BLD AUTO: 0.5 % (ref 0–1.5)
BILIRUB SERPL-MCNC: 0.7 MG/DL (ref 0–1.2)
BILIRUB UR QL STRIP: NEGATIVE
BUN SERPL-MCNC: 29 MG/DL (ref 8–23)
BUN/CREAT SERPL: 16.6 (ref 7–25)
CALCIUM SPEC-SCNC: 10.4 MG/DL (ref 8.6–10.5)
CHLORIDE SERPL-SCNC: 90 MMOL/L (ref 98–107)
CLARITY UR: CLEAR
CO2 SERPL-SCNC: 38 MMOL/L (ref 22–29)
COLOR UR: YELLOW
CREAT SERPL-MCNC: 1.75 MG/DL (ref 0.76–1.27)
CREAT UR-MCNC: 18.3 MG/DL
CREAT UR-MCNC: 18.9 MG/DL
DEPRECATED RDW RBC AUTO: 43.7 FL (ref 37–54)
EGFRCR SERPLBLD CKD-EPI 2021: 43.2 ML/MIN/1.73
EOSINOPHIL # BLD AUTO: 0.18 10*3/MM3 (ref 0–0.4)
EOSINOPHIL NFR BLD AUTO: 2.2 % (ref 0.3–6.2)
ERYTHROCYTE [DISTWIDTH] IN BLOOD BY AUTOMATED COUNT: 14 % (ref 12.3–15.4)
GLOBULIN UR ELPH-MCNC: 3.4 GM/DL
GLUCOSE SERPL-MCNC: 121 MG/DL (ref 65–99)
GLUCOSE UR STRIP-MCNC: ABNORMAL MG/DL
HCT VFR BLD AUTO: 54.2 % (ref 37.5–51)
HGB BLD-MCNC: 17.6 G/DL (ref 13–17.7)
HGB UR QL STRIP.AUTO: NEGATIVE
IMM GRANULOCYTES # BLD AUTO: 0.03 10*3/MM3 (ref 0–0.05)
IMM GRANULOCYTES NFR BLD AUTO: 0.4 % (ref 0–0.5)
KETONES UR QL STRIP: NEGATIVE
LEUKOCYTE ESTERASE UR QL STRIP.AUTO: NEGATIVE
LYMPHOCYTES # BLD AUTO: 1.79 10*3/MM3 (ref 0.7–3.1)
LYMPHOCYTES NFR BLD AUTO: 21.6 % (ref 19.6–45.3)
MAGNESIUM SERPL-MCNC: 2.4 MG/DL (ref 1.6–2.4)
MCH RBC QN AUTO: 28.1 PG (ref 26.6–33)
MCHC RBC AUTO-ENTMCNC: 32.5 G/DL (ref 31.5–35.7)
MCV RBC AUTO: 86.4 FL (ref 79–97)
MICROALBUMIN/CREAT UR: 227.5 MG/G (ref 0–29)
MONOCYTES # BLD AUTO: 0.79 10*3/MM3 (ref 0.1–0.9)
MONOCYTES NFR BLD AUTO: 9.5 % (ref 5–12)
NEUTROPHILS NFR BLD AUTO: 5.46 10*3/MM3 (ref 1.7–7)
NEUTROPHILS NFR BLD AUTO: 65.8 % (ref 42.7–76)
NITRITE UR QL STRIP: NEGATIVE
NRBC BLD AUTO-RTO: 0 /100 WBC (ref 0–0.2)
PH UR STRIP.AUTO: 8 [PH] (ref 5–8)
PHOSPHATE SERPL-MCNC: 2.9 MG/DL (ref 2.5–4.5)
PLATELET # BLD AUTO: 207 10*3/MM3 (ref 140–450)
PMV BLD AUTO: 11.5 FL (ref 6–12)
POTASSIUM SERPL-SCNC: 2.9 MMOL/L (ref 3.5–5.2)
PROT ?TM UR-MCNC: 8.2 MG/DL
PROT SERPL-MCNC: 7.9 G/DL (ref 6–8.5)
PROT UR QL STRIP: NEGATIVE
PROT/CREAT UR: 448.1 MG/G CREA (ref 0–200)
PTH-INTACT SERPL-MCNC: 63.9 PG/ML (ref 15–65)
RBC # BLD AUTO: 6.27 10*6/MM3 (ref 4.14–5.8)
SODIUM SERPL-SCNC: 139 MMOL/L (ref 136–145)
SP GR UR STRIP: 1.01 (ref 1–1.03)
URATE SERPL-MCNC: 6.9 MG/DL (ref 3.4–7)
UROBILINOGEN UR QL STRIP: ABNORMAL
WBC NRBC COR # BLD AUTO: 8.29 10*3/MM3 (ref 3.4–10.8)

## 2025-03-18 PROCEDURE — 82570 ASSAY OF URINE CREATININE: CPT

## 2025-03-18 PROCEDURE — 84100 ASSAY OF PHOSPHORUS: CPT

## 2025-03-18 PROCEDURE — 84550 ASSAY OF BLOOD/URIC ACID: CPT

## 2025-03-18 PROCEDURE — 82043 UR ALBUMIN QUANTITATIVE: CPT

## 2025-03-18 PROCEDURE — 83970 ASSAY OF PARATHORMONE: CPT

## 2025-03-18 PROCEDURE — 81003 URINALYSIS AUTO W/O SCOPE: CPT

## 2025-03-18 PROCEDURE — 83036 HEMOGLOBIN GLYCOSYLATED A1C: CPT | Performed by: STUDENT IN AN ORGANIZED HEALTH CARE EDUCATION/TRAINING PROGRAM

## 2025-03-18 PROCEDURE — 84156 ASSAY OF PROTEIN URINE: CPT

## 2025-03-18 PROCEDURE — 83735 ASSAY OF MAGNESIUM: CPT

## 2025-03-18 PROCEDURE — 85025 COMPLETE CBC W/AUTO DIFF WBC: CPT

## 2025-03-18 PROCEDURE — 80053 COMPREHEN METABOLIC PANEL: CPT

## 2025-03-25 RX ORDER — POTASSIUM CHLORIDE 750 MG/1
10 CAPSULE, EXTENDED RELEASE ORAL DAILY
Qty: 30 CAPSULE | Refills: 2 | Status: SHIPPED | OUTPATIENT
Start: 2025-03-25

## 2025-04-21 ENCOUNTER — OFFICE VISIT (OUTPATIENT)
Dept: INTERNAL MEDICINE | Facility: CLINIC | Age: 65
End: 2025-04-21
Payer: COMMERCIAL

## 2025-04-21 VITALS
HEART RATE: 74 BPM | WEIGHT: 222.4 LBS | SYSTOLIC BLOOD PRESSURE: 124 MMHG | DIASTOLIC BLOOD PRESSURE: 76 MMHG | BODY MASS INDEX: 35.74 KG/M2 | OXYGEN SATURATION: 96 % | HEIGHT: 66 IN

## 2025-04-21 DIAGNOSIS — M1A.9XX0 CHRONIC GOUT WITHOUT TOPHUS, UNSPECIFIED CAUSE, UNSPECIFIED SITE: Primary | ICD-10-CM

## 2025-04-21 PROCEDURE — 99214 OFFICE O/P EST MOD 30 MIN: CPT | Performed by: STUDENT IN AN ORGANIZED HEALTH CARE EDUCATION/TRAINING PROGRAM

## 2025-04-21 RX ORDER — PRAMIPEXOLE DIHYDROCHLORIDE 0.75 MG/1
0.75 TABLET ORAL NIGHTLY
Qty: 90 TABLET | Refills: 0 | Status: SHIPPED | OUTPATIENT
Start: 2025-04-21 | End: 2025-04-28 | Stop reason: SDUPTHER

## 2025-04-21 RX ORDER — PREDNISONE 10 MG/1
TABLET ORAL
Qty: 30 TABLET | Refills: 0 | Status: SHIPPED | OUTPATIENT
Start: 2025-04-21 | End: 2025-05-01

## 2025-04-21 NOTE — PROGRESS NOTES
"Chief Complaint  No chief complaint on file.    Subjective        Calos Moseley presents to [facility]  Pain      Presents to clinic today with complaints of foot pain    He states he has report concern for gout, he has had some left-sided foot pain for past week and family told him it was gout. He denies any joint swelling, no trauma.       Objective   Vital Signs:  /76   Pulse 74   Ht 167.5 cm (65.96\")   Wt 101 kg (222 lb 6.4 oz)   SpO2 96%   BMI 35.94 kg/m²   Estimated body mass index is 35.94 kg/m² as calculated from the following:    Height as of this encounter: 167.5 cm (65.96\").    Weight as of this encounter: 101 kg (222 lb 6.4 oz).            Physical Exam  Vitals reviewed.   Constitutional:       General: He is not in acute distress.     Appearance: Normal appearance. He is not toxic-appearing.   HENT:      Head: Normocephalic and atraumatic.   Eyes:      General: No scleral icterus.     Conjunctiva/sclera: Conjunctivae normal.   Musculoskeletal:         General: Tenderness present. No swelling or deformity.   Skin:     General: Skin is warm and dry.   Neurological:      Mental Status: He is alert and oriented to person, place, and time.   Psychiatric:         Mood and Affect: Mood normal.         Behavior: Behavior normal.        Result Review :                Assessment and Plan   Diagnoses and all orders for this visit:    1. Chronic gout without tophus, unspecified cause, unspecified site (Primary)  -     predniSONE (DELTASONE) 10 MG tablet; Take 4 tablets by mouth Daily for 4 days, THEN 3 tablets Daily for 3 days, THEN 2 tablets Daily for 2 days, THEN 1 tablet Daily for 1 day.  Dispense: 30 tablet; Refill: 0      His presentation is consistent with acute on chronic gout despite no joint swelling or tophi appreciable, I will try to minimize long term steroid use as he is also on allopurinol and I will see him shortly, will try steroid for about 10 days and see " him back in early May as scheduled        Follow Up   No follow-ups on file.  Patient was given instructions and counseling regarding his condition or for health maintenance advice. Please see specific information pulled into the AVS if appropriate.

## 2025-04-27 DIAGNOSIS — N18.32 TYPE 2 DIABETES MELLITUS WITH STAGE 3B CHRONIC KIDNEY DISEASE, WITHOUT LONG-TERM CURRENT USE OF INSULIN: ICD-10-CM

## 2025-04-27 DIAGNOSIS — E11.22 TYPE 2 DIABETES MELLITUS WITH STAGE 3B CHRONIC KIDNEY DISEASE, WITHOUT LONG-TERM CURRENT USE OF INSULIN: ICD-10-CM

## 2025-04-28 RX ORDER — PRAMIPEXOLE DIHYDROCHLORIDE 0.75 MG/1
0.75 TABLET ORAL NIGHTLY
Qty: 90 TABLET | Refills: 0 | Status: SHIPPED | OUTPATIENT
Start: 2025-04-28

## 2025-04-28 RX ORDER — SEMAGLUTIDE 1.34 MG/ML
1 INJECTION, SOLUTION SUBCUTANEOUS WEEKLY
Qty: 3 ML | Refills: 2 | Status: SHIPPED | OUTPATIENT
Start: 2025-04-28

## 2025-04-29 ENCOUNTER — HOSPITAL ENCOUNTER (OUTPATIENT)
Dept: ULTRASOUND IMAGING | Facility: HOSPITAL | Age: 65
Discharge: HOME | End: 2025-04-29
Admitting: STUDENT IN AN ORGANIZED HEALTH CARE EDUCATION/TRAINING PROGRAM
Payer: COMMERCIAL

## 2025-04-29 DIAGNOSIS — R74.01 TRANSAMINITIS: ICD-10-CM

## 2025-04-29 PROCEDURE — 76705 ECHO EXAM OF ABDOMEN: CPT

## 2025-05-05 ENCOUNTER — OFFICE VISIT (OUTPATIENT)
Dept: INTERNAL MEDICINE | Facility: CLINIC | Age: 65
End: 2025-05-05
Payer: COMMERCIAL

## 2025-05-05 VITALS
TEMPERATURE: 98.6 F | BODY MASS INDEX: 37.15 KG/M2 | SYSTOLIC BLOOD PRESSURE: 138 MMHG | DIASTOLIC BLOOD PRESSURE: 62 MMHG | HEIGHT: 65 IN | WEIGHT: 223 LBS | HEART RATE: 71 BPM

## 2025-05-05 DIAGNOSIS — E11.22 TYPE 2 DIABETES MELLITUS WITH STAGE 3B CHRONIC KIDNEY DISEASE, WITHOUT LONG-TERM CURRENT USE OF INSULIN: Primary | ICD-10-CM

## 2025-05-05 DIAGNOSIS — N18.32 TYPE 2 DIABETES MELLITUS WITH STAGE 3B CHRONIC KIDNEY DISEASE, WITHOUT LONG-TERM CURRENT USE OF INSULIN: Primary | ICD-10-CM

## 2025-05-05 DIAGNOSIS — E78.5 HYPERLIPIDEMIA, UNSPECIFIED HYPERLIPIDEMIA TYPE: ICD-10-CM

## 2025-05-05 DIAGNOSIS — K76.0 METABOLIC DYSFUNCTION-ASSOCIATED STEATOTIC LIVER DISEASE (MASLD): ICD-10-CM

## 2025-05-05 DIAGNOSIS — M1A.9XX0 CHRONIC GOUT WITHOUT TOPHUS, UNSPECIFIED CAUSE, UNSPECIFIED SITE: ICD-10-CM

## 2025-05-05 DIAGNOSIS — E66.812 CLASS 2 OBESITY WITHOUT SERIOUS COMORBIDITY WITH BODY MASS INDEX (BMI) OF 37.0 TO 37.9 IN ADULT, UNSPECIFIED OBESITY TYPE: ICD-10-CM

## 2025-05-05 NOTE — PROGRESS NOTES
"Chief Complaint  Diabetes    Subjective        Calos Moseley presents to [facility]IMARY [facility]  History of Present Illness    Presents to clinic today for follow-up    He is doing overall relatively well since last visit, he states that his gout and foot pain is largely go away and he has since completed a prednisone taper.    He does express some frustration with plateau of weight loss, he has been steady at 220 for at least the past few months despite adequate portion control and exercising as able.    Objective   Vital Signs:  /62   Pulse 71   Temp 98.6 °F (37 °C)   Ht 166 cm (65.35\")   Wt 101 kg (223 lb)   BMI 36.71 kg/m²   Estimated body mass index is 36.71 kg/m² as calculated from the following:    Height as of this encounter: 166 cm (65.35\").    Weight as of this encounter: 101 kg (223 lb).            Physical Exam  Vitals reviewed.   Constitutional:       General: He is not in acute distress.     Appearance: Normal appearance. He is obese. He is not toxic-appearing.   HENT:      Head: Normocephalic and atraumatic.   Eyes:      General: No scleral icterus.     Conjunctiva/sclera: Conjunctivae normal.   Skin:     General: Skin is warm and dry.   Neurological:      Mental Status: He is alert and oriented to person, place, and time.   Psychiatric:         Mood and Affect: Mood normal.         Behavior: Behavior normal.        Result Review :                Assessment and Plan   Diagnoses and all orders for this visit:    1. Type 2 diabetes mellitus with stage 3b chronic kidney disease, without long-term current use of insulin (Primary)  -     Comprehensive Metabolic Panel; Future  -     Hemoglobin A1c; Future    2. Class 2 obesity without serious comorbidity with body mass index (BMI) of 37.0 to 37.9 in adult, unspecified obesity type    3. Metabolic dysfunction-associated steatotic liver disease (MASLD)  -     Comprehensive Metabolic Panel; Future  -     Lipid Panel With / Chol / " HDL Ratio; Future    4. Chronic gout without tophus, unspecified cause, unspecified site    5. Hyperlipidemia, unspecified hyperlipidemia type  -     Lipid Panel With / Chol / HDL Ratio; Future      Clinically stable chronic conditions listed above. BP is slightly above goal today however I would like to see if we can improve weight loss before starting additional medication, as we will plan to go up to 2mg on ozempic when he is due for refill. This will also help with his MASLD with recent liver US demonstrated hepatic steatosis. LFTs improved on most recent check    RTC in 3mo, labs prior          Follow Up   Return in about 3 months (around 8/5/2025) for Lab before FU.  Patient was given instructions and counseling regarding his condition or for health maintenance advice. Please see specific information pulled into the AVS if appropriate.

## 2025-06-03 ENCOUNTER — LAB (OUTPATIENT)
Facility: HOSPITAL | Age: 65
End: 2025-06-03
Payer: COMMERCIAL

## 2025-06-03 DIAGNOSIS — K76.0 METABOLIC DYSFUNCTION-ASSOCIATED STEATOTIC LIVER DISEASE (MASLD): ICD-10-CM

## 2025-06-03 DIAGNOSIS — Z79.899 MEDICATION MANAGEMENT: ICD-10-CM

## 2025-06-03 DIAGNOSIS — M1A.0710 CHRONIC GOUT OF RIGHT FOOT, UNSPECIFIED CAUSE: Primary | ICD-10-CM

## 2025-06-03 DIAGNOSIS — M1A.0710 CHRONIC GOUT OF RIGHT FOOT, UNSPECIFIED CAUSE: ICD-10-CM

## 2025-06-03 DIAGNOSIS — E78.5 HYPERLIPIDEMIA, UNSPECIFIED HYPERLIPIDEMIA TYPE: ICD-10-CM

## 2025-06-03 LAB
ALBUMIN SERPL-MCNC: 4.3 G/DL (ref 3.5–5.2)
ALBUMIN/GLOB SERPL: 1.4 G/DL
ALP SERPL-CCNC: 118 U/L (ref 39–117)
ALT SERPL W P-5'-P-CCNC: 59 U/L (ref 1–41)
ANION GAP SERPL CALCULATED.3IONS-SCNC: 11.8 MMOL/L (ref 5–15)
AST SERPL-CCNC: 35 U/L (ref 1–40)
BILIRUB SERPL-MCNC: 0.9 MG/DL (ref 0–1.2)
BUN SERPL-MCNC: 32 MG/DL (ref 8–23)
BUN/CREAT SERPL: 15.9 (ref 7–25)
CALCIUM SPEC-SCNC: 9.9 MG/DL (ref 8.6–10.5)
CHLORIDE SERPL-SCNC: 92 MMOL/L (ref 98–107)
CK SERPL-CCNC: 133 U/L (ref 20–200)
CO2 SERPL-SCNC: 37.2 MMOL/L (ref 22–29)
CREAT SERPL-MCNC: 2.01 MG/DL (ref 0.76–1.27)
EGFRCR SERPLBLD CKD-EPI 2021: 36.6 ML/MIN/1.73
GLOBULIN UR ELPH-MCNC: 3.1 GM/DL
GLUCOSE SERPL-MCNC: 164 MG/DL (ref 65–99)
POTASSIUM SERPL-SCNC: 3 MMOL/L (ref 3.5–5.2)
PROT SERPL-MCNC: 7.4 G/DL (ref 6–8.5)
SODIUM SERPL-SCNC: 141 MMOL/L (ref 136–145)
URATE SERPL-MCNC: 7 MG/DL (ref 3.4–7)

## 2025-06-03 PROCEDURE — 80053 COMPREHEN METABOLIC PANEL: CPT

## 2025-06-03 PROCEDURE — 82550 ASSAY OF CK (CPK): CPT

## 2025-06-03 PROCEDURE — 36415 COLL VENOUS BLD VENIPUNCTURE: CPT

## 2025-06-03 PROCEDURE — 84550 ASSAY OF BLOOD/URIC ACID: CPT

## 2025-06-04 RX ORDER — CARVEDILOL 25 MG/1
25 TABLET ORAL EVERY 12 HOURS SCHEDULED
Qty: 180 TABLET | Refills: 0 | Status: SHIPPED | OUTPATIENT
Start: 2025-06-04

## 2025-06-09 ENCOUNTER — OFFICE VISIT (OUTPATIENT)
Age: 65
End: 2025-06-09
Payer: COMMERCIAL

## 2025-06-09 VITALS
TEMPERATURE: 97.5 F | HEART RATE: 65 BPM | DIASTOLIC BLOOD PRESSURE: 90 MMHG | OXYGEN SATURATION: 98 % | WEIGHT: 224.8 LBS | SYSTOLIC BLOOD PRESSURE: 134 MMHG | HEIGHT: 65 IN | BODY MASS INDEX: 37.45 KG/M2

## 2025-06-09 DIAGNOSIS — M1A.0710 CHRONIC GOUT OF RIGHT FOOT, UNSPECIFIED CAUSE: Primary | ICD-10-CM

## 2025-06-09 DIAGNOSIS — N18.31 STAGE 3A CHRONIC KIDNEY DISEASE: ICD-10-CM

## 2025-06-09 DIAGNOSIS — M54.16 LUMBAR RADICULOPATHY: ICD-10-CM

## 2025-06-09 NOTE — PATIENT INSTRUCTIONS
You are seen for follow-up  You have not had any flares in the interval and recent uric acid level is 7.0  Your exam did not show any evidence of flares, swellings, pain  We will continue with the 300 mg allopurinol daily  Continue with lifestyle modification-weight loss, avoidance of gout provoking diet  Will see you in 6 months.  Please have your labs done prior to that visit

## 2025-06-09 NOTE — PROGRESS NOTES
RHEUMATOLOGY FOLLOW UP VISIT  2025  Patient Name: Calos Moseley : 1960 Medical Record: [id number]       History of Present Illness  Calos Moseley returns for follow up for gout. He has multiple comorbidity.    In the interval:    He reports no recent flare-ups of his gout condition. Colchicine was discontinued due to its impact on kidney function, but he continues to take allopurinol 300 mg daily.  His main complaint today is that of his low back pain around the post surgical site. He has an upcoming appointment to see his neurosurgeon.     Rheumatology History:   Gout: Currently on allopurinol 250 mg daily to date, with low-dose prednisone 10 mg daily.  He has positive RAJAN (1: 160), normal complements, negative ANCA, elevated CK, LDH, transaminases.    Myositis panel is negative.  No evidence of weakness.  He has abnormal chest CT with mild interstitial changes and scarring from prior empyema.    24: PFT-   Spirometry- FVC 60%; FEV1 (57%), FEV1/FVC (94%) -->mixed restriction and obstruction  DLCO- 81%--> wnl  Lung volumes- TLC (68%), SVC (54%), RV (99%)--> moderate restriction  Results  Labs- 6/3/25   - Uric Acid: 7.0 mg/dL (was 6.9 mg/dL last time)   - Creatinine Kinase: 133 U/L   - eGFR: 36 mL/min/1.73m²  2/3/25  Protein creatinine ratio is trending up-535.4 (from 280.9 a month ago).     Uric acid level-8.5 slightly reduced from last time (8.8)   chemistries shows slight improvement in  creatinine, and no significant change in  elevated liver enzymes.  CK remains elevated-825 (increased from 596, from last month)  Urinalysis confirms 2+ protein in the urine, consistent with a increased protein creatinine ratio.  No red cell casts seen in the urine.  Cell counts is stable-no evidence of white count, hemoglobin or platelet abnormality    Outpatient Medications Prior to Visit   Medication Sig Dispense Refill    acetaminophen (TYLENOL) 325 MG tablet Take 2 tablets by mouth Every 12 (Twelve) Hours.       allopurinol (ZYLOPRIM) 300 MG tablet Take 1 tablet by mouth Daily. 90 tablet 3    buPROPion XL (WELLBUTRIN XL) 150 MG 24 hr tablet Take 1 tablet by mouth Daily. 90 tablet 1    carvedilol (COREG) 25 MG tablet Take 1 tablet by mouth Every 12 (Twelve) Hours. 180 tablet 0    cyclobenzaprine (FLEXERIL) 5 MG tablet Take 1-2 tablets by mouth At Night As Needed for muscle spasms . 60 tablet 3    DULoxetine (CYMBALTA) 30 MG capsule Take 1 capsule by mouth 2 times per day 180 capsule 1    empagliflozin (Jardiance) 10 MG tablet tablet Take 1 tablet by mouth Every Morning. 90 tablet 3    Fluticasone-Umeclidin-Vilant (Trelegy Ellipta) 100-62.5-25 MCG/ACT inhaler Inhale 1 puff Daily. 60 each 1    gabapentin (NEURONTIN) 300 MG capsule Take 1 capsule by mouth 3 (Three) Times a Day. 90 capsule 2    hydrALAZINE (APRESOLINE) 50 MG tablet Take 2 tablets by mouth 3 times a day in the morning, evening, and before bedtime. 270 tablet 3    HYDROcodone-acetaminophen (NORCO) 5-325 MG per tablet Take 1-2 tablets by mouth Every 6 (Six) Hours As Needed for Moderate Pain. 20 tablet 0    isosorbide mononitrate (IMDUR) 30 MG 24 hr tablet Take 2 tablets by mouth Daily. Do not crush or chew. 60 tablet 11    lamoTRIgine  MG tablet sustained-release 24 hour Take 1 tablet by mouth Daily. 90 tablet 1    levothyroxine (SYNTHROID, LEVOTHROID) 50 MCG tablet Take 1 tablet by mouth daily in the morning on an empty stomach 90 tablet 1    metOLazone (ZAROXOLYN) 2.5 MG tablet Take 1 tablet by mouth 1 (One) Time Per Week. 4 tablet 2    naltrexone (DEPADE) 50 MG tablet Take 1 tablet by mouth daily 30 tablet 3    potassium citrate (UROCIT-K) 10 MEQ (1080 MG) CR tablet Take 2 tablets (20 mEq total) by mouth in the morning and 2 tablets (20 mEq total) in the evening. Take with meals. Do not crush, chew, or split. 360 tablet 3    pramipexole (MIRAPEX) 0.75 MG tablet Take 1 tablet by mouth Every Night. 90 tablet 0    Semaglutide, 1 MG/DOSE, (Ozempic, 1  MG/DOSE,) 4 MG/3ML solution pen-injector Inject 1 mg under the skin into the appropriate area as directed 1 (One) Time Per Week. 3 mL 2    tamsulosin (Flomax) 0.4 MG capsule 24 hr capsule Take 1 capsule by mouth every night at bedtime. 90 capsule 3    torsemide (DEMADEX) 20 MG tablet Take 4 tablets by mouth 4 (Four) Times a Day - morning, noon, evening, and before bedtime 1440 tablet 3    vilazodone (Viibryd) 40 MG tablet tablet Take 1 tablet by mouth Daily. 90 tablet 3     No facility-administered medications prior to visit.     Physical Exam  Gen: Well-developed, well-nourished adult in no apparent distress. Pleasant and cooperative. Alert and oriented.   HEENT: EOMI, MMM  Chest: Normal work of breathing.  Extremities: Warm, 2+ distal pulses, no cyanosis, clubbing, or edema.  Neuro: Good comprehension/cognition. Gait normal.  Skin: No alopecia, nail change (including no nail pitting), rashes, bruising, petechiae, sclerodactyly, digital pits, telangiectasias, tophi, appreciable calcinosis, or nodules.    Comprehensive Musculoskeletal Examination:  - Shoulders, elbows, wrists, hands/fingers, knees, ankles, feet/toes: No deformity, erythema, warmth, swelling, effusion, tenderness, or limited ROM.  - Lumbosacral spine and hips without limited ROM. Negative NHAN     Assessment & Plan  Calos Moseley is a 64 y.o. male who is being seen for follow-up gout with h/o predominant right ankle and foot involvement.      The patient's uric acid levels have decreased from 8.5 to 7.0, although the target is <6. He is currently on allopurinol 300 mg daily and has not experienced any flares. His joints, including ankles and foot joints, show no signs of swelling or tenderness.  Plan:  - Continue allopurinol 300 mg daily.  - Focus on dietary modifications and weight loss.  - Monitor for any flares and report immediately for potential steroid injection treatment.  - He cannot be on colchicine prophylaxis due to his declining renal  function    - Follow up in 6 months.    Chronic  Kidney disease.   The patient's eGFR is currently 36, indicating chronic kidney disease. His creatinine kinase levels have normalized. He has a history of kidney stones, which have not recurred recently.  Plan:  - Avoid NSAIDs.  - Continue monitoring kidney function.  - Follow up with nephrologist needed.     Back pain.  The patient reports significant back pain, which prevented him from getting out of bed today. The pain appears to be radicular, suggesting sciatica due to nerve impingement.  Plan:  - Follow up with back specialist for further evaluation and management . He has upcoming appointment with neurosurgery.      Follow-up: The patient will follow up in 6 months.    Diagnoses and all orders for this visit:    1. Chronic gout of right foot, unspecified cause (Primary)  -     Uric Acid; Future  -     Comprehensive Metabolic Panel; Future    2. Stage 3a chronic kidney disease  -     Comprehensive Metabolic Panel; Future    3. Lumbar radiculopathy     Return in about 6 months (around 12/9/2025) for Obtain labs prior to next visit.      I spent 25 minutes caring for Calos on this date of service. This time includes time spent by me in the following activities:preparing for the visit, reviewing tests, obtaining and/or reviewing a separately obtained history, performing a medically appropriate examination and/or evaluation , counseling and educating the patient/family/caregiver, ordering medications, tests, or procedures, documenting information in the medical record, and independently interpreting results and communicating that information with the patient/family/caregiver

## 2025-06-12 NOTE — PROGRESS NOTES
"Patient ID: Calos Moseley is a 64 y.o. male is here today for follow-up for lumbar radiculopathy.    Pt reports lower back and in the thigh, the right being the worst.     Subjective     The patient is here in regards to   Chief Complaint   Patient presents with    Back Pain    Follow-up       History of Present Illness  Bobby had a flareup of severe right-sided anterior thigh pain for few days.  This has gradually improved over time he currently no longer experiencing those types of symptoms.      While in the room and during my examination of the patient, appropriate PPE was worn by both the patient and the physician.    The following portions of the patient's history were reviewed and updated as appropriate: allergies, current medications, past family history, past medical history, past social history, past surgical history and problem list.    Review of Systems   Constitutional:  Negative for fatigue and fever.   Eyes:  Negative for visual disturbance.   Gastrointestinal:  Negative for nausea and vomiting.   Genitourinary:  Negative for difficulty urinating.   Musculoskeletal:  Positive for back pain. Negative for gait problem, neck pain and neck stiffness.   Neurological:  Positive for numbness (pt reports numbness in feet.). Negative for dizziness, weakness, light-headedness and headaches.   Psychiatric/Behavioral:  Negative for confusion and decreased concentration.         Past Medical History:   Diagnosis Date    Anesthesia complication     STATES HARD TO WAKE UP AFTER PREVIOUS KIDNEY STONE SURGERY AND STATES HE CODED    Bipolar affective     Chronic back pain     COPD (chronic obstructive pulmonary disease)     ? FROM MD NOTES    COVID 2022    CRF (chronic renal failure), stage 4 (severe)     WILL CHECK LABS AGAIN    Depression     Elevated cholesterol     History of kidney stones     History of pneumonia 05/2023    \"TRAPPED LUNG\" PLUERAL EFFUSION. RIGHT LUNG. S/P DECORTIFICATION RIGHT LUNG    History of UTI "     Hypertension     Hypothyroidism     Kidney stone     Lumbar disc disease     Numbness and tingling     Restless leg syndrome     Shortness of breath     Sleep apnea     cpap  bring dos    Swelling of both lower extremities        No Known Allergies    Family History   Problem Relation Age of Onset    Lung cancer Mother     COPD Father     Emphysema Father     Diabetes Father     Malig Hyperthermia Neg Hx        Social History     Socioeconomic History    Marital status:    Tobacco Use    Smoking status: Former     Current packs/day: 0.00     Types: Cigarettes     Quit date: 1990     Years since quittin.5     Passive exposure: Past    Smokeless tobacco: Never    Tobacco comments:     SMOKED 2 PPD FOR 8 YEARS. QUIT 35 YEARS AGO   Vaping Use    Vaping status: Never Used   Substance and Sexual Activity    Alcohol use: Yes     Comment: ON OCC    Drug use: No    Sexual activity: Defer       Past Surgical History:   Procedure Laterality Date    ADENOIDECTOMY      APPENDECTOMY      BRONCHOSCOPY Right 2023    Procedure: BRONCHOSCOPY WITH ENDOBRONCHIAL ULTRASOUND with BAL of RLL and FNA;  Surgeon: Aniceto Bruno MD;  Location: [facility] ENDOSCOPY;  Service: Pulmonary;  Laterality: Right;  pre- RLL pneumonia, lymphadenopathy  post- same    COLONOSCOPY      COLONOSCOPY N/A 2023    Procedure: COLONOSCOPY into cecum with cold snare polypectomies;  Surgeon: Yoan Leonard MD;  Location: [facility] ENDOSCOPY;  Service: Gastroenterology;  Laterality: N/A;  polyps    CYSTOSCOPY WITH CLOT EVACUATION N/A 2022    Procedure: CYSTOSCOPY WITH CLOT EVACUATION;  Surgeon: Ihsan Lopez MD;  Location: [facility] OR;  Service: Urology;  Laterality: N/A;    CYSTOSCOPY, URETEROSCOPY, RETROGRADE PYELOGRAM, STENT INSERTION Left 2022    Procedure: CYSTOSCOPY URETEROSCOPY LASER LITHOTRIPSY and stent exchange;  Surgeon: Deuce Leija MD;  Location: [facility] OR;  Service: Urology;   Laterality: Left;    EPIDURAL BLOCK      LUMBAR DISCECTOMY Left 5/13/2024    Procedure: LEFT LUMBAR FIVE TO SACRAL ONE FORAMINOTOMY, POSSIBLE DISCECTOMY MICROSCOPIC;  Surgeon: Christos Spangler MD;  Location: [facility] MAIN OR;  Service: Neurosurgery;  Laterality: Left;    NEPHROLITHOTRIPSY PERCUTANEOUS Left 02/21/2022    Procedure: NEPHROSTLITHOTOMY PERCUTANEOUS;  Surgeon: Deuce Leija MD;  Location: [facility] MAIN OR;  Service: Urology;  Laterality: Left;    NEPHROLITHOTRIPSY PERCUTANEOUS Left 02/23/2022    Procedure: LEFT FLEXIBLE NEPHROSTOMY WITH STONE EXTRACTION, LEFT NEPHROSTOMY, TUBE REMOVAL, BILATERAL URETERAL STENT PLACEMENT;  Surgeon: Deuce Leija MD;  Location: [facility] OR;  Service: Urology;  Laterality: Left;    NEPHROLITHOTRIPSY PERCUTANEOUS Right 03/07/2022    Procedure: RIGHT PERCUTANEOUS NEPHROLITHOTOMY W ACCESS;  Surgeon: Deuce Leija MD;  Location: [facility] OR;  Service: Urology;  Laterality: Right;    THORACOSCOPY WITH DECORTICATION Right 05/20/2023    Procedure: THORACOSCOPY VIDEO ASSISTED WITH DECORTICATION WITH DAVINCI ROBOT;  Surgeon: Jose Rice MD;  Location: [facility] OR;  Service: Robotics - DaVinci;  Laterality: Right;    TONSILLECTOMY           Objective     Vitals:    06/19/25 1359   BP: 154/74   Pulse: 81   Temp: 98.7 °F (37.1 °C)   SpO2: 96%     Body mass index is 38.13 kg/m².    Physical Exam  Constitutional:       General: He is awake.   HENT:      Head: Normocephalic and atraumatic.   Eyes:      General: Lids are normal.      Extraocular Movements: Extraocular movements intact.      Conjunctiva/sclera: Conjunctivae normal.      Pupils: Pupils are equal, round, and reactive to light.   Pulmonary:      Breath sounds: Normal breath sounds.   Abdominal:      Palpations: Abdomen is soft.   Musculoskeletal:         General: Normal range of motion.   Skin:     General: Skin is warm and dry.   Neurological:      Mental Status: He is alert.      Coordination:  Coordination is intact.      Deep Tendon Reflexes: Reflexes are normal and symmetric.   Psychiatric:         Behavior: Behavior is cooperative.         Neurological Exam  Mental Status  Awake and alert. Oriented to person, place and time.    Cranial Nerves  CN II: Visual acuity is normal. Visual fields full to confrontation.  CN III, IV, VI: Extraocular movements intact bilaterally. Normal lids and orbits bilaterally. Pupils equal round and reactive to light bilaterally.  CN V: Facial sensation is normal.  CN VII: Full and symmetric facial movement.  CN IX, X: Palate elevates symmetrically. Normal gag reflex.  CN XI: Shoulder shrug strength is normal.  CN XII: Tongue midline without atrophy or fasciculations.    Motor                                               Right                     Left  Deltoid                                   5                          5   Biceps                                   5                          5   Triceps                                  5                          5   Iliopsoas                               5                          5   Quadriceps                           5                          5   Hamstring                             5                          5   Gastrocnemius                     5                           5   Anterior tibialis                      5                          5    Sensory  Sensation is intact to light touch, pinprick, vibration and proprioception in all four extremities.    Reflexes  Deep tendon reflexes are 2+ and symmetric in all four extremities.    Coordination    Finger-to-nose, rapid alternating movements and heel-to-shin normal bilaterally without dysmetria.    Gait  Normal casual, toe, heel and tandem gait.      Assessment & Plan   Independent Review of Radiographic Studies:      I personally reviewed the images from the following studies.    FINDINGS:    No new neuroimaging.    Assessment/Plan: Could have been meralgia  paresthetica versus radiculopathy.  Currently does not have a positive straight leg raise at all.  If it happens again, I would recommend Medrol Dosepak.  He has mild to moderate degenerative disc disease but a small canal and moderate foraminal stenosis at multiple levels including at the L4-5 level bilaterally.  Seem to be worse foraminal stenosis at the L5-S1 level worse on the left than the right.    If he has another flareup, we will do a Medrol Dosepak and repeat an MRI of the lumbar spine.  I am waiting for him to continue to lose weight or for symptoms to get worse and we will consider potential surgery.    Medical Decision Making:      Follow-up in 3 months         Diagnoses and all orders for this visit:    1. Lumbar radiculopathy (Primary)             Patient Instructions/Recommendations:    Call with any questions or concerns      Christos Spangler MD  06/19/25  14:30 EDT

## 2025-06-16 RX ORDER — CARVEDILOL 25 MG/1
25 TABLET ORAL EVERY 12 HOURS SCHEDULED
Qty: 180 TABLET | Refills: 0 | Status: SHIPPED | OUTPATIENT
Start: 2025-06-16

## 2025-06-16 RX ORDER — VILAZODONE HYDROCHLORIDE 40 MG/1
40 TABLET ORAL DAILY
Qty: 90 TABLET | Refills: 3 | Status: SHIPPED | OUTPATIENT
Start: 2025-06-16

## 2025-06-19 ENCOUNTER — TELEPHONE (OUTPATIENT)
Dept: NEUROSURGERY | Facility: CLINIC | Age: 65
End: 2025-06-19

## 2025-06-19 ENCOUNTER — OFFICE VISIT (OUTPATIENT)
Dept: NEUROSURGERY | Facility: CLINIC | Age: 65
End: 2025-06-19
Payer: COMMERCIAL

## 2025-06-19 VITALS
TEMPERATURE: 98.7 F | DIASTOLIC BLOOD PRESSURE: 74 MMHG | WEIGHT: 231.6 LBS | HEART RATE: 81 BPM | SYSTOLIC BLOOD PRESSURE: 154 MMHG | BODY MASS INDEX: 38.13 KG/M2 | OXYGEN SATURATION: 96 %

## 2025-06-19 DIAGNOSIS — M54.16 LUMBAR RADICULOPATHY: Primary | ICD-10-CM

## 2025-06-23 ENCOUNTER — TELEPHONE (OUTPATIENT)
Dept: NEUROSURGERY | Facility: CLINIC | Age: 65
End: 2025-06-23
Payer: COMMERCIAL

## 2025-06-23 DIAGNOSIS — M48.061 LUMBAR FORAMINAL STENOSIS: Primary | ICD-10-CM

## 2025-06-23 DIAGNOSIS — M54.16 LUMBAR RADICULOPATHY: ICD-10-CM

## 2025-06-23 RX ORDER — METHYLPREDNISOLONE 4 MG/1
TABLET ORAL
Qty: 21 TABLET | Refills: 0 | Status: SHIPPED | OUTPATIENT
Start: 2025-06-23

## 2025-06-23 NOTE — TELEPHONE ENCOUNTER
January 31, 2024       Ayaan Stanton MD  [address]  [place] [address] [place] [phone]  Via Fax: [phone]      Patient: William Agrawal   YOB: 1945   Date of Visit: 1/31/2024       Dear Dr. Stanton:    Thank you for referring William Agrawal to me for evaluation. Below are my notes for this visit with him.    If you have questions, please do not hesitate to call me. I look forward to following your patient along with you.      Sincerely,        Blayne Wallace MD        CC: No Recipients  Blayne Wallace MD  1/31/2024 10:02 AM  Signed  Cardiac EP Progress Note    PCP: Ayaan Stanton MD       HISTORY OF PRESENT ILLNESS   William Agrawal is a 78 year old male seen today in follow up for his [employer] BiV PPM and non-ischemic CMP. History of CHB, symptomatic PVCs. He had a Ziopatch done showing rare PVcs with a burden < 1%. His pacemaker log showed 7.5% PVCs between Jan to July 2018. His BiV pacemaker generator was changed in 3/2016 and he now has a [employer]ronic. device. He still has periods of bigeminy and trigeminy PVCs during which he feels weak and tired. No syncope or near syncope. He feels more PVCs when he is stressed out.In July 2021 he has prostate surgery at [facility] and a vasovagal episode after surgery. Most recent echo done on 6/29/23 showed EF of 55%. He has also had a prostatectomy and was found to have prostate cancer however no radiation or any other interventions needed.   He complains of anxiety, panic attacks and being very short tempered. He has been started on Sertraline.      In May 2022 he had COVID infection and started coughing after that. Dr. Stanton switched his Lisinopril to Irbesartan because cough did not resolve and he is coughing much less now.      MEDICATIONS   Outpatient medications:  Current Outpatient Medications   Medication Sig Dispense Refill   • irbesartan (AVAPRO) 150 MG tablet Take 1 tablet by mouth  Patient called stating that he was seen on 6/19 and per Dr Spangler, if patient's pain persist that he would send a MDP to his pharmacy. Please call patient and advise   nightly. 90 tablet 3   • metoPROLOL tartrate (LOPRESSOR) 50 MG tablet Take 1 tablet by mouth in the morning and 1 tablet in the evening. 180 tablet 1   • sertraline (ZOLOFT) 50 MG tablet Take 1 tablet by mouth daily. 90 tablet 3   • clonazePAM (KlonoPIN) 0.5 MG tablet Take 1 tablet by mouth daily as needed for Anxiety. 30 tablet 1     No current facility-administered medications for this visit.     CARDIAC HISTORY:  1 Atriovent Block Complete - 11/14/2007   2 Anxiety State Nos - 11/14/2007   3 Parox Ventric Tachycard - 11/14/2007   4 Card Pacemaker In Situ - 11/14/2007   5 Benign Hypertension - 11/14/2007   6 Syncope And Collapse - 11/14/2007   7 Left Bb Block Nec - 11/15/2007   8 Premature Beats [facility] - 7/28/2008   9 Headache - 11/24/2008   10 chronic systolic heart failure - 1/14/2010   11 Sleep Apnea [CPAP] - 12/5/2011   12 PSVT - 12/2/2013     HISTORIES   ALLERGIES:  No Known Allergies     Past Medical History:   Diagnosis Date   • Anxiety state    • Atrioventricular block, complete (CMD)    • BPH (benign prostatic hyperplasia)    • Chronic headache    • Chronic systolic heart failure (CMD)    • HLD (hyperlipidemia)    • HTN (hypertension)    • ANATOLIY on CPAP    • Paroxysmal ventricular tachycardia (CMD)    • Premature beats      Past Surgical History:   Procedure Laterality Date   • Appendectomy  1970   • Cardiac bi-ventricular pacemaker placement  02/10/2010    new system implanted R side 02/10/2011   • Cardiac catherization  11/2006    normal coronaries   • Hernia repair  2001   • Ppm dual implant  07/12/2007   • Ppm ventricular lead replace  11/19/2010     Family History   Problem Relation Age of Onset   • Coronary Artery Disease Brother         CABG   • Coronary Artery Disease Other      Social History     Tobacco Use   • Smoking status: Never   • Smokeless tobacco: Never   Vaping Use   • Vaping Use: never used   Substance Use Topics   • Alcohol use: Not Currently   • Drug use: Never     CARDIOVASCULAR  PROCEDURES:  CATH LAB:  Cath (Normal EF, Normal Coronaries) - 11/2006   ECHO/MUGA:  Echo (EF 0.49 (49%)) - 3/9/2021   Echo (EF 0.50 (50%), (45-50%) Mild MR, Mild TR, No AS) - 5/15/2019   Echo (EF 0.45 (45%), 45-50%) - 1/22/2018   Echo (EF 0.45 (45%)) - 12/15/2015   Echo (EF 0.50) - 8/21/2014   Echo (EF 50-55%) - 6/10/2013   Echo (EF 0.65) - 4/23/2012   Echo (EF 0.35) - 5/2009   Echo (EF 0.30, paradoxical septal motion) - 12/2009   Echo (EF 0.47) - 3/2010   Echo (EF 0.65) - 11/2006   Echo (EF 0.40, atrial septal aneurysm) - 5/2008   ELECTROPHYSIOLOGY:  Devices (Generator Replacement Bi-Ventricular PPM ([employer])) - 3/8/2016   EVR (Sinus Rhythm, HR 60-, symptoms corrolated w/ PVC's. burden < 1%.) - 12/21/2015   Holter (60-67-93,  33 PVCs.) - 4/23/2012   Devices (Bi-Ventricular PPM, New system was implantes on the right side. Old left sided leads were extracted. [facility]) - 2/10/2011   Devices (Bi-Ventricular PPM, Old LV lead that was causing diaph. stim was removed. New lead implanted in a more anterior location.) - 11/19/2010   Devices (Bi-Ventricular PPM ([employer]), Upgrade, [place]) - 2/10/2010   Devices (Dual Chamber PPM ([employer])) - 7/12/2007   STRESS TESTS:  SEH (EF 0.55 (55%), Negative for ischemia) - 8/25/2016   MPI (inferior and apical scars) - 7/2009   OTHERS:  Other (PVCs, NS-VT) - 2006   Other (Holter @ [place]-2300 PVCs) - 2009   Other (Holter @ [facility] Av HR 69 (), 296 PVCs) - 2008     REVIEW OF SYSTEMS   Review of Systems   All other systems reviewed and are negative.         PHYSICAL EXAM   Vital Signs:    Vitals:    01/31/24 0917   BP: 130/76   BP Location: RUE - Right upper extremity   Patient Position: Sitting   Cuff Size: Regular   Pulse: (!) 60   Weight: 82 kg (180 lb 12.4 oz)   Height: 5' 6\" (1.676 m)     General:   Alert, cooperative, conversive in no acute distress.  Skin:  Warm and dry without rash.    Head:  Normocephalic-atraumatic.   Neck:  Trachea is midline.  No adenopathy.  Normal thyroid without mass or tenderness.  Eyes:  Normal conjunctiva and sclera.  Cardiovascular: regular rate and rhythm, S1, S2 normal, no murmur, click, rub or gallop  Respiratory: clear to auscultation bilaterally  Gastrointestinal:  Soft and nontender.  Normal bowel sounds.  No hepatomegaly or splenomegaly.   Extremities:  extremities normal, atraumatic, no cyanosis or edema  Neuro:   Orientated x 4.  No focal deficits or lateralizing signs.  Psychiatric:   Cooperative.  Appropriate mood & affect.    LABORATORY DATA     Lab Results   Component Value Date    CHOLESTEROL 193 08/18/2023    HDL 40 08/18/2023    CALCLDL 135 (H) 08/18/2023    TRIGLYCERIDE 78 08/18/2023     No results found for: \"HGBA1C\"  Lab Results   Component Value Date    TSH 2.58 08/18/2023     Lab Results   Component Value Date    SODIUM 141 08/18/2023    POTASSIUM 4.5 08/18/2023    CHLORIDE 106 08/18/2023    CO2 30 08/18/2023    ANIONGAP 12 03/01/2016    GLUCOSE 86 08/18/2023    BUN 15 08/18/2023    CREATININE 0.82 08/18/2023    GFRA >90 03/01/2016    BCRAT  08/18/2023     Not Reported: BUN and Creatinine are within reference range.    CALCIUM 9.7 08/18/2023     Lab Results   Component Value Date    WBC 5.4 08/18/2023    RBC 4.22 08/18/2023    HGB 13.4 08/18/2023    HCT 39.4 08/18/2023    MCV 93.4 08/18/2023    MCH 31.8 08/18/2023    MCHC 34.0 08/18/2023    RDWCV 12.8 08/18/2023    PT 11.9 (H) 03/01/2016    SEG 57.6 08/18/2023    TLYMPH 32.9 08/18/2023    PMON 8.2 08/18/2023    PEOS 0.9 08/18/2023    PBASO 0.4 08/18/2023    ANEUT 3,110 08/18/2023    ALYMS 1,777 08/18/2023    JENNIFER 443 08/18/2023    AEOS 49 08/18/2023    ABASO 22 08/18/2023       ASSESSMENT & PLAN   1. Cardiac pacemaker in situ  Functioning normally. 2 years battery left. BiV paced 100%. His pacemaker has an advisory for premature battery depletion, therefore I recommended generator change.   2. Chronic systolic heart failure (CMS/HCC)  Stable, euvolemic. Will  continue GDMT. Echo is pending.   3. Primary hypertension   Well controlled. Will continue current meds.   4. Premature beats  No PVCs. He is BiV paced 100%.        All questions answered and plan discussed with patient.    Procedure Request  Patient Name: William Agrawal : 1945   Telelphone: [phone]       Location: [facility] [phone]   Procedure: Biventricular Pacemaker and Generator Change   : [place]   Diagnosis:   ED Diagnosis   1. Pacemaker reprogramming/check  Cardiac Device Check - In Clinic      2. Primary hypertension        3. Chronic systolic heart failure (CMD)  Transthoracic Echo (TTE) Complete      4. Cardiac pacemaker in situ              Preferred Date/Time: Magdalene     Medications to discontinue:     Hibiclens: [x]  YES    []  NO   Anesthesia: []  YES    [x]  NO      Mapping: []  OCTAVIO   []  Rhythmia    Ablation:  []  Cryo  []  RF  []  Laser []  PFA     Antibiotic Pouch: [x]  Tyrex   []  Kangaroo []  N/A    Patient instructions:NPO after midnight

## 2025-06-23 NOTE — TELEPHONE ENCOUNTER
Spoke with pt. Pt informed me that he is still having pain. He has trouble sleeping because any time he lays down or sits on any soft surface he feels pain in his back and right thigh. (pain level 8) Ok to fill Medrol Dosepak for pt?

## 2025-06-27 DIAGNOSIS — M54.16 LUMBAR BACK PAIN WITH RADICULOPATHY AFFECTING LEFT LOWER EXTREMITY: ICD-10-CM

## 2025-06-27 DIAGNOSIS — M54.16 LUMBAR RADICULOPATHY: Primary | ICD-10-CM

## 2025-06-30 ENCOUNTER — TELEPHONE (OUTPATIENT)
Dept: NEUROSURGERY | Facility: CLINIC | Age: 65
End: 2025-06-30

## 2025-06-30 NOTE — TELEPHONE ENCOUNTER
Spoke with pt. Informed him that Dr Spangler prescribed medrol for him and that he wanted him to have a MRI done. Pt expressed understanding and confirmed to the preferred location would be the hospital. I informed him to call us once he has his MRI scheduled to call us back for a possible earlier appt with Dr Spangler.

## 2025-07-11 DIAGNOSIS — N18.32 TYPE 2 DIABETES MELLITUS WITH STAGE 3B CHRONIC KIDNEY DISEASE, WITHOUT LONG-TERM CURRENT USE OF INSULIN: ICD-10-CM

## 2025-07-11 DIAGNOSIS — E11.22 TYPE 2 DIABETES MELLITUS WITH STAGE 3B CHRONIC KIDNEY DISEASE, WITHOUT LONG-TERM CURRENT USE OF INSULIN: ICD-10-CM

## 2025-07-11 RX ORDER — SEMAGLUTIDE 1.34 MG/ML
1 INJECTION, SOLUTION SUBCUTANEOUS WEEKLY
Qty: 3 ML | Refills: 2 | Status: SHIPPED | OUTPATIENT
Start: 2025-07-11

## 2025-08-11 ENCOUNTER — LAB (OUTPATIENT)
Facility: HOSPITAL | Age: 65
End: 2025-08-11
Payer: COMMERCIAL

## 2025-08-11 ENCOUNTER — HOSPITAL ENCOUNTER (OUTPATIENT)
Dept: MRI IMAGING | Facility: HOSPITAL | Age: 65
Discharge: HOME | End: 2025-08-11
Payer: COMMERCIAL

## 2025-08-11 DIAGNOSIS — M54.16 LUMBAR BACK PAIN WITH RADICULOPATHY AFFECTING LEFT LOWER EXTREMITY: ICD-10-CM

## 2025-08-11 DIAGNOSIS — M54.16 LUMBAR RADICULOPATHY: ICD-10-CM

## 2025-08-11 LAB
CHOLEST SERPL-MCNC: 201 MG/DL (ref 0–200)
HDLC SERPL QL: 3.65
HDLC SERPL-MCNC: 55 MG/DL (ref 40–60)
LDLC SERPL CALC-MCNC: 93 MG/DL (ref 0–100)
TRIGL SERPL-MCNC: 318 MG/DL (ref 0–150)
VLDLC SERPL-MCNC: 53 MG/DL (ref 5–40)

## 2025-08-11 PROCEDURE — 80053 COMPREHEN METABOLIC PANEL: CPT | Performed by: STUDENT IN AN ORGANIZED HEALTH CARE EDUCATION/TRAINING PROGRAM

## 2025-08-11 PROCEDURE — 83036 HEMOGLOBIN GLYCOSYLATED A1C: CPT | Performed by: STUDENT IN AN ORGANIZED HEALTH CARE EDUCATION/TRAINING PROGRAM

## 2025-08-11 PROCEDURE — 72148 MRI LUMBAR SPINE W/O DYE: CPT

## 2025-08-11 RX ORDER — PRAMIPEXOLE DIHYDROCHLORIDE 0.75 MG/1
0.75 TABLET ORAL NIGHTLY
Qty: 90 TABLET | Refills: 0 | Status: SHIPPED | OUTPATIENT
Start: 2025-08-11

## 2025-08-12 ENCOUNTER — OFFICE VISIT (OUTPATIENT)
Dept: INTERNAL MEDICINE | Facility: CLINIC | Age: 65
End: 2025-08-12
Payer: COMMERCIAL

## 2025-08-12 VITALS
HEIGHT: 65 IN | WEIGHT: 222 LBS | OXYGEN SATURATION: 95 % | SYSTOLIC BLOOD PRESSURE: 140 MMHG | DIASTOLIC BLOOD PRESSURE: 68 MMHG | HEART RATE: 72 BPM | BODY MASS INDEX: 36.99 KG/M2

## 2025-08-12 DIAGNOSIS — E11.22 TYPE 2 DIABETES MELLITUS WITH STAGE 3B CHRONIC KIDNEY DISEASE, WITHOUT LONG-TERM CURRENT USE OF INSULIN: Primary | ICD-10-CM

## 2025-08-12 DIAGNOSIS — N18.32 STAGE 3B CHRONIC KIDNEY DISEASE: ICD-10-CM

## 2025-08-12 DIAGNOSIS — G25.81 RESTLESS LEG SYNDROME: ICD-10-CM

## 2025-08-12 DIAGNOSIS — N18.32 TYPE 2 DIABETES MELLITUS WITH STAGE 3B CHRONIC KIDNEY DISEASE, WITHOUT LONG-TERM CURRENT USE OF INSULIN: Primary | ICD-10-CM

## 2025-08-12 DIAGNOSIS — N18.31 STAGE 3A CHRONIC KIDNEY DISEASE: ICD-10-CM

## 2025-08-12 DIAGNOSIS — E66.812 CLASS 2 OBESITY WITHOUT SERIOUS COMORBIDITY WITH BODY MASS INDEX (BMI) OF 37.0 TO 37.9 IN ADULT, UNSPECIFIED OBESITY TYPE: ICD-10-CM

## 2025-08-12 DIAGNOSIS — E78.5 HYPERLIPIDEMIA, UNSPECIFIED HYPERLIPIDEMIA TYPE: Chronic | ICD-10-CM

## 2025-08-12 DIAGNOSIS — E87.6 HYPOKALEMIA: ICD-10-CM

## 2025-08-12 RX ORDER — POTASSIUM CITRATE 1080 MG/1
TABLET, EXTENDED RELEASE ORAL
Qty: 180 TABLET | Refills: 1 | Status: SHIPPED | OUTPATIENT
Start: 2025-08-12

## 2025-08-12 RX ORDER — SEMAGLUTIDE 2.68 MG/ML
2 INJECTION, SOLUTION SUBCUTANEOUS WEEKLY
Qty: 3 ML | Refills: 2 | Status: SHIPPED | OUTPATIENT
Start: 2025-08-12

## (undated) DEVICE — DRP PERC PROC

## (undated) DEVICE — 1000ML,PRESSURE INFUSER W/STOPCOCK: Brand: MEDLINE

## (undated) DEVICE — GOWN,REINFORCE,POLY,SIRUS,BREATH SLV,XLG: Brand: MEDLINE

## (undated) DEVICE — TUBING, SUCTION, 1/4" X 12', STRAIGHT: Brand: MEDLINE

## (undated) DEVICE — TUBING, SUCTION, 1/4" X 10', STRAIGHT: Brand: MEDLINE

## (undated) DEVICE — DBD-DRAPE,LAP,CHOLE,W/TROUGHS,STERILE: Brand: MEDLINE

## (undated) DEVICE — SYR LUERLOK 20CC BX/50

## (undated) DEVICE — SOL IRR NACL 0.9PCT ARTHROMATIC 3000ML

## (undated) DEVICE — DRP MICROSCP LEICA 137X381CM

## (undated) DEVICE — 3M™ STERI-DRAPE™ INCISE DRAPE 1040 (35CM X 35CM): Brand: STERI-DRAPE™

## (undated) DEVICE — CATH FOL COUNCL 2WY 20F 5CC

## (undated) DEVICE — NEEDLE, QUINCKE 22GX3.5": Brand: MEDLINE INDUSTRIES, INC.

## (undated) DEVICE — FIBR LASR HOLMIUM 200 MICRON DISP

## (undated) DEVICE — ULTRASOUND PROBE: Brand: ULTRASOUND PROBE

## (undated) DEVICE — GLW STFF STR 3CM .035X150CM

## (undated) DEVICE — LAPAROVUE VISIBILITY SYSTEM LAPAROSCOPIC SOLUTIONS: Brand: LAPAROVUE

## (undated) DEVICE — WOUND RETRACTOR AND PROTECTOR: Brand: ALEXIS WOUND PROTECTOR-RETRACTOR

## (undated) DEVICE — DUAL LUMEN URETERAL CATHETER

## (undated) DEVICE — C-ARM: Brand: UNBRANDED

## (undated) DEVICE — DEV SXN PUDDLEVAC GUPPY

## (undated) DEVICE — NDL HYPO ECLPS SFTY 22G 1 1/2IN

## (undated) DEVICE — STRIP,CLOSURE,WOUND,MEDI-STRIP,1/2X4: Brand: MEDLINE

## (undated) DEVICE — TBG INSUFFLATION LUER LOCK: Brand: MEDLINE INDUSTRIES, INC.

## (undated) DEVICE — CANNULA SEAL

## (undated) DEVICE — TOTAL TRAY, DB, 100% SILI FOLEY, 16FR 10: Brand: MEDLINE

## (undated) DEVICE — CATH 2L URETRL HC 6F 50CM

## (undated) DEVICE — NITINOL STONE RETRIEVAL BASKET: Brand: ZERO TIP

## (undated) DEVICE — URETERAL DILATATION SYSTEM

## (undated) DEVICE — THE STERILE LIGHT HANDLE COVER IS USED WITH STERIS SURGICAL LIGHTING AND VISUALIZATION SYSTEMS.

## (undated) DEVICE — TRAP,MUCUS SPECIMEN, 80CC: Brand: MEDLINE

## (undated) DEVICE — LAPAROSCOPIC SMOKE FILTRATION SYSTEM: Brand: PALL LAPAROSHIELD® PLUS LAPAROSCOPIC SMOKE FILTRATION SYSTEM

## (undated) DEVICE — PAD,ABDOMINAL,5"X9",STERILE,LF,1/PK: Brand: MEDLINE INDUSTRIES, INC.

## (undated) DEVICE — KT ANTI FOG W/FLD AND SPNG

## (undated) DEVICE — ADAPT CLN BIOGUARD AIR/H2O DISP

## (undated) DEVICE — SYRINGE,TOOMEY,IRRIGATION,70CC,STERILE: Brand: MEDLINE

## (undated) DEVICE — DRSNG WND BORDR/ADHS NONADHR/GZ LF 4X4IN STRL

## (undated) DEVICE — RE-ENTRY MALECOT NEPHROSTOMY CATHETER SET: Brand: RE-ENTRY MALECOT NEPHROSTOMY CATHETER SET

## (undated) DEVICE — RIGID PNEUMATIC PROBE: Brand: RIGID PNEUMATIC PROBE

## (undated) DEVICE — CONTAINER,SPECIMEN,OR STERILE,4OZ: Brand: MEDLINE

## (undated) DEVICE — Device

## (undated) DEVICE — ELECTRD BLD EZ CLN MOD 6.5IN

## (undated) DEVICE — GW AMPLTZ SUPRSTF PTFE JB .035 7X145CM

## (undated) DEVICE — KT CLN CLEANOR SCPE

## (undated) DEVICE — SINGLE USE BIOPSY VALVE MAJ-210: Brand: SINGLE USE BIOPSY VALVE (STERILE)

## (undated) DEVICE — GLV SURG BIOGEL LTX PF 8

## (undated) DEVICE — COLUMN DRAPE

## (undated) DEVICE — THE SINGLE USE ETRAP – POLYP TRAP IS USED FOR SUCTION RETRIEVAL OF ENDOSCOPICALLY REMOVED POLYPS.: Brand: ETRAP

## (undated) DEVICE — ANTIBACTERIAL UNDYED BRAIDED (POLYGLACTIN 910), SYNTHETIC ABSORBABLE SUTURE: Brand: COATED VICRYL

## (undated) DEVICE — NITINOL WIRE WITH HYDROPHILIC TIP: Brand: SENSOR

## (undated) DEVICE — INTENDED FOR TISSUE SEPARATION, AND OTHER PROCEDURES THAT REQUIRE A SHARP SURGICAL BLADE TO PUNCTURE OR CUT.: Brand: BARD-PARKER ® CARBON RIB-BACK BLADES

## (undated) DEVICE — ST EXT MINIBORE BIFUSE W 2CAP/REMOV 8.5

## (undated) DEVICE — MSK AIRWY LARYNG LMA PILOT SZ4

## (undated) DEVICE — KT SURG TURNOVER 050

## (undated) DEVICE — DRP SLUSH WARMR MACH CIR 44X44IN

## (undated) DEVICE — SUT PERMAHAND SILK 0 FSL 30IN BLK

## (undated) DEVICE — ADHS LIQ MASTISOL 2/3ML

## (undated) DEVICE — PAD,EYE,1-5/8X2 5/8,STERILE,LF,1/PK: Brand: MEDLINE

## (undated) DEVICE — RESUSCITATOR,MAN.ADLT,TUBE,FILTER: Brand: MEDLINE INDUSTRIES, INC.

## (undated) DEVICE — REDUCER: Brand: ENDOWRIST

## (undated) DEVICE — CONN TBG Y 5 IN 1 LF STRL

## (undated) DEVICE — ADAPT CYSTO FOR SYR TO SHEATH

## (undated) DEVICE — PK NEURO SPINE 40

## (undated) DEVICE — GOWN,NON-REINFORCED,SIRUS,SET IN SLV,XXL: Brand: MEDLINE

## (undated) DEVICE — COVER,C-ARM,41X74: Brand: MEDLINE

## (undated) DEVICE — ADAPT SWVL FIBROPTIC BRONCH

## (undated) DEVICE — SUT ETHLN 3/0 PS1 18IN 1663H

## (undated) DEVICE — IRRIGATOR BULB ASEPTO 60CC STRL

## (undated) DEVICE — AMPLATZ TYPE RENAL DILATOR

## (undated) DEVICE — LN SMPL CO2 SHTRM SD STREAM W/M LUER

## (undated) DEVICE — GW FIX CORE JB FLX PTFE .035 15X145CM

## (undated) DEVICE — BG DRN DISPOSAL DEPOT W/TBG 24IN 600ML

## (undated) DEVICE — ARM DRAPE

## (undated) DEVICE — TOOL MR8-T14MH30M MR8 14CM TL MH 2FL 3MM: Brand: MIDAS REX MR8

## (undated) DEVICE — GAUZE,SPONGE,FLUFF,6"X6.75",STRL,5/TRAY: Brand: MEDLINE

## (undated) DEVICE — PK MINOR LAPAROTOMY 50

## (undated) DEVICE — SYS IRR PUMP SGL ACTN VAC SYR 10CC

## (undated) DEVICE — SUT SILK 2/0 FS BLK 18IN 685G

## (undated) DEVICE — OCCLUSION BALLOON CATHETER: Brand: OCCLUDER

## (undated) DEVICE — GLV SURG BIOGEL LTX PF 7

## (undated) DEVICE — BLADELESS OBTURATOR: Brand: WECK VISTA

## (undated) DEVICE — 28 FR STRAIGHT – SOFT PVC CATHETER: Brand: PVC THORACIC CATHETERS

## (undated) DEVICE — SUT MONOCRYL 4/0 PS2 27IN Y426H ETY426H

## (undated) DEVICE — ADAPT UROLOK

## (undated) DEVICE — SOLUTION,WATER,IRRIGATION,1000ML,STERILE: Brand: MEDLINE

## (undated) DEVICE — ENDOPATH XCEL BLADELESS TROCARS WITH STABILITY SLEEVES: Brand: ENDOPATH XCEL

## (undated) DEVICE — CATH URETRL OPN/END 5F70CM

## (undated) DEVICE — RADIFOCUS GLIDEWIRE: Brand: GLIDEWIRE

## (undated) DEVICE — SPNG GZ WOVN 4X4IN 12PLY 10/BX STRL

## (undated) DEVICE — SOL ISO/ALC 70PCT 4OZ

## (undated) DEVICE — TIP COVER ACCESSORY

## (undated) DEVICE — NDL HYPO PRECISIONGLIDE REG 25G 1 1/2

## (undated) DEVICE — SOL IRRG H2O BG 3000ML STRL

## (undated) DEVICE — TBG IRRI TUR Y/TYP NONVENT 98IN LF

## (undated) DEVICE — SPNG LAP CIGARETTE KITTNER 5MM STRL PK/5

## (undated) DEVICE — BITEBLOCK OMNI BLOC

## (undated) DEVICE — BAG,DRAINAGE,4L,A/R TOWER,LL,SLIDE TAP: Brand: MEDLINE

## (undated) DEVICE — GAS SAMPLING LINE,10,MM,0.047ID,CO-EX: Brand: MEDLINE

## (undated) DEVICE — EXTENSION SET, MALE LUER LOCK ADAPTER WITH RETRACTABLE COLLAR

## (undated) DEVICE — SUT MNCRYL 4/0 PS2 18 IN

## (undated) DEVICE — APPL CHLORAPREP HI/LITE 26ML ORNG

## (undated) DEVICE — MSK PROC CURAPLEX O2 2/ADAPT 7FT

## (undated) DEVICE — CATH URETH FLEXIMA CONE TP 5F 70CM

## (undated) DEVICE — GAUZE,SPONGE,4"X4",16PLY,XRAY,STRL,LF: Brand: MEDLINE

## (undated) DEVICE — BNDG ADHS PLSTC 1X3IN LF

## (undated) DEVICE — RADIFOCUS GLIDECATH: Brand: GLIDECATH

## (undated) DEVICE — MARKR SKIN W/RULR 2TP

## (undated) DEVICE — HIGH PRESSURE NEPHROSTOMY BALLOON CATHETER: Brand: NEPHROMAX

## (undated) DEVICE — KT ORCA ORCAPOD DISP STRL

## (undated) DEVICE — GLV SURG BIOGEL LTX PF 6 1/2

## (undated) DEVICE — PERCUTANEOUS ACCESS NEEDLE

## (undated) DEVICE — GLOVE,SURG,SENSICARE,ALOE,LF,PF,7: Brand: MEDLINE

## (undated) DEVICE — SENSR O2 OXIMAX FNGR A/ 18IN NONSTR

## (undated) DEVICE — SLV SCD CALF HEMOFORCE DVT THERP REPROC MD

## (undated) DEVICE — 3M™ STERI-DRAPE™ INSTRUMENT POUCH 1018L: Brand: STERI-DRAPE™

## (undated) DEVICE — SUT ETHIB 1 CT1 30IN  X425H

## (undated) DEVICE — SUT VIC 0 UR6 27IN VCP603H

## (undated) DEVICE — SEAL

## (undated) DEVICE — LOU THORACIC: Brand: MEDLINE INDUSTRIES, INC.

## (undated) DEVICE — PERCUTANEOUS TRACT KIT: Brand: LINGEMAN PERCUTANEOUS TRACT KIT

## (undated) DEVICE — ST EXT IV SMARTSITE 2VLV SP M LL 5ML IV1

## (undated) DEVICE — SUCTION IRRIGATOR: Brand: ENDOWRIST

## (undated) DEVICE — PATIENT RETURN ELECTRODE, SINGLE-USE, CONTACT QUALITY MONITORING, ADULT, WITH 9FT CORD, FOR PATIENTS WEIGING OVER 33LBS. (15KG): Brand: MEGADYNE

## (undated) DEVICE — SNAR POLYP CAPTIVATOR RND STFF 2.4 240CM 10MM 1P/U

## (undated) DEVICE — Y-TYPE TUR/BLADDER IRRIGATION SET, REGULATING CLAMP

## (undated) DEVICE — URETERAL ACCESS SHEATH SET: Brand: NAVIGATOR HD

## (undated) DEVICE — SOL ANTISTICK CAUTRY ELECTROLUBE LF

## (undated) DEVICE — SUT MNCRYL PLS ANTIB UD 4/0 PS2 18IN

## (undated) DEVICE — SINGLE USE SUCTION VALVE MAJ-209: Brand: SINGLE USE SUCTION VALVE (STERILE)

## (undated) DEVICE — VITAL SIGNS™ JACKSON-REES CIRCUITS: Brand: VITAL SIGNS™

## (undated) DEVICE — Device: Brand: SINGLE USE ASPIRATION NEEDLE NA-U401SX

## (undated) DEVICE — SOL NACL 0.9PCT 1000ML

## (undated) DEVICE — GLV SURG SIGNATURE ESSENTIAL PF LTX SZ7

## (undated) DEVICE — PK CYSTO 50

## (undated) DEVICE — DRSNG SURESITE123 8X12IN

## (undated) DEVICE — ADHS SKIN SURG TISS VISC PREMIERPRO EXOFIN HI/VISC FAST/DRY

## (undated) DEVICE — HEMOST ABS SURGIFOAM SZ100 8X12 10MM
Type: IMPLANTABLE DEVICE | Site: SPINE LUMBAR | Status: NON-FUNCTIONAL
Removed: 2024-05-13

## (undated) DEVICE — SUT ETHIB 0/0 CT1 30IN X424H

## (undated) DEVICE — CATH FOL SIMPLASTIC 3WY 24F 30CC

## (undated) DEVICE — SYR LL TP 10ML STRL

## (undated) DEVICE — SPONGE,NEURO,.75"X.75",XR,STRL,LF,10/PK: Brand: MEDLINE

## (undated) DEVICE — DILATOR/SHEATH SET: Brand: 8/10 DILATOR/SHEATH SET

## (undated) DEVICE — GLV SURG SENSICARE PI PF LF 7 GRN STRL

## (undated) DEVICE — PENCL ES MEGADINE EZ/CLEAN BUTN W/HOLSTR 10FT